# Patient Record
Sex: MALE | Race: WHITE | NOT HISPANIC OR LATINO | Employment: OTHER | URBAN - METROPOLITAN AREA
[De-identification: names, ages, dates, MRNs, and addresses within clinical notes are randomized per-mention and may not be internally consistent; named-entity substitution may affect disease eponyms.]

---

## 2018-02-08 ENCOUNTER — OFFICE VISIT (OUTPATIENT)
Dept: FAMILY MEDICINE | Facility: CLINIC | Age: 58
End: 2018-02-08
Payer: COMMERCIAL

## 2018-02-08 VITALS
DIASTOLIC BLOOD PRESSURE: 72 MMHG | HEART RATE: 79 BPM | SYSTOLIC BLOOD PRESSURE: 120 MMHG | HEIGHT: 70 IN | BODY MASS INDEX: 21.47 KG/M2 | WEIGHT: 149.94 LBS

## 2018-02-08 DIAGNOSIS — Z00.00 ANNUAL PHYSICAL EXAM: Primary | ICD-10-CM

## 2018-02-08 DIAGNOSIS — N52.9 ERECTILE DYSFUNCTION, UNSPECIFIED ERECTILE DYSFUNCTION TYPE: ICD-10-CM

## 2018-02-08 DIAGNOSIS — Z12.11 SCREEN FOR COLON CANCER: ICD-10-CM

## 2018-02-08 DIAGNOSIS — Z23 NEED FOR VACCINATION AGAINST STREPTOCOCCUS PNEUMONIAE: ICD-10-CM

## 2018-02-08 DIAGNOSIS — F17.200 SMOKER: ICD-10-CM

## 2018-02-08 DIAGNOSIS — Z12.5 SCREENING FOR PROSTATE CANCER: ICD-10-CM

## 2018-02-08 PROCEDURE — 99396 PREV VISIT EST AGE 40-64: CPT | Mod: 25,S$GLB,, | Performed by: FAMILY MEDICINE

## 2018-02-08 PROCEDURE — 99213 OFFICE O/P EST LOW 20 MIN: CPT | Mod: PO | Performed by: FAMILY MEDICINE

## 2018-02-08 PROCEDURE — 99999 PR PBB SHADOW E&M-EST. PATIENT-LVL III: CPT | Mod: PBBFAC,,, | Performed by: FAMILY MEDICINE

## 2018-02-08 PROCEDURE — 90471 IMMUNIZATION ADMIN: CPT | Mod: S$GLB,,, | Performed by: FAMILY MEDICINE

## 2018-02-08 PROCEDURE — 90732 PPSV23 VACC 2 YRS+ SUBQ/IM: CPT | Mod: S$GLB,,, | Performed by: FAMILY MEDICINE

## 2018-02-08 RX ORDER — SILDENAFIL 100 MG/1
100 TABLET, FILM COATED ORAL DAILY PRN
Qty: 4 TABLET | Refills: 6 | Status: SHIPPED | OUTPATIENT
Start: 2018-02-08 | End: 2019-12-05 | Stop reason: SDUPTHER

## 2018-02-08 NOTE — PROGRESS NOTES
Subjective:       Patient ID: Blayne Beebe is a 57 y.o. male.    Chief Complaint: Annual Exam and Establish Care    57 years old male came to the clinic for his physical examination.  Patient requests refill of his medicine for erectile dysfunction.      Review of Systems   Constitutional: Negative.    HENT: Negative.    Eyes: Negative.    Respiratory: Negative.    Cardiovascular: Negative.    Gastrointestinal: Negative.    Genitourinary: Negative.    Musculoskeletal: Negative.    Skin: Negative.    Neurological: Negative.    Psychiatric/Behavioral: Negative.        Objective:      Physical Exam   Constitutional: He is oriented to person, place, and time. He appears well-developed and well-nourished. No distress.   HENT:   Head: Normocephalic and atraumatic.   Right Ear: External ear normal.   Left Ear: External ear normal.   Nose: Nose normal.   Mouth/Throat: Oropharynx is clear and moist. No oropharyngeal exudate.   Eyes: Conjunctivae and EOM are normal. Pupils are equal, round, and reactive to light. Right eye exhibits no discharge. Left eye exhibits no discharge. No scleral icterus.   Neck: Normal range of motion. Neck supple. No JVD present. No tracheal deviation present. No thyromegaly present.   Cardiovascular: Normal rate, regular rhythm, normal heart sounds and intact distal pulses.  Exam reveals no gallop and no friction rub.    No murmur heard.  Pulmonary/Chest: Effort normal and breath sounds normal. No stridor. No respiratory distress. He has no wheezes. He has no rales. He exhibits no tenderness.   Abdominal: Soft. Bowel sounds are normal. He exhibits no distension and no mass. There is no tenderness. There is no rebound and no guarding.   Musculoskeletal: Normal range of motion. He exhibits no edema or tenderness.   Lymphadenopathy:     He has no cervical adenopathy.   Neurological: He is alert and oriented to person, place, and time. He has normal reflexes. He displays normal reflexes. No  cranial nerve deficit. He exhibits normal muscle tone. Coordination normal.   Skin: Skin is warm and dry. No rash noted. He is not diaphoretic. No erythema. No pallor.   Psychiatric: He has a normal mood and affect. His behavior is normal. Judgment and thought content normal.   Nursing note and vitals reviewed.      Assessment:       1. Annual physical exam    2. Screen for colon cancer    3. Screening for prostate cancer    4. Smoker    5. Need for vaccination against Streptococcus pneumoniae    6. Erectile dysfunction, unspecified erectile dysfunction type        Plan:         Blayne was seen today for annual exam and establish care.    Diagnoses and all orders for this visit:    Annual physical exam  -     Lipid panel; Future  -     Comprehensive metabolic panel; Future  -     Urinalysis; Future  -     TSH; Future  -     CBC auto differential; Future    Screen for colon cancer  -     Case request GI: COLONOSCOPY    Screening for prostate cancer  -     PSA, Screening; Future    Smoker  -     Ambulatory referral to Smoking Cessation Program    Need for vaccination against Streptococcus pneumoniae  -     Pneumococcal Polysaccharide Vaccine (23 Valent) (SQ/IM)    Erectile dysfunction, unspecified erectile dysfunction type  -     sildenafil (VIAGRA) 100 MG tablet; Take 1 tablet (100 mg total) by mouth daily as needed for Erectile Dysfunction.

## 2018-02-08 NOTE — PATIENT INSTRUCTIONS
Oral Medicines for Erectile Dysfunction  You can use prescription oral medicine for ED. They often work well. But, like all medicines, they can have side effects. Also, you cant use them if you have certain health problems or take certain other medicines. Talk with your doctor about oral ED medicine. Ask whether it is right for you.  Types of oral ED medicines  There are three types of prescription oral ED medicines. Each one increases blood flow to the penis. When the penis is stimulated, an erection results. The types are:  · Sildenafil citrate   · Tadalafil   · Vardenafil  · Avanfil  What oral ED medicines dont do  There are some things ED medicines cant do.  · They dont cure the cause of your ED. Without the medicine, youll still have trouble getting an erection.  · They cant produce an erection without sexual stimulation.  · They wont increase sexual desire. They also wont solve any other sexual issues. Psychological, emotional, or relationship issues will not be fixed.  Taking oral ED medicines safely  · Do not combine ED medicines with other treatments unless your doctor tells you to.  · Dont take ED medicines more often or in larger doses than prescribed.  · Tell your doctor your health history. Mention all medicines you take. This includes over-the-counter drugs, supplements, and herbs.  · Ask your doctor about whether you can drink alcohol while taking ED medication.  Possible side effects of oral ED medicines  · Headache  · Facial flushing  · Runny or stuffy nose  · Indigestion  · Distortion of your color vision for a short time  · Sudden vision loss or hearing loss (rare)  · Dizziness  Risks of oral ED medicines  · Do not take ED medicines if you take medicines containing nitrates. The combination may drop your blood pressure to a dangerous level. Nitrates include nitroglycerin (a drug for angina or chest pain). They are also in poppers, an inhaled recreational drug. If youre not sure  whether youre taking nitrates, ask your doctor or pharmacist.  · Medicines called alpha-blockers can interact with ED medicines. They can cause a sudden drop in blood pressure. Alpha-blockers are a common treatment for prostate problems. They also treat high blood pressure. Be sure your doctor knows if you take these medicines.  · If youve had a heart attack or have heart disease and you have not had sex for a while, talk to your doctor. Having sex again can put extra strain on your heart. Your doctor can confirm that your heart is healthy enough for sex.  · It is rare, but some men taking ED medicines have had sudden vision loss. This may be more likely if other health problems are present. These include high blood pressure and diabetes. Ask your doctor if you are at risk for this type of vision loss.  · In rare cases, an erection may last too long. This can badly damage the blood vessels in your penis. If an erection lasts longer than 4 hours, go to the emergency room right away.   Date Last Reviewed: 1/1/2017 © 2000-2017 [employer]. [address] [place] [address], [place] [address]. All rights reserved. This information is not intended as a substitute for professional medical care. Always follow your healthcare professional's instructions.

## 2018-02-10 ENCOUNTER — LAB VISIT (OUTPATIENT)
Dept: LAB | Facility: HOSPITAL | Age: 58
End: 2018-02-10
Attending: FAMILY MEDICINE
Payer: COMMERCIAL

## 2018-02-10 DIAGNOSIS — Z12.5 SCREENING FOR PROSTATE CANCER: ICD-10-CM

## 2018-02-10 DIAGNOSIS — Z00.00 ANNUAL PHYSICAL EXAM: ICD-10-CM

## 2018-02-10 LAB
ALBUMIN SERPL BCP-MCNC: 4.1 G/DL
ALP SERPL-CCNC: 92 U/L
ALT SERPL W/O P-5'-P-CCNC: 22 U/L
ANION GAP SERPL CALC-SCNC: 7 MMOL/L
AST SERPL-CCNC: 25 U/L
BASOPHILS # BLD AUTO: 0.05 K/UL
BASOPHILS NFR BLD: 0.7 %
BILIRUB SERPL-MCNC: 0.4 MG/DL
BUN SERPL-MCNC: 9 MG/DL
CALCIUM SERPL-MCNC: 9.8 MG/DL
CHLORIDE SERPL-SCNC: 105 MMOL/L
CHOLEST SERPL-MCNC: 181 MG/DL
CHOLEST/HDLC SERPL: 2.9 {RATIO}
CO2 SERPL-SCNC: 27 MMOL/L
COMPLEXED PSA SERPL-MCNC: 0.96 NG/ML
CREAT SERPL-MCNC: 0.9 MG/DL
DIFFERENTIAL METHOD: ABNORMAL
EOSINOPHIL # BLD AUTO: 0.2 K/UL
EOSINOPHIL NFR BLD: 3.1 %
ERYTHROCYTE [DISTWIDTH] IN BLOOD BY AUTOMATED COUNT: 13.2 %
EST. GFR  (AFRICAN AMERICAN): >60 ML/MIN/1.73 M^2
EST. GFR  (NON AFRICAN AMERICAN): >60 ML/MIN/1.73 M^2
GLUCOSE SERPL-MCNC: 94 MG/DL
HCT VFR BLD AUTO: 45 %
HDLC SERPL-MCNC: 63 MG/DL
HDLC SERPL: 34.8 %
HGB BLD-MCNC: 15.6 G/DL
IMM GRANULOCYTES # BLD AUTO: 0.05 K/UL
IMM GRANULOCYTES NFR BLD AUTO: 0.7 %
LDLC SERPL CALC-MCNC: 91.8 MG/DL
LYMPHOCYTES # BLD AUTO: 2.1 K/UL
LYMPHOCYTES NFR BLD: 28.8 %
MCH RBC QN AUTO: 32.9 PG
MCHC RBC AUTO-ENTMCNC: 34.7 G/DL
MCV RBC AUTO: 95 FL
MONOCYTES # BLD AUTO: 0.6 K/UL
MONOCYTES NFR BLD: 8.2 %
NEUTROPHILS # BLD AUTO: 4.2 K/UL
NEUTROPHILS NFR BLD: 58.5 %
NONHDLC SERPL-MCNC: 118 MG/DL
NRBC BLD-RTO: 0 /100 WBC
PLATELET # BLD AUTO: 272 K/UL
PMV BLD AUTO: 9.2 FL
POTASSIUM SERPL-SCNC: 4.4 MMOL/L
PROT SERPL-MCNC: 7.9 G/DL
RBC # BLD AUTO: 4.74 M/UL
SODIUM SERPL-SCNC: 139 MMOL/L
TRIGL SERPL-MCNC: 131 MG/DL
TSH SERPL DL<=0.005 MIU/L-ACNC: 0.67 UIU/ML
WBC # BLD AUTO: 7.21 K/UL

## 2018-02-10 PROCEDURE — 84153 ASSAY OF PSA TOTAL: CPT

## 2018-02-10 PROCEDURE — 80061 LIPID PANEL: CPT

## 2018-02-10 PROCEDURE — 84443 ASSAY THYROID STIM HORMONE: CPT

## 2018-02-10 PROCEDURE — 85025 COMPLETE CBC W/AUTO DIFF WBC: CPT

## 2018-02-10 PROCEDURE — 80053 COMPREHEN METABOLIC PANEL: CPT

## 2018-02-10 PROCEDURE — 36415 COLL VENOUS BLD VENIPUNCTURE: CPT | Mod: PO

## 2018-02-14 ENCOUNTER — CLINICAL SUPPORT (OUTPATIENT)
Dept: SMOKING CESSATION | Facility: CLINIC | Age: 58
End: 2018-02-14
Payer: COMMERCIAL

## 2018-02-14 DIAGNOSIS — F17.200 NICOTINE DEPENDENCE: Primary | ICD-10-CM

## 2018-02-14 PROCEDURE — 99406 BEHAV CHNG SMOKING 3-10 MIN: CPT | Mod: S$GLB,,,

## 2018-02-16 ENCOUNTER — TELEPHONE (OUTPATIENT)
Dept: GASTROENTEROLOGY | Facility: CLINIC | Age: 58
End: 2018-02-16

## 2018-02-16 NOTE — TELEPHONE ENCOUNTER
Referral was sent from Dr. Howard to schedule an Colonoscopy, left an voicemail for patient to call the office back in regards to scheduling procedure.

## 2018-02-20 ENCOUNTER — TELEPHONE (OUTPATIENT)
Dept: GASTROENTEROLOGY | Facility: CLINIC | Age: 58
End: 2018-02-20

## 2018-02-23 ENCOUNTER — TELEPHONE (OUTPATIENT)
Dept: GASTROENTEROLOGY | Facility: CLINIC | Age: 58
End: 2018-02-23

## 2018-02-26 ENCOUNTER — TELEPHONE (OUTPATIENT)
Dept: GASTROENTEROLOGY | Facility: CLINIC | Age: 58
End: 2018-02-26

## 2018-02-26 NOTE — TELEPHONE ENCOUNTER
----- Message from Rui Rothman sent at 2/26/2018  3:11 PM CST -----  Contact: [phone] self  Patient would like to schedule a colonoscopy. Please call and advise.

## 2018-02-27 ENCOUNTER — TELEPHONE (OUTPATIENT)
Dept: GASTROENTEROLOGY | Facility: CLINIC | Age: 58
End: 2018-02-27

## 2018-02-27 ENCOUNTER — CLINICAL SUPPORT (OUTPATIENT)
Dept: SMOKING CESSATION | Facility: CLINIC | Age: 58
End: 2018-02-27
Payer: COMMERCIAL

## 2018-02-27 DIAGNOSIS — Z12.11 SCREENING FOR COLON CANCER: Primary | ICD-10-CM

## 2018-02-27 DIAGNOSIS — F17.210 HEAVY CIGARETTE SMOKER (20-39 PER DAY): Primary | ICD-10-CM

## 2018-02-27 PROCEDURE — 99404 PREV MED CNSL INDIV APPRX 60: CPT | Mod: S$GLB,,,

## 2018-02-27 PROCEDURE — 99999 PR PBB SHADOW E&M-EST. PATIENT-LVL I: CPT | Mod: PBBFAC,,,

## 2018-02-27 RX ORDER — IBUPROFEN 200 MG
1 TABLET ORAL DAILY
Qty: 14 PATCH | Refills: 0 | Status: SHIPPED | OUTPATIENT
Start: 2018-02-27 | End: 2018-03-22 | Stop reason: SDUPTHER

## 2018-02-27 RX ORDER — MICONAZOLE NITRATE 2 %
2 CREAM (GRAM) TOPICAL
Qty: 100 EACH | Refills: 0 | Status: SHIPPED | OUTPATIENT
Start: 2018-02-27 | End: 2019-12-05

## 2018-02-27 NOTE — TELEPHONE ENCOUNTER
Colonoscopy Referral    Referring Physician: Dr. Howard      Date: 03/27/2018    Reason for Referral: Screening Colon     Family History of: None   Colon polyp:  Relationship/Age of Onset:     Colon cancer:   Relationship/Age of Onset:     Patient with:   Hemoccults Done: No     Iron deficient: No     On Blood Thinner: No     Valvular heart disease/valve replacement: No     Anemia Present: No     On NSAID: No     Lung disease: No     Kidney disease: No     Hx of polyps:     Hx of colon cancer:     Previous colon evalations:  None   When:   Where:   Pertinent symptoms:       Review of patient's allergies indicates:  No Known Allergies    Patient was scheduled for colonoscopy on 03/27/2018 with Dr. Morocho at [facility]. Split dose instructions were reviewed with patient.

## 2018-03-07 ENCOUNTER — CLINICAL SUPPORT (OUTPATIENT)
Dept: SMOKING CESSATION | Facility: CLINIC | Age: 58
End: 2018-03-07
Payer: COMMERCIAL

## 2018-03-07 DIAGNOSIS — F17.210 HEAVY CIGARETTE SMOKER (20-39 PER DAY): Primary | ICD-10-CM

## 2018-03-07 PROCEDURE — 99999 PR PBB SHADOW E&M-EST. PATIENT-LVL I: CPT | Mod: PBBFAC,,,

## 2018-03-07 PROCEDURE — 90853 GROUP PSYCHOTHERAPY: CPT | Mod: S$GLB,,,

## 2018-03-07 NOTE — Clinical Note
Patient is smoking 20 cpd.  Patient will attempt decrease to 15 cpd this week.  Patient remains on prescribed tobacco cessation medication regimen of 21 mg patch without any negative side effects at this time. The patient denies any abnormal behavioral or mental changes at this time. The patient will continue with group therapy sessions and medication monitoring by CTTS. Prescribed medication management will be by physician.

## 2018-03-08 NOTE — PROGRESS NOTES
Site: [facility]  Date:  3/7/18  Clinical Status of Patient: Outpatient   Length of Service and Code: 60 minutes - 84861   Number in Attendance: 14  Group Activities/Focus of Group:  orientation, client introductions, completion of TCRS (Tobacco Cessation Rating Scale) learned addiction model, cues/triggers, personal reasons for quitting, medications, goals, quit date    Target symptoms:  withdrawal and medication side effects             The following were rated moderate (3) to severe (4) on TCRS:       Moderate 3: Desire Crave tobacco/ Increased appetite weight gain/Explained as withdrawal     Severe 4:   none  Patient's Response to Intervention: Patient is smoking 20 cpd.  Patient will attempt decrease to 15 cpd this week.  Patient remains on prescribed tobacco cessation medication regimen of 21 mg patch without any negative side effects at this time. The patient denies any abnormal behavioral or mental changes at this time. The patient will continue with group therapy sessions and medication monitoring by CTTS. Prescribed medication management will be by physician.     Progress Toward Goals and Other Mental Status Changes: The patient denies any abnormal behavioral or mental changes at this time.     Interval History:   Diagnosis: Z72.0  Plan: The patient will continue with group therapy sessions and medication regimen prescribed with management by physician or Cessation Clinic Provider. Patient will inform Smoking Clinic Cessation Counselor of symptoms as rated high on TCRS.    Return to Clinic: 1 week

## 2018-03-15 ENCOUNTER — CLINICAL SUPPORT (OUTPATIENT)
Dept: SMOKING CESSATION | Facility: CLINIC | Age: 58
End: 2018-03-15
Payer: COMMERCIAL

## 2018-03-15 DIAGNOSIS — F17.210 MODERATE CIGARETTE SMOKER (10-19 PER DAY): Primary | ICD-10-CM

## 2018-03-15 PROCEDURE — 99999 PR PBB SHADOW E&M-EST. PATIENT-LVL I: CPT | Mod: PBBFAC,,,

## 2018-03-15 PROCEDURE — 99406 BEHAV CHNG SMOKING 3-10 MIN: CPT | Mod: S$GLB,,,

## 2018-03-21 ENCOUNTER — CLINICAL SUPPORT (OUTPATIENT)
Dept: SMOKING CESSATION | Facility: CLINIC | Age: 58
End: 2018-03-21
Payer: COMMERCIAL

## 2018-03-21 DIAGNOSIS — F17.210 MODERATE CIGARETTE SMOKER (10-19 PER DAY): Primary | ICD-10-CM

## 2018-03-21 PROCEDURE — 99999 PR PBB SHADOW E&M-EST. PATIENT-LVL I: CPT | Mod: PBBFAC,,,

## 2018-03-21 PROCEDURE — 90853 GROUP PSYCHOTHERAPY: CPT | Mod: S$GLB,,,

## 2018-03-21 NOTE — Clinical Note
Patient is smoking 12 cpd.  Patient will attempt 10 cpd this week.  Patient remains on prescribed tobacco cessation medication regimen of 14 mg patch without any negative side effects at this time.The patient denies any abnormal behavioral or mental changes at this time.

## 2018-03-22 RX ORDER — IBUPROFEN 200 MG
1 TABLET ORAL DAILY
Qty: 14 PATCH | Refills: 0 | Status: SHIPPED | OUTPATIENT
Start: 2018-03-22 | End: 2019-12-05

## 2018-03-22 NOTE — PROGRESS NOTES
Smoking Cessation Group Session #3    Site: [facility]  Date:  3/21/18  Clinical Status of Patient: Outpatient   Length of Service and Code: 60 minutes - 56166   Number in Attendance: 15  Group Activities/Focus of Group:  completion of TCRS (Tobacco Cessation Rating Scale) reviewed strategies, controlling environment, cues, triggers, new goals set. Introduced high risk situations with preparation interventions, caffeine similarities with withdrawal issues of habit and nicotine, Alcohol, Understanding urges, cravings, stress and relaxation. Open discussion with intervention discussion.    Specific session focus: managing habitual behavior    Target symptoms:  withdrawal and medication side effects             The following were rated moderate (3) to severe (4) on TCRS:       Moderate 3: Desire Crave tobacco/Explained as withdrawal     Severe 4:   none  Patient's Response to Intervention: Patient is smoking 12 cpd.  Patient will attempt 10 cpd this week.  Patient remains on prescribed tobacco cessation medication regimen of 14 mg patch without any negative side effects at this time.The patient denies any abnormal behavioral or mental changes at this time. The patient will continue with group therapy sessions and medication monitoring by CTTS. Prescribed medication management will be by physician.     Progress Toward Goals and Other Mental Status Changes: The patient denies any abnormal behavioral or mental changes at this time.     Interval History:     Diagnosis: Z72.0  Plan: The patient will continue with group therapy sessions and medication regimen prescribed with management by physician or by the Cessation Clinic Provider. Patient will inform Smoking Cessation Counselor of symptoms as rated high on TCRS.    Return to Clinic: 1 week    Quit Date:    Planned Quit Date:

## 2018-03-27 ENCOUNTER — SURGERY (OUTPATIENT)
Age: 58
End: 2018-03-27

## 2018-03-27 ENCOUNTER — ANESTHESIA (OUTPATIENT)
Dept: ENDOSCOPY | Facility: HOSPITAL | Age: 58
End: 2018-03-27
Payer: COMMERCIAL

## 2018-03-27 ENCOUNTER — ANESTHESIA EVENT (OUTPATIENT)
Dept: ENDOSCOPY | Facility: HOSPITAL | Age: 58
End: 2018-03-27
Payer: COMMERCIAL

## 2018-03-27 ENCOUNTER — HOSPITAL ENCOUNTER (OUTPATIENT)
Facility: HOSPITAL | Age: 58
Discharge: HOME | End: 2018-03-27
Attending: INTERNAL MEDICINE | Admitting: INTERNAL MEDICINE
Payer: COMMERCIAL

## 2018-03-27 VITALS
OXYGEN SATURATION: 98 % | HEART RATE: 64 BPM | DIASTOLIC BLOOD PRESSURE: 73 MMHG | HEIGHT: 70 IN | SYSTOLIC BLOOD PRESSURE: 125 MMHG | RESPIRATION RATE: 16 BRPM | TEMPERATURE: 96 F | BODY MASS INDEX: 22.19 KG/M2 | WEIGHT: 155 LBS

## 2018-03-27 DIAGNOSIS — Z12.11 SCREENING FOR MALIGNANT NEOPLASM OF COLON: Primary | ICD-10-CM

## 2018-03-27 PROCEDURE — 27201089 HC SNARE, DISP (ANY): Performed by: INTERNAL MEDICINE

## 2018-03-27 PROCEDURE — 37000008 HC ANESTHESIA 1ST 15 MINUTES: Performed by: INTERNAL MEDICINE

## 2018-03-27 PROCEDURE — 37000009 HC ANESTHESIA EA ADD 15 MINS: Performed by: INTERNAL MEDICINE

## 2018-03-27 PROCEDURE — 45385 COLONOSCOPY W/LESION REMOVAL: CPT | Performed by: INTERNAL MEDICINE

## 2018-03-27 PROCEDURE — 88305 TISSUE EXAM BY PATHOLOGIST: CPT | Mod: 26,,, | Performed by: PATHOLOGY

## 2018-03-27 PROCEDURE — 45385 COLONOSCOPY W/LESION REMOVAL: CPT | Mod: 33,,, | Performed by: INTERNAL MEDICINE

## 2018-03-27 PROCEDURE — 88305 TISSUE EXAM BY PATHOLOGIST: CPT | Performed by: PATHOLOGY

## 2018-03-27 PROCEDURE — 25000003 PHARM REV CODE 250: Performed by: INTERNAL MEDICINE

## 2018-03-27 PROCEDURE — 63600175 PHARM REV CODE 636 W HCPCS: Performed by: NURSE ANESTHETIST, CERTIFIED REGISTERED

## 2018-03-27 RX ORDER — PROPOFOL 10 MG/ML
VIAL (ML) INTRAVENOUS
Status: DISCONTINUED | OUTPATIENT
Start: 2018-03-27 | End: 2018-03-27

## 2018-03-27 RX ORDER — LIDOCAINE HCL/PF 100 MG/5ML
SYRINGE (ML) INTRAVENOUS
Status: DISCONTINUED | OUTPATIENT
Start: 2018-03-27 | End: 2018-03-27

## 2018-03-27 RX ORDER — SODIUM CHLORIDE 9 MG/ML
INJECTION, SOLUTION INTRAVENOUS CONTINUOUS
Status: DISCONTINUED | OUTPATIENT
Start: 2018-03-27 | End: 2018-03-27 | Stop reason: HOSPADM

## 2018-03-27 RX ORDER — PROPOFOL 10 MG/ML
VIAL (ML) INTRAVENOUS CONTINUOUS PRN
Status: DISCONTINUED | OUTPATIENT
Start: 2018-03-27 | End: 2018-03-27

## 2018-03-27 RX ADMIN — PROPOFOL 100 MG: 10 INJECTION, EMULSION INTRAVENOUS at 09:03

## 2018-03-27 RX ADMIN — LIDOCAINE HYDROCHLORIDE 60 MG: 20 INJECTION, SOLUTION INTRAVENOUS at 09:03

## 2018-03-27 RX ADMIN — PROPOFOL 150 MCG/KG/MIN: 10 INJECTION, EMULSION INTRAVENOUS at 09:03

## 2018-03-27 RX ADMIN — SODIUM CHLORIDE: 0.9 INJECTION, SOLUTION INTRAVENOUS at 08:03

## 2018-03-27 NOTE — PLAN OF CARE
Past Medical History:   Diagnosis Date    Arthritis     COPD (chronic obstructive pulmonary disease)      Accompanied by spouse, ok to speak to her re results of exam.

## 2018-03-27 NOTE — H&P
[facility]  Gastroenterology  H&P    Patient Name: Blayne Beebe  MRN: [id number]  Admission Date: 3/27/2018  Code Status: No Order    Attending Provider: Maria Teresa Morocho MD   Primary Care Physician: Ney Gardner MD  Principal Problem:<principal problem not specified>    Subjective:     History of Present Illness: Screening colonoscopy    Past Medical History:   Diagnosis Date    Arthritis     COPD (chronic obstructive pulmonary disease)        Past Surgical History:   Procedure Laterality Date    CERVICAL FUSION      INGUINAL HERNIA REPAIR Bilateral     Right x2, x1 left    ROTATOR CUFF REPAIR Right     x2       Review of patient's allergies indicates:  No Known Allergies  Family History     Problem Relation (Age of Onset)    Diabetes Mother    Heart disease Father        Social History Main Topics    Smoking status: Current Every Day Smoker     Packs/day: 0.50     Types: Cigarettes    Smokeless tobacco: Never Used    Alcohol use Yes    Drug use: No    Sexual activity: Not on file     Review of Systems   Constitutional: Negative for appetite change and unexpected weight change.   Respiratory: Negative for apnea and chest tightness.    Cardiovascular: Negative for chest pain and leg swelling.   Gastrointestinal: Negative for abdominal distention and abdominal pain.   Musculoskeletal: Negative for arthralgias and back pain.     Objective:     Vital Signs (Most Recent):  Temp: 97.6 °F (36.4 °C) (03/27/18 0831)  Pulse: 64 (03/27/18 0831)  Resp: 20 (03/27/18 0831)  BP: (!) 156/86 (03/27/18 0832)  SpO2: 97 % (03/27/18 0831) Vital Signs (24h Range):  Temp:  [97.6 °F (36.4 °C)] 97.6 °F (36.4 °C)  Pulse:  [64] 64  Resp:  [20] 20  SpO2:  [97 %] 97 %  BP: (156)/(86) 156/86     Weight: 70.3 kg (155 lb) (03/27/18 0831)  Body mass index is 22.24 kg/m².    No intake or output data in the 24 hours ending 03/27/18 0834    Lines/Drains/Airways          No matching active lines, drains, or  airways          Physical Exam   Constitutional: He is oriented to person, place, and time. He appears well-developed and well-nourished. No distress.   HENT:   Head: Normocephalic.   Eyes: Conjunctivae are normal. No scleral icterus.   Neck: No tracheal deviation present. No thyromegaly present.   Cardiovascular: Normal rate, regular rhythm and normal heart sounds.  Exam reveals no gallop and no friction rub.    No murmur heard.  Pulmonary/Chest: Effort normal and breath sounds normal. He has no wheezes. He has no rales.   Abdominal: Soft. Bowel sounds are normal. He exhibits no distension. There is no tenderness. There is no rebound and no guarding.   Musculoskeletal: Normal range of motion. He exhibits no edema or tenderness.   Neurological: He is alert and oriented to person, place, and time.   Skin: He is not diaphoretic.   Psychiatric: He has a normal mood and affect. His behavior is normal.         Assessment/Plan:     Active Diagnoses:    Diagnosis Date Noted POA    Screening for malignant neoplasm of colon [Z12.11] 03/27/2018 Not Applicable      Problems Resolved During this Admission:    Diagnosis Date Noted Date Resolved POA       - Screening colonoscopy    Maria Teresa Morocho MD  Gastroenterology  [facility]

## 2018-03-27 NOTE — ANESTHESIA PREPROCEDURE EVALUATION
03/27/2018  Blayne Beebe is a 57 y.o., male.    Anesthesia Evaluation     I have reviewed the Nursing Notes.   I have reviewed the Medications.     Review of Systems  Anesthesia Hx:  No problems with previous Anesthesia Denies Hx of Anesthetic complications Denies Family Hx of Anesthesia complications.    Social:  Non-Smoker, No Alcohol Use    Hematology/Oncology:  Hematology Normal   Oncology Normal     EENT/Dental:EENT/Dental Normal   Cardiovascular:   Exercise tolerance: good Dysrhythmias  Functional Capacity good / => 4 METS    Pulmonary:   COPD, mild    Renal/:  Renal/ Normal     Hepatic/GI:  Hepatic/GI Normal    Musculoskeletal:   Arthritis     Neurological:  Neurology Normal    Endocrine:  Endocrine Normal        Physical Exam  General:  Well nourished    Airway/Jaw/Neck:  Airway Findings: Mouth Opening: Normal Tongue: Normal  General Airway Assessment: Adult  Mallampati: II  TM Distance: Normal, at least 6 cm  Jaw/Neck Findings:     Neck ROM: Normal ROM      Dental:  Dental Findings: In tact        Mental Status:  Mental Status Findings:  Cooperative, Alert and Oriented         Anesthesia Plan  Type of Anesthesia, risks & benefits discussed:  Anesthesia Type:  MAC  Patient's Preference: MAC  Intra-op Monitoring Plan:   Intra-op Monitoring Plan Comments:   Post Op Pain Control Plan:   Post Op Pain Control Plan Comments:   Induction:   IV  Beta Blocker:         Informed Consent: Patient understands risks and agrees with Anesthesia plan.  Questions answered. Anesthesia consent signed with patient.  ASA Score: 2     Day of Surgery Review of History & Physical:            Ready For Surgery From Anesthesia Perspective.

## 2018-03-27 NOTE — ANESTHESIA POSTPROCEDURE EVALUATION
"Anesthesia Post Evaluation    Patient: Blayne Beebe    Procedure(s) Performed: Procedure(s) (LRB):  COLONOSCOPY (N/A)    Final Anesthesia Type: MAC  Patient location during evaluation: GI PACU  Patient participation: Yes- Able to Participate  Level of consciousness: awake and alert and oriented  Post-procedure vital signs: reviewed and stable  Pain management: adequate  Airway patency: patent  PONV status at discharge: No PONV  Anesthetic complications: no      Cardiovascular status: blood pressure returned to baseline and hemodynamically stable  Respiratory status: unassisted, room air and spontaneous ventilation  Hydration status: euvolemic  Follow-up not needed.        Visit Vitals  BP (!) 156/86   Pulse 64   Temp 36.4 °C (97.6 °F) (Oral)   Resp 20   Ht 5' 10" (1.778 m)   Wt 70.3 kg (155 lb)   SpO2 97%   BMI 22.24 kg/m²       Pain/Yessy Score: Pain Assessment Performed: Yes (3/27/2018  8:32 AM)  Presence of Pain: denies (3/27/2018  8:32 AM)      "

## 2018-03-27 NOTE — PLAN OF CARE
Discharge instructions discussed with pt and wife with all questions addressed. Awaiting to speak to dr lerma in re to procedure

## 2018-03-27 NOTE — TRANSFER OF CARE
"Anesthesia Transfer of Care Note    Patient: Blayne ECKERT III Steib    Procedure(s) Performed: Procedure(s) (LRB):  COLONOSCOPY (N/A)    Patient location: GI    Anesthesia Type: MAC    Transport from OR: Transported from OR on room air with adequate spontaneous ventilation    Post pain: adequate analgesia    Post assessment: tolerated procedure well and no apparent anesthetic complications    Post vital signs: stable    Level of consciousness: awake, alert and oriented    Nausea/Vomiting: no nausea/vomiting    Complications: none    Transfer of care protocol was followed      Last vitals:   Visit Vitals  BP (!) 156/86   Pulse 64   Temp 36.4 °C (97.6 °F) (Oral)   Resp 20   Ht 5' 10" (1.778 m)   Wt 70.3 kg (155 lb)   SpO2 97%   BMI 22.24 kg/m²     "

## 2018-03-27 NOTE — PROVATION PATIENT INSTRUCTIONS
Discharge Summary/Instructions after an Endoscopic Procedure  Patient Name: Blayne Beebe  Patient MRN: [id number]  Patient YOB: 1960 Tuesday, March 27, 2018  Maria Teresa Morocho MD  RESTRICTIONS:  During your procedure today, you received medications for sedation.  These   medications may affect your judgment, balance and coordination.  Therefore,   for 24 hours, you have the following restrictions:   - DO NOT drive a car, operate machinery, make legal/financial decisions,   sign important papers or drink alcohol.    ACTIVITY:  The following day: return to full activity including work, except no heavy   lifting, straining or running for 3 days if polyps were removed.  DIET:  Eat and drink normally unless instructed otherwise.     TREATMENT FOR COMMON SIDE EFFECTS:  - Mild abdominal pain, nausea, belching, bloating or excessive gas:  rest,   eat lightly and use a heating pad.  - Sore Throat: treat with throat lozenges and/or gargle with warm salt   water.  - Because air was used during the procedure, expelling large amounts of air   from your rectum or belching is normal.  - If a bowel prep was taken, you may not have a bowel movement for 1-3 days.    This is normal.  SYMPTOMS TO WATCH FOR AND REPORT TO YOUR PHYSICIAN:  1. Abdominal pain or bloating, other than gas cramps.  2. Chest pain.  3. Back pain.  4. Signs of infection such as: chills or fever occurring within 24 hours   after the procedure.  5. Rectal bleeding, which would show as bright red, maroon, or black stools.   (A tablespoon of blood from the rectum is not serious, especially if   hemorrhoids are present.)  6. Vomiting.  7. Weakness or dizziness.  GO DIRECTLY TO THE NEAREST EMERGENCY ROOM IF YOU HAVE ANY OF THE FOLLOWING:      Difficulty breathing              Chills and/or fever over 101 F   Persistent vomiting and/or vomiting blood   Severe abdominal pain   Severe chest pain   Black, tarry stools   Bleeding- more than one tablespoon   Any  other symptom or condition that you feel may need urgent attention  Your doctor recommends these additional instructions:  If any biopsies were taken, your doctors clinic will contact you in 1 to 2   weeks with any results.  You are being discharged to home.   You have a contact number available for emergencies.  The signs and symptoms   of potential delayed complications were discussed with you.  You may return   to normal activities tomorrow.  Written discharge instructions were   provided to you.   Resume your previous diet.   Continue your present medications.   We are waiting for your pathology results.   Your physician has recommended a repeat colonoscopy (date to be determined   after pending pathology results are reviewed) for surveillance.  For questions, problems or results please call your physician - Maria Teresa Morocho MD at Work:  [phone].  EMERGENCY PHONE NUMBER: [phone],  LAB RESULTS: [phone]  IF A COMPLICATION OR EMERGENCY SITUATION ARISES AND YOU ARE UNABLE TO REACH   YOUR PHYSICIAN - GO DIRECTLY TO THE EMERGENCY ROOM.  Maria Teresa Morocho MD  3/27/2018 9:28:46 AM  This report has been verified and signed electronically.

## 2018-04-10 ENCOUNTER — TELEPHONE (OUTPATIENT)
Dept: GASTROENTEROLOGY | Facility: CLINIC | Age: 58
End: 2018-04-10

## 2018-04-10 NOTE — TELEPHONE ENCOUNTER
----- Message from Maria Teresa Morocho MD sent at 4/9/2018 12:34 PM CDT -----  One tubular adenoma, 5 year colonoscopy recall

## 2018-05-09 ENCOUNTER — CLINICAL SUPPORT (OUTPATIENT)
Dept: SMOKING CESSATION | Facility: CLINIC | Age: 58
End: 2018-05-09
Payer: COMMERCIAL

## 2018-05-09 DIAGNOSIS — F17.200 NICOTINE DEPENDENCE: Primary | ICD-10-CM

## 2018-05-09 PROCEDURE — 99407 BEHAV CHNG SMOKING > 10 MIN: CPT | Mod: S$GLB,,,

## 2018-05-09 NOTE — PROGRESS NOTES
Successful contact with patient regarding tobacco cessation quit #2. Pt reports, he smoked his last cigarette 2 weeks ago and the prescribed nicotine gum has been very helpful. Pt commended for this accomplishment, thus far. Pt provided with his benefit status and contact information to schedule an appointment when he's ready. Will follow up with him in 3 months.

## 2018-05-15 ENCOUNTER — CLINICAL SUPPORT (OUTPATIENT)
Dept: SMOKING CESSATION | Facility: CLINIC | Age: 58
End: 2018-05-15
Payer: COMMERCIAL

## 2018-05-15 ENCOUNTER — TELEPHONE (OUTPATIENT)
Dept: SMOKING CESSATION | Facility: CLINIC | Age: 58
End: 2018-05-15

## 2018-05-15 DIAGNOSIS — F17.210 CIGARETTE NICOTINE DEPENDENCE, UNCOMPLICATED: Primary | ICD-10-CM

## 2018-05-15 PROCEDURE — 99406 BEHAV CHNG SMOKING 3-10 MIN: CPT | Mod: S$GLB,,,

## 2018-05-15 PROCEDURE — 99999 PR PBB SHADOW E&M-EST. PATIENT-LVL I: CPT | Mod: PBBFAC,,,

## 2018-08-08 ENCOUNTER — CLINICAL SUPPORT (OUTPATIENT)
Dept: SMOKING CESSATION | Facility: CLINIC | Age: 58
End: 2018-08-08
Payer: COMMERCIAL

## 2018-08-08 DIAGNOSIS — F17.200 NICOTINE DEPENDENCE: Primary | ICD-10-CM

## 2018-08-08 PROCEDURE — 99406 BEHAV CHNG SMOKING 3-10 MIN: CPT | Mod: S$GLB,,,

## 2018-08-08 NOTE — PROGRESS NOTES
Successful contact with patient regarding tobacco cessation quit #1. Pt states, he remain tobacco free and no further assistance is needed at this time. Pt commended for the accomplishment thus far.  Pt informed of his curent benefit status, 12 month telephone follow up and contact information to schedule an appointment if he need support. Will update the tobacco cessation smart form for 6 months.

## 2019-03-13 ENCOUNTER — TELEPHONE (OUTPATIENT)
Dept: SMOKING CESSATION | Facility: CLINIC | Age: 59
End: 2019-03-13

## 2019-03-25 ENCOUNTER — TELEPHONE (OUTPATIENT)
Dept: SMOKING CESSATION | Facility: CLINIC | Age: 59
End: 2019-03-25

## 2019-12-05 ENCOUNTER — OFFICE VISIT (OUTPATIENT)
Dept: FAMILY MEDICINE | Facility: CLINIC | Age: 59
End: 2019-12-05
Payer: COMMERCIAL

## 2019-12-05 VITALS
BODY MASS INDEX: 20.73 KG/M2 | HEART RATE: 76 BPM | WEIGHT: 144.81 LBS | HEIGHT: 70 IN | DIASTOLIC BLOOD PRESSURE: 72 MMHG | OXYGEN SATURATION: 97 % | SYSTOLIC BLOOD PRESSURE: 130 MMHG

## 2019-12-05 DIAGNOSIS — N52.9 ERECTILE DYSFUNCTION, UNSPECIFIED ERECTILE DYSFUNCTION TYPE: ICD-10-CM

## 2019-12-05 DIAGNOSIS — Z00.00 ANNUAL PHYSICAL EXAM: Primary | ICD-10-CM

## 2019-12-05 DIAGNOSIS — Z12.5 PROSTATE CANCER SCREENING: ICD-10-CM

## 2019-12-05 PROCEDURE — 99396 PR PREVENTIVE VISIT,EST,40-64: ICD-10-PCS | Mod: S$GLB,,, | Performed by: FAMILY MEDICINE

## 2019-12-05 PROCEDURE — 99396 PREV VISIT EST AGE 40-64: CPT | Mod: S$GLB,,, | Performed by: FAMILY MEDICINE

## 2019-12-05 PROCEDURE — 99999 PR PBB SHADOW E&M-EST. PATIENT-LVL III: CPT | Mod: PBBFAC,,, | Performed by: FAMILY MEDICINE

## 2019-12-05 PROCEDURE — 99999 PR PBB SHADOW E&M-EST. PATIENT-LVL III: ICD-10-PCS | Mod: PBBFAC,,, | Performed by: FAMILY MEDICINE

## 2019-12-05 RX ORDER — SILDENAFIL 100 MG/1
100 TABLET, FILM COATED ORAL DAILY PRN
Qty: 10 TABLET | Refills: 6 | Status: SHIPPED | OUTPATIENT
Start: 2019-12-05 | End: 2023-10-11

## 2019-12-05 NOTE — PATIENT INSTRUCTIONS
- Begin your Shingrix series (Shingles vaccine) at your local pharmacy      How to Quit Smoking  Smoking is one of the hardest habits to break. About half of all people who have ever smoked have been able to quit. Most people who still smoke want to quit. Here are some of the best ways to stop smoking.    Keep trying  Most smokers make many attempts at quitting before they are successful. Its important not to give up.  Go cold turkey  Most former smokers quit cold turkey (all at once). Trying to cut back gradually doesn't seem to work as well, perhaps because it continues the smoking habit. Also, it is possible to inhale more while smoking fewer cigarettes. This results in the same amount of nicotine in your body.  Get support  Support programs can be a big help, especially for heavy smokers. These groups offer lectures, ways to change behavior, and peer support. Here are some ways to find a support program:  · Free national quitline: 800-QUIT-NOW (770-468-7189).  · Hospital quit-smoking programs.  · American Lung Association: (843.181.6079).  · American Cancer Society (770-858-5055).  Support at home is important too. Nonsmokers can offer praise and encouragement. If the smoker in your life finds it hard to quit, encourage them to keep trying.  Over-the-counter medicines  Nicotine replacement therapy may make quitting easier. Certain aids, such as the nicotine patch, gum, and lozenges, are available without a prescription. It is best to use these under a doctors care, though. The skin patch provides a steady supply of nicotine. Nicotine gum and lozenges give temporary bursts of low levels of nicotine. Both methods reduce the craving for cigarettes. Warning: If you have nausea, vomiting, dizziness, weakness, or a fast heartbeat, stop using these products and see your doctor.  Prescription medicines  After reviewing your smoking patterns and past attempts to quit, your doctor may offer a prescription medicine such  "as bupropion, varenicline, a nicotine inhaler, or nasal spray. Each has advantages and side effects. Your doctor can review these with you.  Health benefits of quitting  The benefits of quitting start right away and keep improving the longer you go without smoking. These benefits occur at any age.  So whether you are 17 or 70, quitting is a good decision. Some of the benefits include:  · 20 minutes: Blood pressure and pulse return to normal.  · 8 hours: Oxygen levels return to normal.  · 2 days: Ability to smell and taste begin to improve as damaged nerves regrow.  · 2 to 3 weeks: Circulation and lung function improve.  · 1 to 9 months: Coughing, congestion, and shortness of breath decrease; tiredness decreases.  · 1 year: Risk of heart attack decreases by half.  · 5 years: Risk of lung cancer decreases by half; risk of stroke becomes the same as a nonsmokers.  For more on how to quit smoking, try these online resources:   · Smokefree.gov  · "Clearing the Air" booklet from the National Cancer [employer]: smokefree.gov/sites/default/files/pdf/clearing-the-air-accessible.pdf  Date Last Reviewed: 3/1/2017  © 0266-3173 [employer]. [address] [place] [address], [place] [address]. All rights reserved. This information is not intended as a substitute for professional medical care. Always follow your healthcare professional's instructions.        "

## 2019-12-05 NOTE — PROGRESS NOTES
"Subjective:       Patient ID: Blayne Beebe is a 59 y.o. male.    Chief Complaint: Annual Exam    This is a 58 yo male patient of Dr. Howard who is new to me. He presents today for his annual exam. Last check-up was February 2018. 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 Patient will have labs drawn this week. Has reduced smoking to 5 cigarettes/day. Encouraged to continue toward goal of quitting altogether. Discussed importance of engaging in physical activity at least 5x/week for a minimum of 30 min/day and maintaining a well-balanced diet. Recommended that patient begin Shingrix series at local pharmacy. Patient wears reading glasses; says he has not had an eye exam "in a couple years", but plans to follow up soon. Patient mentioned he recently saw a dermatologist who "froze" a few spots on his face and ears. Encouraged patient to use sunscreen when going outside under the sun. UTD with all other immunizations and screenings. PSA screening to be done with labs this week. Patient said he is doing great emotionally. No complaints today.      Review of Systems   Constitutional: Negative for chills, fatigue and fever.   HENT: Negative for congestion, rhinorrhea, sinus pressure, sinus pain, sore throat and tinnitus.    Eyes: Negative for visual disturbance.   Respiratory: Negative for cough, chest tightness and shortness of breath.    Cardiovascular: Negative for chest pain, palpitations and leg swelling.   Gastrointestinal: Negative for abdominal pain, constipation, diarrhea, nausea and vomiting.   Endocrine: Negative for polydipsia, polyphagia and polyuria.   Genitourinary: Negative for difficulty urinating, frequency and urgency.   Musculoskeletal: Negative for arthralgias, back pain and gait problem.   Allergic/Immunologic: Negative for food allergies.   Neurological: Negative for dizziness, weakness, light-headedness, numbness and headaches. "   Psychiatric/Behavioral: The patient is not nervous/anxious.        Objective:      Physical Exam   Constitutional: He is oriented to person, place, and time. Vital signs are normal. He appears well-developed and well-nourished. He is cooperative. No distress.   HENT:   Head: Normocephalic and atraumatic.   Right Ear: Hearing, tympanic membrane, external ear and ear canal normal.   Left Ear: Hearing, tympanic membrane, external ear and ear canal normal.   Nose: Nose normal.   Mouth/Throat: Uvula is midline, oropharynx is clear and moist and mucous membranes are normal.   Eyes: Pupils are equal, round, and reactive to light. Conjunctivae, EOM and lids are normal. Right eye exhibits no discharge. Left eye exhibits no discharge.   Wears reading glasses   Neck: Trachea normal, normal range of motion and full passive range of motion without pain. Neck supple. No JVD present.   Cardiovascular: Normal rate, regular rhythm, S1 normal, S2 normal, normal heart sounds, intact distal pulses and normal pulses.   No murmur heard.  Pulses:       Carotid pulses are 2+ on the right side, and 2+ on the left side.       Radial pulses are 2+ on the right side, and 2+ on the left side.        Dorsalis pedis pulses are 2+ on the right side, and 2+ on the left side.   Pulmonary/Chest: Effort normal and breath sounds normal. No respiratory distress. He has no wheezes. He has no rales.   Abdominal: Soft. Normal appearance and bowel sounds are normal. He exhibits no distension. There is no tenderness.   Musculoskeletal: Normal range of motion. He exhibits no edema.   Neurological: He is alert and oriented to person, place, and time. He has normal strength and normal reflexes. He exhibits normal muscle tone. Coordination and gait normal.   Skin: Skin is warm, dry and intact. Capillary refill takes less than 2 seconds. No rash noted. No erythema.   Psychiatric: He has a normal mood and affect. His speech is normal and behavior is normal.  Judgment and thought content normal. His mood appears not anxious. His affect is not inappropriate. He is not agitated and not withdrawn. Cognition and memory are normal. He does not express impulsivity.   Vitals reviewed.      Assessment:       1. Annual physical exam    2. Prostate cancer screening    3. Erectile dysfunction, unspecified erectile dysfunction type        Plan:       Blayne was seen today for annual exam.    Diagnoses and all orders for this visit:    Annual physical exam  -     CBC auto differential; Future  -     Comprehensive metabolic panel; Future  -     Lipid panel; Future  -     TSH; Future  -     Urinalysis; Future    Prostate cancer screening  -     PSA, Screening; Future    Erectile dysfunction, unspecified erectile dysfunction type  -     sildenafil (VIAGRA) 100 MG tablet; Take 1 tablet (100 mg total) by mouth daily as needed for Erectile Dysfunction.      RTC in one year or sooner if needed.    I personally evaluated the patient with the nurse practitioner.  I agree with her assessment and  plan.

## 2020-01-16 ENCOUNTER — TELEPHONE (OUTPATIENT)
Dept: FAMILY MEDICINE | Facility: CLINIC | Age: 60
End: 2020-01-16

## 2020-01-16 NOTE — TELEPHONE ENCOUNTER
----- Message from Daisha Simons sent at 1/15/2020  2:32 PM CST -----  Contact: Rekha, spouse, [phone]  States patient was seen on 12/5/19 and was supposed to have labs ordered. Please call.

## 2020-01-22 ENCOUNTER — TELEPHONE (OUTPATIENT)
Dept: FAMILY MEDICINE | Facility: CLINIC | Age: 60
End: 2020-01-22

## 2020-01-22 DIAGNOSIS — Z00.00 ANNUAL PHYSICAL EXAM: Primary | ICD-10-CM

## 2020-01-22 NOTE — TELEPHONE ENCOUNTER
Dr. Howard, this patient was not able to make it to his lab appointment because he was working out of town. Can you please finish these orders and I will reschedule him for another lab appointment. Thank you.

## 2020-01-22 NOTE — TELEPHONE ENCOUNTER
----- Message from José Anguiano sent at 1/22/2020 11:35 AM CST -----  Contact: Rekha , Spouse / [phone]   Rekha would like to speak with your office regarding blood work orders for the patient. Please Advise.

## 2020-01-28 ENCOUNTER — LAB VISIT (OUTPATIENT)
Dept: LAB | Facility: HOSPITAL | Age: 60
End: 2020-01-28
Attending: FAMILY MEDICINE
Payer: COMMERCIAL

## 2020-01-28 DIAGNOSIS — Z00.00 ANNUAL PHYSICAL EXAM: ICD-10-CM

## 2020-01-28 LAB
ALBUMIN SERPL BCP-MCNC: 4.1 G/DL (ref 3.5–5.2)
ALP SERPL-CCNC: 85 U/L (ref 55–135)
ALT SERPL W/O P-5'-P-CCNC: 13 U/L (ref 10–44)
ANION GAP SERPL CALC-SCNC: 8 MMOL/L (ref 8–16)
AST SERPL-CCNC: 16 U/L (ref 10–40)
BASOPHILS # BLD AUTO: 0.06 K/UL (ref 0–0.2)
BASOPHILS NFR BLD: 0.8 % (ref 0–1.9)
BILIRUB SERPL-MCNC: 0.4 MG/DL (ref 0.1–1)
BUN SERPL-MCNC: 10 MG/DL (ref 6–20)
CALCIUM SERPL-MCNC: 9.6 MG/DL (ref 8.7–10.5)
CHLORIDE SERPL-SCNC: 107 MMOL/L (ref 95–110)
CHOLEST SERPL-MCNC: 203 MG/DL (ref 120–199)
CHOLEST/HDLC SERPL: 3.1 {RATIO} (ref 2–5)
CO2 SERPL-SCNC: 26 MMOL/L (ref 23–29)
CREAT SERPL-MCNC: 1 MG/DL (ref 0.5–1.4)
DIFFERENTIAL METHOD: ABNORMAL
EOSINOPHIL # BLD AUTO: 0.3 K/UL (ref 0–0.5)
EOSINOPHIL NFR BLD: 4.6 % (ref 0–8)
ERYTHROCYTE [DISTWIDTH] IN BLOOD BY AUTOMATED COUNT: 13 % (ref 11.5–14.5)
EST. GFR  (AFRICAN AMERICAN): >60 ML/MIN/1.73 M^2
EST. GFR  (NON AFRICAN AMERICAN): >60 ML/MIN/1.73 M^2
GLUCOSE SERPL-MCNC: 111 MG/DL (ref 70–110)
HCT VFR BLD AUTO: 48.6 % (ref 40–54)
HDLC SERPL-MCNC: 65 MG/DL (ref 40–75)
HDLC SERPL: 32 % (ref 20–50)
HGB BLD-MCNC: 16 G/DL (ref 14–18)
IMM GRANULOCYTES # BLD AUTO: 0.03 K/UL (ref 0–0.04)
IMM GRANULOCYTES NFR BLD AUTO: 0.4 % (ref 0–0.5)
LDLC SERPL CALC-MCNC: 123.8 MG/DL (ref 63–159)
LYMPHOCYTES # BLD AUTO: 1.9 K/UL (ref 1–4.8)
LYMPHOCYTES NFR BLD: 27 % (ref 18–48)
MCH RBC QN AUTO: 32.7 PG (ref 27–31)
MCHC RBC AUTO-ENTMCNC: 32.9 G/DL (ref 32–36)
MCV RBC AUTO: 99 FL (ref 82–98)
MONOCYTES # BLD AUTO: 0.7 K/UL (ref 0.3–1)
MONOCYTES NFR BLD: 9.7 % (ref 4–15)
NEUTROPHILS # BLD AUTO: 4.1 K/UL (ref 1.8–7.7)
NEUTROPHILS NFR BLD: 57.5 % (ref 38–73)
NONHDLC SERPL-MCNC: 138 MG/DL
NRBC BLD-RTO: 0 /100 WBC
PLATELET # BLD AUTO: 282 K/UL (ref 150–350)
PMV BLD AUTO: 9.5 FL (ref 9.2–12.9)
POTASSIUM SERPL-SCNC: 4.1 MMOL/L (ref 3.5–5.1)
PROT SERPL-MCNC: 7.5 G/DL (ref 6–8.4)
RBC # BLD AUTO: 4.89 M/UL (ref 4.6–6.2)
SODIUM SERPL-SCNC: 141 MMOL/L (ref 136–145)
TRIGL SERPL-MCNC: 71 MG/DL (ref 30–150)
TSH SERPL DL<=0.005 MIU/L-ACNC: 1.41 UIU/ML (ref 0.4–4)
WBC # BLD AUTO: 7.1 K/UL (ref 3.9–12.7)

## 2020-01-28 PROCEDURE — 84443 ASSAY THYROID STIM HORMONE: CPT

## 2020-01-28 PROCEDURE — 36415 COLL VENOUS BLD VENIPUNCTURE: CPT | Mod: PO

## 2020-01-28 PROCEDURE — 80061 LIPID PANEL: CPT

## 2020-01-28 PROCEDURE — 80053 COMPREHEN METABOLIC PANEL: CPT

## 2020-01-28 PROCEDURE — 85025 COMPLETE CBC W/AUTO DIFF WBC: CPT

## 2021-01-05 ENCOUNTER — PATIENT MESSAGE (OUTPATIENT)
Dept: ADMINISTRATIVE | Facility: HOSPITAL | Age: 61
End: 2021-01-05

## 2021-04-05 ENCOUNTER — PATIENT MESSAGE (OUTPATIENT)
Dept: ADMINISTRATIVE | Facility: HOSPITAL | Age: 61
End: 2021-04-05

## 2021-05-04 ENCOUNTER — PATIENT MESSAGE (OUTPATIENT)
Dept: RESEARCH | Facility: HOSPITAL | Age: 61
End: 2021-05-04

## 2021-05-10 ENCOUNTER — PATIENT MESSAGE (OUTPATIENT)
Dept: RESEARCH | Facility: HOSPITAL | Age: 61
End: 2021-05-10

## 2021-07-06 ENCOUNTER — PATIENT MESSAGE (OUTPATIENT)
Dept: ADMINISTRATIVE | Facility: HOSPITAL | Age: 61
End: 2021-07-06

## 2021-10-04 ENCOUNTER — PATIENT MESSAGE (OUTPATIENT)
Dept: ADMINISTRATIVE | Facility: HOSPITAL | Age: 61
End: 2021-10-04

## 2022-05-31 ENCOUNTER — PATIENT MESSAGE (OUTPATIENT)
Dept: ADMINISTRATIVE | Facility: HOSPITAL | Age: 62
End: 2022-05-31
Payer: COMMERCIAL

## 2023-04-17 ENCOUNTER — OFFICE VISIT (OUTPATIENT)
Dept: FAMILY MEDICINE | Facility: CLINIC | Age: 63
End: 2023-04-17
Payer: COMMERCIAL

## 2023-04-17 ENCOUNTER — TELEPHONE (OUTPATIENT)
Dept: ADMINISTRATIVE | Facility: OTHER | Age: 63
End: 2023-04-17
Payer: COMMERCIAL

## 2023-04-17 VITALS
OXYGEN SATURATION: 99 % | SYSTOLIC BLOOD PRESSURE: 156 MMHG | WEIGHT: 135.81 LBS | HEIGHT: 70 IN | HEART RATE: 81 BPM | TEMPERATURE: 98 F | DIASTOLIC BLOOD PRESSURE: 80 MMHG | BODY MASS INDEX: 19.44 KG/M2

## 2023-04-17 DIAGNOSIS — R73.09 ABNORMAL GLUCOSE: ICD-10-CM

## 2023-04-17 DIAGNOSIS — F17.200 TOBACCO DEPENDENCE: ICD-10-CM

## 2023-04-17 DIAGNOSIS — J44.9 CHRONIC OBSTRUCTIVE PULMONARY DISEASE, UNSPECIFIED COPD TYPE: ICD-10-CM

## 2023-04-17 DIAGNOSIS — Z00.00 ROUTINE GENERAL MEDICAL EXAMINATION AT A HEALTH CARE FACILITY: Primary | ICD-10-CM

## 2023-04-17 DIAGNOSIS — Z12.11 COLON CANCER SCREENING: ICD-10-CM

## 2023-04-17 DIAGNOSIS — Z12.5 SCREENING FOR MALIGNANT NEOPLASM OF PROSTATE: ICD-10-CM

## 2023-04-17 PROCEDURE — 99999 PR PBB SHADOW E&M-EST. PATIENT-LVL IV: CPT | Mod: PBBFAC,,, | Performed by: FAMILY MEDICINE

## 2023-04-17 PROCEDURE — 99999 PR PBB SHADOW E&M-EST. PATIENT-LVL IV: ICD-10-PCS | Mod: PBBFAC,,, | Performed by: FAMILY MEDICINE

## 2023-04-17 PROCEDURE — 99396 PR PREVENTIVE VISIT,EST,40-64: ICD-10-PCS | Mod: S$GLB,,, | Performed by: FAMILY MEDICINE

## 2023-04-17 PROCEDURE — 3077F SYST BP >= 140 MM HG: CPT | Mod: CPTII,S$GLB,, | Performed by: FAMILY MEDICINE

## 2023-04-17 PROCEDURE — 3008F BODY MASS INDEX DOCD: CPT | Mod: CPTII,S$GLB,, | Performed by: FAMILY MEDICINE

## 2023-04-17 PROCEDURE — 1160F PR REVIEW ALL MEDS BY PRESCRIBER/CLIN PHARMACIST DOCUMENTED: ICD-10-PCS | Mod: CPTII,S$GLB,, | Performed by: FAMILY MEDICINE

## 2023-04-17 PROCEDURE — 1159F MED LIST DOCD IN RCRD: CPT | Mod: CPTII,S$GLB,, | Performed by: FAMILY MEDICINE

## 2023-04-17 PROCEDURE — 1159F PR MEDICATION LIST DOCUMENTED IN MEDICAL RECORD: ICD-10-PCS | Mod: CPTII,S$GLB,, | Performed by: FAMILY MEDICINE

## 2023-04-17 PROCEDURE — 3077F PR MOST RECENT SYSTOLIC BLOOD PRESSURE >= 140 MM HG: ICD-10-PCS | Mod: CPTII,S$GLB,, | Performed by: FAMILY MEDICINE

## 2023-04-17 PROCEDURE — 3079F PR MOST RECENT DIASTOLIC BLOOD PRESSURE 80-89 MM HG: ICD-10-PCS | Mod: CPTII,S$GLB,, | Performed by: FAMILY MEDICINE

## 2023-04-17 PROCEDURE — 3079F DIAST BP 80-89 MM HG: CPT | Mod: CPTII,S$GLB,, | Performed by: FAMILY MEDICINE

## 2023-04-17 PROCEDURE — 3008F PR BODY MASS INDEX (BMI) DOCUMENTED: ICD-10-PCS | Mod: CPTII,S$GLB,, | Performed by: FAMILY MEDICINE

## 2023-04-17 PROCEDURE — 1160F RVW MEDS BY RX/DR IN RCRD: CPT | Mod: CPTII,S$GLB,, | Performed by: FAMILY MEDICINE

## 2023-04-17 PROCEDURE — 99396 PREV VISIT EST AGE 40-64: CPT | Mod: S$GLB,,, | Performed by: FAMILY MEDICINE

## 2023-04-17 NOTE — PROGRESS NOTES
"(Portions of this note were dictated using voice recognition software and may contain dictation related errors in spelling/grammar/syntax not found on text review)    CC:   Chief Complaint   Patient presents with    Establish Care       HPI: 62 y.o. male new to me here to establish care    COPD, CT chest in 2012 showed bilateral apical emphysematous changes more pronounced on right than left.  No PFTs on file.  No prophylactic meds.  Chronic tobacco use.  Was prior enrolled with smoking cessation    Prior labs showed fasting glucose 111 in 2020    BP is Elevated.  No history of hypertension BP is 156/80, recheck BP is 160/80.  Asymptomatic.    Tobacco:  6 cigarettes a day, has been smoking for around 40 years.  At 1 point was up to 2 packs a day in the past, has been weaning    Alcohol:  Around 1 case of beer a week (24), possibly more  Denies any illicit drug use     Exercise:  Yes   Diet: "normal.    Past Medical History:   Diagnosis Date    Arthritis     COPD (chronic obstructive pulmonary disease)        Past Surgical History:   Procedure Laterality Date    CERVICAL FUSION      COLONOSCOPY N/A 3/27/2018    Procedure: COLONOSCOPY;  Surgeon: Maria Teresa Morocho MD;  Location: [facility];  Service: Endoscopy;  Laterality: N/A;    INGUINAL HERNIA REPAIR Bilateral     Right x2, x1 left    ROTATOR CUFF REPAIR Right     x2       Family History   Problem Relation Age of Onset    Heart disease Father         CHF    Diabetes Mother     Glaucoma Neg Hx        Social History     Tobacco Use    Smoking status: Every Day     Types: Cigarettes    Smokeless tobacco: Never    Tobacco comments:     5 cigarettes a day   Substance Use Topics    Alcohol use: Yes    Drug use: No       Lab Results   Component Value Date    WBC 7.10 01/28/2020    HGB 16.0 01/28/2020    HCT 48.6 01/28/2020    MCV 99 (H) 01/28/2020     01/28/2020    CHOL 203 (H) 01/28/2020    TRIG 71 01/28/2020    HDL 65 01/28/2020    ALT 13 01/28/2020    AST 16 " "01/28/2020    BILITOT 0.4 01/28/2020    ALKPHOS 85 01/28/2020     01/28/2020    K 4.1 01/28/2020     01/28/2020    CREATININE 1.0 01/28/2020    ESTGFRAFRICA >60.0 01/28/2020    EGFRNONAA >60.0 01/28/2020    CALCIUM 9.6 01/28/2020    ALBUMIN 4.1 01/28/2020    BUN 10 01/28/2020    CO2 26 01/28/2020    TSH 1.407 01/28/2020    PSA 0.96 02/10/2018    INR 1.0 12/13/2004    HGBA1C 5.6 02/14/2008    LDLCALC 123.8 01/28/2020     (H) 01/28/2020    HZHZWNGF27MW 44 10/11/2012                 Vital signs reviewed  Vitals:    04/17/23 0914   BP: (!) 156/80   BP Location: Right arm   Patient Position: Sitting   BP Method: Medium (Manual)   Pulse: 81   Temp: 97.7 °F (36.5 °C)   TempSrc: Oral   SpO2: 99%   Weight: 61.6 kg (135 lb 12.9 oz)   Height: 5' 10" (1.778 m)       Wt Readings from Last 4 Encounters:   04/17/23 61.6 kg (135 lb 12.9 oz)   12/05/19 65.7 kg (144 lb 13.5 oz)   03/27/18 70.3 kg (155 lb)   02/08/18 68 kg (149 lb 14.6 oz)       PE:   APPEARANCE: Well nourished, well developed, in no acute distress.    HEAD: Normocephalic, atraumatic.  EYES: PERRL. EOMI.   Conjunctivae noninjected.  EARS: TM's intact. Light reflex normal. No retraction or perforation  NOSE: Mucosa pink. Airway clear.  MOUTH & THROAT: No tonsillar enlargement. No pharyngeal erythema or exudate.   NECK: Supple with no cervical lymphadenopathy.  No carotid bruits.  No thyromegaly  CHEST: Good inspiratory effort. Lungs clear to auscultation with no wheezes or crackles.  CARDIOVASCULAR: Normal S1, S2. No rubs, murmurs, or gallops.  ABDOMEN: Bowel sounds normal. Not distended. Soft. No tenderness or masses. No organomegaly.  EXTREMITIES: No edema,    IMPRESSION  1. Routine general medical examination at a health care facility    2. Abnormal glucose    3. Screening for malignant neoplasm of prostate    4. Chronic obstructive pulmonary disease, unspecified COPD type            PLAN  Orders Placed This Encounter   Procedures    CBC Auto " Differential    Comprehensive Metabolic Panel    Lipid Panel    TSH    Hemoglobin A1C    PSA, Screening     Screening labs above     COPD, occasional coughing but no shortness of breath or wheezing.  Get LDCT given his chronic tobacco history.  Continues to wean, cessation although he feels like he is self     Elevated blood pressure, no documented hypertension history, asymptomatic.  Discussed lifestyle modification, reducing alcohol intake.  Will set up repeat visit in 4 weeks for blood pressure recheck.  He can get labs right before his visit              SCREENINGS      Immunizations:   Tdap 2013, out of stock today, can get at pharmacy  Pneumovax: 2018  Flu up-to-date  COVID x2, eligible for bivalent booster  Look into getting the Shingles vaccine (SHINGRIX) at your local pharmacy (2 part series, done once at/after age 50)      Age/demographic appropriate health maintenance:  Colonoscopy 03/27/2018, 2 polyps (5-6 mm size) sigmoid colon, tubular adenoma noted.  Repeat in 5 years  Prostate: Last check PSA 2018, reordered

## 2023-04-17 NOTE — PATIENT INSTRUCTIONS
"Lifestyle choices to lower blood pressure    Diet  DASH diet--high in fruits/vegetables and low in fat and saturated fat: 8-14 points  Salt restriction--2.3 grams sodium/day: 2-8 points    Weight loss--5-20 points per 20 lbs lost.    Exercise--30min most days/wk: 4-9 points    Limit Alcohol--2/day men; 1/day women: 2-4 points.        The DASH Diet: Healthy Eating to Control Your Blood Pressure     What is the DASH diet?    DASH stands for Dietary Approaches to Stop Hypertension. It is a balanced eating plan that your family doctor might recommend to help you lower your blood pressure. The DASH diet:  Is low in salt, saturated fat, cholesterol and total fat.   Emphasizes fruits, vegetables, and fat-free or low-fat milk and milk products.   Includes whole grains, fish, poultry and nuts.   Limits the amount of red meat, sweets, added sugars and sugar-containing beverages in your diet.   Is rich in potassium, magnesium and calcium, as well as protein and fiber.     How can the DASH diet help me stay healthy?  Getting too much sodium (salt) in your diet can contribute to high blood pressure (also called hypertension). Some salt is in foods naturally, and some salt is added to food when it is processed or prepared. Following the DASH diet can help you lower your blood pressure, or prevent high blood pressure, by reducing the amount of sodium in your diet to less than 2,300 mg per day.  The fruits, vegetables and whole grains recommended in the DASH diet provide many other elements of a healthy diet, such as lycopene, beta-carotene and isoflavones. These can help protect your body against common health conditions, such as cancer, osteoporosis, stroke and diabetes. Following the DASH diet can also help reduce your risk of heart disease by lowering your low-density lipoprotein (LDL, or "bad") cholesterol level.  Following the DASH diet may drop your blood pressure by a few points in as little as 2 weeks. However, you should " not stop taking your blood pressure medicine, or any other prescribed medicine, without talking to your doctor first.    What kinds of foods are included in the DASH diet?  The DASH diet is nutritionally balanced for good health. You dont need to buy any special foods or pills, or cook with any special recipes, to follow the DASH diet. If you follow the DASH diet, you will eat about 2,000 calories each day. These calories will come from a variety of foods.    Where is sodium in my diet?  Food Serving Sodium Content   ¼ teaspoon table salt 575 mg   ½ teaspoon table salt 1,150 mg   1 teaspoon table salt 2,300 mg   1 hot dog 460 mg   1 regular fast-food hamburger 600 mg   2 ounces processed cheese 600 mg   1 tablespoon soy sauce 900 mg   1 serving frozen pizza with meat and vegetables 982 mg   8 ounces regular potato chips 1,192 mg     The DASH diet  Whole grains (6 to 8 servings a day)   Vegetables (4 to 5 servings a day)   Fruits (4 to 5 servings a day)   Low-fat or fat-free milk and milk products (2 to 3 servings a day)   Lean meats, poultry and fish (6 or fewer servings a day)   Nuts, seeds and beans (4 to 5 servings a week)   Fats and oils (2 to 3 servings a day)   Sweets, preferably low-fat or fat-free (5 or fewer a week)   Sodium (no more than 2,300 mg a day)   You can adapt the DASH diet to meet your needs. For example:  If you drink alcohol, limit yourself to 2 drinks or less per day for men and 1 drink or less per day for women.   To reduce your blood pressure even more, replace some of the carbohydrates in the DASH diet with low-fat protein and unsaturated fats.   If you need to lose weight, reduce the number of calories you eat to about 1,600 per day.   Follow a lower-sodium version (no more than 1,500 mg daily) if you are 40 years of age or older, you are  or you already have high blood pressure.     How can I change my eating habits?  Dont be discouraged if following the DASH diet is  "difficult at first. Start with small, achievable goals. The following ideas can help you make healthy changes:  Pay attention to your current eating habits. Make a list of everything you eat for 2 or 3 days. Compare this list to the DASH diet recommendations above and see what changes you need to make in your diet.   Make one change at a time. For example, start by choosing lower-fat versions of your favorite foods or adding more whole grains to your meals.   Learn what makes a serving for each type of food. For example, 1 serving equals 1 slice of bread, 8 ounces of milk, 1 cup of raw vegetables or 1/2 cup of cooked vegetables. For more serving sizes, go to the [facility] site. Dont have a measuring cup? One serving (3 ounces) of meat or poultry is about the size of a deck of cards. One serving (1/2 cup) of rice or potato looks like half a baseball, and a serving of cheese is about the size of 4 stacked dice.   If eating more fruits and vegetables gives you gas, bloating or diarrhea, increase these foods slowly. You can also talk to your family doctor about taking over-the-counter medicines to reduce these symptoms until your body adjusts.   Get more exercise. Physical activity helps lower your blood pressure and can help you lose more weight.   Use salt-free seasonings, such as spices and herbs, to add flavor to your recipes and reduce or eliminate salt.   Include as many fresh and unprocessed foods as possible. Cut back on frozen dinners, packaged mixes, canned soups and bottled salad dressings, which are often high in sodium.   When buying canned, frozen or processed foods, check nutrition labels for the amount of sodium, sugar and saturated fat. Look for the phrases "no salt added," "sodium-free," "low sodium" or "very low sodium" on food packages. Choose foods with monounsaturated and polyunsaturated fats.   Steam, grill, poach, roast or stir-dewey foods. Use low-sodium broth or water instead of butter or oil for " sautéing.   When you eat at a restaurant, ask how foods are prepared. Ask if your order can be made without added salt. Dont add salty condiments, such as ketchup, mustard, pickles or sauces, to your food.   Other Organizations  Centers for Disease Control and Prevention   National Heart, Lung, and Blood [place]   Written by familydoctor.org editorial staff  Reviewed/Updated: 02/11  Created: 08/09

## 2023-04-20 ENCOUNTER — PATIENT MESSAGE (OUTPATIENT)
Dept: FAMILY MEDICINE | Facility: CLINIC | Age: 63
End: 2023-04-20
Payer: COMMERCIAL

## 2023-04-20 DIAGNOSIS — R91.1 PULMONARY NODULE 1 CM OR GREATER IN DIAMETER: Primary | ICD-10-CM

## 2023-04-20 NOTE — TELEPHONE ENCOUNTER
Reviewed screening low-dose lung scan.  Does have 1.4 cm nodule with irregularity noted right side, needs pulmonology referral, may need to consider PET verses biopsy

## 2023-05-16 ENCOUNTER — OFFICE VISIT (OUTPATIENT)
Dept: FAMILY MEDICINE | Facility: CLINIC | Age: 63
End: 2023-05-16
Payer: COMMERCIAL

## 2023-05-16 VITALS
HEART RATE: 76 BPM | SYSTOLIC BLOOD PRESSURE: 122 MMHG | WEIGHT: 133.63 LBS | HEIGHT: 70 IN | OXYGEN SATURATION: 98 % | DIASTOLIC BLOOD PRESSURE: 76 MMHG | BODY MASS INDEX: 19.13 KG/M2

## 2023-05-16 DIAGNOSIS — R73.09 ABNORMAL GLUCOSE: ICD-10-CM

## 2023-05-16 DIAGNOSIS — R03.0 ELEVATED BLOOD PRESSURE READING WITHOUT DIAGNOSIS OF HYPERTENSION: ICD-10-CM

## 2023-05-16 DIAGNOSIS — R91.1 PULMONARY NODULE 1 CM OR GREATER IN DIAMETER: Primary | ICD-10-CM

## 2023-05-16 DIAGNOSIS — F17.200 TOBACCO DEPENDENCE: ICD-10-CM

## 2023-05-16 DIAGNOSIS — J44.9 CHRONIC OBSTRUCTIVE PULMONARY DISEASE, UNSPECIFIED COPD TYPE: ICD-10-CM

## 2023-05-16 PROCEDURE — 3044F PR MOST RECENT HEMOGLOBIN A1C LEVEL <7.0%: ICD-10-PCS | Mod: CPTII,S$GLB,, | Performed by: FAMILY MEDICINE

## 2023-05-16 PROCEDURE — 3074F PR MOST RECENT SYSTOLIC BLOOD PRESSURE < 130 MM HG: ICD-10-PCS | Mod: CPTII,S$GLB,, | Performed by: FAMILY MEDICINE

## 2023-05-16 PROCEDURE — 90750 HZV VACC RECOMBINANT IM: CPT | Mod: S$GLB,,, | Performed by: FAMILY MEDICINE

## 2023-05-16 PROCEDURE — 3044F HG A1C LEVEL LT 7.0%: CPT | Mod: CPTII,S$GLB,, | Performed by: FAMILY MEDICINE

## 2023-05-16 PROCEDURE — 90750 ZOSTER RECOMBINANT VACCINE: ICD-10-PCS | Mod: S$GLB,,, | Performed by: FAMILY MEDICINE

## 2023-05-16 PROCEDURE — 3008F BODY MASS INDEX DOCD: CPT | Mod: CPTII,S$GLB,, | Performed by: FAMILY MEDICINE

## 2023-05-16 PROCEDURE — 90472 TDAP VACCINE GREATER THAN OR EQUAL TO 7YO IM: ICD-10-PCS | Mod: S$GLB,,, | Performed by: FAMILY MEDICINE

## 2023-05-16 PROCEDURE — 90471 IMMUNIZATION ADMIN: CPT | Mod: S$GLB,,, | Performed by: FAMILY MEDICINE

## 2023-05-16 PROCEDURE — 90472 IMMUNIZATION ADMIN EACH ADD: CPT | Mod: S$GLB,,, | Performed by: FAMILY MEDICINE

## 2023-05-16 PROCEDURE — 1159F MED LIST DOCD IN RCRD: CPT | Mod: CPTII,S$GLB,, | Performed by: FAMILY MEDICINE

## 2023-05-16 PROCEDURE — 99214 OFFICE O/P EST MOD 30 MIN: CPT | Mod: 25,S$GLB,, | Performed by: FAMILY MEDICINE

## 2023-05-16 PROCEDURE — 3078F DIAST BP <80 MM HG: CPT | Mod: CPTII,S$GLB,, | Performed by: FAMILY MEDICINE

## 2023-05-16 PROCEDURE — 90715 TDAP VACCINE GREATER THAN OR EQUAL TO 7YO IM: ICD-10-PCS | Mod: S$GLB,,, | Performed by: FAMILY MEDICINE

## 2023-05-16 PROCEDURE — 99214 PR OFFICE/OUTPT VISIT, EST, LEVL IV, 30-39 MIN: ICD-10-PCS | Mod: 25,S$GLB,, | Performed by: FAMILY MEDICINE

## 2023-05-16 PROCEDURE — 90715 TDAP VACCINE 7 YRS/> IM: CPT | Mod: S$GLB,,, | Performed by: FAMILY MEDICINE

## 2023-05-16 PROCEDURE — 3078F PR MOST RECENT DIASTOLIC BLOOD PRESSURE < 80 MM HG: ICD-10-PCS | Mod: CPTII,S$GLB,, | Performed by: FAMILY MEDICINE

## 2023-05-16 PROCEDURE — 3074F SYST BP LT 130 MM HG: CPT | Mod: CPTII,S$GLB,, | Performed by: FAMILY MEDICINE

## 2023-05-16 PROCEDURE — 3008F PR BODY MASS INDEX (BMI) DOCUMENTED: ICD-10-PCS | Mod: CPTII,S$GLB,, | Performed by: FAMILY MEDICINE

## 2023-05-16 PROCEDURE — 99999 PR PBB SHADOW E&M-EST. PATIENT-LVL III: ICD-10-PCS | Mod: PBBFAC,,, | Performed by: FAMILY MEDICINE

## 2023-05-16 PROCEDURE — 99999 PR PBB SHADOW E&M-EST. PATIENT-LVL III: CPT | Mod: PBBFAC,,, | Performed by: FAMILY MEDICINE

## 2023-05-16 PROCEDURE — 90471 ZOSTER RECOMBINANT VACCINE: ICD-10-PCS | Mod: S$GLB,,, | Performed by: FAMILY MEDICINE

## 2023-05-16 PROCEDURE — 1159F PR MEDICATION LIST DOCUMENTED IN MEDICAL RECORD: ICD-10-PCS | Mod: CPTII,S$GLB,, | Performed by: FAMILY MEDICINE

## 2023-05-16 NOTE — PATIENT INSTRUCTIONS
Colonoscopy Phone Numbers  Please call [phone] to schedule your colonoscopy at [facility] location.   If you prefer [facility] on [facility], please call [phone] to schedule.   [facility] location can be reached at [phone] if this location is preferred.

## 2023-05-16 NOTE — PROGRESS NOTES
(Portions of this note were dictated using voice recognition software and may contain dictation related errors in spelling/grammar/syntax not found on text review)    CC: No chief complaint on file.      HPI: 62 y.o. male     One-month blood pressure check since his last visit blood pressure was elevated.  No documented hypertension history.  Significant alcohol history case of beer a week but now has cut it down to about 2 beers a day.  Blood pressure does look better on recheck, currently 122/76 on intake, 120/80 on recheck.  Feels well      COPD, CT chest in 2012 showed bilateral apical emphysematous changes more pronounced on right than left.  No PFTs on file.  No prophylactic meds.  Chronic tobacco use, currently down to 5 cigarettes a day Was prior enrolled with smoking cessation screening chest CT done just recently did show 1.4 cm nodular component right apical area.  Had recommended further workup either with PET verses biopsy, does have a pulmonology appointment coming up tomorrow.     Labs recently done showed glucose 115, A1c 5.0      Past Medical History:   Diagnosis Date    Arthritis     COPD (chronic obstructive pulmonary disease)        Past Surgical History:   Procedure Laterality Date    CERVICAL FUSION      COLONOSCOPY N/A 3/27/2018    Procedure: COLONOSCOPY;  Surgeon: Maria Teresa Morocho MD;  Location: [facility];  Service: Endoscopy;  Laterality: N/A;    INGUINAL HERNIA REPAIR Bilateral     Right x2, x1 left    ROTATOR CUFF REPAIR Right     x2       Family History   Problem Relation Age of Onset    Diabetes Mother     Heart disease Father         CHF    Glaucoma Neg Hx     Colon cancer Neg Hx     Prostate cancer Neg Hx        Social History     Tobacco Use    Smoking status: Every Day     Types: Cigarettes    Smokeless tobacco: Never    Tobacco comments:     5 cigarettes a day   Substance Use Topics    Alcohol use: Yes    Drug use: No       Lab Results   Component Value Date    WBC 9.73 05/10/2023  "   HGB 15.8 05/10/2023    HCT 45.5 05/10/2023    MCV 95 05/10/2023     05/10/2023    CHOL 170 05/10/2023    TRIG 48 05/10/2023    HDL 71 05/10/2023    ALT 18 05/10/2023    AST 26 05/10/2023    BILITOT 0.6 05/10/2023    ALKPHOS 96 05/10/2023     05/10/2023    K 4.8 05/10/2023     05/10/2023    CREATININE 0.86 05/10/2023    ESTGFRAFRICA >60.0 01/28/2020    EGFRNONAA >60.0 01/28/2020    EGFRNORACEVR >60.0 05/10/2023    CALCIUM 9.1 05/10/2023    ALBUMIN 4.2 05/10/2023    BUN 11 05/10/2023    CO2 26 05/10/2023    TSH 0.741 05/10/2023    PSA 0.70 05/10/2023    INR 1.0 12/13/2004    HGBA1C 5.0 05/10/2023    LDLCALC 89.4 05/10/2023     (H) 05/10/2023    OYNYMMVA05HC 44 10/11/2012                 Vital signs reviewed  Vitals:    05/16/23 1101   BP: 122/76   BP Location: Left arm   Patient Position: Sitting   BP Method: Medium (Manual)   Pulse: 76   SpO2: 98%   Weight: 60.6 kg (133 lb 9.6 oz)   Height: 5' 10" (1.778 m)       Wt Readings from Last 4 Encounters:   05/16/23 60.6 kg (133 lb 9.6 oz)   04/17/23 61.6 kg (135 lb 12.9 oz)   12/05/19 65.7 kg (144 lb 13.5 oz)   03/27/18 70.3 kg (155 lb)            IMPRESSION  1. Pulmonary nodule 1 cm or greater in diameter    2. Abnormal glucose    3. Tobacco dependence    4. Chronic obstructive pulmonary disease, unspecified COPD type    5. Elevated blood pressure reading without diagnosis of hypertension            PLAN  Improved blood pressure on revisit.  Has cut down alcohol significantly which I think will help improve blood pressure in the long-term.  Working on quitting smoking, down to 5 cigarettes a day.  Pulmonology appointment coming up tomorrow for lung nodule assessment        SCREENINGS       Immunizations:   Tdap today  Pneumovax: 2018  Flu up-to-date  COVID x2, eligible for bivalent booster  Zoster 1. Today        Age/demographic appropriate health maintenance:  Colonoscopy 03/27/2018, 2 polyps (5-6 mm size) sigmoid colon, tubular adenoma " noted.  Repeat in 5 years. reordered at last visit, was given numbers to schedule  Prostate:  2023

## 2023-05-17 ENCOUNTER — OFFICE VISIT (OUTPATIENT)
Dept: PULMONOLOGY | Facility: CLINIC | Age: 63
End: 2023-05-17
Payer: COMMERCIAL

## 2023-05-17 VITALS
HEART RATE: 77 BPM | HEIGHT: 70 IN | SYSTOLIC BLOOD PRESSURE: 125 MMHG | WEIGHT: 135.56 LBS | DIASTOLIC BLOOD PRESSURE: 70 MMHG | OXYGEN SATURATION: 96 % | BODY MASS INDEX: 19.41 KG/M2

## 2023-05-17 DIAGNOSIS — J43.2 CENTRILOBULAR EMPHYSEMA: Primary | ICD-10-CM

## 2023-05-17 DIAGNOSIS — R91.1 PULMONARY NODULE 1 CM OR GREATER IN DIAMETER: ICD-10-CM

## 2023-05-17 PROCEDURE — 1159F MED LIST DOCD IN RCRD: CPT | Mod: CPTII,S$GLB,, | Performed by: INTERNAL MEDICINE

## 2023-05-17 PROCEDURE — 3044F PR MOST RECENT HEMOGLOBIN A1C LEVEL <7.0%: ICD-10-PCS | Mod: CPTII,S$GLB,, | Performed by: INTERNAL MEDICINE

## 2023-05-17 PROCEDURE — 99999 PR PBB SHADOW E&M-EST. PATIENT-LVL III: CPT | Mod: PBBFAC,,, | Performed by: INTERNAL MEDICINE

## 2023-05-17 PROCEDURE — 3008F BODY MASS INDEX DOCD: CPT | Mod: CPTII,S$GLB,, | Performed by: INTERNAL MEDICINE

## 2023-05-17 PROCEDURE — 99204 PR OFFICE/OUTPT VISIT, NEW, LEVL IV, 45-59 MIN: ICD-10-PCS | Mod: S$GLB,,, | Performed by: INTERNAL MEDICINE

## 2023-05-17 PROCEDURE — 1159F PR MEDICATION LIST DOCUMENTED IN MEDICAL RECORD: ICD-10-PCS | Mod: CPTII,S$GLB,, | Performed by: INTERNAL MEDICINE

## 2023-05-17 PROCEDURE — 3008F PR BODY MASS INDEX (BMI) DOCUMENTED: ICD-10-PCS | Mod: CPTII,S$GLB,, | Performed by: INTERNAL MEDICINE

## 2023-05-17 PROCEDURE — 99999 PR PBB SHADOW E&M-EST. PATIENT-LVL III: ICD-10-PCS | Mod: PBBFAC,,, | Performed by: INTERNAL MEDICINE

## 2023-05-17 PROCEDURE — 3044F HG A1C LEVEL LT 7.0%: CPT | Mod: CPTII,S$GLB,, | Performed by: INTERNAL MEDICINE

## 2023-05-17 PROCEDURE — 3074F SYST BP LT 130 MM HG: CPT | Mod: CPTII,S$GLB,, | Performed by: INTERNAL MEDICINE

## 2023-05-17 PROCEDURE — 1160F PR REVIEW ALL MEDS BY PRESCRIBER/CLIN PHARMACIST DOCUMENTED: ICD-10-PCS | Mod: CPTII,S$GLB,, | Performed by: INTERNAL MEDICINE

## 2023-05-17 PROCEDURE — 3074F PR MOST RECENT SYSTOLIC BLOOD PRESSURE < 130 MM HG: ICD-10-PCS | Mod: CPTII,S$GLB,, | Performed by: INTERNAL MEDICINE

## 2023-05-17 PROCEDURE — 99204 OFFICE O/P NEW MOD 45 MIN: CPT | Mod: S$GLB,,, | Performed by: INTERNAL MEDICINE

## 2023-05-17 PROCEDURE — 3078F DIAST BP <80 MM HG: CPT | Mod: CPTII,S$GLB,, | Performed by: INTERNAL MEDICINE

## 2023-05-17 PROCEDURE — 1160F RVW MEDS BY RX/DR IN RCRD: CPT | Mod: CPTII,S$GLB,, | Performed by: INTERNAL MEDICINE

## 2023-05-17 PROCEDURE — 3078F PR MOST RECENT DIASTOLIC BLOOD PRESSURE < 80 MM HG: ICD-10-PCS | Mod: CPTII,S$GLB,, | Performed by: INTERNAL MEDICINE

## 2023-05-17 NOTE — ASSESSMENT & PLAN NOTE
Asymetrical lung nodule >0.8cm in setting of chronic tobacco use concerning for malignancy. Location is high risk for biopsy, future NM PET Scan.  --Follow up PET scan results

## 2023-05-17 NOTE — PROGRESS NOTES
Subjective:       Patient ID: Blayne Beebe is a 62 y.o. male.    Chief Complaint: Pulmonary Nodules    HPI:   Blayne Beebe is a 62 y.o. male with tobacco use disorder (>40PYH) who presents for evaluation of right apical pulmonary nodule.    Pt seen by Dr Reyna (PCP) on 5/16/23 for follow up and to discuss findings of low dose CT Lung screen. Imaging was significant for emphysematous change and an asymetric, right-apical pleural-parenchymal change with 1.4cm nodule. This was compared to most recent imaging in 2012, which was significant for emphysematous change without nodular component. Mr Beebe was referred to [facility] for further evaluation.     Covid during Mardi Gras 2023- felt awful/tired for 4 days. Minimal respiratory symptoms. Lost weight during this time. Son was tested positive so assuming he had it as well.     Pt is a current smoker (5/day) with >40PYH. Chronic nonproductive cough really going on for one year. Not on home oxygen. 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 Not a concern with regulations at [employer]  Tobacco- cigarettes  Inhalational Agents- none  Mold- none  Pets- dog   Birds- none  Medications- none  Travel- last traveled to [place] in 12/2023  TB- none     Family History of Lung Cancer: none  Childhood history of Lung Disease: none    Review of Systems   Constitutional:  Negative for fever, chills, weight loss, activity change, night sweats and weakness.   HENT:  Negative for rhinorrhea, sinus pressure, sore throat, voice change and congestion.    Respiratory:  Negative for snoring, cough, sputum production, choking, chest tightness,  "shortness of breath, asthma nighttime symptoms and use of rescue inhaler.    Cardiovascular:  Negative for chest pain, palpitations and leg swelling.   Genitourinary:  Negative for hematuria.   Endocrine:  Negative for cold intolerance and heat intolerance.    Musculoskeletal:  Negative for back pain and myalgias.   Gastrointestinal:  Negative for nausea, vomiting, abdominal pain and abdominal distention.   Neurological:  Negative for dizziness and headaches.   Hematological:  Negative for adenopathy.   Psychiatric/Behavioral:  Negative for confusion.        Social History     Tobacco Use    Smoking status: Every Day     Types: Cigarettes     Passive exposure: Current    Smokeless tobacco: Never    Tobacco comments:     5 cigarettes a day   Substance Use Topics    Alcohol use: Yes       Review of patient's allergies indicates:  No Known Allergies  Past Medical History:   Diagnosis Date    Arthritis     COPD (chronic obstructive pulmonary disease)      Past Surgical History:   Procedure Laterality Date    CERVICAL FUSION      COLONOSCOPY N/A 3/27/2018    Procedure: COLONOSCOPY;  Surgeon: Maria Teresa Morocho MD;  Location: [facility];  Service: Endoscopy;  Laterality: N/A;    INGUINAL HERNIA REPAIR Bilateral     Right x2, x1 left    ROTATOR CUFF REPAIR Right     x2     Current Outpatient Medications on File Prior to Visit   Medication Sig    sildenafil (VIAGRA) 100 MG tablet Take 1 tablet (100 mg total) by mouth daily as needed for Erectile Dysfunction.     No current facility-administered medications on file prior to visit.       Objective:      Vitals:    05/17/23 1310   BP: 125/70   BP Location: Right arm   Patient Position: Sitting   Pulse: 77   SpO2: 96%   Weight: 61.5 kg (135 lb 9.3 oz)   Height: 5' 10" (1.778 m)     Physical Exam   Constitutional: He is oriented to person, place, and time. He appears well-developed and well-nourished. No distress.   HENT:   Head: Normocephalic.   Nose: Nose normal.   Mouth/Throat: " No oropharyngeal exudate.   Neck: No JVD present.   Cardiovascular: Normal rate, regular rhythm and normal heart sounds.   No murmur heard.  Pulmonary/Chest: Normal expansion and symmetric chest wall expansion. No stridor. No respiratory distress. He has no decreased breath sounds. He has no rales. Chest wall is not dull to percussion. He exhibits no tenderness. Negative for egophony.   Abdominal: Soft. Bowel sounds are normal. He exhibits no distension. There is no hepatosplenomegaly. There is no abdominal tenderness.   Musculoskeletal:         General: No edema. Normal range of motion.      Cervical back: Neck supple.   Lymphadenopathy:     He has no cervical adenopathy.     He has no axillary adenopathy.   Neurological: He is alert and oriented to person, place, and time. No cranial nerve deficit.   Skin: Skin is warm. He is not diaphoretic. No erythema. No pallor. Nails show no clubbing.   Psychiatric: He has a normal mood and affect. His behavior is normal.   Vitals reviewed.    Personal Diagnostic Review    Laboratory:  Lab Results   Component Value Date    WBC 9.73 05/10/2023    HGB 15.8 05/10/2023    HCT 45.5 05/10/2023    MCV 95 05/10/2023     05/10/2023     CMP  Sodium   Date Value Ref Range Status   05/10/2023 137 136 - 145 mmol/L Final     Potassium   Date Value Ref Range Status   05/10/2023 4.8 3.5 - 5.1 mmol/L Final     Chloride   Date Value Ref Range Status   05/10/2023 106 95 - 110 mmol/L Final     CO2   Date Value Ref Range Status   05/10/2023 26 23 - 29 mmol/L Final     Glucose   Date Value Ref Range Status   05/10/2023 115 (H) 70 - 110 mg/dL Final     BUN   Date Value Ref Range Status   05/10/2023 11 2 - 20 mg/dL Final     Creatinine   Date Value Ref Range Status   05/10/2023 0.86 0.50 - 1.40 mg/dL Final     Calcium   Date Value Ref Range Status   05/10/2023 9.1 8.7 - 10.5 mg/dL Final     Total Protein   Date Value Ref Range Status   05/10/2023 7.8 6.0 - 8.4 g/dL Final     Albumin   Date  Value Ref Range Status   05/10/2023 4.2 3.5 - 5.2 g/dL Final     Total Bilirubin   Date Value Ref Range Status   05/10/2023 0.6 0.1 - 1.0 mg/dL Final     Comment:     For infants and newborns, interpretation of results should be based  on gestational age, weight and in agreement with clinical  observations.    Premature Infant recommended reference ranges:  Up to 24 hours.............<8.0 mg/dL  Up to 48 hours............<12.0 mg/dL  3-5 days..................<15.0 mg/dL  6-29 days.................<15.0 mg/dL       Alkaline Phosphatase   Date Value Ref Range Status   05/10/2023 96 38 - 126 U/L Final     AST   Date Value Ref Range Status   05/10/2023 26 15 - 46 U/L Final     ALT   Date Value Ref Range Status   05/10/2023 18 10 - 44 U/L Final     Anion Gap   Date Value Ref Range Status   05/10/2023 5 (L) 8 - 16 mmol/L Final     eGFR   Date Value Ref Range Status   05/10/2023 >60.0 >60 mL/min/1.73 m^2 Final       CT Chest  low dose lung screen on 4/20/23 - Images personally reviewed imaging. I agree w/ the radiologist who notes;  1.  Asymmetric right apical pleural-parenchymal changes with nodularity present, measuring up to 1.4 cm.  2.  No other discrete pulmonary nodules are seen.  Moderate emphysematous changes are lungs.  3.  Large hiatal hernia.  Coronary artery calcifications.  Other nonemergent findings as described above.    PFTs - No recent PFTs    Stress Echo in 11/30/2012 -   Technically adequate study. The aortic root is normal in size, measuring 2.7 cm at sinotubular junction. The right atrium is normal in size, measuring 3.4 cm in the apical views. The left atrial volume index is normal, measuring 16.26 cc/m2.   The right ventricle is normal in size. The left ventricle is normal in size, with an end-diastolic diameter of 4.1 cm, and an end-systolic diameter of 2.6 cm. Wall thickness is normal, with the septum and the posterior wall each measuring 0.9 cm across.   Relative wall thickness was increased at  0.44, and the LV mass index was 69.4 g/m2 consistent with concentric remodeling.         Assessment:     Problem List Items Addressed This Visit          Unprioritized    COPD (chronic obstructive pulmonary disease) - Primary     Current smoker with >40PYH of smoking, now with non-productive cough >1yr and new pulm nodule at right apices. Lung function to be evaluated with PFT. See Pulm nodule >1cm.  --Follow up PFT results           Relevant Orders    Spirometry with/without bronchodilator    Lung Volumes    DLCO-Carbon Monoxide Diffusing Capacity    NM PET CT Routine Skull to Mid Thigh    Pulmonary nodule 1 cm or greater in diameter     Asymetrical lung nodule >0.8cm in setting of chronic tobacco use concerning for malignancy. Location is high risk for biopsy, future NM PET Scan.  --Follow up PET scan results         Relevant Orders    NM PET CT Routine Skull to Mid Thigh       30 minutes of total time spent on the encounter, which includes face to face time and non-face to face time preparing to see the patient (eg, review of tests), Obtaining and/or reviewing separately obtained history, Documenting clinical information in the electronic or other health record, Independently interpreting results (not separately reported) and communicating results to the patient/family/caregiver, or Care coordination (not separately reported).

## 2023-05-17 NOTE — ASSESSMENT & PLAN NOTE
Current smoker with >40PYH of smoking, now with non-productive cough >1yr and new pulm nodule at right apices. Lung function to be evaluated with PFT. See Pulm nodule >1cm.  --Follow up PFT results

## 2023-06-01 ENCOUNTER — HOSPITAL ENCOUNTER (OUTPATIENT)
Dept: PULMONOLOGY | Facility: CLINIC | Age: 63
Discharge: HOME | End: 2023-06-01
Payer: COMMERCIAL

## 2023-06-01 ENCOUNTER — HOSPITAL ENCOUNTER (OUTPATIENT)
Dept: RADIOLOGY | Facility: HOSPITAL | Age: 63
Discharge: HOME | End: 2023-06-01
Attending: INTERNAL MEDICINE
Payer: COMMERCIAL

## 2023-06-01 DIAGNOSIS — R91.1 PULMONARY NODULE 1 CM OR GREATER IN DIAMETER: ICD-10-CM

## 2023-06-01 DIAGNOSIS — J43.2 CENTRILOBULAR EMPHYSEMA: ICD-10-CM

## 2023-06-01 PROCEDURE — A9698 NON-RAD CONTRAST MATERIALNOC: HCPCS | Performed by: INTERNAL MEDICINE

## 2023-06-01 PROCEDURE — A9552 F18 FDG: HCPCS

## 2023-06-01 PROCEDURE — 94060 PR EVAL OF BRONCHOSPASM: ICD-10-PCS | Mod: S$GLB,,, | Performed by: INTERNAL MEDICINE

## 2023-06-01 PROCEDURE — 94727 PR PULM FUNCTION TEST BY GAS: ICD-10-PCS | Mod: S$GLB,,, | Performed by: INTERNAL MEDICINE

## 2023-06-01 PROCEDURE — 94727 GAS DIL/WSHOT DETER LNG VOL: CPT | Mod: S$GLB,,, | Performed by: INTERNAL MEDICINE

## 2023-06-01 PROCEDURE — 78815 NM PET CT ROUTINE: ICD-10-PCS | Mod: 26,PI,, | Performed by: STUDENT IN AN ORGANIZED HEALTH CARE EDUCATION/TRAINING PROGRAM

## 2023-06-01 PROCEDURE — 25500020 PHARM REV CODE 255: Performed by: INTERNAL MEDICINE

## 2023-06-01 PROCEDURE — 78815 PET IMAGE W/CT SKULL-THIGH: CPT | Mod: 26,PI,, | Performed by: STUDENT IN AN ORGANIZED HEALTH CARE EDUCATION/TRAINING PROGRAM

## 2023-06-01 PROCEDURE — 94060 EVALUATION OF WHEEZING: CPT | Mod: S$GLB,,, | Performed by: INTERNAL MEDICINE

## 2023-06-01 PROCEDURE — 94729 DIFFUSING CAPACITY: CPT | Mod: S$GLB,,, | Performed by: INTERNAL MEDICINE

## 2023-06-01 PROCEDURE — 94729 PR C02/MEMBANE DIFFUSE CAPACITY: ICD-10-PCS | Mod: S$GLB,,, | Performed by: INTERNAL MEDICINE

## 2023-06-01 RX ADMIN — Medication 450 ML: at 12:06

## 2023-06-02 ENCOUNTER — PATIENT MESSAGE (OUTPATIENT)
Dept: PULMONOLOGY | Facility: CLINIC | Age: 63
End: 2023-06-02
Payer: COMMERCIAL

## 2023-06-02 DIAGNOSIS — R91.1 SOLITARY PULMONARY NODULE: ICD-10-CM

## 2023-06-02 DIAGNOSIS — K22.9 ESOPHAGEAL ABNORMALITY: Primary | ICD-10-CM

## 2023-06-05 ENCOUNTER — PATIENT MESSAGE (OUTPATIENT)
Dept: PULMONOLOGY | Facility: CLINIC | Age: 63
End: 2023-06-05
Payer: COMMERCIAL

## 2023-06-06 ENCOUNTER — TELEPHONE (OUTPATIENT)
Dept: PULMONOLOGY | Facility: CLINIC | Age: 63
End: 2023-06-06
Payer: COMMERCIAL

## 2023-06-06 NOTE — TELEPHONE ENCOUNTER
LVM for Mrs Beebe to let her know that is ok if it is first available. The NP will be able to help facilitate getting the appropriate testing scheduled. If they are uncomfortable with that, we can get him in with an MD but I would think that either way they will be able to get the testing needed per Dr Khan. If she has any other questions to return my call and office number provided.

## 2023-06-07 ENCOUNTER — OFFICE VISIT (OUTPATIENT)
Dept: GASTROENTEROLOGY | Facility: CLINIC | Age: 63
End: 2023-06-07
Payer: COMMERCIAL

## 2023-06-07 VITALS
WEIGHT: 134.13 LBS | HEART RATE: 77 BPM | OXYGEN SATURATION: 100 % | DIASTOLIC BLOOD PRESSURE: 110 MMHG | BODY MASS INDEX: 19.2 KG/M2 | SYSTOLIC BLOOD PRESSURE: 158 MMHG | HEIGHT: 70 IN

## 2023-06-07 DIAGNOSIS — Z86.010 PERSONAL HISTORY OF COLONIC POLYPS: ICD-10-CM

## 2023-06-07 DIAGNOSIS — R93.3 ABNORMAL FINDING ON GI TRACT IMAGING: Primary | ICD-10-CM

## 2023-06-07 DIAGNOSIS — K22.9 ESOPHAGEAL ABNORMALITY: ICD-10-CM

## 2023-06-07 PROBLEM — Z86.0100 PERSONAL HISTORY OF COLONIC POLYPS: Status: ACTIVE | Noted: 2023-06-07

## 2023-06-07 PROBLEM — Z12.11 SCREENING FOR MALIGNANT NEOPLASM OF COLON: Status: RESOLVED | Noted: 2018-03-27 | Resolved: 2023-06-07

## 2023-06-07 PROCEDURE — 3080F DIAST BP >= 90 MM HG: CPT | Mod: CPTII,S$GLB,, | Performed by: NURSE PRACTITIONER

## 2023-06-07 PROCEDURE — 1160F RVW MEDS BY RX/DR IN RCRD: CPT | Mod: CPTII,S$GLB,, | Performed by: NURSE PRACTITIONER

## 2023-06-07 PROCEDURE — 3077F SYST BP >= 140 MM HG: CPT | Mod: CPTII,S$GLB,, | Performed by: NURSE PRACTITIONER

## 2023-06-07 PROCEDURE — 3008F PR BODY MASS INDEX (BMI) DOCUMENTED: ICD-10-PCS | Mod: CPTII,S$GLB,, | Performed by: NURSE PRACTITIONER

## 2023-06-07 PROCEDURE — 3077F PR MOST RECENT SYSTOLIC BLOOD PRESSURE >= 140 MM HG: ICD-10-PCS | Mod: CPTII,S$GLB,, | Performed by: NURSE PRACTITIONER

## 2023-06-07 PROCEDURE — 3044F PR MOST RECENT HEMOGLOBIN A1C LEVEL <7.0%: ICD-10-PCS | Mod: CPTII,S$GLB,, | Performed by: NURSE PRACTITIONER

## 2023-06-07 PROCEDURE — 3044F HG A1C LEVEL LT 7.0%: CPT | Mod: CPTII,S$GLB,, | Performed by: NURSE PRACTITIONER

## 2023-06-07 PROCEDURE — 99204 OFFICE O/P NEW MOD 45 MIN: CPT | Mod: S$GLB,,, | Performed by: NURSE PRACTITIONER

## 2023-06-07 PROCEDURE — 99204 PR OFFICE/OUTPT VISIT, NEW, LEVL IV, 45-59 MIN: ICD-10-PCS | Mod: S$GLB,,, | Performed by: NURSE PRACTITIONER

## 2023-06-07 PROCEDURE — 3080F PR MOST RECENT DIASTOLIC BLOOD PRESSURE >= 90 MM HG: ICD-10-PCS | Mod: CPTII,S$GLB,, | Performed by: NURSE PRACTITIONER

## 2023-06-07 PROCEDURE — 1159F PR MEDICATION LIST DOCUMENTED IN MEDICAL RECORD: ICD-10-PCS | Mod: CPTII,S$GLB,, | Performed by: NURSE PRACTITIONER

## 2023-06-07 PROCEDURE — 3008F BODY MASS INDEX DOCD: CPT | Mod: CPTII,S$GLB,, | Performed by: NURSE PRACTITIONER

## 2023-06-07 PROCEDURE — 99999 PR PBB SHADOW E&M-EST. PATIENT-LVL IV: ICD-10-PCS | Mod: PBBFAC,,, | Performed by: NURSE PRACTITIONER

## 2023-06-07 PROCEDURE — 1159F MED LIST DOCD IN RCRD: CPT | Mod: CPTII,S$GLB,, | Performed by: NURSE PRACTITIONER

## 2023-06-07 PROCEDURE — 99999 PR PBB SHADOW E&M-EST. PATIENT-LVL IV: CPT | Mod: PBBFAC,,, | Performed by: NURSE PRACTITIONER

## 2023-06-07 PROCEDURE — 1160F PR REVIEW ALL MEDS BY PRESCRIBER/CLIN PHARMACIST DOCUMENTED: ICD-10-PCS | Mod: CPTII,S$GLB,, | Performed by: NURSE PRACTITIONER

## 2023-06-07 RX ORDER — SODIUM PICOSULFATE, MAGNESIUM OXIDE, AND ANHYDROUS CITRIC ACID 10; 3.5; 12 MG/160ML; G/160ML; G/160ML
1 LIQUID ORAL ONCE
Qty: 1 EACH | Refills: 0 | Status: SHIPPED | OUTPATIENT
Start: 2023-06-07 | End: 2023-06-07

## 2023-06-07 NOTE — PATIENT INSTRUCTIONS
CLENPIQ Instructions    You are scheduled for a EGD/colonoscopy with Dr. Snell on 6/13/2023 at [facility].   To ensure that your test is accurate and complete, you MUST follow these instructions listed below.  If you have any questions, please call our office at [phone].  Plan on being at the hospital for your procedure for 3-4 hours. Please contact the office at [phone] two days prior to procedure date if reschedule is needed.    1.  Follow a CLEAR LIQUID DIET for the entire day before your scheduled colonoscopy.  This means no solid food the entire day starting when you wake.  You may have as much of the clear liquids as you want throughout the day.   CLEAR LIQUID DIET:   - Avoid Red, Orange, Purple, and/or Blue food coloring   - NO DAIRY   - You can have:  Coffee with sugar (no creamer), tea, water, soda, apple or white grape juice, chicken or beef broth/bouillon (no meat, noodles, or veggies), green/yellow popsicles, green/yellow Jell-O, lemonade.    2.  AT 5 pm the evening before your colonoscopy, OPEN ONE (1) BOTTLE OF CLENPIQ AND DRINK THE ENTIRE BOTTLE.  DRINK FIVE (5) 8-OUNCE GLASSES OF WATER (40 OUNCES TOTAL) OVER THE NEXT FIVE (5) HOURS.     3.  The endoscopy department will call you 1 day before your colonoscopy to tell you the exact time to arrive, AND to tell you the exact time to drink the 2nd portion of your prep (which will be FIVE HOURS BEFORE YOUR ARRIVAL TIME).  At this time given to you, OPEN THE OTHER ONE (1) BOTTLE OF CLENPIQ AND DRINK THE ENTIRE BOTTLE.  DRINK THREE (3) 8-OUNCE GLASSES OF WATER (24 OUNCES TOTAL) OVER THE NEXT THREE (3) HOURS. Once this is complete, you can not have anything else by mouth!    4.  You must have someone with you to DRIVE YOU HOME since you will be receiving IV sedation for the colonoscopy.    5.  It is ok to take MOST of your REGULAR MEDICATIONS  in the morning of your test with a SIP of water.  THE ONLY MEDS YOU NEED TO HOLD ARE YOUR  DIABETES MEDICATIONS,  SOME BLOOD PRESSURE MEDS, AND BLOOD THINNERS IF OK'D BY YOUR DOCTOR.  Do NOT have anything else to eat or drink the morning of your colonoscopy.  It is ok to brush your teeth.    6.  If you are on blood thinners THAT YOU HAVE BEEN INSTRUCTED TO HOLD BY YOUR DOCTOR FOR THIS PROCEDURE, then do NOT take this the morning of your colonoscopy.  Do NOT stop these medications on your own, they must be approved to be held by your doctor.  Your colonoscopy can NOT be done if you are on these medications.  Examples of blood thinners include: Coumadin, Aggrenox, Plavix, Pradaxa, Reapro, Pletal, Xarelto, Ticagrelor, Brilinta, Eliquis, and high dose aspirin (325 mg).  You do not have to stop baby aspirin 81 mg.    7.  IF YOU ARE DIABETIC:  NO INSULIN OR ORAL MEDICATIONS THE MORNING OF THE COLONOSCOPY.  TAKE ONLY HALF THE DOSE OF YOUR INSULIN THE DAY BEFORE THE COLONOSCOPY.  DO NOT TAKE ANY ORAL DIABETIC MEDICATIONS THE DAY BEFORE THE COLONOSCOPY.  IF YOU ARE AN INSULIN DEPENDENT DIABETIC WITH UNSTABLE BLOOD SUGARS, NOTIFY YOUR PRIMARY CARE PHYSICIAN FOR INSTRUCTIONS.    8.  Please DO use your inhalers the morning of your procedure.

## 2023-06-07 NOTE — PROGRESS NOTES
Subjective:       Patient ID: Blayne Beebe is a 63 y.o. male.    Chief Complaint: Other (Thickening of the Esophagus)    63 male referred by pulmonary for abnormal finding on PET scan. He is being followed by pulmonary for COPD and pulmonary nodule concerning for malignancy. Long history of tobacco dependence and etoh use. Has decreased etoh consumption over the last month. PET scan showed reactive changes in the esophagus and stomach. Patient denies abdominal pain, dysphagia, heartburn, nausea, vomiting, or unintentional weight loss. Hx of colon polyps with last colonoscopy in 2018. Will schedule EGD/colonoscopy.       Past Medical History:   Diagnosis Date    Arthritis     COPD (chronic obstructive pulmonary disease)        Past Surgical History:   Procedure Laterality Date    CERVICAL FUSION      COLONOSCOPY N/A 3/27/2018    Procedure: COLONOSCOPY;  Surgeon: Maria Teresa Morocho MD;  Location: [facility];  Service: Endoscopy;  Laterality: N/A;    INGUINAL HERNIA REPAIR Bilateral     Right x2, x1 left    ROTATOR CUFF REPAIR Right     x2       Family History   Problem Relation Age of Onset    Diabetes Mother     Heart disease Father         CHF    Glaucoma Neg Hx     Colon cancer Neg Hx     Prostate cancer Neg Hx        Social History     Socioeconomic History    Marital status:    Tobacco Use    Smoking status: Every Day     Types: Cigarettes     Passive exposure: Current    Smokeless tobacco: Never    Tobacco comments:     5 cigarettes a day   Substance and Sexual Activity    Alcohol use: Yes    Drug use: No    Sexual activity: Yes     Partners: Female     Comment:        Review of Systems   Constitutional:  Negative for appetite change and unexpected weight change.   HENT:  Negative for trouble swallowing.    Respiratory:  Positive for cough. Negative for shortness of breath.    Cardiovascular:  Negative for chest pain.   Gastrointestinal:  Negative for abdominal pain, blood in stool,  "constipation, diarrhea, nausea and rectal pain.   Psychiatric/Behavioral:  Negative for dysphoric mood.        Objective:     Vitals:    06/07/23 1316   BP: (!) 158/110   BP Location: Right arm   Patient Position: Sitting   BP Method: Medium (Manual)   Pulse: 77   SpO2: 100%   Weight: 60.8 kg (134 lb 1.6 oz)   Height: 5' 10" (1.778 m)          Physical Exam  Constitutional:       General: He is not in acute distress.  HENT:      Head: Normocephalic.   Eyes:      Conjunctiva/sclera: Conjunctivae normal.   Cardiovascular:      Rate and Rhythm: Normal rate and regular rhythm.   Pulmonary:      Effort: Pulmonary effort is normal. No respiratory distress.      Breath sounds: Normal breath sounds.   Musculoskeletal:         General: No swelling. Normal range of motion.      Cervical back: Normal range of motion.   Skin:     General: Skin is warm and dry.   Neurological:      Mental Status: He is alert and oriented to person, place, and time.   Psychiatric:         Mood and Affect: Mood normal.         Behavior: Behavior normal.     Component      Latest Ref Rng & Units 5/10/2023   WBC      3.90 - 12.70 K/uL 9.73   RBC      4.60 - 6.20 M/uL 4.78   Hemoglobin      14.0 - 18.0 g/dL 15.8   Hematocrit      40.0 - 54.0 % 45.5   MCV      82 - 98 fL 95   MCH      27.0 - 31.0 pg 33.1 (H)   MCHC      32.0 - 36.0 g/dL 34.7   RDW      11.5 - 14.5 % 13.4   Platelets      150 - 450 K/uL 290   MPV      9.2 - 12.9 fL 9.0 (L)   Immature Granulocytes      0.0 - 0.5 % 0.5   Gran # (ANC)      1.8 - 7.7 K/uL 6.7   Immature Grans (Abs)      0.00 - 0.04 K/uL 0.05 (H)   Lymph #      1.0 - 4.8 K/uL 1.9   Mono #      0.3 - 1.0 K/uL 0.7   Eos #      0.0 - 0.5 K/uL 0.3   Baso #      0.00 - 0.20 K/uL 0.08   nRBC      0 /100 WBC 0   Gran %      38.0 - 73.0 % 69.3   Lymph %      18.0 - 48.0 % 19.1   Mono %      4.0 - 15.0 % 7.5   Eosinophil %      0.0 - 8.0 % 2.8   Basophil %      0.0 - 1.9 % 0.8   Differential Method       Automated   Sodium      136 " - 145 mmol/L 137   Potassium      3.5 - 5.1 mmol/L 4.8   Chloride      95 - 110 mmol/L 106   CO2      23 - 29 mmol/L 26   Glucose      70 - 110 mg/dL 115 (H)   BUN      2 - 20 mg/dL 11   Creatinine      0.50 - 1.40 mg/dL 0.86   Calcium      8.7 - 10.5 mg/dL 9.1   PROTEIN TOTAL      6.0 - 8.4 g/dL 7.8   Albumin      3.5 - 5.2 g/dL 4.2   BILIRUBIN TOTAL      0.1 - 1.0 mg/dL 0.6   Alkaline Phosphatase      38 - 126 U/L 96   AST      15 - 46 U/L 26   ALT      10 - 44 U/L 18   Anion Gap      8 - 16 mmol/L 5 (L)   eGFR      >60 mL/min/1.73 m:2 >60.0     NM PET CT Routine Skull to Mid Thigh  Narrative: EXAMINATION:  NM PET CT ROUTINE    CLINICAL HISTORY:  Lung nodule, > 8mm; Solitary pulmonary nodule    TECHNIQUE:  11.93 mCi of F18-FDG was administered intravenously in the right antecubital fossa.  After an approximately 60 min distribution time, PET/CT images were acquired from the skull base to midthigh.  Transmission images were acquired to correct for attenuation using a whole body low-dose CT scan with oral contrast and without IV contrast with the arms positioned above the head. Glycemia at the time of injection was 84 mg/dL.    COMPARISON:  CT chest 04/20/2023    FINDINGS:  Quality of the study: Adequate.    In the head and neck, there is a normal sized left upper surgical chain no flow with mild radiotracer uptake, measures 0.7 cm in short axis with SUV max of 4.3 (image 18).    There are no hypermetabolic mucosal lesions.    In the chest, there is moderate paraseptal and centrilobular emphysema with an irregular opacity at the right lung apex, no associated focal suspicious FDG avidity, measures approximately 1.5 cm in average dimension, favored to represent scarring.    There is circumferential wall thickening of the lower esophagus with increased FDG avidity of SUV max 22 (image 120).  Wall thickening appears to extend into the gastric fundal wall.    Upper limit of normal sized AP window lymph node measuring  0.9 cm in short axis with mild uptake, SUV max of 3.3 (image 72).    In the abdomen and pelvis, there is physiologic tracer distribution within the abdominal organs and excretion into the genitourinary system.    In the bones, there is a peripherally sclerotic and lucent lesion in the posterior aspect of the iliac bone with mild uptake and SUV max of 3 (image 195).    In the extremities, there are no hypermetabolic lesions worrisome for malignancy.    Incidental CT findings: Patchy opacification of the maxillary sinuses.  Moderate coronary atherosclerosis.  Calcifications of the prostate.  Severe atherosclerosis.  Postoperative changes of upper cervical spine and right glenohumeral joint.  Impression: Hypermetabolic circumferential wall thickening of the lower esophagus and gastric fundus, concerning for primary malignancy.    Indeterminate AP window lymph node measuring upper limit of normal for size with mild radiotracer uptake.  Additional subcentimeter left upper cervical chain lymph node with mild uptake, possibly reactive.    Right apical irregular opacity, no associated focal suspicious FDG avidity, favored to represent scarring.    Peripherally sclerotic, lucent lesion with mild uptake in the right iliac bone.  Favored to represent benign etiology although nonspecific.    This report was flagged in Epic as abnormal.    Electronically signed by resident: Pranay Leon  Date:    06/01/2023  Time:    14:53    Electronically signed by: Juawn Villeda  Date:    06/01/2023  Time:    16:55              Assessment:         ICD-10-CM ICD-9-CM   1. Abnormal finding on GI tract imaging  R93.3 793.4   2. Esophageal abnormality  K22.9 530.9   3. Personal history of colonic polyps  Z86.010 V12.72       Plan:       Abnormal finding on GI tract imaging    Esophageal abnormality  -     Ambulatory referral/consult to Gastroenterology    Personal history of colonic polyps  -     CLENPIQ 10 mg-3.5 gram- 12 gram/160 mL Soln;  Take 1 Package by mouth once. Take as directed on your instructions from clinic for 1 dose  Dispense: 1 each; Refill: 0    - Schedule EGD/colonoscopy. Clenpig bowel prep instructions reviewed.     Follow up if symptoms worsen or fail to improve.     Patient's Medications   New Prescriptions    CLENPIQ 10 MG-3.5 GRAM- 12 GRAM/160 ML SOLN    Take 1 Package by mouth once. Take as directed on your instructions from clinic for 1 dose   Previous Medications    SILDENAFIL (VIAGRA) 100 MG TABLET    Take 1 tablet (100 mg total) by mouth daily as needed for Erectile Dysfunction.   Modified Medications    No medications on file   Discontinued Medications    No medications on file

## 2023-06-13 PROBLEM — T88.4XXA HARD TO INTUBATE: Status: ACTIVE | Noted: 2023-06-13

## 2023-06-20 ENCOUNTER — PATIENT MESSAGE (OUTPATIENT)
Dept: GASTROENTEROLOGY | Facility: CLINIC | Age: 63
End: 2023-06-20
Payer: COMMERCIAL

## 2023-06-20 NOTE — TELEPHONE ENCOUNTER
Patient informed that the biopsy confirmed esophageal adenocarcinoma. Keep appointment with oncology next week.

## 2023-06-23 PROBLEM — C16.0: Status: ACTIVE | Noted: 2023-06-23

## 2023-06-23 NOTE — PROGRESS NOTES
"PATIENT: Blayne Beebe  MRN: [id number]  DATE: 6/26/2023    Diagnosis:   1. Malignant neoplasm of gastroesophageal junction    2. Abnormal finding on GI tract imaging    3. Atherosclerosis of aorta    4. Nicotine dependence, cigarettes, uncomplicated    5. Advance care planning      Chief Complaint: Esophageal Cancer    Oncologic History:      Oncologic History Malignant neoplasm of gastroesophageal junction - staging in process      Oncologic Treatment To be determined: likely chemoradiation      Pathology 6/13/23:  1. Duodenal bulb nodule (biopsy):   Marked benign surface foveolar metaplasia with hyperplastic changes   Multiple deeper levels examined     2. Gastroesophageal junction mass (biopsy):   Invasive adenocarcinoma, moderately differentiated   Background low and high-grade glandular dysplasia   HER2 immunohistochemistry:  Equivocal.     Immunohistochemistry (IHC) Testing for Mismatch Repair (MMR) Proteins:   MLH1, MSH2, MSH6, PMS2 - Intact nuclear expression   Background nonneoplastic tissue/internal control with intact nuclear expression     There are exceptions to the above IHC interpretations. These results should not be considered in isolation, and clinical correlation with genetic counseling is recommended to assess the need for germline testing.     Interpretation: No loss of nuclear expression of MMR proteins: low probability of microsatellite instability       3. Colon, cecal polyp (polypectomy):   Tubular adenoma           Subjective:    History of Present Illness: Mr. Beebe is a 63 y.o. male who presents for evaluation and management of gastroesophageal cancer. He is referred by Dr. Snell.    - PET/CT scan (6/1/23) revealed "circumferential wall thickening of the lower esophagus with increased FDG avidity of SUV max 22."  - upper GI endoscopy (6/13/23) revealed a "partially obstructing, likely malignant esophageal tumor was found in the middle third of the esophagus and in the lower third of " "the esophagus."    - today, he endorses fatigue, cough. He denies dysphagia, odynophagia. He denies shortness of breath, chest pain, nausea, vomiting, diarrhea, constipation.    - he smokes a few cigarettes daily. He used to smoke 1-2 packs of cigarettes daily.  - he drinks a couple beers daily.    Past medical, surgical, family, and social histories have been reviewed and updated below.    Past Medical History:   Past Medical History:   Diagnosis Date    Arthritis     COPD (chronic obstructive pulmonary disease)        Past Surgical History:   Past Surgical History:   Procedure Laterality Date    CERVICAL FUSION      COLONOSCOPY N/A 3/27/2018    Procedure: COLONOSCOPY;  Surgeon: Maria Teresa Morocho MD;  Location: [facility];  Service: Endoscopy;  Laterality: N/A;    COLONOSCOPY N/A 6/13/2023    Procedure: COLONOSCOPY;  Surgeon: Kelli Snell MD;  Location: [facility] ENDO;  Service: Endoscopy;  Laterality: N/A;    ESOPHAGOGASTRODUODENOSCOPY N/A 6/13/2023    Procedure: EGD (ESOPHAGOGASTRODUODENOSCOPY);  Surgeon: Kelli Snell MD;  Location: [facility] ENDO;  Service: Endoscopy;  Laterality: N/A;    INGUINAL HERNIA REPAIR Bilateral     Right x2, x1 left    ROTATOR CUFF REPAIR Right     x2       Family History:   Family History   Problem Relation Age of Onset    Diabetes Mother     Heart disease Father         CHF    Glaucoma Neg Hx     Colon cancer Neg Hx     Prostate cancer Neg Hx        Social History:  reports that he has been smoking cigarettes. He has been exposed to tobacco smoke. He has never used smokeless tobacco. He reports current alcohol use. He reports that he does not use drugs.    Allergies:  Review of patient's allergies indicates:  No Known Allergies    Medications:  Current Outpatient Medications   Medication Sig Dispense Refill    pantoprazole (PROTONIX) 40 MG tablet Take 1 tablet (40 mg total) by mouth once daily. 90 tablet 3    sildenafil (VIAGRA) 100 MG tablet Take 1 tablet (100 mg total) by mouth " daily as needed for Erectile Dysfunction. 10 tablet 6     No current facility-administered medications for this visit.       Review of Systems   Constitutional:  Positive for fatigue.   HENT:  Negative for sore throat.    Eyes:  Negative for visual disturbance.   Respiratory:  Positive for cough. Negative for shortness of breath.    Cardiovascular:  Negative for chest pain.   Gastrointestinal:  Negative for abdominal pain.   Genitourinary:  Negative for dysuria.   Musculoskeletal:  Negative for back pain.   Skin:  Negative for rash.   Neurological:  Negative for headaches.   Hematological:  Negative for adenopathy.   Psychiatric/Behavioral:  The patient is not nervous/anxious.      ECOG Performance Status:   ECOG SCORE    0 - Fully active-able to carry on all pre-disease performance without restriction         Objective:      Vitals:   Vitals:    06/26/23 1125   BP: (!) 152/80   Pulse: 80   SpO2: 99%   Weight: 59.2 kg (130 lb 8.2 oz)     BMI: Body mass index is 19.27 kg/m².    Physical Exam  Vitals and nursing note reviewed.   Constitutional:       Appearance: He is well-developed.   HENT:      Head: Normocephalic and atraumatic.   Eyes:      Pupils: Pupils are equal, round, and reactive to light.   Cardiovascular:      Rate and Rhythm: Normal rate and regular rhythm.   Pulmonary:      Effort: Pulmonary effort is normal.      Breath sounds: Normal breath sounds.   Abdominal:      General: Bowel sounds are normal.      Palpations: Abdomen is soft.   Musculoskeletal:         General: Normal range of motion.      Cervical back: Normal range of motion and neck supple.   Skin:     General: Skin is warm and dry.   Neurological:      Mental Status: He is alert and oriented to person, place, and time.   Psychiatric:         Behavior: Behavior normal.         Thought Content: Thought content normal.         Judgment: Judgment normal.       Laboratory Data:  Labs have been reviewed.    Lab Results   Component Value Date     WBC 9.73 05/10/2023    HGB 15.8 05/10/2023    HCT 45.5 05/10/2023    MCV 95 05/10/2023     05/10/2023           Diagnostics:  Upper GI endoscopy (6/13/23):   - Normal upper third of esophagus.                          - Partially obstructing, likely malignant                          esophageal tumor was found in the middle third of                          the esophagus and in the lower third of the                          esophagus. Biopsied.                          - Small hiatal hernia.                          - Normal stomach.                          - Mucosal nodules found in the duodenum, sampled.                          - Normal second portion of the duodenum.     Colonoscopy (6/13/23):  - Redundant distal colon.                          - One diminutive polyp in the cecum, removed with                          a cold biopsy forceps. Resected and retrieved.                          - One 8 mm polyp in the sigmoid colon, removed                          with a cold snare. Complete resection. Polyp                          tissue not retrieved.                          - Severe diverticulosis in the entire examined                          colon.                          - Internal hemorrhoids.         Imaging:    PET scan (6/1/23): I have personally reviewed the images  Quality of the study: Adequate.     In the head and neck, there is a normal sized left upper surgical chain no flow with mild radiotracer uptake, measures 0.7 cm in short axis with SUV max of 4.3 (image 18).     There are no hypermetabolic mucosal lesions.     In the chest, there is moderate paraseptal and centrilobular emphysema with an irregular opacity at the right lung apex, no associated focal suspicious FDG avidity, measures approximately 1.5 cm in average dimension, favored to represent scarring.     There is circumferential wall thickening of the lower esophagus with increased FDG avidity of SUV max 22 (image 120).  Wall  thickening appears to extend into the gastric fundal wall.     Upper limit of normal sized AP window lymph node measuring 0.9 cm in short axis with mild uptake, SUV max of 3.3 (image 72).     In the abdomen and pelvis, there is physiologic tracer distribution within the abdominal organs and excretion into the genitourinary system.     In the bones, there is a peripherally sclerotic and lucent lesion in the posterior aspect of the iliac bone with mild uptake and SUV max of 3 (image 195).     In the extremities, there are no hypermetabolic lesions worrisome for malignancy.     Incidental CT findings: Patchy opacification of the maxillary sinuses.  Moderate coronary atherosclerosis.  Calcifications of the prostate.  Severe atherosclerosis.  Postoperative changes of upper cervical spine and right glenohumeral joint.     Impression:     Hypermetabolic circumferential wall thickening of the lower esophagus and gastric fundus, concerning for primary malignancy.     Indeterminate AP window lymph node measuring upper limit of normal for size with mild radiotracer uptake.  Additional subcentimeter left upper cervical chain lymph node with mild uptake, possibly reactive.     Right apical irregular opacity, no associated focal suspicious FDG avidity, favored to represent scarring.     Peripherally sclerotic, lucent lesion with mild uptake in the right iliac bone.  Favored to represent benign etiology although nonspecific.    CT chest (4/20/23):  1.  Asymmetric right apical pleural-parenchymal changes with nodularity present, measuring up to 1.4 cm.  2.  No other discrete pulmonary nodules are seen.  Moderate emphysematous changes are lungs.  3.  Large hiatal hernia.  Coronary artery calcifications.         Assessment:       1. Malignant neoplasm of gastroesophageal junction    2. Abnormal finding on GI tract imaging    3. Atherosclerosis of aorta    4. Nicotine dependence, cigarettes, uncomplicated    5. Advance care planning   "         Plan:     Gastroesophageal cancer  - I have reviewed his chart  - PET/CT scan (6/1/23) revealed "circumferential wall thickening of the lower esophagus with increased FDG avidity of SUV max 22." Also noted was an "indeterminate AP window lymph node measuring upper limit of normal for size with mild radiotracer uptake."  - upper GI endoscopy (6/13/23) revealed a "partially obstructing, likely malignant esophageal tumor was found in the middle third of the esophagus and in the lower third of the esophagus." Biopsy revealed invasive adenocarcinoma.  - refer for upper endoscopic ultrasound  - refer to surgical oncology  - refer to radiation oncology  - I and [facility] recommend chemoradiation with carboplatin/paclitaxel  - The risks and benefits of chemotherapy were discussed, and informed consent was obtained.  - refer to interventional radiology for port placement.  - since he will likely be proceeding with chemoradiation at [facility], we will arrange for him to see an oncologist at [facility]    2. Atherosclerosis of aorta  - seen on imaging  - continue to monitor    3. Nicotine dependence  - he smokes a few cigarettes daily. He used to smoke 1-2 packs of cigarettes daily.  - I encouraged cessation    5. Advance Care Planning     Date: 06/26/2023    Power of   I initiated the process of voluntary advance care planning today and explained the importance of this process to the patient.  I introduced the concept of advance directives to the patient, as well. Then the patient received detailed information about the importance of designating a Health Care Power of  (HCPOA). He was also instructed to communicate with this person about their wishes for future healthcare, should he become sick and lose decision-making capacity. The patient has not previously appointed a HCPOA. After our discussion, the patient has decided to complete a HCPOA and has appointed his  wife Aislinn Beebe " ([phone]) . I spent a total time of 16 minutes discussing this issue with the patient.          - [facility]      Ed Vance M.D.  Hematology/Oncology  [facility] - [address], [address]  [place] [address]  Phone: [phone]  Fax: [phone]

## 2023-06-23 NOTE — PLAN OF CARE
START ON PATHWAY REGIMEN - Gastroesophageal    [id number]        Paclitaxel       Carboplatin           Additional Orders: Given with concurrent chemoradiotherapy.  GCSF   therapy with RT is a relative contraindication.    **Always confirm dose/schedule in your pharmacy ordering system**    Patient Characteristics:  Esophageal & GE Junction, Adenocarcinoma, Preoperative or Nonsurgical Candidate,   M0 (Clinical Staging), cT2 or Higher or cN+, Surgical Candidate (Up to cT4a) -   Preoperative Therapy, GE Junction  Therapeutic Status: Preoperative or Nonsurgical Candidate, M0 (Clinical Staging)  Histology: Adenocarcinoma  Disease Classification: GE Junction  AJCC Grade: G2  AJCC 8 Stage Grouping: IIB  AJCC T Category: cT2  AJCC N Category: cN0  AJCC M Category: cM0  Intent of Therapy:  Curative Intent, Not Discussed with Patient

## 2023-06-26 ENCOUNTER — OFFICE VISIT (OUTPATIENT)
Dept: HEMATOLOGY/ONCOLOGY | Facility: CLINIC | Age: 63
End: 2023-06-26
Payer: COMMERCIAL

## 2023-06-26 VITALS
HEART RATE: 80 BPM | SYSTOLIC BLOOD PRESSURE: 152 MMHG | BODY MASS INDEX: 19.27 KG/M2 | OXYGEN SATURATION: 99 % | DIASTOLIC BLOOD PRESSURE: 80 MMHG | WEIGHT: 130.5 LBS

## 2023-06-26 DIAGNOSIS — Z71.89 ADVANCE CARE PLANNING: ICD-10-CM

## 2023-06-26 DIAGNOSIS — F17.210 NICOTINE DEPENDENCE, CIGARETTES, UNCOMPLICATED: ICD-10-CM

## 2023-06-26 DIAGNOSIS — R93.3 ABNORMAL FINDING ON GI TRACT IMAGING: ICD-10-CM

## 2023-06-26 DIAGNOSIS — I70.0 ATHEROSCLEROSIS OF AORTA: ICD-10-CM

## 2023-06-26 DIAGNOSIS — C16.0 MALIGNANT NEOPLASM OF GASTROESOPHAGEAL JUNCTION: Primary | ICD-10-CM

## 2023-06-26 PROCEDURE — 3044F HG A1C LEVEL LT 7.0%: CPT | Mod: CPTII,S$GLB,, | Performed by: INTERNAL MEDICINE

## 2023-06-26 PROCEDURE — 99999 PR PBB SHADOW E&M-EST. PATIENT-LVL IV: CPT | Mod: PBBFAC,,, | Performed by: INTERNAL MEDICINE

## 2023-06-26 PROCEDURE — 99215 OFFICE O/P EST HI 40 MIN: CPT | Mod: S$GLB,,, | Performed by: INTERNAL MEDICINE

## 2023-06-26 PROCEDURE — 1160F PR REVIEW ALL MEDS BY PRESCRIBER/CLIN PHARMACIST DOCUMENTED: ICD-10-PCS | Mod: CPTII,S$GLB,, | Performed by: INTERNAL MEDICINE

## 2023-06-26 PROCEDURE — 99497 ADVNCD CARE PLAN 30 MIN: CPT | Mod: S$GLB,,, | Performed by: INTERNAL MEDICINE

## 2023-06-26 PROCEDURE — 3079F PR MOST RECENT DIASTOLIC BLOOD PRESSURE 80-89 MM HG: ICD-10-PCS | Mod: CPTII,S$GLB,, | Performed by: INTERNAL MEDICINE

## 2023-06-26 PROCEDURE — 99999 PR PBB SHADOW E&M-EST. PATIENT-LVL IV: ICD-10-PCS | Mod: PBBFAC,,, | Performed by: INTERNAL MEDICINE

## 2023-06-26 PROCEDURE — 3077F PR MOST RECENT SYSTOLIC BLOOD PRESSURE >= 140 MM HG: ICD-10-PCS | Mod: CPTII,S$GLB,, | Performed by: INTERNAL MEDICINE

## 2023-06-26 PROCEDURE — 99497 PR ADVNCD CARE PLAN 30 MIN: ICD-10-PCS | Mod: S$GLB,,, | Performed by: INTERNAL MEDICINE

## 2023-06-26 PROCEDURE — 3044F PR MOST RECENT HEMOGLOBIN A1C LEVEL <7.0%: ICD-10-PCS | Mod: CPTII,S$GLB,, | Performed by: INTERNAL MEDICINE

## 2023-06-26 PROCEDURE — 3079F DIAST BP 80-89 MM HG: CPT | Mod: CPTII,S$GLB,, | Performed by: INTERNAL MEDICINE

## 2023-06-26 PROCEDURE — 99215 PR OFFICE/OUTPT VISIT, EST, LEVL V, 40-54 MIN: ICD-10-PCS | Mod: S$GLB,,, | Performed by: INTERNAL MEDICINE

## 2023-06-26 PROCEDURE — 1159F MED LIST DOCD IN RCRD: CPT | Mod: CPTII,S$GLB,, | Performed by: INTERNAL MEDICINE

## 2023-06-26 PROCEDURE — 3008F BODY MASS INDEX DOCD: CPT | Mod: CPTII,S$GLB,, | Performed by: INTERNAL MEDICINE

## 2023-06-26 PROCEDURE — 3077F SYST BP >= 140 MM HG: CPT | Mod: CPTII,S$GLB,, | Performed by: INTERNAL MEDICINE

## 2023-06-26 PROCEDURE — 3008F PR BODY MASS INDEX (BMI) DOCUMENTED: ICD-10-PCS | Mod: CPTII,S$GLB,, | Performed by: INTERNAL MEDICINE

## 2023-06-26 PROCEDURE — 1160F RVW MEDS BY RX/DR IN RCRD: CPT | Mod: CPTII,S$GLB,, | Performed by: INTERNAL MEDICINE

## 2023-06-26 PROCEDURE — 1159F PR MEDICATION LIST DOCUMENTED IN MEDICAL RECORD: ICD-10-PCS | Mod: CPTII,S$GLB,, | Performed by: INTERNAL MEDICINE

## 2023-06-26 NOTE — Clinical Note
Good morning,  This is a new lower esophageal/GE junction adenocarcinoma. Getting an EUS for staging. Referring to rad onc and surg oncology. Thinking chemoRT. He wants to be seen at [facility] if getting chemoRT there. Can you schedule him with you in next week or so?  Thanks!

## 2023-06-26 NOTE — Clinical Note
Good morning,  Just saw Mr. Beebe. He's a new lower esophageal/GE junction cancer. Can you set him up for an endoscopic ultrasound for staging?  Thanks so much!

## 2023-06-27 ENCOUNTER — TELEPHONE (OUTPATIENT)
Dept: HEMATOLOGY/ONCOLOGY | Facility: CLINIC | Age: 63
End: 2023-06-27
Payer: COMMERCIAL

## 2023-06-27 DIAGNOSIS — C15.9 MALIGNANT NEOPLASM OF ESOPHAGUS, UNSPECIFIED LOCATION: Primary | ICD-10-CM

## 2023-06-27 DIAGNOSIS — C16.0 MALIGNANT NEOPLASM OF GASTROESOPHAGEAL JUNCTION: Primary | ICD-10-CM

## 2023-06-28 ENCOUNTER — TELEPHONE (OUTPATIENT)
Dept: ENDOSCOPY | Facility: HOSPITAL | Age: 63
End: 2023-06-28
Payer: COMMERCIAL

## 2023-06-28 DIAGNOSIS — C16.0 MALIGNANT NEOPLASM OF GASTROESOPHAGEAL JUNCTION: Primary | ICD-10-CM

## 2023-06-29 ENCOUNTER — OFFICE VISIT (OUTPATIENT)
Dept: HEMATOLOGY/ONCOLOGY | Facility: CLINIC | Age: 63
End: 2023-06-29
Payer: COMMERCIAL

## 2023-06-29 ENCOUNTER — OFFICE VISIT (OUTPATIENT)
Dept: SURGERY | Facility: CLINIC | Age: 63
End: 2023-06-29
Payer: COMMERCIAL

## 2023-06-29 ENCOUNTER — LAB VISIT (OUTPATIENT)
Dept: LAB | Facility: HOSPITAL | Age: 63
End: 2023-06-29
Attending: INTERNAL MEDICINE
Payer: COMMERCIAL

## 2023-06-29 ENCOUNTER — TELEPHONE (OUTPATIENT)
Dept: ENDOSCOPY | Facility: HOSPITAL | Age: 63
End: 2023-06-29
Payer: COMMERCIAL

## 2023-06-29 VITALS
SYSTOLIC BLOOD PRESSURE: 137 MMHG | HEART RATE: 78 BPM | TEMPERATURE: 98 F | BODY MASS INDEX: 19.4 KG/M2 | DIASTOLIC BLOOD PRESSURE: 73 MMHG | RESPIRATION RATE: 18 BRPM | WEIGHT: 131 LBS | HEIGHT: 69 IN | OXYGEN SATURATION: 99 %

## 2023-06-29 VITALS
WEIGHT: 131.81 LBS | HEART RATE: 78 BPM | SYSTOLIC BLOOD PRESSURE: 137 MMHG | DIASTOLIC BLOOD PRESSURE: 73 MMHG | BODY MASS INDEX: 19.52 KG/M2 | OXYGEN SATURATION: 99 % | HEIGHT: 69 IN

## 2023-06-29 DIAGNOSIS — C16.0 MALIGNANT NEOPLASM OF GASTROESOPHAGEAL JUNCTION: ICD-10-CM

## 2023-06-29 DIAGNOSIS — J43.9 PULMONARY EMPHYSEMA, UNSPECIFIED EMPHYSEMA TYPE: ICD-10-CM

## 2023-06-29 DIAGNOSIS — C16.0 MALIGNANT NEOPLASM OF GASTROESOPHAGEAL JUNCTION: Primary | ICD-10-CM

## 2023-06-29 DIAGNOSIS — F17.210 NICOTINE DEPENDENCE, CIGARETTES, UNCOMPLICATED: ICD-10-CM

## 2023-06-29 LAB
ALBUMIN SERPL BCP-MCNC: 3.5 G/DL (ref 3.5–5.2)
ALP SERPL-CCNC: 94 U/L (ref 55–135)
ALT SERPL W/O P-5'-P-CCNC: 7 U/L (ref 10–44)
ANION GAP SERPL CALC-SCNC: 8 MMOL/L (ref 8–16)
AST SERPL-CCNC: 13 U/L (ref 10–40)
BASOPHILS # BLD AUTO: 0.06 K/UL (ref 0–0.2)
BASOPHILS NFR BLD: 0.7 % (ref 0–1.9)
BILIRUB SERPL-MCNC: 0.4 MG/DL (ref 0.1–1)
BUN SERPL-MCNC: 13 MG/DL (ref 8–23)
CALCIUM SERPL-MCNC: 9.4 MG/DL (ref 8.7–10.5)
CHLORIDE SERPL-SCNC: 105 MMOL/L (ref 95–110)
CO2 SERPL-SCNC: 25 MMOL/L (ref 23–29)
CREAT SERPL-MCNC: 0.9 MG/DL (ref 0.5–1.4)
DIFFERENTIAL METHOD: ABNORMAL
EOSINOPHIL # BLD AUTO: 0.2 K/UL (ref 0–0.5)
EOSINOPHIL NFR BLD: 1.7 % (ref 0–8)
ERYTHROCYTE [DISTWIDTH] IN BLOOD BY AUTOMATED COUNT: 12.8 % (ref 11.5–14.5)
EST. GFR  (NO RACE VARIABLE): >60 ML/MIN/1.73 M^2
GLUCOSE SERPL-MCNC: 100 MG/DL (ref 70–110)
HCT VFR BLD AUTO: 44.2 % (ref 40–54)
HGB BLD-MCNC: 15.1 G/DL (ref 14–18)
IMM GRANULOCYTES # BLD AUTO: 0.04 K/UL (ref 0–0.04)
IMM GRANULOCYTES NFR BLD AUTO: 0.4 % (ref 0–0.5)
LYMPHOCYTES # BLD AUTO: 1.5 K/UL (ref 1–4.8)
LYMPHOCYTES NFR BLD: 16.4 % (ref 18–48)
MCH RBC QN AUTO: 33 PG (ref 27–31)
MCHC RBC AUTO-ENTMCNC: 34.2 G/DL (ref 32–36)
MCV RBC AUTO: 97 FL (ref 82–98)
MONOCYTES # BLD AUTO: 0.8 K/UL (ref 0.3–1)
MONOCYTES NFR BLD: 8.8 % (ref 4–15)
NEUTROPHILS # BLD AUTO: 6.5 K/UL (ref 1.8–7.7)
NEUTROPHILS NFR BLD: 72 % (ref 38–73)
NRBC BLD-RTO: 0 /100 WBC
PLATELET # BLD AUTO: 304 K/UL (ref 150–450)
PMV BLD AUTO: 8.8 FL (ref 9.2–12.9)
POTASSIUM SERPL-SCNC: 4 MMOL/L (ref 3.5–5.1)
PROT SERPL-MCNC: 7.3 G/DL (ref 6–8.4)
RBC # BLD AUTO: 4.57 M/UL (ref 4.6–6.2)
SODIUM SERPL-SCNC: 138 MMOL/L (ref 136–145)
WBC # BLD AUTO: 9.01 K/UL (ref 3.9–12.7)

## 2023-06-29 PROCEDURE — 99205 OFFICE O/P NEW HI 60 MIN: CPT | Mod: S$GLB,,, | Performed by: SURGERY

## 2023-06-29 PROCEDURE — 3078F DIAST BP <80 MM HG: CPT | Mod: CPTII,S$GLB,, | Performed by: INTERNAL MEDICINE

## 2023-06-29 PROCEDURE — 3044F PR MOST RECENT HEMOGLOBIN A1C LEVEL <7.0%: ICD-10-PCS | Mod: CPTII,S$GLB,, | Performed by: SURGERY

## 2023-06-29 PROCEDURE — 99999 PR PBB SHADOW E&M-EST. PATIENT-LVL III: ICD-10-PCS | Mod: PBBFAC,,, | Performed by: SURGERY

## 2023-06-29 PROCEDURE — 3078F DIAST BP <80 MM HG: CPT | Mod: CPTII,S$GLB,, | Performed by: SURGERY

## 2023-06-29 PROCEDURE — 99999 PR PBB SHADOW E&M-EST. PATIENT-LVL III: CPT | Mod: PBBFAC,,, | Performed by: SURGERY

## 2023-06-29 PROCEDURE — 1159F PR MEDICATION LIST DOCUMENTED IN MEDICAL RECORD: ICD-10-PCS | Mod: CPTII,S$GLB,, | Performed by: INTERNAL MEDICINE

## 2023-06-29 PROCEDURE — 3075F SYST BP GE 130 - 139MM HG: CPT | Mod: CPTII,S$GLB,, | Performed by: INTERNAL MEDICINE

## 2023-06-29 PROCEDURE — 3008F BODY MASS INDEX DOCD: CPT | Mod: CPTII,S$GLB,, | Performed by: INTERNAL MEDICINE

## 2023-06-29 PROCEDURE — 99999 PR PBB SHADOW E&M-EST. PATIENT-LVL III: CPT | Mod: PBBFAC,,, | Performed by: INTERNAL MEDICINE

## 2023-06-29 PROCEDURE — 99205 PR OFFICE/OUTPT VISIT, NEW, LEVL V, 60-74 MIN: ICD-10-PCS | Mod: S$GLB,,, | Performed by: SURGERY

## 2023-06-29 PROCEDURE — 99215 OFFICE O/P EST HI 40 MIN: CPT | Mod: S$GLB,,, | Performed by: INTERNAL MEDICINE

## 2023-06-29 PROCEDURE — 3008F PR BODY MASS INDEX (BMI) DOCUMENTED: ICD-10-PCS | Mod: CPTII,S$GLB,, | Performed by: SURGERY

## 2023-06-29 PROCEDURE — 99215 PR OFFICE/OUTPT VISIT, EST, LEVL V, 40-54 MIN: ICD-10-PCS | Mod: S$GLB,,, | Performed by: INTERNAL MEDICINE

## 2023-06-29 PROCEDURE — 85025 COMPLETE CBC W/AUTO DIFF WBC: CPT | Performed by: INTERNAL MEDICINE

## 2023-06-29 PROCEDURE — 3044F HG A1C LEVEL LT 7.0%: CPT | Mod: CPTII,S$GLB,, | Performed by: INTERNAL MEDICINE

## 2023-06-29 PROCEDURE — 1159F MED LIST DOCD IN RCRD: CPT | Mod: CPTII,S$GLB,, | Performed by: INTERNAL MEDICINE

## 2023-06-29 PROCEDURE — 99999 PR PBB SHADOW E&M-EST. PATIENT-LVL III: ICD-10-PCS | Mod: PBBFAC,,, | Performed by: INTERNAL MEDICINE

## 2023-06-29 PROCEDURE — 3008F PR BODY MASS INDEX (BMI) DOCUMENTED: ICD-10-PCS | Mod: CPTII,S$GLB,, | Performed by: INTERNAL MEDICINE

## 2023-06-29 PROCEDURE — 3078F PR MOST RECENT DIASTOLIC BLOOD PRESSURE < 80 MM HG: ICD-10-PCS | Mod: CPTII,S$GLB,, | Performed by: SURGERY

## 2023-06-29 PROCEDURE — 36415 COLL VENOUS BLD VENIPUNCTURE: CPT | Performed by: INTERNAL MEDICINE

## 2023-06-29 PROCEDURE — 3075F PR MOST RECENT SYSTOLIC BLOOD PRESS GE 130-139MM HG: ICD-10-PCS | Mod: CPTII,S$GLB,, | Performed by: INTERNAL MEDICINE

## 2023-06-29 PROCEDURE — 3008F BODY MASS INDEX DOCD: CPT | Mod: CPTII,S$GLB,, | Performed by: SURGERY

## 2023-06-29 PROCEDURE — 80053 COMPREHEN METABOLIC PANEL: CPT | Performed by: INTERNAL MEDICINE

## 2023-06-29 PROCEDURE — 3078F PR MOST RECENT DIASTOLIC BLOOD PRESSURE < 80 MM HG: ICD-10-PCS | Mod: CPTII,S$GLB,, | Performed by: INTERNAL MEDICINE

## 2023-06-29 PROCEDURE — 3075F PR MOST RECENT SYSTOLIC BLOOD PRESS GE 130-139MM HG: ICD-10-PCS | Mod: CPTII,S$GLB,, | Performed by: SURGERY

## 2023-06-29 PROCEDURE — 3044F HG A1C LEVEL LT 7.0%: CPT | Mod: CPTII,S$GLB,, | Performed by: SURGERY

## 2023-06-29 PROCEDURE — 3075F SYST BP GE 130 - 139MM HG: CPT | Mod: CPTII,S$GLB,, | Performed by: SURGERY

## 2023-06-29 PROCEDURE — 3044F PR MOST RECENT HEMOGLOBIN A1C LEVEL <7.0%: ICD-10-PCS | Mod: CPTII,S$GLB,, | Performed by: INTERNAL MEDICINE

## 2023-06-29 RX ORDER — ONDANSETRON HYDROCHLORIDE 8 MG/1
8 TABLET, FILM COATED ORAL EVERY 8 HOURS PRN
Qty: 60 TABLET | Refills: 2 | Status: SHIPPED | OUTPATIENT
Start: 2023-06-29 | End: 2023-08-22

## 2023-06-29 NOTE — TELEPHONE ENCOUNTER
Spoke with patient about arrival time @ 1230.   Upper EUS    Medications: Do not take Insulin or oral diabetic medications the day of the procedure.    Take as prescribed: heart, seizure and blood pressure medication in the morning with a sip of water (less than an ounce).  Take any breathing medications and bring inhalers to hospital with you.     Leave all valuables and jewelry at home. Wear comfortable clothes to procedure to change into hospital gown.   You cannot drive for 24 hours after your procedure because you will receive sedation for your procedure to make you comfortable.    A ride must be provided at discharge.      Day Before Procedure 7/2/2023      You may have a light evening meal.   No solid food after 7:00 pm.   Continue drinking clear liquids.        Day of the Procedure 7/3/2023      You may have water/clear liquids until 4 hours before your procedure   See below for list.     What You CANNOT do:   Do not drink milk or anything colored red.  Do not drink alcohol.  No gum chewing or candy morning of procedure     Liquids That Are OK to Drink:   Water  Sports drinks (Gatorade, Power-Aid)  Coffee or tea (no cream or nondairy creamer)  Clear juices without pulp (apple, white grape)  Gelatin desserts (no fruit or toppings)  Clear soda (sprite, coke, ginger ale)  Chicken broth (until 12 midnight the night before procedure)

## 2023-06-29 NOTE — PROGRESS NOTES
Surgical Oncology History and Physical    Encounter Date:  2023    Patient ID: Blayne Beebe  Age:  63 y.o. :  1960    Chief Complaint:    Chief Complaint   Patient presents with    Consult       History:    Mr. Beebe is a 63 y.o. male who presents for evaluation of a newly diagnosed esophageal adenocarcinoma. He underwent screening chest CT due to his history of smoking. There was an abnormality at the apex of the right lung. A PET CT was ordered, which demonstrated a hypermetabolic mass in the mid to distal esophagus. Subsequent EGD demonstrated a partially obstructing tumor in the mid to distal esophagus with biopsy demonstrating adenocarcinoma. He reports he lost 15-20 pounds during  when he was diagnosed with COVID, but he has maintained his weight since. He has always been thin. He denies any dysphagia, odynophagia, or changes in his eating habits. He is a long time smoker and has recently cut down to 5 cigarettes/day. No shortness of breath or dyspnea on exertion.     Past Medical History:   Diagnosis Date    Arthritis     COPD (chronic obstructive pulmonary disease)      Past Surgical History:   Procedure Laterality Date    CERVICAL FUSION      COLONOSCOPY N/A 3/27/2018    Procedure: COLONOSCOPY;  Surgeon: Maria Teresa Morocho MD;  Location: [facility];  Service: Endoscopy;  Laterality: N/A;    COLONOSCOPY N/A 2023    Procedure: COLONOSCOPY;  Surgeon: Kelli Snell MD;  Location: [facility];  Service: Endoscopy;  Laterality: N/A;    ESOPHAGOGASTRODUODENOSCOPY N/A 2023    Procedure: EGD (ESOPHAGOGASTRODUODENOSCOPY);  Surgeon: Kelli Snell MD;  Location: [facility];  Service: Endoscopy;  Laterality: N/A;    INGUINAL HERNIA REPAIR Bilateral     Right x2, x1 left    ROTATOR CUFF REPAIR Right     x2     Current Outpatient Medications on File Prior to Visit   Medication Sig Dispense Refill    pantoprazole (PROTONIX) 40 MG tablet Take 1 tablet (40 mg total) by mouth  "once daily. (Patient not taking: Reported on 6/26/2023) 90 tablet 3    sildenafil (VIAGRA) 100 MG tablet Take 1 tablet (100 mg total) by mouth daily as needed for Erectile Dysfunction. 10 tablet 6     No current facility-administered medications on file prior to visit.     Review of patient's allergies indicates:  No Known Allergies    Family History:  His family history includes Diabetes in his mother; Heart disease in his father.     Social History:  He reports that he has been smoking cigarettes. He started smoking about 40 years ago. He has a 20.00 pack-year smoking history. He has been exposed to tobacco smoke. He has never used smokeless tobacco. He reports current alcohol use. He reports that he does not use drugs.     ROS:     Review of Systems  Pertinent positive/negatives detailed in HPI, all other systems negative.     Physical Exam:  /73 (BP Location: Left arm, Patient Position: Sitting)   Pulse 78   Ht 5' 9" (1.753 m)   Wt 59.8 kg (131 lb 13.4 oz)   SpO2 99%   BMI 19.47 kg/m²     Physical Exam    Constitutional:  Non-toxic, no acute distress.  Performance status: ECOG 0  Eyes:  Sclerae anicteric, gaze symmetrical  Neck:  Trachea midline, thyroid, non enlarged without palpable nodules,  FROM  Resp:  Easy work of breathing, no wheezes  CV:  Regular pulse, no JVD  Abd:  Soft, non-tender, no masses, no hepatosplenomegaly, no ascites, no superficial varices  Lymphatics:  No cervical, supraclavicular, axillary, or inguinal lymphadenopathy  Musculoskeletal:  Ambulatory, normal gait, no muscle wasting  Neuro:  No gross deficits  Psych:  Awake, alert, oriented.  Answers and asks questions appropriately    Data:     Radiology:  I personally reviewed these images: I reviewed his PET CT. The mid to distal esophagus is thickened and hypermetabolic. There is a mildly enlarged, mildly hypermetabolic AP window lymph node as well as a mildly hypermetabolic cervical lymph node. I do not think these are " convincingly metastatic. There is scarring at the right lung apex with no hypermetabolic activity.     Endoscopy:  Impression:            - Normal upper third of esophagus.                          - Partially obstructing, likely malignant                          esophageal tumor was found in the middle third of                          the esophagus and in the lower third of the                          esophagus. Biopsied.                          - Small hiatal hernia.                          - Normal stomach.                          - Mucosal nodules found in the duodenum, sampled.                          - Normal second portion of the duodenum.     Labs:  Normal CBC and CMP    Pathology:  Contains abnormal data Specimen to Pathology, Surgery Gastrointestinal tract  Order: [id number]  Status: Final result     Visible to patient: Yes (seen)     Next appt: 07/05/2023 at 09:00 AM in Radiation Oncology (Sunday Jasso MD)     1 Result Note      Component 2 wk ago   Final Pathologic Diagnosis  Abnormal   1. Duodenal bulb nodule (biopsy):   Marked benign surface foveolar metaplasia with hyperplastic changes   Multiple deeper levels examined     2. Gastroesophageal junction mass (biopsy):   Invasive adenocarcinoma, moderately differentiated   Background low and high-grade glandular dysplasia   HER2 immunohistochemistry:  Equivocal.     Immunohistochemistry (IHC) Testing for Mismatch Repair (MMR) Proteins:   MLH1, MSH2, MSH6, PMS2 - Intact nuclear expression   Background nonneoplastic tissue/internal control with intact nuclear expression     There are exceptions to the above IHC interpretations. These results should not be considered in isolation, and clinical correlation with genetic counseling is recommended to assess the need for germline testing.     Interpretation: No loss of nuclear expression of MMR proteins: low probability of microsatellite instability       3. Colon, cecal polyp (polypectomy):    Tubular adenoma     Comment: Interp By Kayy Cameron M.D., Signed on 06/19/2023 at 09:14   Gross  Abnormal   Part 1   MRN # on requisition [id number]   MRN # on AP label [id number]   Received in formalin, labeled duodenal bulb nodule Bx, is a rubbery tan fragment of tissue measuring 0.3 cm.  Submitted as received entirely.  NWF--1-A     Part 2   MRN # on requisition [id number]   MRN # on AP label [id number]   Received in formalin, labeled GEJ mass Bx, are multiple fragments of rubbery and light brown tissue with mucus measuring together 3 cm.  Submitted as received entirely. ISM--2-A     Part 3   MRN # on requisition [id number]   MRN # on AP label [id number]   Received in formalin, labeled cecal polyp, is a rubbery tan fragment of tissue measuring 0.1 cm.  Submitted as received entirely.  XTL--3-A         Grossed by [facility]     Disclaimer  Abnormal   Unless the case is a 'gross only' or additional testing only, the final diagnosis for each specimen is based on a microscopic examination of appropriate tissue sections.   HER-2/kallie IHC (4B5) clone, DAB detection method) is done on 10% buffered formalin-fixed (for 6-72 hrs), paraffin embedded tissue sections. The scoring is completed on a 4-tiered scoring system of membrane staining using the 2014 ASCO/CAP scoring   guidelines. It has been cleared by the U.S[place] FDA for use as an IVD test. This assay has not been validated on decalcified tissues. Results should be interpreted with caution given the likelihood of false negativity on decalcified specimens.             ICD-10-CM ICD-9-CM    1. Malignant neoplasm of gastroesophageal junction  C16.0 151.0 Ambulatory referral/consult to Surgical Oncology      Plan     Assessment: 63 year old man with newly diagnosed adenocarcinoma of the mid to distal esophagus. He smokes but otherwise has excellent performance status. We discussed the management of localized esophageal adenocarcinoma, which involves  neoadjuvant chemoradiation followed by surgical resection. Given that there are several indeterminate findings on PET CT, I will plan to have his imaging reviewed at [facility] to ensure there is nothing that needs to be biopsied prior to the initiation of chemoradiation.       Plan:  - I will plan to see him back at the conclusion of chemoradiation.   - I asked him to see his PCP for surgical clearance in the interim.   - I discussed his case with Dr. June.    Nas Meyer MD  Surgical Oncology  [facility] [place], [place]         Blayne Beebe 1960

## 2023-06-29 NOTE — PROGRESS NOTES
MEDICAL ONCOLOGY - NEW PATIENT VISIT    Reason for visit: Esophageal adenocarcinoma    Best Contact Phone Number(s): [phone] (home)      Cancer/Stage/TNM:    Cancer Staging   No matching staging information was found for the patient.     Oncology History   Malignant neoplasm of gastroesophageal junction   6/23/2023 Initial Diagnosis    Malignant neoplasm of gastroesophageal junction     7/16/2023 -  Chemotherapy    Treatment Summary   Plan Name: OP ESOPHAGEAL PACLITAXEL CARBOPLATIN WEEKLY  Treatment Goal: Control  Status: Active  Start Date: 7/16/2023 (Planned)  End Date: 8/14/2023 (Planned)  Provider: Delta June MD  Chemotherapy: CARBOplatin (PARAPLATIN) in sodium chloride 0.9% 250 mL chemo infusion, , Intravenous, Clinic/HOD 1 time, 0 of 1 cycle  PACLitaxeL (TAXOL) 50 mg/m2 in sodium chloride 0.9% 250 mL chemo infusion, 50 mg/m2, Intravenous, Clinic/HOD 1 time, 0 of 1 cycle          HPI:   63 y.o. male with tobacco abuse and emphysema who presents for further management of recently diagnosed esophageal adenocarcinoma.  He states that he was being evaluated with low dose screening lung CT in April by his PCP which noted some right apical pulmonary nodularity that was further worked up with a PET/CT on 6/1/23.  This revealed a large hypermetabolic mid-esophageal mass and possible mediastinal lymph node that was mildly hypermetabolic.  He had an EGD on 6/13/23 which demonstrated a large partially obstructing mid-esophageal mass. Biopsy confirmed moderately differentiated adenocarcinoma, pMMR.      He denies any dysphagia, heartburn, chest or abdominal pain.  He reports feeling well without limitations to his breathing or activity level.    History has been obtained by chart review and discussion with the patient.    ROS:   Review of Systems   Constitutional:  Negative for chills, fever, malaise/fatigue and weight loss.   HENT:  Negative for sore throat.    Eyes:  Negative for blurred vision and pain.    Respiratory:  Negative for cough and shortness of breath.    Cardiovascular:  Negative for chest pain and leg swelling.   Gastrointestinal:  Negative for abdominal pain, constipation, diarrhea, nausea and vomiting.   Genitourinary:  Negative for dysuria and frequency.   Musculoskeletal:  Negative for back pain, falls and myalgias.   Skin:  Negative for rash.   Neurological:  Negative for dizziness, weakness and headaches.   Endo/Heme/Allergies:  Does not bruise/bleed easily.   Psychiatric/Behavioral:  Negative for depression. The patient is not nervous/anxious.    All other systems reviewed and are negative.    Past Medical History:   Past Medical History:   Diagnosis Date    Arthritis     COPD (chronic obstructive pulmonary disease)         Past Surgical History:   Past Surgical History:   Procedure Laterality Date    CERVICAL FUSION      COLONOSCOPY N/A 3/27/2018    Procedure: COLONOSCOPY;  Surgeon: Maria Teresa Morocho MD;  Location: [facility];  Service: Endoscopy;  Laterality: N/A;    COLONOSCOPY N/A 6/13/2023    Procedure: COLONOSCOPY;  Surgeon: Kelli Snell MD;  Location: [facility];  Service: Endoscopy;  Laterality: N/A;    ESOPHAGOGASTRODUODENOSCOPY N/A 6/13/2023    Procedure: EGD (ESOPHAGOGASTRODUODENOSCOPY);  Surgeon: Kelli Snell MD;  Location: [facility];  Service: Endoscopy;  Laterality: N/A;    INGUINAL HERNIA REPAIR Bilateral     Right x2, x1 left    ROTATOR CUFF REPAIR Right     x2        Family History:   Family History   Problem Relation Age of Onset    Diabetes Mother     Heart disease Father         CHF    Glaucoma Neg Hx     Colon cancer Neg Hx     Prostate cancer Neg Hx         Social History:   Social History     Tobacco Use    Smoking status: Every Day     Packs/day: 0.50     Years: 40.00     Pack years: 20.00     Types: Cigarettes     Start date: 1983     Passive exposure: Current    Smokeless tobacco: Never    Tobacco comments:     4 cigarettes a day   Substance Use Topics     "Alcohol use: Yes     Comment: a couple of beers a day        I have reviewed and updated the patient's past medical, surgical, family and social histories.    Allergies:   Review of patient's allergies indicates:  No Known Allergies     Medications:   Current Outpatient Medications   Medication Sig Dispense Refill    ondansetron (ZOFRAN) 8 MG tablet Take 1 tablet (8 mg total) by mouth every 8 (eight) hours as needed for Nausea. 60 tablet 2    pantoprazole (PROTONIX) 40 MG tablet Take 1 tablet (40 mg total) by mouth once daily. (Patient not taking: Reported on 6/26/2023) 90 tablet 3    sildenafil (VIAGRA) 100 MG tablet Take 1 tablet (100 mg total) by mouth daily as needed for Erectile Dysfunction. 10 tablet 6     No current facility-administered medications for this visit.        Physical Exam:   /73 (BP Location: Left arm, Patient Position: Sitting, BP Method: Medium (Automatic))   Pulse 78   Temp 97.7 °F (36.5 °C) (Oral)   Resp 18   Ht 5' 9" (1.753 m)   Wt 59.4 kg (131 lb)   SpO2 99%   BMI 19.35 kg/m²      ECOG Performance status: 0            Physical Exam  Vitals reviewed.   Constitutional:       General: He is not in acute distress.     Appearance: Normal appearance. He is normal weight.   HENT:      Head: Normocephalic and atraumatic.      Right Ear: External ear normal.      Left Ear: External ear normal.      Nose: Nose normal.      Mouth/Throat:      Mouth: Mucous membranes are moist.      Pharynx: Oropharynx is clear. No posterior oropharyngeal erythema.   Eyes:      General: No scleral icterus.     Extraocular Movements: Extraocular movements intact.      Conjunctiva/sclera: Conjunctivae normal.      Pupils: Pupils are equal, round, and reactive to light.   Cardiovascular:      Rate and Rhythm: Normal rate and regular rhythm.      Pulses: Normal pulses.      Heart sounds: Normal heart sounds.   Pulmonary:      Effort: Pulmonary effort is normal.      Breath sounds: Normal breath sounds. No " wheezing or rales.   Abdominal:      General: Bowel sounds are normal. There is no distension.      Palpations: Abdomen is soft.      Tenderness: There is no abdominal tenderness.   Musculoskeletal:         General: No swelling. Normal range of motion.      Cervical back: Normal range of motion and neck supple.   Skin:     General: Skin is warm.      Coloration: Skin is not jaundiced.      Findings: No erythema or rash.   Neurological:      General: No focal deficit present.      Mental Status: He is alert and oriented to person, place, and time. Mental status is at baseline.      Gait: Gait normal.   Psychiatric:         Mood and Affect: Mood normal.         Behavior: Behavior normal.         Thought Content: Thought content normal.         Judgment: Judgment normal.         Labs:   Recent Results (from the past 48 hour(s))   CBC Auto Differential    Collection Time: 06/29/23 12:35 PM   Result Value Ref Range    WBC 9.01 3.90 - 12.70 K/uL    RBC 4.57 (L) 4.60 - 6.20 M/uL    Hemoglobin 15.1 14.0 - 18.0 g/dL    Hematocrit 44.2 40.0 - 54.0 %    MCV 97 82 - 98 fL    MCH 33.0 (H) 27.0 - 31.0 pg    MCHC 34.2 32.0 - 36.0 g/dL    RDW 12.8 11.5 - 14.5 %    Platelets 304 150 - 450 K/uL    MPV 8.8 (L) 9.2 - 12.9 fL    Immature Granulocytes 0.4 0.0 - 0.5 %    Gran # (ANC) 6.5 1.8 - 7.7 K/uL    Immature Grans (Abs) 0.04 0.00 - 0.04 K/uL    Lymph # 1.5 1.0 - 4.8 K/uL    Mono # 0.8 0.3 - 1.0 K/uL    Eos # 0.2 0.0 - 0.5 K/uL    Baso # 0.06 0.00 - 0.20 K/uL    nRBC 0 0 /100 WBC    Gran % 72.0 38.0 - 73.0 %    Lymph % 16.4 (L) 18.0 - 48.0 %    Mono % 8.8 4.0 - 15.0 %    Eosinophil % 1.7 0.0 - 8.0 %    Basophil % 0.7 0.0 - 1.9 %    Differential Method Automated    CMP    Collection Time: 06/29/23 12:35 PM   Result Value Ref Range    Sodium 138 136 - 145 mmol/L    Potassium 4.0 3.5 - 5.1 mmol/L    Chloride 105 95 - 110 mmol/L    CO2 25 23 - 29 mmol/L    Glucose 100 70 - 110 mg/dL    BUN 13 8 - 23 mg/dL    Creatinine 0.9 0.5 - 1.4  mg/dL    Calcium 9.4 8.7 - 10.5 mg/dL    Total Protein 7.3 6.0 - 8.4 g/dL    Albumin 3.5 3.5 - 5.2 g/dL    Total Bilirubin 0.4 0.1 - 1.0 mg/dL    Alkaline Phosphatase 94 55 - 135 U/L    AST 13 10 - 40 U/L    ALT 7 (L) 10 - 44 U/L    eGFR >60.0 >60 mL/min/1.73 m^2    Anion Gap 8 8 - 16 mmol/L        I have reviewed the pertinent labs which are notable for normal blood counts, renal and hepatic funciton.    Imaging:         I have personally reviewed the imaging which is notable for large hypermetabolic mid to distal esophageal tumor; possible mediastinal/AP window lymph node with mild activity.    Path:   2. Gastroesophageal junction mass (biopsy):   Invasive adenocarcinoma, moderately differentiated   Background low and high-grade glandular dysplasia   HER2 immunohistochemistry:  Equivocal.     Immunohistochemistry (IHC) Testing for Mismatch Repair (MMR) Proteins:   MLH1, MSH2, MSH6, PMS2 - Intact nuclear expression   Background nonneoplastic tissue/internal control with intact nuclear expression     There are exceptions to the above IHC interpretations. These results should not be considered in isolation, and clinical correlation with genetic counseling is recommended to assess the need for germline testing.     Interpretation: No loss of nuclear expression of MMR proteins: low probability of microsatellite instability       Assessment:       1. Malignant neoplasm of gastroesophageal junction    2. Nicotine dependence, cigarettes, uncomplicated    3. Pulmonary emphysema, unspecified emphysema type          Plan:             # Esophageal adenocarcinoma  He appears to have a locally advanced adenocarcinoma of the middle and lower third of the esophagus, pMMR.  EUS is planned for next week.  PET/CT does not show clear distant metastatic disease.  I discussed the case and plan with Dr. Meyer and he feels the patient is surgically resectable as well.  Will plan to discuss his [facility] next week and  review imaging with attention to PA window lymph node and L cervical chain lymph node.  He will meet with Dr. Jasso of rad onc next week.  Discussed plan for chemoradiation neoadjuvantly with carboplatin AUC 2 and paclitaxel 50m/mg2 weekly x 5 weeks concurrent with radiation.  He is in agreement with this plan.  Handouts on these agents given.  Will send Zofran for him.  Port placement scheduled with IR.  Will coordinate start date with Dr. Jasso.    # COPD, tobacco abuse  Counseled on cessation.  Normal SpO2.  No dyspnea.    Follow up: 2-3 weeks to start chemo.     The above information has been reviewed with the patient and all questions have been answered to their apparent satisfaction.  They understand that they can call the clinic with any questions.    Delta June MD  Hematology/Oncology  [facility]    Route Chart for Scheduling    Med Onc Chart Routing      Follow up with physician 3 weeks. for cycle 1 of carbo + taxol   Follow up with DEMARCO 4 weeks.   Infusion scheduling note   weekly carbo + taxol on Mon or Tues x 5 weeks; needs visit and labs prior   Injection scheduling note    Labs CBC and CMP   Scheduling:  Preferred lab:  Lab interval:     Imaging    Pharmacy appointment    Other referrals            Treatment Plan Information   OP ESOPHAGEAL PACLITAXEL CARBOPLATIN WEEKLY   Delta June MD   Upcoming Treatment Dates - OP ESOPHAGEAL PACLITAXEL CARBOPLATIN WEEKLY    7/17/2023       Pre-Medications       palonosetron (ALOXI) 0.25 mg with Dexamethasone (DECADRON) 12 mg in NS 50 mL IVPB       famotidine (PF) injection 20 mg       diphenhydrAMINE (BENADRYL) 50 mg in NS 50 mL IVPB       Chemotherapy       PACLitaxeL (TAXOL) 50 mg/m2 in sodium chloride 0.9% 250 mL chemo infusion       CARBOplatin (PARAPLATIN) in sodium chloride 0.9% 250 mL chemo infusion       Supportive Care       prochlorperazine injection Soln 10 mg  7/24/2023       Pre-Medications        palonosetron (ALOXI) 0.25 mg with Dexamethasone (DECADRON) 12 mg in NS 50 mL IVPB       famotidine (PF) injection 20 mg       diphenhydrAMINE (BENADRYL) 50 mg in NS 50 mL IVPB       Chemotherapy       PACLitaxeL (TAXOL) in sodium chloride 0.9% 250 mL chemo infusion       CARBOplatin (PARAPLATIN) in sodium chloride 0.9% 250 mL chemo infusion       Supportive Care       prochlorperazine injection Soln 10 mg  7/31/2023       Pre-Medications       palonosetron (ALOXI) 0.25 mg with Dexamethasone (DECADRON) 12 mg in NS 50 mL IVPB       famotidine (PF) injection 20 mg       diphenhydrAMINE (BENADRYL) 50 mg in NS 50 mL IVPB       Chemotherapy       PACLitaxeL (TAXOL) in sodium chloride 0.9% 250 mL chemo infusion       CARBOplatin (PARAPLATIN) in sodium chloride 0.9% 250 mL chemo infusion       Supportive Care       prochlorperazine injection Soln 10 mg  8/7/2023       Pre-Medications       palonosetron (ALOXI) 0.25 mg with Dexamethasone (DECADRON) 12 mg in NS 50 mL IVPB       famotidine (PF) injection 20 mg       diphenhydrAMINE (BENADRYL) 50 mg in NS 50 mL IVPB       Chemotherapy       PACLitaxeL (TAXOL) in sodium chloride 0.9% 250 mL chemo infusion       CARBOplatin (PARAPLATIN) in sodium chloride 0.9% 250 mL chemo infusion       Supportive Care       prochlorperazine injection Soln 10 mg

## 2023-06-30 ENCOUNTER — PATIENT MESSAGE (OUTPATIENT)
Dept: ENDOSCOPY | Facility: HOSPITAL | Age: 63
End: 2023-06-30
Payer: COMMERCIAL

## 2023-06-30 ENCOUNTER — TELEPHONE (OUTPATIENT)
Dept: ENDOSCOPY | Facility: HOSPITAL | Age: 63
End: 2023-06-30
Payer: COMMERCIAL

## 2023-06-30 NOTE — PROGRESS NOTES
[facility] MULTIDISCIPLINARY TUMOR BOARD  PATIENT REVIEW FORM   ____________________________________________________________    CLINIC #: [id number]   DATE: 7/3/2023    DIAGNOSIS: esophageal CA    PRESENTER: Mitch    PATIENT SUMMARY:   This 62 y/o gentleman is a long time smoker with emphysema who had low dose screening lung CT in 4/2023 per his PCP. Given an abnormality on this scan, he had PET on 6/1/23 which identified large hypermetabolic mid esophageal mass with lymph nodes. He underwent EGD on 6/13/23 that found large fungating ulcerated mass with bleeding in middle third of esophagus to lower esophagus, 34-44cm. Pathology revealed moderately differentiated adenocarcinoma.   Staging PET reviewed - level 2 cervical lymph node with FDG uptake, nothing in lungs concerning  Staging EUS today per Dr. Duffy.   He has been evaluated by Dr. Vance in [place] and Dr. June.   He is scheduled to see rad onc, Dr. Jasso, Wednesday and IR for port placement on 7/12/23.     BOARD RECOMMENDATIONS:   Proceed with neoadj CRT, then restaging to consider surgical resection    CONSULT NEEDED:     [] Surgery    [] Hem/Onc    [] Rad/Onc    [] Dietary                 [] Social Service    [] Psychology       [] AES  [] Radiology     Clinical Stage: Tumor 3 Node(s) 1 Metastasis 0      GROUP STAGE:  [] O    [] 1   [] II     [x] III   []IV  [] Local recurrence     [] Regional recurrence     [] Distant recurrence   Metastatic site(s): none         [x] [facility] Treatment Guidelines reviewed and care planned is consistent with guidelines.         (i.e., NCCN, NCI, PD, ACO, AUA, etc.)    PRESENTATION AT CANCER CONFERENCE:         [x] Prospective    [] Retrospective     [] Follow-Up

## 2023-06-30 NOTE — TELEPHONE ENCOUNTER
----- Message from Martha Montalvo sent at 6/30/2023  9:53 AM CDT -----  Contact: [phone]  Pt wife calling to get fasting instructions for procedure on Monday please advise [phone]

## 2023-07-03 ENCOUNTER — ANESTHESIA (OUTPATIENT)
Dept: ENDOSCOPY | Facility: HOSPITAL | Age: 63
End: 2023-07-03
Payer: COMMERCIAL

## 2023-07-03 ENCOUNTER — HOSPITAL ENCOUNTER (OUTPATIENT)
Facility: HOSPITAL | Age: 63
Discharge: HOME | End: 2023-07-03
Attending: INTERNAL MEDICINE | Admitting: INTERNAL MEDICINE
Payer: COMMERCIAL

## 2023-07-03 ENCOUNTER — ANESTHESIA EVENT (OUTPATIENT)
Dept: ENDOSCOPY | Facility: HOSPITAL | Age: 63
End: 2023-07-03
Payer: COMMERCIAL

## 2023-07-03 ENCOUNTER — TUMOR BOARD CONFERENCE (OUTPATIENT)
Dept: SURGERY | Facility: CLINIC | Age: 63
End: 2023-07-03
Payer: COMMERCIAL

## 2023-07-03 VITALS
SYSTOLIC BLOOD PRESSURE: 140 MMHG | DIASTOLIC BLOOD PRESSURE: 72 MMHG | TEMPERATURE: 98 F | OXYGEN SATURATION: 96 % | HEART RATE: 72 BPM | HEIGHT: 69 IN | RESPIRATION RATE: 19 BRPM | BODY MASS INDEX: 19.99 KG/M2 | WEIGHT: 135 LBS

## 2023-07-03 DIAGNOSIS — C15.9 MALIGNANT NEOPLASM OF ESOPHAGUS: ICD-10-CM

## 2023-07-03 PROCEDURE — 25000003 PHARM REV CODE 250: Performed by: NURSE ANESTHETIST, CERTIFIED REGISTERED

## 2023-07-03 PROCEDURE — D9220A PRA ANESTHESIA: ICD-10-PCS | Mod: ANES,,, | Performed by: ANESTHESIOLOGY

## 2023-07-03 PROCEDURE — 37000009 HC ANESTHESIA EA ADD 15 MINS: Performed by: INTERNAL MEDICINE

## 2023-07-03 PROCEDURE — D9220A PRA ANESTHESIA: ICD-10-PCS | Mod: CRNA,,, | Performed by: NURSE ANESTHETIST, CERTIFIED REGISTERED

## 2023-07-03 PROCEDURE — 43231 ESOPHAGOSCOP ULTRASOUND EXAM: CPT | Performed by: INTERNAL MEDICINE

## 2023-07-03 PROCEDURE — 25000003 PHARM REV CODE 250: Performed by: INTERNAL MEDICINE

## 2023-07-03 PROCEDURE — 37000008 HC ANESTHESIA 1ST 15 MINUTES: Performed by: INTERNAL MEDICINE

## 2023-07-03 PROCEDURE — 43231 PR ESOPHAGOSCOPY,ENDOSCOPIC ULTRASND: ICD-10-PCS | Mod: ,,, | Performed by: INTERNAL MEDICINE

## 2023-07-03 PROCEDURE — 63600175 PHARM REV CODE 636 W HCPCS: Performed by: NURSE ANESTHETIST, CERTIFIED REGISTERED

## 2023-07-03 PROCEDURE — D9220A PRA ANESTHESIA: Mod: ANES,,, | Performed by: ANESTHESIOLOGY

## 2023-07-03 PROCEDURE — 43231 ESOPHAGOSCOP ULTRASOUND EXAM: CPT | Mod: ,,, | Performed by: INTERNAL MEDICINE

## 2023-07-03 PROCEDURE — D9220A PRA ANESTHESIA: Mod: CRNA,,, | Performed by: NURSE ANESTHETIST, CERTIFIED REGISTERED

## 2023-07-03 RX ORDER — SODIUM CHLORIDE 0.9 % (FLUSH) 0.9 %
3 SYRINGE (ML) INJECTION
Status: DISCONTINUED | OUTPATIENT
Start: 2023-07-03 | End: 2023-07-03 | Stop reason: HOSPADM

## 2023-07-03 RX ORDER — SODIUM CHLORIDE 9 MG/ML
INJECTION, SOLUTION INTRAVENOUS CONTINUOUS
Status: DISCONTINUED | OUTPATIENT
Start: 2023-07-03 | End: 2023-07-03 | Stop reason: HOSPADM

## 2023-07-03 RX ORDER — PROPOFOL 10 MG/ML
VIAL (ML) INTRAVENOUS
Status: DISCONTINUED | OUTPATIENT
Start: 2023-07-03 | End: 2023-07-03

## 2023-07-03 RX ORDER — LIDOCAINE HYDROCHLORIDE 20 MG/ML
INJECTION, SOLUTION EPIDURAL; INFILTRATION; INTRACAUDAL; PERINEURAL
Status: DISCONTINUED | OUTPATIENT
Start: 2023-07-03 | End: 2023-07-03

## 2023-07-03 RX ADMIN — SODIUM CHLORIDE: 0.9 INJECTION, SOLUTION INTRAVENOUS at 01:07

## 2023-07-03 RX ADMIN — PROPOFOL 210 MG: 10 INJECTION, EMULSION INTRAVENOUS at 01:07

## 2023-07-03 RX ADMIN — LIDOCAINE HYDROCHLORIDE 100 MG: 20 INJECTION, SOLUTION EPIDURAL; INFILTRATION; INTRACAUDAL; PERINEURAL at 01:07

## 2023-07-03 RX ADMIN — SODIUM CHLORIDE: 9 INJECTION, SOLUTION INTRAVENOUS at 01:07

## 2023-07-03 NOTE — PROVATION PATIENT INSTRUCTIONS
Discharge Summary/Instructions after an Endoscopic Procedure  Patient Name: Blayne Beebe  Patient MRN: [id number]  Patient YOB: 1960  Monday, July 3, 2023  Ray Duffy MD  Dear patient,  As a result of recent federal legislation (The Federal Cures Act), you may   receive lab or pathology results from your procedure in your [facility]   account before your physician is able to contact you. Your physician or   their representative will relay the results to you with their   recommendations at their soonest availability.  Thank you,  Your health is very important to us during the Covid Crisis. Following your   procedure today, you will receive a daily text for 2 weeks asking about   signs or symptoms of Covid 19.  Please respond to this text when you   receive it so we can follow up and keep you as safe as possible.   RESTRICTIONS:  During your procedure today, you received medications for sedation.  These   medications may affect your judgment, balance and coordination.  Therefore,   for 24 hours, you have the following restrictions:   - DO NOT drive a car, operate machinery, make legal/financial decisions,   sign important papers or drink alcohol.    ACTIVITY:  Today: no heavy lifting, straining or running due to procedural   sedation/anesthesia.  The following day: return to full activity including work.  DIET:  Eat and drink normally unless instructed otherwise.     TREATMENT FOR COMMON SIDE EFFECTS:  - Mild abdominal pain, nausea, belching, bloating or excessive gas:  rest,   eat lightly and use a heating pad.  - Sore Throat: treat with throat lozenges and/or gargle with warm salt   water.  - Because air was used during the procedure, expelling large amounts of air   from your rectum or belching is normal.  - If a bowel prep was taken, you may not have a bowel movement for 1-3 days.    This is normal.  SYMPTOMS TO WATCH FOR AND REPORT TO YOUR PHYSICIAN:  1. Abdominal pain or bloating, other than gas  cramps.  2. Chest pain.  3. Back pain.  4. Signs of infection such as: chills or fever occurring within 24 hours   after the procedure.  5. Rectal bleeding, which would show as bright red, maroon, or black stools.   (A tablespoon of blood from the rectum is not serious, especially if   hemorrhoids are present.)  6. Vomiting.  7. Weakness or dizziness.  GO DIRECTLY TO THE NEAREST EMERGENCY ROOM IF YOU HAVE ANY OF THE FOLLOWING:      Difficulty breathing              Chills and/or fever over 101 F   Persistent vomiting and/or vomiting blood   Severe abdominal pain   Severe chest pain   Black, tarry stools   Bleeding- more than one tablespoon   Any other symptom or condition that you feel may need urgent attention  Your doctor recommends these additional instructions:  If any biopsies were taken, your doctors clinic will contact you in 1 to 2   weeks with any results.  - Discharge patient to home.   - Resume previous diet.   - Continue present medications.   - Return to referring physician as previously scheduled.  For questions, problems or results please call your physician - Ray Duffy MD.  EMERGENCY PHONE NUMBER: [phone],  LAB RESULTS: [phone]  IF A COMPLICATION OR EMERGENCY SITUATION ARISES AND YOU ARE UNABLE TO REACH   YOUR PHYSICIAN - GO DIRECTLY TO THE EMERGENCY ROOM.  Ray Duffy MD  7/3/2023 1:48:07 PM  This report has been verified and signed electronically.  Dear patient,  As a result of recent federal legislation (The Federal Cures Act), you may   receive lab or pathology results from your procedure in your [facility]   account before your physician is able to contact you. Your physician or   their representative will relay the results to you with their   recommendations at their soonest availability.  Thank you,  PROVATION

## 2023-07-03 NOTE — ANESTHESIA PREPROCEDURE EVALUATION
07/03/2023  Blayne Beebe is a 63 y.o., male     Patient Active Problem List   Diagnosis    Disc disease, degenerative, cervical    Erectile dysfunction    Elevated blood pressure reading without diagnosis of hypertension    Nicotine addiction    COPD (chronic obstructive pulmonary disease)    RBBB    Pulmonary nodule 1 cm or greater in diameter    Personal history of colonic polyps    Abnormal finding on GI tract imaging    Esophageal abnormality    Hard to intubate    Malignant neoplasm of gastroesophageal junction       Review of patient's allergies indicates:  No Known Allergies    Current Outpatient Medications on File Prior to Visit   Medication Sig Dispense Refill    ondansetron (ZOFRAN) 8 MG tablet Take 1 tablet (8 mg total) by mouth every 8 (eight) hours as needed for Nausea. 60 tablet 2    pantoprazole (PROTONIX) 40 MG tablet Take 1 tablet (40 mg total) by mouth once daily. (Patient not taking: Reported on 6/26/2023) 90 tablet 3    sildenafil (VIAGRA) 100 MG tablet Take 1 tablet (100 mg total) by mouth daily as needed for Erectile Dysfunction. 10 tablet 6     No current facility-administered medications on file prior to visit.       Past Surgical History:   Procedure Laterality Date    CERVICAL FUSION      COLONOSCOPY N/A 3/27/2018    Procedure: COLONOSCOPY;  Surgeon: Maria Teresa Morocho MD;  Location: [facility];  Service: Endoscopy;  Laterality: N/A;    COLONOSCOPY N/A 6/13/2023    Procedure: COLONOSCOPY;  Surgeon: Kelli Snell MD;  Location: [facility];  Service: Endoscopy;  Laterality: N/A;    ESOPHAGOGASTRODUODENOSCOPY N/A 6/13/2023    Procedure: EGD (ESOPHAGOGASTRODUODENOSCOPY);  Surgeon: Kelli Snell MD;  Location: [facility];  Service: Endoscopy;  Laterality: N/A;    INGUINAL HERNIA REPAIR Bilateral     Right x2, x1 left    ROTATOR CUFF REPAIR Right     x2          Pre-op Assessment    I have reviewed the Patient Summary Reports.     I have reviewed the Nursing Notes. I have reviewed the NPO Status.   I have reviewed the Medications.     Review of Systems  Anesthesia Hx:  No problems with previous Anesthesia  History of prior surgery of interest to airway management or planning: Denies Family Hx of Anesthesia complications.   Denies Personal Hx of Anesthesia complications.   Social:  Smoker, Alcohol Use    Hematology/Oncology:  Hematology Normal   Oncology Normal     EENT/Dental:EENT/Dental Normal   Cardiovascular:  Cardiovascular Normal Exercise tolerance: good  RBBB   Pulmonary:   COPD, moderate    Renal/:  Renal/ Normal     Hepatic/GI:  Hepatic/GI Normal    Musculoskeletal:   Arthritis   Spine Disorders: cervical Disc disease and Degenerative disease    Neurological:  Neurology Normal    Endocrine:  Endocrine Normal    Psych:  Psychiatric Normal           Physical Exam  General: Well nourished, Cooperative, Alert and Oriented    Airway:  Mallampati: III   Mouth Opening: Normal  TM Distance: Normal  Tongue: Normal  Neck ROM: Normal ROM    Dental:  Intact    Chest/Lungs:  Clear to auscultation, Normal Respiratory Rate    Heart:  Rate: Normal  Rhythm: Regular Rhythm        Anesthesia Plan  Type of Anesthesia, risks & benefits discussed:    Anesthesia Type: Gen Natural Airway  Intra-op Monitoring Plan: Standard ASA Monitors  Post Op Pain Control Plan: multimodal analgesia and IV/PO Opioids PRN  Induction:  IV  Airway Plan: Direct  Informed Consent: Informed consent signed with the Patient and all parties understand the risks and agree with anesthesia plan.  All questions answered. Patient consented to blood products? No  ASA Score: 3  Day of Surgery Review of History & Physical: H&P Update referred to the surgeon/provider.    Ready For Surgery From Anesthesia Perspective.     .

## 2023-07-03 NOTE — H&P
Short Stay Endoscopy History and Physical    PCP - Zahcariah Reyna MD  Referring Physician - Ed Vance MD  [address]  [address]  [place] [address]    Procedure - eus  ASA - per anesthesia  Mallampati - per anesthesia  History of Anesthesia problems - no  Family history Anesthesia problems -  no   Plan of anesthesia - General    HPI:  This is a 63 y.o. male here for evaluation of: esophageal tumor    Reflux - no  Dysphagia - no  Abdominal pain - no  Diarrhea - no    ROS:  Constitutional: No fevers, chills, No weight loss  CV: No chest pain  Pulm: No cough, No shortness of breath  Ophtho: No vision changes  GI: see HPI  Derm: No rash    Medical History:  has a past medical history of Arthritis and COPD (chronic obstructive pulmonary disease).    Surgical History:  has a past surgical history that includes Cervical fusion; Rotator cuff repair (Right); Inguinal hernia repair (Bilateral); Colonoscopy (N/A, 3/27/2018); Colonoscopy (N/A, 6/13/2023); and Esophagogastroduodenoscopy (N/A, 6/13/2023).    Family History: family history includes Diabetes in his mother; Heart disease in his father..    Social History:  reports that he has been smoking cigarettes. He started smoking about 40 years ago. He has a 10.00 pack-year smoking history. He has been exposed to tobacco smoke. He has never used smokeless tobacco. He reports current alcohol use. He reports that he does not use drugs.    Review of patient's allergies indicates:  No Known Allergies    Medications:   Medications Prior to Admission   Medication Sig Dispense Refill Last Dose    ondansetron (ZOFRAN) 8 MG tablet Take 1 tablet (8 mg total) by mouth every 8 (eight) hours as needed for Nausea. 60 tablet 2     pantoprazole (PROTONIX) 40 MG tablet Take 1 tablet (40 mg total) by mouth once daily. (Patient not taking: Reported on 6/26/2023) 90 tablet 3     sildenafil (VIAGRA) 100 MG tablet Take 1 tablet (100 mg total) by mouth daily as needed for Erectile  Dysfunction. 10 tablet 6        Physical Exam:    Vital Signs:   Vitals:    07/03/23 1312   BP: (!) 155/82   Pulse: 68   Resp: 18   Temp: 97.9 °F (36.6 °C)       General Appearance: Well appearing in no acute distress    Labs:  Lab Results   Component Value Date    WBC 9.01 06/29/2023    HGB 15.1 06/29/2023    HCT 44.2 06/29/2023     06/29/2023    CHOL 170 05/10/2023    TRIG 48 05/10/2023    HDL 71 05/10/2023    ALT 7 (L) 06/29/2023    AST 13 06/29/2023     06/29/2023    K 4.0 06/29/2023     06/29/2023    CREATININE 0.9 06/29/2023    BUN 13 06/29/2023    CO2 25 06/29/2023    TSH 0.741 05/10/2023    PSA 0.70 05/10/2023    INR 1.0 12/13/2004    HGBA1C 5.0 05/10/2023       I have explained the risks and benefits of this endoscopic procedure to the patient including but not limited to bleeding, inflammation, infection, perforation, and death.      Ray Duffy MD

## 2023-07-03 NOTE — TRANSFER OF CARE
"Anesthesia Transfer of Care Note    Patient: Blyane D III Steib    Procedure(s) Performed: Procedure(s) (LRB):  ULTRASOUND, UPPER GI TRACT, ENDOSCOPIC (N/A)  EGD (ESOPHAGOGASTRODUODENOSCOPY) (N/A)    Patient location: GI    Anesthesia Type: general    Transport from OR: Transported from OR on room air with adequate spontaneous ventilation    Post pain: adequate analgesia    Post assessment: no apparent anesthetic complications    Post vital signs: stable    Level of consciousness: awake    Nausea/Vomiting: no nausea/vomiting    Complications: none    Transfer of care protocol was followed      Last vitals:   Visit Vitals  BP (!) 155/82 (BP Location: Left arm, Patient Position: Lying)   Pulse 68   Temp 36.6 °C (97.9 °F) (Skin)   Resp 18   Ht 5' 9" (1.753 m)   Wt 61.2 kg (135 lb)   SpO2 99%   BMI 19.94 kg/m²     " Pt called to reschedule appointment for SI joint injection. [facility] re-emailed communication form with correct date.

## 2023-07-03 NOTE — ANESTHESIA POSTPROCEDURE EVALUATION
Anesthesia Post Evaluation    Patient: Blayne D III Steib    Procedure(s) Performed: Procedure(s) (LRB):  ULTRASOUND, UPPER GI TRACT, ENDOSCOPIC (N/A)  EGD (ESOPHAGOGASTRODUODENOSCOPY) (N/A)    Final Anesthesia Type: general      Patient location during evaluation: GI PACU  Patient participation: Yes- Able to Participate  Level of consciousness: awake and alert  Post-procedure vital signs: reviewed and stable  Pain management: adequate  Airway patency: patent    PONV status at discharge: No PONV  Anesthetic complications: no      Cardiovascular status: blood pressure returned to baseline  Respiratory status: unassisted  Hydration status: euvolemic            Vitals Value Taken Time   /72 07/03/23 1414   Temp 36.7 °C (98.1 °F) 07/03/23 1345   Pulse 72 07/03/23 1414   Resp 19 07/03/23 1414   SpO2 96 % 07/03/23 1414         Event Time   Out of Recovery 14:14:42         Pain/Yessy Score: Yessy Score: 10 (7/3/2023  2:14 PM)

## 2023-07-04 NOTE — PROGRESS NOTES
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   EBRT would consist of daily radiation treatments M-F, to the esophagus and involved regional lymph nodes to a dose of 41.4-50.4 Gy in 23-28 fractions at 1.8 Gy per fraction delivered daily, using IMRT.  Risks, benefits, and side effects of radiotherapy were discussed.   Side effects include but are not limited to fatigue, dysphagia, and pneumonitis.         The patient signed RT informed consent after I answered questions to their apparent satisfaction.    - RT consent signed today  - CT sim 7/7/2023 NPO x  4 hours  - 7/12/2023 PORT placement  - 7/18/2023  plan to start chemoRT     HPI:   Blayne Beebe is a 63 y.o. man with recent diagnosis of esophageal cancer after presenting with abnormal low dose screening lung CT     Oncologic history:    6/1/2023 PET CT   Hypermetabolic circumferential wall thickening of lower esophagus and gastric fundus   Indeterminate AP window lymph node measuring upper limit of normal for size with mild radiotracer uptake   subcentimeter left upper cervical chain lymph node with mild uptake, possibly reactive.       6/13/2023 colonoscopy  cecal tubular adenoma    6/13/2023 upper EGD: middle third of esophagus and lower third fungating and ulcerating mass with bleeding 34 to 44 cm from incisors, partially obstructing and partially circumferential, small hiatal hernia present   Pathology: G2 adenocarcinoma, pMMR, HER2n equivocal    6/29/2023  [facility] eval (Dr June)   Discussed plan for chemoradiation neoadjuvantly with carboplatin AUC 2 and paclitaxel 50m/mg2 weekly x 5 weeks concurrent with radiation.    7/3/2023 upper EUS (Dr Duffy) completely obstructing and circumferential fungating mass in middle third of esophagus, 30 to 35 cm from incisors.   single 9mm hypoechoic node in region of mass, not amenable to FNA  uT3    7/3/2023 presented at upper GI TB  Proceed with neoadj CRT, then restaging to consider surgical resection        Subjective:   In clinic the patient is accompanied by his wife Aislinn     The patient reports feeling well overall and feels asymptomatic.  He denies any symptoms such as GERD, dysphagia, odynophagia, chest pain or any other type of pain    The patient denies other major complaints.     The patient denies any history of radiation therapy, implantable cardiac devices, or connective tissue disease.     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 END[facility];  Service: Endoscopy;  Laterality:  N/A;    COLONOSCOPY N/A 6/13/2023    Procedure: COLONOSCOPY;  Surgeon: Kelli Snell MD;  Location: [facility];  Service: Endoscopy;  Laterality: N/A;    ENDOSCOPIC ULTRASOUND OF UPPER GASTROINTESTINAL TRACT N/A 7/3/2023    Procedure: ULTRASOUND, UPPER GI TRACT, ENDOSCOPIC;  Surgeon: Ray Duffy MD;  Location: [facility];  Service: Endoscopy;  Laterality: N/A;    ESOPHAGOGASTRODUODENOSCOPY N/A 6/13/2023    Procedure: EGD (ESOPHAGOGASTRODUODENOSCOPY);  Surgeon: Kelli Snell MD;  Location: [facility];  Service: Endoscopy;  Laterality: N/A;    ESOPHAGOGASTRODUODENOSCOPY N/A 7/3/2023    Procedure: EGD (ESOPHAGOGASTRODUODENOSCOPY);  Surgeon: Ray Duffy MD;  Location: [facility];  Service: Endoscopy;  Laterality: N/A;    INGUINAL HERNIA REPAIR Bilateral     Right x2, x1 left    ROTATOR CUFF REPAIR Right     x2       Past Surgical History:   Procedure Laterality Date    CERVICAL FUSION      COLONOSCOPY N/A 3/27/2018    Procedure: COLONOSCOPY;  Surgeon: Maria Teresa Morocho MD;  Location: [facility];  Service: Endoscopy;  Laterality: N/A;    COLONOSCOPY N/A 6/13/2023    Procedure: COLONOSCOPY;  Surgeon: Kelli Snell MD;  Location: [facility];  Service: Endoscopy;  Laterality: N/A;    ENDOSCOPIC ULTRASOUND OF UPPER GASTROINTESTINAL TRACT N/A 7/3/2023    Procedure: ULTRASOUND, UPPER GI TRACT, ENDOSCOPIC;  Surgeon: Ray Duffy MD;  Location: [facility];  Service: Endoscopy;  Laterality: N/A;    ESOPHAGOGASTRODUODENOSCOPY N/A 6/13/2023    Procedure: EGD (ESOPHAGOGASTRODUODENOSCOPY);  Surgeon: Kelli Snell MD;  Location: [facility];  Service: Endoscopy;  Laterality: N/A;    ESOPHAGOGASTRODUODENOSCOPY N/A 7/3/2023    Procedure: EGD (ESOPHAGOGASTRODUODENOSCOPY);  Surgeon: Ray Duffy MD;  Location: [facility];  Service: Endoscopy;  Laterality: N/A;    INGUINAL HERNIA REPAIR Bilateral     Right x2, x1 left    ROTATOR CUFF REPAIR Right     x2         Current Outpatient Medications on File Prior to Visit    Medication Sig Dispense Refill    ondansetron (ZOFRAN) 8 MG tablet Take 1 tablet (8 mg total) by mouth every 8 (eight) hours as needed for Nausea. 60 tablet 2    pantoprazole (PROTONIX) 40 MG tablet Take 1 tablet (40 mg total) by mouth once daily. (Patient not taking: Reported on 6/26/2023) 90 tablet 3    sildenafil (VIAGRA) 100 MG tablet Take 1 tablet (100 mg total) by mouth daily as needed for Erectile Dysfunction. 10 tablet 6     No current facility-administered medications on file prior to visit.       Review of patient's allergies indicates:  No Known Allergies     Review of Systems   Negative unless of above    Objective:   Physical Exam  Vitals reviewed.     Constitutional:       Appearance: Normal appearance.   HENT:      Head: Normocephalic and atraumatic.   Pulmonary:      Effort: Pulmonary effort is normal.   Abdominal:      General: There is no distension.   Musculoskeletal:         General: Normal range of motion.   Neurological:      General: No focal deficit present.      Mental Status: Alert and oriented  Psychiatric:         Mood and Affect: Mood normal.         Behavior: Behavior normal.      Imaging: I have personally reviewed the patient's available images and reports and summarized pertinent findings above in HPI.      Pathology: I have personally reviewed the patient's available pathology and summarized pertinent findings above in HPI.         I spent approximately 60 minutes reviewing the available records and evaluating the patient, out of which over 50% of the time was spent face to face with the patient in counseling and coordinating this patient's care.          Sunday Jasso MD/PhD

## 2023-07-05 ENCOUNTER — OFFICE VISIT (OUTPATIENT)
Dept: RADIATION ONCOLOGY | Facility: CLINIC | Age: 63
End: 2023-07-05
Payer: COMMERCIAL

## 2023-07-05 VITALS
RESPIRATION RATE: 18 BRPM | BODY MASS INDEX: 19.52 KG/M2 | HEART RATE: 81 BPM | HEIGHT: 69 IN | DIASTOLIC BLOOD PRESSURE: 67 MMHG | WEIGHT: 131.81 LBS | OXYGEN SATURATION: 100 % | SYSTOLIC BLOOD PRESSURE: 133 MMHG

## 2023-07-05 DIAGNOSIS — C16.0 MALIGNANT NEOPLASM OF GASTROESOPHAGEAL JUNCTION: ICD-10-CM

## 2023-07-05 PROCEDURE — 99999 PR PBB SHADOW E&M-EST. PATIENT-LVL III: ICD-10-PCS | Mod: PBBFAC,,, | Performed by: STUDENT IN AN ORGANIZED HEALTH CARE EDUCATION/TRAINING PROGRAM

## 2023-07-05 PROCEDURE — 1159F PR MEDICATION LIST DOCUMENTED IN MEDICAL RECORD: ICD-10-PCS | Mod: CPTII,S$GLB,, | Performed by: STUDENT IN AN ORGANIZED HEALTH CARE EDUCATION/TRAINING PROGRAM

## 2023-07-05 PROCEDURE — 3008F PR BODY MASS INDEX (BMI) DOCUMENTED: ICD-10-PCS | Mod: CPTII,S$GLB,, | Performed by: STUDENT IN AN ORGANIZED HEALTH CARE EDUCATION/TRAINING PROGRAM

## 2023-07-05 PROCEDURE — 99205 OFFICE O/P NEW HI 60 MIN: CPT | Mod: S$GLB,,, | Performed by: STUDENT IN AN ORGANIZED HEALTH CARE EDUCATION/TRAINING PROGRAM

## 2023-07-05 PROCEDURE — 99999 PR PBB SHADOW E&M-EST. PATIENT-LVL III: CPT | Mod: PBBFAC,,, | Performed by: STUDENT IN AN ORGANIZED HEALTH CARE EDUCATION/TRAINING PROGRAM

## 2023-07-05 PROCEDURE — 3075F SYST BP GE 130 - 139MM HG: CPT | Mod: CPTII,S$GLB,, | Performed by: STUDENT IN AN ORGANIZED HEALTH CARE EDUCATION/TRAINING PROGRAM

## 2023-07-05 PROCEDURE — 99205 PR OFFICE/OUTPT VISIT, NEW, LEVL V, 60-74 MIN: ICD-10-PCS | Mod: S$GLB,,, | Performed by: STUDENT IN AN ORGANIZED HEALTH CARE EDUCATION/TRAINING PROGRAM

## 2023-07-05 PROCEDURE — 3078F PR MOST RECENT DIASTOLIC BLOOD PRESSURE < 80 MM HG: ICD-10-PCS | Mod: CPTII,S$GLB,, | Performed by: STUDENT IN AN ORGANIZED HEALTH CARE EDUCATION/TRAINING PROGRAM

## 2023-07-05 PROCEDURE — 3044F HG A1C LEVEL LT 7.0%: CPT | Mod: CPTII,S$GLB,, | Performed by: STUDENT IN AN ORGANIZED HEALTH CARE EDUCATION/TRAINING PROGRAM

## 2023-07-05 PROCEDURE — 3008F BODY MASS INDEX DOCD: CPT | Mod: CPTII,S$GLB,, | Performed by: STUDENT IN AN ORGANIZED HEALTH CARE EDUCATION/TRAINING PROGRAM

## 2023-07-05 PROCEDURE — 3075F PR MOST RECENT SYSTOLIC BLOOD PRESS GE 130-139MM HG: ICD-10-PCS | Mod: CPTII,S$GLB,, | Performed by: STUDENT IN AN ORGANIZED HEALTH CARE EDUCATION/TRAINING PROGRAM

## 2023-07-05 PROCEDURE — 3078F DIAST BP <80 MM HG: CPT | Mod: CPTII,S$GLB,, | Performed by: STUDENT IN AN ORGANIZED HEALTH CARE EDUCATION/TRAINING PROGRAM

## 2023-07-05 PROCEDURE — 1159F MED LIST DOCD IN RCRD: CPT | Mod: CPTII,S$GLB,, | Performed by: STUDENT IN AN ORGANIZED HEALTH CARE EDUCATION/TRAINING PROGRAM

## 2023-07-05 PROCEDURE — 3044F PR MOST RECENT HEMOGLOBIN A1C LEVEL <7.0%: ICD-10-PCS | Mod: CPTII,S$GLB,, | Performed by: STUDENT IN AN ORGANIZED HEALTH CARE EDUCATION/TRAINING PROGRAM

## 2023-07-07 ENCOUNTER — HOSPITAL ENCOUNTER (OUTPATIENT)
Dept: RADIATION THERAPY | Facility: HOSPITAL | Age: 63
Discharge: HOME | End: 2023-07-07
Attending: STUDENT IN AN ORGANIZED HEALTH CARE EDUCATION/TRAINING PROGRAM
Payer: COMMERCIAL

## 2023-07-07 LAB
ONEOME COMMENT: NORMAL
ONEOME METHOD: NORMAL

## 2023-07-07 PROCEDURE — 77014 PR  CT GUIDANCE PLACEMENT RAD THERAPY FIELDS: ICD-10-PCS | Mod: 26,,, | Performed by: STUDENT IN AN ORGANIZED HEALTH CARE EDUCATION/TRAINING PROGRAM

## 2023-07-07 PROCEDURE — 77263 PR  RADIATION THERAPY PLAN COMPLEX: ICD-10-PCS | Mod: ,,, | Performed by: STUDENT IN AN ORGANIZED HEALTH CARE EDUCATION/TRAINING PROGRAM

## 2023-07-07 PROCEDURE — 77334 PR  RADN TREATMENT AID(S) COMPLX: ICD-10-PCS | Mod: 26,,, | Performed by: STUDENT IN AN ORGANIZED HEALTH CARE EDUCATION/TRAINING PROGRAM

## 2023-07-07 PROCEDURE — 77014 PR  CT GUIDANCE PLACEMENT RAD THERAPY FIELDS: CPT | Mod: 26,,, | Performed by: STUDENT IN AN ORGANIZED HEALTH CARE EDUCATION/TRAINING PROGRAM

## 2023-07-07 PROCEDURE — 77334 RADIATION TREATMENT AID(S): CPT | Mod: TC | Performed by: STUDENT IN AN ORGANIZED HEALTH CARE EDUCATION/TRAINING PROGRAM

## 2023-07-07 PROCEDURE — 77334 RADIATION TREATMENT AID(S): CPT | Mod: 26,,, | Performed by: STUDENT IN AN ORGANIZED HEALTH CARE EDUCATION/TRAINING PROGRAM

## 2023-07-07 PROCEDURE — 77263 THER RADIOLOGY TX PLNG CPLX: CPT | Mod: ,,, | Performed by: STUDENT IN AN ORGANIZED HEALTH CARE EDUCATION/TRAINING PROGRAM

## 2023-07-07 PROCEDURE — 77014 HC CT GUIDANCE RADIATION THERAPY FLDS PLACEMENT: CPT | Mod: TC | Performed by: STUDENT IN AN ORGANIZED HEALTH CARE EDUCATION/TRAINING PROGRAM

## 2023-07-11 ENCOUNTER — TELEPHONE (OUTPATIENT)
Dept: INTERVENTIONAL RADIOLOGY/VASCULAR | Facility: HOSPITAL | Age: 63
End: 2023-07-11
Payer: COMMERCIAL

## 2023-07-11 NOTE — NURSING
Spoke to wife: advised to arrive at 12:00, where to ck in, have nothing to eat/drink past midnight, have ride to/from procedure, currently takes no meds, Confirmed no allergies and no blood thinners.

## 2023-07-12 ENCOUNTER — HOSPITAL ENCOUNTER (OUTPATIENT)
Dept: INTERVENTIONAL RADIOLOGY/VASCULAR | Facility: HOSPITAL | Age: 63
Discharge: HOME | End: 2023-07-12
Attending: INTERNAL MEDICINE
Payer: COMMERCIAL

## 2023-07-12 VITALS
WEIGHT: 140 LBS | HEART RATE: 66 BPM | BODY MASS INDEX: 20.73 KG/M2 | RESPIRATION RATE: 13 BRPM | DIASTOLIC BLOOD PRESSURE: 88 MMHG | TEMPERATURE: 99 F | SYSTOLIC BLOOD PRESSURE: 145 MMHG | HEIGHT: 69 IN | OXYGEN SATURATION: 97 %

## 2023-07-12 DIAGNOSIS — C16.0 MALIGNANT NEOPLASM OF GASTROESOPHAGEAL JUNCTION: ICD-10-CM

## 2023-07-12 PROCEDURE — 99152 MOD SED SAME PHYS/QHP 5/>YRS: CPT | Mod: ,,, | Performed by: RADIOLOGY

## 2023-07-12 PROCEDURE — 36561 INSERT TUNNELED CV CATH: CPT | Performed by: RADIOLOGY

## 2023-07-12 PROCEDURE — 76937 US GUIDE VASCULAR ACCESS: CPT | Mod: 26,,, | Performed by: RADIOLOGY

## 2023-07-12 PROCEDURE — 99152 PR MOD CONSCIOUS SEDATION, SAME PHYS, 5+ YRS, FIRST 15 MIN: ICD-10-PCS | Mod: ,,, | Performed by: RADIOLOGY

## 2023-07-12 PROCEDURE — 99152 MOD SED SAME PHYS/QHP 5/>YRS: CPT | Performed by: RADIOLOGY

## 2023-07-12 PROCEDURE — 99153 MOD SED SAME PHYS/QHP EA: CPT | Performed by: RADIOLOGY

## 2023-07-12 PROCEDURE — 63600175 PHARM REV CODE 636 W HCPCS: Performed by: RADIOLOGY

## 2023-07-12 PROCEDURE — 76937 US GUIDE VASCULAR ACCESS: CPT | Mod: TC | Performed by: RADIOLOGY

## 2023-07-12 PROCEDURE — 36561 IR TUNNELED CATH PLACEMENT WITH PORT: ICD-10-PCS | Mod: RT,,, | Performed by: RADIOLOGY

## 2023-07-12 PROCEDURE — 77001 FLUOROGUIDE FOR VEIN DEVICE: CPT | Mod: 26,,, | Performed by: RADIOLOGY

## 2023-07-12 PROCEDURE — 76937 PR  US GUIDE, VASCULAR ACCESS: ICD-10-PCS | Mod: 26,,, | Performed by: RADIOLOGY

## 2023-07-12 PROCEDURE — 77001 FLUOROGUIDE FOR VEIN DEVICE: CPT | Mod: TC | Performed by: RADIOLOGY

## 2023-07-12 PROCEDURE — 25000003 PHARM REV CODE 250: Performed by: RADIOLOGY

## 2023-07-12 PROCEDURE — 77001 CHG FLUOROGUIDE CNTRL VEN ACCESS,PLACE,REPLACE,REMOVE: ICD-10-PCS | Mod: 26,,, | Performed by: RADIOLOGY

## 2023-07-12 PROCEDURE — 63600175 PHARM REV CODE 636 W HCPCS: Performed by: PHYSICIAN ASSISTANT

## 2023-07-12 PROCEDURE — A4550 SURGICAL TRAYS: HCPCS

## 2023-07-12 RX ORDER — SODIUM CHLORIDE 9 MG/ML
INJECTION, SOLUTION INTRAVENOUS
Status: COMPLETED | OUTPATIENT
Start: 2023-07-12 | End: 2023-07-12

## 2023-07-12 RX ORDER — HEPARIN 100 UNIT/ML
SYRINGE INTRAVENOUS
Status: COMPLETED | OUTPATIENT
Start: 2023-07-12 | End: 2023-07-12

## 2023-07-12 RX ORDER — CEFAZOLIN SODIUM 1 G/50ML
1 SOLUTION INTRAVENOUS
Status: COMPLETED | OUTPATIENT
Start: 2023-07-12 | End: 2023-07-12

## 2023-07-12 RX ORDER — MIDAZOLAM HYDROCHLORIDE 1 MG/ML
INJECTION INTRAMUSCULAR; INTRAVENOUS
Status: COMPLETED | OUTPATIENT
Start: 2023-07-12 | End: 2023-07-12

## 2023-07-12 RX ORDER — HEPARIN SOD,PORCINE/0.9 % NACL 1000/500ML
INTRAVENOUS SOLUTION INTRAVENOUS
Status: COMPLETED | OUTPATIENT
Start: 2023-07-12 | End: 2023-07-12

## 2023-07-12 RX ORDER — FENTANYL CITRATE 50 UG/ML
INJECTION, SOLUTION INTRAMUSCULAR; INTRAVENOUS
Status: COMPLETED | OUTPATIENT
Start: 2023-07-12 | End: 2023-07-12

## 2023-07-12 RX ORDER — LIDOCAINE HYDROCHLORIDE 10 MG/ML
INJECTION INFILTRATION; PERINEURAL
Status: COMPLETED | OUTPATIENT
Start: 2023-07-12 | End: 2023-07-12

## 2023-07-12 RX ADMIN — MIDAZOLAM HYDROCHLORIDE 1 MG: 1 INJECTION, SOLUTION INTRAMUSCULAR; INTRAVENOUS at 01:07

## 2023-07-12 RX ADMIN — SODIUM CHLORIDE 500 ML: 0.9 INJECTION, SOLUTION INTRAVENOUS at 01:07

## 2023-07-12 RX ADMIN — FENTANYL CITRATE 50 MCG: 50 INJECTION, SOLUTION INTRAMUSCULAR; INTRAVENOUS at 01:07

## 2023-07-12 RX ADMIN — HEPARIN SODIUM IN SODIUM CHLORIDE 500 ML: 200 INJECTION INTRAVENOUS at 01:07

## 2023-07-12 RX ADMIN — LIDOCAINE HYDROCHLORIDE 10 ML: 10; .005 INJECTION, SOLUTION EPIDURAL; INFILTRATION; INTRACAUDAL; PERINEURAL at 01:07

## 2023-07-12 RX ADMIN — LIDOCAINE HYDROCHLORIDE 10 ML: 10 INJECTION, SOLUTION INFILTRATION; PERINEURAL at 01:07

## 2023-07-12 RX ADMIN — HEPARIN 5 ML: 100 SYRINGE at 01:07

## 2023-07-12 RX ADMIN — CEFAZOLIN SODIUM 1 G: 1 SOLUTION INTRAVENOUS at 12:07

## 2023-07-12 NOTE — PROCEDURES
Radiology Post-Procedure Note    Pre Op Diagnosis: Esophageal cancer  Post Op Diagnosis: Same    Procedure: Port placement    Procedure performed by: Anselmo Silverio MD    Written Informed Consent Obtained: Yes  Specimen Removed: NO  Estimated Blood Loss: Minimal    Findings:   Right chest wall port placed via IJ access.  No complications.    Patient tolerated procedure well.    Anselmo Silverio MD  Diagnostic and Interventional Radiologist  Department of Radiology  Pager: [phone]

## 2023-07-12 NOTE — DISCHARGE INSTRUCTIONS
*PLEASE READ CAREFULLY!!*      Your port insertion/removal site is dressed with gauze and Tega-Derm (looks like Saran Wrap). Underneath the dressing is Derma-Bond (skin glue) and Steri-Strips (looks like strips of white tape). There are also sutures, or stitches, on the inside - these will dissolve on their own.    Do NOT remove the dressing today. Do NOT shower/bathe tonight.     You can shower/bathe tomorrow night. Leave the dressing ON during your shower/bath. Turn the site away from the shower's spray; if you bathe, do NOT allow the site underwater. After your shower/bath, you may remove the dressing. Gently pat the site dry.    The site may remained uncovered. Allow the Steri-Strips and Derma-Bond to come off on their own. You can use a large pad-style bandage, or a 4x4 gauze dressing and tape to cover the site if your clothing (or bra straps) rubbing against it is uncomfortable to you. These items can be found at your local pharmacy or grocery/shopping center.    Until the site heals, usually within one to two weeks, DO NOT submerge in a bath tub, swimming pool, or jacuzzi. DO NOT lift/carry anything heavier than a gallon of milk. DO NOT reach excessively or repeatedly above your head.    Hand hygiene is VERY important! Anyone, including you, who touches the port site should do so with CLEAN hands.         For INSERTIONS: Only trained personnel should access your port. At the end of whatever activity involving your port is completed (chemo, other infusion, blood draw, etc), it should be flushed with a medicine called Heparin.

## 2023-07-12 NOTE — H&P
Radiology History & Physical      SUBJECTIVE:     Chief Complaint: Esophageal cancer    History of Present Illness:  Blayne Beebe is a 63 y.o. male with esophageal cancer who presents for port placement.    Past Medical History:   Diagnosis Date    Arthritis     COPD (chronic obstructive pulmonary disease)      Past Surgical History:   Procedure Laterality Date    CERVICAL FUSION      COLONOSCOPY N/A 3/27/2018    Procedure: COLONOSCOPY;  Surgeon: Maria Teresa Morocho MD;  Location: [facility];  Service: Endoscopy;  Laterality: N/A;    COLONOSCOPY N/A 6/13/2023    Procedure: COLONOSCOPY;  Surgeon: Kelli Snell MD;  Location: [facility];  Service: Endoscopy;  Laterality: N/A;    ENDOSCOPIC ULTRASOUND OF UPPER GASTROINTESTINAL TRACT N/A 7/3/2023    Procedure: ULTRASOUND, UPPER GI TRACT, ENDOSCOPIC;  Surgeon: Ray Duffy MD;  Location: [facility];  Service: Endoscopy;  Laterality: N/A;    ESOPHAGOGASTRODUODENOSCOPY N/A 6/13/2023    Procedure: EGD (ESOPHAGOGASTRODUODENOSCOPY);  Surgeon: Kelli Snell MD;  Location: [facility];  Service: Endoscopy;  Laterality: N/A;    ESOPHAGOGASTRODUODENOSCOPY N/A 7/3/2023    Procedure: EGD (ESOPHAGOGASTRODUODENOSCOPY);  Surgeon: Ray Duffy MD;  Location: [facility];  Service: Endoscopy;  Laterality: N/A;    INGUINAL HERNIA REPAIR Bilateral     Right x2, x1 left    ROTATOR CUFF REPAIR Right     x2       Home Meds:   Prior to Admission medications    Medication Sig Start Date End Date Taking? Authorizing Provider   ondansetron (ZOFRAN) 8 MG tablet Take 1 tablet (8 mg total) by mouth every 8 (eight) hours as needed for Nausea.  Patient not taking: Reported on 7/5/2023 6/29/23   Delta June MD   pantoprazole (PROTONIX) 40 MG tablet Take 1 tablet (40 mg total) by mouth once daily.  Patient not taking: Reported on 6/26/2023 6/13/23 6/12/24  Kelli Snell MD   sildenafil (VIAGRA) 100 MG tablet Take 1 tablet (100 mg total) by mouth daily as needed for Erectile  Dysfunction. 12/5/19 6/8/23  Ney Gardner MD     Anticoagulants/Antiplatelets: no anticoagulation    Allergies: Review of patient's allergies indicates:  No Known Allergies  Sedation History:  no adverse reactions    Review of Systems:   Hematological: no known coagulopathies  Respiratory: no shortness of breath  Cardiovascular: no chest pain  Gastrointestinal: no abdominal pain  Genito-Urinary: no dysuria  Musculoskeletal: negative  Neurological: no TIA or stroke symptoms         OBJECTIVE:     Vital Signs (Most Recent)  Temp: 99.1 °F (37.3 °C) (07/12/23 1213)  Pulse: 68 (07/12/23 1330)  Resp: 15 (07/12/23 1330)  BP: 139/76 (07/12/23 1330)  SpO2: 100 % (07/12/23 1330)    Physical Exam:  ASA: 3  Mallampati: 2    General: no acute distress  Mental Status: alert and oriented to person, place and time  HEENT: normocephalic, atraumatic  Chest: unlabored breathing  Heart: regular heart rate  Abdomen: nondistended  Extremity: moves all extremities    Laboratory  Lab Results   Component Value Date    INR 0.9 07/07/2023       Lab Results   Component Value Date    WBC 9.73 07/07/2023    HGB 15.2 07/07/2023    HCT 44.1 07/07/2023    MCV 94 07/07/2023     07/07/2023      Lab Results   Component Value Date     06/29/2023     06/29/2023    K 4.0 06/29/2023     06/29/2023    CO2 25 06/29/2023    BUN 13 06/29/2023    CREATININE 0.9 06/29/2023    CALCIUM 9.4 06/29/2023    ALT 7 (L) 06/29/2023    AST 13 06/29/2023    ALBUMIN 3.5 06/29/2023    BILITOT 0.4 06/29/2023       ASSESSMENT/PLAN:     Sedation Plan: Moderate.  Patient will undergo port placement.    Anselmo Silverio MD  Diagnostic and Interventional Radiologist  Department of Radiology  Pager: [phone]

## 2023-07-12 NOTE — DISCHARGE SUMMARY
Radiology Discharge Summary      Hospital Course: No complications    Admit Date: 7/12/2023  Discharge Date: 07/12/2023     Instructions Given to Patient: Yes  Diet: Resume prior diet  Activity: activity as tolerated    Description of Condition on Discharge: Stable  Vital Signs (Most Recent): Temp: 99.1 °F (37.3 °C) (07/12/23 1213)  Pulse: 66 (07/12/23 1405)  Resp: 11 (07/12/23 1405)  BP: 135/76 (07/12/23 1405)  SpO2: 98 % (07/12/23 1405)    Discharge Disposition: Home    Discharge Diagnosis: Esophageal cancer s/p port placement     Follow-up: EDWIN Silverio MD  Diagnostic and Interventional Radiologist  Department of Radiology  Pager: [phone]

## 2023-07-12 NOTE — PLAN OF CARE
Patient discharged home in stable condition. PIV removed with catheter tip intact. AVS provided and reviewed with patient. Patient brought to spouse's vehicle via wheelchair. IR care complete.

## 2023-07-13 ENCOUNTER — PATIENT MESSAGE (OUTPATIENT)
Dept: SURGERY | Facility: CLINIC | Age: 63
End: 2023-07-13
Payer: COMMERCIAL

## 2023-07-17 PROCEDURE — 77301 PR  INTEN MOD RADIOTHER PLAN W/DOSE VOL HIST: ICD-10-PCS | Mod: 26,,, | Performed by: STUDENT IN AN ORGANIZED HEALTH CARE EDUCATION/TRAINING PROGRAM

## 2023-07-17 PROCEDURE — 77301 RADIOTHERAPY DOSE PLAN IMRT: CPT | Mod: 26,,, | Performed by: STUDENT IN AN ORGANIZED HEALTH CARE EDUCATION/TRAINING PROGRAM

## 2023-07-17 PROCEDURE — 77301 RADIOTHERAPY DOSE PLAN IMRT: CPT | Mod: TC | Performed by: STUDENT IN AN ORGANIZED HEALTH CARE EDUCATION/TRAINING PROGRAM

## 2023-07-18 ENCOUNTER — INFUSION (OUTPATIENT)
Dept: INFUSION THERAPY | Facility: HOSPITAL | Age: 63
End: 2023-07-18
Payer: COMMERCIAL

## 2023-07-18 ENCOUNTER — OFFICE VISIT (OUTPATIENT)
Dept: HEMATOLOGY/ONCOLOGY | Facility: CLINIC | Age: 63
End: 2023-07-18
Payer: COMMERCIAL

## 2023-07-18 ENCOUNTER — LAB VISIT (OUTPATIENT)
Dept: LAB | Facility: HOSPITAL | Age: 63
End: 2023-07-18
Attending: INTERNAL MEDICINE
Payer: COMMERCIAL

## 2023-07-18 VITALS
SYSTOLIC BLOOD PRESSURE: 123 MMHG | TEMPERATURE: 99 F | OXYGEN SATURATION: 98 % | RESPIRATION RATE: 18 BRPM | BODY MASS INDEX: 19.39 KG/M2 | HEART RATE: 74 BPM | DIASTOLIC BLOOD PRESSURE: 75 MMHG | WEIGHT: 130.94 LBS | HEIGHT: 69 IN

## 2023-07-18 VITALS
OXYGEN SATURATION: 98 % | DIASTOLIC BLOOD PRESSURE: 63 MMHG | HEART RATE: 69 BPM | SYSTOLIC BLOOD PRESSURE: 112 MMHG | RESPIRATION RATE: 18 BRPM

## 2023-07-18 DIAGNOSIS — C16.0 MALIGNANT NEOPLASM OF GASTROESOPHAGEAL JUNCTION: Primary | ICD-10-CM

## 2023-07-18 DIAGNOSIS — J43.9 PULMONARY EMPHYSEMA, UNSPECIFIED EMPHYSEMA TYPE: ICD-10-CM

## 2023-07-18 DIAGNOSIS — C16.0 MALIGNANT NEOPLASM OF GASTROESOPHAGEAL JUNCTION: ICD-10-CM

## 2023-07-18 DIAGNOSIS — F17.210 NICOTINE DEPENDENCE, CIGARETTES, UNCOMPLICATED: ICD-10-CM

## 2023-07-18 DIAGNOSIS — I70.0 ATHEROSCLEROSIS OF AORTA: ICD-10-CM

## 2023-07-18 LAB
ALBUMIN SERPL BCP-MCNC: 3.4 G/DL (ref 3.5–5.2)
ALP SERPL-CCNC: 93 U/L (ref 55–135)
ALT SERPL W/O P-5'-P-CCNC: 7 U/L (ref 10–44)
ANION GAP SERPL CALC-SCNC: 8 MMOL/L (ref 8–16)
AST SERPL-CCNC: 12 U/L (ref 10–40)
BILIRUB SERPL-MCNC: 0.5 MG/DL (ref 0.1–1)
BUN SERPL-MCNC: 11 MG/DL (ref 8–23)
CALCIUM SERPL-MCNC: 9.5 MG/DL (ref 8.7–10.5)
CHLORIDE SERPL-SCNC: 104 MMOL/L (ref 95–110)
CO2 SERPL-SCNC: 28 MMOL/L (ref 23–29)
CREAT SERPL-MCNC: 0.9 MG/DL (ref 0.5–1.4)
ERYTHROCYTE [DISTWIDTH] IN BLOOD BY AUTOMATED COUNT: 13 % (ref 11.5–14.5)
EST. GFR  (NO RACE VARIABLE): >60 ML/MIN/1.73 M^2
GLUCOSE SERPL-MCNC: 101 MG/DL (ref 70–110)
HCT VFR BLD AUTO: 45.2 % (ref 40–54)
HGB BLD-MCNC: 15.1 G/DL (ref 14–18)
IMM GRANULOCYTES # BLD AUTO: 0.04 K/UL (ref 0–0.04)
MCH RBC QN AUTO: 32.7 PG (ref 27–31)
MCHC RBC AUTO-ENTMCNC: 33.4 G/DL (ref 32–36)
MCV RBC AUTO: 98 FL (ref 82–98)
NEUTROPHILS # BLD AUTO: 7.8 K/UL (ref 1.8–7.7)
PLATELET # BLD AUTO: 281 K/UL (ref 150–450)
PMV BLD AUTO: 8.9 FL (ref 9.2–12.9)
POTASSIUM SERPL-SCNC: 4.5 MMOL/L (ref 3.5–5.1)
PROT SERPL-MCNC: 7.4 G/DL (ref 6–8.4)
RBC # BLD AUTO: 4.62 M/UL (ref 4.6–6.2)
SODIUM SERPL-SCNC: 140 MMOL/L (ref 136–145)
WBC # BLD AUTO: 10.58 K/UL (ref 3.9–12.7)

## 2023-07-18 PROCEDURE — 3074F SYST BP LT 130 MM HG: CPT | Mod: CPTII,S$GLB,, | Performed by: INTERNAL MEDICINE

## 2023-07-18 PROCEDURE — 77014 PR  CT GUIDANCE PLACEMENT RAD THERAPY FIELDS: ICD-10-PCS | Mod: 26,,, | Performed by: STUDENT IN AN ORGANIZED HEALTH CARE EDUCATION/TRAINING PROGRAM

## 2023-07-18 PROCEDURE — 25000003 PHARM REV CODE 250: Performed by: INTERNAL MEDICINE

## 2023-07-18 PROCEDURE — 99999 PR PBB SHADOW E&M-EST. PATIENT-LVL III: ICD-10-PCS | Mod: PBBFAC,,, | Performed by: INTERNAL MEDICINE

## 2023-07-18 PROCEDURE — 3078F PR MOST RECENT DIASTOLIC BLOOD PRESSURE < 80 MM HG: ICD-10-PCS | Mod: CPTII,S$GLB,, | Performed by: INTERNAL MEDICINE

## 2023-07-18 PROCEDURE — 3008F BODY MASS INDEX DOCD: CPT | Mod: CPTII,S$GLB,, | Performed by: INTERNAL MEDICINE

## 2023-07-18 PROCEDURE — 77338 PR  MLC IMRT DESIGN & CONSTRUCTION PER IMRT PLAN: ICD-10-PCS | Mod: 26,,, | Performed by: STUDENT IN AN ORGANIZED HEALTH CARE EDUCATION/TRAINING PROGRAM

## 2023-07-18 PROCEDURE — 96367 TX/PROPH/DG ADDL SEQ IV INF: CPT

## 2023-07-18 PROCEDURE — 77470 SPECIAL RADIATION TREATMENT: CPT | Mod: 59,TC | Performed by: STUDENT IN AN ORGANIZED HEALTH CARE EDUCATION/TRAINING PROGRAM

## 2023-07-18 PROCEDURE — 85027 COMPLETE CBC AUTOMATED: CPT | Performed by: INTERNAL MEDICINE

## 2023-07-18 PROCEDURE — 77300 PR RADIATION THERAPY,DOSIMETRY PLAN: ICD-10-PCS | Mod: 26,,, | Performed by: STUDENT IN AN ORGANIZED HEALTH CARE EDUCATION/TRAINING PROGRAM

## 2023-07-18 PROCEDURE — 63600175 PHARM REV CODE 636 W HCPCS: Performed by: INTERNAL MEDICINE

## 2023-07-18 PROCEDURE — 77338 DESIGN MLC DEVICE FOR IMRT: CPT | Mod: 26,,, | Performed by: STUDENT IN AN ORGANIZED HEALTH CARE EDUCATION/TRAINING PROGRAM

## 2023-07-18 PROCEDURE — 99999 PR PBB SHADOW E&M-EST. PATIENT-LVL III: CPT | Mod: PBBFAC,,, | Performed by: INTERNAL MEDICINE

## 2023-07-18 PROCEDURE — 77338 DESIGN MLC DEVICE FOR IMRT: CPT | Mod: TC,59 | Performed by: STUDENT IN AN ORGANIZED HEALTH CARE EDUCATION/TRAINING PROGRAM

## 2023-07-18 PROCEDURE — 3074F PR MOST RECENT SYSTOLIC BLOOD PRESSURE < 130 MM HG: ICD-10-PCS | Mod: CPTII,S$GLB,, | Performed by: INTERNAL MEDICINE

## 2023-07-18 PROCEDURE — 99215 OFFICE O/P EST HI 40 MIN: CPT | Mod: S$GLB,,, | Performed by: INTERNAL MEDICINE

## 2023-07-18 PROCEDURE — 36415 COLL VENOUS BLD VENIPUNCTURE: CPT | Performed by: INTERNAL MEDICINE

## 2023-07-18 PROCEDURE — 3044F HG A1C LEVEL LT 7.0%: CPT | Mod: CPTII,S$GLB,, | Performed by: INTERNAL MEDICINE

## 2023-07-18 PROCEDURE — 96413 CHEMO IV INFUSION 1 HR: CPT

## 2023-07-18 PROCEDURE — 77470 PR  SPECIAL RADIATION TREATMENT: ICD-10-PCS | Mod: 26,59,, | Performed by: STUDENT IN AN ORGANIZED HEALTH CARE EDUCATION/TRAINING PROGRAM

## 2023-07-18 PROCEDURE — 77386 HC IMRT, COMPLEX: CPT | Performed by: STUDENT IN AN ORGANIZED HEALTH CARE EDUCATION/TRAINING PROGRAM

## 2023-07-18 PROCEDURE — 77300 RADIATION THERAPY DOSE PLAN: CPT | Mod: TC | Performed by: STUDENT IN AN ORGANIZED HEALTH CARE EDUCATION/TRAINING PROGRAM

## 2023-07-18 PROCEDURE — 77014 PR  CT GUIDANCE PLACEMENT RAD THERAPY FIELDS: CPT | Mod: 26,,, | Performed by: STUDENT IN AN ORGANIZED HEALTH CARE EDUCATION/TRAINING PROGRAM

## 2023-07-18 PROCEDURE — 3044F PR MOST RECENT HEMOGLOBIN A1C LEVEL <7.0%: ICD-10-PCS | Mod: CPTII,S$GLB,, | Performed by: INTERNAL MEDICINE

## 2023-07-18 PROCEDURE — 80053 COMPREHEN METABOLIC PANEL: CPT | Performed by: INTERNAL MEDICINE

## 2023-07-18 PROCEDURE — 77470 SPECIAL RADIATION TREATMENT: CPT | Mod: 26,59,, | Performed by: STUDENT IN AN ORGANIZED HEALTH CARE EDUCATION/TRAINING PROGRAM

## 2023-07-18 PROCEDURE — 1159F PR MEDICATION LIST DOCUMENTED IN MEDICAL RECORD: ICD-10-PCS | Mod: CPTII,S$GLB,, | Performed by: INTERNAL MEDICINE

## 2023-07-18 PROCEDURE — 77300 RADIATION THERAPY DOSE PLAN: CPT | Mod: 26,,, | Performed by: STUDENT IN AN ORGANIZED HEALTH CARE EDUCATION/TRAINING PROGRAM

## 2023-07-18 PROCEDURE — 77014 HC CT GUIDANCE RADIATION THERAPY FLDS PLACEMENT: CPT | Mod: TC | Performed by: STUDENT IN AN ORGANIZED HEALTH CARE EDUCATION/TRAINING PROGRAM

## 2023-07-18 PROCEDURE — 1159F MED LIST DOCD IN RCRD: CPT | Mod: CPTII,S$GLB,, | Performed by: INTERNAL MEDICINE

## 2023-07-18 PROCEDURE — 3078F DIAST BP <80 MM HG: CPT | Mod: CPTII,S$GLB,, | Performed by: INTERNAL MEDICINE

## 2023-07-18 PROCEDURE — A4216 STERILE WATER/SALINE, 10 ML: HCPCS | Performed by: INTERNAL MEDICINE

## 2023-07-18 PROCEDURE — 96375 TX/PRO/DX INJ NEW DRUG ADDON: CPT

## 2023-07-18 PROCEDURE — 3008F PR BODY MASS INDEX (BMI) DOCUMENTED: ICD-10-PCS | Mod: CPTII,S$GLB,, | Performed by: INTERNAL MEDICINE

## 2023-07-18 PROCEDURE — 99215 PR OFFICE/OUTPT VISIT, EST, LEVL V, 40-54 MIN: ICD-10-PCS | Mod: S$GLB,,, | Performed by: INTERNAL MEDICINE

## 2023-07-18 RX ORDER — FAMOTIDINE 10 MG/ML
20 INJECTION INTRAVENOUS
Status: COMPLETED | OUTPATIENT
Start: 2023-07-18 | End: 2023-07-18

## 2023-07-18 RX ORDER — EPINEPHRINE 0.3 MG/.3ML
0.3 INJECTION SUBCUTANEOUS ONCE AS NEEDED
Status: DISCONTINUED | OUTPATIENT
Start: 2023-07-18 | End: 2023-07-18 | Stop reason: HOSPADM

## 2023-07-18 RX ORDER — HEPARIN 100 UNIT/ML
500 SYRINGE INTRAVENOUS
Status: CANCELLED | OUTPATIENT
Start: 2023-07-18

## 2023-07-18 RX ORDER — DIPHENHYDRAMINE HYDROCHLORIDE 50 MG/ML
50 INJECTION INTRAMUSCULAR; INTRAVENOUS ONCE AS NEEDED
Status: CANCELLED | OUTPATIENT
Start: 2023-07-18

## 2023-07-18 RX ORDER — DIPHENHYDRAMINE HYDROCHLORIDE 50 MG/ML
50 INJECTION INTRAMUSCULAR; INTRAVENOUS ONCE AS NEEDED
Status: COMPLETED | OUTPATIENT
Start: 2023-07-18 | End: 2023-07-18

## 2023-07-18 RX ORDER — FAMOTIDINE 10 MG/ML
20 INJECTION INTRAVENOUS
Status: CANCELLED | OUTPATIENT
Start: 2023-07-18 | End: 2023-07-18

## 2023-07-18 RX ORDER — HEPARIN 100 UNIT/ML
500 SYRINGE INTRAVENOUS
Status: DISCONTINUED | OUTPATIENT
Start: 2023-07-18 | End: 2023-07-18 | Stop reason: HOSPADM

## 2023-07-18 RX ORDER — EPINEPHRINE 0.3 MG/.3ML
0.3 INJECTION SUBCUTANEOUS ONCE AS NEEDED
Status: CANCELLED | OUTPATIENT
Start: 2023-07-18

## 2023-07-18 RX ORDER — PROCHLORPERAZINE EDISYLATE 5 MG/ML
10 INJECTION INTRAMUSCULAR; INTRAVENOUS ONCE AS NEEDED
Status: CANCELLED
Start: 2023-07-18

## 2023-07-18 RX ORDER — LIDOCAINE AND PRILOCAINE 25; 25 MG/G; MG/G
CREAM TOPICAL
Qty: 30 G | Refills: 1 | Status: SHIPPED | OUTPATIENT
Start: 2023-07-18

## 2023-07-18 RX ORDER — SODIUM CHLORIDE 0.9 % (FLUSH) 0.9 %
10 SYRINGE (ML) INJECTION
Status: CANCELLED | OUTPATIENT
Start: 2023-07-18

## 2023-07-18 RX ORDER — PROCHLORPERAZINE EDISYLATE 5 MG/ML
10 INJECTION INTRAMUSCULAR; INTRAVENOUS ONCE AS NEEDED
Status: DISCONTINUED | OUTPATIENT
Start: 2023-07-18 | End: 2023-07-18 | Stop reason: HOSPADM

## 2023-07-18 RX ORDER — SODIUM CHLORIDE 0.9 % (FLUSH) 0.9 %
10 SYRINGE (ML) INJECTION
Status: DISCONTINUED | OUTPATIENT
Start: 2023-07-18 | End: 2023-07-18 | Stop reason: HOSPADM

## 2023-07-18 RX ADMIN — HEPARIN 500 UNITS: 100 SYRINGE at 01:07

## 2023-07-18 RX ADMIN — Medication 10 ML: at 01:07

## 2023-07-18 RX ADMIN — PACLITAXEL 84 MG: 6 INJECTION, SOLUTION, CONCENTRATE INTRAVENOUS at 11:07

## 2023-07-18 RX ADMIN — CARBOPLATIN 195 MG: 10 INJECTION, SOLUTION INTRAVENOUS at 12:07

## 2023-07-18 RX ADMIN — DIPHENHYDRAMINE HYDROCHLORIDE 50 MG: 50 INJECTION, SOLUTION INTRAMUSCULAR; INTRAVENOUS at 11:07

## 2023-07-18 RX ADMIN — DIPHENHYDRAMINE HYDROCHLORIDE 50 MG: 50 INJECTION INTRAMUSCULAR; INTRAVENOUS at 11:07

## 2023-07-18 RX ADMIN — FAMOTIDINE 20 MG: 10 INJECTION INTRAVENOUS at 10:07

## 2023-07-18 RX ADMIN — DEXAMETHASONE SODIUM PHOSPHATE 0.25 MG: 4 INJECTION, SOLUTION INTRA-ARTICULAR; INTRALESIONAL; INTRAMUSCULAR; INTRAVENOUS; SOFT TISSUE at 10:07

## 2023-07-18 RX ADMIN — HYDROCORTISONE SODIUM SUCCINATE 100 MG: 100 INJECTION, POWDER, FOR SOLUTION INTRAMUSCULAR; INTRAVENOUS at 11:07

## 2023-07-18 NOTE — PLAN OF CARE
1000 Pt seated in chair. Treatment plan reviewed. Pt within parameters for treatment. Pt voices no new complaints or concerns.Chemo education given to pt, including poss side effects, symptom management, and knows when to report to ER for further evaluation of symptoms. Pt verbalized an understanding. [facility] for safety.

## 2023-07-18 NOTE — PROGRESS NOTES
MEDICAL ONCOLOGY - ESTABLISHED PATIENT VISIT    Reason for visit: Esophageal adenocarcinoma    Best Contact Phone Number(s): [phone] (home)      Cancer/Stage/TNM:    Cancer Staging   Malignant neoplasm of gastroesophageal junction  Staging form: Esophagus - Adenocarcinoma, AJCC 8th Edition  - Clinical stage from 6/13/2023: Stage III (cT3, cN1, cM0, G2) - Signed by Sunday Jasso MD on 7/5/2023       Oncology History   Malignant neoplasm of gastroesophageal junction   6/13/2023 Cancer Staged    Staging form: Esophagus - Adenocarcinoma, AJCC 8th Edition  - Clinical stage from 6/13/2023: Stage III (cT3, cN1, cM0, G2)     6/23/2023 Initial Diagnosis    Malignant neoplasm of gastroesophageal junction     7/3/2023 Tumor Conference     [facility] UGI MULTIDISCIPLINARY TUMOR BOARD  PATIENT REVIEW FORM   ____________________________________________________________    CLINIC #: [id number]   DATE: 7/3/2023    DIAGNOSIS: esophageal CA    PRESENTER: Mitch    PATIENT SUMMARY:   This 64 y/o gentleman is a long time smoker with emphysema who had low dose screening lung CT in 4/2023 per his PCP. Given an abnormality on this scan, he had PET on 6/1/23 which identified large hypermetabolic mid esophageal mass with lymph nodes. He underwent EGD on 6/13/23 that found large fungating ulcerated mass with bleeding in middle third of esophagus to lower esophagus, 34-44cm. Pathology revealed moderately differentiated adenocarcinoma.   Staging PET reviewed - level 2 cervical lymph node with FDG uptake, nothing in lungs concerning  Staging EUS today per Dr. Duffy.   He has been evaluated by Dr. Vance in [place] and Dr. June.   He is scheduled to see rad onc, Dr. Jasso, Wednesday and IR for port placement on 7/12/23.     BOARD RECOMMENDATIONS:   Proceed with neoadj CRT, then restaging to consider surgical resection    CONSULT NEEDED:     [] Surgery    [] Hem/Onc    [] Rad/Onc    [] Dietary                 [] Social  Service    [] Psychology       [] AES  [] Radiology     Clinical Stage: Tumor 3 Node(s) 1 Metastasis 0      GROUP STAGE:  [] O    [] 1   [] II     [x] III   []IV  [] Local recurrence     [] Regional recurrence     [] Distant recurrence   Metastatic site(s): none         [x] [facility] Treatment Guidelines reviewed and care planned is consistent with guidelines.         (i.e., NCCN, NCI, PD, ACO, AUA, etc.)    PRESENTATION AT CANCER CONFERENCE:         [x] Prospective    [] Retrospective     [] Follow-Up         7/16/2023 -  Chemotherapy    Treatment Summary   Plan Name: OP ESOPHAGEAL PACLITAXEL CARBOPLATIN WEEKLY  Treatment Goal: Control  Status: Active  Start Date: 7/16/2023  End Date: 8/15/2023 (Planned)  Provider: Delta June MD  Chemotherapy: CARBOplatin (PARAPLATIN) 195 mg in sodium chloride 0.9% 304.5 mL chemo infusion, 195 mg (100 % of original dose 193 mg), Intravenous, Clinic/HOD 1 time, 1 of 1 cycle  Dose modification:   (original dose 193 mg, Cycle 1)  Administration: 195 mg (7/18/2023)  PACLitaxeL (TAXOL) 50 mg/m2 = 84 mg in sodium chloride 0.9% 250 mL chemo infusion, 50 mg/m2 = 84 mg, Intravenous, Clinic/HOD 1 time, 1 of 1 cycle  Administration: 84 mg (7/18/2023)          Interim History:   63 y.o. male recently diagnosed esophageal adenocarcinoma who presents prior to cycle 1 of carboplatin + paclitaxel as part of neoadjuvant chemoradiation. He started radiation therapy this morning. Port was placed last week and is healing well, has a little discomfort but it is improving.  He denies any dysphagia, heartburn, chest or abdominal pain. States he is able to eat normally and does not feel like food gets stuck in his throat. He reports feeling well without limitations to his breathing or activity level. CBC today with WBC 10.6, Hg 15.1, platelets 281. CMP today unremarkable.     ECOG status is 0.     ROS:   Review of Systems   Constitutional:  Negative for chills, fever, malaise/fatigue and weight  loss.   HENT:  Negative for sore throat.    Eyes:  Negative for blurred vision and pain.   Respiratory:  Negative for cough and shortness of breath.    Cardiovascular:  Negative for chest pain and leg swelling.   Gastrointestinal:  Negative for abdominal pain, constipation, diarrhea, nausea and vomiting.   Genitourinary:  Negative for dysuria and frequency.   Musculoskeletal:  Negative for back pain, falls and myalgias.   Skin:  Negative for rash.   Neurological:  Negative for dizziness, weakness and headaches.   Endo/Heme/Allergies:  Does not bruise/bleed easily.   Psychiatric/Behavioral:  Negative for depression. The patient is not nervous/anxious.    All other systems reviewed and are negative.    Past Medical History:   Past Medical History:   Diagnosis Date    Arthritis     COPD (chronic obstructive pulmonary disease)         Past Surgical History:   Past Surgical History:   Procedure Laterality Date    CERVICAL FUSION      COLONOSCOPY N/A 3/27/2018    Procedure: COLONOSCOPY;  Surgeon: Mari aTeresa Morocho MD;  Location: [facility];  Service: Endoscopy;  Laterality: N/A;    COLONOSCOPY N/A 6/13/2023    Procedure: COLONOSCOPY;  Surgeon: Kelli Snell MD;  Location: [facility];  Service: Endoscopy;  Laterality: N/A;    ENDOSCOPIC ULTRASOUND OF UPPER GASTROINTESTINAL TRACT N/A 7/3/2023    Procedure: ULTRASOUND, UPPER GI TRACT, ENDOSCOPIC;  Surgeon: Ray Duffy MD;  Location: [facility];  Service: Endoscopy;  Laterality: N/A;    ESOPHAGOGASTRODUODENOSCOPY N/A 6/13/2023    Procedure: EGD (ESOPHAGOGASTRODUODENOSCOPY);  Surgeon: Kelli Snell MD;  Location: [facility];  Service: Endoscopy;  Laterality: N/A;    ESOPHAGOGASTRODUODENOSCOPY N/A 7/3/2023    Procedure: EGD (ESOPHAGOGASTRODUODENOSCOPY);  Surgeon: Ray Duffy MD;  Location: [facility];  Service: Endoscopy;  Laterality: N/A;    INGUINAL HERNIA REPAIR Bilateral     Right x2, x1 left    ROTATOR CUFF REPAIR Right     x2        Family History:   Family  History   Problem Relation Age of Onset    Diabetes Mother     Heart disease Father         CHF    Glaucoma Neg Hx     Colon cancer Neg Hx     Prostate cancer Neg Hx         Social History:   Social History     Tobacco Use    Smoking status: Every Day     Packs/day: 0.25     Years: 40.00     Pack years: 10.00     Types: Cigarettes     Start date: 1983     Passive exposure: Current    Smokeless tobacco: Never    Tobacco comments:     4 cigarettes a day   Substance Use Topics    Alcohol use: Yes     Comment: a couple of beers a day        I have reviewed and updated the patient's past medical, surgical, family and social histories.    Allergies:   Review of patient's allergies indicates:  No Known Allergies     Medications:   Current Outpatient Medications   Medication Sig Dispense Refill    LIDOcaine-prilocaine (EMLA) cream Apply topically as needed (Port access). 30 g 1    ondansetron (ZOFRAN) 8 MG tablet Take 1 tablet (8 mg total) by mouth every 8 (eight) hours as needed for Nausea. (Patient not taking: Reported on 7/5/2023) 60 tablet 2    pantoprazole (PROTONIX) 40 MG tablet Take 1 tablet (40 mg total) by mouth once daily. (Patient not taking: Reported on 6/26/2023) 90 tablet 3    sildenafil (VIAGRA) 100 MG tablet Take 1 tablet (100 mg total) by mouth daily as needed for Erectile Dysfunction. 10 tablet 6     No current facility-administered medications for this visit.     Facility-Administered Medications Ordered in Other Visits   Medication Dose Route Frequency Provider Last Rate Last Admin    alteplase injection 2 mg  2 mg Intra-Catheter PRN Delta June MD        EPINEPHrine (EPIPEN) 0.3 mg/0.3 mL pen injection 0.3 mg  0.3 mg Intramuscular Once PRN Delta June MD        heparin, porcine (PF) 100 unit/mL injection flush 500 Units  500 Units Intravenous PRN Delta June MD   500 Units at 07/18/23 1335    prochlorperazine injection Soln 10 mg  10 mg Intravenous Once PRN Delta SEAY  "MD Shaylee        sodium chloride 0.9% 250 mL flush bag   Intravenous 1 time in Clinic/HOD Delta June MD        sodium chloride 0.9% flush 10 mL  10 mL Intravenous PRN Delta June MD   10 mL at 07/18/23 1336        Physical Exam:   /75 (BP Location: Left arm, Patient Position: Sitting, BP Method: Medium (Automatic))   Pulse 74   Temp 98.5 °F (36.9 °C) (Oral)   Resp 18   Ht 5' 9" (1.753 m)   Wt 59.4 kg (130 lb 15.3 oz)   SpO2 98%   BMI 19.34 kg/m²      ECOG Performance status: 0            Physical Exam  Vitals reviewed.   Constitutional:       General: He is not in acute distress.     Appearance: Normal appearance. He is normal weight.   HENT:      Head: Normocephalic and atraumatic.      Right Ear: External ear normal.      Left Ear: External ear normal.      Nose: Nose normal.      Mouth/Throat:      Mouth: Mucous membranes are moist.      Pharynx: Oropharynx is clear. No posterior oropharyngeal erythema.   Eyes:      General: No scleral icterus.     Extraocular Movements: Extraocular movements intact.      Conjunctiva/sclera: Conjunctivae normal.      Pupils: Pupils are equal, round, and reactive to light.   Cardiovascular:      Rate and Rhythm: Normal rate and regular rhythm.      Pulses: Normal pulses.      Heart sounds: Normal heart sounds.   Pulmonary:      Effort: Pulmonary effort is normal.      Breath sounds: Normal breath sounds. No wheezing or rales.   Abdominal:      General: Bowel sounds are normal. There is no distension.      Palpations: Abdomen is soft.      Tenderness: There is no abdominal tenderness.   Musculoskeletal:         General: No swelling. Normal range of motion.      Cervical back: Normal range of motion and neck supple.   Skin:     General: Skin is warm.      Coloration: Skin is not jaundiced.      Findings: No erythema or rash.   Neurological:      General: No focal deficit present.      Mental Status: He is alert and oriented to person, place, and " time. Mental status is at baseline.      Gait: Gait normal.   Psychiatric:         Mood and Affect: Mood normal.         Behavior: Behavior normal.         Thought Content: Thought content normal.         Judgment: Judgment normal.         Labs:   Recent Results (from the past 48 hour(s))   CBC Oncology    Collection Time: 07/18/23  8:07 AM   Result Value Ref Range    WBC 10.58 3.90 - 12.70 K/uL    RBC 4.62 4.60 - 6.20 M/uL    Hemoglobin 15.1 14.0 - 18.0 g/dL    Hematocrit 45.2 40.0 - 54.0 %    MCV 98 82 - 98 fL    MCH 32.7 (H) 27.0 - 31.0 pg    MCHC 33.4 32.0 - 36.0 g/dL    RDW 13.0 11.5 - 14.5 %    Platelets 281 150 - 450 K/uL    MPV 8.9 (L) 9.2 - 12.9 fL    Gran # (ANC) 7.8 (H) 1.8 - 7.7 K/uL    Immature Grans (Abs) 0.04 0.00 - 0.04 K/uL   Comprehensive Metabolic Panel    Collection Time: 07/18/23  8:07 AM   Result Value Ref Range    Sodium 140 136 - 145 mmol/L    Potassium 4.5 3.5 - 5.1 mmol/L    Chloride 104 95 - 110 mmol/L    CO2 28 23 - 29 mmol/L    Glucose 101 70 - 110 mg/dL    BUN 11 8 - 23 mg/dL    Creatinine 0.9 0.5 - 1.4 mg/dL    Calcium 9.5 8.7 - 10.5 mg/dL    Total Protein 7.4 6.0 - 8.4 g/dL    Albumin 3.4 (L) 3.5 - 5.2 g/dL    Total Bilirubin 0.5 0.1 - 1.0 mg/dL    Alkaline Phosphatase 93 55 - 135 U/L    AST 12 10 - 40 U/L    ALT 7 (L) 10 - 44 U/L    eGFR >60.0 >60 mL/min/1.73 m^2    Anion Gap 8 8 - 16 mmol/L        I have reviewed the pertinent labs which are notable for normal blood counts, renal and hepatic funciton.    Imaging:         I have personally reviewed the imaging which is notable for large hypermetabolic mid to distal esophageal tumor; possible mediastinal/AP window lymph node with mild activity.    Path:   2. Gastroesophageal junction mass (biopsy):   Invasive adenocarcinoma, moderately differentiated   Background low and high-grade glandular dysplasia   HER2 immunohistochemistry:  Equivocal.     Immunohistochemistry (IHC) Testing for Mismatch Repair (MMR) Proteins:   MLH1, MSH2, MSH6,  PMS2 - Intact nuclear expression   Background nonneoplastic tissue/internal control with intact nuclear expression     There are exceptions to the above IHC interpretations. These results should not be considered in isolation, and clinical correlation with genetic counseling is recommended to assess the need for germline testing.     Interpretation: No loss of nuclear expression of MMR proteins: low probability of microsatellite instability       Assessment:       1. Malignant neoplasm of gastroesophageal junction    2. Nicotine dependence, cigarettes, uncomplicated    3. Pulmonary emphysema, unspecified emphysema type    4. Atherosclerosis of aorta          Plan:             # Esophageal adenocarcinoma  He appears to have a locally advanced adenocarcinoma of the middle and lower third of the esophagus, pMMR. PET/CT does not show clear distant metastatic disease.  I discussed the case and plan with Dr. Meyer and he feels the patient is surgically resectable as well.    Started radiation therapy this morning  Discussed all the risks and benefits of the chemotherapy with the patient.    Patient was consented for chemotherapy today 7/18/2023 .   An extensive discussion was had which included a thorough discussion of the risk and benefits of treatment and alternatives.  Risks, including but not limited to, possible hair loss, bone marrow damage (anemia, thrombocytopenia, immune suppression, neutropenia), damage to body organs (brain, heart, liver, kidney, lungs, nervous system, skin, and others), allergic reactions, sterility, nausea/vomiting, constipation/diarrhea, sores in the mouth, secondary cancers, local damage at possible injection sites, and rarely death were all discussed.  The patient agrees with the plan, and all questions have been answered to their satisfaction.  Consent was signed the patient, provider, and a third party witness.     Proceed with carboplatin cycle 1 AUC 2 and paclitaxel 50m/mg2 weekly x 5  weeks concurrent with radiation  Zofran PRN sent.  EMLA sent.  Will get repeat PET 4-6 weeks after chemo/radiation to confirm patient still a surgical candidate   Follow-up next week prior to cycle 2.    # COPD, tobacco abuse, aortic atherosclerosis  Counseled on tobacco cessation.  Normal SpO2.  No dyspnea.  Atherosclerosis noted on imaging.    The above information has been reviewed with the patient and all questions have been answered to their apparent satisfaction.  They understand that they can call the clinic with any questions.      Alex Chavis MD  Hematology/Oncology Fellow PGY-IV      Route Chart for Scheduling    Med Onc Chart Routing      Follow up with physician 2 weeks. for weekly carbo + taxol (5 weeks total)   Follow up with DEMARCO 1 week. for weekly carbo + taxol (5 weeks total)   Infusion scheduling note    Injection scheduling note    Labs CBC and CMP   Scheduling:  Preferred lab:  Lab interval:     Imaging    Pharmacy appointment    Other referrals            Treatment Plan Information   OP ESOPHAGEAL PACLITAXEL CARBOPLATIN WEEKLY   Delta June MD   Upcoming Treatment Dates - OP ESOPHAGEAL PACLITAXEL CARBOPLATIN WEEKLY    7/25/2023       Pre-Medications       palonosetron (ALOXI) 0.25 mg with Dexamethasone (DECADRON) 12 mg in NS 50 mL IVPB       famotidine (PF) injection 20 mg       diphenhydrAMINE (BENADRYL) 50 mg in NS 50 mL IVPB       Chemotherapy       CARBOplatin (PARAPLATIN) 195 mg in sodium chloride 0.9% 269.5 mL chemo infusion       PACLitaxeL protein-bound (ABRAXANE) in 14 mL infusion       Supportive Care       prochlorperazine injection Soln 10 mg  8/1/2023       Pre-Medications       palonosetron 0.25mg/dexAMETHasone 12mg in NS IVPB 0.25 mg 50 mL       famotidine (PF) injection 20 mg       diphenhydrAMINE (BENADRYL) 50 mg in NS 50 mL IVPB       Chemotherapy       PACLitaxeL protein-bound (ABRAXANE) in 14 mL infusion       CARBOplatin (PARAPLATIN) 195 mg in sodium chloride  0.9% 269.5 mL chemo infusion       Supportive Care       prochlorperazine injection Soln 10 mg  8/8/2023       Pre-Medications       palonosetron 0.25mg/dexAMETHasone 12mg in NS IVPB 0.25 mg 50 mL       famotidine (PF) injection 20 mg       diphenhydrAMINE (BENADRYL) 50 mg in NS 50 mL IVPB       Chemotherapy       PACLitaxeL protein-bound (ABRAXANE) in 14 mL infusion       CARBOplatin (PARAPLATIN) 195 mg in sodium chloride 0.9% 269.5 mL chemo infusion       Supportive Care       prochlorperazine injection Soln 10 mg  8/15/2023       Pre-Medications       palonosetron 0.25mg/dexAMETHasone 12mg in NS IVPB 0.25 mg 50 mL       famotidine (PF) injection 20 mg       diphenhydrAMINE (BENADRYL) 50 mg in NS 50 mL IVPB       Chemotherapy       PACLitaxeL protein-bound (ABRAXANE) in 14 mL infusion       CARBOplatin (PARAPLATIN) 195 mg in sodium chloride 0.9% 269.5 mL chemo infusion       Supportive Care       prochlorperazine injection Soln 10 mg

## 2023-07-18 NOTE — NURSING
1154 Pt began reacting to Taxol, 4 min into infusion, rate 63 ml/hr. Infusion stopped, NS wide open, administered 25 mg Benadryl, and 100 mg Solucortef. Pt presented with coughing, flushing, chest tightness and nausea. O2 sat was 92%, 2L O2 applied NC. MD notified, no further meds to be given. Pt recovered within a few minutes of rescue meds being given. Carboplatin to be given per SOLO Snow. VSS, pt is sleeping in chair, with wife by his side, [facility] for signs and symptoms of reaction.

## 2023-07-18 NOTE — PLAN OF CARE
1340 Taxol discontinued per MD. Hansa bhupinder without complications. VS stable. Pt knows to call provider with any questions or concerns. Pt ambulatory from clinic with steady gait. Accompanied by wife. No distress noted.

## 2023-07-19 PROCEDURE — 77014 PR  CT GUIDANCE PLACEMENT RAD THERAPY FIELDS: ICD-10-PCS | Mod: 26,,, | Performed by: STUDENT IN AN ORGANIZED HEALTH CARE EDUCATION/TRAINING PROGRAM

## 2023-07-19 PROCEDURE — 77014 PR  CT GUIDANCE PLACEMENT RAD THERAPY FIELDS: CPT | Mod: 26,,, | Performed by: STUDENT IN AN ORGANIZED HEALTH CARE EDUCATION/TRAINING PROGRAM

## 2023-07-19 PROCEDURE — 77386 HC IMRT, COMPLEX: CPT | Performed by: STUDENT IN AN ORGANIZED HEALTH CARE EDUCATION/TRAINING PROGRAM

## 2023-07-19 PROCEDURE — 77014 HC CT GUIDANCE RADIATION THERAPY FLDS PLACEMENT: CPT | Mod: TC | Performed by: STUDENT IN AN ORGANIZED HEALTH CARE EDUCATION/TRAINING PROGRAM

## 2023-07-20 PROCEDURE — 77014 PR  CT GUIDANCE PLACEMENT RAD THERAPY FIELDS: CPT | Mod: 26,,, | Performed by: STUDENT IN AN ORGANIZED HEALTH CARE EDUCATION/TRAINING PROGRAM

## 2023-07-20 PROCEDURE — 77386 HC IMRT, COMPLEX: CPT | Performed by: STUDENT IN AN ORGANIZED HEALTH CARE EDUCATION/TRAINING PROGRAM

## 2023-07-20 PROCEDURE — 77014 PR  CT GUIDANCE PLACEMENT RAD THERAPY FIELDS: ICD-10-PCS | Mod: 26,,, | Performed by: STUDENT IN AN ORGANIZED HEALTH CARE EDUCATION/TRAINING PROGRAM

## 2023-07-20 PROCEDURE — 77014 HC CT GUIDANCE RADIATION THERAPY FLDS PLACEMENT: CPT | Mod: TC | Performed by: STUDENT IN AN ORGANIZED HEALTH CARE EDUCATION/TRAINING PROGRAM

## 2023-07-21 ENCOUNTER — DOCUMENTATION ONLY (OUTPATIENT)
Dept: RADIATION ONCOLOGY | Facility: CLINIC | Age: 63
End: 2023-07-21
Payer: COMMERCIAL

## 2023-07-21 PROCEDURE — 77014 PR  CT GUIDANCE PLACEMENT RAD THERAPY FIELDS: CPT | Mod: 26,,, | Performed by: STUDENT IN AN ORGANIZED HEALTH CARE EDUCATION/TRAINING PROGRAM

## 2023-07-21 PROCEDURE — 77014 PR  CT GUIDANCE PLACEMENT RAD THERAPY FIELDS: ICD-10-PCS | Mod: 26,,, | Performed by: STUDENT IN AN ORGANIZED HEALTH CARE EDUCATION/TRAINING PROGRAM

## 2023-07-21 PROCEDURE — 77014 HC CT GUIDANCE RADIATION THERAPY FLDS PLACEMENT: CPT | Mod: TC | Performed by: STUDENT IN AN ORGANIZED HEALTH CARE EDUCATION/TRAINING PROGRAM

## 2023-07-21 PROCEDURE — 77386 HC IMRT, COMPLEX: CPT | Performed by: STUDENT IN AN ORGANIZED HEALTH CARE EDUCATION/TRAINING PROGRAM

## 2023-07-21 NOTE — PLAN OF CARE
Day 4 of outpatient radiation to the esophagus. Doing well. No complaints or concerns, just started therapy.

## 2023-07-24 ENCOUNTER — OFFICE VISIT (OUTPATIENT)
Dept: HEMATOLOGY/ONCOLOGY | Facility: CLINIC | Age: 63
End: 2023-07-24
Payer: COMMERCIAL

## 2023-07-24 ENCOUNTER — LAB VISIT (OUTPATIENT)
Dept: LAB | Facility: HOSPITAL | Age: 63
End: 2023-07-24
Attending: INTERNAL MEDICINE
Payer: COMMERCIAL

## 2023-07-24 ENCOUNTER — INFUSION (OUTPATIENT)
Dept: INFUSION THERAPY | Facility: HOSPITAL | Age: 63
End: 2023-07-24
Payer: COMMERCIAL

## 2023-07-24 VITALS
TEMPERATURE: 98 F | DIASTOLIC BLOOD PRESSURE: 70 MMHG | BODY MASS INDEX: 19.31 KG/M2 | HEIGHT: 69 IN | SYSTOLIC BLOOD PRESSURE: 115 MMHG | OXYGEN SATURATION: 99 % | WEIGHT: 130.38 LBS | RESPIRATION RATE: 18 BRPM | HEART RATE: 72 BPM

## 2023-07-24 VITALS
RESPIRATION RATE: 18 BRPM | HEART RATE: 66 BPM | DIASTOLIC BLOOD PRESSURE: 78 MMHG | TEMPERATURE: 99 F | SYSTOLIC BLOOD PRESSURE: 130 MMHG | OXYGEN SATURATION: 98 %

## 2023-07-24 DIAGNOSIS — I70.0 ATHEROSCLEROSIS OF AORTA: ICD-10-CM

## 2023-07-24 DIAGNOSIS — C16.0 MALIGNANT NEOPLASM OF GASTROESOPHAGEAL JUNCTION: Primary | ICD-10-CM

## 2023-07-24 DIAGNOSIS — C16.0 MALIGNANT NEOPLASM OF GASTROESOPHAGEAL JUNCTION: ICD-10-CM

## 2023-07-24 DIAGNOSIS — J43.9 PULMONARY EMPHYSEMA, UNSPECIFIED EMPHYSEMA TYPE: ICD-10-CM

## 2023-07-24 LAB
ALBUMIN SERPL BCP-MCNC: 3.2 G/DL (ref 3.5–5.2)
ALP SERPL-CCNC: 82 U/L (ref 55–135)
ALT SERPL W/O P-5'-P-CCNC: 7 U/L (ref 10–44)
ANION GAP SERPL CALC-SCNC: 9 MMOL/L (ref 8–16)
AST SERPL-CCNC: 11 U/L (ref 10–40)
BILIRUB SERPL-MCNC: 0.7 MG/DL (ref 0.1–1)
BUN SERPL-MCNC: 12 MG/DL (ref 8–23)
CALCIUM SERPL-MCNC: 9.3 MG/DL (ref 8.7–10.5)
CHLORIDE SERPL-SCNC: 104 MMOL/L (ref 95–110)
CO2 SERPL-SCNC: 25 MMOL/L (ref 23–29)
CREAT SERPL-MCNC: 0.8 MG/DL (ref 0.5–1.4)
ERYTHROCYTE [DISTWIDTH] IN BLOOD BY AUTOMATED COUNT: 12.9 % (ref 11.5–14.5)
EST. GFR  (NO RACE VARIABLE): >60 ML/MIN/1.73 M^2
GLUCOSE SERPL-MCNC: 106 MG/DL (ref 70–110)
HCT VFR BLD AUTO: 44 % (ref 40–54)
HGB BLD-MCNC: 14.9 G/DL (ref 14–18)
IMM GRANULOCYTES # BLD AUTO: 0.1 K/UL (ref 0–0.04)
MCH RBC QN AUTO: 33 PG (ref 27–31)
MCHC RBC AUTO-ENTMCNC: 33.9 G/DL (ref 32–36)
MCV RBC AUTO: 97 FL (ref 82–98)
NEUTROPHILS # BLD AUTO: 6.4 K/UL (ref 1.8–7.7)
PLATELET # BLD AUTO: 282 K/UL (ref 150–450)
PMV BLD AUTO: 9 FL (ref 9.2–12.9)
POTASSIUM SERPL-SCNC: 4.5 MMOL/L (ref 3.5–5.1)
PROT SERPL-MCNC: 6.8 G/DL (ref 6–8.4)
RBC # BLD AUTO: 4.52 M/UL (ref 4.6–6.2)
SODIUM SERPL-SCNC: 138 MMOL/L (ref 136–145)
WBC # BLD AUTO: 8.45 K/UL (ref 3.9–12.7)

## 2023-07-24 PROCEDURE — 25000003 PHARM REV CODE 250: Performed by: PHYSICIAN ASSISTANT

## 2023-07-24 PROCEDURE — 1159F PR MEDICATION LIST DOCUMENTED IN MEDICAL RECORD: ICD-10-PCS | Mod: CPTII,S$GLB,, | Performed by: PHYSICIAN ASSISTANT

## 2023-07-24 PROCEDURE — 99999 PR PBB SHADOW E&M-EST. PATIENT-LVL IV: ICD-10-PCS | Mod: PBBFAC,,, | Performed by: PHYSICIAN ASSISTANT

## 2023-07-24 PROCEDURE — 36415 COLL VENOUS BLD VENIPUNCTURE: CPT | Performed by: INTERNAL MEDICINE

## 2023-07-24 PROCEDURE — 99999 PR PBB SHADOW E&M-EST. PATIENT-LVL IV: CPT | Mod: PBBFAC,,, | Performed by: PHYSICIAN ASSISTANT

## 2023-07-24 PROCEDURE — 77386 HC IMRT, COMPLEX: CPT | Performed by: STUDENT IN AN ORGANIZED HEALTH CARE EDUCATION/TRAINING PROGRAM

## 2023-07-24 PROCEDURE — 3044F HG A1C LEVEL LT 7.0%: CPT | Mod: CPTII,S$GLB,, | Performed by: PHYSICIAN ASSISTANT

## 2023-07-24 PROCEDURE — 77014 PR  CT GUIDANCE PLACEMENT RAD THERAPY FIELDS: ICD-10-PCS | Mod: 26,,, | Performed by: STUDENT IN AN ORGANIZED HEALTH CARE EDUCATION/TRAINING PROGRAM

## 2023-07-24 PROCEDURE — 3078F DIAST BP <80 MM HG: CPT | Mod: CPTII,S$GLB,, | Performed by: PHYSICIAN ASSISTANT

## 2023-07-24 PROCEDURE — 3074F SYST BP LT 130 MM HG: CPT | Mod: CPTII,S$GLB,, | Performed by: PHYSICIAN ASSISTANT

## 2023-07-24 PROCEDURE — 96417 CHEMO IV INFUS EACH ADDL SEQ: CPT

## 2023-07-24 PROCEDURE — 3074F PR MOST RECENT SYSTOLIC BLOOD PRESSURE < 130 MM HG: ICD-10-PCS | Mod: CPTII,S$GLB,, | Performed by: PHYSICIAN ASSISTANT

## 2023-07-24 PROCEDURE — 96375 TX/PRO/DX INJ NEW DRUG ADDON: CPT

## 2023-07-24 PROCEDURE — 1160F RVW MEDS BY RX/DR IN RCRD: CPT | Mod: CPTII,S$GLB,, | Performed by: PHYSICIAN ASSISTANT

## 2023-07-24 PROCEDURE — 77014 PR  CT GUIDANCE PLACEMENT RAD THERAPY FIELDS: CPT | Mod: 26,,, | Performed by: STUDENT IN AN ORGANIZED HEALTH CARE EDUCATION/TRAINING PROGRAM

## 2023-07-24 PROCEDURE — A4216 STERILE WATER/SALINE, 10 ML: HCPCS | Performed by: PHYSICIAN ASSISTANT

## 2023-07-24 PROCEDURE — 1159F MED LIST DOCD IN RCRD: CPT | Mod: CPTII,S$GLB,, | Performed by: PHYSICIAN ASSISTANT

## 2023-07-24 PROCEDURE — 99215 OFFICE O/P EST HI 40 MIN: CPT | Mod: S$GLB,,, | Performed by: PHYSICIAN ASSISTANT

## 2023-07-24 PROCEDURE — 3078F PR MOST RECENT DIASTOLIC BLOOD PRESSURE < 80 MM HG: ICD-10-PCS | Mod: CPTII,S$GLB,, | Performed by: PHYSICIAN ASSISTANT

## 2023-07-24 PROCEDURE — 3008F PR BODY MASS INDEX (BMI) DOCUMENTED: ICD-10-PCS | Mod: CPTII,S$GLB,, | Performed by: PHYSICIAN ASSISTANT

## 2023-07-24 PROCEDURE — 85027 COMPLETE CBC AUTOMATED: CPT | Performed by: INTERNAL MEDICINE

## 2023-07-24 PROCEDURE — 1160F PR REVIEW ALL MEDS BY PRESCRIBER/CLIN PHARMACIST DOCUMENTED: ICD-10-PCS | Mod: CPTII,S$GLB,, | Performed by: PHYSICIAN ASSISTANT

## 2023-07-24 PROCEDURE — 99215 PR OFFICE/OUTPT VISIT, EST, LEVL V, 40-54 MIN: ICD-10-PCS | Mod: S$GLB,,, | Performed by: PHYSICIAN ASSISTANT

## 2023-07-24 PROCEDURE — 96367 TX/PROPH/DG ADDL SEQ IV INF: CPT

## 2023-07-24 PROCEDURE — 63600175 PHARM REV CODE 636 W HCPCS: Performed by: PHYSICIAN ASSISTANT

## 2023-07-24 PROCEDURE — 77336 RADIATION PHYSICS CONSULT: CPT | Performed by: STUDENT IN AN ORGANIZED HEALTH CARE EDUCATION/TRAINING PROGRAM

## 2023-07-24 PROCEDURE — 3008F BODY MASS INDEX DOCD: CPT | Mod: CPTII,S$GLB,, | Performed by: PHYSICIAN ASSISTANT

## 2023-07-24 PROCEDURE — 96413 CHEMO IV INFUSION 1 HR: CPT

## 2023-07-24 PROCEDURE — 80053 COMPREHEN METABOLIC PANEL: CPT | Performed by: INTERNAL MEDICINE

## 2023-07-24 PROCEDURE — 3044F PR MOST RECENT HEMOGLOBIN A1C LEVEL <7.0%: ICD-10-PCS | Mod: CPTII,S$GLB,, | Performed by: PHYSICIAN ASSISTANT

## 2023-07-24 RX ORDER — PROCHLORPERAZINE EDISYLATE 5 MG/ML
10 INJECTION INTRAMUSCULAR; INTRAVENOUS ONCE AS NEEDED
Status: DISCONTINUED | OUTPATIENT
Start: 2023-07-24 | End: 2023-07-24 | Stop reason: HOSPADM

## 2023-07-24 RX ORDER — DIPHENHYDRAMINE HYDROCHLORIDE 50 MG/ML
50 INJECTION INTRAMUSCULAR; INTRAVENOUS ONCE AS NEEDED
Status: CANCELLED | OUTPATIENT
Start: 2023-07-25

## 2023-07-24 RX ORDER — EPINEPHRINE 0.3 MG/.3ML
0.3 INJECTION SUBCUTANEOUS ONCE AS NEEDED
Status: DISCONTINUED | OUTPATIENT
Start: 2023-07-24 | End: 2023-07-24 | Stop reason: HOSPADM

## 2023-07-24 RX ORDER — DIPHENHYDRAMINE HYDROCHLORIDE 50 MG/ML
50 INJECTION INTRAMUSCULAR; INTRAVENOUS ONCE AS NEEDED
Status: DISCONTINUED | OUTPATIENT
Start: 2023-07-24 | End: 2023-07-24 | Stop reason: HOSPADM

## 2023-07-24 RX ORDER — SODIUM CHLORIDE 0.9 % (FLUSH) 0.9 %
10 SYRINGE (ML) INJECTION
Status: CANCELLED | OUTPATIENT
Start: 2023-07-25

## 2023-07-24 RX ORDER — FAMOTIDINE 10 MG/ML
20 INJECTION INTRAVENOUS
Status: CANCELLED | OUTPATIENT
Start: 2023-07-25 | End: 2023-07-25

## 2023-07-24 RX ORDER — PROCHLORPERAZINE EDISYLATE 5 MG/ML
10 INJECTION INTRAMUSCULAR; INTRAVENOUS ONCE AS NEEDED
Status: CANCELLED
Start: 2023-07-25

## 2023-07-24 RX ORDER — SODIUM CHLORIDE 0.9 % (FLUSH) 0.9 %
10 SYRINGE (ML) INJECTION
Status: DISCONTINUED | OUTPATIENT
Start: 2023-07-24 | End: 2023-07-24 | Stop reason: HOSPADM

## 2023-07-24 RX ORDER — FAMOTIDINE 10 MG/ML
20 INJECTION INTRAVENOUS
Status: COMPLETED | OUTPATIENT
Start: 2023-07-24 | End: 2023-07-24

## 2023-07-24 RX ORDER — EPINEPHRINE 0.3 MG/.3ML
0.3 INJECTION SUBCUTANEOUS ONCE AS NEEDED
Status: CANCELLED | OUTPATIENT
Start: 2023-07-25

## 2023-07-24 RX ORDER — HEPARIN 100 UNIT/ML
500 SYRINGE INTRAVENOUS
Status: CANCELLED | OUTPATIENT
Start: 2023-07-25

## 2023-07-24 RX ORDER — HEPARIN 100 UNIT/ML
500 SYRINGE INTRAVENOUS
Status: DISCONTINUED | OUTPATIENT
Start: 2023-07-24 | End: 2023-07-24 | Stop reason: HOSPADM

## 2023-07-24 RX ADMIN — SODIUM CHLORIDE: 9 INJECTION, SOLUTION INTRAVENOUS at 02:07

## 2023-07-24 RX ADMIN — DEXAMETHASONE SODIUM PHOSPHATE 0.25 MG: 4 INJECTION, SOLUTION INTRA-ARTICULAR; INTRALESIONAL; INTRAMUSCULAR; INTRAVENOUS; SOFT TISSUE at 02:07

## 2023-07-24 RX ADMIN — FAMOTIDINE 20 MG: 10 INJECTION INTRAVENOUS at 02:07

## 2023-07-24 RX ADMIN — Medication 10 ML: at 04:07

## 2023-07-24 RX ADMIN — HEPARIN 500 UNITS: 100 SYRINGE at 04:07

## 2023-07-24 RX ADMIN — DIPHENHYDRAMINE HYDROCHLORIDE 50 MG: 50 INJECTION INTRAMUSCULAR; INTRAVENOUS at 03:07

## 2023-07-24 RX ADMIN — PACLITAXEL 70 MG: 100 INJECTION, POWDER, LYOPHILIZED, FOR SUSPENSION INTRAVENOUS at 03:07

## 2023-07-24 RX ADMIN — CARBOPLATIN 210 MG: 10 INJECTION, SOLUTION INTRAVENOUS at 04:07

## 2023-07-24 NOTE — PROGRESS NOTES
MEDICAL ONCOLOGY - ESTABLISHED PATIENT VISIT    Reason for visit: Esophageal adenocarcinoma    Best Contact Phone Number(s): [phone] (home)      Cancer/Stage/TNM:    Cancer Staging   Malignant neoplasm of gastroesophageal junction  Staging form: Esophagus - Adenocarcinoma, AJCC 8th Edition  - Clinical stage from 6/13/2023: Stage III (cT3, cN1, cM0, G2) - Signed by Sunday Jasso MD on 7/5/2023       Oncology History   Malignant neoplasm of gastroesophageal junction   6/13/2023 Cancer Staged    Staging form: Esophagus - Adenocarcinoma, AJCC 8th Edition  - Clinical stage from 6/13/2023: Stage III (cT3, cN1, cM0, G2)     6/23/2023 Initial Diagnosis    Malignant neoplasm of gastroesophageal junction     7/3/2023 Tumor Conference     [facility] UGI MULTIDISCIPLINARY TUMOR BOARD  PATIENT REVIEW FORM   ____________________________________________________________    CLINIC #: [id number]   DATE: 7/3/2023    DIAGNOSIS: esophageal CA    PRESENTER: Mitch    PATIENT SUMMARY:   This 64 y/o gentleman is a long time smoker with emphysema who had low dose screening lung CT in 4/2023 per his PCP. Given an abnormality on this scan, he had PET on 6/1/23 which identified large hypermetabolic mid esophageal mass with lymph nodes. He underwent EGD on 6/13/23 that found large fungating ulcerated mass with bleeding in middle third of esophagus to lower esophagus, 34-44cm. Pathology revealed moderately differentiated adenocarcinoma.   Staging PET reviewed - level 2 cervical lymph node with FDG uptake, nothing in lungs concerning  Staging EUS today per Dr. Duffy.   He has been evaluated by Dr. Vance in [place] and Dr. June.   He is scheduled to see rad onc, Dr. Jasso, Wednesday and IR for port placement on 7/12/23.     BOARD RECOMMENDATIONS:   Proceed with neoadj CRT, then restaging to consider surgical resection    CONSULT NEEDED:     [] Surgery    [] Hem/Onc    [] Rad/Onc    [] Dietary                 [] Social  Service    [] Psychology       [] AES  [] Radiology     Clinical Stage: Tumor 3 Node(s) 1 Metastasis 0      GROUP STAGE:  [] O    [] 1   [] II     [x] III   []IV  [] Local recurrence     [] Regional recurrence     [] Distant recurrence   Metastatic site(s): none         [x] [facility] Treatment Guidelines reviewed and care planned is consistent with guidelines.         (i.e., NCCN, NCI, PD, ACO, AUA, etc.)    PRESENTATION AT CANCER CONFERENCE:         [x] Prospective    [] Retrospective     [] Follow-Up         7/16/2023 -  Chemotherapy    Treatment Summary   Plan Name: OP ESOPHAGEAL PACLITAXEL CARBOPLATIN WEEKLY  Treatment Goal: Control  Status: Active  Start Date: 7/16/2023  End Date: 8/15/2023 (Planned)  Provider: Delta June MD  Chemotherapy: CARBOplatin (PARAPLATIN) 195 mg in sodium chloride 0.9% 304.5 mL chemo infusion, 195 mg (100 % of original dose 193 mg), Intravenous, Clinic/HOD 1 time, 1 of 1 cycle  Dose modification:   (original dose 193 mg, Cycle 1)  Administration: 195 mg (7/18/2023)  PACLitaxeL (TAXOL) 50 mg/m2 = 84 mg in sodium chloride 0.9% 250 mL chemo infusion, 50 mg/m2 = 84 mg, Intravenous, Clinic/HOD 1 time, 1 of 1 cycle  Administration: 84 mg (7/18/2023)          Interim History:   63 y.o. male recently diagnosed esophageal adenocarcinoma who presents prior to cycle 2 of weekly neoadjuvant chemoradiation. He is s/p 1 cycle of carbo/taxol that he received last week. Unfortunately, he began to have a reaction to the Taxol. During the Taxol infusion, he presented with coughing, flushing, chest tightness and nausea. O2 sat was 92%, 2L O2 applied NC. We were notified and stopped the Taxol. He was able to proceed with the Carboplatin without complication. He returns today for week 2 and to discuss the adjustment in his treatment. Given his reaction to the Taxol, we are going to switch him to weekly Carbo/Abraxane. He is agreeable.     Doing well today and feels well. He has tolerated radiation  therapy well with minimal side effects. He is still eating fairly well and swallowing without significant discomfort. Appetite is stable. No other concerns or complaints today.  Port was placed 2 weeks ago and is healing well, has a little discomfort but it is improving.  He denies any dysphagia, heartburn, chest or abdominal pain. He reports feeling well without limitations to his breathing or activity level.     ECOG status is 0. Presents with his caregiver today.     ROS:   Review of Systems   Constitutional:  Negative for chills, fever, malaise/fatigue and weight loss.   HENT:  Negative for sore throat.    Eyes:  Negative for blurred vision and pain.   Respiratory:  Negative for cough and shortness of breath.    Cardiovascular:  Negative for chest pain and leg swelling.   Gastrointestinal:  Negative for abdominal pain, constipation, diarrhea, nausea and vomiting.   Genitourinary:  Negative for dysuria and frequency.   Musculoskeletal:  Negative for back pain, falls and myalgias.   Skin:  Negative for rash.   Neurological:  Negative for dizziness, weakness and headaches.   Endo/Heme/Allergies:  Does not bruise/bleed easily.   Psychiatric/Behavioral:  Negative for depression. The patient is not nervous/anxious.    All other systems reviewed and are negative.    Past Medical History:   Past Medical History:   Diagnosis Date    Arthritis     COPD (chronic obstructive pulmonary disease)         Past Surgical History:   Past Surgical History:   Procedure Laterality Date    CERVICAL FUSION      COLONOSCOPY N/A 3/27/2018    Procedure: COLONOSCOPY;  Surgeon: Maria Teresa Morocho MD;  Location: [facility];  Service: Endoscopy;  Laterality: N/A;    COLONOSCOPY N/A 6/13/2023    Procedure: COLONOSCOPY;  Surgeon: Kelli Snell MD;  Location: [facility];  Service: Endoscopy;  Laterality: N/A;    ENDOSCOPIC ULTRASOUND OF UPPER GASTROINTESTINAL TRACT N/A 7/3/2023    Procedure: ULTRASOUND, UPPER GI TRACT, ENDOSCOPIC;  Surgeon:  Ray Duffy MD;  Location: [facility] ENDO;  Service: Endoscopy;  Laterality: N/A;    ESOPHAGOGASTRODUODENOSCOPY N/A 6/13/2023    Procedure: EGD (ESOPHAGOGASTRODUODENOSCOPY);  Surgeon: Kelli Snell MD;  Location: [facility] ENDO;  Service: Endoscopy;  Laterality: N/A;    ESOPHAGOGASTRODUODENOSCOPY N/A 7/3/2023    Procedure: EGD (ESOPHAGOGASTRODUODENOSCOPY);  Surgeon: Ray Duffy MD;  Location: [facility] ENDO;  Service: Endoscopy;  Laterality: N/A;    INGUINAL HERNIA REPAIR Bilateral     Right x2, x1 left    ROTATOR CUFF REPAIR Right     x2        Family History:   Family History   Problem Relation Age of Onset    Diabetes Mother     Heart disease Father         CHF    Glaucoma Neg Hx     Colon cancer Neg Hx     Prostate cancer Neg Hx         Social History:   Social History     Tobacco Use    Smoking status: Every Day     Packs/day: 0.25     Years: 40.00     Pack years: 10.00     Types: Cigarettes     Start date: 1983     Passive exposure: Current    Smokeless tobacco: Never    Tobacco comments:     4 cigarettes a day   Substance Use Topics    Alcohol use: Yes     Comment: a couple of beers a day        I have reviewed and updated the patient's past medical, surgical, family and social histories.    Allergies:   Review of patient's allergies indicates:  No Known Allergies     Medications:   Current Outpatient Medications   Medication Sig Dispense Refill    LIDOcaine-prilocaine (EMLA) cream Apply topically as needed (Port access). 30 g 1    ondansetron (ZOFRAN) 8 MG tablet Take 1 tablet (8 mg total) by mouth every 8 (eight) hours as needed for Nausea. (Patient not taking: Reported on 7/5/2023) 60 tablet 2    pantoprazole (PROTONIX) 40 MG tablet Take 1 tablet (40 mg total) by mouth once daily. (Patient not taking: Reported on 6/26/2023) 90 tablet 3    sildenafil (VIAGRA) 100 MG tablet Take 1 tablet (100 mg total) by mouth daily as needed for Erectile Dysfunction. 10 tablet 6     No current facility-administered  "medications for this visit.        Physical Exam:   /70 (BP Location: Right arm, Patient Position: Sitting, BP Method: Large (Automatic))   Pulse 72   Temp 98.2 °F (36.8 °C) (Oral)   Resp 18   Ht 5' 9" (1.753 m)   Wt 59.1 kg (130 lb 6.4 oz)   SpO2 99%   BMI 19.26 kg/m²      ECOG Performance status: 0            Physical Exam  Vitals reviewed.   Constitutional:       General: He is not in acute distress.     Appearance: Normal appearance. He is normal weight.   HENT:      Head: Normocephalic and atraumatic.      Right Ear: External ear normal.      Left Ear: External ear normal.      Nose: Nose normal.      Mouth/Throat:      Mouth: Mucous membranes are moist.      Pharynx: Oropharynx is clear. No posterior oropharyngeal erythema.   Eyes:      General: No scleral icterus.     Extraocular Movements: Extraocular movements intact.      Conjunctiva/sclera: Conjunctivae normal.      Pupils: Pupils are equal, round, and reactive to light.   Cardiovascular:      Rate and Rhythm: Normal rate and regular rhythm.      Pulses: Normal pulses.      Heart sounds: Normal heart sounds.   Pulmonary:      Effort: Pulmonary effort is normal.      Breath sounds: Normal breath sounds. No wheezing or rales.   Abdominal:      General: Bowel sounds are normal. There is no distension.      Palpations: Abdomen is soft.      Tenderness: There is no abdominal tenderness.   Musculoskeletal:         General: No swelling. Normal range of motion.      Cervical back: Normal range of motion and neck supple.   Skin:     General: Skin is warm.      Coloration: Skin is not jaundiced.      Findings: No erythema or rash.   Neurological:      General: No focal deficit present.      Mental Status: He is alert and oriented to person, place, and time. Mental status is at baseline.      Gait: Gait normal.   Psychiatric:         Mood and Affect: Mood normal.         Behavior: Behavior normal.         Thought Content: Thought content normal.         " Judgment: Judgment normal.         Labs:   Recent Results (from the past 48 hour(s))   CBC Oncology    Collection Time: 07/24/23  7:28 AM   Result Value Ref Range    WBC 8.45 3.90 - 12.70 K/uL    RBC 4.52 (L) 4.60 - 6.20 M/uL    Hemoglobin 14.9 14.0 - 18.0 g/dL    Hematocrit 44.0 40.0 - 54.0 %    MCV 97 82 - 98 fL    MCH 33.0 (H) 27.0 - 31.0 pg    MCHC 33.9 32.0 - 36.0 g/dL    RDW 12.9 11.5 - 14.5 %    Platelets 282 150 - 450 K/uL    MPV 9.0 (L) 9.2 - 12.9 fL    Gran # (ANC) 6.4 1.8 - 7.7 K/uL    Immature Grans (Abs) 0.10 (H) 0.00 - 0.04 K/uL   Comprehensive Metabolic Panel    Collection Time: 07/24/23  7:28 AM   Result Value Ref Range    Sodium 138 136 - 145 mmol/L    Potassium 4.5 3.5 - 5.1 mmol/L    Chloride 104 95 - 110 mmol/L    CO2 25 23 - 29 mmol/L    Glucose 106 70 - 110 mg/dL    BUN 12 8 - 23 mg/dL    Creatinine 0.8 0.5 - 1.4 mg/dL    Calcium 9.3 8.7 - 10.5 mg/dL    Total Protein 6.8 6.0 - 8.4 g/dL    Albumin 3.2 (L) 3.5 - 5.2 g/dL    Total Bilirubin 0.7 0.1 - 1.0 mg/dL    Alkaline Phosphatase 82 55 - 135 U/L    AST 11 10 - 40 U/L    ALT 7 (L) 10 - 44 U/L    eGFR >60.0 >60 mL/min/1.73 m^2    Anion Gap 9 8 - 16 mmol/L        I have reviewed the pertinent labs which are notable for normal blood counts, renal and hepatic funciton.    Imaging:         I have personally reviewed the imaging which is notable for large hypermetabolic mid to distal esophageal tumor; possible mediastinal/AP window lymph node with mild activity.    Path:   2. Gastroesophageal junction mass (biopsy):   Invasive adenocarcinoma, moderately differentiated   Background low and high-grade glandular dysplasia   HER2 immunohistochemistry:  Equivocal.     Immunohistochemistry (IHC) Testing for Mismatch Repair (MMR) Proteins:   MLH1, MSH2, MSH6, PMS2 - Intact nuclear expression   Background nonneoplastic tissue/internal control with intact nuclear expression     There are exceptions to the above IHC interpretations. These results should not be  considered in isolation, and clinical correlation with genetic counseling is recommended to assess the need for germline testing.     Interpretation: No loss of nuclear expression of MMR proteins: low probability of microsatellite instability       Assessment:       1. Malignant neoplasm of gastroesophageal junction    2. Pulmonary emphysema, unspecified emphysema type    3. Atherosclerosis of aorta            Plan:             # Esophageal adenocarcinoma  He appears to have a locally advanced adenocarcinoma of the middle and lower third of the esophagus, pMMR. PET/CT does not show clear distant metastatic disease.  We discussed the case and plan with Dr. Meyer and he feels the patient is surgically resectable as well.    Mr. Beebe was recently diagnosed with esophageal adenocarcinoma who presents prior to cycle 2 of weekly neoadjuvant chemoradiation. He is s/p 1 cycle of carbo/taxol that he received last week. Unfortunately, he began to have a reaction to the Taxol. During the Taxol infusion, he presented with coughing, flushing, chest tightness and nausea. O2 sat was 92%, 2L O2 applied NC. We were notified and stopped the Taxol. He was able to proceed with the Carboplatin without complication. He returns today for week 2 and to discuss the adjustment in his treatment. Given his reaction to the Taxol, we are going to switch him to weekly Carbo/Abraxane. He is agreeable.   Discussed all the risks and benefits of the chemotherapy with the patient. Has tolerated RT well.     Doing well today. No concerns or complaints.   Patient was consented for chemotherapy today 7/24/2023 .   An extensive discussion was had which included a thorough discussion of the risk and benefits of treatment and alternatives.  Risks, including but not limited to, possible hair loss, bone marrow damage (anemia, thrombocytopenia, immune suppression, neutropenia), damage to body organs (brain, heart, liver, kidney, lungs, nervous system, skin,  and others), allergic reactions, sterility, nausea/vomiting, constipation/diarrhea, sores in the mouth, secondary cancers, local damage at possible injection sites, and rarely death were all discussed.  The patient agrees with the plan, and all questions have been answered to their satisfaction.  Consent was signed the patient, provider, and a third party witness.     Proceed with carboplatin cycle 1 AUC 2 and Abraxane 40m/mg2 weekly x 4 weeks concurrent with radiation, once PA for Abraxane has been gained.   PA for Abraxane approved. Will start today  Lab work adequate to proceed.   Proceed with cycle 2 of chemoRT with Carbo/abraxane    Will get repeat PET 4-6 weeks after chemo/radiation to confirm patient still a surgical candidate     Follow-up next week prior to cycle 3.    # COPD, tobacco abuse, aortic atherosclerosis  Counseled on tobacco cessation.  Normal SpO2.  No dyspnea.  Atherosclerosis noted on imaging.    The above information has been reviewed with the patient and all questions have been answered to their apparent satisfaction.  They understand that they can call the clinic with any questions.      Route Chart for Scheduling    Med Onc Chart Routing      Follow up with physician 1 week and 2 weeks. See Dr. June as scheduled with lab work and weekly Carbo/Abraxane   Follow up with DEMARCO 3 weeks. See DEMARCO in 3 weeks as scheduled   Infusion scheduling note    Injection scheduling note    Labs CBC and CMP   Scheduling:  Preferred lab:  Lab interval: once a week     Imaging    Pharmacy appointment    Other referrals            Treatment Plan Information   OP ESOPHAGEAL PACLITAXEL CARBOPLATIN WEEKLY   Delta June MD   Upcoming Treatment Dates - OP ESOPHAGEAL PACLITAXEL CARBOPLATIN WEEKLY    7/25/2023       Pre-Medications       palonosetron (ALOXI) 0.25 mg with Dexamethasone (DECADRON) 12 mg in NS 50 mL IVPB       famotidine (PF) injection 20 mg       diphenhydrAMINE (BENADRYL) 50 mg in NS 50 mL  IVPB       Chemotherapy       CARBOplatin (PARAPLATIN) 195 mg in sodium chloride 0.9% 269.5 mL chemo infusion       PACLitaxeL protein-bound (ABRAXANE) in 14 mL infusion       Supportive Care       prochlorperazine injection Soln 10 mg  8/1/2023       Pre-Medications       palonosetron 0.25mg/dexAMETHasone 12mg in NS IVPB 0.25 mg 50 mL       famotidine (PF) injection 20 mg       diphenhydrAMINE (BENADRYL) 50 mg in NS 50 mL IVPB       Chemotherapy       PACLitaxeL protein-bound (ABRAXANE) in 14 mL infusion       CARBOplatin (PARAPLATIN) 195 mg in sodium chloride 0.9% 269.5 mL chemo infusion       Supportive Care       prochlorperazine injection Soln 10 mg  8/8/2023       Pre-Medications       palonosetron 0.25mg/dexAMETHasone 12mg in NS IVPB 0.25 mg 50 mL       famotidine (PF) injection 20 mg       diphenhydrAMINE (BENADRYL) 50 mg in NS 50 mL IVPB       Chemotherapy       PACLitaxeL protein-bound (ABRAXANE) in 14 mL infusion       CARBOplatin (PARAPLATIN) 195 mg in sodium chloride 0.9% 269.5 mL chemo infusion       Supportive Care       prochlorperazine injection Soln 10 mg  8/15/2023       Pre-Medications       palonosetron 0.25mg/dexAMETHasone 12mg in NS IVPB 0.25 mg 50 mL       famotidine (PF) injection 20 mg       diphenhydrAMINE (BENADRYL) 50 mg in NS 50 mL IVPB       Chemotherapy       PACLitaxeL protein-bound (ABRAXANE) in 14 mL infusion       CARBOplatin (PARAPLATIN) 195 mg in sodium chloride 0.9% 269.5 mL chemo infusion       Supportive Care       prochlorperazine injection Soln 10 mg

## 2023-07-24 NOTE — PLAN OF CARE
5394 Patient tolerated abraxane/carbo with no s/s of reaction and no complaints. VSS after treatment. Port flushed, heparin locked and deaccessed. Bandaid to port site. He declined the AVS and ambulated out.

## 2023-07-24 NOTE — PLAN OF CARE
1405 Patient here for C1D8 abraxane/carbo. Insurance approved abraxane today to replace taxol. Port accessed and NS started at 25ml/hr. All pre-meds to be given. [place] and snack provided.

## 2023-07-25 PROCEDURE — 77427 RADIATION TX MANAGEMENT X5: CPT | Mod: ,,, | Performed by: STUDENT IN AN ORGANIZED HEALTH CARE EDUCATION/TRAINING PROGRAM

## 2023-07-25 PROCEDURE — 77014 PR  CT GUIDANCE PLACEMENT RAD THERAPY FIELDS: CPT | Mod: 26,,, | Performed by: STUDENT IN AN ORGANIZED HEALTH CARE EDUCATION/TRAINING PROGRAM

## 2023-07-25 PROCEDURE — 77014 PR  CT GUIDANCE PLACEMENT RAD THERAPY FIELDS: ICD-10-PCS | Mod: 26,,, | Performed by: STUDENT IN AN ORGANIZED HEALTH CARE EDUCATION/TRAINING PROGRAM

## 2023-07-25 PROCEDURE — 77386 HC IMRT, COMPLEX: CPT | Performed by: STUDENT IN AN ORGANIZED HEALTH CARE EDUCATION/TRAINING PROGRAM

## 2023-07-25 PROCEDURE — 77427 PR CHG RADIATION,MANGEMENT,5 TX'S: ICD-10-PCS | Mod: ,,, | Performed by: STUDENT IN AN ORGANIZED HEALTH CARE EDUCATION/TRAINING PROGRAM

## 2023-07-26 PROCEDURE — 77014 PR  CT GUIDANCE PLACEMENT RAD THERAPY FIELDS: CPT | Mod: 26,,, | Performed by: STUDENT IN AN ORGANIZED HEALTH CARE EDUCATION/TRAINING PROGRAM

## 2023-07-26 PROCEDURE — 77386 HC IMRT, COMPLEX: CPT | Performed by: STUDENT IN AN ORGANIZED HEALTH CARE EDUCATION/TRAINING PROGRAM

## 2023-07-26 PROCEDURE — 77014 PR  CT GUIDANCE PLACEMENT RAD THERAPY FIELDS: ICD-10-PCS | Mod: 26,,, | Performed by: STUDENT IN AN ORGANIZED HEALTH CARE EDUCATION/TRAINING PROGRAM

## 2023-07-27 PROCEDURE — 77014 PR  CT GUIDANCE PLACEMENT RAD THERAPY FIELDS: ICD-10-PCS | Mod: 26,,, | Performed by: STUDENT IN AN ORGANIZED HEALTH CARE EDUCATION/TRAINING PROGRAM

## 2023-07-27 PROCEDURE — 77014 PR  CT GUIDANCE PLACEMENT RAD THERAPY FIELDS: CPT | Mod: 26,,, | Performed by: STUDENT IN AN ORGANIZED HEALTH CARE EDUCATION/TRAINING PROGRAM

## 2023-07-27 PROCEDURE — 77386 HC IMRT, COMPLEX: CPT | Performed by: STUDENT IN AN ORGANIZED HEALTH CARE EDUCATION/TRAINING PROGRAM

## 2023-07-28 ENCOUNTER — DOCUMENTATION ONLY (OUTPATIENT)
Dept: RADIATION ONCOLOGY | Facility: CLINIC | Age: 63
End: 2023-07-28
Payer: COMMERCIAL

## 2023-07-28 PROCEDURE — 77386 HC IMRT, COMPLEX: CPT | Performed by: STUDENT IN AN ORGANIZED HEALTH CARE EDUCATION/TRAINING PROGRAM

## 2023-07-28 PROCEDURE — 77014 PR  CT GUIDANCE PLACEMENT RAD THERAPY FIELDS: CPT | Mod: 26,,, | Performed by: STUDENT IN AN ORGANIZED HEALTH CARE EDUCATION/TRAINING PROGRAM

## 2023-07-28 PROCEDURE — 77014 PR  CT GUIDANCE PLACEMENT RAD THERAPY FIELDS: ICD-10-PCS | Mod: 26,,, | Performed by: STUDENT IN AN ORGANIZED HEALTH CARE EDUCATION/TRAINING PROGRAM

## 2023-07-28 NOTE — PLAN OF CARE
Day 9 of outpatient radiation to the esophagus. Doing well. No dysphagia. Feels gaseous, increased belching. No nausea or vomiting.

## 2023-07-31 ENCOUNTER — INFUSION (OUTPATIENT)
Dept: INFUSION THERAPY | Facility: HOSPITAL | Age: 63
End: 2023-07-31
Payer: COMMERCIAL

## 2023-07-31 ENCOUNTER — LAB VISIT (OUTPATIENT)
Dept: LAB | Facility: HOSPITAL | Age: 63
End: 2023-07-31
Attending: INTERNAL MEDICINE
Payer: COMMERCIAL

## 2023-07-31 ENCOUNTER — OFFICE VISIT (OUTPATIENT)
Dept: HEMATOLOGY/ONCOLOGY | Facility: CLINIC | Age: 63
End: 2023-07-31
Payer: COMMERCIAL

## 2023-07-31 VITALS — SYSTOLIC BLOOD PRESSURE: 110 MMHG | DIASTOLIC BLOOD PRESSURE: 67 MMHG | HEART RATE: 65 BPM

## 2023-07-31 VITALS
RESPIRATION RATE: 18 BRPM | HEIGHT: 69 IN | BODY MASS INDEX: 18.96 KG/M2 | WEIGHT: 128 LBS | SYSTOLIC BLOOD PRESSURE: 106 MMHG | TEMPERATURE: 98 F | HEART RATE: 76 BPM | DIASTOLIC BLOOD PRESSURE: 62 MMHG | OXYGEN SATURATION: 99 %

## 2023-07-31 DIAGNOSIS — C16.0 MALIGNANT NEOPLASM OF GASTROESOPHAGEAL JUNCTION: ICD-10-CM

## 2023-07-31 DIAGNOSIS — J43.9 PULMONARY EMPHYSEMA, UNSPECIFIED EMPHYSEMA TYPE: ICD-10-CM

## 2023-07-31 DIAGNOSIS — C16.0 MALIGNANT NEOPLASM OF GASTROESOPHAGEAL JUNCTION: Primary | ICD-10-CM

## 2023-07-31 DIAGNOSIS — I70.0 ATHEROSCLEROSIS OF AORTA: ICD-10-CM

## 2023-07-31 DIAGNOSIS — F17.210 NICOTINE DEPENDENCE, CIGARETTES, UNCOMPLICATED: ICD-10-CM

## 2023-07-31 LAB
ALBUMIN SERPL BCP-MCNC: 3.3 G/DL (ref 3.5–5.2)
ALP SERPL-CCNC: 78 U/L (ref 55–135)
ALT SERPL W/O P-5'-P-CCNC: 9 U/L (ref 10–44)
ANION GAP SERPL CALC-SCNC: 7 MMOL/L (ref 8–16)
AST SERPL-CCNC: 16 U/L (ref 10–40)
BILIRUB SERPL-MCNC: 0.5 MG/DL (ref 0.1–1)
BUN SERPL-MCNC: 13 MG/DL (ref 8–23)
CALCIUM SERPL-MCNC: 9.6 MG/DL (ref 8.7–10.5)
CHLORIDE SERPL-SCNC: 106 MMOL/L (ref 95–110)
CO2 SERPL-SCNC: 26 MMOL/L (ref 23–29)
CREAT SERPL-MCNC: 0.7 MG/DL (ref 0.5–1.4)
ERYTHROCYTE [DISTWIDTH] IN BLOOD BY AUTOMATED COUNT: 12.9 % (ref 11.5–14.5)
EST. GFR  (NO RACE VARIABLE): >60 ML/MIN/1.73 M^2
GLUCOSE SERPL-MCNC: 103 MG/DL (ref 70–110)
HCT VFR BLD AUTO: 42.6 % (ref 40–54)
HGB BLD-MCNC: 14.4 G/DL (ref 14–18)
IMM GRANULOCYTES # BLD AUTO: 0.03 K/UL (ref 0–0.04)
MCH RBC QN AUTO: 32.9 PG (ref 27–31)
MCHC RBC AUTO-ENTMCNC: 33.8 G/DL (ref 32–36)
MCV RBC AUTO: 97 FL (ref 82–98)
NEUTROPHILS # BLD AUTO: 4.1 K/UL (ref 1.8–7.7)
PLATELET # BLD AUTO: 194 K/UL (ref 150–450)
PMV BLD AUTO: 9.4 FL (ref 9.2–12.9)
POTASSIUM SERPL-SCNC: 4.5 MMOL/L (ref 3.5–5.1)
PROT SERPL-MCNC: 6.8 G/DL (ref 6–8.4)
RBC # BLD AUTO: 4.38 M/UL (ref 4.6–6.2)
SODIUM SERPL-SCNC: 139 MMOL/L (ref 136–145)
WBC # BLD AUTO: 5.35 K/UL (ref 3.9–12.7)

## 2023-07-31 PROCEDURE — 3008F PR BODY MASS INDEX (BMI) DOCUMENTED: ICD-10-PCS | Mod: CPTII,S$GLB,, | Performed by: INTERNAL MEDICINE

## 2023-07-31 PROCEDURE — 96367 TX/PROPH/DG ADDL SEQ IV INF: CPT

## 2023-07-31 PROCEDURE — 3044F HG A1C LEVEL LT 7.0%: CPT | Mod: CPTII,S$GLB,, | Performed by: INTERNAL MEDICINE

## 2023-07-31 PROCEDURE — 96375 TX/PRO/DX INJ NEW DRUG ADDON: CPT

## 2023-07-31 PROCEDURE — 3008F BODY MASS INDEX DOCD: CPT | Mod: CPTII,S$GLB,, | Performed by: INTERNAL MEDICINE

## 2023-07-31 PROCEDURE — 99215 OFFICE O/P EST HI 40 MIN: CPT | Mod: S$GLB,,, | Performed by: INTERNAL MEDICINE

## 2023-07-31 PROCEDURE — 99215 PR OFFICE/OUTPT VISIT, EST, LEVL V, 40-54 MIN: ICD-10-PCS | Mod: S$GLB,,, | Performed by: INTERNAL MEDICINE

## 2023-07-31 PROCEDURE — 36415 COLL VENOUS BLD VENIPUNCTURE: CPT | Performed by: INTERNAL MEDICINE

## 2023-07-31 PROCEDURE — 63600175 PHARM REV CODE 636 W HCPCS: Performed by: INTERNAL MEDICINE

## 2023-07-31 PROCEDURE — 99999 PR PBB SHADOW E&M-EST. PATIENT-LVL IV: ICD-10-PCS | Mod: PBBFAC,,, | Performed by: INTERNAL MEDICINE

## 2023-07-31 PROCEDURE — 3074F PR MOST RECENT SYSTOLIC BLOOD PRESSURE < 130 MM HG: ICD-10-PCS | Mod: CPTII,S$GLB,, | Performed by: INTERNAL MEDICINE

## 2023-07-31 PROCEDURE — 3044F PR MOST RECENT HEMOGLOBIN A1C LEVEL <7.0%: ICD-10-PCS | Mod: CPTII,S$GLB,, | Performed by: INTERNAL MEDICINE

## 2023-07-31 PROCEDURE — 77386 HC IMRT, COMPLEX: CPT | Performed by: STUDENT IN AN ORGANIZED HEALTH CARE EDUCATION/TRAINING PROGRAM

## 2023-07-31 PROCEDURE — 3078F DIAST BP <80 MM HG: CPT | Mod: CPTII,S$GLB,, | Performed by: INTERNAL MEDICINE

## 2023-07-31 PROCEDURE — 85027 COMPLETE CBC AUTOMATED: CPT | Performed by: INTERNAL MEDICINE

## 2023-07-31 PROCEDURE — 3074F SYST BP LT 130 MM HG: CPT | Mod: CPTII,S$GLB,, | Performed by: INTERNAL MEDICINE

## 2023-07-31 PROCEDURE — 77336 RADIATION PHYSICS CONSULT: CPT | Performed by: STUDENT IN AN ORGANIZED HEALTH CARE EDUCATION/TRAINING PROGRAM

## 2023-07-31 PROCEDURE — 77014 PR  CT GUIDANCE PLACEMENT RAD THERAPY FIELDS: ICD-10-PCS | Mod: 26,,, | Performed by: STUDENT IN AN ORGANIZED HEALTH CARE EDUCATION/TRAINING PROGRAM

## 2023-07-31 PROCEDURE — 96417 CHEMO IV INFUS EACH ADDL SEQ: CPT

## 2023-07-31 PROCEDURE — A4216 STERILE WATER/SALINE, 10 ML: HCPCS | Performed by: INTERNAL MEDICINE

## 2023-07-31 PROCEDURE — 25000003 PHARM REV CODE 250: Performed by: INTERNAL MEDICINE

## 2023-07-31 PROCEDURE — 1159F MED LIST DOCD IN RCRD: CPT | Mod: CPTII,S$GLB,, | Performed by: INTERNAL MEDICINE

## 2023-07-31 PROCEDURE — 1159F PR MEDICATION LIST DOCUMENTED IN MEDICAL RECORD: ICD-10-PCS | Mod: CPTII,S$GLB,, | Performed by: INTERNAL MEDICINE

## 2023-07-31 PROCEDURE — 80053 COMPREHEN METABOLIC PANEL: CPT | Performed by: INTERNAL MEDICINE

## 2023-07-31 PROCEDURE — 99999 PR PBB SHADOW E&M-EST. PATIENT-LVL IV: CPT | Mod: PBBFAC,,, | Performed by: INTERNAL MEDICINE

## 2023-07-31 PROCEDURE — 3078F PR MOST RECENT DIASTOLIC BLOOD PRESSURE < 80 MM HG: ICD-10-PCS | Mod: CPTII,S$GLB,, | Performed by: INTERNAL MEDICINE

## 2023-07-31 PROCEDURE — 77014 PR  CT GUIDANCE PLACEMENT RAD THERAPY FIELDS: CPT | Mod: 26,,, | Performed by: STUDENT IN AN ORGANIZED HEALTH CARE EDUCATION/TRAINING PROGRAM

## 2023-07-31 PROCEDURE — 96413 CHEMO IV INFUSION 1 HR: CPT

## 2023-07-31 RX ORDER — EPINEPHRINE 0.3 MG/.3ML
0.3 INJECTION SUBCUTANEOUS ONCE AS NEEDED
Status: DISCONTINUED | OUTPATIENT
Start: 2023-07-31 | End: 2023-07-31 | Stop reason: HOSPADM

## 2023-07-31 RX ORDER — PROCHLORPERAZINE EDISYLATE 5 MG/ML
10 INJECTION INTRAMUSCULAR; INTRAVENOUS ONCE AS NEEDED
Status: DISCONTINUED | OUTPATIENT
Start: 2023-07-31 | End: 2023-07-31 | Stop reason: HOSPADM

## 2023-07-31 RX ORDER — HEPARIN 100 UNIT/ML
500 SYRINGE INTRAVENOUS
Status: DISCONTINUED | OUTPATIENT
Start: 2023-07-31 | End: 2023-07-31 | Stop reason: HOSPADM

## 2023-07-31 RX ORDER — PROCHLORPERAZINE EDISYLATE 5 MG/ML
10 INJECTION INTRAMUSCULAR; INTRAVENOUS ONCE AS NEEDED
Status: CANCELLED
Start: 2023-08-01

## 2023-07-31 RX ORDER — SODIUM CHLORIDE 0.9 % (FLUSH) 0.9 %
10 SYRINGE (ML) INJECTION
Status: CANCELLED | OUTPATIENT
Start: 2023-08-01

## 2023-07-31 RX ORDER — FAMOTIDINE 10 MG/ML
20 INJECTION INTRAVENOUS
Status: CANCELLED | OUTPATIENT
Start: 2023-08-01 | End: 2023-08-01

## 2023-07-31 RX ORDER — DIPHENHYDRAMINE HYDROCHLORIDE 50 MG/ML
50 INJECTION INTRAMUSCULAR; INTRAVENOUS ONCE AS NEEDED
Status: CANCELLED | OUTPATIENT
Start: 2023-08-01

## 2023-07-31 RX ORDER — SODIUM CHLORIDE 0.9 % (FLUSH) 0.9 %
10 SYRINGE (ML) INJECTION
Status: DISCONTINUED | OUTPATIENT
Start: 2023-07-31 | End: 2023-07-31 | Stop reason: HOSPADM

## 2023-07-31 RX ORDER — FAMOTIDINE 10 MG/ML
20 INJECTION INTRAVENOUS
Status: COMPLETED | OUTPATIENT
Start: 2023-07-31 | End: 2023-07-31

## 2023-07-31 RX ORDER — EPINEPHRINE 0.3 MG/.3ML
0.3 INJECTION SUBCUTANEOUS ONCE AS NEEDED
Status: CANCELLED | OUTPATIENT
Start: 2023-08-01

## 2023-07-31 RX ORDER — DIPHENHYDRAMINE HYDROCHLORIDE 50 MG/ML
50 INJECTION INTRAMUSCULAR; INTRAVENOUS ONCE AS NEEDED
Status: DISCONTINUED | OUTPATIENT
Start: 2023-07-31 | End: 2023-07-31 | Stop reason: HOSPADM

## 2023-07-31 RX ORDER — HEPARIN 100 UNIT/ML
500 SYRINGE INTRAVENOUS
Status: CANCELLED | OUTPATIENT
Start: 2023-08-01

## 2023-07-31 RX ADMIN — CARBOPLATIN 230 MG: 10 INJECTION, SOLUTION INTRAVENOUS at 12:07

## 2023-07-31 RX ADMIN — SODIUM CHLORIDE: 9 INJECTION, SOLUTION INTRAVENOUS at 09:07

## 2023-07-31 RX ADMIN — HEPARIN 500 UNITS: 100 SYRINGE at 01:07

## 2023-07-31 RX ADMIN — DEXAMETHASONE SODIUM PHOSPHATE 0.25 MG: 4 INJECTION, SOLUTION INTRA-ARTICULAR; INTRALESIONAL; INTRAMUSCULAR; INTRAVENOUS; SOFT TISSUE at 10:07

## 2023-07-31 RX ADMIN — Medication 10 ML: at 01:07

## 2023-07-31 RX ADMIN — PACLITAXEL 70 MG: 100 INJECTION, POWDER, LYOPHILIZED, FOR SUSPENSION INTRAVENOUS at 11:07

## 2023-07-31 RX ADMIN — DIPHENHYDRAMINE HYDROCHLORIDE 50 MG: 50 INJECTION, SOLUTION INTRAMUSCULAR; INTRAVENOUS at 10:07

## 2023-07-31 RX ADMIN — FAMOTIDINE 20 MG: 10 INJECTION INTRAVENOUS at 10:07

## 2023-07-31 NOTE — PROGRESS NOTES
MEDICAL ONCOLOGY - ESTABLISHED PATIENT VISIT    Reason for visit: Esophageal adenocarcinoma    Best Contact Phone Number(s): [phone] (home)      Cancer/Stage/TNM:    Cancer Staging   Malignant neoplasm of gastroesophageal junction  Staging form: Esophagus - Adenocarcinoma, AJCC 8th Edition  - Clinical stage from 6/13/2023: Stage III (cT3, cN1, cM0, G2) - Signed by Sunday Jasso MD on 7/5/2023       Oncology History   Malignant neoplasm of gastroesophageal junction   6/13/2023 Cancer Staged    Staging form: Esophagus - Adenocarcinoma, AJCC 8th Edition  - Clinical stage from 6/13/2023: Stage III (cT3, cN1, cM0, G2)     6/23/2023 Initial Diagnosis    Malignant neoplasm of gastroesophageal junction     7/3/2023 Tumor Conference     [facility] UGI MULTIDISCIPLINARY TUMOR BOARD  PATIENT REVIEW FORM   ____________________________________________________________    CLINIC #: [id number]   DATE: 7/3/2023    DIAGNOSIS: esophageal CA    PRESENTER: Mitch    PATIENT SUMMARY:   This 62 y/o gentleman is a long time smoker with emphysema who had low dose screening lung CT in 4/2023 per his PCP. Given an abnormality on this scan, he had PET on 6/1/23 which identified large hypermetabolic mid esophageal mass with lymph nodes. He underwent EGD on 6/13/23 that found large fungating ulcerated mass with bleeding in middle third of esophagus to lower esophagus, 34-44cm. Pathology revealed moderately differentiated adenocarcinoma.   Staging PET reviewed - level 2 cervical lymph node with FDG uptake, nothing in lungs concerning  Staging EUS today per Dr. Duffy.   He has been evaluated by Dr. Vance in [place] and Dr. June.   He is scheduled to see rad onc, Dr. Jasso, Wednesday and IR for port placement on 7/12/23.     BOARD RECOMMENDATIONS:   Proceed with neoadj CRT, then restaging to consider surgical resection    CONSULT NEEDED:     [] Surgery    [] Hem/Onc    [] Rad/Onc    [] Dietary                 [] Social  Service    [] Psychology       [] AES  [] Radiology     Clinical Stage: Tumor 3 Node(s) 1 Metastasis 0      GROUP STAGE:  [] O    [] 1   [] II     [x] III   []IV  [] Local recurrence     [] Regional recurrence     [] Distant recurrence   Metastatic site(s): none         [x] [facility] Treatment Guidelines reviewed and care planned is consistent with guidelines.         (i.e., NCCN, NCI, PD, ACO, AUA, etc.)    PRESENTATION AT CANCER CONFERENCE:         [x] Prospective    [] Retrospective     [] Follow-Up         7/16/2023 -  Chemotherapy    Treatment Summary   Plan Name: OP ESOPHAGEAL PACLITAXEL CARBOPLATIN WEEKLY  Treatment Goal: Control  Status: Active  Start Date: 7/16/2023  End Date: 8/15/2023 (Planned)  Provider: Delta June MD  Chemotherapy: CARBOplatin (PARAPLATIN) 195 mg in sodium chloride 0.9% 304.5 mL chemo infusion, 195 mg (100 % of original dose 193 mg), Intravenous, Clinic/HOD 1 time, 1 of 1 cycle  Dose modification:   (original dose 193 mg, Cycle 1)  Administration: 195 mg (7/18/2023), 210 mg (7/24/2023)  PACLitaxeL (TAXOL) 50 mg/m2 = 84 mg in sodium chloride 0.9% 250 mL chemo infusion, 50 mg/m2 = 84 mg, Intravenous, Clinic/HOD 1 time, 1 of 1 cycle  Administration: 84 mg (7/18/2023)          Interim History:   63 y.o. male recently diagnosed esophageal adenocarcinoma who presents prior to cycle 3 of weekly neoadjuvant chemoradiation. He had an infusion reaction with cycle 1 of carbo/taxol and then switched to Abraxane with cycle 2 which he tolerated without issue.  He feels well.  No nausea, no dysphagia, no paresthesias.    ECOG status is 0. Presents with his wife today.     ROS:   Review of Systems   Constitutional:  Negative for chills, fever, malaise/fatigue and weight loss.   HENT:  Negative for sore throat.    Eyes:  Negative for blurred vision and pain.   Respiratory:  Negative for cough and shortness of breath.    Cardiovascular:  Negative for chest pain and leg swelling.   Gastrointestinal:   Negative for abdominal pain, constipation, diarrhea, nausea and vomiting.   Genitourinary:  Negative for dysuria and frequency.   Musculoskeletal:  Negative for back pain, falls and myalgias.   Skin:  Negative for rash.   Neurological:  Negative for dizziness, weakness and headaches.   Endo/Heme/Allergies:  Does not bruise/bleed easily.   Psychiatric/Behavioral:  Negative for depression. The patient is not nervous/anxious.    All other systems reviewed and are negative.      Past Medical History:   Past Medical History:   Diagnosis Date    Arthritis     COPD (chronic obstructive pulmonary disease)         Past Surgical History:   Past Surgical History:   Procedure Laterality Date    CERVICAL FUSION      COLONOSCOPY N/A 3/27/2018    Procedure: COLONOSCOPY;  Surgeon: Maria Teresa Morocho MD;  Location: [facility];  Service: Endoscopy;  Laterality: N/A;    COLONOSCOPY N/A 6/13/2023    Procedure: COLONOSCOPY;  Surgeon: Kelli Snell MD;  Location: [facility];  Service: Endoscopy;  Laterality: N/A;    ENDOSCOPIC ULTRASOUND OF UPPER GASTROINTESTINAL TRACT N/A 7/3/2023    Procedure: ULTRASOUND, UPPER GI TRACT, ENDOSCOPIC;  Surgeon: Ray Duffy MD;  Location: [facility];  Service: Endoscopy;  Laterality: N/A;    ESOPHAGOGASTRODUODENOSCOPY N/A 6/13/2023    Procedure: EGD (ESOPHAGOGASTRODUODENOSCOPY);  Surgeon: Kelli Snell MD;  Location: [facility];  Service: Endoscopy;  Laterality: N/A;    ESOPHAGOGASTRODUODENOSCOPY N/A 7/3/2023    Procedure: EGD (ESOPHAGOGASTRODUODENOSCOPY);  Surgeon: Ray Duffy MD;  Location: [facility];  Service: Endoscopy;  Laterality: N/A;    INGUINAL HERNIA REPAIR Bilateral     Right x2, x1 left    ROTATOR CUFF REPAIR Right     x2        Family History:   Family History   Problem Relation Age of Onset    Diabetes Mother     Heart disease Father         CHF    Glaucoma Neg Hx     Colon cancer Neg Hx     Prostate cancer Neg Hx         Social History:   Social History     Tobacco Use     "Smoking status: Some Days     Current packs/day: 0.25     Average packs/day: 0.3 packs/day for 40.6 years (10.1 ttl pk-yrs)     Types: Cigarettes     Start date: 1983     Passive exposure: Current    Smokeless tobacco: Never    Tobacco comments:     4 cigarettes a day   Substance Use Topics    Alcohol use: Yes     Comment: a couple of beers a day        I have reviewed and updated the patient's past medical, surgical, family and social histories.    Allergies:   Review of patient's allergies indicates:  No Known Allergies     Medications:   Current Outpatient Medications   Medication Sig Dispense Refill    LIDOcaine-prilocaine (EMLA) cream Apply topically as needed (Port access). (Patient not taking: Reported on 7/31/2023) 30 g 1    ondansetron (ZOFRAN) 8 MG tablet Take 1 tablet (8 mg total) by mouth every 8 (eight) hours as needed for Nausea. (Patient not taking: Reported on 7/5/2023) 60 tablet 2    pantoprazole (PROTONIX) 40 MG tablet Take 1 tablet (40 mg total) by mouth once daily. (Patient not taking: Reported on 6/26/2023) 90 tablet 3    sildenafil (VIAGRA) 100 MG tablet Take 1 tablet (100 mg total) by mouth daily as needed for Erectile Dysfunction. 10 tablet 6     No current facility-administered medications for this visit.        Physical Exam:   /62 (BP Location: Left arm, Patient Position: Sitting, BP Method: Medium (Automatic))   Pulse 76   Temp 98.1 °F (36.7 °C) (Oral)   Resp 18   Ht 5' 9" (1.753 m)   Wt 58 kg (127 lb 15.6 oz)   SpO2 99%   BMI 18.90 kg/m²      ECOG Performance status: 0            Physical Exam  Vitals reviewed.   Constitutional:       General: He is not in acute distress.     Appearance: Normal appearance. He is normal weight.   HENT:      Head: Normocephalic and atraumatic.      Right Ear: External ear normal.      Left Ear: External ear normal.      Nose: Nose normal.      Mouth/Throat:      Mouth: Mucous membranes are moist.      Pharynx: Oropharynx is clear. No posterior " oropharyngeal erythema.   Eyes:      General: No scleral icterus.     Extraocular Movements: Extraocular movements intact.      Conjunctiva/sclera: Conjunctivae normal.      Pupils: Pupils are equal, round, and reactive to light.   Cardiovascular:      Rate and Rhythm: Normal rate and regular rhythm.      Pulses: Normal pulses.      Heart sounds: Normal heart sounds.   Pulmonary:      Effort: Pulmonary effort is normal.      Breath sounds: Normal breath sounds. No wheezing or rales.   Abdominal:      General: Bowel sounds are normal. There is no distension.      Palpations: Abdomen is soft.      Tenderness: There is no abdominal tenderness.   Musculoskeletal:         General: No swelling. Normal range of motion.      Cervical back: Normal range of motion and neck supple.   Skin:     General: Skin is warm.      Coloration: Skin is not jaundiced.      Findings: No erythema or rash.   Neurological:      General: No focal deficit present.      Mental Status: He is alert and oriented to person, place, and time. Mental status is at baseline.      Gait: Gait normal.   Psychiatric:         Mood and Affect: Mood normal.         Behavior: Behavior normal.         Thought Content: Thought content normal.         Judgment: Judgment normal.           Labs:   Recent Results (from the past 48 hour(s))   CBC Oncology    Collection Time: 07/31/23  8:06 AM   Result Value Ref Range    WBC 5.35 3.90 - 12.70 K/uL    RBC 4.38 (L) 4.60 - 6.20 M/uL    Hemoglobin 14.4 14.0 - 18.0 g/dL    Hematocrit 42.6 40.0 - 54.0 %    MCV 97 82 - 98 fL    MCH 32.9 (H) 27.0 - 31.0 pg    MCHC 33.8 32.0 - 36.0 g/dL    RDW 12.9 11.5 - 14.5 %    Platelets 194 150 - 450 K/uL    MPV 9.4 9.2 - 12.9 fL    Gran # (ANC) 4.1 1.8 - 7.7 K/uL    Immature Grans (Abs) 0.03 0.00 - 0.04 K/uL   Comprehensive Metabolic Panel    Collection Time: 07/31/23  8:06 AM   Result Value Ref Range    Sodium 139 136 - 145 mmol/L    Potassium 4.5 3.5 - 5.1 mmol/L    Chloride 106 95 - 110  mmol/L    CO2 26 23 - 29 mmol/L    Glucose 103 70 - 110 mg/dL    BUN 13 8 - 23 mg/dL    Creatinine 0.7 0.5 - 1.4 mg/dL    Calcium 9.6 8.7 - 10.5 mg/dL    Total Protein 6.8 6.0 - 8.4 g/dL    Albumin 3.3 (L) 3.5 - 5.2 g/dL    Total Bilirubin 0.5 0.1 - 1.0 mg/dL    Alkaline Phosphatase 78 55 - 135 U/L    AST 16 10 - 40 U/L    ALT 9 (L) 10 - 44 U/L    eGFR >60.0 >60 mL/min/1.73 m^2    Anion Gap 7 (L) 8 - 16 mmol/L        I have reviewed the pertinent labs which are notable for normal blood counts, renal and hepatic funciton.    Imaging:       I have personally reviewed the imaging which is notable for large hypermetabolic mid to distal esophageal tumor; possible mediastinal/AP window lymph node with mild activity.    Path:   2. Gastroesophageal junction mass (biopsy):   Invasive adenocarcinoma, moderately differentiated   Background low and high-grade glandular dysplasia   HER2 immunohistochemistry:  Equivocal.     Immunohistochemistry (IHC) Testing for Mismatch Repair (MMR) Proteins:   MLH1, MSH2, MSH6, PMS2 - Intact nuclear expression   Background nonneoplastic tissue/internal control with intact nuclear expression     There are exceptions to the above IHC interpretations. These results should not be considered in isolation, and clinical correlation with genetic counseling is recommended to assess the need for germline testing.     Interpretation: No loss of nuclear expression of MMR proteins: low probability of microsatellite instability       Assessment:       1. Malignant neoplasm of gastroesophageal junction    2. Pulmonary emphysema, unspecified emphysema type    3. Atherosclerosis of aorta    4. Nicotine dependence, cigarettes, uncomplicated              Plan:             # Esophageal adenocarcinoma  He appears to have a locally advanced adenocarcinoma of the middle and lower third of the esophagus, pMMR. PET/CT does not show clear distant metastatic disease.  We discussed the case and plan with Dr. Meyer and  he feels the patient is surgically resectable as well.    Began cycle 1 on 7/18/23. He unfortunately had a reaction to the Taxol. During the Taxol infusion, he developed coughing, flushing, chest tightness and nausea. O2 sat was 92%, 2L O2 applied NC. We were notified and stopped the Taxol. He was able to proceed with the Carboplatin without complication.   We switched him to Abraxane 40 mg/m2 with week 2.  This was tolerated very well without issue. Has tolerated RT well.     Will proceed today with third cycle of weekly carbo + Abraxane.  Carboplatin AUC 2 and Abraxane 40m/mg2.  Plan for 5 total weeks of chemotherapy.  Lab work adequate to proceed.   RTC in 1 week for fourth dose.    Will get repeat PET 4-6 weeks after chemo/radiation to confirm patient still a surgical candidate     # COPD, tobacco abuse, aortic atherosclerosis  Counseled on tobacco cessation.  Normal SpO2.  No dyspnea.  Atherosclerosis noted on imaging.    The above information has been reviewed with the patient and all questions have been answered to their apparent satisfaction.  They understand that they can call the clinic with any questions.      Route Chart for Scheduling    Med Onc Chart Routing      Follow up with physician 1 week. for carbo + abraxane   Follow up with DEMARCO 2 weeks. for carbo + abraxane   Infusion scheduling note    Injection scheduling note    Labs CBC and CMP   Scheduling:  Preferred lab:  Lab interval:     Imaging    Pharmacy appointment    Other referrals              Treatment Plan Information   OP ESOPHAGEAL PACLITAXEL CARBOPLATIN WEEKLY   Delta June MD   Upcoming Treatment Dates - OP ESOPHAGEAL PACLITAXEL CARBOPLATIN WEEKLY    8/1/2023       Pre-Medications       palonosetron (ALOXI) 0.25 mg with Dexamethasone (DECADRON) 12 mg in NS 50 mL IVPB       famotidine (PF) injection 20 mg       diphenhydrAMINE (BENADRYL) 50 mg in sodium chloride 0.9% 50 mL IVPB       Chemotherapy       PACLitaxeL protein-bound  (ABRAXANE) in 14 mL infusion       CARBOplatin (PARAPLATIN) 230 mg in sodium chloride 0.9% 273 mL chemo infusion       Supportive Care       prochlorperazine injection Soln 10 mg  8/8/2023       Pre-Medications       palonosetron (ALOXI) 0.25 mg with Dexamethasone (DECADRON) 12 mg in NS 50 mL IVPB       famotidine (PF) injection 20 mg       diphenhydrAMINE (BENADRYL) 50 mg in sodium chloride 0.9% 50 mL IVPB       Chemotherapy       PACLitaxeL protein-bound (ABRAXANE) in 14 mL infusion       CARBOplatin (PARAPLATIN) 230 mg in sodium chloride 0.9% 273 mL chemo infusion       Supportive Care       prochlorperazine injection Soln 10 mg  8/15/2023       Pre-Medications       palonosetron (ALOXI) 0.25 mg with Dexamethasone (DECADRON) 12 mg in NS 50 mL IVPB       famotidine (PF) injection 20 mg       diphenhydrAMINE (BENADRYL) 50 mg in sodium chloride 0.9% 50 mL IVPB       Chemotherapy       PACLitaxeL protein-bound (ABRAXANE) in 14 mL infusion       CARBOplatin (PARAPLATIN) 230 mg in sodium chloride 0.9% 273 mL chemo infusion       Supportive Care       prochlorperazine injection Soln 10 mg

## 2023-07-31 NOTE — PLAN OF CARE
Patient tolerated Abraxane/Carbo today. NAD. PAC accessed with positive blood return. Pt declined AVS, uses [facility]. D/C 8/8. Discharged home, ambulated independently.

## 2023-08-01 ENCOUNTER — HOSPITAL ENCOUNTER (OUTPATIENT)
Dept: RADIATION THERAPY | Facility: HOSPITAL | Age: 63
Discharge: HOME | End: 2023-08-01
Attending: STUDENT IN AN ORGANIZED HEALTH CARE EDUCATION/TRAINING PROGRAM
Payer: COMMERCIAL

## 2023-08-01 PROCEDURE — 77014 PR  CT GUIDANCE PLACEMENT RAD THERAPY FIELDS: ICD-10-PCS | Mod: 26,,, | Performed by: STUDENT IN AN ORGANIZED HEALTH CARE EDUCATION/TRAINING PROGRAM

## 2023-08-01 PROCEDURE — 77427 RADIATION TX MANAGEMENT X5: CPT | Mod: ,,, | Performed by: STUDENT IN AN ORGANIZED HEALTH CARE EDUCATION/TRAINING PROGRAM

## 2023-08-01 PROCEDURE — 77427 PR CHG RADIATION,MANGEMENT,5 TX'S: ICD-10-PCS | Mod: ,,, | Performed by: STUDENT IN AN ORGANIZED HEALTH CARE EDUCATION/TRAINING PROGRAM

## 2023-08-01 PROCEDURE — 77014 PR  CT GUIDANCE PLACEMENT RAD THERAPY FIELDS: CPT | Mod: 26,,, | Performed by: STUDENT IN AN ORGANIZED HEALTH CARE EDUCATION/TRAINING PROGRAM

## 2023-08-01 PROCEDURE — 77014 HC CT GUIDANCE RADIATION THERAPY FLDS PLACEMENT: CPT | Mod: TC | Performed by: STUDENT IN AN ORGANIZED HEALTH CARE EDUCATION/TRAINING PROGRAM

## 2023-08-01 PROCEDURE — 77386 HC IMRT, COMPLEX: CPT | Performed by: STUDENT IN AN ORGANIZED HEALTH CARE EDUCATION/TRAINING PROGRAM

## 2023-08-02 ENCOUNTER — PATIENT MESSAGE (OUTPATIENT)
Dept: HEMATOLOGY/ONCOLOGY | Facility: CLINIC | Age: 63
End: 2023-08-02
Payer: COMMERCIAL

## 2023-08-02 PROCEDURE — 77014 PR  CT GUIDANCE PLACEMENT RAD THERAPY FIELDS: CPT | Mod: 26,,, | Performed by: STUDENT IN AN ORGANIZED HEALTH CARE EDUCATION/TRAINING PROGRAM

## 2023-08-02 PROCEDURE — 77014 HC CT GUIDANCE RADIATION THERAPY FLDS PLACEMENT: CPT | Mod: TC | Performed by: STUDENT IN AN ORGANIZED HEALTH CARE EDUCATION/TRAINING PROGRAM

## 2023-08-02 PROCEDURE — 77014 PR  CT GUIDANCE PLACEMENT RAD THERAPY FIELDS: ICD-10-PCS | Mod: 26,,, | Performed by: STUDENT IN AN ORGANIZED HEALTH CARE EDUCATION/TRAINING PROGRAM

## 2023-08-02 PROCEDURE — 77386 HC IMRT, COMPLEX: CPT | Performed by: STUDENT IN AN ORGANIZED HEALTH CARE EDUCATION/TRAINING PROGRAM

## 2023-08-03 PROCEDURE — 77386 HC IMRT, COMPLEX: CPT | Performed by: STUDENT IN AN ORGANIZED HEALTH CARE EDUCATION/TRAINING PROGRAM

## 2023-08-03 PROCEDURE — 77014 PR  CT GUIDANCE PLACEMENT RAD THERAPY FIELDS: CPT | Mod: 26,,, | Performed by: STUDENT IN AN ORGANIZED HEALTH CARE EDUCATION/TRAINING PROGRAM

## 2023-08-03 PROCEDURE — 77014 HC CT GUIDANCE RADIATION THERAPY FLDS PLACEMENT: CPT | Mod: TC | Performed by: STUDENT IN AN ORGANIZED HEALTH CARE EDUCATION/TRAINING PROGRAM

## 2023-08-03 PROCEDURE — 77014 PR  CT GUIDANCE PLACEMENT RAD THERAPY FIELDS: ICD-10-PCS | Mod: 26,,, | Performed by: STUDENT IN AN ORGANIZED HEALTH CARE EDUCATION/TRAINING PROGRAM

## 2023-08-04 ENCOUNTER — DOCUMENTATION ONLY (OUTPATIENT)
Dept: RADIATION ONCOLOGY | Facility: CLINIC | Age: 63
End: 2023-08-04
Payer: COMMERCIAL

## 2023-08-04 PROCEDURE — 77014 PR  CT GUIDANCE PLACEMENT RAD THERAPY FIELDS: ICD-10-PCS | Mod: 26,,, | Performed by: STUDENT IN AN ORGANIZED HEALTH CARE EDUCATION/TRAINING PROGRAM

## 2023-08-04 PROCEDURE — 77386 HC IMRT, COMPLEX: CPT | Performed by: STUDENT IN AN ORGANIZED HEALTH CARE EDUCATION/TRAINING PROGRAM

## 2023-08-04 PROCEDURE — 77014 PR  CT GUIDANCE PLACEMENT RAD THERAPY FIELDS: CPT | Mod: 26,,, | Performed by: STUDENT IN AN ORGANIZED HEALTH CARE EDUCATION/TRAINING PROGRAM

## 2023-08-07 PROCEDURE — 77014 PR  CT GUIDANCE PLACEMENT RAD THERAPY FIELDS: ICD-10-PCS | Mod: 26,,, | Performed by: STUDENT IN AN ORGANIZED HEALTH CARE EDUCATION/TRAINING PROGRAM

## 2023-08-07 PROCEDURE — 77336 RADIATION PHYSICS CONSULT: CPT | Performed by: STUDENT IN AN ORGANIZED HEALTH CARE EDUCATION/TRAINING PROGRAM

## 2023-08-07 PROCEDURE — 77386 HC IMRT, COMPLEX: CPT | Performed by: STUDENT IN AN ORGANIZED HEALTH CARE EDUCATION/TRAINING PROGRAM

## 2023-08-07 PROCEDURE — 77014 PR  CT GUIDANCE PLACEMENT RAD THERAPY FIELDS: CPT | Mod: 26,,, | Performed by: STUDENT IN AN ORGANIZED HEALTH CARE EDUCATION/TRAINING PROGRAM

## 2023-08-08 ENCOUNTER — INFUSION (OUTPATIENT)
Dept: INFUSION THERAPY | Facility: HOSPITAL | Age: 63
End: 2023-08-08
Payer: COMMERCIAL

## 2023-08-08 ENCOUNTER — OFFICE VISIT (OUTPATIENT)
Dept: HEMATOLOGY/ONCOLOGY | Facility: CLINIC | Age: 63
End: 2023-08-08
Payer: COMMERCIAL

## 2023-08-08 ENCOUNTER — LAB VISIT (OUTPATIENT)
Dept: LAB | Facility: HOSPITAL | Age: 63
End: 2023-08-08
Attending: INTERNAL MEDICINE
Payer: COMMERCIAL

## 2023-08-08 VITALS
SYSTOLIC BLOOD PRESSURE: 99 MMHG | WEIGHT: 128.63 LBS | HEIGHT: 69 IN | BODY MASS INDEX: 19.05 KG/M2 | DIASTOLIC BLOOD PRESSURE: 62 MMHG | HEART RATE: 78 BPM | OXYGEN SATURATION: 98 % | TEMPERATURE: 98 F | RESPIRATION RATE: 18 BRPM

## 2023-08-08 VITALS — DIASTOLIC BLOOD PRESSURE: 88 MMHG | HEART RATE: 71 BPM | RESPIRATION RATE: 16 BRPM | SYSTOLIC BLOOD PRESSURE: 127 MMHG

## 2023-08-08 DIAGNOSIS — K20.90 ESOPHAGITIS: ICD-10-CM

## 2023-08-08 DIAGNOSIS — I70.0 ATHEROSCLEROSIS OF AORTA: ICD-10-CM

## 2023-08-08 DIAGNOSIS — D70.1 CHEMOTHERAPY INDUCED NEUTROPENIA: ICD-10-CM

## 2023-08-08 DIAGNOSIS — D64.81 ANTINEOPLASTIC CHEMOTHERAPY INDUCED ANEMIA: ICD-10-CM

## 2023-08-08 DIAGNOSIS — C16.0 MALIGNANT NEOPLASM OF GASTROESOPHAGEAL JUNCTION: Primary | ICD-10-CM

## 2023-08-08 DIAGNOSIS — T45.1X5A IMMUNODEFICIENCY DUE TO CHEMOTHERAPY: ICD-10-CM

## 2023-08-08 DIAGNOSIS — C16.0 MALIGNANT NEOPLASM OF GASTROESOPHAGEAL JUNCTION: ICD-10-CM

## 2023-08-08 DIAGNOSIS — D84.821 IMMUNODEFICIENCY DUE TO CHEMOTHERAPY: ICD-10-CM

## 2023-08-08 DIAGNOSIS — D69.59 CHEMOTHERAPY-INDUCED THROMBOCYTOPENIA: ICD-10-CM

## 2023-08-08 DIAGNOSIS — Z79.899 IMMUNODEFICIENCY DUE TO CHEMOTHERAPY: ICD-10-CM

## 2023-08-08 DIAGNOSIS — T45.1X5A CHEMOTHERAPY INDUCED NEUTROPENIA: ICD-10-CM

## 2023-08-08 DIAGNOSIS — J43.9 PULMONARY EMPHYSEMA, UNSPECIFIED EMPHYSEMA TYPE: ICD-10-CM

## 2023-08-08 DIAGNOSIS — T45.1X5A ANTINEOPLASTIC CHEMOTHERAPY INDUCED ANEMIA: ICD-10-CM

## 2023-08-08 DIAGNOSIS — F17.210 NICOTINE DEPENDENCE, CIGARETTES, UNCOMPLICATED: ICD-10-CM

## 2023-08-08 DIAGNOSIS — T45.1X5A CHEMOTHERAPY-INDUCED THROMBOCYTOPENIA: ICD-10-CM

## 2023-08-08 LAB
ALBUMIN SERPL BCP-MCNC: 3.3 G/DL (ref 3.5–5.2)
ALP SERPL-CCNC: 67 U/L (ref 55–135)
ALT SERPL W/O P-5'-P-CCNC: 11 U/L (ref 10–44)
ANION GAP SERPL CALC-SCNC: 10 MMOL/L (ref 8–16)
AST SERPL-CCNC: 17 U/L (ref 10–40)
BILIRUB SERPL-MCNC: 0.4 MG/DL (ref 0.1–1)
BUN SERPL-MCNC: 14 MG/DL (ref 8–23)
CALCIUM SERPL-MCNC: 9.3 MG/DL (ref 8.7–10.5)
CHLORIDE SERPL-SCNC: 106 MMOL/L (ref 95–110)
CO2 SERPL-SCNC: 24 MMOL/L (ref 23–29)
CREAT SERPL-MCNC: 0.8 MG/DL (ref 0.5–1.4)
ERYTHROCYTE [DISTWIDTH] IN BLOOD BY AUTOMATED COUNT: 13.3 % (ref 11.5–14.5)
EST. GFR  (NO RACE VARIABLE): >60 ML/MIN/1.73 M^2
GLUCOSE SERPL-MCNC: 101 MG/DL (ref 70–110)
HCT VFR BLD AUTO: 39 % (ref 40–54)
HGB BLD-MCNC: 13.5 G/DL (ref 14–18)
IMM GRANULOCYTES # BLD AUTO: 0.01 K/UL (ref 0–0.04)
MCH RBC QN AUTO: 32.8 PG (ref 27–31)
MCHC RBC AUTO-ENTMCNC: 34.6 G/DL (ref 32–36)
MCV RBC AUTO: 95 FL (ref 82–98)
NEUTROPHILS # BLD AUTO: 1.4 K/UL (ref 1.8–7.7)
PLATELET # BLD AUTO: 75 K/UL (ref 150–450)
PMV BLD AUTO: 9.8 FL (ref 9.2–12.9)
POTASSIUM SERPL-SCNC: 4.2 MMOL/L (ref 3.5–5.1)
PROT SERPL-MCNC: 6.5 G/DL (ref 6–8.4)
RBC # BLD AUTO: 4.12 M/UL (ref 4.6–6.2)
SODIUM SERPL-SCNC: 140 MMOL/L (ref 136–145)
WBC # BLD AUTO: 2.14 K/UL (ref 3.9–12.7)

## 2023-08-08 PROCEDURE — 3044F PR MOST RECENT HEMOGLOBIN A1C LEVEL <7.0%: ICD-10-PCS | Mod: CPTII,S$GLB,, | Performed by: INTERNAL MEDICINE

## 2023-08-08 PROCEDURE — 3078F DIAST BP <80 MM HG: CPT | Mod: CPTII,S$GLB,, | Performed by: INTERNAL MEDICINE

## 2023-08-08 PROCEDURE — 77427 RADIATION TX MANAGEMENT X5: CPT | Mod: ,,, | Performed by: STUDENT IN AN ORGANIZED HEALTH CARE EDUCATION/TRAINING PROGRAM

## 2023-08-08 PROCEDURE — 63600175 PHARM REV CODE 636 W HCPCS: Performed by: INTERNAL MEDICINE

## 2023-08-08 PROCEDURE — 96413 CHEMO IV INFUSION 1 HR: CPT

## 2023-08-08 PROCEDURE — 25000003 PHARM REV CODE 250: Performed by: INTERNAL MEDICINE

## 2023-08-08 PROCEDURE — 77386 HC IMRT, COMPLEX: CPT | Performed by: STUDENT IN AN ORGANIZED HEALTH CARE EDUCATION/TRAINING PROGRAM

## 2023-08-08 PROCEDURE — 3078F PR MOST RECENT DIASTOLIC BLOOD PRESSURE < 80 MM HG: ICD-10-PCS | Mod: CPTII,S$GLB,, | Performed by: INTERNAL MEDICINE

## 2023-08-08 PROCEDURE — 3044F HG A1C LEVEL LT 7.0%: CPT | Mod: CPTII,S$GLB,, | Performed by: INTERNAL MEDICINE

## 2023-08-08 PROCEDURE — 96367 TX/PROPH/DG ADDL SEQ IV INF: CPT

## 2023-08-08 PROCEDURE — 99999 PR PBB SHADOW E&M-EST. PATIENT-LVL III: CPT | Mod: PBBFAC,,, | Performed by: INTERNAL MEDICINE

## 2023-08-08 PROCEDURE — 36415 COLL VENOUS BLD VENIPUNCTURE: CPT | Performed by: INTERNAL MEDICINE

## 2023-08-08 PROCEDURE — 3008F PR BODY MASS INDEX (BMI) DOCUMENTED: ICD-10-PCS | Mod: CPTII,S$GLB,, | Performed by: INTERNAL MEDICINE

## 2023-08-08 PROCEDURE — 80053 COMPREHEN METABOLIC PANEL: CPT | Performed by: INTERNAL MEDICINE

## 2023-08-08 PROCEDURE — 96375 TX/PRO/DX INJ NEW DRUG ADDON: CPT

## 2023-08-08 PROCEDURE — 77014 PR  CT GUIDANCE PLACEMENT RAD THERAPY FIELDS: CPT | Mod: 26,,, | Performed by: STUDENT IN AN ORGANIZED HEALTH CARE EDUCATION/TRAINING PROGRAM

## 2023-08-08 PROCEDURE — 99215 OFFICE O/P EST HI 40 MIN: CPT | Mod: S$GLB,,, | Performed by: INTERNAL MEDICINE

## 2023-08-08 PROCEDURE — 3074F PR MOST RECENT SYSTOLIC BLOOD PRESSURE < 130 MM HG: ICD-10-PCS | Mod: CPTII,S$GLB,, | Performed by: INTERNAL MEDICINE

## 2023-08-08 PROCEDURE — 85027 COMPLETE CBC AUTOMATED: CPT | Performed by: INTERNAL MEDICINE

## 2023-08-08 PROCEDURE — 99999 PR PBB SHADOW E&M-EST. PATIENT-LVL III: ICD-10-PCS | Mod: PBBFAC,,, | Performed by: INTERNAL MEDICINE

## 2023-08-08 PROCEDURE — 77014 PR  CT GUIDANCE PLACEMENT RAD THERAPY FIELDS: ICD-10-PCS | Mod: 26,,, | Performed by: STUDENT IN AN ORGANIZED HEALTH CARE EDUCATION/TRAINING PROGRAM

## 2023-08-08 PROCEDURE — 3008F BODY MASS INDEX DOCD: CPT | Mod: CPTII,S$GLB,, | Performed by: INTERNAL MEDICINE

## 2023-08-08 PROCEDURE — 96417 CHEMO IV INFUS EACH ADDL SEQ: CPT

## 2023-08-08 PROCEDURE — 3074F SYST BP LT 130 MM HG: CPT | Mod: CPTII,S$GLB,, | Performed by: INTERNAL MEDICINE

## 2023-08-08 PROCEDURE — 77427 PR CHG RADIATION,MANGEMENT,5 TX'S: ICD-10-PCS | Mod: ,,, | Performed by: STUDENT IN AN ORGANIZED HEALTH CARE EDUCATION/TRAINING PROGRAM

## 2023-08-08 PROCEDURE — 99215 PR OFFICE/OUTPT VISIT, EST, LEVL V, 40-54 MIN: ICD-10-PCS | Mod: S$GLB,,, | Performed by: INTERNAL MEDICINE

## 2023-08-08 PROCEDURE — A4216 STERILE WATER/SALINE, 10 ML: HCPCS | Performed by: INTERNAL MEDICINE

## 2023-08-08 RX ORDER — PANTOPRAZOLE SODIUM 40 MG/1
40 TABLET, DELAYED RELEASE ORAL DAILY
Qty: 90 TABLET | Refills: 3 | Status: SHIPPED | OUTPATIENT
Start: 2023-08-08 | End: 2024-08-07

## 2023-08-08 RX ORDER — SODIUM CHLORIDE 0.9 % (FLUSH) 0.9 %
10 SYRINGE (ML) INJECTION
Status: DISCONTINUED | OUTPATIENT
Start: 2023-08-08 | End: 2023-08-08 | Stop reason: HOSPADM

## 2023-08-08 RX ORDER — DIPHENHYDRAMINE HYDROCHLORIDE 50 MG/ML
50 INJECTION INTRAMUSCULAR; INTRAVENOUS ONCE AS NEEDED
Status: CANCELLED | OUTPATIENT
Start: 2023-08-08

## 2023-08-08 RX ORDER — HEPARIN 100 UNIT/ML
500 SYRINGE INTRAVENOUS
Status: DISCONTINUED | OUTPATIENT
Start: 2023-08-08 | End: 2023-08-08 | Stop reason: HOSPADM

## 2023-08-08 RX ORDER — DIPHENHYDRAMINE HYDROCHLORIDE 50 MG/ML
50 INJECTION INTRAMUSCULAR; INTRAVENOUS ONCE AS NEEDED
Status: DISCONTINUED | OUTPATIENT
Start: 2023-08-08 | End: 2023-08-08 | Stop reason: HOSPADM

## 2023-08-08 RX ORDER — FAMOTIDINE 10 MG/ML
20 INJECTION INTRAVENOUS
Status: CANCELLED | OUTPATIENT
Start: 2023-08-08 | End: 2023-08-08

## 2023-08-08 RX ORDER — HEPARIN 100 UNIT/ML
500 SYRINGE INTRAVENOUS
Status: CANCELLED | OUTPATIENT
Start: 2023-08-08

## 2023-08-08 RX ORDER — EPINEPHRINE 0.3 MG/.3ML
0.3 INJECTION SUBCUTANEOUS ONCE AS NEEDED
Status: CANCELLED | OUTPATIENT
Start: 2023-08-08

## 2023-08-08 RX ORDER — PROCHLORPERAZINE EDISYLATE 5 MG/ML
10 INJECTION INTRAMUSCULAR; INTRAVENOUS ONCE AS NEEDED
Status: CANCELLED
Start: 2023-08-08

## 2023-08-08 RX ORDER — SODIUM CHLORIDE 0.9 % (FLUSH) 0.9 %
10 SYRINGE (ML) INJECTION
Status: CANCELLED | OUTPATIENT
Start: 2023-08-08

## 2023-08-08 RX ORDER — SUCRALFATE 1 G/10ML
1 SUSPENSION ORAL 4 TIMES DAILY
Qty: 414 ML | Refills: 1 | Status: SHIPPED | OUTPATIENT
Start: 2023-08-08 | End: 2023-08-22

## 2023-08-08 RX ORDER — EPINEPHRINE 0.3 MG/.3ML
0.3 INJECTION SUBCUTANEOUS ONCE AS NEEDED
Status: DISCONTINUED | OUTPATIENT
Start: 2023-08-08 | End: 2023-08-08 | Stop reason: HOSPADM

## 2023-08-08 RX ORDER — FAMOTIDINE 10 MG/ML
20 INJECTION INTRAVENOUS
Status: COMPLETED | OUTPATIENT
Start: 2023-08-08 | End: 2023-08-08

## 2023-08-08 RX ORDER — PROCHLORPERAZINE EDISYLATE 5 MG/ML
10 INJECTION INTRAMUSCULAR; INTRAVENOUS ONCE AS NEEDED
Status: DISCONTINUED | OUTPATIENT
Start: 2023-08-08 | End: 2023-08-08 | Stop reason: HOSPADM

## 2023-08-08 RX ADMIN — Medication 10 ML: at 12:08

## 2023-08-08 RX ADMIN — HEPARIN 500 UNITS: 100 SYRINGE at 12:08

## 2023-08-08 RX ADMIN — DEXAMETHASONE SODIUM PHOSPHATE 0.25 MG: 4 INJECTION, SOLUTION INTRA-ARTICULAR; INTRALESIONAL; INTRAMUSCULAR; INTRAVENOUS; SOFT TISSUE at 09:08

## 2023-08-08 RX ADMIN — SODIUM CHLORIDE: 9 INJECTION, SOLUTION INTRAVENOUS at 09:08

## 2023-08-08 RX ADMIN — FAMOTIDINE 20 MG: 10 INJECTION INTRAVENOUS at 10:08

## 2023-08-08 RX ADMIN — CARBOPLATIN 210 MG: 10 INJECTION, SOLUTION INTRAVENOUS at 11:08

## 2023-08-08 RX ADMIN — PACLITAXEL 70 MG: 100 INJECTION, POWDER, LYOPHILIZED, FOR SUSPENSION INTRAVENOUS at 10:08

## 2023-08-08 RX ADMIN — DIPHENHYDRAMINE HYDROCHLORIDE 50 MG: 50 INJECTION, SOLUTION INTRAMUSCULAR; INTRAVENOUS at 09:08

## 2023-08-08 NOTE — PROGRESS NOTES
MEDICAL ONCOLOGY - ESTABLISHED PATIENT VISIT    Reason for visit: Esophageal adenocarcinoma    Best Contact Phone Number(s): [phone] (home)      Cancer/Stage/TNM:    Cancer Staging   Malignant neoplasm of gastroesophageal junction  Staging form: Esophagus - Adenocarcinoma, AJCC 8th Edition  - Clinical stage from 6/13/2023: Stage III (cT3, cN1, cM0, G2) - Signed by Sunday Jasso MD on 7/5/2023       Oncology History   Malignant neoplasm of gastroesophageal junction   6/13/2023 Cancer Staged    Staging form: Esophagus - Adenocarcinoma, AJCC 8th Edition  - Clinical stage from 6/13/2023: Stage III (cT3, cN1, cM0, G2)     6/23/2023 Initial Diagnosis    Malignant neoplasm of gastroesophageal junction     7/3/2023 Tumor Conference     [facility] UGI MULTIDISCIPLINARY TUMOR BOARD  PATIENT REVIEW FORM   ____________________________________________________________    CLINIC #: [id number]   DATE: 7/3/2023    DIAGNOSIS: esophageal CA    PRESENTER: Mitch    PATIENT SUMMARY:   This 62 y/o gentleman is a long time smoker with emphysema who had low dose screening lung CT in 4/2023 per his PCP. Given an abnormality on this scan, he had PET on 6/1/23 which identified large hypermetabolic mid esophageal mass with lymph nodes. He underwent EGD on 6/13/23 that found large fungating ulcerated mass with bleeding in middle third of esophagus to lower esophagus, 34-44cm. Pathology revealed moderately differentiated adenocarcinoma.   Staging PET reviewed - level 2 cervical lymph node with FDG uptake, nothing in lungs concerning  Staging EUS today per Dr. Duffy.   He has been evaluated by Dr. Vance in [place] and Dr. June.   He is scheduled to see rad onc, Dr. Jasso, Wednesday and IR for port placement on 7/12/23.     BOARD RECOMMENDATIONS:   Proceed with neoadj CRT, then restaging to consider surgical resection    CONSULT NEEDED:     [] Surgery    [] Hem/Onc    [] Rad/Onc    [] Dietary                 [] Social  "Service    [] Psychology       [] AES  [] Radiology     Clinical Stage: Tumor 3 Node(s) 1 Metastasis 0      GROUP STAGE:  [] O    [] 1   [] II     [x] III   []IV  [] Local recurrence     [] Regional recurrence     [] Distant recurrence   Metastatic site(s): none         [x] [facility] Treatment Guidelines reviewed and care planned is consistent with guidelines.         (i.e., NCCN, NCI, PD, ACO, AUA, etc.)    PRESENTATION AT CANCER CONFERENCE:         [x] Prospective    [] Retrospective     [] Follow-Up         7/16/2023 -  Chemotherapy    Treatment Summary   Plan Name: OP ESOPHAGEAL PACLITAXEL CARBOPLATIN WEEKLY  Treatment Goal: Control  Status: Active  Start Date: 7/16/2023  End Date: 8/15/2023 (Planned)  Provider: Delta June MD  Chemotherapy: CARBOplatin (PARAPLATIN) 195 mg in sodium chloride 0.9% 304.5 mL chemo infusion, 195 mg (100 % of original dose 193 mg), Intravenous, Clinic/HOD 1 time, 1 of 1 cycle  Dose modification:   (original dose 193 mg, Cycle 1)  Administration: 195 mg (7/18/2023), 210 mg (7/24/2023), 230 mg (7/31/2023)  PACLitaxeL (TAXOL) 50 mg/m2 = 84 mg in sodium chloride 0.9% 250 mL chemo infusion, 50 mg/m2 = 84 mg, Intravenous, Clinic/HOD 1 time, 1 of 1 cycle  Administration: 84 mg (7/18/2023)          Interim History:   63 y.o. male recently diagnosed esophageal adenocarcinoma who presents prior to cycle 4 of weekly neoadjuvant chemoradiation. He had an infusion reaction with cycle 1 of carbo/taxol and then switched to Abraxane with cycle 2 which he tolerated without issue.  He feels well.  No nausea, no dysphagia, no paresthesias.  He does note some increase in "reflux" or burning in his esophagus.  He tried Gas X which did not help much.  Very mild fatigue.    ECOG status is 0. Presents with his wife today.     ROS:   Review of Systems   Constitutional:  Negative for chills, fever, malaise/fatigue and weight loss.   HENT:  Negative for sore throat.    Eyes:  Negative for blurred vision " and pain.   Respiratory:  Negative for cough and shortness of breath.    Cardiovascular:  Negative for chest pain and leg swelling.   Gastrointestinal:  Negative for abdominal pain, constipation, diarrhea, nausea and vomiting.   Genitourinary:  Negative for dysuria and frequency.   Musculoskeletal:  Negative for back pain, falls and myalgias.   Skin:  Negative for rash.   Neurological:  Negative for dizziness, weakness and headaches.   Endo/Heme/Allergies:  Does not bruise/bleed easily.   Psychiatric/Behavioral:  Negative for depression. The patient is not nervous/anxious.    All other systems reviewed and are negative.      Past Medical History:   Past Medical History:   Diagnosis Date    Arthritis     COPD (chronic obstructive pulmonary disease)         Past Surgical History:   Past Surgical History:   Procedure Laterality Date    CERVICAL FUSION      COLONOSCOPY N/A 3/27/2018    Procedure: COLONOSCOPY;  Surgeon: Maria Teresa Morocho MD;  Location: [facility];  Service: Endoscopy;  Laterality: N/A;    COLONOSCOPY N/A 6/13/2023    Procedure: COLONOSCOPY;  Surgeon: Kelli Snell MD;  Location: [facility];  Service: Endoscopy;  Laterality: N/A;    ENDOSCOPIC ULTRASOUND OF UPPER GASTROINTESTINAL TRACT N/A 7/3/2023    Procedure: ULTRASOUND, UPPER GI TRACT, ENDOSCOPIC;  Surgeon: Ray Duffy MD;  Location: [facility];  Service: Endoscopy;  Laterality: N/A;    ESOPHAGOGASTRODUODENOSCOPY N/A 6/13/2023    Procedure: EGD (ESOPHAGOGASTRODUODENOSCOPY);  Surgeon: Kelli Snell MD;  Location: [facility];  Service: Endoscopy;  Laterality: N/A;    ESOPHAGOGASTRODUODENOSCOPY N/A 7/3/2023    Procedure: EGD (ESOPHAGOGASTRODUODENOSCOPY);  Surgeon: Ray Duffy MD;  Location: [facility];  Service: Endoscopy;  Laterality: N/A;    INGUINAL HERNIA REPAIR Bilateral     Right x2, x1 left    ROTATOR CUFF REPAIR Right     x2        Family History:   Family History   Problem Relation Age of Onset    Diabetes Mother     Heart disease  "Father         CHF    Glaucoma Neg Hx     Colon cancer Neg Hx     Prostate cancer Neg Hx         Social History:   Social History     Tobacco Use    Smoking status: Former     Current packs/day: 0.25     Average packs/day: 0.3 packs/day for 40.6 years (10.2 ttl pk-yrs)     Types: Cigarettes     Start date: 1983     Passive exposure: Current    Smokeless tobacco: Never    Tobacco comments:     4 cigarettes a day   Substance Use Topics    Alcohol use: Yes     Comment: a couple of beers a day        I have reviewed and updated the patient's past medical, surgical, family and social histories.    Allergies:   Review of patient's allergies indicates:  No Known Allergies     Medications:   Current Outpatient Medications   Medication Sig Dispense Refill    LIDOcaine-prilocaine (EMLA) cream Apply topically as needed (Port access). (Patient not taking: Reported on 7/31/2023) 30 g 1    ondansetron (ZOFRAN) 8 MG tablet Take 1 tablet (8 mg total) by mouth every 8 (eight) hours as needed for Nausea. (Patient not taking: Reported on 7/5/2023) 60 tablet 2    pantoprazole (PROTONIX) 40 MG tablet Take 1 tablet (40 mg total) by mouth once daily. 90 tablet 3    sildenafil (VIAGRA) 100 MG tablet Take 1 tablet (100 mg total) by mouth daily as needed for Erectile Dysfunction. 10 tablet 6    sucralfate (CARAFATE) 100 mg/mL suspension Take 10 mLs (1 g total) by mouth 4 (four) times daily. 414 mL 1     No current facility-administered medications for this visit.        Physical Exam:   BP 99/62 (BP Location: Left arm, Patient Position: Sitting, BP Method: Medium (Automatic))   Pulse 78   Temp 98.1 °F (36.7 °C) (Oral)   Resp 18   Ht 5' 9" (1.753 m)   Wt 58.3 kg (128 lb 10.2 oz)   SpO2 98%   BMI 19.00 kg/m²      ECOG Performance status: 0            Physical Exam  Vitals reviewed.   Constitutional:       General: He is not in acute distress.     Appearance: Normal appearance. He is normal weight.   HENT:      Head: Normocephalic and " atraumatic.      Right Ear: External ear normal.      Left Ear: External ear normal.      Nose: Nose normal.      Mouth/Throat:      Mouth: Mucous membranes are moist.      Pharynx: Oropharynx is clear. No posterior oropharyngeal erythema.   Eyes:      General: No scleral icterus.     Extraocular Movements: Extraocular movements intact.      Conjunctiva/sclera: Conjunctivae normal.      Pupils: Pupils are equal, round, and reactive to light.   Cardiovascular:      Rate and Rhythm: Normal rate and regular rhythm.      Pulses: Normal pulses.      Heart sounds: Normal heart sounds.   Pulmonary:      Effort: Pulmonary effort is normal.      Breath sounds: Normal breath sounds. No wheezing or rales.   Abdominal:      General: Bowel sounds are normal. There is no distension.      Palpations: Abdomen is soft.      Tenderness: There is no abdominal tenderness.   Musculoskeletal:         General: No swelling. Normal range of motion.      Cervical back: Normal range of motion and neck supple.   Skin:     General: Skin is warm.      Coloration: Skin is not jaundiced.      Findings: No erythema or rash.   Neurological:      General: No focal deficit present.      Mental Status: He is alert and oriented to person, place, and time. Mental status is at baseline.      Gait: Gait normal.   Psychiatric:         Mood and Affect: Mood normal.         Behavior: Behavior normal.         Thought Content: Thought content normal.         Judgment: Judgment normal.           Labs:   Recent Results (from the past 48 hour(s))   CBC Oncology    Collection Time: 08/08/23  7:29 AM   Result Value Ref Range    WBC 2.14 (L) 3.90 - 12.70 K/uL    RBC 4.12 (L) 4.60 - 6.20 M/uL    Hemoglobin 13.5 (L) 14.0 - 18.0 g/dL    Hematocrit 39.0 (L) 40.0 - 54.0 %    MCV 95 82 - 98 fL    MCH 32.8 (H) 27.0 - 31.0 pg    MCHC 34.6 32.0 - 36.0 g/dL    RDW 13.3 11.5 - 14.5 %    Platelets 75 (L) 150 - 450 K/uL    MPV 9.8 9.2 - 12.9 fL    Gran # (ANC) 1.4 (L) 1.8 - 7.7  K/uL    Immature Grans (Abs) 0.01 0.00 - 0.04 K/uL   Comprehensive Metabolic Panel    Collection Time: 08/08/23  7:29 AM   Result Value Ref Range    Sodium 140 136 - 145 mmol/L    Potassium 4.2 3.5 - 5.1 mmol/L    Chloride 106 95 - 110 mmol/L    CO2 24 23 - 29 mmol/L    Glucose 101 70 - 110 mg/dL    BUN 14 8 - 23 mg/dL    Creatinine 0.8 0.5 - 1.4 mg/dL    Calcium 9.3 8.7 - 10.5 mg/dL    Total Protein 6.5 6.0 - 8.4 g/dL    Albumin 3.3 (L) 3.5 - 5.2 g/dL    Total Bilirubin 0.4 0.1 - 1.0 mg/dL    Alkaline Phosphatase 67 55 - 135 U/L    AST 17 10 - 40 U/L    ALT 11 10 - 44 U/L    eGFR >60.0 >60 mL/min/1.73 m^2    Anion Gap 10 8 - 16 mmol/L        I have reviewed the pertinent labs which are notable for mild neutropenia, normal renal and hepatic funciton.    Imaging:       I have personally reviewed the imaging which is notable for large hypermetabolic mid to distal esophageal tumor; possible mediastinal/AP window lymph node with mild activity.    Path:   2. Gastroesophageal junction mass (biopsy):   Invasive adenocarcinoma, moderately differentiated   Background low and high-grade glandular dysplasia   HER2 immunohistochemistry:  Equivocal.     Immunohistochemistry (IHC) Testing for Mismatch Repair (MMR) Proteins:   MLH1, MSH2, MSH6, PMS2 - Intact nuclear expression   Background nonneoplastic tissue/internal control with intact nuclear expression     There are exceptions to the above IHC interpretations. These results should not be considered in isolation, and clinical correlation with genetic counseling is recommended to assess the need for germline testing.     Interpretation: No loss of nuclear expression of MMR proteins: low probability of microsatellite instability       Assessment:       1. Malignant neoplasm of gastroesophageal junction    2. Immunodeficiency due to chemotherapy    3. Pulmonary emphysema, unspecified emphysema type    4. Atherosclerosis of aorta    5. Nicotine dependence, cigarettes,  uncomplicated    6. Chemotherapy induced neutropenia    7. Antineoplastic chemotherapy induced anemia    8. Chemotherapy-induced thrombocytopenia    9. Esophagitis              Plan:             # Esophageal adenocarcinoma  He appears to have a locally advanced adenocarcinoma of the middle and lower third of the esophagus, pMMR. PET/CT does not show clear distant metastatic disease.  We discussed the case and plan with Dr. Meyer and he feels the patient is surgically resectable as well.    Began cycle 1 on 7/18/23. He unfortunately had a reaction to the Taxol. During the Taxol infusion, he developed coughing, flushing, chest tightness and nausea. O2 sat was 92%, 2L O2 applied NC. We were notified and stopped the Taxol. He was able to proceed with the Carboplatin without complication.   We switched him to Abraxane 40 mg/m2 with week 2.  This was tolerated very well without issue. Has tolerated RT well.     Will proceed today with fourth cycle of weekly carbo + Abraxane.  Carboplatin AUC 2 and Abraxane 40m/mg2.  Plan for 5 total weeks of chemotherapy.  Lab work adequate to proceed.  Neutropenia, thrombocytopenia, mild anemia are noted.  Will not change dose and proceed as planned.  RTC in 1 week for fifth and final dose if counts tolerate.    Will get repeat PET 4-6 weeks after chemo/radiation to confirm patient still a surgical candidate     # Esophagitis  Rx sent for pantoprazole and sucralfate.  Likely secondary to radiation.    # COPD, tobacco abuse, aortic atherosclerosis  Counseled on tobacco cessation.  Normal SpO2.  No dyspnea.  Atherosclerosis noted on imaging.    The above information has been reviewed with the patient and all questions have been answered to their apparent satisfaction.  They understand that they can call the clinic with any questions.      Route Chart for Scheduling    Med Onc Chart Routing      Follow up with physician 6 weeks. with PET/CT prior   Follow up with DEMARCO 1 week. as scheduled    Infusion scheduling note    Injection scheduling note    Labs CBC and CMP   Scheduling:  Preferred lab:  Lab interval:  labs next week and in 6 weeks prior to PET   Imaging PET scan   prior to f/u in 6 weeks with me   Pharmacy appointment    Other referrals              Treatment Plan Information   OP ESOPHAGEAL PACLITAXEL CARBOPLATIN WEEKLY   Delta June MD   Upcoming Treatment Dates - OP ESOPHAGEAL PACLITAXEL CARBOPLATIN WEEKLY    8/8/2023       Pre-Medications       palonosetron (ALOXI) 0.25 mg with Dexamethasone (DECADRON) 12 mg in NS 50 mL IVPB       famotidine (PF) injection 20 mg       diphenhydrAMINE (BENADRYL) 50 mg in sodium chloride 0.9% 50 mL IVPB       Chemotherapy       PACLitaxeL protein-bound (ABRAXANE) in 14 mL infusion       CARBOplatin (PARAPLATIN) 210 mg in sodium chloride 0.9% 271 mL chemo infusion       Supportive Care       prochlorperazine injection Soln 10 mg  8/15/2023       Pre-Medications       palonosetron (ALOXI) 0.25 mg with Dexamethasone (DECADRON) 12 mg in NS 50 mL IVPB       famotidine (PF) injection 20 mg       diphenhydrAMINE (BENADRYL) 50 mg in sodium chloride 0.9% 50 mL IVPB       Chemotherapy       PACLitaxeL protein-bound (ABRAXANE) in 14 mL infusion       CARBOplatin (PARAPLATIN) 210 mg in sodium chloride 0.9% 271 mL chemo infusion       Supportive Care       prochlorperazine injection Soln 10 mg

## 2023-08-08 NOTE — PLAN OF CARE
1216 Patient tolerated C1D22 Abraxane/ Carboplatin without incident. Seen by Dr June prior to treatment. Labs reviewed and within parameters for treatment. Vitals stable before, during and after treatment. Port with brisk blood return and flushed without resistance before, during and after infusion, heparin locked and needle removed at discharge. Pre-medicated per orders. Patient aware of his next appointment date/time, uses [facility]. To contact provider with questions or concerns. D/C ambulatory and stable with his wife.

## 2023-08-09 PROCEDURE — 77386 HC IMRT, COMPLEX: CPT | Performed by: STUDENT IN AN ORGANIZED HEALTH CARE EDUCATION/TRAINING PROGRAM

## 2023-08-09 PROCEDURE — 77014 PR  CT GUIDANCE PLACEMENT RAD THERAPY FIELDS: CPT | Mod: 26,,, | Performed by: STUDENT IN AN ORGANIZED HEALTH CARE EDUCATION/TRAINING PROGRAM

## 2023-08-09 PROCEDURE — 77014 PR  CT GUIDANCE PLACEMENT RAD THERAPY FIELDS: ICD-10-PCS | Mod: 26,,, | Performed by: STUDENT IN AN ORGANIZED HEALTH CARE EDUCATION/TRAINING PROGRAM

## 2023-08-10 ENCOUNTER — PATIENT MESSAGE (OUTPATIENT)
Dept: FAMILY MEDICINE | Facility: CLINIC | Age: 63
End: 2023-08-10
Payer: COMMERCIAL

## 2023-08-10 PROCEDURE — 77014 PR  CT GUIDANCE PLACEMENT RAD THERAPY FIELDS: CPT | Mod: 26,,, | Performed by: STUDENT IN AN ORGANIZED HEALTH CARE EDUCATION/TRAINING PROGRAM

## 2023-08-10 PROCEDURE — 77386 HC IMRT, COMPLEX: CPT | Performed by: STUDENT IN AN ORGANIZED HEALTH CARE EDUCATION/TRAINING PROGRAM

## 2023-08-10 PROCEDURE — 77014 PR  CT GUIDANCE PLACEMENT RAD THERAPY FIELDS: ICD-10-PCS | Mod: 26,,, | Performed by: STUDENT IN AN ORGANIZED HEALTH CARE EDUCATION/TRAINING PROGRAM

## 2023-08-11 ENCOUNTER — DOCUMENTATION ONLY (OUTPATIENT)
Dept: RADIATION ONCOLOGY | Facility: CLINIC | Age: 63
End: 2023-08-11
Payer: COMMERCIAL

## 2023-08-11 DIAGNOSIS — C16.0 MALIGNANT NEOPLASM OF GASTROESOPHAGEAL JUNCTION: Primary | ICD-10-CM

## 2023-08-11 PROCEDURE — 77386 HC IMRT, COMPLEX: CPT | Performed by: STUDENT IN AN ORGANIZED HEALTH CARE EDUCATION/TRAINING PROGRAM

## 2023-08-11 PROCEDURE — 77014 PR  CT GUIDANCE PLACEMENT RAD THERAPY FIELDS: CPT | Mod: 26,,, | Performed by: STUDENT IN AN ORGANIZED HEALTH CARE EDUCATION/TRAINING PROGRAM

## 2023-08-11 PROCEDURE — 77014 PR  CT GUIDANCE PLACEMENT RAD THERAPY FIELDS: ICD-10-PCS | Mod: 26,,, | Performed by: STUDENT IN AN ORGANIZED HEALTH CARE EDUCATION/TRAINING PROGRAM

## 2023-08-14 ENCOUNTER — PATIENT MESSAGE (OUTPATIENT)
Dept: FAMILY MEDICINE | Facility: CLINIC | Age: 63
End: 2023-08-14
Payer: COMMERCIAL

## 2023-08-14 PROCEDURE — 77014 PR  CT GUIDANCE PLACEMENT RAD THERAPY FIELDS: ICD-10-PCS | Mod: 26,,, | Performed by: STUDENT IN AN ORGANIZED HEALTH CARE EDUCATION/TRAINING PROGRAM

## 2023-08-14 PROCEDURE — 77336 RADIATION PHYSICS CONSULT: CPT | Performed by: STUDENT IN AN ORGANIZED HEALTH CARE EDUCATION/TRAINING PROGRAM

## 2023-08-14 PROCEDURE — 77014 PR  CT GUIDANCE PLACEMENT RAD THERAPY FIELDS: CPT | Mod: 26,,, | Performed by: STUDENT IN AN ORGANIZED HEALTH CARE EDUCATION/TRAINING PROGRAM

## 2023-08-14 PROCEDURE — 77386 HC IMRT, COMPLEX: CPT | Performed by: STUDENT IN AN ORGANIZED HEALTH CARE EDUCATION/TRAINING PROGRAM

## 2023-08-15 ENCOUNTER — LAB VISIT (OUTPATIENT)
Dept: LAB | Facility: HOSPITAL | Age: 63
End: 2023-08-15
Attending: INTERNAL MEDICINE
Payer: COMMERCIAL

## 2023-08-15 ENCOUNTER — PATIENT MESSAGE (OUTPATIENT)
Dept: SURGERY | Facility: CLINIC | Age: 63
End: 2023-08-15
Payer: COMMERCIAL

## 2023-08-15 ENCOUNTER — OFFICE VISIT (OUTPATIENT)
Dept: HEMATOLOGY/ONCOLOGY | Facility: CLINIC | Age: 63
End: 2023-08-15
Payer: COMMERCIAL

## 2023-08-15 ENCOUNTER — DOCUMENTATION ONLY (OUTPATIENT)
Dept: RADIATION ONCOLOGY | Facility: CLINIC | Age: 63
End: 2023-08-15
Payer: COMMERCIAL

## 2023-08-15 VITALS
RESPIRATION RATE: 18 BRPM | OXYGEN SATURATION: 98 % | HEIGHT: 69 IN | SYSTOLIC BLOOD PRESSURE: 95 MMHG | WEIGHT: 127.13 LBS | HEART RATE: 79 BPM | BODY MASS INDEX: 18.83 KG/M2 | DIASTOLIC BLOOD PRESSURE: 58 MMHG

## 2023-08-15 DIAGNOSIS — C16.0 MALIGNANT NEOPLASM OF GASTROESOPHAGEAL JUNCTION: Primary | ICD-10-CM

## 2023-08-15 DIAGNOSIS — K20.90 ESOPHAGITIS: ICD-10-CM

## 2023-08-15 DIAGNOSIS — T45.1X5A CHEMOTHERAPY-INDUCED THROMBOCYTOPENIA: ICD-10-CM

## 2023-08-15 DIAGNOSIS — Z79.899 IMMUNODEFICIENCY DUE TO CHEMOTHERAPY: ICD-10-CM

## 2023-08-15 DIAGNOSIS — T45.1X5A IMMUNODEFICIENCY DUE TO CHEMOTHERAPY: ICD-10-CM

## 2023-08-15 DIAGNOSIS — I70.0 ATHEROSCLEROSIS OF AORTA: ICD-10-CM

## 2023-08-15 DIAGNOSIS — D69.59 CHEMOTHERAPY-INDUCED THROMBOCYTOPENIA: ICD-10-CM

## 2023-08-15 DIAGNOSIS — D70.1 CHEMOTHERAPY INDUCED NEUTROPENIA: ICD-10-CM

## 2023-08-15 DIAGNOSIS — C16.0 MALIGNANT NEOPLASM OF GASTROESOPHAGEAL JUNCTION: ICD-10-CM

## 2023-08-15 DIAGNOSIS — J43.9 PULMONARY EMPHYSEMA, UNSPECIFIED EMPHYSEMA TYPE: ICD-10-CM

## 2023-08-15 DIAGNOSIS — D84.821 IMMUNODEFICIENCY DUE TO CHEMOTHERAPY: ICD-10-CM

## 2023-08-15 DIAGNOSIS — T45.1X5A CHEMOTHERAPY INDUCED NEUTROPENIA: ICD-10-CM

## 2023-08-15 LAB
ALBUMIN SERPL BCP-MCNC: 3.1 G/DL (ref 3.5–5.2)
ALP SERPL-CCNC: 69 U/L (ref 55–135)
ALT SERPL W/O P-5'-P-CCNC: 10 U/L (ref 10–44)
ANION GAP SERPL CALC-SCNC: 5 MMOL/L (ref 8–16)
AST SERPL-CCNC: 14 U/L (ref 10–40)
BILIRUB SERPL-MCNC: 0.9 MG/DL (ref 0.1–1)
BUN SERPL-MCNC: 17 MG/DL (ref 8–23)
CALCIUM SERPL-MCNC: 9.1 MG/DL (ref 8.7–10.5)
CHLORIDE SERPL-SCNC: 110 MMOL/L (ref 95–110)
CO2 SERPL-SCNC: 25 MMOL/L (ref 23–29)
CREAT SERPL-MCNC: 0.8 MG/DL (ref 0.5–1.4)
ERYTHROCYTE [DISTWIDTH] IN BLOOD BY AUTOMATED COUNT: 13.6 % (ref 11.5–14.5)
EST. GFR  (NO RACE VARIABLE): >60 ML/MIN/1.73 M^2
GLUCOSE SERPL-MCNC: 104 MG/DL (ref 70–110)
HCT VFR BLD AUTO: 36.2 % (ref 40–54)
HGB BLD-MCNC: 12.5 G/DL (ref 14–18)
IMM GRANULOCYTES # BLD AUTO: 0.01 K/UL (ref 0–0.04)
MCH RBC QN AUTO: 32.7 PG (ref 27–31)
MCHC RBC AUTO-ENTMCNC: 34.5 G/DL (ref 32–36)
MCV RBC AUTO: 95 FL (ref 82–98)
NEUTROPHILS # BLD AUTO: 0.9 K/UL (ref 1.8–7.7)
PLATELET # BLD AUTO: 43 K/UL (ref 150–450)
PMV BLD AUTO: 11 FL (ref 9.2–12.9)
POTASSIUM SERPL-SCNC: 4.3 MMOL/L (ref 3.5–5.1)
PROT SERPL-MCNC: 6.4 G/DL (ref 6–8.4)
RBC # BLD AUTO: 3.82 M/UL (ref 4.6–6.2)
SODIUM SERPL-SCNC: 140 MMOL/L (ref 136–145)
WBC # BLD AUTO: 1.6 K/UL (ref 3.9–12.7)

## 2023-08-15 PROCEDURE — 3074F PR MOST RECENT SYSTOLIC BLOOD PRESSURE < 130 MM HG: ICD-10-PCS | Mod: CPTII,S$GLB,, | Performed by: PHYSICIAN ASSISTANT

## 2023-08-15 PROCEDURE — 77427 RADIATION TX MANAGEMENT X5: CPT | Mod: ,,, | Performed by: STUDENT IN AN ORGANIZED HEALTH CARE EDUCATION/TRAINING PROGRAM

## 2023-08-15 PROCEDURE — 3044F PR MOST RECENT HEMOGLOBIN A1C LEVEL <7.0%: ICD-10-PCS | Mod: CPTII,S$GLB,, | Performed by: PHYSICIAN ASSISTANT

## 2023-08-15 PROCEDURE — 77014 PR  CT GUIDANCE PLACEMENT RAD THERAPY FIELDS: ICD-10-PCS | Mod: 26,,, | Performed by: STUDENT IN AN ORGANIZED HEALTH CARE EDUCATION/TRAINING PROGRAM

## 2023-08-15 PROCEDURE — 99999 PR PBB SHADOW E&M-EST. PATIENT-LVL III: ICD-10-PCS | Mod: PBBFAC,,, | Performed by: PHYSICIAN ASSISTANT

## 2023-08-15 PROCEDURE — 85027 COMPLETE CBC AUTOMATED: CPT | Performed by: INTERNAL MEDICINE

## 2023-08-15 PROCEDURE — 3008F PR BODY MASS INDEX (BMI) DOCUMENTED: ICD-10-PCS | Mod: CPTII,S$GLB,, | Performed by: PHYSICIAN ASSISTANT

## 2023-08-15 PROCEDURE — 1160F PR REVIEW ALL MEDS BY PRESCRIBER/CLIN PHARMACIST DOCUMENTED: ICD-10-PCS | Mod: CPTII,S$GLB,, | Performed by: PHYSICIAN ASSISTANT

## 2023-08-15 PROCEDURE — 80053 COMPREHEN METABOLIC PANEL: CPT | Performed by: INTERNAL MEDICINE

## 2023-08-15 PROCEDURE — 77386 HC IMRT, COMPLEX: CPT | Performed by: STUDENT IN AN ORGANIZED HEALTH CARE EDUCATION/TRAINING PROGRAM

## 2023-08-15 PROCEDURE — 3078F PR MOST RECENT DIASTOLIC BLOOD PRESSURE < 80 MM HG: ICD-10-PCS | Mod: CPTII,S$GLB,, | Performed by: PHYSICIAN ASSISTANT

## 2023-08-15 PROCEDURE — 1159F PR MEDICATION LIST DOCUMENTED IN MEDICAL RECORD: ICD-10-PCS | Mod: CPTII,S$GLB,, | Performed by: PHYSICIAN ASSISTANT

## 2023-08-15 PROCEDURE — 36415 COLL VENOUS BLD VENIPUNCTURE: CPT | Performed by: INTERNAL MEDICINE

## 2023-08-15 PROCEDURE — 77427 PR CHG RADIATION,MANGEMENT,5 TX'S: ICD-10-PCS | Mod: ,,, | Performed by: STUDENT IN AN ORGANIZED HEALTH CARE EDUCATION/TRAINING PROGRAM

## 2023-08-15 PROCEDURE — 3044F HG A1C LEVEL LT 7.0%: CPT | Mod: CPTII,S$GLB,, | Performed by: PHYSICIAN ASSISTANT

## 2023-08-15 PROCEDURE — 99999 PR PBB SHADOW E&M-EST. PATIENT-LVL III: CPT | Mod: PBBFAC,,, | Performed by: PHYSICIAN ASSISTANT

## 2023-08-15 PROCEDURE — 3078F DIAST BP <80 MM HG: CPT | Mod: CPTII,S$GLB,, | Performed by: PHYSICIAN ASSISTANT

## 2023-08-15 PROCEDURE — 99215 OFFICE O/P EST HI 40 MIN: CPT | Mod: S$GLB,,, | Performed by: PHYSICIAN ASSISTANT

## 2023-08-15 PROCEDURE — 3074F SYST BP LT 130 MM HG: CPT | Mod: CPTII,S$GLB,, | Performed by: PHYSICIAN ASSISTANT

## 2023-08-15 PROCEDURE — 3008F BODY MASS INDEX DOCD: CPT | Mod: CPTII,S$GLB,, | Performed by: PHYSICIAN ASSISTANT

## 2023-08-15 PROCEDURE — 77014 PR  CT GUIDANCE PLACEMENT RAD THERAPY FIELDS: CPT | Mod: 26,,, | Performed by: STUDENT IN AN ORGANIZED HEALTH CARE EDUCATION/TRAINING PROGRAM

## 2023-08-15 PROCEDURE — 1160F RVW MEDS BY RX/DR IN RCRD: CPT | Mod: CPTII,S$GLB,, | Performed by: PHYSICIAN ASSISTANT

## 2023-08-15 PROCEDURE — 1159F MED LIST DOCD IN RCRD: CPT | Mod: CPTII,S$GLB,, | Performed by: PHYSICIAN ASSISTANT

## 2023-08-15 PROCEDURE — 99215 PR OFFICE/OUTPT VISIT, EST, LEVL V, 40-54 MIN: ICD-10-PCS | Mod: S$GLB,,, | Performed by: PHYSICIAN ASSISTANT

## 2023-08-15 NOTE — PROGRESS NOTES
MEDICAL ONCOLOGY - ESTABLISHED PATIENT VISIT    Reason for visit: Esophageal adenocarcinoma    Best Contact Phone Number(s): [phone] (home)      Cancer/Stage/TNM:    Cancer Staging   Malignant neoplasm of gastroesophageal junction  Staging form: Esophagus - Adenocarcinoma, AJCC 8th Edition  - Clinical stage from 6/13/2023: Stage III (cT3, cN1, cM0, G2) - Signed by Sunday Jasso MD on 7/5/2023       Oncology History   Malignant neoplasm of gastroesophageal junction   6/13/2023 Cancer Staged    Staging form: Esophagus - Adenocarcinoma, AJCC 8th Edition  - Clinical stage from 6/13/2023: Stage III (cT3, cN1, cM0, G2)     6/23/2023 Initial Diagnosis    Malignant neoplasm of gastroesophageal junction     7/3/2023 Tumor Conference     [facility] UGI MULTIDISCIPLINARY TUMOR BOARD  PATIENT REVIEW FORM   ____________________________________________________________    CLINIC #: [id number]   DATE: 7/3/2023    DIAGNOSIS: esophageal CA    PRESENTER: Mitch    PATIENT SUMMARY:   This 62 y/o gentleman is a long time smoker with emphysema who had low dose screening lung CT in 4/2023 per his PCP. Given an abnormality on this scan, he had PET on 6/1/23 which identified large hypermetabolic mid esophageal mass with lymph nodes. He underwent EGD on 6/13/23 that found large fungating ulcerated mass with bleeding in middle third of esophagus to lower esophagus, 34-44cm. Pathology revealed moderately differentiated adenocarcinoma.   Staging PET reviewed - level 2 cervical lymph node with FDG uptake, nothing in lungs concerning  Staging EUS today per Dr. Duffy.   He has been evaluated by Dr. Vance in [place] and Dr. June.   He is scheduled to see rad onc, Dr. Jasso, Wednesday and IR for port placement on 7/12/23.     BOARD RECOMMENDATIONS:   Proceed with neoadj CRT, then restaging to consider surgical resection    CONSULT NEEDED:     [] Surgery    [] Hem/Onc    [] Rad/Onc    [] Dietary                 [] Social  "Service    [] Psychology       [] AES  [] Radiology     Clinical Stage: Tumor 3 Node(s) 1 Metastasis 0      GROUP STAGE:  [] O    [] 1   [] II     [x] III   []IV  [] Local recurrence     [] Regional recurrence     [] Distant recurrence   Metastatic site(s): none         [x] [facility] Treatment Guidelines reviewed and care planned is consistent with guidelines.         (i.e., NCCN, NCI, PD, ACO, AUA, etc.)    PRESENTATION AT CANCER CONFERENCE:         [x] Prospective    [] Retrospective     [] Follow-Up         7/16/2023 -  Chemotherapy    Treatment Summary   Plan Name: OP ESOPHAGEAL PACLITAXEL CARBOPLATIN WEEKLY  Treatment Goal: Control  Status: Active  Start Date: 7/16/2023  End Date: 8/15/2023 (Planned)  Provider: Delta June MD  Chemotherapy: CARBOplatin (PARAPLATIN) 195 mg in sodium chloride 0.9% 304.5 mL chemo infusion, 195 mg (100 % of original dose 193 mg), Intravenous, Clinic/HOD 1 time, 1 of 1 cycle  Dose modification:   (original dose 193 mg, Cycle 1)  Administration: 195 mg (7/18/2023), 210 mg (7/24/2023), 230 mg (7/31/2023), 210 mg (8/8/2023)  PACLitaxeL (TAXOL) 50 mg/m2 = 84 mg in sodium chloride 0.9% 250 mL chemo infusion, 50 mg/m2 = 84 mg, Intravenous, Clinic/HOD 1 time, 1 of 1 cycle  Administration: 84 mg (7/18/2023)          Interim History:   63 y.o. male recently diagnosed esophageal adenocarcinoma who presents prior to cycle 5 of weekly neoadjuvant chemoradiation. He had an infusion reaction with cycle 1 of carbo/taxol and then switched to Abraxane with cycle 2 which he tolerated without issue.  He feels well.  No nausea, no dysphagia, no paresthesias.  He does note some increase in "reflux" or burning in his esophagus.  He was prescribed carafate but the liquid was not covered by insurance. He was given magic mouthwash by Dr. Jasso that he is going to start today. He does have fatigue that is slowly worsening but he continues to try and remain active at home. He is eating and has a good " appetite. No other concerns or complaints today.     ECOG status is 0. Presents with his wife today.     ROS:   Review of Systems   Constitutional:  Negative for chills, fever, malaise/fatigue and weight loss.   HENT:  Negative for sore throat.    Eyes:  Negative for blurred vision and pain.   Respiratory:  Negative for cough and shortness of breath.    Cardiovascular:  Negative for chest pain and leg swelling.   Gastrointestinal:  Negative for abdominal pain, constipation, diarrhea, nausea and vomiting.   Genitourinary:  Negative for dysuria and frequency.   Musculoskeletal:  Negative for back pain, falls and myalgias.   Skin:  Negative for rash.   Neurological:  Negative for dizziness, weakness and headaches.   Endo/Heme/Allergies:  Does not bruise/bleed easily.   Psychiatric/Behavioral:  Negative for depression. The patient is not nervous/anxious.    All other systems reviewed and are negative.      Past Medical History:   Past Medical History:   Diagnosis Date    Arthritis     COPD (chronic obstructive pulmonary disease)         Past Surgical History:   Past Surgical History:   Procedure Laterality Date    CERVICAL FUSION      COLONOSCOPY N/A 3/27/2018    Procedure: COLONOSCOPY;  Surgeon: Maria Teresa Morocho MD;  Location: [facility];  Service: Endoscopy;  Laterality: N/A;    COLONOSCOPY N/A 6/13/2023    Procedure: COLONOSCOPY;  Surgeon: Kelli Snell MD;  Location: [facility];  Service: Endoscopy;  Laterality: N/A;    ENDOSCOPIC ULTRASOUND OF UPPER GASTROINTESTINAL TRACT N/A 7/3/2023    Procedure: ULTRASOUND, UPPER GI TRACT, ENDOSCOPIC;  Surgeon: Ray Duffy MD;  Location: [facility];  Service: Endoscopy;  Laterality: N/A;    ESOPHAGOGASTRODUODENOSCOPY N/A 6/13/2023    Procedure: EGD (ESOPHAGOGASTRODUODENOSCOPY);  Surgeon: Kelli Snell MD;  Location: [facility];  Service: Endoscopy;  Laterality: N/A;    ESOPHAGOGASTRODUODENOSCOPY N/A 7/3/2023    Procedure: EGD (ESOPHAGOGASTRODUODENOSCOPY);  Surgeon:  Ray Duffy MD;  Location: [facility];  Service: Endoscopy;  Laterality: N/A;    INGUINAL HERNIA REPAIR Bilateral     Right x2, x1 left    ROTATOR CUFF REPAIR Right     x2        Family History:   Family History   Problem Relation Age of Onset    Diabetes Mother     Heart disease Father         CHF    Glaucoma Neg Hx     Colon cancer Neg Hx     Prostate cancer Neg Hx         Social History:   Social History     Tobacco Use    Smoking status: Former     Current packs/day: 0.25     Average packs/day: 0.3 packs/day for 40.6 years (10.2 ttl pk-yrs)     Types: Cigarettes     Start date: 1983     Passive exposure: Current    Smokeless tobacco: Never    Tobacco comments:     4 cigarettes a day   Substance Use Topics    Alcohol use: Yes     Comment: a couple of beers a day        I have reviewed and updated the patient's past medical, surgical, family and social histories.    Allergies:   Review of patient's allergies indicates:  No Known Allergies     Medications:   Current Outpatient Medications   Medication Sig Dispense Refill    duke's soln (benadryl 30 mL, mylanta 30 mL, LIDOcaine 30 mL, nystatin 30 mL) 120mL Take 10 mLs by mouth 4 (four) times daily. for 12 days 120 mL 3    LIDOcaine-prilocaine (EMLA) cream Apply topically as needed (Port access). (Patient not taking: Reported on 7/31/2023) 30 g 1    ondansetron (ZOFRAN) 8 MG tablet Take 1 tablet (8 mg total) by mouth every 8 (eight) hours as needed for Nausea. (Patient not taking: Reported on 7/5/2023) 60 tablet 2    pantoprazole (PROTONIX) 40 MG tablet Take 1 tablet (40 mg total) by mouth once daily. 90 tablet 3    sildenafil (VIAGRA) 100 MG tablet Take 1 tablet (100 mg total) by mouth daily as needed for Erectile Dysfunction. 10 tablet 6    sucralfate (CARAFATE) 100 mg/mL suspension Take 10 mLs (1 g total) by mouth 4 (four) times daily. 414 mL 1     No current facility-administered medications for this visit.        Physical Exam:   BP (!) 95/58 (BP Location:  "Left arm, Patient Position: Sitting, BP Method: Large (Automatic))   Pulse 79   Resp 18   Ht 5' 9" (1.753 m)   Wt 57.6 kg (127 lb 1.5 oz)   SpO2 98%   BMI 18.77 kg/m²      ECOG Performance status: 0            Physical Exam  Vitals reviewed.   Constitutional:       General: He is not in acute distress.     Appearance: Normal appearance. He is normal weight.   HENT:      Head: Normocephalic and atraumatic.      Right Ear: External ear normal.      Left Ear: External ear normal.      Nose: Nose normal.      Mouth/Throat:      Mouth: Mucous membranes are moist.      Pharynx: Oropharynx is clear. No posterior oropharyngeal erythema.   Eyes:      General: No scleral icterus.     Extraocular Movements: Extraocular movements intact.      Conjunctiva/sclera: Conjunctivae normal.      Pupils: Pupils are equal, round, and reactive to light.   Cardiovascular:      Rate and Rhythm: Normal rate and regular rhythm.      Pulses: Normal pulses.      Heart sounds: Normal heart sounds.   Pulmonary:      Effort: Pulmonary effort is normal.      Breath sounds: Normal breath sounds. No wheezing or rales.   Abdominal:      General: Bowel sounds are normal. There is no distension.      Palpations: Abdomen is soft.      Tenderness: There is no abdominal tenderness.   Musculoskeletal:         General: No swelling. Normal range of motion.      Cervical back: Normal range of motion and neck supple.   Skin:     General: Skin is warm.      Coloration: Skin is not jaundiced.      Findings: No erythema or rash.   Neurological:      General: No focal deficit present.      Mental Status: He is alert and oriented to person, place, and time. Mental status is at baseline.      Gait: Gait normal.   Psychiatric:         Mood and Affect: Mood normal.         Behavior: Behavior normal.         Thought Content: Thought content normal.         Judgment: Judgment normal.           Labs:   Recent Results (from the past 48 hour(s))   CBC Oncology    " Collection Time: 08/15/23  7:29 AM   Result Value Ref Range    WBC 1.60 (LL) 3.90 - 12.70 K/uL    RBC 3.82 (L) 4.60 - 6.20 M/uL    Hemoglobin 12.5 (L) 14.0 - 18.0 g/dL    Hematocrit 36.2 (L) 40.0 - 54.0 %    MCV 95 82 - 98 fL    MCH 32.7 (H) 27.0 - 31.0 pg    MCHC 34.5 32.0 - 36.0 g/dL    RDW 13.6 11.5 - 14.5 %    Platelets 43 (L) 150 - 450 K/uL    MPV 11.0 9.2 - 12.9 fL    Gran # (ANC) 0.9 (L) 1.8 - 7.7 K/uL    Immature Grans (Abs) 0.01 0.00 - 0.04 K/uL   Comprehensive Metabolic Panel    Collection Time: 08/15/23  7:29 AM   Result Value Ref Range    Sodium 140 136 - 145 mmol/L    Potassium 4.3 3.5 - 5.1 mmol/L    Chloride 110 95 - 110 mmol/L    CO2 25 23 - 29 mmol/L    Glucose 104 70 - 110 mg/dL    BUN 17 8 - 23 mg/dL    Creatinine 0.8 0.5 - 1.4 mg/dL    Calcium 9.1 8.7 - 10.5 mg/dL    Total Protein 6.4 6.0 - 8.4 g/dL    Albumin 3.1 (L) 3.5 - 5.2 g/dL    Total Bilirubin 0.9 0.1 - 1.0 mg/dL    Alkaline Phosphatase 69 55 - 135 U/L    AST 14 10 - 40 U/L    ALT 10 10 - 44 U/L    eGFR >60.0 >60 mL/min/1.73 m^2    Anion Gap 5 (L) 8 - 16 mmol/L        I have reviewed the pertinent labs. Platelet count 43K.     Imaging:       I have personally reviewed the imaging which is notable for large hypermetabolic mid to distal esophageal tumor; possible mediastinal/AP window lymph node with mild activity.    Path:   2. Gastroesophageal junction mass (biopsy):   Invasive adenocarcinoma, moderately differentiated   Background low and high-grade glandular dysplasia   HER2 immunohistochemistry:  Equivocal.     Immunohistochemistry (IHC) Testing for Mismatch Repair (MMR) Proteins:   MLH1, MSH2, MSH6, PMS2 - Intact nuclear expression   Background nonneoplastic tissue/internal control with intact nuclear expression     There are exceptions to the above IHC interpretations. These results should not be considered in isolation, and clinical correlation with genetic counseling is recommended to assess the need for germline testing.      Interpretation: No loss of nuclear expression of MMR proteins: low probability of microsatellite instability       Assessment:       1. Malignant neoplasm of gastroesophageal junction    2. Immunodeficiency due to chemotherapy    3. Esophagitis    4. Pulmonary emphysema, unspecified emphysema type    5. Atherosclerosis of aorta    6. Chemotherapy induced neutropenia    7. Chemotherapy-induced thrombocytopenia                Plan:             # Esophageal adenocarcinoma, chemotherapy induced neutropenia/thrombocytopenia  He appears to have a locally advanced adenocarcinoma of the middle and lower third of the esophagus, pMMR. PET/CT does not show clear distant metastatic disease.  We discussed the case and plan with Dr. Meyer and he feels the patient is surgically resectable as well.    Began cycle 1 on 7/18/23. He unfortunately had a reaction to the Taxol. During the Taxol infusion, he developed coughing, flushing, chest tightness and nausea. O2 sat was 92%, 2L O2 applied NC. We were notified and stopped the Taxol. He was able to proceed with the Carboplatin without complication.   We switched him to Abraxane 40 mg/m2 with week 2.  This was tolerated very well without issue. Has tolerated RT well and has completed 4 cycles of chemoRT. He will complete RT this Friday.     Doing well but platelet count 43K. .   Will hold chemotherapy today and he will have completed 4 cycles of chemotherapy with RT. We do not feel the need to make this cycle up for him. Discussed with Dr. June  Will cancel cycle 5 of carbo + Abraxane today.    RTC as scheduled with repeat PET/CT around 9/22/2023. This will be around 4-6 weeks after chemo/radiation to confirm patient still a surgical candidate     # Esophagitis  Rx sent for magic mouthwash. Will start this today. Insurance did not approve the liquid carafate  Likely secondary to radiation which he will complete this Friday.    # COPD, tobacco abuse, aortic  atherosclerosis  Counseled on tobacco cessation.  Normal SpO2.  No dyspnea.  Atherosclerosis noted on imaging.    The above information has been reviewed with the patient and all questions have been answered to their apparent satisfaction.  They understand that they can call the clinic with any questions.      Route Chart for Scheduling    Med Onc Chart Routing      Follow up with physician . RTC as scheduled with lab work, PET/CT and treatment discussion   Follow up with DEMARCO    Infusion scheduling note    Injection scheduling note    Labs CBC and CMP   Scheduling:  Preferred lab:  Lab interval:     Imaging PET scan   scheduled. thank you   Pharmacy appointment    Other referrals              Treatment Plan Information   OP ESOPHAGEAL PACLITAXEL CARBOPLATIN WEEKLY   Delta June MD   Upcoming Treatment Dates - OP ESOPHAGEAL PACLITAXEL CARBOPLATIN WEEKLY    8/15/2023       Pre-Medications       palonosetron (ALOXI) 0.25 mg with Dexamethasone (DECADRON) 12 mg in NS 50 mL IVPB       famotidine (PF) injection 20 mg       diphenhydrAMINE (BENADRYL) 50 mg in sodium chloride 0.9% 50 mL IVPB       Chemotherapy       PACLitaxeL protein-bound (ABRAXANE) in 14 mL infusion       CARBOplatin (PARAPLATIN) 210 mg in sodium chloride 0.9% 271 mL chemo infusion       Supportive Care       prochlorperazine injection Soln 10 mg

## 2023-08-16 ENCOUNTER — TELEPHONE (OUTPATIENT)
Dept: SURGERY | Facility: CLINIC | Age: 63
End: 2023-08-16
Payer: COMMERCIAL

## 2023-08-16 PROCEDURE — 77014 PR  CT GUIDANCE PLACEMENT RAD THERAPY FIELDS: CPT | Mod: 26,,, | Performed by: STUDENT IN AN ORGANIZED HEALTH CARE EDUCATION/TRAINING PROGRAM

## 2023-08-16 PROCEDURE — 77386 HC IMRT, COMPLEX: CPT | Performed by: STUDENT IN AN ORGANIZED HEALTH CARE EDUCATION/TRAINING PROGRAM

## 2023-08-16 PROCEDURE — 77014 PR  CT GUIDANCE PLACEMENT RAD THERAPY FIELDS: ICD-10-PCS | Mod: 26,,, | Performed by: STUDENT IN AN ORGANIZED HEALTH CARE EDUCATION/TRAINING PROGRAM

## 2023-08-16 NOTE — TELEPHONE ENCOUNTER
Attempted to speak to pt in regards to scheduling prehab. Pt was sleeping but spoke to spouse, Aislinn. Spouse feels that pt will be resistant to the idea and will benefit from speaking to Dr. Meyer at clinic appt about it. Spouse would like to schedule prehab appointment in the meantime. Pt scheduled for 8/28/23 with SARAY Solomon.

## 2023-08-17 PROCEDURE — 77386 HC IMRT, COMPLEX: CPT | Performed by: STUDENT IN AN ORGANIZED HEALTH CARE EDUCATION/TRAINING PROGRAM

## 2023-08-17 PROCEDURE — 77014 PR  CT GUIDANCE PLACEMENT RAD THERAPY FIELDS: ICD-10-PCS | Mod: 26,,, | Performed by: STUDENT IN AN ORGANIZED HEALTH CARE EDUCATION/TRAINING PROGRAM

## 2023-08-17 PROCEDURE — 77336 RADIATION PHYSICS CONSULT: CPT | Performed by: STUDENT IN AN ORGANIZED HEALTH CARE EDUCATION/TRAINING PROGRAM

## 2023-08-17 PROCEDURE — 77014 PR  CT GUIDANCE PLACEMENT RAD THERAPY FIELDS: CPT | Mod: 26,,, | Performed by: STUDENT IN AN ORGANIZED HEALTH CARE EDUCATION/TRAINING PROGRAM

## 2023-08-18 ENCOUNTER — DOCUMENTATION ONLY (OUTPATIENT)
Dept: RADIATION ONCOLOGY | Facility: CLINIC | Age: 63
End: 2023-08-18
Payer: COMMERCIAL

## 2023-08-18 NOTE — PLAN OF CARE
Completed 23/23 of outpatient radiation to the esophagus, on August 17th. Tolerated therapy well. RTC tbd.

## 2023-08-22 ENCOUNTER — OFFICE VISIT (OUTPATIENT)
Dept: FAMILY MEDICINE | Facility: CLINIC | Age: 63
End: 2023-08-22
Payer: COMMERCIAL

## 2023-08-22 VITALS
RESPIRATION RATE: 18 BRPM | SYSTOLIC BLOOD PRESSURE: 98 MMHG | BODY MASS INDEX: 18.68 KG/M2 | HEART RATE: 102 BPM | OXYGEN SATURATION: 95 % | DIASTOLIC BLOOD PRESSURE: 54 MMHG | HEIGHT: 69 IN | WEIGHT: 126.13 LBS

## 2023-08-22 DIAGNOSIS — I25.10 ATHEROSCLEROSIS OF CORONARY ARTERY WITHOUT ANGINA PECTORIS, UNSPECIFIED VESSEL OR LESION TYPE, UNSPECIFIED WHETHER NATIVE OR TRANSPLANTED HEART: ICD-10-CM

## 2023-08-22 DIAGNOSIS — C16.0 MALIGNANT NEOPLASM OF GASTROESOPHAGEAL JUNCTION: Primary | ICD-10-CM

## 2023-08-22 DIAGNOSIS — I45.10 RBBB: ICD-10-CM

## 2023-08-22 DIAGNOSIS — F17.200 TOBACCO DEPENDENCE: ICD-10-CM

## 2023-08-22 DIAGNOSIS — D69.6 THROMBOCYTOPENIA: ICD-10-CM

## 2023-08-22 DIAGNOSIS — D64.81 ANEMIA DUE TO ANTINEOPLASTIC CHEMOTHERAPY: ICD-10-CM

## 2023-08-22 DIAGNOSIS — R94.31 ABNORMAL ELECTROCARDIOGRAM (ECG) (EKG): ICD-10-CM

## 2023-08-22 DIAGNOSIS — J44.9 CHRONIC OBSTRUCTIVE PULMONARY DISEASE, UNSPECIFIED COPD TYPE: ICD-10-CM

## 2023-08-22 DIAGNOSIS — Z01.818 PREOP EXAMINATION: ICD-10-CM

## 2023-08-22 DIAGNOSIS — T45.1X5A ANEMIA DUE TO ANTINEOPLASTIC CHEMOTHERAPY: ICD-10-CM

## 2023-08-22 DIAGNOSIS — D70.9 NEUTROPENIA, UNSPECIFIED TYPE: ICD-10-CM

## 2023-08-22 PROCEDURE — 93005 ELECTROCARDIOGRAM TRACING: CPT | Mod: S$GLB,,, | Performed by: FAMILY MEDICINE

## 2023-08-22 PROCEDURE — 3044F PR MOST RECENT HEMOGLOBIN A1C LEVEL <7.0%: ICD-10-PCS | Mod: CPTII,S$GLB,, | Performed by: FAMILY MEDICINE

## 2023-08-22 PROCEDURE — 99215 PR OFFICE/OUTPT VISIT, EST, LEVL V, 40-54 MIN: ICD-10-PCS | Mod: 25,S$GLB,, | Performed by: FAMILY MEDICINE

## 2023-08-22 PROCEDURE — 3008F PR BODY MASS INDEX (BMI) DOCUMENTED: ICD-10-PCS | Mod: CPTII,S$GLB,, | Performed by: FAMILY MEDICINE

## 2023-08-22 PROCEDURE — 93010 EKG 12-LEAD: ICD-10-PCS | Mod: S$GLB,,, | Performed by: INTERNAL MEDICINE

## 2023-08-22 PROCEDURE — 3008F BODY MASS INDEX DOCD: CPT | Mod: CPTII,S$GLB,, | Performed by: FAMILY MEDICINE

## 2023-08-22 PROCEDURE — 93010 ELECTROCARDIOGRAM REPORT: CPT | Mod: S$GLB,,, | Performed by: INTERNAL MEDICINE

## 2023-08-22 PROCEDURE — 3074F SYST BP LT 130 MM HG: CPT | Mod: CPTII,S$GLB,, | Performed by: FAMILY MEDICINE

## 2023-08-22 PROCEDURE — 1159F PR MEDICATION LIST DOCUMENTED IN MEDICAL RECORD: ICD-10-PCS | Mod: CPTII,S$GLB,, | Performed by: FAMILY MEDICINE

## 2023-08-22 PROCEDURE — 1159F MED LIST DOCD IN RCRD: CPT | Mod: CPTII,S$GLB,, | Performed by: FAMILY MEDICINE

## 2023-08-22 PROCEDURE — 99999 PR PBB SHADOW E&M-EST. PATIENT-LVL III: ICD-10-PCS | Mod: PBBFAC,,, | Performed by: FAMILY MEDICINE

## 2023-08-22 PROCEDURE — 3074F PR MOST RECENT SYSTOLIC BLOOD PRESSURE < 130 MM HG: ICD-10-PCS | Mod: CPTII,S$GLB,, | Performed by: FAMILY MEDICINE

## 2023-08-22 PROCEDURE — 3044F HG A1C LEVEL LT 7.0%: CPT | Mod: CPTII,S$GLB,, | Performed by: FAMILY MEDICINE

## 2023-08-22 PROCEDURE — 93005 EKG 12-LEAD: ICD-10-PCS | Mod: S$GLB,,, | Performed by: FAMILY MEDICINE

## 2023-08-22 PROCEDURE — 3078F PR MOST RECENT DIASTOLIC BLOOD PRESSURE < 80 MM HG: ICD-10-PCS | Mod: CPTII,S$GLB,, | Performed by: FAMILY MEDICINE

## 2023-08-22 PROCEDURE — 99215 OFFICE O/P EST HI 40 MIN: CPT | Mod: 25,S$GLB,, | Performed by: FAMILY MEDICINE

## 2023-08-22 PROCEDURE — 3078F DIAST BP <80 MM HG: CPT | Mod: CPTII,S$GLB,, | Performed by: FAMILY MEDICINE

## 2023-08-22 PROCEDURE — 99999 PR PBB SHADOW E&M-EST. PATIENT-LVL III: CPT | Mod: PBBFAC,,, | Performed by: FAMILY MEDICINE

## 2023-08-24 ENCOUNTER — HOSPITAL ENCOUNTER (OUTPATIENT)
Dept: PULMONOLOGY | Facility: CLINIC | Age: 63
Discharge: HOME | End: 2023-08-24
Payer: COMMERCIAL

## 2023-08-24 ENCOUNTER — OFFICE VISIT (OUTPATIENT)
Dept: SURGERY | Facility: CLINIC | Age: 63
End: 2023-08-24
Payer: COMMERCIAL

## 2023-08-24 VITALS
WEIGHT: 128.19 LBS | HEIGHT: 69 IN | DIASTOLIC BLOOD PRESSURE: 69 MMHG | SYSTOLIC BLOOD PRESSURE: 113 MMHG | BODY MASS INDEX: 18.99 KG/M2 | HEART RATE: 80 BPM | OXYGEN SATURATION: 99 %

## 2023-08-24 DIAGNOSIS — C16.0 MALIGNANT NEOPLASM OF GASTROESOPHAGEAL JUNCTION: Primary | ICD-10-CM

## 2023-08-24 DIAGNOSIS — C16.0 MALIGNANT NEOPLASM OF GASTROESOPHAGEAL JUNCTION: ICD-10-CM

## 2023-08-24 LAB
DLCO ADJ PRE: 12.84 ML/(MIN*MMHG) (ref 20.99–34.84)
DLCO SINGLE BREATH LLN: 20.99
DLCO SINGLE BREATH PRE REF: 43 %
DLCO SINGLE BREATH REF: 27.92
DLCOC SBVA LLN: 2.83
DLCOC SBVA PRE REF: 52.8 %
DLCOC SBVA REF: 4.03
DLCOC SINGLE BREATH LLN: 20.99
DLCOC SINGLE BREATH PRE REF: 46 %
DLCOC SINGLE BREATH REF: 27.92
DLCOCSBVAULN: 5.24
DLCOCSINGLEBREATHULN: 34.84
DLCOSINGLEBREATHULN: 34.84
DLCOVA LLN: 2.83
DLCOVA PRE REF: 49.4 %
DLCOVA PRE: 1.99 ML/(MIN*MMHG*L) (ref 2.83–5.24)
DLCOVA REF: 4.03
DLCOVAULN: 5.24
DLVAADJ PRE: 2.13 ML/(MIN*MMHG*L) (ref 2.83–5.24)
ERV LLN: -16448.86
ERV PRE REF: 168.7 %
ERV REF: 1.14
ERVULN: ABNORMAL
FEF 25 75 LLN: 1.34
FEF 25 75 PRE REF: 65.1 %
FEF 25 75 REF: 2.79
FEV05 LLN: 1.55
FEV05 REF: 2.68
FEV1 FVC LLN: 65
FEV1 FVC PRE REF: 93.3 %
FEV1 FVC REF: 77
FEV1 LLN: 2.54
FEV1 PRE REF: 89.3 %
FEV1 REF: 3.4
FRCPLETH LLN: 2.58
FRCPLETH PREREF: 120.9 %
FRCPLETH REF: 3.57
FRCPLETHULN: 4.56
FVC LLN: 3.34
FVC PRE REF: 95.5 %
FVC REF: 4.4
IVC PRE: 3.96 L (ref 3.34–5.48)
IVC SINGLE BREATH LLN: 3.34
IVC SINGLE BREATH PRE REF: 89.9 %
IVC SINGLE BREATH REF: 4.4
IVCSINGLEBREATHULN: 5.48
LLN IC: -9999996.83
PEF LLN: 6.6
PEF PRE REF: 76.6 %
PEF REF: 8.85
PHYSICIAN COMMENT: ABNORMAL
PRE DLCO: 12.01 ML/(MIN*MMHG) (ref 20.99–34.84)
PRE ERV: 1.92 L (ref -16448.86–16451.14)
PRE FEF 25 75: 1.82 L/S (ref 1.34–4.78)
PRE FET 100: 6.57 SEC
PRE FEV05 REF: 86.7 %
PRE FEV1 FVC: 72.15 % (ref 65–88.08)
PRE FEV1: 3.03 L (ref 2.54–4.2)
PRE FEV5: 2.33 L (ref 1.55–3.82)
PRE FRC PL: 4.32 L (ref 2.58–4.56)
PRE FVC: 4.2 L (ref 3.34–5.48)
PRE IC: 2.32 L (ref -9999996.83–#######.####)
PRE PEF: 6.78 L/S (ref 6.6–11.1)
PRE REF IC: 73.2 %
PRE RV: 2.39 L (ref 1.76–3.1)
PRE TLC: 6.63 L (ref 5.77–8.07)
RAW PRE REF: 82.1 %
RAW PRE: 2.51 CMH2O*S/L (ref 3.06–3.06)
RAW REF: 3.06
REF IC: 3.17
RV LLN: 1.76
RV PRE REF: 98.5 %
RV REF: 2.43
RVTLC LLN: 29
RVTLC PRE REF: 94.6 %
RVTLC PRE: 36.09 % (ref 29.16–47.12)
RVTLC REF: 38
RVTLCULN: 47
RVULN: 3.1
SGAW PRE REF: 102.8 %
SGAW PRE: 0.09 1/(CMH2O*S) (ref 0.08–0.08)
SGAW REF: 0.08
TLC LLN: 5.77
TLC PRE REF: 95.8 %
TLC REF: 6.92
TLC ULN: 8.07
ULN IC: ABNORMAL
VA PRE: 6.03 L (ref 6.77–6.77)
VA SINGLE BREATH LLN: 6.77
VA SINGLE BREATH PRE REF: 89 %
VA SINGLE BREATH REF: 6.77
VASINGLEBREATHULN: 6.77
VC LLN: 3.34
VC PRE REF: 96.3 %
VC PRE: 4.24 L (ref 3.34–5.48)
VC REF: 4.4
VC ULN: 5.48

## 2023-08-24 PROCEDURE — 3074F SYST BP LT 130 MM HG: CPT | Mod: CPTII,S$GLB,, | Performed by: SURGERY

## 2023-08-24 PROCEDURE — 3078F DIAST BP <80 MM HG: CPT | Mod: CPTII,S$GLB,, | Performed by: SURGERY

## 2023-08-24 PROCEDURE — 94726 PULM FUNCT TST PLETHYSMOGRAP: ICD-10-PCS | Mod: S$GLB,,, | Performed by: INTERNAL MEDICINE

## 2023-08-24 PROCEDURE — 99999 PR PBB SHADOW E&M-EST. PATIENT-LVL IV: ICD-10-PCS | Mod: PBBFAC,,, | Performed by: SURGERY

## 2023-08-24 PROCEDURE — 94010 BREATHING CAPACITY TEST: ICD-10-PCS | Mod: S$GLB,,, | Performed by: INTERNAL MEDICINE

## 2023-08-24 PROCEDURE — 94726 PLETHYSMOGRAPHY LUNG VOLUMES: CPT | Mod: S$GLB,,, | Performed by: INTERNAL MEDICINE

## 2023-08-24 PROCEDURE — 3008F PR BODY MASS INDEX (BMI) DOCUMENTED: ICD-10-PCS | Mod: CPTII,S$GLB,, | Performed by: SURGERY

## 2023-08-24 PROCEDURE — 1159F MED LIST DOCD IN RCRD: CPT | Mod: CPTII,S$GLB,, | Performed by: SURGERY

## 2023-08-24 PROCEDURE — 3044F HG A1C LEVEL LT 7.0%: CPT | Mod: CPTII,S$GLB,, | Performed by: SURGERY

## 2023-08-24 PROCEDURE — 94729 DIFFUSING CAPACITY: CPT | Mod: S$GLB,,, | Performed by: INTERNAL MEDICINE

## 2023-08-24 PROCEDURE — 1159F PR MEDICATION LIST DOCUMENTED IN MEDICAL RECORD: ICD-10-PCS | Mod: CPTII,S$GLB,, | Performed by: SURGERY

## 2023-08-24 PROCEDURE — 99213 OFFICE O/P EST LOW 20 MIN: CPT | Mod: S$GLB,,, | Performed by: SURGERY

## 2023-08-24 PROCEDURE — 99999 PR PBB SHADOW E&M-EST. PATIENT-LVL IV: CPT | Mod: PBBFAC,,, | Performed by: SURGERY

## 2023-08-24 PROCEDURE — 94729 PR C02/MEMBANE DIFFUSE CAPACITY: ICD-10-PCS | Mod: S$GLB,,, | Performed by: INTERNAL MEDICINE

## 2023-08-24 PROCEDURE — 3044F PR MOST RECENT HEMOGLOBIN A1C LEVEL <7.0%: ICD-10-PCS | Mod: CPTII,S$GLB,, | Performed by: SURGERY

## 2023-08-24 PROCEDURE — 3008F BODY MASS INDEX DOCD: CPT | Mod: CPTII,S$GLB,, | Performed by: SURGERY

## 2023-08-24 PROCEDURE — 3078F PR MOST RECENT DIASTOLIC BLOOD PRESSURE < 80 MM HG: ICD-10-PCS | Mod: CPTII,S$GLB,, | Performed by: SURGERY

## 2023-08-24 PROCEDURE — 99213 PR OFFICE/OUTPT VISIT, EST, LEVL III, 20-29 MIN: ICD-10-PCS | Mod: S$GLB,,, | Performed by: SURGERY

## 2023-08-24 PROCEDURE — 94010 BREATHING CAPACITY TEST: CPT | Mod: S$GLB,,, | Performed by: INTERNAL MEDICINE

## 2023-08-24 PROCEDURE — 3074F PR MOST RECENT SYSTOLIC BLOOD PRESSURE < 130 MM HG: ICD-10-PCS | Mod: CPTII,S$GLB,, | Performed by: SURGERY

## 2023-08-24 NOTE — PROGRESS NOTES
SURGICAL ONCOLOGY  Clinic Note        SUBJECTIVE:     HISTORY OF PRESENT ILLNESS:  Blayne Beebe is a 63 y.o. male with PMH COPD (emphysema), HLD, and Stage III (cT3, cN1, cM0, G2) GEJ esophageal adenocarcinoma. He was diagnosed after an abnormal screening lung CT. PET on 23 which identified large hypermetabolic mid esophageal mass with lymph nodes. He underwent EGD on 23 that found large fungating ulcerated mass with bleeding in middle third of esophagus to lower esophagus, 34-44cm. Pathology revealed moderately differentiated adenocarcinoma.     He underwent port placement on 23 for administration of neoadjuvant chemotherapy. Initiated chemotherapy (paclitaxel/carboplatin) 23-8/15/23. He is here today to discuss possible surgical intervention. His oncologist is Dr. Banks, last visit on 8/15/23. He tolerated chemotherapy well. He reports his only symptoms since the initiation of chemotherapy has been mild fatigue. He has been tolerating a regular diet, without abdominal pain, early satiety or reflux.     He underwent PFTs today - Spirometry is normal. Lung volume determination is normal. Airway mechanics show normal airway resistance and conductance. DLCO is moderately decreased. He reports that he is still smoking, 2-3 cigarettes yesterday, but is trying to quit. Past surgical history is notable for 3 open inguinal hernia repairs, no prior intra-abdominal surgery. No prior MI or stroke and he is not on anticoagulation.         MEDICATIONS:  Home Medications:  Current Outpatient Medications on File Prior to Visit   Medication Sig Dispense Refill    [] duke's soln (benadryl 30 mL, mylanta 30 mL, LIDOcaine 30 mL, nystatin 30 mL) 120mL Take 10 mLs by mouth 4 (four) times daily. for 12 days 120 mL 3    LIDOcaine-prilocaine (EMLA) cream Apply topically as needed (Port access). (Patient not taking: Reported on 2023) 30 g 1    pantoprazole (PROTONIX) 40 MG tablet Take 1 tablet (40 mg  total) by mouth once daily. 90 tablet 3    sildenafil (VIAGRA) 100 MG tablet Take 1 tablet (100 mg total) by mouth daily as needed for Erectile Dysfunction. 10 tablet 6     No current facility-administered medications on file prior to visit.       ALLERGIES:    Review of patient's allergies indicates:  No Known Allergies    PAST MEDICAL HISTORY:    Past Medical History:   Diagnosis Date    Arthritis     COPD (chronic obstructive pulmonary disease)        SURGICAL HISTORY:  Past Surgical History:   Procedure Laterality Date    CERVICAL FUSION      COLONOSCOPY N/A 3/27/2018    Procedure: COLONOSCOPY;  Surgeon: Maria Teresa Morocho MD;  Location: [facility];  Service: Endoscopy;  Laterality: N/A;    COLONOSCOPY N/A 6/13/2023    Procedure: COLONOSCOPY;  Surgeon: Kelli Snell MD;  Location: [facility];  Service: Endoscopy;  Laterality: N/A;    ENDOSCOPIC ULTRASOUND OF UPPER GASTROINTESTINAL TRACT N/A 7/3/2023    Procedure: ULTRASOUND, UPPER GI TRACT, ENDOSCOPIC;  Surgeon: Ray Duffy MD;  Location: [facility];  Service: Endoscopy;  Laterality: N/A;    ESOPHAGOGASTRODUODENOSCOPY N/A 6/13/2023    Procedure: EGD (ESOPHAGOGASTRODUODENOSCOPY);  Surgeon: Kelli Snell MD;  Location: [facility];  Service: Endoscopy;  Laterality: N/A;    ESOPHAGOGASTRODUODENOSCOPY N/A 7/3/2023    Procedure: EGD (ESOPHAGOGASTRODUODENOSCOPY);  Surgeon: Ray Duffy MD;  Location: [facility];  Service: Endoscopy;  Laterality: N/A;    INGUINAL HERNIA REPAIR Bilateral     Right x2, x1 left    ROTATOR CUFF REPAIR Right     x2       FAMILY HISTORY:  Family History   Problem Relation Age of Onset    Diabetes Mother     Heart disease Father         CHF    Glaucoma Neg Hx     Colon cancer Neg Hx     Prostate cancer Neg Hx        SOCIAL HISTORY:  Social History     Tobacco Use    Smoking status: Former     Current packs/day: 0.25     Average packs/day: 0.3 packs/day for 40.6 years (10.2 ttl pk-yrs)     Types: Cigarettes     Start date: 1983      Passive exposure: Current    Smokeless tobacco: Never    Tobacco comments:     4 cigarettes a day   Substance Use Topics    Alcohol use: Yes     Comment: a couple of beers a day    Drug use: No        REVIEW OF SYSTEMS:  Review of Systems   Constitutional:  Positive for fatigue. Negative for activity change, chills and fever.   Eyes:  Negative for discharge and itching.   Respiratory:  Negative for cough and shortness of breath.    Cardiovascular:  Negative for chest pain and palpitations.   Gastrointestinal:  Negative for abdominal distention and abdominal pain.   Endocrine: Negative for cold intolerance and heat intolerance.         OBJECTIVE:     Most Recent Vitals:  Pulse: 80 (08/24/23 1259)  BP: 113/69 (08/24/23 1259)  SpO2: 99 % (08/24/23 1259)      PHYSICAL EXAM:  Physical Exam  Vitals and nursing note reviewed.   Constitutional:       General: He is not in acute distress.     Comments: Thin   Cardiovascular:      Rate and Rhythm: Normal rate.      Pulses: Normal pulses.   Pulmonary:      Effort: Pulmonary effort is normal. No respiratory distress.   Abdominal:      General: There is no distension.      Palpations: Abdomen is soft.      Tenderness: There is no abdominal tenderness.   Neurological:      Mental Status: He is alert.           LABORATORY VALUES:  Lab Results   Component Value Date    WBC 1.60 (LL) 08/15/2023    HGB 12.5 (L) 08/15/2023    HCT 36.2 (L) 08/15/2023    PLT 43 (L) 08/15/2023    HGBA1C 5.0 05/10/2023     Lab Results   Component Value Date     08/15/2023    K 4.3 08/15/2023     08/15/2023    CO2 25 08/15/2023    BUN 17 08/15/2023    CREATININE 0.8 08/15/2023     08/15/2023    CALCIUM 9.1 08/15/2023    AST 14 08/15/2023    ALT 10 08/15/2023    ALKPHOS 69 08/15/2023    BILITOT 0.9 08/15/2023    PROT 6.4 08/15/2023    ALBUMIN 3.1 (L) 08/15/2023     Lab Results   Component Value Date    INR 0.9 07/07/2023     Lab Results   Component Value Date    TSH 0.741 05/10/2023          DIAGNOSTIC STUDIES:  EUS (7/3/23) - hypoechoic mass was found in the middle third of the esophagus.        The mass was encountered at 30 cm from the incisors and extended to        35 cm. The lesion was partially circumferential (involving 60% of        the lumen). The endosonographic borders were well-defined. There was        sonographic evidence suggesting invasion into the adventitia. One 9mm hypoechoic node was        seen in the region of the mass, not amenable to FNA (>T3)      PET (6/1/23) - hypermetabolic circumferential wall thickening of lower esophagus/gastric fundus; Indeterminate AP window lymph node measuring upper limit of normal for size with mild radiotracer uptake.  Additional subcentimeter left upper cervical chain lymph node with mild uptake, possibly reactive.         ASSESSMENT:     Blayne Beebe is a 63 y.o. male PMH COPD (emphysema), HLD, and Stage III (cT3, cN1, cM0, G2) GEJ esophageal adenocarcinoma s/p neoadjuvant chemoradiation presents to clinic today to discuss surgical intervention. He underwent PFTs today, sufficient lung volumes but decreased DLCO, discussed with patient would be a higher than average risk for not tolerating single lung ventilation for transthoracic approach. Scheduled for cardiac stress test next week, staging PET post chemoradiation to be completed and will tentatively schedule for surgery in October.       PLAN:  - Transthoracic, possible transhiatal esophagectomy in October, pending cardiac stress test and restaging PET scan   - Discussed prehabiliation  - Smoking cessation counseling performed  - Encouraged aerobic exercise, including walking, as tolerated        -- Juju Moeller M.D  General Surgery PGY5  Pager: [phone]

## 2023-08-28 ENCOUNTER — CLINICAL SUPPORT (OUTPATIENT)
Dept: REHABILITATION | Facility: HOSPITAL | Age: 63
End: 2023-08-28
Attending: STUDENT IN AN ORGANIZED HEALTH CARE EDUCATION/TRAINING PROGRAM
Payer: COMMERCIAL

## 2023-08-28 ENCOUNTER — CLINICAL SUPPORT (OUTPATIENT)
Dept: HEMATOLOGY/ONCOLOGY | Facility: CLINIC | Age: 63
End: 2023-08-28
Payer: COMMERCIAL

## 2023-08-28 ENCOUNTER — OFFICE VISIT (OUTPATIENT)
Dept: SURGERY | Facility: CLINIC | Age: 63
End: 2023-08-28
Payer: COMMERCIAL

## 2023-08-28 ENCOUNTER — OFFICE VISIT (OUTPATIENT)
Dept: PSYCHIATRY | Facility: CLINIC | Age: 63
End: 2023-08-28
Payer: COMMERCIAL

## 2023-08-28 VITALS
HEART RATE: 84 BPM | HEIGHT: 69 IN | SYSTOLIC BLOOD PRESSURE: 96 MMHG | BODY MASS INDEX: 18.47 KG/M2 | WEIGHT: 124.69 LBS | OXYGEN SATURATION: 99 % | DIASTOLIC BLOOD PRESSURE: 62 MMHG

## 2023-08-28 DIAGNOSIS — C16.0 MALIGNANT NEOPLASM OF GASTROESOPHAGEAL JUNCTION: Primary | ICD-10-CM

## 2023-08-28 DIAGNOSIS — Z00.8 ENCOUNTER FOR PRE-SURGICAL PSYCHOLOGICAL ASSESSMENT: ICD-10-CM

## 2023-08-28 DIAGNOSIS — E44.0 MODERATE MALNUTRITION: ICD-10-CM

## 2023-08-28 DIAGNOSIS — Z74.09 DECREASED FUNCTIONAL MOBILITY AND ENDURANCE: ICD-10-CM

## 2023-08-28 DIAGNOSIS — Z71.3 NUTRITIONAL COUNSELING: ICD-10-CM

## 2023-08-28 DIAGNOSIS — R29.898 WEAKNESS OF BOTH HIPS: ICD-10-CM

## 2023-08-28 DIAGNOSIS — Z72.0 TOBACCO USE: ICD-10-CM

## 2023-08-28 DIAGNOSIS — C16.0 MALIGNANT NEOPLASM OF GASTROESOPHAGEAL JUNCTION: ICD-10-CM

## 2023-08-28 PROCEDURE — 3078F DIAST BP <80 MM HG: CPT | Mod: CPTII,S$GLB,, | Performed by: NURSE PRACTITIONER

## 2023-08-28 PROCEDURE — 99999 PR PBB SHADOW E&M-EST. PATIENT-LVL I: ICD-10-PCS | Mod: PBBFAC,,, | Performed by: CASE MANAGER/CARE COORDINATOR

## 2023-08-28 PROCEDURE — 97161 PT EVAL LOW COMPLEX 20 MIN: CPT

## 2023-08-28 PROCEDURE — 99999 PR PBB SHADOW E&M-EST. PATIENT-LVL I: CPT | Mod: PBBFAC,,, | Performed by: DIETITIAN, REGISTERED

## 2023-08-28 PROCEDURE — 97802 PR MED NUTR THER, 1ST, INDIV, EA 15 MIN: ICD-10-PCS | Mod: S$GLB,,, | Performed by: DIETITIAN, REGISTERED

## 2023-08-28 PROCEDURE — 1159F PR MEDICATION LIST DOCUMENTED IN MEDICAL RECORD: ICD-10-PCS | Mod: CPTII,S$GLB,, | Performed by: NURSE PRACTITIONER

## 2023-08-28 PROCEDURE — 97110 THERAPEUTIC EXERCISES: CPT

## 2023-08-28 PROCEDURE — 3044F PR MOST RECENT HEMOGLOBIN A1C LEVEL <7.0%: ICD-10-PCS | Mod: CPTII,S$GLB,, | Performed by: CASE MANAGER/CARE COORDINATOR

## 2023-08-28 PROCEDURE — 99999 PR PBB SHADOW E&M-EST. PATIENT-LVL IV: CPT | Mod: PBBFAC,,, | Performed by: NURSE PRACTITIONER

## 2023-08-28 PROCEDURE — 1160F PR REVIEW ALL MEDS BY PRESCRIBER/CLIN PHARMACIST DOCUMENTED: ICD-10-PCS | Mod: CPTII,S$GLB,, | Performed by: NURSE PRACTITIONER

## 2023-08-28 PROCEDURE — 1160F RVW MEDS BY RX/DR IN RCRD: CPT | Mod: CPTII,S$GLB,, | Performed by: NURSE PRACTITIONER

## 2023-08-28 PROCEDURE — 3008F PR BODY MASS INDEX (BMI) DOCUMENTED: ICD-10-PCS | Mod: CPTII,S$GLB,, | Performed by: NURSE PRACTITIONER

## 2023-08-28 PROCEDURE — 3074F PR MOST RECENT SYSTOLIC BLOOD PRESSURE < 130 MM HG: ICD-10-PCS | Mod: CPTII,S$GLB,, | Performed by: NURSE PRACTITIONER

## 2023-08-28 PROCEDURE — 3044F HG A1C LEVEL LT 7.0%: CPT | Mod: CPTII,S$GLB,, | Performed by: CASE MANAGER/CARE COORDINATOR

## 2023-08-28 PROCEDURE — 3074F SYST BP LT 130 MM HG: CPT | Mod: CPTII,S$GLB,, | Performed by: NURSE PRACTITIONER

## 2023-08-28 PROCEDURE — 96156 HLTH BHV ASSMT/REASSESSMENT: CPT | Mod: S$GLB,,, | Performed by: CASE MANAGER/CARE COORDINATOR

## 2023-08-28 PROCEDURE — 3044F HG A1C LEVEL LT 7.0%: CPT | Mod: CPTII,S$GLB,, | Performed by: NURSE PRACTITIONER

## 2023-08-28 PROCEDURE — 3044F PR MOST RECENT HEMOGLOBIN A1C LEVEL <7.0%: ICD-10-PCS | Mod: CPTII,S$GLB,, | Performed by: NURSE PRACTITIONER

## 2023-08-28 PROCEDURE — 99999 PR PBB SHADOW E&M-EST. PATIENT-LVL I: CPT | Mod: PBBFAC,,, | Performed by: CASE MANAGER/CARE COORDINATOR

## 2023-08-28 PROCEDURE — 97802 MEDICAL NUTRITION INDIV IN: CPT | Mod: S$GLB,,, | Performed by: DIETITIAN, REGISTERED

## 2023-08-28 PROCEDURE — 1159F MED LIST DOCD IN RCRD: CPT | Mod: CPTII,S$GLB,, | Performed by: NURSE PRACTITIONER

## 2023-08-28 PROCEDURE — 3078F PR MOST RECENT DIASTOLIC BLOOD PRESSURE < 80 MM HG: ICD-10-PCS | Mod: CPTII,S$GLB,, | Performed by: NURSE PRACTITIONER

## 2023-08-28 PROCEDURE — 96156 PR ASSESS/REASSESSMENT, HEALTH BEHAVIOR: ICD-10-PCS | Mod: S$GLB,,, | Performed by: CASE MANAGER/CARE COORDINATOR

## 2023-08-28 PROCEDURE — 99214 OFFICE O/P EST MOD 30 MIN: CPT | Mod: S$GLB,,, | Performed by: NURSE PRACTITIONER

## 2023-08-28 PROCEDURE — 99214 PR OFFICE/OUTPT VISIT, EST, LEVL IV, 30-39 MIN: ICD-10-PCS | Mod: S$GLB,,, | Performed by: NURSE PRACTITIONER

## 2023-08-28 PROCEDURE — 99999 PR PBB SHADOW E&M-EST. PATIENT-LVL I: ICD-10-PCS | Mod: PBBFAC,,, | Performed by: DIETITIAN, REGISTERED

## 2023-08-28 PROCEDURE — 3008F BODY MASS INDEX DOCD: CPT | Mod: CPTII,S$GLB,, | Performed by: NURSE PRACTITIONER

## 2023-08-28 PROCEDURE — 99999 PR PBB SHADOW E&M-EST. PATIENT-LVL IV: ICD-10-PCS | Mod: PBBFAC,,, | Performed by: NURSE PRACTITIONER

## 2023-08-28 RX ORDER — DM/P-EPHED/ACETAMINOPH/DOXYLAM 30-7.5/3
2 LIQUID (ML) ORAL
Qty: 270 LOZENGE | Refills: 0 | Status: SHIPPED | OUTPATIENT
Start: 2023-08-28 | End: 2023-09-27

## 2023-08-28 NOTE — PLAN OF CARE
[facility]  Physical Therapy Initial Evaluation  Name: Blayne Beebe  Clinic Number: [id number]    Therapy Diagnosis:   Encounter Diagnoses   Name Primary?    Weakness of both hips     Decreased functional mobility and endurance     Malignant neoplasm of gastroesophageal junction         Physician: Belem Chanel APRN, A*    Physician Orders: PT Eval and Treat   Medical Diagnosis from Referral: C16.0 (ICD-10-CM) - Malignant neoplasm of gastroesophageal junction  Evaluation Date: 8/28/2023  Authorization Period Expiration: 8/27/2024  Plan of Care Expiration: 10/6/2023  Progress Note Due: 10/6/2023  Visit # / Visits authorized: 1/ 1   FOTO: N/A    Precautions: Standard and cancer     Time In: 12:08 pm  Time Out: 12:55 pm  Total Appointment Time (timed & untimed codes): 47 minutes    Subjective   Date of onset: during chemo and radiation  History of current condition - Blayne reports: since starting chemo and radiation he has been taking it easy. 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 He has been feeling tired, more at the start of the week and it gets better as the week goes on. By the end of the week he just feels tired at the end of the day.   Cancer Related Surgery and Date: 10/4/23 esophagectomy    Chemotherapy: 7/16/23-8/15/23  Radiation: Completed 8/17/23     Medical History:   Past Medical History:   Diagnosis Date    Arthritis     COPD (chronic obstructive pulmonary disease)        Surgical History:   Blayne Beebe  has a past surgical history that includes Cervical fusion; Rotator cuff repair (Right); Inguinal hernia repair (Bilateral); Colonoscopy (N/A, 3/27/2018); Colonoscopy (N/A, 6/13/2023); Esophagogastroduodenoscopy (N/A, 6/13/2023); Endoscopic ultrasound of upper gastrointestinal tract (N/A, 7/3/2023); and Esophagogastroduodenoscopy (N/A,  7/3/2023).    Medications:   Blayne has a current medication list which includes the following prescription(s): lidocaine-prilocaine, pantoprazole, and sildenafil.    Allergies:   Review of patient's allergies indicates:  No Known Allergies     Imaging, EUS: 7/3/23  - hypoechoic mass was found in the middle third of the esophagus.        The mass was encountered at 30 cm from the incisors and extended to        35 cm. The lesion was partially circumferential (involving 60% of        the lumen). The endosonographic borders were well-defined. There was        sonographic evidence suggesting invasion into the adventitia. 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, lives with their family  Occupation: retired  Prior Level of Function: independent  Current Level of Function: I with ADLs, Totally dependent for gutting grass, yard work   Exercise Routine prior to onset: walking 3-4 times a week  Dominant hand:  right     Pain:  Current 0/10, worst 0/10, best 0/10   Location: N/A  Description: N/A  Aggravating Factors: N/A  Easing Factors: N/A    Functional Mobility (Bed mobility, transfers)  Bed mobility: I  Supine to sit: I  Sit to supine: I  Transfers to bed: I  Transfers to toilet: I  Sit to stand:  I  Stand pivot:  I  Car transfers: I    ADL's:  Feeding: I  Grooming: I  Hygiene: I  UB Dressing: I  LB Dressing: I  Toileting: I  Bathing: I    Pts goals: I'm not sure.      Objective     Lab Values 8/15/23  Hemoglobin: 12.5(L)  Hematocrit: 36.2(L)  Platelets: 43(L)  ANC: 0.9(L)        Vitals: BP: 96/62 mmHg; HR: 84 bpm; SpO2: 99%    Mental status: alert, oriented to person, place, and time, normal mood, behavior, speech, dress, motor activity, and thought processes, affect appropriate to mood  Appearance: Casually dressed  Behavior:  calm, cooperative, and  adequate rapport can be established  Attention Span and Concentration:  Normal    Postural examination/scapula alignment: Rounded shoulder, Head forward, and Decreased lordosis      Sensation:   Light Touch UEs  Intact  Light Touch LEs  Intact  Proprioception: Intact,            ROM:     Active/Passive ROM: (measured in degrees)     B UE and LE AROM WFL        Strength: manual muscle test grades below     Upper Extremity Strength  (R) UE  (L) UE    Shoulder flexion: 5/5 Shoulder flexion: 5/5   Shoulder Abduction: 5/5 Shoulder abduction: 5/5   Shoulder ER 5/5 Shoulder ER 5/5   Shoulder IR 5/5 Shoulder IR 5/5   Elbow flexion: 5/5 Elbow flexion: 5/5   Elbow extension: 5/5 Elbow extension: 5/5   Wrist flexion: 5/5 Wrist flexion: 5/5   Wrist extension: 5/5 Wrist extension: 5/5         Lower Extremity Strength  Right LE  Left LE    Hip Flexion: 4/5 Hip Flexion: 4/5   Hip Extension:  4/5 Hip Extension: 4/5   Hip Abduction: 5/5 Hip Abduction: 5/5   Hip Adduction: 5/5 Hip Adduction 5/5   Knee Extension: 5/5 Knee Extension: 5/5   Knee Flexion: 5/5 Knee Flexion: 5/5   Ankle Dorsiflexion: 5/5 Ankle Dorsiflexion: 5/5   Ankle Plantarflexion: 5/5 Ankle Plantarflexion: 5/5       Special Tests      Strength (in pounds, setting II one maximum trial):   Left Right    II 95.0 90.6     Normal  Average Values  Female Right Left Male: Right Left   20-29 66 lbs 62 lbs         94 lbs 86 lbs   30-39 68 lbs 64 lbs 90 lbs 82 lbs   40-49 64 lbs 62 lbs 80 lbs 74 lbs   50-59 62 lbs 57 lbs 72 lbs 65 lbs   60-69 53 lbs 51 lbs 63 lbs 56 lbs   70+ 44 lbs 42 lbs 54 lbs 48 lbs       Balance Assessment:     Evaluation   Single Limb Stance R LE 18.05 sec  (<10 sec = HIGH FALL RISK)   Single Limb Stance L LE 20.21 sec  (<10 sec = HIGH FALL RISK)      Evaluation   Timed Up and Go 6.19 sec  < 20 sec safe for independent transfers, < 30 sec safe for dependent transfers/assist required       Table: Population Norms for TUG    Age  Average TUG    60 -  "69 years  8.1 seconds    70 - 79 years  9.2 seconds    80 - 99 years  11.3 seconds        Endurance:    6 Minute Walk Test Distance in meters: 447.42 m (1467' 10")  Age Matched Norm for Men 60-64 years - 1830 ft - 2205 ft  - AD used: none  - Assistance: none  - Distance: 447.42 meters    GAIT DEVIATIONS:  Blayne displays the following deviations with ambulation: decreased elena.    Impairments contributing to deviations: decreased endurance    Endurance Assessment:   Evaluation   30 second Chair Rise  (adults > 59 y/o) 17 completed with no arms   Normal Range of Scores for Men age 60-64 - 14 to 19 reps with no arms      TREATMENT     Total Treatment time separate from Evaluation: 10 minutes    Blayne received therapeutic exercises to develop strength and endurance for 10 minutes including:  Mini squats  Standing hip ext  Tandem stance  Single leg stance  Walking program    Home Exercises and Patient Education Provided    Education provided:   - Role of physical therapy in multi-disciplinary team  - Goals for physical therapy, scheduling  - Home exercise program, exercise technique  - Energy conservation techniques     Written Home Exercises Provided: yes.  Exercises were reviewed and Blayne was able to demonstrate them prior to the end of the session.  Blayne demonstrated good  understanding of the education provided.     See EMR under Patient Instructions for exercises provided 8/28/2023.    Assessment   Blayne is a 63 y.o. male referred to outpatient Physical Therapy with a medical diagnosis of Malignant neoplasm of gastroesophageal junction. Pt presents with mild weakness of both hips, decreased balance, and decreased endurance. Due to his mild weakness, balance issues, decreased endurance he has difficulty performing his usual household duties and preferred recreational activities. At this time the patient prefers to perform a HEP instead of attending formal physical therapy.    Pt prognosis is Good.   Pt " will benefit from skilled outpatient Physical Therapy to address the deficits stated above and in the chart below, provide pt/family education, and to maximize pt's level of independence.     Plan of care discussed with patient: Yes  Pt's spiritual, cultural and educational needs considered and patient is agreeable to the plan of care and goals as stated below:     Anticipated Barriers for therapy: none anticipated    Medical Necessity is demonstrated by the following  History  Co-morbidities and personal factors that may impact the plan of care [] LOW: no personal factors / co-morbidities  [x] MODERATE: 1-2 personal factors / co-morbidities  [] HIGH: 3+ personal factors / co-morbidities    Moderate / High Support Documentation:   Co-morbidities affecting plan of care: history of cancer, COPD    Personal Factors:   no deficits     Examination  Body Structures and Functions, activity limitations and participation restrictions that may impact the plan of care [x] LOW: addressing 1-2 elements  [] MODERATE: 3+ elements  [] HIGH: 4+ elements (please support below)    Moderate / High Support Documentation: decreased endurance, mild B hip weakness     Clinical Presentation [] LOW: stable  [x] MODERATE: Evolving  [] HIGH: Unstable     Decision Making/ Complexity Score: low         Goals:  Long Term Goals: 5 weeks  1. Pt will increase strength of bilateral lower extremities to 5/5  to increase stability during ambulation on uneven surfaces,to increase tolerance for ADL and work activities. (progressing, not met)  2. Pt will be independent with HEP to improve ROM, strength, balance,  and independence with ADL's (progressing, not met)  3. Pt. will improve Single leg balance test score to 30 seconds each LE in order to ambulate safely in community and for patient to be in low risk for falls category (progressing, not met)  4. Pt. will improve 6 minute walk test score by 50 meters in order to transfer independently and for  patient to be in low risk for falls category (progressing, not met)  5. Patient will report compliance with walking program 5x week for 30 min each day to improve overall cardiovascular function and decrease cancer related fatigue at discharge. (progressing, not met)    Plan   Plan of care Certification: 8/28/2023 to 10/6/2023.    Patient has elected to perform HEP only at this time, Physical Therapist to reassess patient and update his HEP as needed.      Rhea Cowan, PT

## 2023-08-28 NOTE — PROGRESS NOTES
Psycho-Oncology Health and Behavior Evaluation   Psychiatry Initial Visit (PhD)    Date:  8/28/2023     CPT Code: 93667 Health and Behavior Assessment    Evaluation Length (direct face-to-face time):  20 minutes     Referred by:  Surgical Oncologist/GI Team: SARAY Dejesus, ANPShanaeC    Chief complaint/reason for encounter:  Health and Behavior evaluation prior to esophagectomy surgery    Clinical status of patient: Outpatient    Blayne Beebe, a 63 y.o. male, was seen for initial evaluation visit.  Met with patient. His primary care physician is Zachariah Renya MD.    INFORMED CONSENT/ LIMITS of CONFIDENTIALITY: Prior to beginning the interview, the patient's identification was confirmed using two identifiers. Blayne Beebe was informed of the possible risks and benefits of psychological interventions (e.g., counseling, psychotherapy, testing) and provided information regarding the handling of protected health records and   the limits of confidentiality, including the importance of reporting any suicidal or homicidal ideation to ensure safety of all parties. This provider explained the purpose of today's appointment and the patient was provided with time to ask questions regarding this information.  Acceptance and understanding of these conditions was expressed, and Blayne Beebe freely consented to this evaluation.     Patient's wife was present for the session with patient's consent.    Medical/Surgical History:   Patient Active Problem List   Diagnosis    Disc disease, degenerative, cervical    Erectile dysfunction    Elevated blood pressure reading without diagnosis of hypertension    Nicotine addiction    COPD (chronic obstructive pulmonary disease)    RBBB    Pulmonary nodule 1 cm or greater in diameter    Personal history of colonic polyps    Abnormal finding on GI tract imaging    Esophageal abnormality    Hard to intubate    Malignant neoplasm of gastroesophageal junction       Health  Behaviors:       ETOH Use: Patient noted he has not had any alcohol in last three weeks.    Tobacco Use: Patient noted he smoked two cigarettes yesterday and is working on abstaining.   THC use: No   Non-prescribed/Illicit Drug Use:  No     Prescription Misuse:No   Caffeine: 2 cups of coffee in AM   Exercise:The patient engaged in regular activity prior to illness The patient is actively working to return to baseline exercise tolerance.   Firearms:  Yes, patient noted firearms are stored locked up.   Advanced directives:Yes, patient has healthcare power of  in chart marked as received on 6/30/2023.     Past Psychiatric History:   Inpatient treatment: No     Outpatient treatment: No     Prior substance abuse treatment: No     Suicide Attempts: No     Psychotropic Medications: Current/Past: None        Current medications below, allergies reviewed in chart.  Current Outpatient Medications   Medication    LIDOcaine-prilocaine (EMLA) cream    pantoprazole (PROTONIX) 40 MG tablet    sildenafil (VIAGRA) 100 MG tablet     No current facility-administered medications for this visit.         Social situation/Stressors:Blayne Beebe is an 63 y.o. male referred by SARAY Duke, ANP-C for pre-surgical evaluation.  Blayne Beebe lives in [place], [place].     Relationship Status:  Partner/Spouse Name: Aislinn  Children/Dependents: Patient noted he has two children  Employment Status:  Retired 2 years and 8 months ago from [employer]  Education: high school diploma/GED  Hobbies: Patient shared that he enjoys fishing, planting, golf, reading, and cooking.   Status: no  Legal Status:  Patient denied any current legal difficulties.  Perceived Level of Social Support: good   Primary supports: spouse and friends  Patient's wife will be present and available to assist the patient during his recovery period.         Additional stressors:   None reported at this time    Strengths and liabilities:  Strength: Patient accepts guidance/feedback, Strength: Patient is motivated for change., Strength: Patient has positive support network., Strength: Patient has reasonable judgment.    Current Evaluation:       History of present illness:   Oncology History   Malignant neoplasm of gastroesophageal junction   6/13/2023 Cancer Staged    Staging form: Esophagus - Adenocarcinoma, AJCC 8th Edition  - Clinical stage from 6/13/2023: Stage III (cT3, cN1, cM0, G2)     6/23/2023 Initial Diagnosis    Malignant neoplasm of gastroesophageal junction     7/3/2023 Tumor Conference     [facility] UGI MULTIDISCIPLINARY TUMOR BOARD  PATIENT REVIEW FORM   ____________________________________________________________    CLINIC #: [id number]   DATE: 7/3/2023    DIAGNOSIS: esophageal CA    PRESENTER: Mitch    PATIENT SUMMARY:   This 64 y/o gentleman is a long time smoker with emphysema who had low dose screening lung CT in 4/2023 per his PCP. Given an abnormality on this scan, he had PET on 6/1/23 which identified large hypermetabolic mid esophageal mass with lymph nodes. He underwent EGD on 6/13/23 that found large fungating ulcerated mass with bleeding in middle third of esophagus to lower esophagus, 34-44cm. Pathology revealed moderately differentiated adenocarcinoma.   Staging PET reviewed - level 2 cervical lymph node with FDG uptake, nothing in lungs concerning  Staging EUS today per Dr. Duffy.   He has been evaluated by Dr. Vance in [place] and Dr. June.   He is scheduled to see rad onc, Dr. Jasso, Wednesday and IR for port placement on 7/12/23.     BOARD RECOMMENDATIONS:   Proceed with neoadj CRT, then restaging to consider surgical resection    CONSULT NEEDED:     [] Surgery    [] Hem/Onc    [] Rad/Onc    [] Dietary                 [] Social Service    [] Psychology       [] AES  [] Radiology     Clinical Stage: Tumor 3 Node(s) 1 Metastasis 0      GROUP STAGE:  [] O    [] 1   [] II     [x] III   []IV  [] Local  recurrence     [] Regional recurrence     [] Distant recurrence   Metastatic site(s): none         [x] [facility] Treatment Guidelines reviewed and care planned is consistent with guidelines.         (i.e., NCCN, NCI, PD, ACO, AUA, etc.)    PRESENTATION AT CANCER CONFERENCE:         [x] Prospective    [] Retrospective     [] Follow-Up         7/16/2023 -  Chemotherapy    Treatment Summary   Plan Name: OP ESOPHAGEAL PACLITAXEL CARBOPLATIN WEEKLY  Treatment Goal: Control  Status: Active  Start Date: 7/16/2023  End Date: 8/15/2023 (Planned)  Provider: Delta June MD  Chemotherapy: CARBOplatin (PARAPLATIN) 195 mg in sodium chloride 0.9% 304.5 mL chemo infusion, 195 mg (100 % of original dose 193 mg), Intravenous, Clinic/HOD 1 time, 1 of 1 cycle  Dose modification:   (original dose 193 mg, Cycle 1)  Administration: 195 mg (7/18/2023), 210 mg (7/24/2023), 230 mg (7/31/2023), 210 mg (8/8/2023)  PACLitaxeL (TAXOL) 50 mg/m2 = 84 mg in sodium chloride 0.9% 250 mL chemo infusion, 50 mg/m2 = 84 mg, Intravenous, Clinic/HOD 1 time, 1 of 1 cycle  Administration: 84 mg (7/18/2023)         Blayne Beebe has adjusted to illness fairly well.  The patient relies primarily on active coping strategies. He has engaged in appropriate information gathering.  The patient has good family support. The patient has an good partnership with his [facility] oncology treatment team.    Illness-related psychosocial stressors include  decrease in exercise, however patient noted that he plans on starting to start walking for exercise .  The patient reports good adherence with medical treatment in the past.  The patient reports the following barriers to cancer care:none reported at this time.  The patient reports high self-efficacy in regard to coping with his illness.  He reports good motivation to regain his strength and gain weight prior to surgery and to work toward recovery post-surgery. Patient noted he has coped adequately with previous  "surgeries.    Blayne Beebe is able to describe his medical problem in layman's terms.  Blayne Beebe is able to knowledgeably discuss the proposed medical treatments and describe what has been recommended by his team.  Blayne Beebe is knowledgeable about the possible costs, risks, and some of the complications of the treatment and the behavioral changes which will be required of him.    He has anticipated his recovery needs and has planned adequate time, assistance from his wife, and residence at home to facilitate healing.   Blayne Beebe is aware of the requirement that he increase his weight.    He is aware of the following necessary behavioral changes:changes in food selection, preparation, and storage.   Blayne Beebe has realistic expectations of health and illness possibilities following his treatment. He is aware of some of the possible medical side effects during/following treatment.   He is aware of the risk of mortality.         Psychological Impediments to Coping:  Mood: denied;   no SI/HI;   Anxiety: denied; no prior;    Substance abuse: denied  Is the patient willing to submit to a random drug screen?  N/A  Phobias: Denied  Trauma History: Denied  Sleep:  The patient described his sleep quality as "good" and noted that he gets 6-8 hours of sleep per night.  Head Injury History: Denies   Personality Functioning: The patient does not display any personality characteristics which would be an impediment to receiving surgery.  Cognitive functioning: denied      SUMMARY:  Blayne Beebe is a  63 y.o. male referred by SARAY Groves for health and behavior evaluation to maximize surgical outcomes.  The patient appears absent of disabling psychopathology or disabilities which would prevent understanding and compliance with medical treatment.  There is no evidence of suicidality.   The patient has good knowledge about surgery, appropriate expectations for health and illness " following sugery, adequate  understanding of the possible risks and somewhat of possible complications of this treatment option, and a high willingness to sustain effort for lifestyle changes and health adaptations which will be required of him.  Coping with illness effectively at this time.  He reports adequate compliance with previous medical treatment.  Blayne Beebe has good social support from his wife and friends. Caregiver is reportedly patient's wife and she is reportedly engaged and aware of post-surgical demands.  The patient exhibits a high degree of social stability.  The patient has experience using coping mechanisms to deal with stress. and The patient acknowledges no stressors expected to limit his ability to cope with the demands of surgery and recovery.  The patient reports no illicit drug use and that he has not drank alcohol in last 3 weeks He also reportedly drinks two cups of coffee in AM.  Patient noted he smoked two cigarettes yesterday and has started talking with SARAY Duke ANP-C regarding smoking cessation.  No cognitive difficulties appeared evident during the session. Patient denied any difficulties with memory/concentration and was oriented to person, place, and day of the week.      RECOMMENDATIONS  Patient noted he has already talked with SARAY Duke ANP-C regarding smoking cessation. No additional pre-surgical psychological or behavioral interventions are recommended for this patient. He was provided with information about [facility] supportive services (individual and caregiver psychological interventions) should these interventions be of interest in the future. Patient appears to have some understanding of possible side effects, however possible side effects from his proposed procedure should be reviewed with patient prior to him giving consent for procedure.    1. Malignant neoplasm of gastroesophageal junction    2. Encounter for  pre-surgical psychological assessment          Rico Santos Psy.D.  Clinical Psychologist  [place] License #[id number]  [place] License #[id number]

## 2023-08-28 NOTE — PATIENT INSTRUCTIONS
Strengthening: Hip Extension - Resisted        With tubing around right ankle, face anchor and pull leg straight back.  Repeat 10 times per set. Do 3 sets per session. Do 1 sessions per day.     Functional Quadriceps: Chair Squat        Keeping feet flat on floor, shoulder width apart, squat as low as is comfortable. Use support as necessary.  Repeat 10 times per set. Do 3 sets per session. Do 1 sessions per day.          Tandem Stance    Stand with one foot directly in front of the other. Repeat on both sides. Hold for 30 seconds. Do 3 times on each side.        Single Leg Stance    Standing up tall, pull up one leg from the floor and balance on on leg. Avoid letting your hip drop or slouch over. Hold for 30 seconds. Do 2 times on each side.     WALKING FOR EXERCISE:  The American Heart Association recommends 20-30 minutes of moderate to vigorous activity most, if not all, days of the week.  Start walking at any comfortable speed for 10 minutes every day. Takes breaks as you need to, and work toward taking fewer breaks. Increase time or speed as walking gets easier.

## 2023-08-28 NOTE — PROGRESS NOTES
Prehabilitation Initial Assessment      Mr. Blayne Beebe is a 63 y.o. male presented to the clinic for Prehabilitation. He arrives to clinic with his wife from [place].     Past Oncological History:   6/2023: abnormal lung CT screening identified mid esophageal mass with lymph nodes, stage III, [id number] at mid to lower esophagus.   7/16/2023 - 8/15/2023: neoadj CRT per Dr. June    Tentatively scheduled for esophagectomy 10/4/23 per Dr. Meyer    SUBJECTIVE:     Vital Signs:  Vitals:    08/28/23 1024   BP: 96/62   Pulse: 84      Body mass index is 18.41 kg/m².     Overall reports feeling OK. Fatigue is improving, sleeping well at night, admits to daytime napping. Continues to have a decreased appetite but denies dysphagia and odynophagia. Usually eats 1-2 meals daily without N/V/D. Drinks 2-3 Ensure daily. His baseline weight was 140-145# but he lost 10# with COVID in 2/2023. He weighed ~ 130# when diagnosed with cancer. Remains afebrile. Denies CP, SOB. He admits to decreased activity but works in garden / yard. Prior to chemoRT, he walked almost daily but no formal exercise. Limited ROM in neck r/t C1-C2 fusion. Retired from [employer] in 12/2020.     ASSESSMENTS:     Cognitive Assessment     Smoking Hx Yes    Ever smoked? Yes  Currently smoking? Yes  Previous failed Smoking Cessation program twice  How many years smoking? 40 years  How many packs per day? 1/2 ppd    Functional Assessment    Mets  >4 Mets  Yes    Functional Screening Questions   Can you get out of bed to chair yourself?  Yes  Can you dress and bathe yourself?  Yes  Can you make your own meals?  Yes  Can you do your own shopping?  Yes  Result: Does All ADLS, has No impairment    MANAGEMENT:     We discussed the rationale for Prehab and education regarding functional losses/risks/possible contraindications for surgical resection. The patient would like to participate in the PREHAB program.     We discussed the vital components of the  prehabilitation program and that it is designed to improve preoperative functional status, physical condition, nutritional status in order to improve perioperative outcomes. This is accomplished through a multimodal Prehab treatment protocol:     1) Nutritional Intervention  2) Physical Exercise/Pulmonary Strengthening  3) Smoking Cessation/Harm Reduction  4) Psychosocial evaluation/referral/treatment    Treatment Plan    1) Patient is going to start IMT (Inspiratory Muscle Training) to prevent pneumonia.   2) Not enrolled into the Care Companion's program.   3)Patient is going to start whey protein.   4) Patient is going to start Vitamin D.    5) Patient is going to start Omega-3 supplement.   6) Patient is referred to physical therapy. He will perform home exercises as instructed.   7) Patient is referred to dietitian  8) Patient is referred to psychology oncology.  9) instructed to quit smoking. Not interested in a third referral to Smoking Cessation given past failure. Discussed OTC and RX, he is open to try lozenges. eRx sent to pharmacy on file.     Please schedule prehab f/u VV next week.     8/29/2023: Cardiac stress test   9/22/2023: Staging PET with labs  9/27/2023: appt with Dr. Mitch Chanel NP  Nurse Practitioner, Prehabilitation Program and Surgical Oncology   [facility]  [address], [place] [address]

## 2023-08-28 NOTE — PROGRESS NOTES
"Oncology Nutrition Assessment for Medical Nutrition Therapy  Initial Prehab Visit    Blayne Beebe   1960    Referring Provider: Nas Meyer MD      Reason for Visit: nutrition counseling and education    PMHx:   Past Medical History:   Diagnosis Date    Arthritis     COPD (chronic obstructive pulmonary disease)        Nutrition Assessment    This is a 63 y.o.male with a medical diagnosis of GEJ adenocarcinoma s/p chemoradiation and plan for esophagectomy 10/4. He presents today as part of prehab program. He reports that his appetite is fair, eating 1-2 meals/day but snacks as well. He is supplementing with 2-3 Ensure Plus as well. He drinks Gatorade, Crystal Light, and tea.      Weight: 56.5 kg (124 lb 10.7 oz)  Height: 5' 9" (1.753 m)  BMI: 18.4    Usual BW: 140-145lb  Weight Change: 10lb loss from COVID 2/2023 then another 5-10lb loss    Allergies: Patient has no known allergies.    Current Medications:  Current Outpatient Medications:     LIDOcaine-prilocaine (EMLA) cream, Apply topically as needed (Port access). (Patient not taking: Reported on 7/31/2023), Disp: 30 g, Rfl: 1    pantoprazole (PROTONIX) 40 MG tablet, Take 1 tablet (40 mg total) by mouth once daily., Disp: 90 tablet, Rfl: 3    sildenafil (VIAGRA) 100 MG tablet, Take 1 tablet (100 mg total) by mouth daily as needed for Erectile Dysfunction., Disp: 10 tablet, Rfl: 6    Food/medication interactions noted: none    Vitamins/Supplements: none reported    Labs: Reviewed    Nutrition Diagnosis    Problem: moderate malnutrition  Etiology (related to): inadequate energy and protein intake  Signs/Symptoms (as evidenced by): reports of inadequate PO intake, weight loss, and both fat & muscle wasting present    Nutrition Intervention    Nutrition Prescription   1978 kcals (35kcal/kg)  79g protein (1.4g/kg)   1978mL fluid (35mL/kg)    Recommendations:  Eat small frequent meals/snacks  Make meals/snacks high in calories and protein - examples " dicussed  Continue to supplement with 2 Ensure Plus/day  Consume at least 1 scoop of protein powder/day  Start vitamin D3 and omega-3 supplements  Drink at least 64oz fluid/day    Materials Provided/Reviewed: Prehab materials    Nutrition Monitoring and Evaluation    Monitor: diet education needs, energy intake, and weight status    Goals: prevent further weight loss and encourage weight gain    Follow up: Patient provided with contact information and advised to call/message with questions or to make future appointment if further intervention is needed.    Communication to referring provider/care team: note available in chart; discussed with CHARI Chanel NP    Counseling time: 15 minutes    Noelle Morelos MS, RD, LDN

## 2023-08-28 NOTE — PATIENT INSTRUCTIONS
"Weeks 1 to 6: 1 lozenge every 1 to 2 hours (maximum: 5 lozenges every 6 hours; 20 lozenges/day); to increase chances of quitting, use at least 9 lozenges/day during the first 6 weeks.    Weeks 7 to 9: 1 lozenge every 2 to 4 hours (maximum: 5 lozenges every 6 hours; 20 lozenges/day).    Weeks 10 to 12: 1 lozenge every 4 to 8 hours (maximum: 5 lozenges every 6 hours; 20 lozenges/day).              Telemedicine Virtual Visits    What is a Virtual Visit?  A virtual visit is a secure video appointment with your provider via your smartphone or tablet.  This allows you to conduct a traditional office visit with your provider electronically through your [facility] without leaving home or work.    How much is a Virtual Visit?  The patients insurance will be billed for the virtual visit. Contact your insurance provider for covered benefits.    Requirements and Preparing for a Virtual Visit  You must have a smartphone, mobile tablet, laptop or desktop computer.  Your device must have a front-facing camera.  On a laptop or desktop, you can visit [email or web address]  For smartphones or tablets, you must have the [facility] installed.  You can find the [facility] in the [facility] Store (Compare And Share) and Google Play Store ([employer]).  If you need help with [facility], help is available:  online at [email or web address]   or by phone at 1-705.424.3393    E-Pre-Check  Once your Virtual Visit is scheduled you will need to complete the ePre-Check in the [facility] before your visit.   During ePre-Check you will verify your insurance, demographics, and sign the Telehealth Consent form.        How to Start Your Virtual Visit  Once your ePre-check is complete, you can select "Begin Visit"  You can join the visit up to 15 minutes before your appointment time  After selecting Begin visit, you will be launched to a new page.  Allow access to your camera and microphone if prompted, and select Join Visit  You will them be in a virtual waiting room " while you wait for your provider to join.

## 2023-08-28 NOTE — LETTER
August 28, 2023        SARAY Ying,ANP-C  [address] [place] [id number]             [facility] - [facility]iat[facility]  [address] [place] [id number]  Phone: [phone]  Fax: [phone]   Patient: Blayne Beebe   MR Number: [id number]   YOB: 1960   Date of Visit: 8/28/2023       Dear SARAY Duke, ANP-C:    Thank you for referring Blayne Beebe to me for evaluation. Below are the relevant portions of my assessment and plan of care.     SUMMARY:  Blayne Beebe is a  63 y.o. male referred by SARAY Chanel for health and behavior evaluation to maximize surgical outcomes.  The patient appears absent of disabling psychopathology or disabilities which would prevent understanding and compliance with medical treatment.  There is no evidence of suicidality.   The patient has good knowledge about surgery, appropriate expectations for health and illness following sugery, adequate  understanding of the possible risks and somewhat of possible complications of this treatment option, and a high willingness to sustain effort for lifestyle changes and health adaptations which will be required of him.  Coping with illness effectively at this time.  He reports adequate compliance with previous medical treatment.  Blayne Beebe has good social support from his wife and friends. Caregiver is reportedly patient's wife and she is reportedly engaged and aware of post-surgical demands.  The patient exhibits a high degree of social stability.  The patient has experience using coping mechanisms to deal with stress. and The patient acknowledges no stressors expected to limit his ability to cope with the demands of surgery and recovery.  The patient reports no illicit drug use and that he has not drank alcohol in last 3 weeks He also reportedly drinks two cups of coffee in AM.  Patient noted he smoked two cigarettes yesterday and has started talking with Belem Chanel,  [facility] regarding smoking cessation.  No cognitive difficulties appeared evident during the session. Patient denied any difficulties with memory/concentration and was oriented to person, place, and day of the week.      RECOMMENDATIONS  Patient noted he has already talked with SARAY Duke ANP-C regarding smoking cessation. No additional pre-surgical psychological or behavioral interventions are recommended for this patient. He was provided with information about [facility] supportive services (individual and caregiver psychological interventions) should these interventions be of interest in the future. Patient appears to have some understanding of possible side effects, however possible side effects from his proposed procedure should be reviewed with patient prior to him giving consent for procedure.       If you have questions, please do not hesitate to call me.    Sincerely,      Rico Santos, Marilee           CC  No Recipients

## 2023-08-29 ENCOUNTER — HOSPITAL ENCOUNTER (OUTPATIENT)
Dept: RADIOLOGY | Facility: HOSPITAL | Age: 63
Discharge: HOME | End: 2023-08-29
Attending: FAMILY MEDICINE
Payer: COMMERCIAL

## 2023-08-29 ENCOUNTER — HOSPITAL ENCOUNTER (OUTPATIENT)
Dept: CARDIOLOGY | Facility: HOSPITAL | Age: 63
Discharge: HOME | End: 2023-08-29
Attending: FAMILY MEDICINE
Payer: COMMERCIAL

## 2023-08-29 ENCOUNTER — PATIENT MESSAGE (OUTPATIENT)
Dept: FAMILY MEDICINE | Facility: CLINIC | Age: 63
End: 2023-08-29
Payer: COMMERCIAL

## 2023-08-29 DIAGNOSIS — I25.10 ATHEROSCLEROSIS OF CORONARY ARTERY WITHOUT ANGINA PECTORIS, UNSPECIFIED VESSEL OR LESION TYPE, UNSPECIFIED WHETHER NATIVE OR TRANSPLANTED HEART: ICD-10-CM

## 2023-08-29 DIAGNOSIS — R94.31 ABNORMAL ELECTROCARDIOGRAM (ECG) (EKG): ICD-10-CM

## 2023-08-29 LAB
CV STRESS BASE HR: 63 BPM
DIASTOLIC BLOOD PRESSURE: 69 MMHG
OHS CV CPX 1 MINUTE RECOVERY HEART RATE: 82 BPM
OHS CV CPX 85 PERCENT MAX PREDICTED HEART RATE MALE: 134
OHS CV CPX MAX PREDICTED HEART RATE: 158
OHS CV CPX PATIENT IS FEMALE: 0
OHS CV CPX PATIENT IS MALE: 1
OHS CV CPX PEAK DIASTOLIC BLOOD PRESSURE: 73 MMHG
OHS CV CPX PEAK HEAR RATE: 101 BPM
OHS CV CPX PEAK RATE PRESSURE PRODUCT: NORMAL
OHS CV CPX PEAK SYSTOLIC BLOOD PRESSURE: 126 MMHG
OHS CV CPX PERCENT MAX PREDICTED HEART RATE ACHIEVED: 64
OHS CV CPX RATE PRESSURE PRODUCT PRESENTING: 7875
SYSTOLIC BLOOD PRESSURE: 125 MMHG

## 2023-08-29 PROCEDURE — 93018 CV STRESS TEST I&R ONLY: CPT | Mod: ,,, | Performed by: INTERNAL MEDICINE

## 2023-08-29 PROCEDURE — 93016 NUCLEAR STRESS TEST (CUPID ONLY): ICD-10-PCS | Mod: ,,, | Performed by: INTERNAL MEDICINE

## 2023-08-29 PROCEDURE — 78452 HT MUSCLE IMAGE SPECT MULT: CPT | Mod: TC

## 2023-08-29 PROCEDURE — 93017 CV STRESS TEST TRACING ONLY: CPT

## 2023-08-29 PROCEDURE — 93016 CV STRESS TEST SUPVJ ONLY: CPT | Mod: ,,, | Performed by: INTERNAL MEDICINE

## 2023-08-29 PROCEDURE — A9502 TC99M TETROFOSMIN: HCPCS

## 2023-08-29 PROCEDURE — 93018 NUCLEAR STRESS TEST (CUPID ONLY): ICD-10-PCS | Mod: ,,, | Performed by: INTERNAL MEDICINE

## 2023-08-29 PROCEDURE — 78452 HT MUSCLE IMAGE SPECT MULT: CPT | Mod: 26,,, | Performed by: STUDENT IN AN ORGANIZED HEALTH CARE EDUCATION/TRAINING PROGRAM

## 2023-08-29 PROCEDURE — 78452 NM MYOCARDIAL PERFUSION SPECT MULTI PHARM: ICD-10-PCS | Mod: 26,,, | Performed by: STUDENT IN AN ORGANIZED HEALTH CARE EDUCATION/TRAINING PROGRAM

## 2023-08-29 RX ORDER — REGADENOSON 0.08 MG/ML
0.4 INJECTION, SOLUTION INTRAVENOUS ONCE
Status: DISCONTINUED | OUTPATIENT
Start: 2023-08-29 | End: 2023-09-06 | Stop reason: HOSPADM

## 2023-09-01 ENCOUNTER — TELEPHONE (OUTPATIENT)
Dept: SURGERY | Facility: CLINIC | Age: 63
End: 2023-09-01
Payer: COMMERCIAL

## 2023-09-01 NOTE — TELEPHONE ENCOUNTER
Attempted to call pt and schedule prehab VV next week, no answer. Left detailed msg on vm with contact info.

## 2023-09-02 ENCOUNTER — PATIENT MESSAGE (OUTPATIENT)
Dept: SURGERY | Facility: CLINIC | Age: 63
End: 2023-09-02
Payer: COMMERCIAL

## 2023-09-05 ENCOUNTER — OFFICE VISIT (OUTPATIENT)
Dept: SURGERY | Facility: CLINIC | Age: 63
End: 2023-09-05
Payer: COMMERCIAL

## 2023-09-05 VITALS — WEIGHT: 128.5 LBS | BODY MASS INDEX: 18.98 KG/M2

## 2023-09-05 DIAGNOSIS — C16.0 MALIGNANT NEOPLASM OF GASTROESOPHAGEAL JUNCTION: Primary | ICD-10-CM

## 2023-09-05 DIAGNOSIS — Z72.0 TOBACCO USE: ICD-10-CM

## 2023-09-05 DIAGNOSIS — Z74.09 DECREASED FUNCTIONAL MOBILITY AND ENDURANCE: ICD-10-CM

## 2023-09-05 PROCEDURE — 3044F PR MOST RECENT HEMOGLOBIN A1C LEVEL <7.0%: ICD-10-PCS | Mod: CPTII,95,, | Performed by: NURSE PRACTITIONER

## 2023-09-05 PROCEDURE — 99213 PR OFFICE/OUTPT VISIT, EST, LEVL III, 20-29 MIN: ICD-10-PCS | Mod: 95,,, | Performed by: NURSE PRACTITIONER

## 2023-09-05 PROCEDURE — 99213 OFFICE O/P EST LOW 20 MIN: CPT | Mod: 95,,, | Performed by: NURSE PRACTITIONER

## 2023-09-05 PROCEDURE — 1159F PR MEDICATION LIST DOCUMENTED IN MEDICAL RECORD: ICD-10-PCS | Mod: CPTII,95,, | Performed by: NURSE PRACTITIONER

## 2023-09-05 PROCEDURE — 3008F PR BODY MASS INDEX (BMI) DOCUMENTED: ICD-10-PCS | Mod: CPTII,95,, | Performed by: NURSE PRACTITIONER

## 2023-09-05 PROCEDURE — 3044F HG A1C LEVEL LT 7.0%: CPT | Mod: CPTII,95,, | Performed by: NURSE PRACTITIONER

## 2023-09-05 PROCEDURE — 1159F MED LIST DOCD IN RCRD: CPT | Mod: CPTII,95,, | Performed by: NURSE PRACTITIONER

## 2023-09-05 PROCEDURE — 1160F PR REVIEW ALL MEDS BY PRESCRIBER/CLIN PHARMACIST DOCUMENTED: ICD-10-PCS | Mod: CPTII,95,, | Performed by: NURSE PRACTITIONER

## 2023-09-05 PROCEDURE — 3008F BODY MASS INDEX DOCD: CPT | Mod: CPTII,95,, | Performed by: NURSE PRACTITIONER

## 2023-09-05 PROCEDURE — 1160F RVW MEDS BY RX/DR IN RCRD: CPT | Mod: CPTII,95,, | Performed by: NURSE PRACTITIONER

## 2023-09-05 NOTE — Clinical Note
Please schedule prehab f/u VV on Friday 9/15/23 Can you try to coordinate this preop lab appt?  9/22/2023: Staging PET  9/27/2023: labs 9/28/2023: appt with Dr. Meyer and Dr. June

## 2023-09-05 NOTE — PROGRESS NOTES
The patient location is: home  The chief complaint leading to consultation is: prehab f/u    Visit type: audiovisual    Face to Face time with patient: 22 minutes  26 minutes of total time spent on the encounter, which includes face to face time and non-face to face time preparing to see the patient (eg, review of tests), Obtaining and/or reviewing separately obtained history, Documenting clinical information in the electronic or other health record, Independently interpreting results (not separately reported) and communicating results to the patient/family/caregiver, or Care coordination (not separately reported).     Each patient to whom he or she provides medical services by telemedicine is:  (1) informed of the relationship between the physician and patient and the respective role of any other health care provider with respect to management of the patient; and (2) notified that he or she may decline to receive medical services by telemedicine and may withdraw from such care at any time.      Prehabilitation Clinic Follow-Up    Mr. Blayne Beebe is a 63 y.o. male presented to the [facility] for prehabilitation, after initial prehab assessments were done 8/28/23. He remains at home in [place]. This is the 1st follow-up appointment. This Is not the final prehab visit.     Past Oncological History:   6/2023: abnormal lung CT screening identified mid esophageal mass with lymph nodes, stage III, [id number] at mid to lower esophagus.   7/16/2023 - 8/15/2023: neoadj CRT per Dr. June     Tentatively scheduled for esophagectomy 10/4/23 per Dr. Meyer    SUBJECTIVE:     Vital Signs:  Body mass index is 18.98 kg/m².     Today he reports feeling better, with more energy. States he has a good appetite, tolerating oral diet without N/V. Denies dysphagia. Drinks 2 Ensure daily plus 1 scoop of protein powder in yogurt daily. Oral diet recall includes: spaghetti, green beans, baked chicken, mashed potato, eggs with toast.  His home scale weight today was 128.5#. He is performing home PT exercises BID as instructed. He walked 20 minutes every day last week. His last cigarette was Saturday 9/2/23 (previously smoked 1/2-1 ppd x 40 years, failed smoking cessation program twice)    ASSESSMENTS:     Compliance    Steps? (walk at least half hour per day 5 days a week or 8223-8381 steps per day) YES  IMT? YES 10 breaths TID  CREON? NA  Protein? YES  Vitamin-D3? YES  Omega-3? NO cannot swallow capsule    8/29/2023: Cardiac Stress    The ECG portion of the study is abnormal but not diagnostic.    The patient reported no chest pain during the stress test.    During stress, rare PVCs are noted.    The nuclear portion of this study will be reported separately.    MANAGEMENT:     Patient Is on track to proceed with surgery in 4 weeks.     Treatment Plan    1) Patient is going to continue IMT (Inspiratory Muscle Training) at Men 60-70yo (27.8 cmH2O) to prevent pneumonia. Instructed to increase resistance later this week.  2) Patient is going to remain tobacco free  3) Patient is going to continue 1 scoop whey protein daily.   4) Patient is going to continue 2000 IU Vitamin D 1 time(s) daily.    5) Patient is not going to start Omega-3s daily as he is unable to swallow capsule.   6) Patient is not continue physical therapy. Instructed to perform home PT exercises BID. Discussed strategy to increase walking.  7) Patient is not continue dietitian. He will weigh at home twice a week. He will message Monday 9/11/23 with next home scale weight.       Please schedule prehab f/u VV on Friday 9/15/23    9/22/2023: Staging PET   9/27/2023: labs  9/28/2023: appt with Dr. Meyer and Dr. June        Nurse Practitioner, Prehabilitation Program  Surgical Oncology and General Surgery  [facility] [address]  [address], [place] [address]    Tel [phone]  Fax [phone]

## 2023-09-11 ENCOUNTER — PATIENT MESSAGE (OUTPATIENT)
Dept: SURGERY | Facility: CLINIC | Age: 63
End: 2023-09-11
Payer: COMMERCIAL

## 2023-09-12 ENCOUNTER — PATIENT MESSAGE (OUTPATIENT)
Dept: SURGERY | Facility: CLINIC | Age: 63
End: 2023-09-12
Payer: COMMERCIAL

## 2023-09-14 ENCOUNTER — OFFICE VISIT (OUTPATIENT)
Dept: SURGERY | Facility: CLINIC | Age: 63
End: 2023-09-14
Payer: COMMERCIAL

## 2023-09-14 VITALS — BODY MASS INDEX: 19.64 KG/M2 | WEIGHT: 133 LBS

## 2023-09-14 DIAGNOSIS — F17.201 TOBACCO USE DISORDER, MODERATE, IN EARLY REMISSION: ICD-10-CM

## 2023-09-14 DIAGNOSIS — C16.0 MALIGNANT NEOPLASM OF GASTROESOPHAGEAL JUNCTION: Primary | ICD-10-CM

## 2023-09-14 DIAGNOSIS — Z74.09 DECREASED FUNCTIONAL MOBILITY AND ENDURANCE: ICD-10-CM

## 2023-09-14 PROCEDURE — 3008F BODY MASS INDEX DOCD: CPT | Mod: CPTII,95,, | Performed by: NURSE PRACTITIONER

## 2023-09-14 PROCEDURE — 99212 OFFICE O/P EST SF 10 MIN: CPT | Mod: 95,,, | Performed by: NURSE PRACTITIONER

## 2023-09-14 PROCEDURE — 1160F RVW MEDS BY RX/DR IN RCRD: CPT | Mod: CPTII,95,, | Performed by: NURSE PRACTITIONER

## 2023-09-14 PROCEDURE — 3044F PR MOST RECENT HEMOGLOBIN A1C LEVEL <7.0%: ICD-10-PCS | Mod: CPTII,95,, | Performed by: NURSE PRACTITIONER

## 2023-09-14 PROCEDURE — 1159F MED LIST DOCD IN RCRD: CPT | Mod: CPTII,95,, | Performed by: NURSE PRACTITIONER

## 2023-09-14 PROCEDURE — 3008F PR BODY MASS INDEX (BMI) DOCUMENTED: ICD-10-PCS | Mod: CPTII,95,, | Performed by: NURSE PRACTITIONER

## 2023-09-14 PROCEDURE — 99212 PR OFFICE/OUTPT VISIT, EST, LEVL II, 10-19 MIN: ICD-10-PCS | Mod: 95,,, | Performed by: NURSE PRACTITIONER

## 2023-09-14 PROCEDURE — 3044F HG A1C LEVEL LT 7.0%: CPT | Mod: CPTII,95,, | Performed by: NURSE PRACTITIONER

## 2023-09-14 PROCEDURE — 1159F PR MEDICATION LIST DOCUMENTED IN MEDICAL RECORD: ICD-10-PCS | Mod: CPTII,95,, | Performed by: NURSE PRACTITIONER

## 2023-09-14 PROCEDURE — 1160F PR REVIEW ALL MEDS BY PRESCRIBER/CLIN PHARMACIST DOCUMENTED: ICD-10-PCS | Mod: CPTII,95,, | Performed by: NURSE PRACTITIONER

## 2023-09-14 NOTE — PROGRESS NOTES
Prehabilitation Clinic Follow-Up    Mr. Blayne Beebe is a 63 y.o. male presented to the [facility] for prehabilitation.   This is the 2nd follow-up appointment. This Is not the final prehab visit.     Past Oncological History:   6/2023: abnormal lung CT screening identified mid esophageal mass with lymph nodes, stage III, [id number] at mid to lower esophagus.   7/16/2023 - 8/15/2023: neoadj CRT per Dr. June    9/22/23: restaging PET, 9/28/23: restaging appt with Dr. Meyer  Tentatively scheduled for esophagectomy 10/4/23 per Dr. Meyer  SUBJECTIVE:     Vital Signs:  Body mass index is 19.64 kg/m².     Today he reports  feeling great. States he has a good appetite, eating 3 meals daily most every day plus snacks (on cake, cupcake, milkshake, fruit). He is tolerating oral diet without N/V/D. Denies dysphagia. Drinks 2 Ensure daily plus 1 scoop of protein powder in yogurt daily. His home scale weight today was 133.2#, up from 128.5#. He is performing home PT exercises BID as instructed. He walks 20 minutes every day last week. His last cigarette was Saturday 9/2/23 (previously smoked 1/2-1 ppd x 40 years, failed smoking cessation program twice). Remains afebrile.    ASSESSMENTS:     Compliance    Steps? (walk at least half hour per day 5 days a week or 8838-2207 steps per day) YES  IMT? YES 10 breaths BID  CREON? NA  Protein? YES  Vitamin-D3? YES  Omega-3? NO cannot swallow capsules  Smoking?  Quit 9/2/23 8/29/2023: Cardiac Stress    The ECG portion of the study is abnormal but not diagnostic.    The patient reported no chest pain during the stress test.    During stress, rare PVCs are noted.    The nuclear portion of this study will be reported separately.    PT evaluation:  Endurance is low, given slower 6 min walk  Balance acceptable, given 30 sec chair rise,TUG and single limp stance WNL  Strength acceptable, given  strength     August back to May 2023:        MANAGEMENT:     Patient Is on  track to proceed with surgery in 3 weeks.     Treatment Plan    1) Patient is going to continue IMT (Inspiratory Muscle Training) at Men 60-70yo (27.8 cmH2O) to prevent pneumonia. Encouraged to increase to TID and increase resistance  2) Patient is going to remain tobacco free  3) Patient is going to continue 1 scoop whey protein daily.   4) Patient is going to continue 2000 IU Vitamin D 1 time(s) daily.    5) Patient is continue home physical therapy exercises and walking regimen daily outside in the neighborhood. Reviewed progressive exercise walking program.   6) Patient is not continue dietitian. He will monitor home scale weight 2x week and report to this office. Will check albumin with next lab draw.    9/22/2023: Staging PET and labs  9/28/2023: appt with Dr. Meyer and Dr. June        Nurse Practitioner, Prehabilitation Program  Surgical Oncology and General Surgery  [facility] [address]  [address], [place] [address]    Tel [phone]  Fax [phone]

## 2023-09-22 ENCOUNTER — PATIENT MESSAGE (OUTPATIENT)
Dept: SURGERY | Facility: CLINIC | Age: 63
End: 2023-09-22
Payer: COMMERCIAL

## 2023-09-22 ENCOUNTER — HOSPITAL ENCOUNTER (OUTPATIENT)
Dept: RADIOLOGY | Facility: HOSPITAL | Age: 63
Discharge: HOME | End: 2023-09-22
Attending: INTERNAL MEDICINE
Payer: COMMERCIAL

## 2023-09-22 DIAGNOSIS — C16.0 MALIGNANT NEOPLASM OF GASTROESOPHAGEAL JUNCTION: ICD-10-CM

## 2023-09-22 LAB — POCT GLUCOSE: 106 MG/DL (ref 70–110)

## 2023-09-22 PROCEDURE — 78815 PET IMAGE W/CT SKULL-THIGH: CPT | Mod: 26,PS,, | Performed by: STUDENT IN AN ORGANIZED HEALTH CARE EDUCATION/TRAINING PROGRAM

## 2023-09-22 PROCEDURE — A9552 F18 FDG: HCPCS

## 2023-09-22 PROCEDURE — 78815 NM PET CT ROUTINE: ICD-10-PCS | Mod: 26,PS,, | Performed by: STUDENT IN AN ORGANIZED HEALTH CARE EDUCATION/TRAINING PROGRAM

## 2023-09-25 ENCOUNTER — PATIENT MESSAGE (OUTPATIENT)
Dept: SURGERY | Facility: CLINIC | Age: 63
End: 2023-09-25
Payer: COMMERCIAL

## 2023-09-25 DIAGNOSIS — C16.0 MALIGNANT NEOPLASM OF GASTROESOPHAGEAL JUNCTION: Primary | ICD-10-CM

## 2023-09-27 ENCOUNTER — PATIENT MESSAGE (OUTPATIENT)
Dept: SURGERY | Facility: HOSPITAL | Age: 63
End: 2023-09-27
Payer: COMMERCIAL

## 2023-09-28 ENCOUNTER — OFFICE VISIT (OUTPATIENT)
Dept: HEMATOLOGY/ONCOLOGY | Facility: CLINIC | Age: 63
End: 2023-09-28
Payer: COMMERCIAL

## 2023-09-28 ENCOUNTER — OFFICE VISIT (OUTPATIENT)
Dept: SURGERY | Facility: CLINIC | Age: 63
End: 2023-09-28
Payer: COMMERCIAL

## 2023-09-28 VITALS
HEIGHT: 69 IN | DIASTOLIC BLOOD PRESSURE: 76 MMHG | RESPIRATION RATE: 18 BRPM | BODY MASS INDEX: 19.86 KG/M2 | HEART RATE: 78 BPM | SYSTOLIC BLOOD PRESSURE: 147 MMHG | WEIGHT: 134.06 LBS | OXYGEN SATURATION: 100 %

## 2023-09-28 VITALS
HEIGHT: 69 IN | HEART RATE: 78 BPM | WEIGHT: 134.06 LBS | OXYGEN SATURATION: 100 % | SYSTOLIC BLOOD PRESSURE: 147 MMHG | BODY MASS INDEX: 19.86 KG/M2 | DIASTOLIC BLOOD PRESSURE: 76 MMHG

## 2023-09-28 DIAGNOSIS — C16.0 MALIGNANT NEOPLASM OF GASTROESOPHAGEAL JUNCTION: Primary | ICD-10-CM

## 2023-09-28 DIAGNOSIS — Z79.899 IMMUNODEFICIENCY DUE TO CHEMOTHERAPY: ICD-10-CM

## 2023-09-28 DIAGNOSIS — F17.210 NICOTINE DEPENDENCE, CIGARETTES, UNCOMPLICATED: ICD-10-CM

## 2023-09-28 DIAGNOSIS — D84.821 IMMUNODEFICIENCY DUE TO CHEMOTHERAPY: ICD-10-CM

## 2023-09-28 DIAGNOSIS — I70.0 ATHEROSCLEROSIS OF AORTA: ICD-10-CM

## 2023-09-28 DIAGNOSIS — T45.1X5A CHEMOTHERAPY-INDUCED THROMBOCYTOPENIA: ICD-10-CM

## 2023-09-28 DIAGNOSIS — Z74.09 DECREASED FUNCTIONAL MOBILITY AND ENDURANCE: ICD-10-CM

## 2023-09-28 DIAGNOSIS — F17.201 TOBACCO USE DISORDER, MODERATE, IN EARLY REMISSION: ICD-10-CM

## 2023-09-28 DIAGNOSIS — T45.1X5A CHEMOTHERAPY INDUCED NEUTROPENIA: ICD-10-CM

## 2023-09-28 DIAGNOSIS — K20.90 ESOPHAGITIS: ICD-10-CM

## 2023-09-28 DIAGNOSIS — D69.59 CHEMOTHERAPY-INDUCED THROMBOCYTOPENIA: ICD-10-CM

## 2023-09-28 DIAGNOSIS — T45.1X5A IMMUNODEFICIENCY DUE TO CHEMOTHERAPY: ICD-10-CM

## 2023-09-28 DIAGNOSIS — D70.1 CHEMOTHERAPY INDUCED NEUTROPENIA: ICD-10-CM

## 2023-09-28 DIAGNOSIS — J43.9 PULMONARY EMPHYSEMA, UNSPECIFIED EMPHYSEMA TYPE: ICD-10-CM

## 2023-09-28 PROCEDURE — 3077F PR MOST RECENT SYSTOLIC BLOOD PRESSURE >= 140 MM HG: ICD-10-PCS | Mod: CPTII,S$GLB,, | Performed by: NURSE PRACTITIONER

## 2023-09-28 PROCEDURE — 3044F HG A1C LEVEL LT 7.0%: CPT | Mod: CPTII,S$GLB,, | Performed by: NURSE PRACTITIONER

## 2023-09-28 PROCEDURE — 3078F PR MOST RECENT DIASTOLIC BLOOD PRESSURE < 80 MM HG: ICD-10-PCS | Mod: CPTII,S$GLB,, | Performed by: INTERNAL MEDICINE

## 2023-09-28 PROCEDURE — 3044F HG A1C LEVEL LT 7.0%: CPT | Mod: CPTII,S$GLB,, | Performed by: INTERNAL MEDICINE

## 2023-09-28 PROCEDURE — 99214 OFFICE O/P EST MOD 30 MIN: CPT | Mod: S$GLB,,, | Performed by: NURSE PRACTITIONER

## 2023-09-28 PROCEDURE — 99215 PR OFFICE/OUTPT VISIT, EST, LEVL V, 40-54 MIN: ICD-10-PCS | Mod: S$GLB,,, | Performed by: INTERNAL MEDICINE

## 2023-09-28 PROCEDURE — 3008F PR BODY MASS INDEX (BMI) DOCUMENTED: ICD-10-PCS | Mod: CPTII,S$GLB,, | Performed by: INTERNAL MEDICINE

## 2023-09-28 PROCEDURE — 3078F PR MOST RECENT DIASTOLIC BLOOD PRESSURE < 80 MM HG: ICD-10-PCS | Mod: CPTII,S$GLB,, | Performed by: NURSE PRACTITIONER

## 2023-09-28 PROCEDURE — 99214 PR OFFICE/OUTPT VISIT, EST, LEVL IV, 30-39 MIN: ICD-10-PCS | Mod: S$GLB,,, | Performed by: NURSE PRACTITIONER

## 2023-09-28 PROCEDURE — 3078F DIAST BP <80 MM HG: CPT | Mod: CPTII,S$GLB,, | Performed by: NURSE PRACTITIONER

## 2023-09-28 PROCEDURE — 99215 OFFICE O/P EST HI 40 MIN: CPT | Mod: S$GLB,,, | Performed by: INTERNAL MEDICINE

## 2023-09-28 PROCEDURE — 3077F SYST BP >= 140 MM HG: CPT | Mod: CPTII,S$GLB,, | Performed by: NURSE PRACTITIONER

## 2023-09-28 PROCEDURE — 99999 PR PBB SHADOW E&M-EST. PATIENT-LVL III: ICD-10-PCS | Mod: PBBFAC,,, | Performed by: INTERNAL MEDICINE

## 2023-09-28 PROCEDURE — 99999 PR PBB SHADOW E&M-EST. PATIENT-LVL III: CPT | Mod: PBBFAC,,, | Performed by: NURSE PRACTITIONER

## 2023-09-28 PROCEDURE — 3008F PR BODY MASS INDEX (BMI) DOCUMENTED: ICD-10-PCS | Mod: CPTII,S$GLB,, | Performed by: NURSE PRACTITIONER

## 2023-09-28 PROCEDURE — 99999 PR PBB SHADOW E&M-EST. PATIENT-LVL III: ICD-10-PCS | Mod: PBBFAC,,, | Performed by: NURSE PRACTITIONER

## 2023-09-28 PROCEDURE — 3008F BODY MASS INDEX DOCD: CPT | Mod: CPTII,S$GLB,, | Performed by: INTERNAL MEDICINE

## 2023-09-28 PROCEDURE — 3078F DIAST BP <80 MM HG: CPT | Mod: CPTII,S$GLB,, | Performed by: INTERNAL MEDICINE

## 2023-09-28 PROCEDURE — 3044F PR MOST RECENT HEMOGLOBIN A1C LEVEL <7.0%: ICD-10-PCS | Mod: CPTII,S$GLB,, | Performed by: INTERNAL MEDICINE

## 2023-09-28 PROCEDURE — 1159F PR MEDICATION LIST DOCUMENTED IN MEDICAL RECORD: ICD-10-PCS | Mod: CPTII,S$GLB,, | Performed by: INTERNAL MEDICINE

## 2023-09-28 PROCEDURE — 99999 PR PBB SHADOW E&M-EST. PATIENT-LVL III: CPT | Mod: PBBFAC,,, | Performed by: INTERNAL MEDICINE

## 2023-09-28 PROCEDURE — 3044F PR MOST RECENT HEMOGLOBIN A1C LEVEL <7.0%: ICD-10-PCS | Mod: CPTII,S$GLB,, | Performed by: NURSE PRACTITIONER

## 2023-09-28 PROCEDURE — 1159F MED LIST DOCD IN RCRD: CPT | Mod: CPTII,S$GLB,, | Performed by: INTERNAL MEDICINE

## 2023-09-28 PROCEDURE — 3077F PR MOST RECENT SYSTOLIC BLOOD PRESSURE >= 140 MM HG: ICD-10-PCS | Mod: CPTII,S$GLB,, | Performed by: INTERNAL MEDICINE

## 2023-09-28 PROCEDURE — 3077F SYST BP >= 140 MM HG: CPT | Mod: CPTII,S$GLB,, | Performed by: INTERNAL MEDICINE

## 2023-09-28 PROCEDURE — 3008F BODY MASS INDEX DOCD: CPT | Mod: CPTII,S$GLB,, | Performed by: NURSE PRACTITIONER

## 2023-09-28 RX ORDER — CYANOCOBALAMIN (VITAMIN B-12) 500 MCG
TABLET ORAL DAILY
COMMUNITY

## 2023-09-28 NOTE — PROGRESS NOTES
MEDICAL ONCOLOGY - ESTABLISHED PATIENT VISIT    Reason for visit: Esophageal adenocarcinoma    Best Contact Phone Number(s): [phone] (home)      Cancer/Stage/TNM:    Cancer Staging   Malignant neoplasm of gastroesophageal junction  Staging form: Esophagus - Adenocarcinoma, AJCC 8th Edition  - Clinical stage from 6/13/2023: Stage III (cT3, cN1, cM0, G2) - Signed by Sunday Jasso MD on 7/5/2023       Oncology History   Malignant neoplasm of gastroesophageal junction   6/13/2023 Cancer Staged    Staging form: Esophagus - Adenocarcinoma, AJCC 8th Edition  - Clinical stage from 6/13/2023: Stage III (cT3, cN1, cM0, G2)     6/23/2023 Initial Diagnosis    Malignant neoplasm of gastroesophageal junction     7/3/2023 Tumor Conference     [facility] UGI MULTIDISCIPLINARY TUMOR BOARD  PATIENT REVIEW FORM   ____________________________________________________________    CLINIC #: [id number]   DATE: 7/3/2023    DIAGNOSIS: esophageal CA    PRESENTER: Mitch    PATIENT SUMMARY:   This 64 y/o gentleman is a long time smoker with emphysema who had low dose screening lung CT in 4/2023 per his PCP. Given an abnormality on this scan, he had PET on 6/1/23 which identified large hypermetabolic mid esophageal mass with lymph nodes. He underwent EGD on 6/13/23 that found large fungating ulcerated mass with bleeding in middle third of esophagus to lower esophagus, 34-44cm. Pathology revealed moderately differentiated adenocarcinoma.   Staging PET reviewed - level 2 cervical lymph node with FDG uptake, nothing in lungs concerning  Staging EUS today per Dr. Duffy.   He has been evaluated by Dr. Vance in [place] and Dr. June.   He is scheduled to see rad onc, Dr. Jasso, Wednesday and IR for port placement on 7/12/23.     BOARD RECOMMENDATIONS:   Proceed with neoadj CRT, then restaging to consider surgical resection    CONSULT NEEDED:     [] Surgery    [] Hem/Onc    [] Rad/Onc    [] Dietary                 [] Social  Service    [] Psychology       [] AES  [] Radiology     Clinical Stage: Tumor 3 Node(s) 1 Metastasis 0      GROUP STAGE:  [] O    [] 1   [] II     [x] III   []IV  [] Local recurrence     [] Regional recurrence     [] Distant recurrence   Metastatic site(s): none         [x] [facility] Treatment Guidelines reviewed and care planned is consistent with guidelines.         (i.e., NCCN, NCI, PD, ACO, AUA, etc.)    PRESENTATION AT CANCER CONFERENCE:         [x] Prospective    [] Retrospective     [] Follow-Up         7/16/2023 -  Chemotherapy    Treatment Summary   Plan Name: OP ESOPHAGEAL PACLITAXEL CARBOPLATIN WEEKLY  Treatment Goal: Control  Status: Active  Start Date: 7/16/2023  End Date: 8/15/2023 (Planned)  Provider: Delta June MD  Chemotherapy: CARBOplatin (PARAPLATIN) 195 mg in sodium chloride 0.9% 304.5 mL chemo infusion, 195 mg (100 % of original dose 193 mg), Intravenous, Clinic/HOD 1 time, 1 of 1 cycle  Dose modification:   (original dose 193 mg, Cycle 1)  Administration: 195 mg (7/18/2023), 210 mg (7/24/2023), 230 mg (7/31/2023), 210 mg (8/8/2023)  PACLitaxeL (TAXOL) 50 mg/m2 = 84 mg in sodium chloride 0.9% 250 mL chemo infusion, 50 mg/m2 = 84 mg, Intravenous, Clinic/HOD 1 time, 1 of 1 cycle  Administration: 84 mg (7/18/2023)     7/18/2023 - 8/17/2023 Radiation Therapy    Treating physician: Sunday Jasso    Course: C1 Thorax 2023    Treatment Site Ref. ID Energy Dose/Fx (Gy) #Fx Dose Correction (Gy) Total Dose (Gy) Start Date End Date Elapsed Days   IM Esophagus PTV_High 6X 1.8 23 / 23 0 41.4 7/18/2023 8/17/2023 30             Interim History:   63 y.o. male with esophageal adenocarcinoma who presents for follow-up after completion of chemoradiation.  We did have to hold his fifth infusion of weekly carboplatin + Abraxane due to severe thrombocytopenia.  He has recovered well.  Eating well.  Reflux is controlled.  Remains active.      ECOG status is 0. Presents with his wife today.     ROS:    Review of Systems   Constitutional:  Negative for chills, fever, malaise/fatigue and weight loss.   HENT:  Negative for sore throat.    Eyes:  Negative for blurred vision and pain.   Respiratory:  Negative for cough and shortness of breath.    Cardiovascular:  Negative for chest pain and leg swelling.   Gastrointestinal:  Positive for heartburn. Negative for abdominal pain, constipation, diarrhea, nausea and vomiting.   Genitourinary:  Negative for dysuria and frequency.   Musculoskeletal:  Negative for back pain, falls and myalgias.   Skin:  Negative for rash.   Neurological:  Negative for dizziness, weakness and headaches.   Endo/Heme/Allergies:  Does not bruise/bleed easily.   Psychiatric/Behavioral:  Negative for depression. The patient is not nervous/anxious.    All other systems reviewed and are negative.      Past Medical History:   Past Medical History:   Diagnosis Date    Arthritis     COPD (chronic obstructive pulmonary disease)         Past Surgical History:   Past Surgical History:   Procedure Laterality Date    CERVICAL FUSION      COLONOSCOPY N/A 3/27/2018    Procedure: COLONOSCOPY;  Surgeon: Maria Teresa Morocho MD;  Location: [facility];  Service: Endoscopy;  Laterality: N/A;    COLONOSCOPY N/A 6/13/2023    Procedure: COLONOSCOPY;  Surgeon: Kelli Snell MD;  Location: [facility];  Service: Endoscopy;  Laterality: N/A;    ENDOSCOPIC ULTRASOUND OF UPPER GASTROINTESTINAL TRACT N/A 7/3/2023    Procedure: ULTRASOUND, UPPER GI TRACT, ENDOSCOPIC;  Surgeon: Ray Duffy MD;  Location: [facility];  Service: Endoscopy;  Laterality: N/A;    ESOPHAGOGASTRODUODENOSCOPY N/A 6/13/2023    Procedure: EGD (ESOPHAGOGASTRODUODENOSCOPY);  Surgeon: Kelli Snell MD;  Location: [facility];  Service: Endoscopy;  Laterality: N/A;    ESOPHAGOGASTRODUODENOSCOPY N/A 7/3/2023    Procedure: EGD (ESOPHAGOGASTRODUODENOSCOPY);  Surgeon: Ray Duffy MD;  Location: [facility];  Service: Endoscopy;  Laterality: N/A;     "INGUINAL HERNIA REPAIR Bilateral     Right x2, x1 left    ROTATOR CUFF REPAIR Right     x2        Family History:   Family History   Problem Relation Age of Onset    Diabetes Mother     Heart disease Father         CHF    Glaucoma Neg Hx     Colon cancer Neg Hx     Prostate cancer Neg Hx         Social History:   Social History     Tobacco Use    Smoking status: Former     Current packs/day: 0.25     Average packs/day: 0.3 packs/day for 40.7 years (10.2 ttl pk-yrs)     Types: Cigarettes     Start date: 1983     Passive exposure: Current    Smokeless tobacco: Never    Tobacco comments:     4 cigarettes a day   Substance Use Topics    Alcohol use: Yes     Comment: a couple of beers a day        I have reviewed and updated the patient's past medical, surgical, family and social histories.    Allergies:   Review of patient's allergies indicates:  No Known Allergies     Medications:   Current Outpatient Medications   Medication Sig Dispense Refill    cholecalciferol, vitamin D3, (VITAMIN D3) 10 mcg (400 unit) Tab Take by mouth once daily.      LIDOcaine-prilocaine (EMLA) cream Apply topically as needed (Port access). (Patient not taking: Reported on 7/31/2023) 30 g 1    pantoprazole (PROTONIX) 40 MG tablet Take 1 tablet (40 mg total) by mouth once daily. 90 tablet 3    sildenafil (VIAGRA) 100 MG tablet Take 1 tablet (100 mg total) by mouth daily as needed for Erectile Dysfunction. 10 tablet 6     No current facility-administered medications for this visit.        Physical Exam:   BP (!) 147/76 (BP Location: Left arm, Patient Position: Sitting, BP Method: Medium (Automatic))   Pulse 78   Resp 18   Ht 5' 9" (1.753 m)   Wt 60.8 kg (134 lb 0.6 oz)   SpO2 100%   BMI 19.79 kg/m²      ECOG Performance status: 0            Physical Exam  Vitals reviewed.   Constitutional:       General: He is not in acute distress.     Appearance: Normal appearance. He is normal weight.   HENT:      Head: Normocephalic and atraumatic.      " Right Ear: External ear normal.      Left Ear: External ear normal.      Nose: Nose normal.      Mouth/Throat:      Mouth: Mucous membranes are moist.      Pharynx: Oropharynx is clear. No posterior oropharyngeal erythema.   Eyes:      General: No scleral icterus.     Extraocular Movements: Extraocular movements intact.      Conjunctiva/sclera: Conjunctivae normal.      Pupils: Pupils are equal, round, and reactive to light.   Cardiovascular:      Rate and Rhythm: Normal rate and regular rhythm.      Pulses: Normal pulses.      Heart sounds: Normal heart sounds.   Pulmonary:      Effort: Pulmonary effort is normal.      Breath sounds: Normal breath sounds. No wheezing or rales.   Abdominal:      General: Bowel sounds are normal. There is no distension.      Palpations: Abdomen is soft.      Tenderness: There is no abdominal tenderness.   Musculoskeletal:         General: No swelling. Normal range of motion.      Cervical back: Normal range of motion and neck supple.   Skin:     General: Skin is warm.      Coloration: Skin is not jaundiced.      Findings: No erythema or rash.   Neurological:      General: No focal deficit present.      Mental Status: He is alert and oriented to person, place, and time. Mental status is at baseline.      Gait: Gait normal.   Psychiatric:         Mood and Affect: Mood normal.         Behavior: Behavior normal.         Thought Content: Thought content normal.         Judgment: Judgment normal.           Labs:   Recent Results (from the past 48 hour(s))   CBC Auto Differential    Collection Time: 09/27/23  9:46 AM   Result Value Ref Range    WBC 5.55 3.90 - 12.70 K/uL    RBC 4.02 (L) 4.60 - 6.20 M/uL    Hemoglobin 13.5 (L) 14.0 - 18.0 g/dL    Hematocrit 39.9 (L) 40.0 - 54.0 %    MCV 99 (H) 82 - 98 fL    MCH 33.6 (H) 27.0 - 31.0 pg    MCHC 33.8 32.0 - 36.0 g/dL    RDW 16.7 (H) 11.5 - 14.5 %    Platelets 238 150 - 450 K/uL    MPV 9.4 9.2 - 12.9 fL    Immature Granulocytes 1.1 (H) 0.0 - 0.5  %    Gran # (ANC) 3.2 1.8 - 7.7 K/uL    Immature Grans (Abs) 0.06 (H) 0.00 - 0.04 K/uL    Lymph # 1.3 1.0 - 4.8 K/uL    Mono # 0.8 0.3 - 1.0 K/uL    Eos # 0.2 0.0 - 0.5 K/uL    Baso # 0.05 0.00 - 0.20 K/uL    nRBC 0 0 /100 WBC    Gran % 57.4 38.0 - 73.0 %    Lymph % 23.1 18.0 - 48.0 %    Mono % 14.1 4.0 - 15.0 %    Eosinophil % 3.4 0.0 - 8.0 %    Basophil % 0.9 0.0 - 1.9 %    Differential Method Automated    Comprehensive Metabolic Panel    Collection Time: 09/27/23  9:46 AM   Result Value Ref Range    Sodium 142 136 - 145 mmol/L    Potassium 4.6 3.5 - 5.1 mmol/L    Chloride 107 95 - 110 mmol/L    CO2 22 (L) 23 - 29 mmol/L    Glucose 109 70 - 110 mg/dL    BUN 13 2 - 20 mg/dL    Creatinine 0.76 0.50 - 1.40 mg/dL    Calcium 9.4 8.7 - 10.5 mg/dL    Total Protein 8.2 6.0 - 8.4 g/dL    Albumin 4.5 3.5 - 5.2 g/dL    Total Bilirubin 0.7 0.1 - 1.0 mg/dL    Alkaline Phosphatase 120 38 - 126 U/L    AST 39 15 - 46 U/L    ALT 23 10 - 44 U/L    Anion Gap 13 8 - 16 mmol/L    eGFR >60.0 >60 mL/min/1.73 m^2   APTT    Collection Time: 09/27/23  9:46 AM   Result Value Ref Range    aPTT 27.6 21.0 - 32.0 sec   PROTIME-INR    Collection Time: 09/27/23  9:46 AM   Result Value Ref Range    Prothrombin Time 10.0 9.0 - 12.5 sec    INR 0.9 0.8 - 1.2        I have reviewed the pertinent labs. Mild anemia, improved leukopenia and thrombocytopenia. Normal CMP.    Imaging:       PET/CT 9/22/23:    Impression:     1. In this patient with biopsy-proven esophageal adenocarcinoma, there is persistent circumferential soft tissue thickening in the distal esophagus likely extending into the proximal stomach with interval decreased hypermetabolic activity.  Index cervical and AP window lymph nodes are decreased in size/uptake.  No new lymphadenopathy or suspicious lesions elsewhere.  2. Additional stable findings as above.    Path:   2. Gastroesophageal junction mass (biopsy):   Invasive adenocarcinoma, moderately differentiated   Background low and  high-grade glandular dysplasia   HER2 immunohistochemistry:  Equivocal.     Immunohistochemistry (IHC) Testing for Mismatch Repair (MMR) Proteins:   MLH1, MSH2, MSH6, PMS2 - Intact nuclear expression   Background nonneoplastic tissue/internal control with intact nuclear expression     There are exceptions to the above IHC interpretations. These results should not be considered in isolation, and clinical correlation with genetic counseling is recommended to assess the need for germline testing.     Interpretation: No loss of nuclear expression of MMR proteins: low probability of microsatellite instability       Assessment:       1. Malignant neoplasm of gastroesophageal junction    2. Immunodeficiency due to chemotherapy    3. Chemotherapy induced neutropenia    4. Chemotherapy-induced thrombocytopenia    5. Esophagitis    6. Pulmonary emphysema, unspecified emphysema type    7. Nicotine dependence, cigarettes, uncomplicated    8. Atherosclerosis of aorta                  Plan:             # Esophageal adenocarcinoma, chemotherapy induced neutropenia/thrombocytopenia  He appears to have a locally advanced adenocarcinoma of the middle and lower third of the esophagus, pMMR. PET/CT does not show clear distant metastatic disease.  We discussed the case and plan with Dr. Meyer and he feels the patient is surgically resectable as well.    Began cycle 1 on 7/18/23. He unfortunately had a reaction to the Taxol. During the Taxol infusion, he developed coughing, flushing, chest tightness and nausea. O2 sat was 92%, 2L O2 applied NC. We were notified and stopped the Taxol. He was able to proceed with the Carboplatin without complication.   We switched him to Abraxane 40 mg/m2 with week 2.  This was tolerated very well without issue. Has tolerated RT well and has completed 4 cycles of chemoRT. He completed radiation 8/18/23.  We did have to forego his last dose of chemotherapy due to cytopenias.    PET/CT on 9/22/23 showed  very nice response to treatment with reduction in SUV avidity of primary tumor.  Discussed with Dr. Meyer.  He will proceed with surgery next week.    I will see him back about 3 weeks post-op discuss potential for adjuvant nivolumab per [id number] pending path response.    # Esophagitis  Improved.  Secondary to chemo and radiation.    # COPD, tobacco abuse, aortic atherosclerosis  Counseled on tobacco cessation.  He has stopped smoking.  Normal SpO2.  No dyspnea.  Atherosclerosis noted on imaging.    The above information has been reviewed with the patient and all questions have been answered to their apparent satisfaction.  They understand that they can call the clinic with any questions.      Route Chart for Scheduling    Med Onc Chart Routing      Follow up with physician 4 weeks. with labs   Follow up with DEMARCO    Infusion scheduling note    Injection scheduling note    Labs CBC and CMP   Scheduling:  Preferred lab:  Lab interval:     Imaging    Pharmacy appointment    Other referrals                  Treatment Plan Information   OP ESOPHAGEAL PACLITAXEL CARBOPLATIN WEEKLY   Delta June MD   Upcoming Treatment Dates - OP ESOPHAGEAL PACLITAXEL CARBOPLATIN WEEKLY    8/15/2023       Pre-Medications       palonosetron (ALOXI) 0.25 mg with Dexamethasone (DECADRON) 12 mg in NS 50 mL IVPB       famotidine (PF) injection 20 mg       diphenhydrAMINE (BENADRYL) 50 mg in sodium chloride 0.9% 50 mL IVPB       Chemotherapy       PACLitaxeL protein-bound (ABRAXANE) in 14 mL infusion       CARBOplatin (PARAPLATIN) 210 mg in sodium chloride 0.9% 271 mL chemo infusion       Supportive Care       prochlorperazine injection Soln 10 mg

## 2023-09-28 NOTE — H&P (VIEW-ONLY)
Seen with Belem. He feels good. Stress test negative for ischemia. PET shows decreased but residual hypermetabolic activity of the primary tumor. An AP window and level 2 cervical lymph node are no longer PET avid. Hie PET was previously reviewed in Tumor Board, and the level 2 node was thought to be reactive. He is ready to proceed with esophagectomy at this point.     The risks and benefits of robotic assisted Woodrow esophagectomy were reviewed including but not limited to: infection, bleeding, anastomotic leak, conduit necrosis, j tube related complications, recurrence, positive margin, cardiovascular and pulmonary complications, death, and imponderables. He understands the risks and signed written informed consent to proceed.     Nas Meyer MD  Surgical Oncology

## 2023-10-02 ENCOUNTER — PATIENT MESSAGE (OUTPATIENT)
Dept: SURGERY | Facility: HOSPITAL | Age: 63
End: 2023-10-02
Payer: COMMERCIAL

## 2023-10-02 NOTE — PROGRESS NOTES
Encounter Date:  2023    Patient ID: Blayne Beebe  Age:  63 y.o. :  1960    Chief Complaint:  followup of esophageal AP     2023: abnormal lung CT screening identified mid esophageal mass with lymph nodes, stage III, [id number] at mid to lower esophagus.   2023 - 8/15/2023: neoadj CRT per Dr. June  23: restaging PET, 23: restaging appt with Dr. Meyre  Tentatively scheduled for esophagectomy 10/4/23 per Dr. Meyer    Interval History:  Mr. Beebe returns to clinic from [place], [place] accompanied by his wife. Since completing CRT, he has been actively participating in prehab in preparation for surgery. PFTs revealed sufficient lung volumes but decreased DLCO. He quit smoking 2023.  Restaging PET showed residual disease in esophagus but no evidence of distant metastasis. He states he has a good appetite, tolerating oral diet without N/V/D. Denies dysphagia. His weight is stable, home scale weight 135#. Drinks Ensure plus 1 scoop of protein powder supplement daily. He is eating 3 meals daily plus bedtime snack. Remains afebrile. Denies CP, SOB. Limited ROM in neck r/t C1-C2 fusion. Retired from [employer] in 2020.     New Data:  Imaging:  Dr. Meyer and I personally reviewed the following images:   2023: PET  In this patient with biopsy-proven esophageal adenocarcinoma, there is persistent circumferential soft tissue thickening in the distal esophagus likely extending into the proximal stomach with interval decreased hypermetabolic activity.  Index cervical and AP window lymph nodes are decreased in size/uptake.  No new lymphadenopathy or suspicious lesions elsewhere.    2023: NM myocardial perfusion spect  1. Scintigraphically negative for ischemia or infarct.  2. The global left ventricular systolic function is within normal limits with an LV ejection fraction of 58 % and no evidence of LV dilatation. Wall motion is normal.    Labs:      Chemistry        Component  Value Date/Time     09/27/2023 0946    K 4.6 09/27/2023 0946     09/27/2023 0946    CO2 22 (L) 09/27/2023 0946    BUN 13 09/27/2023 0946    CREATININE 0.76 09/27/2023 0946     09/27/2023 0946        Component Value Date/Time    CALCIUM 9.4 09/27/2023 0946    ALKPHOS 120 09/27/2023 0946    AST 39 09/27/2023 0946    ALT 23 09/27/2023 0946    BILITOT 0.7 09/27/2023 0946    ESTGFRAFRICA >60.0 01/28/2020 0803    EGFRNONAA >60.0 01/28/2020 0803        Lab Results   Component Value Date    WBC 5.55 09/27/2023    HGB 13.5 (L) 09/27/2023    HCT 39.9 (L) 09/27/2023    MCV 99 (H) 09/27/2023     09/27/2023     Lab Results   Component Value Date    HGBA1C 5.0 05/10/2023     Past Medical History:   Diagnosis Date    Arthritis     Cancer     COPD (chronic obstructive pulmonary disease)      Past Surgical History:   Procedure Laterality Date    CERVICAL FUSION      COLONOSCOPY N/A 3/27/2018    Procedure: COLONOSCOPY;  Surgeon: Maria Teresa Morocho MD;  Location: [facility];  Service: Endoscopy;  Laterality: N/A;    COLONOSCOPY N/A 6/13/2023    Procedure: COLONOSCOPY;  Surgeon: Kelli Snell MD;  Location: [facility];  Service: Endoscopy;  Laterality: N/A;    ENDOSCOPIC ULTRASOUND OF UPPER GASTROINTESTINAL TRACT N/A 7/3/2023    Procedure: ULTRASOUND, UPPER GI TRACT, ENDOSCOPIC;  Surgeon: Ray Duffy MD;  Location: [facility];  Service: Endoscopy;  Laterality: N/A;    ESOPHAGOGASTRODUODENOSCOPY N/A 6/13/2023    Procedure: EGD (ESOPHAGOGASTRODUODENOSCOPY);  Surgeon: Kelli Snell MD;  Location: [facility];  Service: Endoscopy;  Laterality: N/A;    ESOPHAGOGASTRODUODENOSCOPY N/A 7/3/2023    Procedure: EGD (ESOPHAGOGASTRODUODENOSCOPY);  Surgeon: Ray Duffy MD;  Location: [facility];  Service: Endoscopy;  Laterality: N/A;    INGUINAL HERNIA REPAIR Bilateral     Right x2, x1 left    ROTATOR CUFF REPAIR Right     x2     Current Outpatient Medications on File Prior to Visit   Medication Sig Dispense  "Refill    cholecalciferol, vitamin D3, (VITAMIN D3) 10 mcg (400 unit) Tab Take by mouth once daily.      pantoprazole (PROTONIX) 40 MG tablet Take 1 tablet (40 mg total) by mouth once daily. 90 tablet 3    sildenafil (VIAGRA) 100 MG tablet Take 1 tablet (100 mg total) by mouth daily as needed for Erectile Dysfunction. 10 tablet 6    LIDOcaine-prilocaine (EMLA) cream Apply topically as needed (Port access). (Patient not taking: Reported on 7/31/2023) 30 g 1     No current facility-administered medications on file prior to visit.     Review of patient's allergies indicates:  No Known Allergies    Family History:  His family history includes Diabetes in his mother; Heart disease in his father.     Social History:   reports that he has quit smoking. His smoking use included cigarettes. He started smoking about 40 years ago. He has a 10.2 pack-year smoking history. He has been exposed to tobacco smoke. He has never used smokeless tobacco. He reports current alcohol use. He reports that he does not use drugs.     ROS:    Pertinent positive/negatives detailed in HPI, all other systems negative.     Physical Exam:  BP (!) 147/76   Pulse 78   Ht 5' 9" (1.753 m)   Wt 60.8 kg (134 lb 0.6 oz)   SpO2 100%   BMI 19.79 kg/m²     Constitutional:  Non-toxic, no acute distress.    Eyes:  Sclerae anicteric, gaze symmetrical  Neck:  Trachea midline,  FROM  Resp:  Easy work of breathing  Abd:  Soft, non-tender, no masses, no ascites  Musculoskeletal:  Ambulatory, normal gait.  Extremities are symmetrical without lymphedema.  Neuro:  No gross deficits  Psych:  Awake, alert, oriented.  Answers and asks questions appropriately      ICD-10-CM ICD-9-CM    1. Malignant neoplasm of gastroesophageal junction  C16.0 151.0       2. Decreased functional mobility and endurance  Z74.09 780.99       3. Tobacco use disorder, moderate, in early remission  F17.201 305.1       Plan   This 64 y/o gentleman was diagnosed adenocarcinoma of the mid to " distal esophagus in June 2023. He completed neoadj CRT   Restaging PET without evidence of distant mets. PFTs acceptable. He participated in Prehab and quit smoking 9/2/2023. Reviewed robotic Woodrow esophagectomy, expected hospital stay and recovering including nutritional supplement with J feeds.   Risks and benefits including death, bleeding, infection, scar, pain/numbness, margin positivity, discovery of additional disease, anastomotic leakage, damage to local structures, need for additional procedures based on operative findings and imponderables were all reviewed.  He was given the opportunity to ask questions, which were all addressed.  He voiced understanding and wishes to proceed. Consents obtained and preop instructions given per RN. Tentatively scheduled for surgery on 10/4    Follow up for post operative care / hospital discharge.    Pt seen in conjunction with Dr. Meyer today.         Belem Chanel NP  Upper GI / Hepatobiliary Surgical Oncology  [facility] [place], [place]  Office: [phone]  Fax: [phone]

## 2023-10-03 ENCOUNTER — TELEPHONE (OUTPATIENT)
Dept: SURGERY | Facility: CLINIC | Age: 63
End: 2023-10-03
Payer: COMMERCIAL

## 2023-10-03 ENCOUNTER — ANESTHESIA EVENT (OUTPATIENT)
Dept: SURGERY | Facility: HOSPITAL | Age: 63
DRG: 328 | End: 2023-10-03
Payer: COMMERCIAL

## 2023-10-03 DIAGNOSIS — C16.0 MALIGNANT NEOPLASM OF GASTROESOPHAGEAL JUNCTION: Primary | ICD-10-CM

## 2023-10-03 RX ORDER — CELECOXIB 200 MG/1
400 CAPSULE ORAL
Status: CANCELLED | OUTPATIENT
Start: 2023-10-03

## 2023-10-03 RX ORDER — HEPARIN SODIUM 5000 [USP'U]/ML
5000 INJECTION, SOLUTION INTRAVENOUS; SUBCUTANEOUS
Status: CANCELLED | OUTPATIENT
Start: 2023-10-03

## 2023-10-03 RX ORDER — METRONIDAZOLE 500 MG/100ML
500 INJECTION, SOLUTION INTRAVENOUS
Status: CANCELLED | OUTPATIENT
Start: 2023-10-03

## 2023-10-03 RX ORDER — ACETAMINOPHEN 500 MG
1000 TABLET ORAL
Status: CANCELLED | OUTPATIENT
Start: 2023-10-03

## 2023-10-03 RX ORDER — CEFAZOLIN SODIUM 2 G/50ML
2 SOLUTION INTRAVENOUS
Status: CANCELLED | OUTPATIENT
Start: 2023-10-03

## 2023-10-03 NOTE — TELEPHONE ENCOUNTER
Spoke to pt/spouse and confirmed procedure for 10/4/23 with Dr. Meyer. Informed to arrive at [facility] on the 2nd floor on [facility] for 0600 and surgery time is approximately 0800. Surgery Instructions provided as follows:  instructed to remain NPO solids 8 hours prior to surgery, clear liquids until 2 hours prior to surgery, to shower with antibacterial soap the night before surgery and morning of surgery before arrival, not to apply any lotions, powders or deodorant, remove all metal and jewelry, to wear comfortable clothes, and leave all valuables at home. Medications reviewed and  instructed to hold medications on DOS. Spouse will accompany pt on DOS. Pt/spouse verbalized understanding to all of the above.

## 2023-10-03 NOTE — PRE-PROCEDURE INSTRUCTIONS
"PreOp Instructions given:   - Verbal medication information (what to hold and what to take)   - NPO guidelines   - Arrival place directions given; time to be given the day before procedure by the   Surgeon's Office   - Bathing with antibacterial soap   - Don't wear any jewelry or bring any valuables AM of surgery   - No makeup or moisturizer to face   - No perfume/cologne, powder, lotions or aftershave   Pt. verbalized understanding.   Pt denies any h/o Anesthesia/Sedation complications or side effects.  Epic documentation of "Difficult Intubation"  "

## 2023-10-03 NOTE — ANESTHESIA PREPROCEDURE EVALUATION
[facility]  Anesthesia Pre-Operative Evaluation         Patient Name: Blayne Beebe  YOB: 1960  MRN: [id number]    SUBJECTIVE:     Pre-operative evaluation for Procedure(s) (LRB):  XI ROBOTIC ESOPHAGECTOMY (N/A)     10/03/2023    Blayne Beebe is a 63 y.o. male w/ a significant PMHx of COPD (emphysema), hx of difficult intubation, HLD, and Stage III (cT3, cN1, cM0, G2) GEJ esophageal adenocarcinoma. He underwent EGD on 6/13/23 that found large fungating ulcerated mass with bleeding in middle third of esophagus to lower esophagus, 34-44cm. Pathology revealed moderately differentiated adenocarcinoma.     He underwent port placement on 7/12/23 for administration of neoadjuvant chemotherapy. Initiated chemotherapy (paclitaxel/carboplatin) 7/16/23-8/15/23. Spirometry is normal. Lung volume determination is normal. Airway mechanics show normal airway resistance and conductance. DLCO is moderately decreased.    Patient now presents for the above procedure(s).      LDA:        PowerPort A Cath Single Lumen 07/12/23 1344 Internal Jugular Right (Active)   Number of days: 82       Prev airway:   Intubation:     Induction:  Intravenous    Intubated:  Postinduction    Mask Ventilation:  Easy with oral airway    Attempts:  1    Attempted By:  Staff anesthesiologist    Method of Intubation:  Video laryngoscopy    Blade:  Zapata 3    Laryngeal View Grade: Grade IIA - cords partially seen      Difficult Airway Encountered?: Yes      Future Airway Recommendations:  Video laryngoscopy    Complications:  None    Airway Device:  Oral endotracheal tube    Airway Device Size:  7.5    Style/Cuff Inflation:  Cuffed (inflated to minimal occlusive pressure)    Tube secured:  21    Secured at:  The lips    Placement Verified By:  Capnometry    Complicating Factors:  Anterior larynx, short neck, small mouth and poor neck/head extension    Findings Post-Intubation:  BS equal bilateral and  atraumatic/condition of teeth unchanged      Patient Active Problem List   Diagnosis    Disc disease, degenerative, cervical    Erectile dysfunction    Elevated blood pressure reading without diagnosis of hypertension    Tobacco use disorder, moderate, in early remission    COPD (chronic obstructive pulmonary disease)    RBBB    Pulmonary nodule 1 cm or greater in diameter    Personal history of colonic polyps    Abnormal finding on GI tract imaging    Esophageal abnormality    Hard to intubate    Malignant neoplasm of gastroesophageal junction    Weakness of both hips    Decreased functional mobility and endurance       Review of patient's allergies indicates:  No Known Allergies    Current Inpatient Medications:      No current facility-administered medications on file prior to encounter.     Current Outpatient Medications on File Prior to Encounter   Medication Sig Dispense Refill    LIDOcaine-prilocaine (EMLA) cream Apply topically as needed (Port access). (Patient not taking: Reported on 7/31/2023) 30 g 1    pantoprazole (PROTONIX) 40 MG tablet Take 1 tablet (40 mg total) by mouth once daily. 90 tablet 3    sildenafil (VIAGRA) 100 MG tablet Take 1 tablet (100 mg total) by mouth daily as needed for Erectile Dysfunction. 10 tablet 6       Past Surgical History:   Procedure Laterality Date    CERVICAL FUSION      COLONOSCOPY N/A 3/27/2018    Procedure: COLONOSCOPY;  Surgeon: Maria Teresa Morocho MD;  Location: [facility];  Service: Endoscopy;  Laterality: N/A;    COLONOSCOPY N/A 6/13/2023    Procedure: COLONOSCOPY;  Surgeon: Kelli Snell MD;  Location: [facility];  Service: Endoscopy;  Laterality: N/A;    ENDOSCOPIC ULTRASOUND OF UPPER GASTROINTESTINAL TRACT N/A 7/3/2023    Procedure: ULTRASOUND, UPPER GI TRACT, ENDOSCOPIC;  Surgeon: Ray Duffy MD;  Location: [facility];  Service: Endoscopy;  Laterality: N/A;    ESOPHAGOGASTRODUODENOSCOPY N/A 6/13/2023    Procedure: EGD  (ESOPHAGOGASTRODUODENOSCOPY);  Surgeon: Kelli Snell MD;  Location: [facility] ENDO;  Service: Endoscopy;  Laterality: N/A;    ESOPHAGOGASTRODUODENOSCOPY N/A 7/3/2023    Procedure: EGD (ESOPHAGOGASTRODUODENOSCOPY);  Surgeon: Ray Duffy MD;  Location: [facility] ENDO;  Service: Endoscopy;  Laterality: N/A;    INGUINAL HERNIA REPAIR Bilateral     Right x2, x1 left    ROTATOR CUFF REPAIR Right     x2       Social History     Socioeconomic History    Marital status:    Tobacco Use    Smoking status: Former     Current packs/day: 0.25     Average packs/day: 0.3 packs/day for 40.8 years (10.2 ttl pk-yrs)     Types: Cigarettes     Start date: 1983     Passive exposure: Current    Smokeless tobacco: Never    Tobacco comments:     4 cigarettes a day   Substance and Sexual Activity    Alcohol use: Yes     Comment: a couple of beers a day    Drug use: No    Sexual activity: Yes     Partners: Female     Comment:        OBJECTIVE:     Vital Signs Range (Last 24H):         Significant Labs:  Lab Results   Component Value Date    WBC 5.55 09/27/2023    HGB 13.5 (L) 09/27/2023    HCT 39.9 (L) 09/27/2023     09/27/2023    CHOL 170 05/10/2023    TRIG 48 05/10/2023    HDL 71 05/10/2023    ALT 23 09/27/2023    AST 39 09/27/2023     09/27/2023    K 4.6 09/27/2023     09/27/2023    CREATININE 0.76 09/27/2023    BUN 13 09/27/2023    CO2 22 (L) 09/27/2023    TSH 0.741 05/10/2023    PSA 0.70 05/10/2023    INR 0.9 09/27/2023    HGBA1C 5.0 05/10/2023       Diagnostic Studies: No relevant studies.    EKG:   Results for orders placed or performed in visit on 08/22/23   EKG 12-lead    Collection Time: 08/22/23 11:09 AM    Narrative    Test Reason : C16.0,J44.9,F17.200,I45.10,Z01.818,D70.9,D64.81,T45.1X5A,D69.6,    Vent. Rate : 083 BPM     Atrial Rate : 083 BPM     P-R Int : 136 ms          QRS Dur : 130 ms      QT Int : 392 ms       P-R-T Axes : 082 089 -13 degrees     QTc Int : 460 ms    Normal sinus  rhythm  Possible Left atrial enlargement  Nonspecific intraventricular block  T wave abnormality, consider inferior ischemia  Abnormal ECG  When compared with ECG of 28-APR-2015 14:52,  Nonspecific intraventricular block has replaced Right bundle branch block  Confirmed by Manohar Yeboah MD ([id number]) on 8/29/2023 1:09:44 PM    Referred By: magui lopes           Confirmed By:Manohar Yeboah MD       2D ECHO:  TTE:  No results found for this or any previous visit.    KWAME:  No results found for this or any previous visit.    ASSESSMENT/PLAN:                                                                                                                  10/03/2023  Blayne Beebe is a 63 y.o., male.      Pre-op Assessment    I have reviewed the Patient Summary Reports.     I have reviewed the Nursing Notes. I have reviewed the NPO Status.   I have reviewed the Medications.     Review of Systems  Anesthesia Hx:  No problems with previous Anesthesia  History of prior surgery of interest to airway management or planning: Denies Family Hx of Anesthesia complications.   Denies Personal Hx of Anesthesia complications.   Social:  Smoker, Alcohol Use    Hematology/Oncology:  Hematology Normal   Oncology Normal     EENT/Dental:EENT/Dental Normal   Cardiovascular:   Exercise tolerance: good Dysrhythmias RBBB   Pulmonary:   COPD, moderate    Renal/:  Renal/ Normal     Hepatic/GI:  Hepatic/GI Normal    Musculoskeletal:   Arthritis   Spine Disorders: cervical Disc disease and Degenerative disease    Neurological:  Neurology Normal    Endocrine:  Endocrine Normal    Psych:  Psychiatric Normal           Physical Exam  General: Well nourished, Cooperative, Alert and Oriented    Airway:  Mallampati: III   Mouth Opening: Normal  TM Distance: Normal  Tongue: Normal  Neck ROM: Normal ROM    Dental:  Intact        Anesthesia Plan  Type of Anesthesia, risks & benefits discussed:    Anesthesia Type: Gen ETT  Intra-op Monitoring  Plan: Standard ASA Monitors, Art Line and Central Line  Post Op Pain Control Plan: multimodal analgesia  Induction:  IV  Airway Plan: Direct, Post-Induction  Informed Consent: Informed consent signed with the Patient and all parties understand the risks and agree with anesthesia plan.  All questions answered.   ASA Score: 3  Day of Surgery Review of History & Physical: H&P Update referred to the surgeon/provider.    Ready For Surgery From Anesthesia Perspective.     .

## 2023-10-04 ENCOUNTER — HOSPITAL ENCOUNTER (INPATIENT)
Facility: HOSPITAL | Age: 63
LOS: 6 days | Discharge: HOME | DRG: 328 | End: 2023-10-10
Attending: SURGERY | Admitting: SURGERY
Payer: COMMERCIAL

## 2023-10-04 ENCOUNTER — ANESTHESIA (OUTPATIENT)
Dept: SURGERY | Facility: HOSPITAL | Age: 63
DRG: 328 | End: 2023-10-04
Payer: COMMERCIAL

## 2023-10-04 DIAGNOSIS — C16.0 MALIGNANT NEOPLASM OF GASTROESOPHAGEAL JUNCTION: ICD-10-CM

## 2023-10-04 LAB
ABO + RH BLD: NORMAL
ALBUMIN SERPL BCP-MCNC: 3.6 G/DL (ref 3.5–5.2)
ALP SERPL-CCNC: 70 U/L (ref 55–135)
ALT SERPL W/O P-5'-P-CCNC: 29 U/L (ref 10–44)
ANION GAP SERPL CALC-SCNC: 14 MMOL/L (ref 8–16)
APTT PPP: 27.3 SEC (ref 21–32)
AST SERPL-CCNC: 49 U/L (ref 10–40)
BASOPHILS # BLD AUTO: 0 K/UL (ref 0–0.2)
BASOPHILS NFR BLD: 0 % (ref 0–1.9)
BILIRUB SERPL-MCNC: 0.6 MG/DL (ref 0.1–1)
BLD GP AB SCN CELLS X3 SERPL QL: NORMAL
BUN SERPL-MCNC: 12 MG/DL (ref 8–23)
CALCIUM SERPL-MCNC: 7.7 MG/DL (ref 8.7–10.5)
CHLORIDE SERPL-SCNC: 114 MMOL/L (ref 95–110)
CO2 SERPL-SCNC: 15 MMOL/L (ref 23–29)
CREAT SERPL-MCNC: 0.8 MG/DL (ref 0.5–1.4)
DIFFERENTIAL METHOD: ABNORMAL
EOSINOPHIL # BLD AUTO: 0 K/UL (ref 0–0.5)
EOSINOPHIL NFR BLD: 0 % (ref 0–8)
ERYTHROCYTE [DISTWIDTH] IN BLOOD BY AUTOMATED COUNT: 16.4 % (ref 11.5–14.5)
EST. GFR  (NO RACE VARIABLE): >60 ML/MIN/1.73 M^2
GLUCOSE SERPL-MCNC: 174 MG/DL (ref 70–110)
GLUCOSE SERPL-MCNC: 212 MG/DL (ref 70–110)
HCO3 UR-SCNC: 22 MMOL/L (ref 24–28)
HCT VFR BLD AUTO: 32.5 % (ref 40–54)
HCT VFR BLD CALC: 35 %PCV (ref 36–54)
HGB BLD-MCNC: 10.7 G/DL (ref 14–18)
IMM GRANULOCYTES # BLD AUTO: 0.05 K/UL (ref 0–0.04)
IMM GRANULOCYTES NFR BLD AUTO: 0.6 % (ref 0–0.5)
INR PPP: 1 (ref 0.8–1.2)
LACTATE SERPL-SCNC: 5.6 MMOL/L (ref 0.5–2.2)
LYMPHOCYTES # BLD AUTO: 0.3 K/UL (ref 1–4.8)
LYMPHOCYTES NFR BLD: 3.6 % (ref 18–48)
MAGNESIUM SERPL-MCNC: 1.6 MG/DL (ref 1.6–2.6)
MCH RBC QN AUTO: 35.3 PG (ref 27–31)
MCHC RBC AUTO-ENTMCNC: 32.9 G/DL (ref 32–36)
MCV RBC AUTO: 107 FL (ref 82–98)
MONOCYTES # BLD AUTO: 0.3 K/UL (ref 0.3–1)
MONOCYTES NFR BLD: 3 % (ref 4–15)
NEUTROPHILS # BLD AUTO: 8 K/UL (ref 1.8–7.7)
NEUTROPHILS NFR BLD: 92.8 % (ref 38–73)
NRBC BLD-RTO: 0 /100 WBC
PCO2 BLDA: 53.9 MMHG (ref 35–45)
PH SMN: 7.22 [PH] (ref 7.35–7.45)
PHOSPHATE SERPL-MCNC: 2.2 MG/DL (ref 2.7–4.5)
PLATELET # BLD AUTO: 123 K/UL (ref 150–450)
PMV BLD AUTO: 9.4 FL (ref 9.2–12.9)
PO2 BLDA: 100 MMHG (ref 80–100)
POC BE: -6 MMOL/L
POC IONIZED CALCIUM: 1.17 MMOL/L (ref 1.06–1.42)
POC SATURATED O2: 96 % (ref 95–100)
POC TCO2: 24 MMOL/L (ref 23–27)
POTASSIUM BLD-SCNC: 4.7 MMOL/L (ref 3.5–5.1)
POTASSIUM SERPL-SCNC: 3.3 MMOL/L (ref 3.5–5.1)
PROT SERPL-MCNC: 5.8 G/DL (ref 6–8.4)
PROTHROMBIN TIME: 11.2 SEC (ref 9–12.5)
RBC # BLD AUTO: 3.03 M/UL (ref 4.6–6.2)
SAMPLE: ABNORMAL
SODIUM BLD-SCNC: 138 MMOL/L (ref 136–145)
SODIUM SERPL-SCNC: 143 MMOL/L (ref 136–145)
SPECIMEN OUTDATE: NORMAL
WBC # BLD AUTO: 8.66 K/UL (ref 3.9–12.7)

## 2023-10-04 PROCEDURE — 83605 ASSAY OF LACTIC ACID: CPT

## 2023-10-04 PROCEDURE — 94761 N-INVAS EAR/PLS OXIMETRY MLT: CPT

## 2023-10-04 PROCEDURE — 88309 TISSUE EXAM BY PATHOLOGIST: CPT | Mod: 26,,, | Performed by: PATHOLOGY

## 2023-10-04 PROCEDURE — 88309 PR  SURG PATH,LEVEL VI: ICD-10-PCS | Mod: 26,,, | Performed by: PATHOLOGY

## 2023-10-04 PROCEDURE — 88332 PATH CONSLTJ SURG EA ADD BLK: CPT | Mod: 26,,, | Performed by: PATHOLOGY

## 2023-10-04 PROCEDURE — D9220A PRA ANESTHESIA: ICD-10-PCS | Mod: CRNA,,, | Performed by: NURSE ANESTHETIST, CERTIFIED REGISTERED

## 2023-10-04 PROCEDURE — 88331 PATH CONSLTJ SURG 1 BLK 1SPC: CPT | Mod: 26,,, | Performed by: PATHOLOGY

## 2023-10-04 PROCEDURE — 25000003 PHARM REV CODE 250: Performed by: NURSE ANESTHETIST, CERTIFIED REGISTERED

## 2023-10-04 PROCEDURE — 25000003 PHARM REV CODE 250

## 2023-10-04 PROCEDURE — 43288 PR ESOPHAGECTOMY, TOTAL/NEAR TOTAL, W/THORASC MOBL: ICD-10-PCS | Mod: 82,,, | Performed by: SURGERY

## 2023-10-04 PROCEDURE — 25000242 PHARM REV CODE 250 ALT 637 W/ HCPCS: Performed by: NURSE ANESTHETIST, CERTIFIED REGISTERED

## 2023-10-04 PROCEDURE — 20000000 HC ICU ROOM

## 2023-10-04 PROCEDURE — 25000242 PHARM REV CODE 250 ALT 637 W/ HCPCS: Performed by: NURSE PRACTITIONER

## 2023-10-04 PROCEDURE — 27000221 HC OXYGEN, UP TO 24 HOURS

## 2023-10-04 PROCEDURE — 88309 TISSUE EXAM BY PATHOLOGIST: CPT | Performed by: PATHOLOGY

## 2023-10-04 PROCEDURE — 83735 ASSAY OF MAGNESIUM: CPT

## 2023-10-04 PROCEDURE — 88331 PR  PATH CONSULT IN SURG,W FRZ SEC: ICD-10-PCS | Mod: 26,,, | Performed by: PATHOLOGY

## 2023-10-04 PROCEDURE — 84100 ASSAY OF PHOSPHORUS: CPT

## 2023-10-04 PROCEDURE — D9220A PRA ANESTHESIA: ICD-10-PCS | Mod: ANES,,, | Performed by: ANESTHESIOLOGY

## 2023-10-04 PROCEDURE — 43659 PR LAP PYLOROMYOTOMY: ICD-10-PCS | Mod: 82,,, | Performed by: SURGERY

## 2023-10-04 PROCEDURE — 86920 COMPATIBILITY TEST SPIN: CPT

## 2023-10-04 PROCEDURE — 80053 COMPREHEN METABOLIC PANEL: CPT

## 2023-10-04 PROCEDURE — 36000713 HC OR TIME LEV V EA ADD 15 MIN: Performed by: SURGERY

## 2023-10-04 PROCEDURE — D9220A PRA ANESTHESIA: Mod: CRNA,,, | Performed by: NURSE ANESTHETIST, CERTIFIED REGISTERED

## 2023-10-04 PROCEDURE — 88342 IMHCHEM/IMCYTCHM 1ST ANTB: CPT | Performed by: PATHOLOGY

## 2023-10-04 PROCEDURE — D9220A PRA ANESTHESIA: Mod: ANES,,, | Performed by: ANESTHESIOLOGY

## 2023-10-04 PROCEDURE — 25000003 PHARM REV CODE 250: Performed by: STUDENT IN AN ORGANIZED HEALTH CARE EDUCATION/TRAINING PROGRAM

## 2023-10-04 PROCEDURE — 63600175 PHARM REV CODE 636 W HCPCS: Performed by: STUDENT IN AN ORGANIZED HEALTH CARE EDUCATION/TRAINING PROGRAM

## 2023-10-04 PROCEDURE — 94664 DEMO&/EVAL PT USE INHALER: CPT

## 2023-10-04 PROCEDURE — C1894 INTRO/SHEATH, NON-LASER: HCPCS | Performed by: SURGERY

## 2023-10-04 PROCEDURE — 99499 UNLISTED E&M SERVICE: CPT | Mod: ,,, | Performed by: SURGERY

## 2023-10-04 PROCEDURE — 44015 INSERT NEEDLE CATH BOWEL: CPT | Mod: ,,, | Performed by: SURGERY

## 2023-10-04 PROCEDURE — 99499 NO LOS: ICD-10-PCS | Mod: ,,, | Performed by: SURGERY

## 2023-10-04 PROCEDURE — C1729 CATH, DRAINAGE: HCPCS | Performed by: SURGERY

## 2023-10-04 PROCEDURE — 99900035 HC TECH TIME PER 15 MIN (STAT)

## 2023-10-04 PROCEDURE — 88342 IMHCHEM/IMCYTCHM 1ST ANTB: CPT | Mod: 26,,, | Performed by: PATHOLOGY

## 2023-10-04 PROCEDURE — 27800903 OPTIME MED/SURG SUP & DEVICES OTHER IMPLANTS: Performed by: SURGERY

## 2023-10-04 PROCEDURE — 44015 INSERT NEEDLE CATH BOWEL: CPT | Mod: 82,,, | Performed by: SURGERY

## 2023-10-04 PROCEDURE — 43288 PR ESOPHAGECTOMY, TOTAL/NEAR TOTAL, W/THORASC MOBL: ICD-10-PCS | Mod: 22,,, | Performed by: SURGERY

## 2023-10-04 PROCEDURE — 63600175 PHARM REV CODE 636 W HCPCS: Performed by: SURGERY

## 2023-10-04 PROCEDURE — 85730 THROMBOPLASTIN TIME PARTIAL: CPT

## 2023-10-04 PROCEDURE — 43659 UNLISTED LAPS PX STOMACH: CPT | Mod: 82,,, | Performed by: SURGERY

## 2023-10-04 PROCEDURE — 88332 PATH CONSLTJ SURG EA ADD BLK: CPT | Performed by: PATHOLOGY

## 2023-10-04 PROCEDURE — 85610 PROTHROMBIN TIME: CPT

## 2023-10-04 PROCEDURE — 36620 INSERTION CATHETER ARTERY: CPT | Mod: ,,, | Performed by: ANESTHESIOLOGY

## 2023-10-04 PROCEDURE — 63600175 PHARM REV CODE 636 W HCPCS

## 2023-10-04 PROCEDURE — 43288 ESPHG THRSC MOBLJ: CPT | Mod: 82,,, | Performed by: SURGERY

## 2023-10-04 PROCEDURE — 27201423 OPTIME MED/SURG SUP & DEVICES STERILE SUPPLY: Performed by: SURGERY

## 2023-10-04 PROCEDURE — 37000009 HC ANESTHESIA EA ADD 15 MINS: Performed by: SURGERY

## 2023-10-04 PROCEDURE — 43288 ESPHG THRSC MOBLJ: CPT | Mod: 22,,, | Performed by: SURGERY

## 2023-10-04 PROCEDURE — 63600175 PHARM REV CODE 636 W HCPCS: Mod: JZ,JG | Performed by: NURSE ANESTHETIST, CERTIFIED REGISTERED

## 2023-10-04 PROCEDURE — 27201037 HC PRESSURE MONITORING SET UP

## 2023-10-04 PROCEDURE — 86901 BLOOD TYPING SEROLOGIC RH(D): CPT

## 2023-10-04 PROCEDURE — 94640 AIRWAY INHALATION TREATMENT: CPT

## 2023-10-04 PROCEDURE — P9045 ALBUMIN (HUMAN), 5%, 250 ML: HCPCS | Mod: JZ,JG | Performed by: NURSE ANESTHETIST, CERTIFIED REGISTERED

## 2023-10-04 PROCEDURE — 85025 COMPLETE CBC W/AUTO DIFF WBC: CPT

## 2023-10-04 PROCEDURE — 37000008 HC ANESTHESIA 1ST 15 MINUTES: Performed by: SURGERY

## 2023-10-04 PROCEDURE — 44015 PR INSERT TUBE-BOWEL,ENTERAL ALIMENT: ICD-10-PCS | Mod: ,,, | Performed by: SURGERY

## 2023-10-04 PROCEDURE — 36415 COLL VENOUS BLD VENIPUNCTURE: CPT | Performed by: SURGERY

## 2023-10-04 PROCEDURE — 88331 PATH CONSLTJ SURG 1 BLK 1SPC: CPT | Performed by: PATHOLOGY

## 2023-10-04 PROCEDURE — 63600175 PHARM REV CODE 636 W HCPCS: Performed by: NURSE ANESTHETIST, CERTIFIED REGISTERED

## 2023-10-04 PROCEDURE — 25000003 PHARM REV CODE 250: Performed by: ANESTHESIOLOGY

## 2023-10-04 PROCEDURE — 25000003 PHARM REV CODE 250: Performed by: SURGERY

## 2023-10-04 PROCEDURE — 36000712 HC OR TIME LEV V 1ST 15 MIN: Performed by: SURGERY

## 2023-10-04 PROCEDURE — 88332 PR  PATH CONSULT IN SURG,W ADDN FRZ SEC: ICD-10-PCS | Mod: 26,,, | Performed by: PATHOLOGY

## 2023-10-04 PROCEDURE — 36620 ARTERIAL: ICD-10-PCS | Mod: ,,, | Performed by: ANESTHESIOLOGY

## 2023-10-04 PROCEDURE — 27000646 HC AEROBIKA DEVICE

## 2023-10-04 PROCEDURE — 44015 PR INSERT TUBE-BOWEL,ENTERAL ALIMENT: ICD-10-PCS | Mod: 82,,, | Performed by: SURGERY

## 2023-10-04 PROCEDURE — P9045 ALBUMIN (HUMAN), 5%, 250 ML: HCPCS | Mod: JZ,JG

## 2023-10-04 PROCEDURE — 88342 CHG IMMUNOCYTOCHEMISTRY: ICD-10-PCS | Mod: 26,,, | Performed by: PATHOLOGY

## 2023-10-04 DEVICE — TUBE BLLN MIC JENUNAL FEED 12F: Type: IMPLANTABLE DEVICE | Site: ABDOMEN | Status: FUNCTIONAL

## 2023-10-04 RX ORDER — LIDOCAINE HYDROCHLORIDE 20 MG/ML
INJECTION, SOLUTION EPIDURAL; INFILTRATION; INTRACAUDAL; PERINEURAL
Status: DISCONTINUED | OUTPATIENT
Start: 2023-10-04 | End: 2023-10-04

## 2023-10-04 RX ORDER — LEVALBUTEROL INHALATION SOLUTION 0.63 MG/3ML
0.63 SOLUTION RESPIRATORY (INHALATION)
Status: DISCONTINUED | OUTPATIENT
Start: 2023-10-04 | End: 2023-10-10 | Stop reason: HOSPADM

## 2023-10-04 RX ORDER — ACETAMINOPHEN 500 MG
1000 TABLET ORAL ONCE
Status: DISCONTINUED | OUTPATIENT
Start: 2023-10-04 | End: 2023-10-04

## 2023-10-04 RX ORDER — ALBUMIN HUMAN 50 G/1000ML
25 SOLUTION INTRAVENOUS ONCE
Status: COMPLETED | OUTPATIENT
Start: 2023-10-04 | End: 2023-10-04

## 2023-10-04 RX ORDER — CELECOXIB 200 MG/1
400 CAPSULE ORAL
Status: DISCONTINUED | OUTPATIENT
Start: 2023-10-04 | End: 2023-10-04

## 2023-10-04 RX ORDER — GLUCAGON 1 MG
1 KIT INJECTION
Status: DISCONTINUED | OUTPATIENT
Start: 2023-10-04 | End: 2023-10-10 | Stop reason: HOSPADM

## 2023-10-04 RX ORDER — METOPROLOL TARTRATE 1 MG/ML
2.5 INJECTION, SOLUTION INTRAVENOUS EVERY 6 HOURS
Status: DISCONTINUED | OUTPATIENT
Start: 2023-10-05 | End: 2023-10-08

## 2023-10-04 RX ORDER — METRONIDAZOLE 500 MG/100ML
500 INJECTION, SOLUTION INTRAVENOUS
Status: DISCONTINUED | OUTPATIENT
Start: 2023-10-04 | End: 2023-10-04

## 2023-10-04 RX ORDER — SODIUM CHLORIDE, SODIUM LACTATE, POTASSIUM CHLORIDE, CALCIUM CHLORIDE 600; 310; 30; 20 MG/100ML; MG/100ML; MG/100ML; MG/100ML
INJECTION, SOLUTION INTRAVENOUS CONTINUOUS
Status: DISCONTINUED | OUTPATIENT
Start: 2023-10-04 | End: 2023-10-05

## 2023-10-04 RX ORDER — INSULIN ASPART 100 [IU]/ML
0-5 INJECTION, SOLUTION INTRAVENOUS; SUBCUTANEOUS EVERY 6 HOURS PRN
Status: DISCONTINUED | OUTPATIENT
Start: 2023-10-04 | End: 2023-10-10 | Stop reason: HOSPADM

## 2023-10-04 RX ORDER — SODIUM CHLORIDE 9 MG/ML
INJECTION, SOLUTION INTRAVENOUS CONTINUOUS
Status: DISCONTINUED | OUTPATIENT
Start: 2023-10-04 | End: 2023-10-07

## 2023-10-04 RX ORDER — ACETAMINOPHEN 10 MG/ML
INJECTION, SOLUTION INTRAVENOUS
Status: DISCONTINUED | OUTPATIENT
Start: 2023-10-04 | End: 2023-10-04

## 2023-10-04 RX ORDER — PROCHLORPERAZINE EDISYLATE 5 MG/ML
2.5 INJECTION INTRAMUSCULAR; INTRAVENOUS EVERY 6 HOURS PRN
Status: DISCONTINUED | OUTPATIENT
Start: 2023-10-04 | End: 2023-10-10 | Stop reason: HOSPADM

## 2023-10-04 RX ORDER — POTASSIUM CHLORIDE 7.45 MG/ML
40 INJECTION INTRAVENOUS
Status: DISCONTINUED | OUTPATIENT
Start: 2023-10-04 | End: 2023-10-07

## 2023-10-04 RX ORDER — HYDROMORPHONE HCL IN 0.9% NACL 6 MG/30 ML
PATIENT CONTROLLED ANALGESIA SYRINGE INTRAVENOUS CONTINUOUS
Status: DISCONTINUED | OUTPATIENT
Start: 2023-10-04 | End: 2023-10-06

## 2023-10-04 RX ORDER — ONDANSETRON 2 MG/ML
4 INJECTION INTRAMUSCULAR; INTRAVENOUS EVERY 6 HOURS PRN
Status: DISCONTINUED | OUTPATIENT
Start: 2023-10-04 | End: 2023-10-10 | Stop reason: HOSPADM

## 2023-10-04 RX ORDER — MUPIROCIN 20 MG/G
1 OINTMENT TOPICAL 2 TIMES DAILY
Status: COMPLETED | OUTPATIENT
Start: 2023-10-04 | End: 2023-10-09

## 2023-10-04 RX ORDER — CEFAZOLIN SODIUM 1 G/3ML
INJECTION, POWDER, FOR SOLUTION INTRAMUSCULAR; INTRAVENOUS
Status: DISCONTINUED | OUTPATIENT
Start: 2023-10-04 | End: 2023-10-04

## 2023-10-04 RX ORDER — FENTANYL CITRATE 50 UG/ML
25-200 INJECTION, SOLUTION INTRAMUSCULAR; INTRAVENOUS
Status: CANCELLED | OUTPATIENT
Start: 2023-10-04

## 2023-10-04 RX ORDER — GABAPENTIN 300 MG/1
300 CAPSULE ORAL 3 TIMES DAILY
Status: DISCONTINUED | OUTPATIENT
Start: 2023-10-04 | End: 2023-10-04

## 2023-10-04 RX ORDER — HEPARIN SODIUM 5000 [USP'U]/ML
5000 INJECTION, SOLUTION INTRAVENOUS; SUBCUTANEOUS
Status: DISCONTINUED | OUTPATIENT
Start: 2023-10-04 | End: 2023-10-04 | Stop reason: HOSPADM

## 2023-10-04 RX ORDER — HYDROMORPHONE HYDROCHLORIDE 1 MG/ML
1 INJECTION, SOLUTION INTRAMUSCULAR; INTRAVENOUS; SUBCUTANEOUS ONCE
Status: COMPLETED | OUTPATIENT
Start: 2023-10-04 | End: 2023-10-04

## 2023-10-04 RX ORDER — FENTANYL CITRATE 50 UG/ML
INJECTION, SOLUTION INTRAMUSCULAR; INTRAVENOUS
Status: DISCONTINUED | OUTPATIENT
Start: 2023-10-04 | End: 2023-10-04

## 2023-10-04 RX ORDER — MAGNESIUM SULFATE HEPTAHYDRATE 40 MG/ML
4 INJECTION, SOLUTION INTRAVENOUS
Status: DISCONTINUED | OUTPATIENT
Start: 2023-10-04 | End: 2023-10-07

## 2023-10-04 RX ORDER — DEXAMETHASONE SODIUM PHOSPHATE 4 MG/ML
INJECTION, SOLUTION INTRA-ARTICULAR; INTRALESIONAL; INTRAMUSCULAR; INTRAVENOUS; SOFT TISSUE
Status: DISCONTINUED | OUTPATIENT
Start: 2023-10-04 | End: 2023-10-04

## 2023-10-04 RX ORDER — ONDANSETRON 2 MG/ML
INJECTION INTRAMUSCULAR; INTRAVENOUS
Status: DISCONTINUED | OUTPATIENT
Start: 2023-10-04 | End: 2023-10-04

## 2023-10-04 RX ORDER — ACETAMINOPHEN 500 MG
1000 TABLET ORAL
Status: DISCONTINUED | OUTPATIENT
Start: 2023-10-04 | End: 2023-10-04 | Stop reason: HOSPADM

## 2023-10-04 RX ORDER — LIDOCAINE HYDROCHLORIDE 10 MG/ML
1 INJECTION, SOLUTION EPIDURAL; INFILTRATION; INTRACAUDAL; PERINEURAL ONCE AS NEEDED
Status: COMPLETED | OUTPATIENT
Start: 2023-10-04 | End: 2023-10-04

## 2023-10-04 RX ORDER — CALCIUM GLUCONATE 20 MG/ML
1 INJECTION, SOLUTION INTRAVENOUS
Status: DISCONTINUED | OUTPATIENT
Start: 2023-10-04 | End: 2023-10-07

## 2023-10-04 RX ORDER — HYDROMORPHONE HYDROCHLORIDE 2 MG/ML
INJECTION, SOLUTION INTRAMUSCULAR; INTRAVENOUS; SUBCUTANEOUS
Status: DISCONTINUED | OUTPATIENT
Start: 2023-10-04 | End: 2023-10-04

## 2023-10-04 RX ORDER — ALBUTEROL SULFATE 90 UG/1
AEROSOL, METERED RESPIRATORY (INHALATION)
Status: DISCONTINUED | OUTPATIENT
Start: 2023-10-04 | End: 2023-10-04

## 2023-10-04 RX ORDER — MAGNESIUM SULFATE HEPTAHYDRATE 40 MG/ML
2 INJECTION, SOLUTION INTRAVENOUS
Status: DISCONTINUED | OUTPATIENT
Start: 2023-10-04 | End: 2023-10-07

## 2023-10-04 RX ORDER — EPHEDRINE SULFATE 50 MG/ML
INJECTION, SOLUTION INTRAVENOUS
Status: DISCONTINUED | OUTPATIENT
Start: 2023-10-04 | End: 2023-10-04

## 2023-10-04 RX ORDER — BUPIVACAINE HYDROCHLORIDE 2.5 MG/ML
INJECTION, SOLUTION EPIDURAL; INFILTRATION; INTRACAUDAL
Status: DISCONTINUED | OUTPATIENT
Start: 2023-10-04 | End: 2023-10-04 | Stop reason: HOSPADM

## 2023-10-04 RX ORDER — DEXTROSE MONOHYDRATE, SODIUM CHLORIDE, AND POTASSIUM CHLORIDE 50; 1.49; 4.5 G/1000ML; G/1000ML; G/1000ML
INJECTION, SOLUTION INTRAVENOUS CONTINUOUS
Status: DISCONTINUED | OUTPATIENT
Start: 2023-10-04 | End: 2023-10-04

## 2023-10-04 RX ORDER — PROPOFOL 10 MG/ML
VIAL (ML) INTRAVENOUS
Status: DISCONTINUED | OUTPATIENT
Start: 2023-10-04 | End: 2023-10-04

## 2023-10-04 RX ORDER — LIDOCAINE HYDROCHLORIDE 10 MG/ML
INJECTION, SOLUTION EPIDURAL; INFILTRATION; INTRACAUDAL; PERINEURAL
Status: COMPLETED
Start: 2023-10-04 | End: 2023-10-04

## 2023-10-04 RX ORDER — PANTOPRAZOLE SODIUM 40 MG/10ML
40 INJECTION, POWDER, LYOPHILIZED, FOR SOLUTION INTRAVENOUS DAILY
Status: DISCONTINUED | OUTPATIENT
Start: 2023-10-05 | End: 2023-10-08

## 2023-10-04 RX ORDER — MIDAZOLAM HYDROCHLORIDE 1 MG/ML
.5-4 INJECTION INTRAMUSCULAR; INTRAVENOUS
Status: CANCELLED | OUTPATIENT
Start: 2023-10-04

## 2023-10-04 RX ORDER — PHENYLEPHRINE HCL IN 0.9% NACL 1 MG/10 ML
SYRINGE (ML) INTRAVENOUS
Status: DISCONTINUED | OUTPATIENT
Start: 2023-10-04 | End: 2023-10-04

## 2023-10-04 RX ORDER — CALCIUM GLUCONATE 20 MG/ML
3 INJECTION, SOLUTION INTRAVENOUS
Status: DISCONTINUED | OUTPATIENT
Start: 2023-10-04 | End: 2023-10-07

## 2023-10-04 RX ORDER — ALBUMIN HUMAN 50 G/1000ML
SOLUTION INTRAVENOUS
Status: DISCONTINUED | OUTPATIENT
Start: 2023-10-04 | End: 2023-10-04

## 2023-10-04 RX ORDER — MIDAZOLAM HYDROCHLORIDE 1 MG/ML
INJECTION, SOLUTION INTRAMUSCULAR; INTRAVENOUS
Status: DISCONTINUED | OUTPATIENT
Start: 2023-10-04 | End: 2023-10-04

## 2023-10-04 RX ORDER — CALCIUM GLUCONATE 20 MG/ML
2 INJECTION, SOLUTION INTRAVENOUS
Status: DISCONTINUED | OUTPATIENT
Start: 2023-10-04 | End: 2023-10-07

## 2023-10-04 RX ORDER — ACETAMINOPHEN 10 MG/ML
1000 INJECTION, SOLUTION INTRAVENOUS EVERY 8 HOURS
Status: COMPLETED | OUTPATIENT
Start: 2023-10-04 | End: 2023-10-05

## 2023-10-04 RX ORDER — ROCURONIUM BROMIDE 10 MG/ML
INJECTION, SOLUTION INTRAVENOUS
Status: DISCONTINUED | OUTPATIENT
Start: 2023-10-04 | End: 2023-10-04

## 2023-10-04 RX ORDER — HYDRALAZINE HYDROCHLORIDE 20 MG/ML
10 INJECTION INTRAMUSCULAR; INTRAVENOUS EVERY 4 HOURS PRN
Status: DISCONTINUED | OUTPATIENT
Start: 2023-10-04 | End: 2023-10-10 | Stop reason: HOSPADM

## 2023-10-04 RX ORDER — NALOXONE HCL 0.4 MG/ML
0.02 VIAL (ML) INJECTION
Status: DISCONTINUED | OUTPATIENT
Start: 2023-10-04 | End: 2023-10-06

## 2023-10-04 RX ORDER — KETAMINE HCL IN 0.9 % NACL 50 MG/5 ML
SYRINGE (ML) INTRAVENOUS
Status: DISCONTINUED | OUTPATIENT
Start: 2023-10-04 | End: 2023-10-04

## 2023-10-04 RX ORDER — LABETALOL HCL 20 MG/4 ML
20 SYRINGE (ML) INTRAVENOUS EVERY 4 HOURS PRN
Status: DISCONTINUED | OUTPATIENT
Start: 2023-10-04 | End: 2023-10-10 | Stop reason: HOSPADM

## 2023-10-04 RX ADMIN — DEXTROSE MONOHYDRATE 12.5 G: 25 INJECTION, SOLUTION INTRAVENOUS at 03:10

## 2023-10-04 RX ADMIN — PROPOFOL 50 MG: 10 INJECTION, EMULSION INTRAVENOUS at 08:10

## 2023-10-04 RX ADMIN — MUPIROCIN 1 G: 20 OINTMENT TOPICAL at 08:10

## 2023-10-04 RX ADMIN — HEPARIN SODIUM 5000 UNITS: 5000 INJECTION INTRAVENOUS; SUBCUTANEOUS at 06:10

## 2023-10-04 RX ADMIN — METRONIDAZOLE 500 MG: 500 INJECTION, SOLUTION INTRAVENOUS at 02:10

## 2023-10-04 RX ADMIN — EPHEDRINE SULFATE 5 MG: 50 INJECTION INTRAVENOUS at 03:10

## 2023-10-04 RX ADMIN — Medication 100 MCG: at 10:10

## 2023-10-04 RX ADMIN — ONDANSETRON 500 MG: 2 INJECTION INTRAMUSCULAR; INTRAVENOUS at 03:10

## 2023-10-04 RX ADMIN — SODIUM CHLORIDE 0.25 MCG/KG/MIN: 9 INJECTION, SOLUTION INTRAVENOUS at 10:10

## 2023-10-04 RX ADMIN — MAGNESIUM SULFATE HEPTAHYDRATE 2 G: 40 INJECTION, SOLUTION INTRAVENOUS at 10:10

## 2023-10-04 RX ADMIN — LIDOCAINE HYDROCHLORIDE 50 MG: 20 INJECTION, SOLUTION EPIDURAL; INFILTRATION; INTRACAUDAL at 11:10

## 2023-10-04 RX ADMIN — ALBUMIN (HUMAN) 500 ML: 12.5 SOLUTION INTRAVENOUS at 03:10

## 2023-10-04 RX ADMIN — SODIUM CHLORIDE: 9 INJECTION, SOLUTION INTRAVENOUS at 06:10

## 2023-10-04 RX ADMIN — CEFAZOLIN 2 G: 330 INJECTION, POWDER, FOR SOLUTION INTRAMUSCULAR; INTRAVENOUS at 09:10

## 2023-10-04 RX ADMIN — INSULIN HUMAN 10 UNITS: 100 INJECTION, SOLUTION PARENTERAL at 03:10

## 2023-10-04 RX ADMIN — FENTANYL CITRATE 100 MCG: 50 INJECTION INTRAMUSCULAR; INTRAVENOUS at 08:10

## 2023-10-04 RX ADMIN — ROCURONIUM BROMIDE 20 MG: 10 INJECTION INTRAVENOUS at 06:10

## 2023-10-04 RX ADMIN — DEXTROSE, SODIUM CHLORIDE, AND POTASSIUM CHLORIDE: 5; .45; .15 INJECTION INTRAVENOUS at 08:10

## 2023-10-04 RX ADMIN — METHOCARBAMOL 500 MG: 100 INJECTION, SOLUTION INTRAMUSCULAR; INTRAVENOUS at 09:10

## 2023-10-04 RX ADMIN — Medication 25 MG: at 08:10

## 2023-10-04 RX ADMIN — ROCURONIUM BROMIDE 20 MG: 10 INJECTION INTRAVENOUS at 03:10

## 2023-10-04 RX ADMIN — ROCURONIUM BROMIDE 30 MG: 10 INJECTION INTRAVENOUS at 12:10

## 2023-10-04 RX ADMIN — ALBUTEROL SULFATE 10 PUFF: 108 INHALANT RESPIRATORY (INHALATION) at 03:10

## 2023-10-04 RX ADMIN — MIDAZOLAM 2 MG: 1 INJECTION INTRAMUSCULAR; INTRAVENOUS at 08:10

## 2023-10-04 RX ADMIN — PROPOFOL 150 MG: 10 INJECTION, EMULSION INTRAVENOUS at 08:10

## 2023-10-04 RX ADMIN — SODIUM CHLORIDE: 0.9 INJECTION, SOLUTION INTRAVENOUS at 07:10

## 2023-10-04 RX ADMIN — ROCURONIUM BROMIDE 50 MG: 10 INJECTION INTRAVENOUS at 10:10

## 2023-10-04 RX ADMIN — Medication: at 10:10

## 2023-10-04 RX ADMIN — ALBUMIN (HUMAN) 25 G: 12.5 SOLUTION INTRAVENOUS at 10:10

## 2023-10-04 RX ADMIN — EPHEDRINE SULFATE 5 MG: 50 INJECTION INTRAVENOUS at 04:10

## 2023-10-04 RX ADMIN — Medication 100 MCG: at 01:10

## 2023-10-04 RX ADMIN — GLYCOPYRROLATE 0.2 MG: 0.2 INJECTION INTRAMUSCULAR; INTRAVENOUS at 09:10

## 2023-10-04 RX ADMIN — SODIUM PHOSPHATE, MONOBASIC, MONOHYDRATE AND SODIUM PHOSPHATE, DIBASIC, ANHYDROUS 20.01 MMOL: 142; 276 INJECTION, SOLUTION INTRAVENOUS at 10:10

## 2023-10-04 RX ADMIN — DEXAMETHASONE SODIUM PHOSPHATE 12 MG: 4 INJECTION INTRA-ARTICULAR; INTRALESIONAL; INTRAMUSCULAR; INTRAVENOUS; SOFT TISSUE at 10:10

## 2023-10-04 RX ADMIN — POTASSIUM CHLORIDE 10 MEQ: 7.46 INJECTION, SOLUTION INTRAVENOUS at 11:10

## 2023-10-04 RX ADMIN — CEFAZOLIN 2 G: 330 INJECTION, POWDER, FOR SOLUTION INTRAMUSCULAR; INTRAVENOUS at 05:10

## 2023-10-04 RX ADMIN — LIDOCAINE HYDROCHLORIDE 80 MG: 20 INJECTION, SOLUTION EPIDURAL; INFILTRATION; INTRACAUDAL at 08:10

## 2023-10-04 RX ADMIN — ROCURONIUM BROMIDE 30 MG: 10 INJECTION INTRAVENOUS at 11:10

## 2023-10-04 RX ADMIN — HYDROMORPHONE HYDROCHLORIDE 0.8 MG: 2 INJECTION INTRAMUSCULAR; INTRAVENOUS; SUBCUTANEOUS at 08:10

## 2023-10-04 RX ADMIN — HYDROMORPHONE HYDROCHLORIDE 1 MG: 1 INJECTION, SOLUTION INTRAMUSCULAR; INTRAVENOUS; SUBCUTANEOUS at 09:10

## 2023-10-04 RX ADMIN — PROPOFOL 50 MG: 10 INJECTION, EMULSION INTRAVENOUS at 12:10

## 2023-10-04 RX ADMIN — POTASSIUM CHLORIDE 10 MEQ: 7.46 INJECTION, SOLUTION INTRAVENOUS at 10:10

## 2023-10-04 RX ADMIN — ROCURONIUM BROMIDE 20 MG: 10 INJECTION INTRAVENOUS at 12:10

## 2023-10-04 RX ADMIN — ONDANSETRON 500 MG: 2 INJECTION INTRAMUSCULAR; INTRAVENOUS at 02:10

## 2023-10-04 RX ADMIN — ALBUTEROL SULFATE 10 PUFF: 108 INHALANT RESPIRATORY (INHALATION) at 05:10

## 2023-10-04 RX ADMIN — Medication 50 MCG: at 08:10

## 2023-10-04 RX ADMIN — SUGAMMADEX 400 MG: 100 INJECTION, SOLUTION INTRAVENOUS at 07:10

## 2023-10-04 RX ADMIN — ACETAMINOPHEN 1000 MG: 10 INJECTION, SOLUTION INTRAVENOUS at 09:10

## 2023-10-04 RX ADMIN — ALBUTEROL SULFATE 10 PUFF: 108 INHALANT RESPIRATORY (INHALATION) at 06:10

## 2023-10-04 RX ADMIN — ALBUMIN (HUMAN) 500 ML: 12.5 SOLUTION INTRAVENOUS at 02:10

## 2023-10-04 RX ADMIN — ONDANSETRON 4 MG: 2 INJECTION INTRAMUSCULAR; INTRAVENOUS at 07:10

## 2023-10-04 RX ADMIN — LIDOCAINE HYDROCHLORIDE 2 MG: 10 INJECTION, SOLUTION EPIDURAL; INFILTRATION; INTRACAUDAL; PERINEURAL at 06:10

## 2023-10-04 RX ADMIN — PROPOFOL 25 MG: 10 INJECTION, EMULSION INTRAVENOUS at 08:10

## 2023-10-04 RX ADMIN — ROCURONIUM BROMIDE 20 MG: 10 INJECTION INTRAVENOUS at 02:10

## 2023-10-04 RX ADMIN — SODIUM CHLORIDE, SODIUM GLUCONATE, SODIUM ACETATE, POTASSIUM CHLORIDE, MAGNESIUM CHLORIDE, SODIUM PHOSPHATE, DIBASIC, AND POTASSIUM PHOSPHATE: .53; .5; .37; .037; .03; .012; .00082 INJECTION, SOLUTION INTRAVENOUS at 04:10

## 2023-10-04 RX ADMIN — FENTANYL CITRATE 25 MCG: 50 INJECTION INTRAMUSCULAR; INTRAVENOUS at 08:10

## 2023-10-04 RX ADMIN — ROCURONIUM BROMIDE 50 MG: 10 INJECTION INTRAVENOUS at 09:10

## 2023-10-04 RX ADMIN — SODIUM CHLORIDE, SODIUM GLUCONATE, SODIUM ACETATE, POTASSIUM CHLORIDE, MAGNESIUM CHLORIDE, SODIUM PHOSPHATE, DIBASIC, AND POTASSIUM PHOSPHATE: .53; .5; .37; .037; .03; .012; .00082 INJECTION, SOLUTION INTRAVENOUS at 08:10

## 2023-10-04 RX ADMIN — EPHEDRINE SULFATE 5 MG: 50 INJECTION INTRAVENOUS at 06:10

## 2023-10-04 RX ADMIN — Medication 15 MG: at 09:10

## 2023-10-04 RX ADMIN — SODIUM CHLORIDE, POTASSIUM CHLORIDE, SODIUM LACTATE AND CALCIUM CHLORIDE: 600; 310; 30; 20 INJECTION, SOLUTION INTRAVENOUS at 09:10

## 2023-10-04 RX ADMIN — LEVALBUTEROL HYDROCHLORIDE 0.63 MG: 0.63 SOLUTION RESPIRATORY (INHALATION) at 09:10

## 2023-10-04 RX ADMIN — CEFAZOLIN 2 G: 330 INJECTION, POWDER, FOR SOLUTION INTRAMUSCULAR; INTRAVENOUS at 01:10

## 2023-10-04 RX ADMIN — Medication 10 MG: at 10:10

## 2023-10-04 RX ADMIN — ROCURONIUM BROMIDE 50 MG: 10 INJECTION INTRAVENOUS at 08:10

## 2023-10-04 RX ADMIN — Medication 100 MCG: at 11:10

## 2023-10-04 RX ADMIN — Medication 100 MCG: at 12:10

## 2023-10-04 RX ADMIN — HYDROMORPHONE HYDROCHLORIDE 0.2 MG: 2 INJECTION INTRAMUSCULAR; INTRAVENOUS; SUBCUTANEOUS at 07:10

## 2023-10-04 RX ADMIN — ROCURONIUM BROMIDE 20 MG: 10 INJECTION INTRAVENOUS at 05:10

## 2023-10-04 NOTE — PLAN OF CARE
[facility]       HOME  HEALTH ORDERS                                    FACE TO FACE ENCOUNTER      Patient Name: Blayne Beebe  YOB: 1960    PCP: Zachariah Reyna MD   PCP Address: [address] / [place] [address]  PCP Phone Number: [phone]  PCP Fax: [phone]    Encounter Date: 10/10/2023    Admit to Home Health    Diagnoses:  Active Hospital Problems    Diagnosis  POA    *Malignant neoplasm of gastroesophageal junction [C16.0]  Yes      Resolved Hospital Problems   No resolved problems to display.       Future Appointments   Date Time Provider Department Center   10/19/2023  9:00 AM [facility] [id number] 350 LB LIMIT [facility] XRAY OP [address]   10/19/2023 10:30 AM [facility]   [facility]2023  8[facility]40 [facility] LAB HO [address]   10/23/2023  9:30 AM Delta June [facility]           I have seen and examined this patient face to face today. My clinical findings that support the need for the home health skilled services and home bound status are the following:  Weakness/numbness causing balance and gait disturbance due to Surgery making it taxing to leave home.    Allergies:Review of patient's allergies indicates:  No Known Allergies    Diet: NPO    Activities: activity as tolerated    Nursing:   SN to complete comprehensive assessment including routine vital signs. Instruct on disease process and s/s of complications to report to MD. Review/verify medication list sent home with the patient at time of discharge  and instruct patient/caregiver as needed. Frequency may be adjusted depending on start of care date.    Notify MD if SBP > 160 or < 90; DBP > 90 or < 50; HR > 120 or < 50; Temp > 101    Medications: Review discharge medications with patient and family and provide education.      Current Discharge Medication List        START taking these  medications    Details   acetaminophen (TYLENOL) 650 mg/20.3 mL Soln 31.2 mLs (999.0148 mg total) by Per J Tube route every 8 (eight) hours as needed for Pain.  Qty: 630 mL, Refills: 0      enoxaparin (LOVENOX) 40 mg/0.4 mL Syrg Inject 0.4 mLs (40 mg total) into the skin evry 24 hours (prophylaxis, 1700). for 14 days  Qty: 5.6 mL, Refills: 0      !! oxyCODONE (ROXICODONE) 5 mg/5 mL Soln 5 mLs (5 mg total) by Per J Tube route every 4 (four) hours as needed.  Qty: 473 mL, Refills: 0    Comments: Quantity prescribed more than 7 day supply? No      !! oxyCODONE (ROXICODONE) 5 mg/5 mL Soln 10 mLs (10 mg total) by Per J Tube route every 4 (four) hours as needed.  Qty: 473 mL, Refills: 0    Comments: Quantity prescribed more than 7 day supply? No       !! - Potential duplicate medications found. Please discuss with provider.        CONTINUE these medications which have NOT CHANGED    Details   cholecalciferol, vitamin D3, (VITAMIN D3) 10 mcg (400 unit) Tab Take by mouth once daily.      pantoprazole (PROTONIX) 40 MG tablet Take 1 tablet (40 mg total) by mouth once daily.  Qty: 90 tablet, Refills: 3    Associated Diagnoses: Malignant neoplasm of gastroesophageal junction      LIDOcaine-prilocaine (EMLA) cream Apply topically as needed (Port access).  Qty: 30 g, Refills: 1    Associated Diagnoses: Malignant neoplasm of gastroesophageal junction      sildenafil (VIAGRA) 100 MG tablet Take 1 tablet (100 mg total) by mouth daily as needed for Erectile Dysfunction.  Qty: 10 tablet, Refills: 6    Associated Diagnoses: Erectile dysfunction, unspecified erectile dysfunction type             Disciplines requested: Nursing Services to provide:   Other: S/P Esophagectomy     Flush jejunostomy tube with 60cc water TID.     left neck incision with derma bond. May shower.     Free water flushes via jejunostomy tube @ 55cc/hour while tube feeds are infusing.     Monitor abdomen for redness, oozing from staple site, abdominal distension,  or pain not controlled by pain medication.     TUBE FEEDS via jejunostomy tube: [place] 1.4 (or equivalent) @ 60cc/hour from 2p-8a.  Increase by 10cc each night to a goal of 65cc/hour.    Vitals signs daily. Call MD with Temperature > 101, SBP > 180, HR < 50.     Physician to follow patient's care (the person listed here will be responsible for signing ongoing orders): Other: Dr. Ray Paulino, Dr. MIGUEL Flores, Dr. Gonzales Paulino, Dr. Hank Pérez, Dr. Nas Meyer at [phone], [phone] or [phone] office; [phone] fax Requested Start of Care Date: 10/4/2023      I certify that this patient is confined to his home and needs intermittent skilled nursing care.      Electronically Signed  _________________________________  Aysha Villeda NP  10/10/2023

## 2023-10-04 NOTE — ANESTHESIA PROCEDURE NOTES
Arterial    Diagnosis: Esophageal Cancer    Patient location during procedure: done in OR    Staffing  Authorizing Provider: Mague Jin MD  Performing Provider: June Gupta MD    Staffing  Performed by: June Gupta MD  Authorized by: Mague Jin MD    Anesthesiologist was present at the time of the procedure.    Preanesthetic Checklist  Completed: patient identified, IV checked, site marked, risks and benefits discussed, surgical consent, monitors and equipment checked, pre-op evaluation, timeout performed and anesthesia consent givenArterial  Skin Prep: chlorhexidine gluconate  Local Infiltration: none  Orientation: left  Location: ulnar    Catheter Size: 20 G  Catheter placement by Ultrasound guidance. Heme positive aspiration all ports.   Vessel Caliber: patent, compressibility normal  Needle advanced into vessel with real time Ultrasound guidance.  Assessment  Dressing: secured with tape and tegaderm

## 2023-10-04 NOTE — ANESTHESIA PROCEDURE NOTES
Intubation    Date/Time: 10/4/2023 8:54 AM    Performed by: June Gupta MD  Authorized by: Mague Jin MD    Intubation:     Induction:  Intravenous    Intubated:  Postinduction    Mask Ventilation:  Easy mask    Attempts:  More than 3    Attempted By:  Resident anesthesiologist    Method of Intubation:  Video laryngoscopy    Blade:  Zapata 3    Laryngeal View Grade: Grade IIA - cords partially seen      Attempted By (2nd Attempt):  Staff anesthesiologist    Method of Intubation (2nd Attempt):  Video laryngoscopy    Blade (2nd Attempt):  Zapata 3    Laryngeal View Grade (2nd Attempt): Grade IIb - only the arytenoids and epiglottis seen      Attempted By (3rd Attempt):  Staff anesthesiologist    Method of Intubation (3rd Attempt):  Fiberoptic    Blade (3rd Attempt):  Zapata 4    Laryngeal View Grade (3rd Attempt): Grade IV - neither epiglottis nor glottis seen      Difficult Airway Encountered?: Yes      Future Airway Recommendations:  Fiberoptic in room with bougie. Pt's airway is extremly friable as well.    Complications:  None    Airway Device:  Oral endotracheal tube and endobronchial blocker right    Airway Device Size:  7.5    Style/Cuff Inflation:  Cuffed (inflated to minimal occlusive pressure)    Placement Verified By:  Capnometry    Complicating Factors:  Small mouth, anterior larynx, poor neck/head extension and bleeding in oropharynx    Findings Post-Intubation:  BS equal bilateral

## 2023-10-04 NOTE — PROGRESS NOTES
Pt states he is unable to swallow any pills. Dr. Jin notified; MD states ok to hold pre-op oral meds. MD also notified pt unable to remove ring from left hand. Verbalizes understanding, no new orders.

## 2023-10-04 NOTE — ASSESSMENT & PLAN NOTE
  Neuro/Psych:   -- Sedation: not sedated  -- Pain: IV tylenol, IV robaxin, PCA             Cards:   -- HDS, no pressors required  -- MAPS>65  -- if patient becomes hypotensive, attempt fluid resuscitation before initiating pressors as the use of pressors may negative impact the surgical anastomosis       Pulm:   -- Goal O2 sat > 90%  -- Stable on room air  -- Has history of COPD  -- Patient has history of difficult intubation, if intubation is needed please reach out to anesthesia staff  -- Given surgical anastomosis, use of BiPAP and CPAP is contraindicated       Renal:  -- Keep caldera for strict I/O  -- BUN/Cr        FEN / GI:   -- Replace lytes as needed  -- Daily CMP  -- Nutrition: Strict NPO  -- mIVF  -- PRN zofran/compazine   -- J-tube in place  -- IV Pantoprazole  -- Use of NSAIDS is contraindicated given surgical manipulation of GI tract        ID:   -- Tm: afebrile; WBC wnl  -- Abx: complete perioperative abx      Heme/Onc:   -- H/H stable   -- Daily CBC  -- DVT ppx - management per primary       Endo:   --Crit Care Gluc goal 140-180        PPx:   Feeding: Strict NPO  Analgesia/Sedation: MMPC  Thromboembolic prevention: pending  HOB >30: yes  Stress Ulcer ppx: ppi  Glucose control: Critical care goal 140-180 g/dl, ISS    Lines/Drains/Airway: A line, 2xPIV, caldera, R-CT, sheila drain, j-tube      Dispo/Code Status/Palliative:   -- SICU / Full Code

## 2023-10-04 NOTE — HPI
Blayne Beebe is a 63 y.o. male with Stage III (cT3, cN1, cM0, G2) GEJ esophageal adenocarcinoma who underwent esophagectomy today. He has a pmh notable for tobacco use disorder, COPD (emphysema), HLD, difficult prior intubation, and cervical spine surgery. He underwent port placement on 7/12/23 and underwent neoadjuvant crt. Initiated chemotherapy (paclitaxel/carboplatin) 7/16/23-8/15/23 and tolerated well with only symptoms of fatigue. He presents to SICU after undergoing robotic assisted [place] esophagectomy with Dr. Meyer today.     He arrives to SICU extubated, not sedated, HDS not any gtts. His surgery was uncomplicated with an EBL of 200cc but was notable for being a difficult intubation/extubation that required assistance of multiple anesthesia staff. He arrives with a J tube, right chest tube, caldera, 19F sternal sheila drain, and left radial art line. Immediate postoperative plans include close monitoring and pain control.

## 2023-10-04 NOTE — SUBJECTIVE & OBJECTIVE
Follow-up For: Procedure(s) (LRB):  XI ROBOTIC ESOPHAGECTOMY (N/A)    Post-Operative Day: Day of Surgery     Past Medical History:   Diagnosis Date    Arthritis     Cancer     COPD (chronic obstructive pulmonary disease)        Past Surgical History:   Procedure Laterality Date    CERVICAL FUSION      COLONOSCOPY N/A 3/27/2018    Procedure: COLONOSCOPY;  Surgeon: Maria Teresa Morocho MD;  Location: [facility];  Service: Endoscopy;  Laterality: N/A;    COLONOSCOPY N/A 6/13/2023    Procedure: COLONOSCOPY;  Surgeon: Kelli Snell MD;  Location: [facility];  Service: Endoscopy;  Laterality: N/A;    ENDOSCOPIC ULTRASOUND OF UPPER GASTROINTESTINAL TRACT N/A 7/3/2023    Procedure: ULTRASOUND, UPPER GI TRACT, ENDOSCOPIC;  Surgeon: Ray Duffy MD;  Location: [facility];  Service: Endoscopy;  Laterality: N/A;    ESOPHAGOGASTRODUODENOSCOPY N/A 6/13/2023    Procedure: EGD (ESOPHAGOGASTRODUODENOSCOPY);  Surgeon: Kelli Snell MD;  Location: [facility];  Service: Endoscopy;  Laterality: N/A;    ESOPHAGOGASTRODUODENOSCOPY N/A 7/3/2023    Procedure: EGD (ESOPHAGOGASTRODUODENOSCOPY);  Surgeon: Ray Duffy MD;  Location: [facility];  Service: Endoscopy;  Laterality: N/A;    INGUINAL HERNIA REPAIR Bilateral     Right x2, x1 left    ROTATOR CUFF REPAIR Right     x2       Review of patient's allergies indicates:  No Known Allergies    Family History       Problem Relation (Age of Onset)    Diabetes Mother    Heart disease Father          Tobacco Use    Smoking status: Former     Current packs/day: 0.25     Average packs/day: 0.3 packs/day for 40.8 years (10.2 ttl pk-yrs)     Types: Cigarettes     Start date: 1983     Passive exposure: Current    Smokeless tobacco: Never    Tobacco comments:     4 cigarettes a day   Substance and Sexual Activity    Alcohol use: Yes     Comment: a couple of beers a day    Drug use: No    Sexual activity: Yes     Partners: Female     Comment:       Unable to perform ROS, patient in pain    Objective:     Vital Signs (Most Recent):  Temp: 97.3 °F (36.3 °C) (10/04/23 0629)  Pulse: 68 (10/04/23 0629)  Resp: 18 (10/04/23 0629)  BP: (!) 154/76 (10/04/23 0629)  SpO2: 100 % (10/04/23 0629) Vital Signs (24h Range):  Temp:  [97.3 °F (36.3 °C)] 97.3 °F (36.3 °C)  Pulse:  [68] 68  Resp:  [18] 18  SpO2:  [100 %] 100 %  BP: (154)/(76) 154/76     Weight: 61.2 kg (135 lb)  Body mass index is 19.94 kg/m².      Intake/Output Summary (Last 24 hours) at 10/4/2023 1131  Last data filed at 10/4/2023 1117  Gross per 24 hour   Intake 1850 ml   Output --   Net 1850 ml          Physical Exam  Vitals and nursing note reviewed.   Constitutional:       General: He is not in acute distress.     Interventions: He is sedated.   HENT:      Head: Normocephalic and atraumatic.      Right Ear: External ear normal.      Left Ear: External ear normal.      Nose: Nose normal.   Eyes:      Conjunctiva/sclera: Conjunctivae normal.   Neck:      Comments: Left lateral neck incision cdi  Palpable subcutaneous emphysema in chest and neck  Cardiovascular:      Rate and Rhythm: Normal rate and regular rhythm.   Pulmonary:      Effort: Pulmonary effort is normal. No respiratory distress.      Comments: R chest tube in place w/sanguinous output  Abdominal:      General: Abdomen is flat. There is no distension.      Palpations: Abdomen is soft.      Comments: J tube in place  Sternal sheila drain output SS   Genitourinary:     Comments: Car in place draining clear yellow urine  Skin:     General: Skin is warm and dry.            Vents:       Lines/Drains/Airways       Central Venous Catheter Line  Duration                  PowerPort A Cath Single Lumen 07/12/23 1344 Internal Jugular Right 83 days              Drain  Duration                  Urethral Catheter 10/04/23 0830 Non-latex;Straight-tip;Silicone 16 Fr. <1 day              Airway  Duration                  Airway - Non-Surgical 10/04/23 0854 <1 day              Arterial Line  Duration  "            Arterial Line 10/04/23 1005 Left Other (Comment) <1 day              Peripheral Intravenous Line  Duration                  Peripheral IV - Single Lumen 18 G Right Hand -- days         Peripheral IV - Single Lumen 10/04/23 0630 18 G Left Forearm <1 day                    Significant Labs:    CBC/Anemia Profile:  No results for input(s): "WBC", "HGB", "HCT", "PLT", "MCV", "RDW", "IRON", "FERRITIN", "RETIC", "FOLATE", "[id number]", "OCCULTBLOOD" in the last 48 hours.     Chemistries:  No results for input(s): "NA", "K", "CL", "CO2", "BUN", "CREATININE", "CALCIUM", "ALBUMIN", "PROT", "BILITOT", "ALKPHOS", "ALT", "AST", "GLUCOSE", "MG", "PHOS" in the last 48 hours.      Significant Imaging:   "

## 2023-10-05 LAB
ALBUMIN SERPL BCP-MCNC: 3.9 G/DL (ref 3.5–5.2)
ALLENS TEST: ABNORMAL
ALP SERPL-CCNC: 61 U/L (ref 55–135)
ALT SERPL W/O P-5'-P-CCNC: 28 U/L (ref 10–44)
ANION GAP SERPL CALC-SCNC: 8 MMOL/L (ref 8–16)
AST SERPL-CCNC: 58 U/L (ref 10–40)
BASOPHILS # BLD AUTO: 0.01 K/UL (ref 0–0.2)
BASOPHILS NFR BLD: 0.1 % (ref 0–1.9)
BILIRUB SERPL-MCNC: 0.5 MG/DL (ref 0.1–1)
BUN SERPL-MCNC: 10 MG/DL (ref 8–23)
CALCIUM SERPL-MCNC: 8 MG/DL (ref 8.7–10.5)
CHLORIDE SERPL-SCNC: 109 MMOL/L (ref 95–110)
CO2 SERPL-SCNC: 20 MMOL/L (ref 23–29)
CREAT SERPL-MCNC: 0.7 MG/DL (ref 0.5–1.4)
DELSYS: ABNORMAL
DIFFERENTIAL METHOD: ABNORMAL
EOSINOPHIL # BLD AUTO: 0 K/UL (ref 0–0.5)
EOSINOPHIL NFR BLD: 0.1 % (ref 0–8)
ERYTHROCYTE [DISTWIDTH] IN BLOOD BY AUTOMATED COUNT: 16.3 % (ref 11.5–14.5)
EST. GFR  (NO RACE VARIABLE): >60 ML/MIN/1.73 M^2
FLOW: 4
FLOW: 4
GLUCOSE SERPL-MCNC: 149 MG/DL (ref 70–110)
GLUCOSE SERPL-MCNC: 164 MG/DL (ref 70–110)
GLUCOSE SERPL-MCNC: 179 MG/DL (ref 70–110)
GLUCOSE SERPL-MCNC: 190 MG/DL (ref 70–110)
GLUCOSE SERPL-MCNC: 204 MG/DL (ref 70–110)
GLUCOSE SERPL-MCNC: 289 MG/DL (ref 70–110)
HCO3 UR-SCNC: 18.8 MMOL/L (ref 24–28)
HCO3 UR-SCNC: 21.4 MMOL/L (ref 24–28)
HCO3 UR-SCNC: 22.4 MMOL/L (ref 24–28)
HCO3 UR-SCNC: 22.5 MMOL/L (ref 24–28)
HCO3 UR-SCNC: 23.6 MMOL/L (ref 24–28)
HCO3 UR-SCNC: 23.9 MMOL/L (ref 24–28)
HCO3 UR-SCNC: 24 MMOL/L (ref 24–28)
HCO3 UR-SCNC: 25 MMOL/L (ref 24–28)
HCT VFR BLD AUTO: 28.8 % (ref 40–54)
HCT VFR BLD CALC: 28 %PCV (ref 36–54)
HCT VFR BLD CALC: 29 %PCV (ref 36–54)
HCT VFR BLD CALC: 30 %PCV (ref 36–54)
HCT VFR BLD CALC: 30 %PCV (ref 36–54)
HCT VFR BLD CALC: 31 %PCV (ref 36–54)
HCT VFR BLD CALC: 37 %PCV (ref 36–54)
HGB BLD-MCNC: 9.6 G/DL (ref 14–18)
IMM GRANULOCYTES # BLD AUTO: 0.03 K/UL (ref 0–0.04)
IMM GRANULOCYTES NFR BLD AUTO: 0.3 % (ref 0–0.5)
LACTATE SERPL-SCNC: 2.2 MMOL/L (ref 0.5–2.2)
LDH SERPL L TO P-CCNC: 1.58 MMOL/L (ref 0.36–1.25)
LYMPHOCYTES # BLD AUTO: 0.3 K/UL (ref 1–4.8)
LYMPHOCYTES NFR BLD: 3.3 % (ref 18–48)
MAGNESIUM SERPL-MCNC: 1.9 MG/DL (ref 1.6–2.6)
MCH RBC QN AUTO: 34.9 PG (ref 27–31)
MCHC RBC AUTO-ENTMCNC: 33.3 G/DL (ref 32–36)
MCV RBC AUTO: 105 FL (ref 82–98)
MODE: ABNORMAL
MODE: ABNORMAL
MONOCYTES # BLD AUTO: 0.6 K/UL (ref 0.3–1)
MONOCYTES NFR BLD: 6.5 % (ref 4–15)
NEUTROPHILS # BLD AUTO: 8.9 K/UL (ref 1.8–7.7)
NEUTROPHILS NFR BLD: 89.7 % (ref 38–73)
NRBC BLD-RTO: 0 /100 WBC
PCO2 BLDA: 37.2 MMHG (ref 35–45)
PCO2 BLDA: 39.2 MMHG (ref 35–45)
PCO2 BLDA: 40.3 MMHG (ref 35–45)
PCO2 BLDA: 47.5 MMHG (ref 35–45)
PCO2 BLDA: 48.6 MMHG (ref 35–45)
PCO2 BLDA: 65.9 MMHG (ref 35–45)
PCO2 BLDA: 69.4 MMHG (ref 35–45)
PCO2 BLDA: 87.9 MMHG (ref 35–45)
PH SMN: 7.06 [PH] (ref 7.35–7.45)
PH SMN: 7.12 [PH] (ref 7.35–7.45)
PH SMN: 7.14 [PH] (ref 7.35–7.45)
PH SMN: 7.28 [PH] (ref 7.35–7.45)
PH SMN: 7.3 [PH] (ref 7.35–7.45)
PH SMN: 7.31 [PH] (ref 7.35–7.45)
PH SMN: 7.35 [PH] (ref 7.35–7.45)
PH SMN: 7.39 [PH] (ref 7.35–7.45)
PHOSPHATE SERPL-MCNC: 3.1 MG/DL (ref 2.7–4.5)
PLATELET # BLD AUTO: 115 K/UL (ref 150–450)
PMV BLD AUTO: 9.6 FL (ref 9.2–12.9)
PO2 BLDA: 102 MMHG (ref 80–100)
PO2 BLDA: 106 MMHG (ref 80–100)
PO2 BLDA: 119 MMHG (ref 80–100)
PO2 BLDA: 126 MMHG (ref 80–100)
PO2 BLDA: 418 MMHG (ref 80–100)
PO2 BLDA: 89 MMHG (ref 80–100)
PO2 BLDA: 91 MMHG (ref 80–100)
PO2 BLDA: 98 MMHG (ref 80–100)
POC BE: -1 MMOL/L
POC BE: -2 MMOL/L
POC BE: -3 MMOL/L
POC BE: -4 MMOL/L
POC BE: -5 MMOL/L
POC BE: -5 MMOL/L
POC BE: -7 MMOL/L
POC BE: -8 MMOL/L
POC IONIZED CALCIUM: 1.08 MMOL/L (ref 1.06–1.42)
POC IONIZED CALCIUM: 1.17 MMOL/L (ref 1.06–1.42)
POC IONIZED CALCIUM: 1.21 MMOL/L (ref 1.06–1.42)
POC IONIZED CALCIUM: 1.22 MMOL/L (ref 1.06–1.42)
POC IONIZED CALCIUM: 1.32 MMOL/L (ref 1.06–1.42)
POC IONIZED CALCIUM: 1.44 MMOL/L (ref 1.06–1.42)
POC SATURATED O2: 100 % (ref 95–100)
POC SATURATED O2: 95 % (ref 95–100)
POC SATURATED O2: 97 % (ref 95–100)
POC TCO2: 20 MMOL/L (ref 23–27)
POC TCO2: 23 MMOL/L (ref 23–27)
POC TCO2: 24 MMOL/L (ref 23–27)
POC TCO2: 24 MMOL/L (ref 23–27)
POC TCO2: 25 MMOL/L (ref 23–27)
POC TCO2: 25 MMOL/L (ref 23–27)
POC TCO2: 26 MMOL/L (ref 23–27)
POC TCO2: 28 MMOL/L (ref 23–27)
POCT GLUCOSE: 101 MG/DL (ref 70–110)
POCT GLUCOSE: 240 MG/DL (ref 70–110)
POCT GLUCOSE: 91 MG/DL (ref 70–110)
POCT GLUCOSE: 97 MG/DL (ref 70–110)
POTASSIUM BLD-SCNC: 3.6 MMOL/L (ref 3.5–5.1)
POTASSIUM BLD-SCNC: 3.6 MMOL/L (ref 3.5–5.1)
POTASSIUM BLD-SCNC: 4.3 MMOL/L (ref 3.5–5.1)
POTASSIUM BLD-SCNC: 4.6 MMOL/L (ref 3.5–5.1)
POTASSIUM BLD-SCNC: 4.6 MMOL/L (ref 3.5–5.1)
POTASSIUM BLD-SCNC: 6.5 MMOL/L (ref 3.5–5.1)
POTASSIUM SERPL-SCNC: 4.1 MMOL/L (ref 3.5–5.1)
PROT SERPL-MCNC: 6.2 G/DL (ref 6–8.4)
RBC # BLD AUTO: 2.75 M/UL (ref 4.6–6.2)
SAMPLE: ABNORMAL
SITE: ABNORMAL
SODIUM BLD-SCNC: 135 MMOL/L (ref 136–145)
SODIUM BLD-SCNC: 138 MMOL/L (ref 136–145)
SODIUM BLD-SCNC: 138 MMOL/L (ref 136–145)
SODIUM BLD-SCNC: 139 MMOL/L (ref 136–145)
SODIUM BLD-SCNC: 141 MMOL/L (ref 136–145)
SODIUM BLD-SCNC: 142 MMOL/L (ref 136–145)
SODIUM SERPL-SCNC: 137 MMOL/L (ref 136–145)
WBC # BLD AUTO: 9.87 K/UL (ref 3.9–12.7)

## 2023-10-05 PROCEDURE — 63600175 PHARM REV CODE 636 W HCPCS: Performed by: SURGERY

## 2023-10-05 PROCEDURE — 27000492 HC SLEEVE, SCD T/L

## 2023-10-05 PROCEDURE — 63600175 PHARM REV CODE 636 W HCPCS: Performed by: STUDENT IN AN ORGANIZED HEALTH CARE EDUCATION/TRAINING PROGRAM

## 2023-10-05 PROCEDURE — 25000003 PHARM REV CODE 250: Performed by: SURGERY

## 2023-10-05 PROCEDURE — 97165 OT EVAL LOW COMPLEX 30 MIN: CPT

## 2023-10-05 PROCEDURE — 94664 DEMO&/EVAL PT USE INHALER: CPT

## 2023-10-05 PROCEDURE — 25000242 PHARM REV CODE 250 ALT 637 W/ HCPCS: Performed by: NURSE PRACTITIONER

## 2023-10-05 PROCEDURE — 80053 COMPREHEN METABOLIC PANEL: CPT | Performed by: STUDENT IN AN ORGANIZED HEALTH CARE EDUCATION/TRAINING PROGRAM

## 2023-10-05 PROCEDURE — C9113 INJ PANTOPRAZOLE SODIUM, VIA: HCPCS | Performed by: STUDENT IN AN ORGANIZED HEALTH CARE EDUCATION/TRAINING PROGRAM

## 2023-10-05 PROCEDURE — 63600175 PHARM REV CODE 636 W HCPCS

## 2023-10-05 PROCEDURE — 82800 BLOOD PH: CPT

## 2023-10-05 PROCEDURE — 94761 N-INVAS EAR/PLS OXIMETRY MLT: CPT

## 2023-10-05 PROCEDURE — 82330 ASSAY OF CALCIUM: CPT

## 2023-10-05 PROCEDURE — 97116 GAIT TRAINING THERAPY: CPT

## 2023-10-05 PROCEDURE — 84132 ASSAY OF SERUM POTASSIUM: CPT

## 2023-10-05 PROCEDURE — 97535 SELF CARE MNGMENT TRAINING: CPT

## 2023-10-05 PROCEDURE — 25000003 PHARM REV CODE 250: Performed by: STUDENT IN AN ORGANIZED HEALTH CARE EDUCATION/TRAINING PROGRAM

## 2023-10-05 PROCEDURE — 20600001 HC STEP DOWN PRIVATE ROOM

## 2023-10-05 PROCEDURE — 84295 ASSAY OF SERUM SODIUM: CPT

## 2023-10-05 PROCEDURE — 84100 ASSAY OF PHOSPHORUS: CPT | Performed by: STUDENT IN AN ORGANIZED HEALTH CARE EDUCATION/TRAINING PROGRAM

## 2023-10-05 PROCEDURE — 25000003 PHARM REV CODE 250

## 2023-10-05 PROCEDURE — 27000491 HC MATTRESS, SOFT CARE OVERLAY

## 2023-10-05 PROCEDURE — 85025 COMPLETE CBC W/AUTO DIFF WBC: CPT | Performed by: STUDENT IN AN ORGANIZED HEALTH CARE EDUCATION/TRAINING PROGRAM

## 2023-10-05 PROCEDURE — 99900035 HC TECH TIME PER 15 MIN (STAT)

## 2023-10-05 PROCEDURE — 27000221 HC OXYGEN, UP TO 24 HOURS

## 2023-10-05 PROCEDURE — 97162 PT EVAL MOD COMPLEX 30 MIN: CPT

## 2023-10-05 PROCEDURE — 83605 ASSAY OF LACTIC ACID: CPT | Performed by: SURGERY

## 2023-10-05 PROCEDURE — 85014 HEMATOCRIT: CPT

## 2023-10-05 PROCEDURE — 82803 BLOOD GASES ANY COMBINATION: CPT

## 2023-10-05 PROCEDURE — 97530 THERAPEUTIC ACTIVITIES: CPT

## 2023-10-05 PROCEDURE — 83735 ASSAY OF MAGNESIUM: CPT | Performed by: STUDENT IN AN ORGANIZED HEALTH CARE EDUCATION/TRAINING PROGRAM

## 2023-10-05 PROCEDURE — 94640 AIRWAY INHALATION TREATMENT: CPT

## 2023-10-05 PROCEDURE — 37799 UNLISTED PX VASCULAR SURGERY: CPT

## 2023-10-05 RX ORDER — SENNOSIDES 8.8 MG/5ML
5 LIQUID ORAL NIGHTLY
Status: DISCONTINUED | OUTPATIENT
Start: 2023-10-05 | End: 2023-10-10 | Stop reason: HOSPADM

## 2023-10-05 RX ORDER — TAMSULOSIN HYDROCHLORIDE 0.4 MG/1
0.4 CAPSULE ORAL DAILY
Status: DISCONTINUED | OUTPATIENT
Start: 2023-10-06 | End: 2023-10-10

## 2023-10-05 RX ORDER — ACETAMINOPHEN 650 MG/20.3ML
1000 LIQUID ORAL EVERY 8 HOURS
Status: DISCONTINUED | OUTPATIENT
Start: 2023-10-05 | End: 2023-10-09

## 2023-10-05 RX ORDER — DEXTROSE MONOHYDRATE, SODIUM CHLORIDE, AND POTASSIUM CHLORIDE 50; 1.49; 4.5 G/1000ML; G/1000ML; G/1000ML
INJECTION, SOLUTION INTRAVENOUS CONTINUOUS
Status: DISCONTINUED | OUTPATIENT
Start: 2023-10-05 | End: 2023-10-06

## 2023-10-05 RX ORDER — ENOXAPARIN SODIUM 100 MG/ML
40 INJECTION SUBCUTANEOUS EVERY 24 HOURS
Status: DISCONTINUED | OUTPATIENT
Start: 2023-10-05 | End: 2023-10-10

## 2023-10-05 RX ADMIN — METOROPROLOL TARTRATE 2.5 MG: 5 INJECTION, SOLUTION INTRAVENOUS at 05:10

## 2023-10-05 RX ADMIN — ACETAMINOPHEN 1000 MG: 10 INJECTION, SOLUTION INTRAVENOUS at 05:10

## 2023-10-05 RX ADMIN — PANTOPRAZOLE SODIUM 40 MG: 40 INJECTION, POWDER, FOR SOLUTION INTRAVENOUS at 09:10

## 2023-10-05 RX ADMIN — MUPIROCIN 1 G: 20 OINTMENT TOPICAL at 08:10

## 2023-10-05 RX ADMIN — POTASSIUM CHLORIDE, DEXTROSE MONOHYDRATE AND SODIUM CHLORIDE: 150; 5; 450 INJECTION, SOLUTION INTRAVENOUS at 04:10

## 2023-10-05 RX ADMIN — MUPIROCIN 1 G: 20 OINTMENT TOPICAL at 09:10

## 2023-10-05 RX ADMIN — ENOXAPARIN SODIUM 40 MG: 40 INJECTION SUBCUTANEOUS at 05:10

## 2023-10-05 RX ADMIN — ACETAMINOPHEN 1000 MG: 10 INJECTION, SOLUTION INTRAVENOUS at 01:10

## 2023-10-05 RX ADMIN — ACETAMINOPHEN 999.01 MG: 160 SOLUTION ORAL at 11:10

## 2023-10-05 RX ADMIN — Medication: at 08:10

## 2023-10-05 RX ADMIN — POTASSIUM CHLORIDE 10 MEQ: 7.46 INJECTION, SOLUTION INTRAVENOUS at 01:10

## 2023-10-05 RX ADMIN — INSULIN ASPART 1 UNITS: 100 INJECTION, SOLUTION INTRAVENOUS; SUBCUTANEOUS at 12:10

## 2023-10-05 RX ADMIN — METOROPROLOL TARTRATE 2.5 MG: 5 INJECTION, SOLUTION INTRAVENOUS at 12:10

## 2023-10-05 RX ADMIN — POTASSIUM CHLORIDE 10 MEQ: 7.46 INJECTION, SOLUTION INTRAVENOUS at 02:10

## 2023-10-05 RX ADMIN — METHOCARBAMOL 500 MG: 100 INJECTION, SOLUTION INTRAMUSCULAR; INTRAVENOUS at 02:10

## 2023-10-05 RX ADMIN — METHOCARBAMOL 500 MG: 100 INJECTION, SOLUTION INTRAMUSCULAR; INTRAVENOUS at 11:10

## 2023-10-05 RX ADMIN — LEVALBUTEROL HYDROCHLORIDE 0.63 MG: 0.63 SOLUTION RESPIRATORY (INHALATION) at 02:10

## 2023-10-05 RX ADMIN — METHOCARBAMOL 500 MG: 100 INJECTION, SOLUTION INTRAMUSCULAR; INTRAVENOUS at 06:10

## 2023-10-05 RX ADMIN — SENNOSIDES 5 ML: 8.8 SYRUP ORAL at 08:10

## 2023-10-05 RX ADMIN — LEVALBUTEROL HYDROCHLORIDE 0.63 MG: 0.63 SOLUTION RESPIRATORY (INHALATION) at 07:10

## 2023-10-05 RX ADMIN — LEVALBUTEROL HYDROCHLORIDE 0.63 MG: 0.63 SOLUTION RESPIRATORY (INHALATION) at 08:10

## 2023-10-05 RX ADMIN — POTASSIUM CHLORIDE, DEXTROSE MONOHYDRATE AND SODIUM CHLORIDE: 150; 5; 450 INJECTION, SOLUTION INTRAVENOUS at 09:10

## 2023-10-05 NOTE — PLAN OF CARE
Problem: Adult Inpatient Plan of Care  Goal: Absence of Hospital-Acquired Illness or Injury  Outcome: Ongoing, Progressing  Intervention: Prevent Skin Injury  Flowsheets (Taken 10/5/2023 1721)  Skin Protection:   adhesive use limited   skin-to-skin areas padded     Problem: Infection  Goal: Absence of Infection Signs and Symptoms  Outcome: Ongoing, Progressing  Intervention: Prevent or Manage Infection  Flowsheets (Taken 10/5/2023 1721)  Infection Management: aseptic technique maintained  Isolation Precautions: precautions initiated     Problem: Fall Injury Risk  Goal: Absence of Fall and Fall-Related Injury  Outcome: Ongoing, Progressing  Intervention: Identify and Manage Contributors  Flowsheets (Taken 10/5/2023 1721)  Self-Care Promotion:   BADL personal objects within reach   BADL personal routines maintained  Medication Review/Management: medications reviewed     Problem: Skin Injury Risk Increased  Goal: Skin Health and Integrity  Outcome: Ongoing, Progressing  Intervention: Promote and Optimize Oral Intake  Flowsheets (Taken 10/5/2023 1721)  Oral Nutrition Promotion: calorie-dense foods provided

## 2023-10-05 NOTE — PT/OT/SLP EVAL
Physical Therapy Evaluation    Patient Name:  Blayne Beebe   MRN:  [id number]  Admit Date: 10/4/2023  Admitting Diagnosis:  <principal problem not specified>  Length of Stay: 1 days  Recent Surgery: Procedure(s) (LRB):  XI ROBOTIC ESOPHAGECTOMY (N/A)  INSERTION, JEJUNOSTOMY TUBE  ROBOTIC REPAIR, HERNIA, PARAESOPHAGEAL  PYLOROMYOTOMY 1 Day Post-Op    Recommendations:     Discharge Recommendations:  other (see comments) (defer to CM/SW)   Discharge Equipment Recommendations: none       Assessment:     Blayne Beebe is a 63 y.o. male admitted with a medical diagnosis of <principal problem not specified>.      Problem List: impaired endurance, impaired functional mobility, gait instability, impaired cardiopulmonary response to activity  Rehab Prognosis: Good; patient would benefit from acute skilled PT services to address these deficits and reach maximum level of function.      Plan:     During this hospitalization, patient to be seen 4 x/week to address the identified rehab impairments via gait training, therapeutic activities, therapeutic exercises, neuromuscular re-education and progress towards the established goals.    Plan of Care Expires:  11/04/23    Subjective   Communicated with RN prior to session.  Patient found up in chair upon PT entry to room, agreeable to evaluation. Blayne Beebe's alone during session.    Chief Complaint: No chief complaint on file.    Patient/Family Comments/goals: to get better  Pain/Comfort:  Pain Rating 1: 0/10  Pain Rating Post-Intervention 1: 0/10    Living Environment:  Pt lives with wife in a [facility] with a  entrance. Pt was ind with ambulation and ADLs.  Patient uses DME as follows: none. DME owned (not currently used): none.    Patient reports they will have assistance from wife upon discharge.    Objective:   Patient found with: telemetry, pulse ox (continuous), blood pressure cuff, central line, peripheral IV, chest tube, PADMA drain, caldera catheter, arterial  "line, PCA, oxygen (J-tube)     General Precautions: Standard, Cardiac fall   Orthopedic Precautions:N/A   Braces: N/A   Oxygen Device: Nasal Cannula   Vitals: BP (!) 132/57   Pulse 65   Temp 98.1 °F (36.7 °C) (Oral)   Resp 19   Ht 5' 9" (1.753 m)   Wt 61.2 kg (135 lb)   SpO2 95%   BMI 19.94 kg/m²     Exams:  Cognition:   Alert and Cooperative  Ox4  Command following: Follows multistep  commands  Fluency: clear/fluent    RLE ROM: WFL  RLE Strength: WFL  LLE ROM: WFL  LLE Strength: WFL      Outcome Measures:  AM-PAC 6 CLICK MOBILITY  Turning over in bed (including adjusting bedclothes, sheets and blankets)?: 3  Sitting down on and standing up from a chair with arms (e.g., wheelchair, bedside commode, etc.): 3  Moving from lying on back to sitting on the side of the bed?: 3  Moving to and from a bed to a chair (including a wheelchair)?: 3  Need to walk in hospital room?: 3  Climbing 3-5 steps with a railing?: 3  Basic Mobility Total Score: 18     Functional Mobility:  Additional staff present: OT  Bed Mobility:  Not performed 2nd to pt found in chair     Transfers:   Sit <> Stand Transfer: stand by assistance with no assistive device from chair      Gait:   Patient ambulated: 10ft + 20ft (ADLs standing at the sink between trials)   Patient required: standy by assistance  Patient used: no assistive device  Gait Pattern observed: reciprocal gait  Gait Deviation(s): decreased step length and decreased elena  Impairments due to:  post surgical instability   Comments:   No LOB  Mild SOB  Verbal cuing for pacing, deep breathing, and purposeful steps      Therapeutic Activities, Exercises, & Education:   Educated pt on PT role/POC  Educated pt on importance of OOB activity and daily ambulation   Pt verbalized understanding     Pt performed ADLs standing at the sink with OT    Patient left up in chair with all lines intact, call button in reach, and RN notified.    GOALS:   Multidisciplinary Problems       Physical " Therapy Goals          Problem: Physical Therapy    Goal Priority Disciplines Outcome Goal Variances Interventions   Physical Therapy Goal     PT, PT/OT Ongoing, Progressing     Description: Goals to be met by: 10/20/2023    Patient will increase functional independence with mobility by performin. Supine to sit with Supervision   2. Sit to stand transfer with Supervision  3. Gait  x 400 feet with Supervision.                          History:     Past Medical History:   Diagnosis Date    Arthritis     Cancer     COPD (chronic obstructive pulmonary disease)        Past Surgical History:   Procedure Laterality Date    CERVICAL FUSION      COLONOSCOPY N/A 3/27/2018    Procedure: COLONOSCOPY;  Surgeon: Maria Teresa Morocho MD;  Location: [facility];  Service: Endoscopy;  Laterality: N/A;    COLONOSCOPY N/A 2023    Procedure: COLONOSCOPY;  Surgeon: Kelli Snell MD;  Location: [facility];  Service: Endoscopy;  Laterality: N/A;    ENDOSCOPIC ULTRASOUND OF UPPER GASTROINTESTINAL TRACT N/A 7/3/2023    Procedure: ULTRASOUND, UPPER GI TRACT, ENDOSCOPIC;  Surgeon: Ray Duffy MD;  Location: [facility];  Service: Endoscopy;  Laterality: N/A;    ESOPHAGOGASTRODUODENOSCOPY N/A 2023    Procedure: EGD (ESOPHAGOGASTRODUODENOSCOPY);  Surgeon: Kelli Snell MD;  Location: [facility];  Service: Endoscopy;  Laterality: N/A;    ESOPHAGOGASTRODUODENOSCOPY N/A 7/3/2023    Procedure: EGD (ESOPHAGOGASTRODUODENOSCOPY);  Surgeon: Ray Duffy MD;  Location: [facility];  Service: Endoscopy;  Laterality: N/A;    INGUINAL HERNIA REPAIR Bilateral     Right x2, x1 left    PLACEMENT OF JEJUNOSTOMY TUBE  10/4/2023    Procedure: INSERTION, JEJUNOSTOMY TUBE;  Surgeon: Nas Meyer MD;  Location: [facility] OR [address] [place] [address];  Service: General;;    PYLOROMYOTOMY  10/4/2023    Procedure: PYLOROMYOTOMY;  Surgeon: Nas Meyer MD;  [facility];  Service: General;;    ROBOT-ASSISTED SURGICAL REMOVAL OF ESOPHAGUS USING  DA BALWINDER XI N/A 10/4/2023    Procedure: XI ROBOTIC ESOPHAGECTOMY;  Surgeon: Nas Meyer MD;  Location: [facility] OR [address] [place] [address];  Service: General;  Laterality: N/A;    ROBOTIC REPAIR, HERNIA, PARAESOPHAGEAL  10/4/2023    Procedure: ROBOTIC REPAIR, HERNIA, PARAESOPHAGEAL;  Surgeon: Nas Meyer MD;  Location: [facility] OR [address] [place] [address];  Service: General;;    ROTATOR CUFF REPAIR Right     x2       Time Tracking:     PT Received On: 10/05/23  PT Start Time: 0835     PT Stop Time: 0903  PT Total Time (min): 28 min     Billable Minutes: Evaluation 5, Gait Training 8, and Therapeutic Activity 15

## 2023-10-05 NOTE — ASSESSMENT & PLAN NOTE
Blayne Beebe is a 63 y.o. male with Stage III (cT3, cN1, cM0, G2) GEJ esophageal adenocarcinoma who underwent esophagectomy today. He has a pmh notable for tobacco use disorder, COPD (emphysema), HLD, difficult prior intubation, and cervical spine surgery. Initiated chemotherapy (paclitaxel/carboplatin) 7/16/23-8/15/23 and tolerated well with only symptoms of fatigue. He presents to SICU after undergoing robotic assisted Woodrow esophagectomy with Dr. Meyer. HDS, extubated, not on any gtts, AO4.       Neuro/Psych:   -- Sedation: not sedated  -- Pain: IV tylenol, IV robaxin, PCA             Cards:   -- HDS, no pressors required  -- MAPS>65  -- if patient becomes hypotensive, attempt fluid resuscitation before initiating pressors as the use of pressors may negative impact the surgical anastomosis.       Pulm:   -- Goal O2 sat > 90%  -- Stable on room air  -- Has history of COPD  -- Patient has history of difficult intubation, if intubation is needed please reach out to anesthesia staff  -- Given surgical anastomosis, use of BiPAP and CPAP is contraindicated       Renal:  -- Keep caldera for strict I/O  -- BUN/Cr 10/0.7  -- 2.8 L UOP      FEN / GI:   -- Net +4.3 L  -- Replace lytes as needed  -- Daily CMP  -- Nutrition: Strict NPO  -- mIVF  -- PRN zofran/compazine   -- J-tube in place  -- IV Pantoprazole  -- Use of NSAIDS is contraindicated given surgical manipulation of GI tract     ID:   -- Tm: Afebrile; WBC wnl  -- Abx: None      Heme/Onc:   -- H/H stable HgB 9.6  -- Daily CBC  -- DVT ppx - management per primary       Endo:   --Crit Care Gluc goal 140-180     PPx:   Feeding: Strict NPO  Analgesia/Sedation: MMPC  Thromboembolic prevention: pending  HOB >30: yes  Stress Ulcer ppx: ppi  Glucose control: Critical care goal 140-180 g/dl, ISS    Lines/Drains/Airway: A line, 2xPIV, caldera, R-CT, sheila drain, j-tube      Dispo/Code Status/Palliative:   -- SICU / Full Code  -- Stable for stepdown

## 2023-10-05 NOTE — OP NOTE
Preop Dx: Esophageal adenocarcinoma  Postop Dx: same  Surgeon: Nas Meyer  Assistant: Ray Paulino  Operative Procedure: Total thoracic esophagectomy, proximal gastrectomy, and cervical esophagogastrostomy via right chest, abdomen, and left neck, robotic-assisted  Operative Detail:  I assisted Dr Meyer throughout this complex case.  After induction of general endotracheal anesthesia, the patient was placed in the left lateral decubitus position and the right hemithorax was prepped and draped in sterile fashion.  Access to the right chest was gained with an 8 mm robotic trocar placed in the 5th intercostal space at approximately the mid axillary line.  10 mm of insufflation pressure was applied.  We have limited access which makes this portion of the procedure considerably more difficult than normal.  This is due to the presence of dense pleural adhesions particularly around the apex of the right lung as well as multiple emphysematous blebs which can not be decompressed and make retraction of the lung difficult.  In addition, we have only partial control of right lung ventilation with our endobronchial blocker and the right upper lobe continues to be ventilated.  Despite these obstacles we are able to accomplish our objectives in the chest although this takes about twice as long as in the usual case.  The mediastinal pleura is opened and the azygous vein is isolated and taken with a vascular staple load.  Access to lymph node stations is limited by our overall poor access but the esophagus is mobilized and encircled with a Penrose drain and using traction on the strain the esophagus was mobilized with surrounding soft tissue from the hiatus inferiorly to the apex of the chest superiorly.  A Penrose drain secured with an endoloop is left at the lower limit and a 2nd 1 at the upper limit of our dissection.  The robotic instruments were then withdrawn and the robot is undocked and the right lung is re-expanded.   Prior to this a 24 Ananth is been placed as a right chest tube and this was connected to Pleur-Evac suction and secured.  The patient is then placed in the supine position on the operating table and the endobronchial blocker was removed.  The neck anterior chest and abdomen were prepped and draped in sterile fashion.  The Veress needle was introduced in the supraumbilical position and intraperitoneal positioning is confirmed.  Insufflation is carried out without difficulty.  An 8 mm robotic trocar is placed just to the right of the umbilicus in this patient with a relatively small abdominal domain.  We do have a free space and under direct laparoscopic vision 3 additional robotic trocars are added, as well as a liver retractor port and an assistant port.  The patient is placed in steep reverse Trendelenburg position.  Robotic instruments are inserted under direct vision and we again moved to the console.  The stomach is grasped and elevated superiorly and the gastrocolic omentum was taken down.  There are significant adhesions in the lesser sac which are divided.  The entire greater curvature of the stomach is mobilized and outside of and carefully preserving the right gastroepiploic arcade.  Inferiorly mobilization extends to the base of the right gastroepiploic pedicle.  The gastrohepatic omentum is widely taken and all soft tissue around the right left kisha are swept down onto the specimen.  The left gastric pedicle is elevated and left gastric node dissection was performed the left gastric vessels were then taken using a vascular staple load.  A pyloromyotomy is performed in the usual fashion.  Dissection within the right kisha communicates with our previous thoracic dissection and the Penrose drain a line around the lower esophagus is brought into the abdomen and traction on this is used to complete the final mobilization of the GE junction and lower esophagus from the lower mediastinum.  The Penrose drain was  then removed.  Our stomach is now entirely free.  The stomach is divided using the robotic BECCA stapler then an oblique fashion to include most of the lesser curve, the cardia, and the attached left gastric lymph nodes on block with the esophagus.  This creates a long gastric conduit based on the greater curvature of the stomach in the right gastroepiploic arcade.  Loose or fragmented staples are carefully removed from the staple line on both the specimen and conduit side.  The specimen is then stuffed into the right chest and posterior crural repair was performed with U-stitches of 2-0 silk.  The upper aspect of the conduit is then hitched to the lower aspect of the specimen with a 2-0 silk U-stitch.  Robotic instruments were then withdrawn the abdomen is desufflated to a pressure of 5 mm and the patient is taken out of reverse Trendelenburg position.  The left cervical counter incision was then made and deepened medial to the sternocleidomastoid muscle and to the left carotid entering the prevertebral space and elevating the esophagus.  Without difficulty the Penrose that it been placed around the upper thoracic esophagus is grasped and elevated into the neck facilitating mobilization of the lower cervical esophagus.  Gentle traction on the esophagus then delivers the specimen to the neck at the same time bringing up the conduit.  We do have ample length.  The specimen was detached from the conduit and the lower cervical esophagus is divided using a purple load of the Covidien stapler.  Side-to-side esophagogastrostomy was then carried out in the usual fashion using a 2nd purple Covidien load.  The transverse enterotomies were closed with interrupted 3-0 Vicryl.  The neck is irrigated with sterile saline and closed over Ananth drainage.  Returning to the abdomen, Dr. Meyer completes a robotic feeding jejunostomy and abdominal wall trocar closure.  The patient has tolerated this lengthy procedure well and is brought  to the intensive care unit in stable condition.  EBL: 200 ccs  CPT code:

## 2023-10-05 NOTE — NURSING
Nurses Note -- 4 Eyes      10/4/2023   11:46 PM      Skin assessed during: Admit from OR      [x] No Altered Skin Integrity Present    [x]Prevention Measures Documented  Sacral and heel foams applied, SCDs in use, bed plugged in and working.    Wound Care Consulted? No    Attending Nurse:  Paz Gil RN/Staff Member:   SAMANTHA Sherwood

## 2023-10-05 NOTE — TRANSFER OF CARE
"Anesthesia Transfer of Care Note    Patient: Blayne D III Steib    Procedure(s) Performed: Procedure(s) (LRB):  XI ROBOTIC ESOPHAGECTOMY (N/A)  INSERTION, JEJUNOSTOMY TUBE  ROBOTIC REPAIR, HERNIA, PARAESOPHAGEAL  PYLOROMYOTOMY    Patient location: ICU    Anesthesia Type: general    Transport from OR: Transported from OR on 6-10 L/min O2 by face mask with adequate spontaneous ventilation    Post pain: adequate analgesia    Post vital signs: stable    Level of consciousness: awake    Nausea/Vomiting: no nausea/vomiting    Complications: none    Transfer of care protocol was followed      Last vitals:   Visit Vitals  BP (!) 146/70 (BP Location: Right arm, Patient Position: Lying)   Pulse 99   Temp 36.3 °C (97.3 °F) (Temporal)   Resp 17   Ht 5' 9" (1.753 m)   Wt 61.2 kg (135 lb)   SpO2 96%   BMI 19.94 kg/m²     "

## 2023-10-05 NOTE — H&P
[facility] - Surgical Intensive Care  Critical Care - Surgery  History & Physical    Patient Name: Blayne Beebe  MRN: [id number]  Admission Date: 10/4/2023  Code Status: Full Code  Attending Physician: Mitch  Primary Care Provider: Zachariah Reyna MD   Principal Problem: <principal problem not specified>    Subjective:     HPI:  Blayne Beebe is a 63 y.o. male with Stage III (cT3, cN1, cM0, G2) GEJ esophageal adenocarcinoma who underwent esophagectomy today. He has a pmh notable for tobacco use disorder, COPD (emphysema), HLD, difficult prior intubation, and cervical spine surgery. He underwent port placement on 7/12/23 and underwent neoadjuvant crt. Initiated chemotherapy (paclitaxel/carboplatin) 7/16/23-8/15/23 and tolerated well with only symptoms of fatigue. He presents to SICU after undergoing robotic assisted Woodrow esophagectomy with Dr. Meyer today.     He arrives to SICU extubated, not sedated, HDS not any gtts. His surgery was uncomplicated with an EBL of 200cc but was notable for being a difficult intubation/extubation that required assistance of multiple anesthesia staff. He arrives with a J tube, right chest tube, caldera, 19F sternal sheila drain, and left radial art line. Immediate postoperative plans include close monitoring and pain control.       Hospital/ICU Course:  No notes on file    Follow-up For: Procedure(s) (LRB):  XI ROBOTIC ESOPHAGECTOMY (N/A)    Post-Operative Day: Day of Surgery     Past Medical History:   Diagnosis Date    Arthritis     Cancer     COPD (chronic obstructive pulmonary disease)        Past Surgical History:   Procedure Laterality Date    CERVICAL FUSION      COLONOSCOPY N/A 3/27/2018    Procedure: COLONOSCOPY;  Surgeon: Maria Teresa Morocho MD;  Location: [facility] ENDO;  Service: Endoscopy;  Laterality: N/A;    COLONOSCOPY N/A 6/13/2023    Procedure: COLONOSCOPY;  Surgeon: Kelli Snell MD;  Location: [facility] ENDO;  Service: Endoscopy;  Laterality: N/A;     ENDOSCOPIC ULTRASOUND OF UPPER GASTROINTESTINAL TRACT N/A 7/3/2023    Procedure: ULTRASOUND, UPPER GI TRACT, ENDOSCOPIC;  Surgeon: Ray Duffy MD;  Location: [facility];  Service: Endoscopy;  Laterality: N/A;    ESOPHAGOGASTRODUODENOSCOPY N/A 6/13/2023    Procedure: EGD (ESOPHAGOGASTRODUODENOSCOPY);  Surgeon: Kelli Snell MD;  Location[facility] ENDO;  Service: Endoscopy;  Laterality: N/A;    ESOPHAGOGASTRODUODENOSCOPY N/A 7/3/2023    Procedure: EGD (ESOPHAGOGASTRODUODENOSCOPY);  Surgeon: Ray Duffy MD;  Location: [facility] ENDO;  Service: Endoscopy;  Laterality: N/A;    INGUINAL HERNIA REPAIR Bilateral     Right x2, x1 left    ROTATOR CUFF REPAIR Right     x2       Review of patient's allergies indicates:  No Known Allergies    Family History       Problem Relation (Age of Onset)    Diabetes Mother    Heart disease Father          Tobacco Use    Smoking status: Former     Current packs/day: 0.25     Average packs/day: 0.3 packs/day for 40.8 years (10.2 ttl pk-yrs)     Types: Cigarettes     Start date: 1983     Passive exposure: Current    Smokeless tobacco: Never    Tobacco comments:     4 cigarettes a day   Substance and Sexual Activity    Alcohol use: Yes     Comment: a couple of beers a day    Drug use: No    Sexual activity: Yes     Partners: Female     Comment:       Unable to perform ROS, patient in pain   Objective:     Vital Signs (Most Recent):  Temp: 97.3 °F (36.3 °C) (10/04/23 0629)  Pulse: 68 (10/04/23 0629)  Resp: 18 (10/04/23 0629)  BP: (!) 154/76 (10/04/23 0629)  SpO2: 100 % (10/04/23 0629) Vital Signs (24h Range):  Temp:  [97.3 °F (36.3 °C)] 97.3 °F (36.3 °C)  Pulse:  [68] 68  Resp:  [18] 18  SpO2:  [100 %] 100 %  BP: (154)/(76) 154/76     Weight: 61.2 kg (135 lb)  Body mass index is 19.94 kg/m².      Intake/Output Summary (Last 24 hours) at 10/4/2023 1131  Last data filed at 10/4/2023 1117  Gross per 24 hour   Intake 1850 ml   Output --   Net 1850 ml          Physical  "Exam  Vitals and nursing note reviewed.   Constitutional:       General: He is not in acute distress.     Interventions: He is sedated.   HENT:      Head: Normocephalic and atraumatic.      Right Ear: External ear normal.      Left Ear: External ear normal.      Nose: Nose normal.   Eyes:      Conjunctiva/sclera: Conjunctivae normal.   Neck:      Comments: Left lateral neck incision cdi  Palpable subcutaneous emphysema in chest and neck  Cardiovascular:      Rate and Rhythm: Normal rate and regular rhythm.   Pulmonary:      Effort: Pulmonary effort is normal. No respiratory distress.      Comments: R chest tube in place w/sanguinous output  Abdominal:      General: Abdomen is flat. There is no distension.      Palpations: Abdomen is soft.      Comments: J tube in place  Sternal sheila drain output SS   Genitourinary:     Comments: Car in place draining clear yellow urine  Skin:     General: Skin is warm and dry.            Vents:       Lines/Drains/Airways       Central Venous Catheter Line  Duration                  PowerPort A Cath Single Lumen 07/12/23 1344 Internal Jugular Right 83 days              Drain  Duration                  Urethral Catheter 10/04/23 0830 Non-latex;Straight-tip;Silicone 16 Fr. <1 day              Airway  Duration                  Airway - Non-Surgical 10/04/23 0854 <1 day              Arterial Line  Duration             Arterial Line 10/04/23 1005 Left Other (Comment) <1 day              Peripheral Intravenous Line  Duration                  Peripheral IV - Single Lumen 18 G Right Hand -- days         Peripheral IV - Single Lumen 10/04/23 0630 18 G Left Forearm <1 day                    Significant Labs:    CBC/Anemia Profile:  No results for input(s): "WBC", "HGB", "HCT", "PLT", "MCV", "RDW", "IRON", "FERRITIN", "RETIC", "FOLATE", "[id number]", "OCCULTBLOOD" in the last 48 hours.     Chemistries:  No results for input(s): "NA", "K", "CL", "CO2", "BUN", "CREATININE", "CALCIUM", " ""ALBUMIN", "PROT", "BILITOT", "ALKPHOS", "ALT", "AST", "GLUCOSE", "MG", "PHOS" in the last 48 hours.      Significant Imaging:     Assessment/Plan:     Oncology  Malignant neoplasm of gastroesophageal junction    Neuro/Psych:   -- Sedation: not sedated  -- Pain: IV tylenol, IV robaxin, PCA             Cards:   -- HDS, no pressors required  -- MAPS>65  -- if patient becomes hypotensive, attempt fluid resuscitation before initiating pressors as the use of pressors may negative impact the surgical anastomosis       Pulm:   -- Goal O2 sat > 90%  -- Stable on room air  -- Has history of COPD  -- Patient has history of difficult intubation, if intubation is needed please reach out to anesthesia staff  -- Given surgical anastomosis, use of BiPAP and CPAP is contraindicated       Renal:  -- Keep caldera for strict I/O  -- BUN/Cr        FEN / GI:   -- Replace lytes as needed  -- Daily CMP  -- Nutrition: Strict NPO  -- mIVF  -- PRN zofran/compazine   -- J-tube in place  -- IV Pantoprazole  -- Use of NSAIDS is contraindicated given surgical manipulation of GI tract        ID:   -- Tm: afebrile; WBC wnl  -- Abx: complete perioperative abx      Heme/Onc:   -- H/H stable   -- Daily CBC  -- DVT ppx - management per primary       Endo:   --Crit Care Gluc goal 140-180        PPx:   Feeding: Strict NPO  Analgesia/Sedation: MMPC  Thromboembolic prevention: pending  HOB >30: yes  Stress Ulcer ppx: ppi  Glucose control: Critical care goal 140-180 g/dl, ISS    Lines/Drains/Airway: A line, 2xPIV, caldera, R-CT, sheila drain, j-tube      Dispo/Code Status/Palliative:   -- SICU / Full Code            Syd Lua MD  Critical Care - Surgery  [facility] - Surgical Intensive Care  "

## 2023-10-05 NOTE — PLAN OF CARE
Evaluation completed. Goals established.    Problem: Occupational Therapy  Goal: Occupational Therapy Goal  Description: Goals to be met by: 10/19/2023     Patient will increase functional independence with ADLs by performing:    UE Dressing with Quanah.  LE Dressing with Quanah.  Grooming while standing at sink with Quanah.  Toileting from toilet with Quanah for hygiene and clothing management.   Toilet transfer to toilet with Quanah.    Outcome: Ongoing, Progressing   Jonathon Espino OTR/L

## 2023-10-05 NOTE — PLAN OF CARE
[facility] - Surgical Intensive Care  Initial Discharge Assessment       Primary Care Provider: Zachariah Reyna MD    Admission Diagnosis: Malignant neoplasm of gastroesophageal junction [C16.0]    Admission Date: 10/4/2023  Expected Discharge Date:     Transition of Care Barriers: None    Payor: [employer] / Plan: [facility] CHOICE PLUS / Product Type: Commercial /     Extended Emergency Contact Information  Primary Emergency Contact: Aislinn Beebe  Address: [address] [place] [address] [place]  Mobile Phone: [phone]  Relation: Spouse  Secondary Emergency Contact: Ed Beebe  Address: [address]           [place], [place] [address] [place] [address]  Mobile Phone: [phone]  Relation: Son    Discharge Plan A: Home with family, Home, Home Health  Discharge Plan B: Home, Home with family      [id number] [place] - [address]  [address]  [place] [address]  Phone: [phone] Fax: [phone]    [facility] - [place] - [address]  [email or web address]  [place] [address]  Phone: [phone] Fax: [phone]    [id number] - [place], [place] - [address] AT [facility] [address]  [address]  [place] [facility] [address]  Phone: [phone] Fax: [phone]      Initial Assessment (most recent)       Adult Discharge Assessment - 10/05/23 1017          Discharge Assessment    Assessment Type Discharge Planning Assessment     Confirmed/corrected address, phone number and insurance Yes     Confirmed Demographics Correct on Facesheet     Source of Information patient     When was your last doctors appointment? 05/15/23     Communicated NARCISO with patient/caregiver Date not available/Unable to determine     People in Home spouse     Do you expect to return to your current living situation? Yes     Do you have help at home or someone to help you manage your care at home?  Yes     Prior to hospitilization cognitive status: No Deficits     Current cognitive status: No Deficits     Home Layout Able to live on 1st floor     Equipment Currently Used at Home none     Readmission within 30 days? No     Patient currently being followed by outpatient case management? No     Do you currently have service(s) that help you manage your care at home? No     Do you take prescription medications? No     Do you have prescription coverage? Yes     Do you have any problems affording any of your prescribed medications? TBD     Who is going to help you get home at discharge? [employer]ITESH CAVANAUGH [phone]     How do you get to doctors appointments? car, drives self;family or friend will provide     Are you on dialysis? No     Do you take coumadin? No     DME Needed Upon Discharge  --   TBD    Discharge Plan discussed with: Patient     Transition of Care Barriers None     Discharge Plan A Home with family;Home;Home Health     Discharge Plan B Home;Home with family        Physical Activity    On average, how many days per week do you engage in moderate to strenuous exercise (like a brisk walk)? 6 days     On average, how many minutes do you engage in exercise at this level? 50 min        Financial Resource Strain    How hard is it for you to pay for the very basics like food, housing, medical care, and heating? Not hard at all        Housing Stability    In the last 12 months, was there a time when you were not able to pay the mortgage or rent on time? No     In the last 12 months, was there a time when you did not have a steady place to sleep or slept in a shelter (including now)? No        Transportation Needs    In the past 12 months, has lack of transportation kept you from medical appointments or from getting medications? No     In the past 12 months, has lack of transportation kept you from meetings, work, or from getting things needed for daily living? No        Food Insecurity    Within the past  12 months, you worried that your food would run out before you got the money to buy more. Never true     Within the past 12 months, the food you bought just didn't last and you didn't have money to get more. Never true        Social Connections    In a typical week, how many times do you talk on the phone with family, friends, or neighbors? Patient refused     How often do you get together with friends or relatives? Patient refused     How often do you attend [employer] or [employer]ism services? Never     Do you belong to any clubs or organizations such as [employer] groups, unions, fraternal or athletic groups, or school groups? No     How often do you attend meetings of the clubs or organizations you belong to? Never     Are you , , , , never , or living with a partner?         Alcohol Use    Q1: How often do you have a drink containing alcohol? Patient refused     Q2: How many drinks containing alcohol do you have on a typical day when you are drinking? Patient refused     Q3: How often do you have six or more drinks on one occasion? Patient refused                      Patient lives in a 1 story house with his wife he is not on dialysis and does not take coumadin he has a ride home

## 2023-10-05 NOTE — PROGRESS NOTES
[facility] - Surgical Intensive Care  General Surgery  Progress Note    Subjective:     History of Present Illness:  No notes on file    Post-Op Info:  Procedure(s) (LRB):  XI ROBOTIC ESOPHAGECTOMY (N/A)  INSERTION, JEJUNOSTOMY TUBE  ROBOTIC REPAIR, HERNIA, PARAESOPHAGEAL  PYLOROMYOTOMY   1 Day Post-Op     Interval History: POD 1 from esophagectomy. Difficulty intubating yesterday and noted to have some pharyngeal swelling upon extubation. Has remained stable overnight. Chest tube with no evidence of air leak. Pain fairly well controlled.    Medications:  Continuous Infusions:   sodium chloride 0.9% 10 mL/hr at 10/05/23 0600    hydromorphone in 0.9 % NaCl 6 mg/30 ml      lactated ringers 75 mL/hr at 10/05/23 0636     Scheduled Meds:   acetaminophen  1,000 mg Intravenous Q8H    levalbuterol  0.63 mg Nebulization Q6H WAKE    methocarbamol (ROBAXIN) IVPB  500 mg Intravenous Q8H    metoprolol  2.5 mg Intravenous Q6H    mupirocin  1 g Nasal BID    pantoprazole  40 mg Intravenous Daily     PRN Meds:calcium gluconate IVPB, calcium gluconate IVPB, calcium gluconate IVPB, dextrose 10%, dextrose 10%, glucagon (human recombinant), hydrALAZINE, insulin aspart U-100, labetalol, magnesium sulfate IVPB, magnesium sulfate IVPB, naloxone, ondansetron, potassium chloride **AND** potassium chloride **AND** potassium chloride, prochlorperazine, sodium phosphate 15 mmol in dextrose 5 % (D5W) 250 mL IVPB, sodium phosphate 20.01 mmol in dextrose 5 % (D5W) 250 mL IVPB, sodium phosphate 30 mmol in dextrose 5 % (D5W) 250 mL IVPB     Review of patient's allergies indicates:  No Known Allergies  Objective:     Vital Signs (Most Recent):  Temp: 97.7 °F (36.5 °C) (10/05/23 0300)  Pulse: 71 (10/05/23 0700)  Resp: 14 (10/05/23 0700)  BP: 132/70 (10/05/23 0600)  SpO2: 98 % (10/05/23 0700) Vital Signs (24h Range):  Temp:  [97.5 °F (36.4 °C)-97.7 °F (36.5 °C)] 97.7 °F (36.5 °C)  Pulse:  [] 71  Resp:  [5-28] 14  SpO2:  [93 %-100 %] 98  %  BP: (130-165)/(56-77) 132/70  Arterial Line BP: (114-171)/(46-71) 114/46     Weight: 61.2 kg (135 lb)  Body mass index is 19.94 kg/m².    Intake/Output - Last 3 Shifts         10/03 0700  10/04 0659 10/04 0700  10/05 0659 10/05 0700  10/06 0659    I.V. (mL/kg)  2864.3 (46.8)     IV Piggyback  4989.6     Total Intake(mL/kg)  7854 (128.3)     Urine (mL/kg/hr)  2845 (1.9)     Drains  58     Chest Tube  650     Total Output  3553     Net  +4301                     Physical Exam  Vitals and nursing note reviewed.   Constitutional:       General: He is not in acute distress.     Interventions: He is sedated.   HENT:      Head: Normocephalic and atraumatic.      Right Ear: External ear normal.      Left Ear: External ear normal.      Nose: Nose normal.   Eyes:      Conjunctiva/sclera: Conjunctivae normal.   Neck:      Comments: Left lateral neck incision cdi  PADMA with SS output  Cardiovascular:      Rate and Rhythm: Normal rate and regular rhythm.   Pulmonary:      Effort: Pulmonary effort is normal. No respiratory distress.      Comments: R chest tube in place to suction  Abdominal:      General: Abdomen is flat. There is no distension.      Palpations: Abdomen is soft.      Comments: J tube in place   Genitourinary:     Comments: Car in place draining clear yellow urine  Skin:     General: Skin is warm and dry.          Significant Labs:  I have reviewed all pertinent lab results within the past 24 hours.  CBC:   Recent Labs   Lab 10/05/23  0307   WBC 9.87   RBC 2.75*   HGB 9.6*   HCT 28.8*   *   *   MCH 34.9*   MCHC 33.3     CMP:   Recent Labs   Lab 10/05/23  0307   *   CALCIUM 8.0*   ALBUMIN 3.9   PROT 6.2      K 4.1   CO2 20*      BUN 10   CREATININE 0.7   ALKPHOS 61   ALT 28   AST 58*   BILITOT 0.5       Significant Diagnostics:  I have reviewed all pertinent imaging results/findings within the past 24 hours.    Assessment/Plan:     Malignant neoplasm of gastroesophageal  junction  63 year old male with a PMH of esophageal adenocarcinoma. Now s/p 3-hole esophagectomy on 10/04/23.    - NPO  - Hold off on J tube feeds today  - mIVF continued but dropped down to 75mL/hr  - Continue chest tube. Will exchange pleuravac. If no air leak will plan to transition to water seal  - Wean supplemental oxygen  - Continue PCA. Multimodals   - Metoprolol IV  - Protonix IV  - Keep caldera today  - PRN anti-emetics  - PT/OT  - PPx Lovenox    Dispo: Remain in SICU today        Wilder Wall MD  General Surgery  [facility] - Surgical Intensive Care

## 2023-10-05 NOTE — PT/OT/SLP EVAL
Occupational Therapy   Co-Evaluation w/ PT    Name: Blayne Beebe  MRN: [id number]  Admitting Diagnosis: <principal problem not specified>  Recent Surgery: Procedure(s) (LRB):  XI ROBOTIC ESOPHAGECTOMY (N/A)  INSERTION, JEJUNOSTOMY TUBE  ROBOTIC REPAIR, HERNIA, PARAESOPHAGEAL  PYLOROMYOTOMY 1 Day Post-Op  Co-treat performed due to acuity and complexity of pt's medical status with 2 skilled disciplines needed to optimize pts functional performance in ICU setting.   Recommendations:     Discharge Recommendations: other (see comments)  Discharge Equipment Recommendations:  none  Barriers to discharge:   None    Assessment:     Blayne Beebe is a 63 y.o. male with a medical diagnosis of <principal problem not specified>.  Pt tolerated session well with vitals remaining stable throughout session. Pt requiring SBA for mobility this date.  Pt continues benefit from OT services. He presents with deficits listed below. Performance deficits affecting function: weakness, impaired endurance, impaired self care skills, impaired functional mobility, gait instability, impaired balance, decreased coordination, impaired skin.      Rehab Prognosis: Good; patient would benefit from acute skilled OT services to address these deficits and reach maximum level of function.       Plan:     Patient to be seen 3 x/week to address the above listed problems via self-care/home management, therapeutic activities, therapeutic exercises  Plan of Care Expires: 11/05/23  Plan of Care Reviewed with: patient    Subjective     Chief Complaint: no complaints verbalized this date.  Patient/Family Comments/goals: Return to PLOF    Occupational Profile:  Living Environment: Pt lives with wife in a H with threshold to enter and WIS.   Previous level of function: (I) ADLs and functional mobility without DME use. Currently drives  Roles and Routines: Retired, caregiver to self,   Equipment Used at Home: none  Assistance upon Discharge: Wife  will be able to assist if needed.     Pain/Comfort:  Pain Rating 1: 0/10    Patients cultural, spiritual, Anabaptism conflicts given the current situation: no    Objective:     Communicated with: RN prior to session.  Patient found up in chair with peripheral IV, arterial line, caldera catheter, chest tube, PADMA drain, PCA, oxygen, telemetry, pulse ox (continuous), blood pressure cuff upon OT entry to room.    General Precautions: Standard, fall  Orthopedic Precautions: N/A  Braces: N/A  Respiratory Status: Nasal cannula, flow 3 L/min    Occupational Performance:    Bed Mobility:    No bed mobility observed. Pt up in chair.     Functional Mobility/Transfers:  Patient completed Sit <> Stand Transfer with stand by assistance  with  no assistive device   Functional Mobility: Pt walked ~30' with SBA.     Activities of Daily Living:  Grooming: CGA progressing to SBA  at sink for oral care and g/h.     Cognitive/Visual Perceptual:  Cognitive/Psychosocial Skills:     -       Oriented to: Person, Place, Time, and Situation   -       Follows Commands/attention:Follows multistep  commands  -       Communication: clear/fluent  -       Safety awareness/insight to disability: intact   -       Mood/Affect/Coping skills/emotional control: Appropriate to situation, Cooperative, and Pleasant    Physical Exam:  Dominant hand: -       R  Upper Extremity Range of Motion:  -       Right Upper Extremity: WFL  -       Left Upper Extremity: WFL  Upper Extremity Strength: -       Right Upper Extremity: WFL  -       Left Upper Extremity: WFL   Strength: -       Right Upper Extremity: WFL  -       Left Upper Extremity: WFL    AMPAC 6 Click ADL:  AMPAC Total Score: 20    Treatment & Education:  Role of OT and OT POC  Educated on OOB activity with staff A and impact on increasing functional independence.  Educated on importance of progressive mobilization to increase strength and endurance.      Patient left up in chair with all lines intact,  call button in reach, and RN notified    GOALS:   Multidisciplinary Problems       Occupational Therapy Goals          Problem: Occupational Therapy    Goal Priority Disciplines Outcome Interventions   Occupational Therapy Goal     OT, PT/OT Ongoing, Progressing    Description: Goals to be met by: 10/19/2023     Patient will increase functional independence with ADLs by performing:    UE Dressing with Bledsoe.  LE Dressing with Bledsoe.  Grooming while standing at sink with Bledsoe.  Toileting from toilet with Bledsoe for hygiene and clothing management.   Toilet transfer to toilet with Bledsoe.                         History:     Past Medical History:   Diagnosis Date    Arthritis     Cancer     COPD (chronic obstructive pulmonary disease)          Past Surgical History:   Procedure Laterality Date    CERVICAL FUSION      COLONOSCOPY N/A 3/27/2018    Procedure: COLONOSCOPY;  Surgeon: Maria Teresa Morocho MD;  Location: [facility];  Service: Endoscopy;  Laterality: N/A;    COLONOSCOPY N/A 6/13/2023    Procedure: COLONOSCOPY;  Surgeon: Kelli Snell MD;  Location: [facility];  Service: Endoscopy;  Laterality: N/A;    ENDOSCOPIC ULTRASOUND OF UPPER GASTROINTESTINAL TRACT N/A 7/3/2023    Procedure: ULTRASOUND, UPPER GI TRACT, ENDOSCOPIC;  Surgeon: Ray Duffy MD;  Location: [facility];  Service: Endoscopy;  Laterality: N/A;    ESOPHAGOGASTRODUODENOSCOPY N/A 6/13/2023    Procedure: EGD (ESOPHAGOGASTRODUODENOSCOPY);  Surgeon: Kelli Snell MD;  Location: [facility];  Service: Endoscopy;  Laterality: N/A;    ESOPHAGOGASTRODUODENOSCOPY N/A 7/3/2023    Procedure: EGD (ESOPHAGOGASTRODUODENOSCOPY);  Surgeon: Ray Duffy MD;  Location: [facility];  Service: Endoscopy;  Laterality: N/A;    INGUINAL HERNIA REPAIR Bilateral     Right x2, x1 left    PLACEMENT OF JEJUNOSTOMY TUBE  10/4/2023    Procedure: INSERTION, JEJUNOSTOMY TUBE;  Surgeon: Nas Meyer MD;  Location: [facility] OR [address] [place] [address];   Service: General;;    PYLOROMYOTOMY  10/4/2023    Procedure: PYLOROMYOTOMY;  Surgeon: Nas Meyer MD;  Location: [facility] OR [address] [place] [address];  Service: General;;    ROBOT-ASSISTED SURGICAL REMOVAL OF ESOPHAGUS USING DA BALWINDER XI N/A 10/4/2023    Procedure: XI ROBOTIC ESOPHAGECTOMY;  Surgeon: Nas Meyer MD;  Location: [address];  Service: General;  Laterality: N/A;    ROBOTIC REPAIR, HERNIA, PARAESOPHAGEAL  10/4/2023    Procedure: ROBOTIC REPAIR, HERNIA, PARAESOPHAGEAL;  Surgeon: Nas Meyer MD;  Location: [address];  Service: General;;    ROTATOR CUFF REPAIR Right     x2       Time Tracking:     OT Date of Treatment: 10/05/23  OT Start Time: 0836  OT Stop Time: 0903  OT Total Time (min): 27 min    Billable Minutes:Evaluation 15  Self Care/Home Management 10    10/5/2023

## 2023-10-05 NOTE — PLAN OF CARE
SICU PLAN OF CARE NOTE    Dx: <principal problem not specified>    Goals of Care: SBP <170    Vital Signs (last 12 hours):   Temp:  [97.5 °F (36.4 °C)]   Pulse:  []   Resp:  [5-28]   BP: (131-165)/(62-77)   SpO2:  [93 %-100 %]   Arterial Line BP: (135-171)/(48-71)      Neuro: AAO x4, Arouses to Voice, Follows Commands, and Moves All Extremities    Cardiac: NSR 70s    Respiratory: Nasal Cannula    Gtts: MIVF and PCA     Urine Output: Urinary Catheter 2060 cc/shift    Drains:   PADMA Drain, total output 58 cc/shift  Chest Tube, total output 650 cc/shift     Diet: NPO     Labs/Accuchecks: All labs reviewed. K, Mag, Phos replaced. Accuchecks Q6.    Skin: No new skin breakdown noted this shift. Bed plugged in and working. Protective foams and SCDs in use. Frequent weight shift assistance provided.

## 2023-10-05 NOTE — PROGRESS NOTES
[facility] - [facility]  Critical Care - Surgery  Progress Note    Patient Name: Blayne Beebe  MRN: [id number]  Admission Date: 10/4/2023  Hospital Length of Stay: 1 days  Code Status: Full Code  Attending Provider: Nas Myeer MD  Primary Care Provider: Zachariah Reyna MD   Principal Problem: <principal problem not specified>    Subjective:     Hospital/ICU Course:  No notes on file    Interval History/Significant Events: Patient did well overnight. No acute events. Remains on PCA. No complaints this AM, sitting comfortably in bed. AO4. 60 cc sanguinous output from PADMA drain. 650 cc from CT. Stable.    Follow-up For: Procedure(s) (LRB):  XI ROBOTIC ESOPHAGECTOMY (N/A)  INSERTION, JEJUNOSTOMY TUBE  ROBOTIC REPAIR, HERNIA, PARAESOPHAGEAL  PYLOROMYOTOMY    Post-Operative Day: 1 Day Post-Op    Objective:     Vital Signs (Most Recent):  Temp: 97.7 °F (36.5 °C) (10/05/23 0300)  Pulse: 75 (10/05/23 0615)  Resp: 20 (10/05/23 0615)  BP: 132/70 (10/05/23 0600)  SpO2: (!) 94 % (10/05/23 0615) Vital Signs (24h Range):  Temp:  [97.5 °F (36.4 °C)-97.7 °F (36.5 °C)] 97.7 °F (36.5 °C)  Pulse:  [] 75  Resp:  [5-28] 20  SpO2:  [93 %-100 %] 94 %  BP: (130-165)/(56-77) 132/70  Arterial Line BP: (119-171)/(48-71) 119/50     Weight: 61.2 kg (135 lb)  Body mass index is 19.94 kg/m².      Intake/Output Summary (Last 24 hours) at 10/5/2023 0634  Last data filed at 10/5/2023 0600  Gross per 24 hour   Intake 7853.97 ml   Output 3553 ml   Net 4300.97 ml          Physical Exam  Vitals and nursing note reviewed.   Constitutional:       General: He is not in acute distress.     Interventions: He is sedated.   HENT:      Head: Normocephalic and atraumatic.      Right Ear: External ear normal.      Left Ear: External ear normal.      Nose: Nose normal.   Eyes:      Conjunctiva/sclera: Conjunctivae normal.   Neck:      Comments: Left lateral neck incision cdi  Palpable subcutaneous emphysema in chest and  neck  Cardiovascular:      Rate and Rhythm: Normal rate and regular rhythm.   Pulmonary:      Effort: Pulmonary effort is normal. No respiratory distress.      Comments: R chest tube in place w/sanguinous output  Abdominal:      General: Abdomen is flat. There is no distension.      Palpations: Abdomen is soft.      Comments: J tube in place  Sternal sheila drain output SS   Genitourinary:     Comments: Car in place draining clear yellow urine  Skin:     General: Skin is warm and dry.            Vents:       Lines/Drains/Airways       Central Venous Catheter Line  Duration                  PowerPort A Cath Single Lumen 07/12/23 1344 Internal Jugular Right 84 days              Drain  Duration                  Chest Tube 10/04/23 1200 Tube - 1 Right 24 Fr. <1 day         Closed/Suction Drain 10/04/23 1710 Tube - 1 Left;Anterior Neck Bulb 19 Fr. <1 day         Gastrostomy/Enterostomy 10/04/23 1730 Jejunostomy tube LUQ feeding <1 day         Urethral Catheter 10/04/23 0830 Non-latex;Straight-tip;Silicone 16 Fr. <1 day              Arterial Line  Duration             Arterial Line 10/04/23 1005 Left Other (Comment) <1 day              Peripheral Intravenous Line  Duration                  Peripheral IV - Single Lumen 18 G Right Hand -- days         Peripheral IV - Single Lumen 10/04/23 0630 18 G Left Forearm 1 day                    Significant Labs:    CBC/Anemia Profile:  Recent Labs   Lab 10/04/23  1030 10/04/23  2011 10/05/23  0307   WBC  --  8.66 9.87   HGB  --  10.7* 9.6*   HCT 35* 32.5* 28.8*   PLT  --  123* 115*   MCV  --  107* 105*   RDW  --  16.4* 16.3*        Chemistries:  Recent Labs   Lab 10/04/23  2011 10/05/23  0307    137   K 3.3* 4.1   * 109   CO2 15* 20*   BUN 12 10   CREATININE 0.8 0.7   CALCIUM 7.7* 8.0*   ALBUMIN 3.6 3.9   PROT 5.8* 6.2   BILITOT 0.6 0.5   ALKPHOS 70 61   ALT 29 28   AST 49* 58*   MG 1.6 1.9   PHOS 2.2* 3.1       All pertinent labs within the past 24 hours have been  reviewed.    Significant Imaging:  I have reviewed all pertinent imaging results/findings within the past 24 hours.  I have reviewed and interpreted all pertinent imaging results/findings within the past 24 hours.    Assessment/Plan:     Oncology  Malignant neoplasm of gastroesophageal junction  Blayne Beebe is a 63 y.o. male with Stage III (cT3, cN1, cM0, G2) GEJ esophageal adenocarcinoma who underwent esophagectomy today. He has a pmh notable for tobacco use disorder, COPD (emphysema), HLD, difficult prior intubation, and cervical spine surgery. Initiated chemotherapy (paclitaxel/carboplatin) 7/16/23-8/15/23 and tolerated well with only symptoms of fatigue. He presents to SICU after undergoing robotic assisted [place] esophagectomy with Dr. Meyer. HDS, extubated, not on any gtts, AO4.       Neuro/Psych:   -- Sedation: not sedated  -- Pain: IV tylenol, IV robaxin, PCA             Cards:   -- HDS, no pressors required  -- MAPS>65  -- if patient becomes hypotensive, attempt fluid resuscitation before initiating pressors as the use of pressors may negative impact the surgical anastomosis.       Pulm:   -- Goal O2 sat > 90%  -- Stable on room air  -- Has history of COPD  -- Patient has history of difficult intubation, if intubation is needed please reach out to anesthesia staff  -- Given surgical anastomosis, use of BiPAP and CPAP is contraindicated       Renal:  -- Keep caldera for strict I/O  -- BUN/Cr 10/0.7  -- 2.8 L UOP      FEN / GI:   -- Net +4.3 L  -- Replace lytes as needed  -- Daily CMP  -- Nutrition: Strict NPO  -- mIVF  -- PRN zofran/compazine   -- J-tube in place  -- IV Pantoprazole  -- Use of NSAIDS is contraindicated given surgical manipulation of GI tract     ID:   -- Tm: Afebrile; WBC wnl  -- Abx: None      Heme/Onc:   -- H/H stable HgB 9.6  -- Daily CBC  -- DVT ppx - management per primary       Endo:   --Crit Care Gluc goal 140-180     PPx:   Feeding: Strict NPO  Analgesia/Sedation:  MMPC  Thromboembolic prevention: pending  HOB >30: yes  Stress Ulcer ppx: ppi  Glucose control: Critical care goal 140-180 g/dl, ISS    Lines/Drains/Airway: A line, 2xPIV, caldera, R-CT, sheila drain, j-tube      Dispo/Code Status/Palliative:   -- SICU / Full Code  -- Stable for stepdown       Critical care was time spent personally by me on the following activities: development of treatment plan with patient or surrogate and bedside caregivers, discussions with consultants, evaluation of patient's response to treatment, examination of patient, ordering and performing treatments and interventions, ordering and review of laboratory studies, ordering and review of radiographic studies, pulse oximetry, re-evaluation of patient's condition.  This critical care time did not overlap with that of any other provider or involve time for any procedures.     Radha Hay MD  Critical Care - Surgery  [facility] - [facility]

## 2023-10-05 NOTE — SUBJECTIVE & OBJECTIVE
Interval History/Significant Events: Patient did well overnight. No acute events. Remains on PCA. No complaints this AM, sitting comfortably in bed. AO4. 60 cc sanguinous output from PADMA drain. 650 cc from CT. Stable.    Follow-up For: Procedure(s) (LRB):  XI ROBOTIC ESOPHAGECTOMY (N/A)  INSERTION, JEJUNOSTOMY TUBE  ROBOTIC REPAIR, HERNIA, PARAESOPHAGEAL  PYLOROMYOTOMY    Post-Operative Day: 1 Day Post-Op    Objective:     Vital Signs (Most Recent):  Temp: 97.7 °F (36.5 °C) (10/05/23 0300)  Pulse: 75 (10/05/23 0615)  Resp: 20 (10/05/23 0615)  BP: 132/70 (10/05/23 0600)  SpO2: (!) 94 % (10/05/23 0615) Vital Signs (24h Range):  Temp:  [97.5 °F (36.4 °C)-97.7 °F (36.5 °C)] 97.7 °F (36.5 °C)  Pulse:  [] 75  Resp:  [5-28] 20  SpO2:  [93 %-100 %] 94 %  BP: (130-165)/(56-77) 132/70  Arterial Line BP: (119-171)/(48-71) 119/50     Weight: 61.2 kg (135 lb)  Body mass index is 19.94 kg/m².      Intake/Output Summary (Last 24 hours) at 10/5/2023 0634  Last data filed at 10/5/2023 0600  Gross per 24 hour   Intake 7853.97 ml   Output 3553 ml   Net 4300.97 ml          Physical Exam  Vitals and nursing note reviewed.   Constitutional:       General: He is not in acute distress.     Interventions: He is sedated.   HENT:      Head: Normocephalic and atraumatic.      Right Ear: External ear normal.      Left Ear: External ear normal.      Nose: Nose normal.   Eyes:      Conjunctiva/sclera: Conjunctivae normal.   Neck:      Comments: Left lateral neck incision cdi  Palpable subcutaneous emphysema in chest and neck  Cardiovascular:      Rate and Rhythm: Normal rate and regular rhythm.   Pulmonary:      Effort: Pulmonary effort is normal. No respiratory distress.      Comments: R chest tube in place w/sanguinous output  Abdominal:      General: Abdomen is flat. There is no distension.      Palpations: Abdomen is soft.      Comments: J tube in place  Sternal sheila drain output SS   Genitourinary:     Comments: Car in place draining  clear yellow urine  Skin:     General: Skin is warm and dry.            Vents:       Lines/Drains/Airways       Central Venous Catheter Line  Duration                  PowerPort A Cath Single Lumen 07/12/23 1344 Internal Jugular Right 84 days              Drain  Duration                  Chest Tube 10/04/23 1200 Tube - 1 Right 24 Fr. <1 day         Closed/Suction Drain 10/04/23 1710 Tube - 1 Left;Anterior Neck Bulb 19 Fr. <1 day         Gastrostomy/Enterostomy 10/04/23 1730 Jejunostomy tube LUQ feeding <1 day         Urethral Catheter 10/04/23 0830 Non-latex;Straight-tip;Silicone 16 Fr. <1 day              Arterial Line  Duration             Arterial Line 10/04/23 1005 Left Other (Comment) <1 day              Peripheral Intravenous Line  Duration                  Peripheral IV - Single Lumen 18 G Right Hand -- days         Peripheral IV - Single Lumen 10/04/23 0630 18 G Left Forearm 1 day                    Significant Labs:    CBC/Anemia Profile:  Recent Labs   Lab 10/04/23  1030 10/04/23  2011 10/05/23  0307   WBC  --  8.66 9.87   HGB  --  10.7* 9.6*   HCT 35* 32.5* 28.8*   PLT  --  123* 115*   MCV  --  107* 105*   RDW  --  16.4* 16.3*        Chemistries:  Recent Labs   Lab 10/04/23  2011 10/05/23  0307    137   K 3.3* 4.1   * 109   CO2 15* 20*   BUN 12 10   CREATININE 0.8 0.7   CALCIUM 7.7* 8.0*   ALBUMIN 3.6 3.9   PROT 5.8* 6.2   BILITOT 0.6 0.5   ALKPHOS 70 61   ALT 29 28   AST 49* 58*   MG 1.6 1.9   PHOS 2.2* 3.1       All pertinent labs within the past 24 hours have been reviewed.    Significant Imaging:  I have reviewed all pertinent imaging results/findings within the past 24 hours.  I have reviewed and interpreted all pertinent imaging results/findings within the past 24 hours.

## 2023-10-05 NOTE — NURSING
Pt admit to SICU [id number] from OR. VSS, connected to wall monitor. 5L NC. Labs collected and sent.   SICU resident and charge nurse at bedside for report.  Pt wife at bedside and staying the night. Pt oriented to room, call bell within reach. Bed in lowest position and wheels locked, instructed to call nurse before moving.     Skin assessed; Sacral foams and SCDs in place. 4 eyes assessment complete.

## 2023-10-05 NOTE — SUBJECTIVE & OBJECTIVE
Interval History: POD 1 from esophagectomy. Difficulty intubating yesterday and noted to have some pharyngeal swelling upon extubation. Has remained stable overnight. Chest tube with no evidence of air leak. Pain fairly well controlled.    Medications:  Continuous Infusions:   sodium chloride 0.9% 10 mL/hr at 10/05/23 0600    hydromorphone in 0.9 % NaCl 6 mg/30 ml      lactated ringers 75 mL/hr at 10/05/23 0636     Scheduled Meds:   acetaminophen  1,000 mg Intravenous Q8H    levalbuterol  0.63 mg Nebulization Q6H WAKE    methocarbamol (ROBAXIN) IVPB  500 mg Intravenous Q8H    metoprolol  2.5 mg Intravenous Q6H    mupirocin  1 g Nasal BID    pantoprazole  40 mg Intravenous Daily     PRN Meds:calcium gluconate IVPB, calcium gluconate IVPB, calcium gluconate IVPB, dextrose 10%, dextrose 10%, glucagon (human recombinant), hydrALAZINE, insulin aspart U-100, labetalol, magnesium sulfate IVPB, magnesium sulfate IVPB, naloxone, ondansetron, potassium chloride **AND** potassium chloride **AND** potassium chloride, prochlorperazine, sodium phosphate 15 mmol in dextrose 5 % (D5W) 250 mL IVPB, sodium phosphate 20.01 mmol in dextrose 5 % (D5W) 250 mL IVPB, sodium phosphate 30 mmol in dextrose 5 % (D5W) 250 mL IVPB     Review of patient's allergies indicates:  No Known Allergies  Objective:     Vital Signs (Most Recent):  Temp: 97.7 °F (36.5 °C) (10/05/23 0300)  Pulse: 71 (10/05/23 0700)  Resp: 14 (10/05/23 0700)  BP: 132/70 (10/05/23 0600)  SpO2: 98 % (10/05/23 0700) Vital Signs (24h Range):  Temp:  [97.5 °F (36.4 °C)-97.7 °F (36.5 °C)] 97.7 °F (36.5 °C)  Pulse:  [] 71  Resp:  [5-28] 14  SpO2:  [93 %-100 %] 98 %  BP: (130-165)/(56-77) 132/70  Arterial Line BP: (114-171)/(46-71) 114/46     Weight: 61.2 kg (135 lb)  Body mass index is 19.94 kg/m².    Intake/Output - Last 3 Shifts         10/03 0700  10/04 0659 10/04 0700  10/05 0659 10/05 0700  10/06 0659    I.V. (mL/kg)  2864.3 (46.8)     IV Piggyback  4989.6     Total  Intake(mL/kg)  7854 (128.3)     Urine (mL/kg/hr)  2845 (1.9)     Drains  58     Chest Tube  650     Total Output  3553     Net  +4301                     Physical Exam  Vitals and nursing note reviewed.   Constitutional:       General: He is not in acute distress.     Interventions: He is sedated.   HENT:      Head: Normocephalic and atraumatic.      Right Ear: External ear normal.      Left Ear: External ear normal.      Nose: Nose normal.   Eyes:      Conjunctiva/sclera: Conjunctivae normal.   Neck:      Comments: Left lateral neck incision cdi  PADMA with SS output  Cardiovascular:      Rate and Rhythm: Normal rate and regular rhythm.   Pulmonary:      Effort: Pulmonary effort is normal. No respiratory distress.      Comments: R chest tube in place to suction  Abdominal:      General: Abdomen is flat. There is no distension.      Palpations: Abdomen is soft.      Comments: J tube in place   Genitourinary:     Comments: Car in place draining clear yellow urine  Skin:     General: Skin is warm and dry.          Significant Labs:  I have reviewed all pertinent lab results within the past 24 hours.  CBC:   Recent Labs   Lab 10/05/23  0307   WBC 9.87   RBC 2.75*   HGB 9.6*   HCT 28.8*   *   *   MCH 34.9*   MCHC 33.3     CMP:   Recent Labs   Lab 10/05/23  0307   *   CALCIUM 8.0*   ALBUMIN 3.9   PROT 6.2      K 4.1   CO2 20*      BUN 10   CREATININE 0.7   ALKPHOS 61   ALT 28   AST 58*   BILITOT 0.5       Significant Diagnostics:  I have reviewed all pertinent imaging results/findings within the past 24 hours.

## 2023-10-05 NOTE — ANESTHESIA POSTPROCEDURE EVALUATION
Anesthesia Post Evaluation    Patient: Blayne D III Steib    Procedure(s) Performed: Procedure(s) (LRB):  XI ROBOTIC ESOPHAGECTOMY (N/A)  INSERTION, JEJUNOSTOMY TUBE  ROBOTIC REPAIR, HERNIA, PARAESOPHAGEAL  PYLOROMYOTOMY    Final Anesthesia Type: general      Patient location during evaluation: ICU  Patient participation: Yes- Able to Participate  Level of consciousness: awake  Post-procedure vital signs: reviewed and stable  Pain management: adequate  Airway patency: patent    PONV status at discharge: No PONV  Anesthetic complications: no      Cardiovascular status: blood pressure returned to baseline  Respiratory status: unassisted  Hydration status: euvolemic  Follow-up not needed.          Vitals Value Taken Time   /70 10/04/23 2008   Temp  10/04/23 2015   Pulse 101 10/04/23 2014   Resp 19 10/04/23 2014   SpO2 96 % 10/04/23 2014   Vitals shown include unvalidated device data.      No case tracking events are documented in the log.      Pain/Yessy Score: No data recorded

## 2023-10-05 NOTE — OP NOTE
[facility] - Surgical Intensive Care  Surgery Department  Operative Note       Date of Procedure: 10/4/2023     Procedure: Procedure(s) (LRB):  XI ROBOTIC ESOPHAGECTOMY (N/A)  INSERTION, JEJUNOSTOMY TUBE  ROBOTIC REPAIR, HERNIA, PARAESOPHAGEAL  PYLOROMYOTOMY     Surgeon(s) and Role:     * Nas Meyer MD - Primary     * Ray Paulino MD - Assisting     * Smita Romeo MD - Resident - Chief        Pre-Operative Diagnosis:   Distal esophageal adenocarcinoma    Pre-Operative Variables:  Preoperative diagnosis: Distal esophageal adenocarcinoma  Going into surgery, the lesion was believed to be resectable  Preoperative treatment: Chemoradiation with CarboTaxol    Post-Operative Diagnosis:   Same    Comorbidities:  COPD  History of cervical fusion    Anesthesia: General    Operative Findings:   No evidence of distant intraperitoneal metastases   Frozen sections negative at margins  Resection status: R0 pending final pathology. No gross disease remaining post dissection.  Right apical lung adhesions and extensive right upper lobe emphysematous blebs  Type 2 paraesophageal hernia    Procedures:  Robotic assisted Woodrow three field esophagectomy  Pyloromyotomy  Paraesophageal hernia repair  Jejunostomy tube placement  22 modifier - I am attaching a 22 modifier as the thoracic portion of the procedure required 100% more effort than normal due to poor single lung ventilation, emphysematous changes in the right lung, and right lung adhesions.       Estimated Blood Loss (EBL):  200 mL           Indications:  Blayne Beebe is a 63 year old man with a distal esophageal adenocarcinoma who completed preoperative chemoradiation and presents for robotic assisted Woodrow esophagectomy. Risks and benefits were reviewed including but not limited to: bleeding, infection, anastomotic leak, recurrent laryngeal nerve injury, j tube related complications, damage to local structures, cardiovascular and pulmonary  complications, need for additional procedures, death, and imponderables. He understands and signed written informed consent to proceed.    Details:  The patient was transported to the operating room and satisfactory anesthesia established. The patient had a difficult airway, and the anesthesia team was unable to place a double lumen endotracheal tube, so a single lumen tube with a bronchial blocker was placed instead. Preoperative antibiotics were administered. The patient was placed in the left lateral decubitus, semi-prone position and extremities were padded and protected throughout. A caldera catheter and a foot board were placed. An appropriate timeout was performed. Right lung isolation was confirmed by bronchoscopy.     Three robotic trocars and one assistant port were placed in the chest, and gentle CO2 insufflation was begun. The right upper lobe was inflated throughout the case, which made the thoracic esophageal dissection significantly more difficult. There were blebs throughout the right upper lobe. There were adhesions from the chest wall to the right upper lobe that were divided with cautery. The inferior pulmonary ligament was divided and the pleura overlying the esophagus incised up to the level of the azygos vein. The vein was dissected and divided with the robotic stapling device. The esophagus at this level was circumferentially dissected and encircled with a penrose to facilitate retraction. The esophagus was then dissected with monopolar and bipolar cautery down to the hiatus, mobilizing level 8 nodes into the specimen field. A new penrose was encircled here and stuffed down into the hiatus for the abdominal portion of the procedure.    The mid and upper thoracic esophagus were then mobilized, taking care to stay right on the organ here and bluntly mobilizing the airway anteriorly. This was mobilized to the thoracic inlet and the penrose oriented for retreival in the neck. A 24 Dive[place] sheila drain  was cut to size and inserted through the assistant port and secured with a chromic suture oriented posteriorly from the base to the apex. The incisions were infiltrated with Exparel.    Incisions were closed and the patient was re-positioned supine and prepped and draped.    The Veress needle was used to enter the abdomen just superior to the umbilicus. The abdomen was insufflated. Once the abdomen was insufflated, a 5 mm optiview trocar was used to place a 5 mm port in the left abdomen just superior to the umbilicus. The abdomen was inspected and there was no evidence of injury or peritoneal disease. A laparoscopic TAP block was performed with Exparel. Two 8 mm robotic trocars and a 12 mm robotic trocar were then inserted across the abdomen in a straight line in line with this port. The 5 mm port was upsized to an 8 mm robotic trocar. A 5 mm liver retractor trocar was placed at the right costal margin, and a 12 mm assistant port was also placed in the lower abdomen. The liver retractor was positioned to expose the hiatus. The patient was placed in reverse Trendelenburg position and the robot docked.    The lesser sac was entered through the gastrocolic ligament 2-3 cm off of the gastroepiploic vessels. This dissection was carried superiorly, coning in on the stomach at the level of the short gastric vessels until the gastrosplenic ligament was divided and the left kisha identified. Inferiorly this line continued to free up and adhesions to the gastroepiploic pedicle to mobilize the antrum and pylorus.     The lesser omentum was incised and divided to expose the right kisha. The stomach was elevated to expose the origin of the left gastric artery. Lymph nodes at the base were mobilized up with the specimen, and a vascular stapler was used to divide the pedicle at the origin of the left gastric. The peritoneum overlying the stomach at the hiatus was taken down. There was a type 3 paraesophageal hernia with a portion of  the stomach and the GE junction in the mediastinum. The previously placed penrose drain was encountered and used to apply traction to the GE junction and mobilize it and the intrathoracic stomach fully from the mediastinum.    An area on the lesser curve appropriate to start the conduit was selected and the branches of the right gastric artery were divided here to create a landing zone. Serial 60 mm robotic stapler firings were used to form a 4cm tubularized conduit. Loose staples were removed from the conduit and the specimen. The tip of the conduit was then sutured to the specimen to facilitate bringing it up to the neck.    A pyloromyotomy was performed with 2-0 silk stitch through the muscle, dividing over it with cautery. The pyloromyotomy was then closed with 3-0 silk sutures.     The hiatal opening was  larger than normal due to the paraesophageal hernia. The hiatus was closed posteriorly with 2 2-0 silk U stitches.     The robot was then undocked and a neck incision was made on the anterior border of the sternocleidomastoid muscle down to the sternal notch. Platysma was divided and the SCM mobilized laterally. The jugular neurovascular bundle was encountered and mobilized laterally with blunt retraction and sharp dissection to preserve the vagus, dividing small thyroidal vessels, until the anterior cervical spine was encountered. The space was opened and the thoracic penrose encountered and the esophagus was brought into the field.    The specimen was then retrieved at the neck and sent for frozen margins which returned as negative for malignancy. Appropriate anastomotic site was identified and a 45 mm purple endo-BECCA stapler used to form a side-side stapled esophagogastrostomy. The common esophagogastrotomy was closed with interrupted 3-0 Vicryl suture. The anastomosis was reduced into the neck. A 19 Kenyan sheila drain was left in the neck, which was closed in two layers with vicryl and monocryl  suture.    A robotic jejunostomy tube was then placed. The ligament of Treitz was identified and a mobile loop of proximal bowel selected. The robot was docked. A 3-0 Vicryl pursestring suture was placed. A 12 [place] j tube was placed through the abdominal wall. An enterotomy was made, and the tube was advanced distally in the small bowel. The pursestring suture was tied. The bowel was secured to the abdominal wall using a 3-0 v lock suture circumferentially taking alternating bites between the abdominal wall and the bowel. The tube was flushed copiously to ensure luminal placement.    12mm trocar sites were closed with the suture passer and the gas was evacuated. All incisions were closed with monocryl and dermabond.    All needle, lap, and sponge counts were reported as correct. I communicated the intraoperative findings with the family following the procedure.     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 Total thoracic esophagus, proximal stomach, and lymph nodes--Frozen, walked to pathology by ORA.     References:    Click here for ordering Quick Tip   Question Answer Comment   Procedure Type: General Surgery    Specimen Class: Known or suspected malignancy    Which provider would you like to cc? MERRITT MEYER        10/04/23 1814                            Condition: Good    Disposition: ICU - extubated and stable.    Attestation: I was present and scrubbed for the entire procedure.    Assistance: Due to having no qualified resident during critical portion of the case, Dr. Ray Paulino acted as an assistant.    Merritt Meyer MD  Staff Surgeon  Surgical Oncology

## 2023-10-05 NOTE — BRIEF OP NOTE
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 Healthy appearing conduit and anastomoses.     Estimated Blood Loss: * No values recorded between 10/4/2023  9:32 AM and 10/4/2023  7:37 PM *    Estimated Blood Loss has not been documented. EBL = 200 mL.         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 Total thoracic esophagus, proximal stomach, and lymph nodes--Frozen, walked to pathology by ORA.     References:    Click here for ordering Quick Tip   Question Answer Comment   Procedure Type: General Surgery    Specimen Class: Known or suspected malignancy    Which provider would you like to cc? MERRITT MEYER        10/04/23 1814                    [id number]

## 2023-10-05 NOTE — ASSESSMENT & PLAN NOTE
63 year old male with a PMH of esophageal adenocarcinoma. Now s/p 3-hole esophagectomy on 10/04/23.    - NPO  - Hold off on J tube feeds today  - mIVF continued but dropped down to 75mL/hr  - Continue chest tube. Will exchange pleuravac. If no air leak will plan to transition to water seal  - Wean supplemental oxygen  - Continue PCA. Multimodals   - Metoprolol IV  - Protonix IV  - Keep caldera today  - PRN anti-emetics  - PT/OT  - PPx Lovenox    Dispo: Remain in SICU today

## 2023-10-05 NOTE — PLAN OF CARE
Problem: Physical Therapy  Goal: Physical Therapy Goal  Description: Goals to be met by: 10/20/2023    Patient will increase functional independence with mobility by performin. Supine to sit with Supervision   2. Sit to stand transfer with Supervision  3. Gait  x 400 feet with Supervision.     Outcome: Ongoing, Progressing     Eval completed. Goals appropriate.

## 2023-10-06 LAB
ALBUMIN SERPL BCP-MCNC: 3.5 G/DL (ref 3.5–5.2)
ALP SERPL-CCNC: 71 U/L (ref 55–135)
ALT SERPL W/O P-5'-P-CCNC: 37 U/L (ref 10–44)
ANION GAP SERPL CALC-SCNC: 6 MMOL/L (ref 8–16)
AST SERPL-CCNC: 64 U/L (ref 10–40)
BASOPHILS # BLD AUTO: 0.01 K/UL (ref 0–0.2)
BASOPHILS NFR BLD: 0.1 % (ref 0–1.9)
BILIRUB SERPL-MCNC: 0.6 MG/DL (ref 0.1–1)
BUN SERPL-MCNC: 7 MG/DL (ref 8–23)
CALCIUM SERPL-MCNC: 8.6 MG/DL (ref 8.7–10.5)
CHLORIDE SERPL-SCNC: 104 MMOL/L (ref 95–110)
CO2 SERPL-SCNC: 26 MMOL/L (ref 23–29)
CREAT SERPL-MCNC: 0.7 MG/DL (ref 0.5–1.4)
DIFFERENTIAL METHOD: ABNORMAL
EOSINOPHIL # BLD AUTO: 0 K/UL (ref 0–0.5)
EOSINOPHIL NFR BLD: 0.1 % (ref 0–8)
ERYTHROCYTE [DISTWIDTH] IN BLOOD BY AUTOMATED COUNT: 16.6 % (ref 11.5–14.5)
EST. GFR  (NO RACE VARIABLE): >60 ML/MIN/1.73 M^2
GLUCOSE SERPL-MCNC: 107 MG/DL (ref 70–110)
HCT VFR BLD AUTO: 31.3 % (ref 40–54)
HGB BLD-MCNC: 10.5 G/DL (ref 14–18)
IMM GRANULOCYTES # BLD AUTO: 0.1 K/UL (ref 0–0.04)
IMM GRANULOCYTES NFR BLD AUTO: 0.7 % (ref 0–0.5)
LYMPHOCYTES # BLD AUTO: 0.8 K/UL (ref 1–4.8)
LYMPHOCYTES NFR BLD: 5.4 % (ref 18–48)
MAGNESIUM SERPL-MCNC: 1.7 MG/DL (ref 1.6–2.6)
MCH RBC QN AUTO: 35.1 PG (ref 27–31)
MCHC RBC AUTO-ENTMCNC: 33.5 G/DL (ref 32–36)
MCV RBC AUTO: 105 FL (ref 82–98)
MONOCYTES # BLD AUTO: 1 K/UL (ref 0.3–1)
MONOCYTES NFR BLD: 6.6 % (ref 4–15)
NEUTROPHILS # BLD AUTO: 12.5 K/UL (ref 1.8–7.7)
NEUTROPHILS NFR BLD: 87.1 % (ref 38–73)
NRBC BLD-RTO: 0 /100 WBC
PHOSPHATE SERPL-MCNC: 2.2 MG/DL (ref 2.7–4.5)
PLATELET # BLD AUTO: 124 K/UL (ref 150–450)
PMV BLD AUTO: 9.8 FL (ref 9.2–12.9)
POCT GLUCOSE: 111 MG/DL (ref 70–110)
POCT GLUCOSE: 112 MG/DL (ref 70–110)
POCT GLUCOSE: 116 MG/DL (ref 70–110)
POCT GLUCOSE: 128 MG/DL (ref 70–110)
POTASSIUM SERPL-SCNC: 4.3 MMOL/L (ref 3.5–5.1)
PROT SERPL-MCNC: 6.1 G/DL (ref 6–8.4)
RBC # BLD AUTO: 2.99 M/UL (ref 4.6–6.2)
SODIUM SERPL-SCNC: 136 MMOL/L (ref 136–145)
WBC # BLD AUTO: 14.38 K/UL (ref 3.9–12.7)

## 2023-10-06 PROCEDURE — 94664 DEMO&/EVAL PT USE INHALER: CPT

## 2023-10-06 PROCEDURE — C9113 INJ PANTOPRAZOLE SODIUM, VIA: HCPCS | Performed by: STUDENT IN AN ORGANIZED HEALTH CARE EDUCATION/TRAINING PROGRAM

## 2023-10-06 PROCEDURE — 63600175 PHARM REV CODE 636 W HCPCS: Performed by: STUDENT IN AN ORGANIZED HEALTH CARE EDUCATION/TRAINING PROGRAM

## 2023-10-06 PROCEDURE — 80053 COMPREHEN METABOLIC PANEL: CPT | Performed by: STUDENT IN AN ORGANIZED HEALTH CARE EDUCATION/TRAINING PROGRAM

## 2023-10-06 PROCEDURE — 85025 COMPLETE CBC W/AUTO DIFF WBC: CPT | Performed by: STUDENT IN AN ORGANIZED HEALTH CARE EDUCATION/TRAINING PROGRAM

## 2023-10-06 PROCEDURE — 25000003 PHARM REV CODE 250

## 2023-10-06 PROCEDURE — 97116 GAIT TRAINING THERAPY: CPT | Mod: CQ

## 2023-10-06 PROCEDURE — 20600001 HC STEP DOWN PRIVATE ROOM

## 2023-10-06 PROCEDURE — 99900035 HC TECH TIME PER 15 MIN (STAT)

## 2023-10-06 PROCEDURE — 94761 N-INVAS EAR/PLS OXIMETRY MLT: CPT

## 2023-10-06 PROCEDURE — 25000003 PHARM REV CODE 250: Performed by: STUDENT IN AN ORGANIZED HEALTH CARE EDUCATION/TRAINING PROGRAM

## 2023-10-06 PROCEDURE — 63600175 PHARM REV CODE 636 W HCPCS

## 2023-10-06 PROCEDURE — 27000221 HC OXYGEN, UP TO 24 HOURS

## 2023-10-06 PROCEDURE — 84100 ASSAY OF PHOSPHORUS: CPT | Performed by: STUDENT IN AN ORGANIZED HEALTH CARE EDUCATION/TRAINING PROGRAM

## 2023-10-06 PROCEDURE — 27000646 HC AEROBIKA DEVICE

## 2023-10-06 PROCEDURE — 25000003 PHARM REV CODE 250: Performed by: SURGERY

## 2023-10-06 PROCEDURE — 83735 ASSAY OF MAGNESIUM: CPT | Performed by: STUDENT IN AN ORGANIZED HEALTH CARE EDUCATION/TRAINING PROGRAM

## 2023-10-06 PROCEDURE — 25000242 PHARM REV CODE 250 ALT 637 W/ HCPCS: Performed by: NURSE PRACTITIONER

## 2023-10-06 PROCEDURE — 94640 AIRWAY INHALATION TREATMENT: CPT

## 2023-10-06 PROCEDURE — 36415 COLL VENOUS BLD VENIPUNCTURE: CPT | Performed by: STUDENT IN AN ORGANIZED HEALTH CARE EDUCATION/TRAINING PROGRAM

## 2023-10-06 PROCEDURE — 25000242 PHARM REV CODE 250 ALT 637 W/ HCPCS

## 2023-10-06 RX ORDER — OXYCODONE HCL 5 MG/5 ML
10 SOLUTION, ORAL ORAL EVERY 4 HOURS PRN
Status: DISCONTINUED | OUTPATIENT
Start: 2023-10-06 | End: 2023-10-10 | Stop reason: HOSPADM

## 2023-10-06 RX ORDER — HYDROMORPHONE HYDROCHLORIDE 1 MG/ML
0.2 INJECTION, SOLUTION INTRAMUSCULAR; INTRAVENOUS; SUBCUTANEOUS EVERY 6 HOURS PRN
Status: DISCONTINUED | OUTPATIENT
Start: 2023-10-06 | End: 2023-10-09

## 2023-10-06 RX ORDER — OXYCODONE HCL 5 MG/5 ML
5 SOLUTION, ORAL ORAL EVERY 4 HOURS PRN
Status: DISCONTINUED | OUTPATIENT
Start: 2023-10-06 | End: 2023-10-10 | Stop reason: HOSPADM

## 2023-10-06 RX ADMIN — OXYCODONE HYDROCHLORIDE 10 MG: 5 SOLUTION ORAL at 12:10

## 2023-10-06 RX ADMIN — HYDROMORPHONE HYDROCHLORIDE 0.2 MG: 1 INJECTION, SOLUTION INTRAMUSCULAR; INTRAVENOUS; SUBCUTANEOUS at 02:10

## 2023-10-06 RX ADMIN — SENNOSIDES 5 ML: 8.8 SYRUP ORAL at 09:10

## 2023-10-06 RX ADMIN — ENOXAPARIN SODIUM 40 MG: 40 INJECTION SUBCUTANEOUS at 06:10

## 2023-10-06 RX ADMIN — ACETAMINOPHEN 999.01 MG: 160 SOLUTION ORAL at 09:10

## 2023-10-06 RX ADMIN — METOROPROLOL TARTRATE 2.5 MG: 5 INJECTION, SOLUTION INTRAVENOUS at 06:10

## 2023-10-06 RX ADMIN — OXYCODONE HYDROCHLORIDE 10 MG: 5 SOLUTION ORAL at 06:10

## 2023-10-06 RX ADMIN — METOROPROLOL TARTRATE 2.5 MG: 5 INJECTION, SOLUTION INTRAVENOUS at 12:10

## 2023-10-06 RX ADMIN — MUPIROCIN 1 G: 20 OINTMENT TOPICAL at 08:10

## 2023-10-06 RX ADMIN — METHOCARBAMOL 500 MG: 100 INJECTION, SOLUTION INTRAMUSCULAR; INTRAVENOUS at 09:10

## 2023-10-06 RX ADMIN — LEVALBUTEROL HYDROCHLORIDE 0.63 MG: 0.63 SOLUTION RESPIRATORY (INHALATION) at 08:10

## 2023-10-06 RX ADMIN — ACETAMINOPHEN 999.01 MG: 160 SOLUTION ORAL at 02:10

## 2023-10-06 RX ADMIN — SODIUM PHOSPHATE, MONOBASIC, MONOHYDRATE AND SODIUM PHOSPHATE, DIBASIC, ANHYDROUS 20.01 MMOL: 142; 276 INJECTION, SOLUTION INTRAVENOUS at 10:10

## 2023-10-06 RX ADMIN — PANTOPRAZOLE SODIUM 40 MG: 40 INJECTION, POWDER, FOR SOLUTION INTRAVENOUS at 08:10

## 2023-10-06 RX ADMIN — Medication: at 06:10

## 2023-10-06 RX ADMIN — MUPIROCIN 1 G: 20 OINTMENT TOPICAL at 10:10

## 2023-10-06 RX ADMIN — METHOCARBAMOL 500 MG: 100 INJECTION, SOLUTION INTRAMUSCULAR; INTRAVENOUS at 05:10

## 2023-10-06 RX ADMIN — LEVALBUTEROL HYDROCHLORIDE 0.63 MG: 0.63 SOLUTION RESPIRATORY (INHALATION) at 02:10

## 2023-10-06 RX ADMIN — LEVALBUTEROL HYDROCHLORIDE 0.63 MG: 0.63 SOLUTION RESPIRATORY (INHALATION) at 09:10

## 2023-10-06 RX ADMIN — METHOCARBAMOL 500 MG: 100 INJECTION, SOLUTION INTRAMUSCULAR; INTRAVENOUS at 02:10

## 2023-10-06 NOTE — ASSESSMENT & PLAN NOTE
63 year old male with a PMH of esophageal adenocarcinoma. Now s/p 3-hole esophagectomy on 10/04/23.    - NPO  - will discuss initiating tube feeds today  - mIVF continued but dropped down to 75mL/hr  - exchanged pleuravac yesterday. Placed to water seal  - Wean supplemental oxygen  - Continue PCA. Multimodals   - Metoprolol IV  - Protonix IV  - Keep caldera today  - PRN anti-emetics  - PT/OT  - PPx Lovenox    Dispo: Remain in SICU today

## 2023-10-06 NOTE — PROGRESS NOTES
[facility] - [facility]  General Surgery  Progress Note    Subjective:     History of Present Illness:  No notes on file    Post-Op Info:  Procedure(s) (LRB):  XI ROBOTIC ESOPHAGECTOMY (N/A)  INSERTION, JEJUNOSTOMY TUBE  ROBOTIC REPAIR, HERNIA, PARAESOPHAGEAL  PYLOROMYOTOMY   2 Days Post-Op     Interval History: POD 2 from esophagectomy. Pain well controlled, stepped down to floor overnight. Was out of bed to chair most of day yesterday. CT to water seal with 245cc SS output. Neck PADMA with 85 SS out.     Medications:  Continuous Infusions:   sodium chloride 0.9% 10 mL/hr at 10/05/23 2000    hydromorphone in 0.9 % NaCl 6 mg/30 ml       Scheduled Meds:   acetaminophen  999.0148 mg Per J Tube Q8H    enoxparin  40 mg Subcutaneous Q24H (prophylaxis, 1700)    levalbuterol  0.63 mg Nebulization Q6H WAKE    methocarbamol (ROBAXIN) IVPB  500 mg Intravenous Q8H    metoprolol  2.5 mg Intravenous Q6H    mupirocin  1 g Nasal BID    pantoprazole  40 mg Intravenous Daily    sennosides 8.8 mg/5 ml  5 mL Per J Tube QHS    tamsulosin  0.4 mg Oral Daily     PRN Meds:calcium gluconate IVPB, calcium gluconate IVPB, calcium gluconate IVPB, dextrose 10%, dextrose 10%, glucagon (human recombinant), hydrALAZINE, insulin aspart U-100, labetalol, magnesium sulfate IVPB, magnesium sulfate IVPB, naloxone, ondansetron, potassium chloride **AND** potassium chloride **AND** potassium chloride, prochlorperazine, sodium phosphate 15 mmol in dextrose 5 % (D5W) 250 mL IVPB, sodium phosphate 20.01 mmol in dextrose 5 % (D5W) 250 mL IVPB, sodium phosphate 30 mmol in dextrose 5 % (D5W) 250 mL IVPB     Review of patient's allergies indicates:  No Known Allergies  Objective:     Vital Signs (Most Recent):  Temp: 98.1 °F (36.7 °C) (10/06/23 0737)  Pulse: 72 (10/06/23 0737)  Resp: 20 (10/06/23 0737)  BP: 139/70 (10/06/23 0737)  SpO2: (!) 91 % (10/06/23 0737) Vital Signs (24h Range):  Temp:  [97.7 °F (36.5 °C)-98.4 °F (36.9 °C)] 98.1 °F (36.7 °C)  Pulse:  [64-79]  72  Resp:  [10-37] 20  SpO2:  [91 %-100 %] 91 %  BP: (117-145)/(57-73) 139/70  Arterial Line BP: (119-127)/(49-55) 119/49     Weight: 61.2 kg (135 lb)  Body mass index is 19.93 kg/m².    Intake/Output - Last 3 Shifts         10/04 0700  10/05 0659 10/05 0700  10/06 0659 10/06 0700  10/07 0659    I.V. (mL/kg) 2864.3 (46.8) 1096.2 (17.9) 164.2 (2.7)    NG/GT  60     IV Piggyback 4989.6 430.5 97.4    Total Intake(mL/kg) 7854 (128.3) 1586.7 (25.9) 261.6 (4.3)    Urine (mL/kg/hr) 2845 (1.9) 2950 (2)     Drains 58 85     Stool  0     Chest Tube 650 280     Total Output 3553 3315     Net +4301 -1728.3 +261.6           Stool Occurrence  0 x              Physical Exam  Vitals and nursing note reviewed.   Constitutional:       General: He is not in acute distress.     Interventions: He is sedated.   HENT:      Head: Normocephalic and atraumatic.      Right Ear: External ear normal.      Left Ear: External ear normal.      Nose: Nose normal.   Eyes:      Conjunctiva/sclera: Conjunctivae normal.   Neck:      Comments: Left lateral neck incision cdi  PADMA with SS output  Cardiovascular:      Rate and Rhythm: Normal rate and regular rhythm.   Pulmonary:      Effort: Pulmonary effort is normal. No respiratory distress.      Comments: R chest tube in place to water seal.  Abdominal:      General: Abdomen is flat. There is no distension.      Palpations: Abdomen is soft.      Comments: J tube in place   Genitourinary:     Comments: Car in place draining clear yellow urine  Skin:     General: Skin is warm and dry.          Significant Labs:  I have reviewed all pertinent lab results within the past 24 hours.    Significant Diagnostics:  I have reviewed all pertinent imaging results/findings within the past 24 hours.    Assessment/Plan:     * Malignant neoplasm of gastroesophageal junction  63 year old male with a PMH of esophageal adenocarcinoma. Now s/p 3-hole esophagectomy on 10/04/23.    - NPO  - J tube feeds at [facility] today  -  mIVF continued but dropped down to 75mL/hr  - exchanged pleuravac yesterday. Placed to water seal  - Wean supplemental oxygen  - dc PCA, transition to oxy elixir per J tube and dilaudid breakthrough. multimodals  - Metoprolol IV  - Protonix IV  - PRN anti-emetics  - PT/OT  - PPx Lovenox    Dispo: continue floor care        Darya Guzman MD  General Surgery  [facility]

## 2023-10-06 NOTE — CONSULTS
Recommendations    1) TF RECS: Tere Neff 1.4 @ 65ml/hr x 18 hours to provide 1638 kcals, 86g PRO, 819 ml fluid w/ additonal FWF per MD     2) ADAT per MD     3) Following    Goals: Meet % een/epn by next Rd f/u  Nutrition Goal Status: new  Communication of RD Recs: other (comment) (POC)    Will follow up    Thanks,    Jessy Chavez RD, LDN

## 2023-10-06 NOTE — SUBJECTIVE & OBJECTIVE
Interval History: POD 2 from esophagectomy. Pain well controlled, stepped down to floor overnight. Was out of bed to chair most of day yesterday. CT to water seal with 245cc SS output. Neck PADMA with 85 SS out.     Medications:  Continuous Infusions:   sodium chloride 0.9% 10 mL/hr at 10/05/23 2000    hydromorphone in 0.9 % NaCl 6 mg/30 ml       Scheduled Meds:   acetaminophen  999.0148 mg Per J Tube Q8H    enoxparin  40 mg Subcutaneous Q24H (prophylaxis, 1700)    levalbuterol  0.63 mg Nebulization Q6H WAKE    methocarbamol (ROBAXIN) IVPB  500 mg Intravenous Q8H    metoprolol  2.5 mg Intravenous Q6H    mupirocin  1 g Nasal BID    pantoprazole  40 mg Intravenous Daily    sennosides 8.8 mg/5 ml  5 mL Per J Tube QHS    tamsulosin  0.4 mg Oral Daily     PRN Meds:calcium gluconate IVPB, calcium gluconate IVPB, calcium gluconate IVPB, dextrose 10%, dextrose 10%, glucagon (human recombinant), hydrALAZINE, insulin aspart U-100, labetalol, magnesium sulfate IVPB, magnesium sulfate IVPB, naloxone, ondansetron, potassium chloride **AND** potassium chloride **AND** potassium chloride, prochlorperazine, sodium phosphate 15 mmol in dextrose 5 % (D5W) 250 mL IVPB, sodium phosphate 20.01 mmol in dextrose 5 % (D5W) 250 mL IVPB, sodium phosphate 30 mmol in dextrose 5 % (D5W) 250 mL IVPB     Review of patient's allergies indicates:  No Known Allergies  Objective:     Vital Signs (Most Recent):  Temp: 98.1 °F (36.7 °C) (10/06/23 0737)  Pulse: 72 (10/06/23 0737)  Resp: 20 (10/06/23 0737)  BP: 139/70 (10/06/23 0737)  SpO2: (!) 91 % (10/06/23 0737) Vital Signs (24h Range):  Temp:  [97.7 °F (36.5 °C)-98.4 °F (36.9 °C)] 98.1 °F (36.7 °C)  Pulse:  [64-79] 72  Resp:  [10-37] 20  SpO2:  [91 %-100 %] 91 %  BP: (117-145)/(57-73) 139/70  Arterial Line BP: (119-127)/(49-55) 119/49     Weight: 61.2 kg (135 lb)  Body mass index is 19.93 kg/m².    Intake/Output - Last 3 Shifts         10/04 0700  10/05 0659 10/05 0700  10/06 0659 10/06 0700  10/07 0659     I.V. (mL/kg) 2864.3 (46.8) 1096.2 (17.9) 164.2 (2.7)    NG/GT  60     IV Piggyback 4989.6 430.5 97.4    Total Intake(mL/kg) 7854 (128.3) 1586.7 (25.9) 261.6 (4.3)    Urine (mL/kg/hr) 2845 (1.9) 2950 (2)     Drains 58 85     Stool  0     Chest Tube 650 280     Total Output 3553 3315     Net +4301 -1728.3 +261.6           Stool Occurrence  0 x              Physical Exam  Vitals and nursing note reviewed.   Constitutional:       General: He is not in acute distress.     Interventions: He is sedated.   HENT:      Head: Normocephalic and atraumatic.      Right Ear: External ear normal.      Left Ear: External ear normal.      Nose: Nose normal.   Eyes:      Conjunctiva/sclera: Conjunctivae normal.   Neck:      Comments: Left lateral neck incision cdi  PADMA with SS output  Cardiovascular:      Rate and Rhythm: Normal rate and regular rhythm.   Pulmonary:      Effort: Pulmonary effort is normal. No respiratory distress.      Comments: R chest tube in place to water seal.  Abdominal:      General: Abdomen is flat. There is no distension.      Palpations: Abdomen is soft.      Comments: J tube in place   Genitourinary:     Comments: Car in place draining clear yellow urine  Skin:     General: Skin is warm and dry.          Significant Labs:  I have reviewed all pertinent lab results within the past 24 hours.    Significant Diagnostics:  I have reviewed all pertinent imaging results/findings within the past 24 hours.

## 2023-10-06 NOTE — RESPIRATORY THERAPY
RAPID RESPONSE RESPIRATORY THERAPY ETCO2 CHECK         Time of visit: 727     Code Status: Full Code   : 1960  Bed: 1049/1049 A:   MRN: [id number]  Time spent at the bedside: < 15 min    SITUATION    Evaluated patient for: ETCo2 compliance    BACKGROUND    Why is the patient in the hospital?: Malignant neoplasm of gastroesophageal junction    Patient has a past medical history of Arthritis, Cancer, and COPD (chronic obstructive pulmonary disease).    24 Hours Vitals Range:  Temp:  [97.7 °F (36.5 °C)-98.4 °F (36.9 °C)]   Pulse:  [64-79]   Resp:  [10-37]   BP: (117-145)/(57-73)   SpO2:  [91 %-100 %]   Arterial Line BP: (119-130)/(49-55)     Labs:    Recent Labs     10/04/23  2011 10/05/23  0307 10/06/23  0430    137 136   K 3.3* 4.1 4.3   * 109 104   CO2 15* 20* 26   BUN 12 10 7*   CREATININE 0.8 0.7 0.7   * 190* 107   PHOS 2.2* 3.1 2.2*   MG 1.6 1.9 1.7        Recent Labs     10/04/23  2358 10/05/23  0454 10/05/23  0838   PH 7.311* 7.352 7.387   PCO2 37.2 40.3 39.2   PO2 98 91 89   HCO3 18.8* 22.4* 23.6*   POCSATURATED 97 97 97   BE -7 -3 -1       ASSESSMENT/INTERVENTIONS      Last VS   Temp: 97.8 °F (36.6 °C) (10/06 0533)  Pulse: 76 (10/06 0533)  Resp: 17 (10/06 0658)  BP: 132/62 (10/06 0614)  SpO2: 92 % (10/06 0533)  Arterial Line BP: 119/49 (10/05 1600)    Level of Consciousness: Level of Consciousness (AVPU): alert  Respiratory Effort: Respiratory Effort: Unlabored Expansion/Accessory Muscle Usage: Expansion/Accessory Muscles/Retractions: no use of accessory muscles, no retractions, expansion symmetric  All Lung Field Breath Sounds: All Lung Fields Breath Sounds: clear  Is the ETCO2 monitor on? Yes  Is the patient wearing a cannula? Yes  Are ETCO2 orders placed? Yes  Is the patient on a PCA pump? Yes  ETCO2 monitored: ETCO2 (mmHg): 31 mmHg  Ambu at bedside:      Active Orders   Respiratory Care    ACAPELLA TREATMENT Q6H WAKE     Frequency: Q6H WAKE     Number of Occurrences: Until  Specified     Order Comments: Q10x Q 1hour w/a      END TIDAL CO2 MONITOR Q12H     Frequency: Q12H     Number of Occurrences: Until Specified    Incentive spirometry     Frequency: Q6H WAKE     Number of Occurrences: Until Specified     Order Comments: Q10x Q1 hour w/a      Incentive spirometry     Frequency: Q4H     Number of Occurrences: Until Specified     Order Comments: Q4 while awake until discharge.  Educate patient in use; Keep incentive spirometer within reach; and Document incentive spirometer volume every 4 hours while awake.    ((10 sets per hour (3-5 inspirations per set)) on original order)      Inhalation Treatment Q6H WAKE     Frequency: Q6H WAKE     Number of Occurrences: Until Specified    Oxygen Continuous     Frequency: Continuous     Number of Occurrences: Until Specified     Order Questions:      Device type: Low flow      Device: Nasal Cannula (1- 5 Liters)      LPM: 1-5      Titrate O2 per Oxygen Titration Protocol: Yes      To maintain SpO2 goal of: >= 88%      Notify MD of: Inability to achieve desired SpO2; Sudden change in patient status and requires 20% increase in FiO2; Patient requires >60% FiO2    POCT ARTERIAL BLOOD GAS Blood Gas     Frequency: Q4H     Number of Occurrences: 3 Occurrences     Order Comments: Notify Physician if: see parameters below.       Order Questions:      Component: Blood Gas    Pulse Oximetry Q Shift     Frequency: Q Shift     Number of Occurrences: Until Specified       RECOMMENDATIONS    We recommend: RRT Recs: Continue POC per primary team.      FOLLOW-UP    Please call back the Rapid Response RT, Katerina Angela RRT at x [phone] for any questions or concerns.

## 2023-10-06 NOTE — PT/OT/SLP PROGRESS
Physical Therapy Treatment    Patient Name:  Blayne Beebe   MRN:  [id number]    Recommendations:     Discharge Recommendations: other (see comments)  Discharge Equipment Recommendations: none  Barriers to discharge: None    Assessment:     Blayne Beebe is a 63 y.o. male admitted with a medical diagnosis of Malignant neoplasm of gastroesophageal junction.  He presents with the following impairments/functional limitations: impaired endurance, impaired self care skills, impaired functional mobility, impaired cardiopulmonary response to activity, gait instability.    Rehab Prognosis: Good; patient would benefit from acute skilled PT services to address these deficits and reach maximum level of function.    Recent Surgery: Procedure(s) (LRB):  XI ROBOTIC ESOPHAGECTOMY (N/A)  INSERTION, JEJUNOSTOMY TUBE  ROBOTIC REPAIR, HERNIA, PARAESOPHAGEAL  PYLOROMYOTOMY 2 Days Post-Op    Plan:     During this hospitalization, patient to be seen 4 x/week to address the identified rehab impairments via gait training, therapeutic activities, therapeutic exercises, neuromuscular re-education and progress toward the following goals:    Plan of Care Expires:  11/04/23    Subjective     Chief Complaint: pain  Patient/Family Comments/goals: pt and spouse agreeable to ambulation on unit 3 x daily at minimum  Pain/Comfort:  Pain Rating 1: 6/10  Location - Side 1: Right  Location - Orientation 1: generalized  Location 1: flank  Pain Addressed 1: Pre-medicate for activity, Reposition, Distraction, Cessation of Activity  Pain Rating Post-Intervention 1: 6/10      Objective:     Communicated with RN prior to session.  Patient found up in chair with telemetry, pulse ox (continuous), peripheral IV, chest tube, PADMA drain upon PTA entry to room. RN in to bedside to remove pt from oxygen and to disconnect IV.     General Precautions: Standard, fall  Orthopedic Precautions: N/A  Braces: N/A  Respiratory Status: Room air     Functional  Mobility:  Transfers:     Sit to Stand:  independence with no AD  Gait: Pt ambulates ~200 ft with no AD and supervision. 2 brief standing rests d/t pain. Pt denies SOB. Antalgic posturing, slow elena.       AM-PAC 6 CLICK MOBILITY  Turning over in bed (including adjusting bedclothes, sheets and blankets)?: 3  Sitting down on and standing up from a chair with arms (e.g., wheelchair, bedside commode, etc.): 3  Moving from lying on back to sitting on the side of the bed?: 3  Moving to and from a bed to a chair (including a wheelchair)?: 3  Need to walk in hospital room?: 3  Climbing 3-5 steps with a railing?: 3  Basic Mobility Total Score: 18     Patient left up in chair with all lines intact, call button in reach, and spouse present..    GOALS:   Multidisciplinary Problems       Physical Therapy Goals          Problem: Physical Therapy    Goal Priority Disciplines Outcome Goal Variances Interventions   Physical Therapy Goal     PT, PT/OT Ongoing, Progressing     Description: Goals to be met by: 10/20/2023    Patient will increase functional independence with mobility by performin. Supine to sit with Supervision   2. Sit to stand transfer with Supervision  3. Gait  x 400 feet with Supervision.                          Time Tracking:     PT Received On: 10/06/23  PT Start Time: 1350     PT Stop Time: 1402  PT Total Time (min): 12 min     Billable Minutes: Gait Training 12    Treatment Type: Treatment  PT/PTA: PTA     Number of PTA visits since last PT visit: 1     10/06/2023

## 2023-10-06 NOTE — PHYSICIAN QUERY
PT Name: Blayne Beebe  MR #: [id number]     DOCUMENTATION CLARIFICATION     CDS: Chely CERRATO RN  Contact information: [email or web address]    This form is a permanent document in the medical record.    Query Date: October 6, 2023    By submitting this query, we are merely seeking further clarification of documentation to reflect the severity of illness of your patient. Please utilize your independent clinical judgment when addressing the question(s) below.    The Medical Record reflects the following:     Indicators   Supporting Clinical Findings Location in Medical Record   X Lab Value(s)  10/04/23 20:11 10/05/23 03:07 10/06/23 04:30   Potassium 3.3 (L) 4.1 4.3      10/04/23 20:11 10/05/23 03:07 10/06/23 04:30   Phosphorus Level 2.2 (L) 3.1 2.2 (L)    Labs 10/4-10/6          Labs 10/4-10/6     X Treatment/Medication potassium chloride 10 mEq in 100 mL IVPB        sodium phosphate 20.01 mmol in dextrose 5 % (D5W) 250 mL IVPB   MAR 10/4/23 2253, 2300, 2358, 10/5/23 0000, 0100, 0203, 0300      MAR 10/4/23 2247, 2300, 10/5/23 0000, 0100, 0200    Other       Provider, please specify the diagnosis or diagnoses that correspond(s) to the above indicators. Tyler all that apply:    [ x ] Hypokalemia   [ x ] Hypophosphatemia   [   ] Other electrolyte disturbance (please specify): __________   [   ]  Clinically Undetermined       Please document in your progress notes daily for the duration of treatment until resolved, and include in your discharge summary.

## 2023-10-06 NOTE — PLAN OF CARE
- Received patient with Chest Tube to water seal.  - 2 PIV patent and intact. IV Fluids D5 .45NSS + 20mEq KCL at 50ml/hr.  - on PCA with pain level of 7/10.  - skin checked with RN; Foleys draining a yellow urine with adequate output.  - PADMA drain on the left neck,  J-tube LUQ  - CBG checked every q6 - no coverage given.  - On telemetry NSR with episodes of PVC's  - Chest Xray done.  - WC      Problem: Adult Inpatient Plan of Care  Goal: Plan of Care Review  Outcome: Ongoing, Progressing  Goal: Patient-Specific Goal (Individualized)  Outcome: Ongoing, Progressing  Goal: Absence of Hospital-Acquired Illness or Injury  Outcome: Ongoing, Progressing  Goal: Optimal Comfort and Wellbeing  Outcome: Ongoing, Progressing  Goal: Readiness for Transition of Care  Outcome: Ongoing, Not Progressing     Problem: Fall Injury Risk  Goal: Absence of Fall and Fall-Related Injury  Outcome: Ongoing, Progressing

## 2023-10-06 NOTE — NURSING
.Nurses Note -- 4 Eyes      10/6/2023   3:19 AM      Skin assessed during: Admit      [x] No Altered Skin Integrity Present    [x]Prevention Measures Documented      [] Yes- Altered Skin Integrity Present or Discovered   [] LDA Added if Not in Epic (Describe Wound)   [] New Altered Skin Integrity was Present on Admit and Documented in LDA   [] Wound Image Taken    Wound Care Consulted? No    Attending Nurse:  SAMANTHA Johnson    Second RN/Staff Member:  SAMANTHA Calvo

## 2023-10-06 NOTE — PLAN OF CARE
Problem: Adult Inpatient Plan of Care  Goal: Plan of Care Review  Outcome: Ongoing, Progressing  Goal: Absence of Hospital-Acquired Illness or Injury  Outcome: Ongoing, Progressing  Goal: Optimal Comfort and Wellbeing  Outcome: Ongoing, Progressing     Problem: Bowel Motility Impaired (Surgery Nonspecified)  Goal: Effective Bowel Elimination  Outcome: Ongoing, Progressing     Problem: Fluid and Electrolyte Imbalance (Surgery Nonspecified)  Goal: Fluid and Electrolyte Balance  Outcome: Ongoing, Progressing     Problem: Pain (Surgery Nonspecified)  Goal: Acceptable Pain Control  Outcome: Ongoing, Progressing

## 2023-10-06 NOTE — NURSING
EOSS: Patient stable throughout shift; Car removed at start of shift and patient voiding spontaneously without difficulty; ambulated in barrientos x 2 and in room/to bathroom multiple times; tube feed started in afternoon and patient tolerating thus far; pain well controlled with prn meds; patient and family updated frequently on treatment and POC; All invasive lines/drains/tubes patent and secure;  comfort and safety maintained; VSS; Report to Elizabeth SINGH

## 2023-10-07 LAB
ALBUMIN SERPL BCP-MCNC: 3.2 G/DL (ref 3.5–5.2)
ALP SERPL-CCNC: 105 U/L (ref 55–135)
ALT SERPL W/O P-5'-P-CCNC: 36 U/L (ref 10–44)
ANION GAP SERPL CALC-SCNC: 8 MMOL/L (ref 8–16)
AST SERPL-CCNC: 42 U/L (ref 10–40)
BASOPHILS # BLD AUTO: 0.02 K/UL (ref 0–0.2)
BASOPHILS NFR BLD: 0.1 % (ref 0–1.9)
BILIRUB SERPL-MCNC: 0.9 MG/DL (ref 0.1–1)
BUN SERPL-MCNC: 8 MG/DL (ref 8–23)
CALCIUM SERPL-MCNC: 8.8 MG/DL (ref 8.7–10.5)
CHLORIDE SERPL-SCNC: 105 MMOL/L (ref 95–110)
CO2 SERPL-SCNC: 22 MMOL/L (ref 23–29)
CREAT SERPL-MCNC: 0.6 MG/DL (ref 0.5–1.4)
CRP SERPL-MCNC: 191.24 MG/L (ref 0–3.19)
DIFFERENTIAL METHOD: ABNORMAL
EOSINOPHIL # BLD AUTO: 0 K/UL (ref 0–0.5)
EOSINOPHIL NFR BLD: 0.1 % (ref 0–8)
ERYTHROCYTE [DISTWIDTH] IN BLOOD BY AUTOMATED COUNT: 15.8 % (ref 11.5–14.5)
EST. GFR  (NO RACE VARIABLE): >60 ML/MIN/1.73 M^2
GLUCOSE SERPL-MCNC: 103 MG/DL (ref 70–110)
HCT VFR BLD AUTO: 33.9 % (ref 40–54)
HGB BLD-MCNC: 11.4 G/DL (ref 14–18)
IMM GRANULOCYTES # BLD AUTO: 0.15 K/UL (ref 0–0.04)
IMM GRANULOCYTES NFR BLD AUTO: 0.8 % (ref 0–0.5)
LYMPHOCYTES # BLD AUTO: 0.7 K/UL (ref 1–4.8)
LYMPHOCYTES NFR BLD: 3.6 % (ref 18–48)
MAGNESIUM SERPL-MCNC: 1.6 MG/DL (ref 1.6–2.6)
MCH RBC QN AUTO: 35 PG (ref 27–31)
MCHC RBC AUTO-ENTMCNC: 33.6 G/DL (ref 32–36)
MCV RBC AUTO: 104 FL (ref 82–98)
MONOCYTES # BLD AUTO: 1.5 K/UL (ref 0.3–1)
MONOCYTES NFR BLD: 8.3 % (ref 4–15)
NEUTROPHILS # BLD AUTO: 15.7 K/UL (ref 1.8–7.7)
NEUTROPHILS NFR BLD: 87.1 % (ref 38–73)
NRBC BLD-RTO: 0 /100 WBC
PHOSPHATE SERPL-MCNC: 1.7 MG/DL (ref 2.7–4.5)
PLATELET # BLD AUTO: 163 K/UL (ref 150–450)
PMV BLD AUTO: 9.9 FL (ref 9.2–12.9)
POCT GLUCOSE: 107 MG/DL (ref 70–110)
POCT GLUCOSE: 113 MG/DL (ref 70–110)
POCT GLUCOSE: 118 MG/DL (ref 70–110)
POCT GLUCOSE: 130 MG/DL (ref 70–110)
POTASSIUM SERPL-SCNC: 3.7 MMOL/L (ref 3.5–5.1)
PROT SERPL-MCNC: 6.2 G/DL (ref 6–8.4)
RBC # BLD AUTO: 3.26 M/UL (ref 4.6–6.2)
SODIUM SERPL-SCNC: 135 MMOL/L (ref 136–145)
WBC # BLD AUTO: 18.03 K/UL (ref 3.9–12.7)

## 2023-10-07 PROCEDURE — 25000242 PHARM REV CODE 250 ALT 637 W/ HCPCS: Performed by: NURSE PRACTITIONER

## 2023-10-07 PROCEDURE — 94664 DEMO&/EVAL PT USE INHALER: CPT

## 2023-10-07 PROCEDURE — 25000003 PHARM REV CODE 250: Performed by: SURGERY

## 2023-10-07 PROCEDURE — 36415 COLL VENOUS BLD VENIPUNCTURE: CPT | Performed by: NURSE PRACTITIONER

## 2023-10-07 PROCEDURE — 25000003 PHARM REV CODE 250: Performed by: STUDENT IN AN ORGANIZED HEALTH CARE EDUCATION/TRAINING PROGRAM

## 2023-10-07 PROCEDURE — 94640 AIRWAY INHALATION TREATMENT: CPT

## 2023-10-07 PROCEDURE — 86141 C-REACTIVE PROTEIN HS: CPT | Performed by: NURSE PRACTITIONER

## 2023-10-07 PROCEDURE — 99900035 HC TECH TIME PER 15 MIN (STAT)

## 2023-10-07 PROCEDURE — 83735 ASSAY OF MAGNESIUM: CPT | Performed by: STUDENT IN AN ORGANIZED HEALTH CARE EDUCATION/TRAINING PROGRAM

## 2023-10-07 PROCEDURE — 20600001 HC STEP DOWN PRIVATE ROOM

## 2023-10-07 PROCEDURE — 27000646 HC AEROBIKA DEVICE

## 2023-10-07 PROCEDURE — 85025 COMPLETE CBC W/AUTO DIFF WBC: CPT | Performed by: STUDENT IN AN ORGANIZED HEALTH CARE EDUCATION/TRAINING PROGRAM

## 2023-10-07 PROCEDURE — 80053 COMPREHEN METABOLIC PANEL: CPT | Performed by: STUDENT IN AN ORGANIZED HEALTH CARE EDUCATION/TRAINING PROGRAM

## 2023-10-07 PROCEDURE — 94799 UNLISTED PULMONARY SVC/PX: CPT

## 2023-10-07 PROCEDURE — C9113 INJ PANTOPRAZOLE SODIUM, VIA: HCPCS | Performed by: STUDENT IN AN ORGANIZED HEALTH CARE EDUCATION/TRAINING PROGRAM

## 2023-10-07 PROCEDURE — 25000003 PHARM REV CODE 250

## 2023-10-07 PROCEDURE — 94761 N-INVAS EAR/PLS OXIMETRY MLT: CPT

## 2023-10-07 PROCEDURE — 84100 ASSAY OF PHOSPHORUS: CPT | Performed by: STUDENT IN AN ORGANIZED HEALTH CARE EDUCATION/TRAINING PROGRAM

## 2023-10-07 PROCEDURE — 27000221 HC OXYGEN, UP TO 24 HOURS

## 2023-10-07 PROCEDURE — 63600175 PHARM REV CODE 636 W HCPCS: Performed by: STUDENT IN AN ORGANIZED HEALTH CARE EDUCATION/TRAINING PROGRAM

## 2023-10-07 RX ORDER — BISACODYL 10 MG
10 SUPPOSITORY, RECTAL RECTAL DAILY
Status: DISCONTINUED | OUTPATIENT
Start: 2023-10-07 | End: 2023-10-09

## 2023-10-07 RX ADMIN — ACETAMINOPHEN 999.01 MG: 160 SOLUTION ORAL at 10:10

## 2023-10-07 RX ADMIN — BISACODYL 10 MG: 10 SUPPOSITORY RECTAL at 12:10

## 2023-10-07 RX ADMIN — LEVALBUTEROL HYDROCHLORIDE 0.63 MG: 0.63 SOLUTION RESPIRATORY (INHALATION) at 08:10

## 2023-10-07 RX ADMIN — METOROPROLOL TARTRATE 2.5 MG: 5 INJECTION, SOLUTION INTRAVENOUS at 12:10

## 2023-10-07 RX ADMIN — SENNOSIDES 5 ML: 8.8 SYRUP ORAL at 08:10

## 2023-10-07 RX ADMIN — PANTOPRAZOLE SODIUM 40 MG: 40 INJECTION, POWDER, FOR SOLUTION INTRAVENOUS at 08:10

## 2023-10-07 RX ADMIN — METHOCARBAMOL 500 MG: 100 INJECTION, SOLUTION INTRAMUSCULAR; INTRAVENOUS at 05:10

## 2023-10-07 RX ADMIN — MUPIROCIN 1 G: 20 OINTMENT TOPICAL at 08:10

## 2023-10-07 RX ADMIN — LEVALBUTEROL HYDROCHLORIDE 0.63 MG: 0.63 SOLUTION RESPIRATORY (INHALATION) at 07:10

## 2023-10-07 RX ADMIN — METOROPROLOL TARTRATE 2.5 MG: 5 INJECTION, SOLUTION INTRAVENOUS at 05:10

## 2023-10-07 RX ADMIN — LEVALBUTEROL HYDROCHLORIDE 0.63 MG: 0.63 SOLUTION RESPIRATORY (INHALATION) at 02:10

## 2023-10-07 RX ADMIN — METHOCARBAMOL 500 MG: 100 INJECTION, SOLUTION INTRAMUSCULAR; INTRAVENOUS at 01:10

## 2023-10-07 RX ADMIN — ENOXAPARIN SODIUM 40 MG: 40 INJECTION SUBCUTANEOUS at 06:10

## 2023-10-07 RX ADMIN — ACETAMINOPHEN 999.01 MG: 160 SOLUTION ORAL at 05:10

## 2023-10-07 RX ADMIN — METOROPROLOL TARTRATE 2.5 MG: 5 INJECTION, SOLUTION INTRAVENOUS at 06:10

## 2023-10-07 NOTE — SUBJECTIVE & OBJECTIVE
Interval History: No acute events. AF and HDS on 1L NC. Started on tube feeds yesterday and tolerating well. Not yet passing flatus or having Bms. Pain is well controlled. Going for walks in the barrientos. CT with SS output. No air leak noted. CXR without any significant findings. Neck PADMA drain with SS output, no foul smell.     Medications:  Continuous Infusions:   sodium chloride 0.9% 10 mL/hr at 10/05/23 2000     Scheduled Meds:   acetaminophen  999.0148 mg Per J Tube Q8H    bisacodyL  10 mg Rectal Daily    enoxparin  40 mg Subcutaneous Q24H (prophylaxis, 1700)    levalbuterol  0.63 mg Nebulization Q6H WAKE    methocarbamol (ROBAXIN) IVPB  500 mg Intravenous Q8H    metoprolol  2.5 mg Intravenous Q6H    mupirocin  1 g Nasal BID    pantoprazole  40 mg Intravenous Daily    sennosides 8.8 mg/5 ml  5 mL Per J Tube QHS    tamsulosin  0.4 mg Oral Daily     PRN Meds:dextrose 10%, dextrose 10%, glucagon (human recombinant), hydrALAZINE, HYDROmorphone, insulin aspart U-100, labetalol, ondansetron, oxyCODONE, oxyCODONE, prochlorperazine     Review of patient's allergies indicates:  No Known Allergies  Objective:     Vital Signs (Most Recent):  Temp: 97.9 °F (36.6 °C) (10/07/23 0712)  Pulse: 74 (10/07/23 0824)  Resp: 16 (10/07/23 0824)  BP: 123/66 (10/07/23 0712)  SpO2: 96 % (10/07/23 0824) Vital Signs (24h Range):  Temp:  [97.7 °F (36.5 °C)-98.7 °F (37.1 °C)] 97.9 °F (36.6 °C)  Pulse:  [60-89] 74  Resp:  [16-20] 16  SpO2:  [93 %-96 %] 96 %  BP: (103-148)/(55-77) 123/66     Weight: 61.2 kg (135 lb)  Body mass index is 19.93 kg/m².    Intake/Output - Last 3 Shifts         10/05 0700  10/06 0659 10/06 0700  10/07 0659 10/07 0700  10/08 0659    I.V. (mL/kg) 1096.2 (17.9) 167.2 (2.7)     NG/GT 60 462 30    IV Piggyback 430.5 586.3     Total Intake(mL/kg) 1586.7 (25.9) 1215.5 (19.9) 30 (0.5)    Urine (mL/kg/hr) 2950 (2) 2500 (1.7) 400 (1.9)    Drains 85 20     Stool 0      Chest Tube 280 115     Total Output 3315 2635 400    Net  -1728.3 -1419.5 -370           Stool Occurrence 0 x               Physical Exam  Vitals and nursing note reviewed.   Constitutional:       General: He is not in acute distress.     Interventions: He is sedated.   HENT:      Head: Normocephalic and atraumatic.      Right Ear: External ear normal.      Left Ear: External ear normal.      Nose: Nose normal.   Eyes:      General: No scleral icterus.     Extraocular Movements: Extraocular movements intact.   Neck:      Comments: Left lateral neck incision cdi  PADMA with SS output  Breath not foul smelling  Cardiovascular:      Rate and Rhythm: Normal rate and regular rhythm.   Pulmonary:      Effort: Pulmonary effort is normal. No respiratory distress.      Comments: R chest tube in place to water seal with SS output. No air leak  Abdominal:      General: Abdomen is flat. There is no distension.      Palpations: Abdomen is soft.      Comments: J tube in place   Skin:     General: Skin is warm and dry.   Neurological:      General: No focal deficit present.      Cranial Nerves: No cranial nerve deficit.   Psychiatric:         Mood and Affect: Mood normal.         Behavior: Behavior normal.          Significant Labs:  I have reviewed all pertinent lab results within the past 24 hours.  CBC:   Recent Labs   Lab 10/07/23  0352   WBC 18.03*   RBC 3.26*   HGB 11.4*   HCT 33.9*      *   MCH 35.0*   MCHC 33.6     CMP:   Recent Labs   Lab 10/07/23  0352      CALCIUM 8.8   ALBUMIN 3.2*   PROT 6.2   *   K 3.7   CO2 22*      BUN 8   CREATININE 0.6   ALKPHOS 105   ALT 36   AST 42*   BILITOT 0.9       Significant Diagnostics:  I have reviewed all pertinent imaging results/findings within the past 24 hours.

## 2023-10-07 NOTE — PLAN OF CARE
.[facility] Plan of Care Note  Dx: Malignant neoplasm of gastroesophageal junction ; S/P Esophagectomy  J-Tube insertion, Paraesophageal Pyloromyotomy    Shift Events : Patient on reclining chair with O2 1L NC, chest tube to water seal, PADMA drain on left neck. PIV flushed, patent and saline lock. Tube feeds via J-tube at 20ml/hr. Due meds given.      Goals of Care: Pain Control    Neuro: AAOX4    Vital Signs:  WDL    Respiratory: 1L NC    Diet: STRICT NPO    Is patient tolerating current diet? NA    GTTS: None    Urine Output/Bowel Movement:  Adequate    Drains/Tubes/Tube Feeds (include total output/shift):  Chest Tube Right Flank, PADMA Left neck, J-Tube LUQ    Lines: 2 PIV right and left arm, patent and intact.      Accuchecks: q6    Skin:  Intact except for the lap sites.    Fall Risk Score: 12    Activity level?  SBA X1    Any scheduled procedures?  Xray    Any safety concerns?  Fall Risk    Other:     Problem: Adult Inpatient Plan of Care  Goal: Plan of Care Review  Outcome: Ongoing, Progressing  Goal: Patient-Specific Goal (Individualized)  Outcome: Ongoing, Progressing  Goal: Absence of Hospital-Acquired Illness or Injury  Outcome: Ongoing, Progressing  Goal: Optimal Comfort and Wellbeing  Outcome: Ongoing, Progressing  Goal: Readiness for Transition of Care  Outcome: Ongoing, Not Progressing     Problem: Fall Injury Risk  Goal: Absence of Fall and Fall-Related Injury  Outcome: Ongoing, Progressing     Problem: Skin Injury Risk Increased  Goal: Skin Health and Integrity  Outcome: Ongoing, Progressing

## 2023-10-07 NOTE — ASSESSMENT & PLAN NOTE
63 year old male with a PMH of esophageal adenocarcinoma. Now s/p 3-hole esophagectomy on 10/04/23.    - NPO  - Increase tube feeds to 30 mL/hr  - DC chest tube today. Can DC tele later today if no issues following CT removal  - Wean supplemental oxygen  - Continue MM pain meds  - CRP POD#3 and #5  - DC mIVF   - Metoprolol IV  - Protonix IV  - Keep caldera today  - PRN anti-emetics  - PO flomax  - PT/OT  - PPx Lovenox    Dispo: not yet medically stable for discharge

## 2023-10-07 NOTE — PROGRESS NOTES
[facility]  General Surgery  Progress Note    Subjective:     History of Present Illness:  No notes on file    Post-Op Info:  Procedure(s) (LRB):  XI ROBOTIC ESOPHAGECTOMY (N/A)  INSERTION, JEJUNOSTOMY TUBE  ROBOTIC REPAIR, HERNIA, PARAESOPHAGEAL  PYLOROMYOTOMY   3 Days Post-Op     Interval History: No acute events. AF and HDS on 1L NC. Started on tube feeds yesterday and tolerating well. Not yet passing flatus or having Bms. Pain is well controlled. Going for walks in the barrientos. CT with SS output. No air leak noted. CXR without any significant findings. Neck PADMA drain with SS output, no foul smell.     Medications:  Continuous Infusions:   sodium chloride 0.9% 10 mL/hr at 10/05/23 2000     Scheduled Meds:   acetaminophen  999.0148 mg Per J Tube Q8H    bisacodyL  10 mg Rectal Daily    enoxparin  40 mg Subcutaneous Q24H (prophylaxis, 1700)    levalbuterol  0.63 mg Nebulization Q6H WAKE    methocarbamol (ROBAXIN) IVPB  500 mg Intravenous Q8H    metoprolol  2.5 mg Intravenous Q6H    mupirocin  1 g Nasal BID    pantoprazole  40 mg Intravenous Daily    sennosides 8.8 mg/5 ml  5 mL Per J Tube QHS    tamsulosin  0.4 mg Oral Daily     PRN Meds:dextrose 10%, dextrose 10%, glucagon (human recombinant), hydrALAZINE, HYDROmorphone, insulin aspart U-100, labetalol, ondansetron, oxyCODONE, oxyCODONE, prochlorperazine     Review of patient's allergies indicates:  No Known Allergies  Objective:     Vital Signs (Most Recent):  Temp: 97.9 °F (36.6 °C) (10/07/23 0712)  Pulse: 74 (10/07/23 0824)  Resp: 16 (10/07/23 0824)  BP: 123/66 (10/07/23 0712)  SpO2: 96 % (10/07/23 0824) Vital Signs (24h Range):  Temp:  [97.7 °F (36.5 °C)-98.7 °F (37.1 °C)] 97.9 °F (36.6 °C)  Pulse:  [60-89] 74  Resp:  [16-20] 16  SpO2:  [93 %-96 %] 96 %  BP: (103-148)/(55-77) 123/66     Weight: 61.2 kg (135 lb)  Body mass index is 19.93 kg/m².    Intake/Output - Last 3 Shifts         10/05 0700  10/06 0659 10/06 0700  10/07 0659 10/07 0700  10/08  0659    I.V. (mL/kg) 1096.2 (17.9) 167.2 (2.7)     NG/GT 60 462 30    IV Piggyback 430.5 586.3     Total Intake(mL/kg) 1586.7 (25.9) 1215.5 (19.9) 30 (0.5)    Urine (mL/kg/hr) 2950 (2) 2500 (1.7) 400 (1.9)    Drains 85 20     Stool 0      Chest Tube 280 115     Total Output 3315 2635 400    Net -1728.3 -1419.5 -370           Stool Occurrence 0 x               Physical Exam  Vitals and nursing note reviewed.   Constitutional:       General: He is not in acute distress.     Interventions: He is sedated.   HENT:      Head: Normocephalic and atraumatic.      Right Ear: External ear normal.      Left Ear: External ear normal.      Nose: Nose normal.   Eyes:      General: No scleral icterus.     Extraocular Movements: Extraocular movements intact.   Neck:      Comments: Left lateral neck incision cdi  PADMA with SS output  Breath not foul smelling  Cardiovascular:      Rate and Rhythm: Normal rate and regular rhythm.   Pulmonary:      Effort: Pulmonary effort is normal. No respiratory distress.      Comments: R chest tube in place to water seal with SS output. No air leak  Abdominal:      General: Abdomen is flat. There is no distension.      Palpations: Abdomen is soft.      Comments: J tube in place   Skin:     General: Skin is warm and dry.   Neurological:      General: No focal deficit present.      Cranial Nerves: No cranial nerve deficit.   Psychiatric:         Mood and Affect: Mood normal.         Behavior: Behavior normal.          Significant Labs:  I have reviewed all pertinent lab results within the past 24 hours.  CBC:   Recent Labs   Lab 10/07/23  0352   WBC 18.03*   RBC 3.26*   HGB 11.4*   HCT 33.9*      *   MCH 35.0*   MCHC 33.6     CMP:   Recent Labs   Lab 10/07/23  0352      CALCIUM 8.8   ALBUMIN 3.2*   PROT 6.2   *   K 3.7   CO2 22*      BUN 8   CREATININE 0.6   ALKPHOS 105   ALT 36   AST 42*   BILITOT 0.9       Significant Diagnostics:  I have reviewed all pertinent imaging  results/findings within the past 24 hours.         Assessment/Plan:     * Malignant neoplasm of gastroesophageal junction  63 year old male with a PMH of esophageal adenocarcinoma. Now s/p 3-hole esophagectomy on 10/04/23.    - NPO  - Increase tube feeds to 30 mL/hr  - DC chest tube today. Can DC tele later today if no issues following CT removal  - Wean supplemental oxygen  - Continue MM pain meds  - CRP POD#3 and #5  - DC mIVF   - Metoprolol IV  - Protonix IV  - Keep caldera today  - PRN anti-emetics  - PO flomax  - PT/OT  - PPx Lovenox    Dispo: not yet medically stable for discharge        Smita Romeo MD  General Surgery  [facility]

## 2023-10-07 NOTE — PLAN OF CARE
[facility] Plan of Care Note  Dx Malignant neoplasm of gastroesophageal junction     Shift Events chest tube removed. Ambulated hallway x3    Goals of Care: pain control, pulmonary hygine     Neuro: A&O4    Vital Signs: vss    Respiratory: 1l    Diet: NPO- tube feeds     Is patient tolerating current diet? N/a    GTTS: none    Urine Output/Bowel Movement: urine output adequate, no BM today     Drains/Tubes/Tube Feeds (include total output/shift): see flow sheet    Lines: piv x2      Accuchecks:Q6    Skin: lap sites     Fall Risk Score: 12    Activity level? SBA    Any scheduled procedures? No    Any safety concerns? no    Other: n.a      Problem: Adult Inpatient Plan of Care  Goal: Plan of Care Review  Outcome: Ongoing, Progressing  Goal: Patient-Specific Goal (Individualized)  Outcome: Ongoing, Progressing  Goal: Absence of Hospital-Acquired Illness or Injury  Outcome: Ongoing, Progressing  Goal: Optimal Comfort and Wellbeing  Outcome: Ongoing, Progressing  Goal: Readiness for Transition of Care  Outcome: Ongoing, Progressing     Problem: Infection  Goal: Absence of Infection Signs and Symptoms  Outcome: Ongoing, Progressing     Problem: Fall Injury Risk  Goal: Absence of Fall and Fall-Related Injury  Outcome: Ongoing, Progressing     Problem: Skin Injury Risk Increased  Goal: Skin Health and Integrity  Outcome: Ongoing, Progressing     Problem: Pain Acute  Goal: Acceptable Pain Control and Functional Ability  Outcome: Ongoing, Progressing     Problem: Bowel Motility Impaired (Surgery Nonspecified)  Goal: Effective Bowel Elimination  Outcome: Ongoing, Progressing     Problem: Fluid and Electrolyte Imbalance (Surgery Nonspecified)  Goal: Fluid and Electrolyte Balance  Outcome: Ongoing, Progressing     Problem: Pain (Surgery Nonspecified)  Goal: Acceptable Pain Control  Outcome: Ongoing, Progressing     Problem: Postoperative Nausea and Vomiting (Surgery Nonspecified)  Goal: Nausea and Vomiting Relief  Outcome: Ongoing,  Progressing

## 2023-10-08 LAB
ALBUMIN SERPL BCP-MCNC: 2.9 G/DL (ref 3.5–5.2)
ALP SERPL-CCNC: 113 U/L (ref 55–135)
ALT SERPL W/O P-5'-P-CCNC: 24 U/L (ref 10–44)
ANION GAP SERPL CALC-SCNC: 9 MMOL/L (ref 8–16)
AST SERPL-CCNC: 18 U/L (ref 10–40)
BASOPHILS # BLD AUTO: 0.03 K/UL (ref 0–0.2)
BASOPHILS NFR BLD: 0.2 % (ref 0–1.9)
BILIRUB SERPL-MCNC: 0.8 MG/DL (ref 0.1–1)
BLD PROD TYP BPU: NORMAL
BLOOD UNIT EXPIRATION DATE: NORMAL
BLOOD UNIT TYPE CODE: 5100
BLOOD UNIT TYPE: NORMAL
BUN SERPL-MCNC: 13 MG/DL (ref 8–23)
CALCIUM SERPL-MCNC: 9 MG/DL (ref 8.7–10.5)
CHLORIDE SERPL-SCNC: 106 MMOL/L (ref 95–110)
CO2 SERPL-SCNC: 23 MMOL/L (ref 23–29)
CODING SYSTEM: NORMAL
CREAT SERPL-MCNC: 0.6 MG/DL (ref 0.5–1.4)
CROSSMATCH INTERPRETATION: NORMAL
CRP SERPL-MCNC: 157.56 MG/L (ref 0–3.19)
DIFFERENTIAL METHOD: ABNORMAL
DISPENSE STATUS: NORMAL
EOSINOPHIL # BLD AUTO: 0.1 K/UL (ref 0–0.5)
EOSINOPHIL NFR BLD: 0.9 % (ref 0–8)
ERYTHROCYTE [DISTWIDTH] IN BLOOD BY AUTOMATED COUNT: 15.4 % (ref 11.5–14.5)
EST. GFR  (NO RACE VARIABLE): >60 ML/MIN/1.73 M^2
GLUCOSE SERPL-MCNC: 100 MG/DL (ref 70–110)
HCT VFR BLD AUTO: 33.4 % (ref 40–54)
HGB BLD-MCNC: 11.2 G/DL (ref 14–18)
IMM GRANULOCYTES # BLD AUTO: 0.07 K/UL (ref 0–0.04)
IMM GRANULOCYTES NFR BLD AUTO: 0.5 % (ref 0–0.5)
LYMPHOCYTES # BLD AUTO: 0.7 K/UL (ref 1–4.8)
LYMPHOCYTES NFR BLD: 5.3 % (ref 18–48)
MAGNESIUM SERPL-MCNC: 1.8 MG/DL (ref 1.6–2.6)
MCH RBC QN AUTO: 35 PG (ref 27–31)
MCHC RBC AUTO-ENTMCNC: 33.5 G/DL (ref 32–36)
MCV RBC AUTO: 104 FL (ref 82–98)
MONOCYTES # BLD AUTO: 1.5 K/UL (ref 0.3–1)
MONOCYTES NFR BLD: 10.9 % (ref 4–15)
NEUTROPHILS # BLD AUTO: 11.1 K/UL (ref 1.8–7.7)
NEUTROPHILS NFR BLD: 82.2 % (ref 38–73)
NRBC BLD-RTO: 0 /100 WBC
NUM UNITS TRANS PACKED RBC: NORMAL
PHOSPHATE SERPL-MCNC: 2.4 MG/DL (ref 2.7–4.5)
PLATELET # BLD AUTO: 169 K/UL (ref 150–450)
PMV BLD AUTO: 9.8 FL (ref 9.2–12.9)
POCT GLUCOSE: 105 MG/DL (ref 70–110)
POCT GLUCOSE: 108 MG/DL (ref 70–110)
POTASSIUM SERPL-SCNC: 3.5 MMOL/L (ref 3.5–5.1)
PROT SERPL-MCNC: 6.2 G/DL (ref 6–8.4)
RBC # BLD AUTO: 3.2 M/UL (ref 4.6–6.2)
SODIUM SERPL-SCNC: 138 MMOL/L (ref 136–145)
WBC # BLD AUTO: 13.52 K/UL (ref 3.9–12.7)

## 2023-10-08 PROCEDURE — 83735 ASSAY OF MAGNESIUM: CPT | Performed by: STUDENT IN AN ORGANIZED HEALTH CARE EDUCATION/TRAINING PROGRAM

## 2023-10-08 PROCEDURE — 63600175 PHARM REV CODE 636 W HCPCS: Performed by: STUDENT IN AN ORGANIZED HEALTH CARE EDUCATION/TRAINING PROGRAM

## 2023-10-08 PROCEDURE — 85025 COMPLETE CBC W/AUTO DIFF WBC: CPT | Performed by: STUDENT IN AN ORGANIZED HEALTH CARE EDUCATION/TRAINING PROGRAM

## 2023-10-08 PROCEDURE — 94640 AIRWAY INHALATION TREATMENT: CPT

## 2023-10-08 PROCEDURE — 94664 DEMO&/EVAL PT USE INHALER: CPT

## 2023-10-08 PROCEDURE — 27000221 HC OXYGEN, UP TO 24 HOURS

## 2023-10-08 PROCEDURE — 25000003 PHARM REV CODE 250: Performed by: SURGERY

## 2023-10-08 PROCEDURE — 20600001 HC STEP DOWN PRIVATE ROOM

## 2023-10-08 PROCEDURE — 25000003 PHARM REV CODE 250

## 2023-10-08 PROCEDURE — 99900035 HC TECH TIME PER 15 MIN (STAT)

## 2023-10-08 PROCEDURE — C9113 INJ PANTOPRAZOLE SODIUM, VIA: HCPCS | Performed by: STUDENT IN AN ORGANIZED HEALTH CARE EDUCATION/TRAINING PROGRAM

## 2023-10-08 PROCEDURE — 27000646 HC AEROBIKA DEVICE

## 2023-10-08 PROCEDURE — 80053 COMPREHEN METABOLIC PANEL: CPT | Performed by: STUDENT IN AN ORGANIZED HEALTH CARE EDUCATION/TRAINING PROGRAM

## 2023-10-08 PROCEDURE — 94799 UNLISTED PULMONARY SVC/PX: CPT

## 2023-10-08 PROCEDURE — 36415 COLL VENOUS BLD VENIPUNCTURE: CPT | Performed by: NURSE PRACTITIONER

## 2023-10-08 PROCEDURE — 86141 C-REACTIVE PROTEIN HS: CPT | Performed by: NURSE PRACTITIONER

## 2023-10-08 PROCEDURE — 25000242 PHARM REV CODE 250 ALT 637 W/ HCPCS: Performed by: NURSE PRACTITIONER

## 2023-10-08 PROCEDURE — 94761 N-INVAS EAR/PLS OXIMETRY MLT: CPT

## 2023-10-08 PROCEDURE — 84100 ASSAY OF PHOSPHORUS: CPT | Performed by: STUDENT IN AN ORGANIZED HEALTH CARE EDUCATION/TRAINING PROGRAM

## 2023-10-08 RX ORDER — PANTOPRAZOLE SODIUM 20 MG/1
40 TABLET, DELAYED RELEASE ORAL DAILY
Status: DISCONTINUED | OUTPATIENT
Start: 2023-10-09 | End: 2023-10-10 | Stop reason: HOSPADM

## 2023-10-08 RX ORDER — FUROSEMIDE 10 MG/ML
10 INJECTION INTRAMUSCULAR; INTRAVENOUS ONCE
Status: COMPLETED | OUTPATIENT
Start: 2023-10-08 | End: 2023-10-08

## 2023-10-08 RX ADMIN — LEVALBUTEROL HYDROCHLORIDE 0.63 MG: 0.63 SOLUTION RESPIRATORY (INHALATION) at 02:10

## 2023-10-08 RX ADMIN — MUPIROCIN 1 G: 20 OINTMENT TOPICAL at 10:10

## 2023-10-08 RX ADMIN — PANTOPRAZOLE SODIUM 40 MG: 40 INJECTION, POWDER, FOR SOLUTION INTRAVENOUS at 09:10

## 2023-10-08 RX ADMIN — MUPIROCIN 1 G: 20 OINTMENT TOPICAL at 09:10

## 2023-10-08 RX ADMIN — FUROSEMIDE 10 MG: 10 INJECTION, SOLUTION INTRAMUSCULAR; INTRAVENOUS at 09:10

## 2023-10-08 RX ADMIN — ENOXAPARIN SODIUM 40 MG: 40 INJECTION SUBCUTANEOUS at 05:10

## 2023-10-08 RX ADMIN — LEVALBUTEROL HYDROCHLORIDE 0.63 MG: 0.63 SOLUTION RESPIRATORY (INHALATION) at 08:10

## 2023-10-08 RX ADMIN — METOROPROLOL TARTRATE 2.5 MG: 5 INJECTION, SOLUTION INTRAVENOUS at 12:10

## 2023-10-08 RX ADMIN — ACETAMINOPHEN 999.01 MG: 160 SOLUTION ORAL at 05:10

## 2023-10-08 RX ADMIN — METOROPROLOL TARTRATE 2.5 MG: 5 INJECTION, SOLUTION INTRAVENOUS at 06:10

## 2023-10-08 RX ADMIN — ACETAMINOPHEN 999.01 MG: 160 SOLUTION ORAL at 06:10

## 2023-10-08 RX ADMIN — LEVALBUTEROL HYDROCHLORIDE 0.63 MG: 0.63 SOLUTION RESPIRATORY (INHALATION) at 09:10

## 2023-10-08 NOTE — SUBJECTIVE & OBJECTIVE
Interval History: Did well yesterday. CT removed and currently on 1L NC. CXR for this morning pending. Has good pain control and has been OOB ambulating the halls. Labs reassuring this morning with downtrending WBC and CRP. Passing flatus but no Bms yet    Medications:  Continuous Infusions:  Scheduled Meds:   acetaminophen  999.0148 mg Per J Tube Q8H    bisacodyL  10 mg Rectal Daily    enoxparin  40 mg Subcutaneous Q24H (prophylaxis, 1700)    furosemide (LASIX) injection  10 mg Intravenous Once    levalbuterol  0.63 mg Nebulization Q6H WAKE    mupirocin  1 g Nasal BID    pantoprazole  40 mg Intravenous Daily    sennosides 8.8 mg/5 ml  5 mL Per J Tube QHS    tamsulosin  0.4 mg Oral Daily     PRN Meds:dextrose 10%, dextrose 10%, glucagon (human recombinant), hydrALAZINE, HYDROmorphone, insulin aspart U-100, labetalol, ondansetron, oxyCODONE, oxyCODONE, prochlorperazine     Review of patient's allergies indicates:  No Known Allergies  Objective:     Vital Signs (Most Recent):  Temp: 98.6 °F (37 °C) (10/08/23 0713)  Pulse: 70 (10/08/23 0829)  Resp: 18 (10/08/23 0829)  BP: 109/62 (10/08/23 0713)  SpO2: 96 % (10/08/23 0829) Vital Signs (24h Range):  Temp:  [98 °F (36.7 °C)-98.6 °F (37 °C)] 98.6 °F (37 °C)  Pulse:  [62-86] 70  Resp:  [16-20] 18  SpO2:  [92 %-96 %] 96 %  BP: (109-132)/(62-76) 109/62     Weight: 61.2 kg (135 lb)  Body mass index is 19.93 kg/m².    Intake/Output - Last 3 Shifts         10/06 0700  10/07 0659 10/07 0700  10/08 0659 10/08 0700  10/09 0659    I.V. (mL/kg) 167.2 (2.7)      NG/ 402     IV Piggyback 586.3      Total Intake(mL/kg) 1215.5 (19.9) 402 (6.6)     Urine (mL/kg/hr) 2500 (1.7) 700 (0.5)     Drains 20 5     Stool  0     Chest Tube 115 10     Total Output 2635 715     Net -1419.5 -313            Urine Occurrence  1 x     Stool Occurrence  0 x              Physical Exam  Vitals and nursing note reviewed.   Constitutional:       General: He is not in acute distress.     Interventions: He  is sedated.   HENT:      Head: Normocephalic and atraumatic.      Right Ear: External ear normal.      Left Ear: External ear normal.      Nose: Nose normal.   Eyes:      General: No scleral icterus.     Extraocular Movements: Extraocular movements intact.   Neck:      Comments: Left lateral neck incision cdi  PADMA with SS output  Breath not foul smelling  Cardiovascular:      Rate and Rhythm: Normal rate and regular rhythm.   Pulmonary:      Effort: Pulmonary effort is normal. No respiratory distress.   Abdominal:      General: Abdomen is flat. There is no distension.      Palpations: Abdomen is soft.      Comments: J tube in place   Skin:     General: Skin is warm and dry.   Neurological:      General: No focal deficit present.      Cranial Nerves: No cranial nerve deficit.   Psychiatric:         Mood and Affect: Mood normal.         Behavior: Behavior normal.          Significant Labs:  I have reviewed all pertinent lab results within the past 24 hours.  CBC:   Recent Labs   Lab 10/08/23  0501   WBC 13.52*   RBC 3.20*   HGB 11.2*   HCT 33.4*      *   MCH 35.0*   MCHC 33.5     CMP:   Recent Labs   Lab 10/08/23  0501      CALCIUM 9.0   ALBUMIN 2.9*   PROT 6.2      K 3.5   CO2 23      BUN 13   CREATININE 0.6   ALKPHOS 113   ALT 24   AST 18   BILITOT 0.8       Significant Diagnostics:  I have reviewed all pertinent imaging results/findings within the past 24 hours.

## 2023-10-08 NOTE — PLAN OF CARE
[facility] Plan of Care Note  Dx Malignant neoplasm of gastroesophageal junction      Shift Events tele d/c. Tube feed increased to 40ml/hr tolerating well. Ambulated hallways x4     Goals of Care: pain control, pulmonary hygine      Neuro: A&O4     Vital Signs: vss     Respiratory: 1l     Diet: NPO- tube feeds      Is patient tolerating current diet? N/a     GTTS: none     Urine Output/Bowel Movement: urine output adequate, no BM today      Drains/Tubes/Tube Feeds (include total output/shift): see flow sheet     Lines: piv x2        Accuchecks:Q6     Skin: lap sites      Fall Risk Score: 12     Activity level? SBA     Any scheduled procedures? No     Any safety concerns? no     Other: n.a  Problem: Adult Inpatient Plan of Care  Goal: Plan of Care Review  Outcome: Ongoing, Progressing  Goal: Patient-Specific Goal (Individualized)  Outcome: Ongoing, Progressing  Goal: Absence of Hospital-Acquired Illness or Injury  Outcome: Ongoing, Progressing  Goal: Optimal Comfort and Wellbeing  Outcome: Ongoing, Progressing  Goal: Readiness for Transition of Care  Outcome: Ongoing, Progressing     Problem: Infection  Goal: Absence of Infection Signs and Symptoms  Outcome: Ongoing, Progressing     Problem: Fall Injury Risk  Goal: Absence of Fall and Fall-Related Injury  Outcome: Ongoing, Progressing     Problem: Skin Injury Risk Increased  Goal: Skin Health and Integrity  Outcome: Ongoing, Progressing     Problem: Pain Acute  Goal: Acceptable Pain Control and Functional Ability  Outcome: Ongoing, Progressing     Problem: Bowel Motility Impaired (Surgery Nonspecified)  Goal: Effective Bowel Elimination  Outcome: Ongoing, Progressing     Problem: Fluid and Electrolyte Imbalance (Surgery Nonspecified)  Goal: Fluid and Electrolyte Balance  Outcome: Ongoing, Progressing     Problem: Pain (Surgery Nonspecified)  Goal: Acceptable Pain Control  Outcome: Ongoing, Progressing     Problem: Postoperative Nausea and Vomiting (Surgery  Nonspecified)  Goal: Nausea and Vomiting Relief  Outcome: Ongoing, Progressing

## 2023-10-08 NOTE — ASSESSMENT & PLAN NOTE
63 year old male with a PMH of esophageal adenocarcinoma. Now s/p 3-hole esophagectomy on 10/04/23.    - Increase tube feeds to 40 mL/hr  - CXR this morning to eval after CT removal  - Wean supplemental oxygen. Will give lasix 10 mg IV today  - Continue MM pain meds  - DC metop  - DC tele  - Protonix IV  - PRN anti-emetics  - PO flomax  - PT/OT  - PPx Lovenox    Dispo: not yet medically stable for discharge

## 2023-10-08 NOTE — PROGRESS NOTES
[facility]imitri Javier - [facility]  General Surgery  Progress Note    Subjective:     History of Present Illness:  No notes on file    Post-Op Info:  Procedure(s) (LRB):  XI ROBOTIC ESOPHAGECTOMY (N/A)  INSERTION, JEJUNOSTOMY TUBE  ROBOTIC REPAIR, HERNIA, PARAESOPHAGEAL  PYLOROMYOTOMY   4 Days Post-Op     Interval History: Did well yesterday. CT removed and currently on 1L NC. CXR for this morning pending. Has good pain control and has been OOB ambulating the halls. Labs reassuring this morning with downtrending WBC and CRP. Passing flatus but no Bms yet    Medications:  Continuous Infusions:  Scheduled Meds:   acetaminophen  999.0148 mg Per J Tube Q8H    bisacodyL  10 mg Rectal Daily    enoxparin  40 mg Subcutaneous Q24H (prophylaxis, 1700)    furosemide (LASIX) injection  10 mg Intravenous Once    levalbuterol  0.63 mg Nebulization Q6H WAKE    mupirocin  1 g Nasal BID    pantoprazole  40 mg Intravenous Daily    sennosides 8.8 mg/5 ml  5 mL Per J Tube QHS    tamsulosin  0.4 mg Oral Daily     PRN Meds:dextrose 10%, dextrose 10%, glucagon (human recombinant), hydrALAZINE, HYDROmorphone, insulin aspart U-100, labetalol, ondansetron, oxyCODONE, oxyCODONE, prochlorperazine     Review of patient's allergies indicates:  No Known Allergies  Objective:     Vital Signs (Most Recent):  Temp: 98.6 °F (37 °C) (10/08/23 0713)  Pulse: 70 (10/08/23 0829)  Resp: 18 (10/08/23 0829)  BP: 109/62 (10/08/23 0713)  SpO2: 96 % (10/08/23 0829) Vital Signs (24h Range):  Temp:  [98 °F (36.7 °C)-98.6 °F (37 °C)] 98.6 °F (37 °C)  Pulse:  [62-86] 70  Resp:  [16-20] 18  SpO2:  [92 %-96 %] 96 %  BP: (109-132)/(62-76) 109/62     Weight: 61.2 kg (135 lb)  Body mass index is 19.93 kg/m².    Intake/Output - Last 3 Shifts         10/06 0700  10/07 0659 10/07 0700  10/08 0659 10/08 0700  10/09 0659    I.V. (mL/kg) 167.2 (2.7)      NG/ 402     IV Piggyback 586.3      Total Intake(mL/kg) 1215.5 (19.9) 402 (6.6)     Urine (mL/kg/hr) 2500 (1.7) 700 (0.5)      Drains 20 5     Stool  0     Chest Tube 115 10     Total Output 2635 715     Net -1419.5 -313            Urine Occurrence  1 x     Stool Occurrence  0 x              Physical Exam  Vitals and nursing note reviewed.   Constitutional:       General: He is not in acute distress.     Interventions: He is sedated.   HENT:      Head: Normocephalic and atraumatic.      Right Ear: External ear normal.      Left Ear: External ear normal.      Nose: Nose normal.   Eyes:      General: No scleral icterus.     Extraocular Movements: Extraocular movements intact.   Neck:      Comments: Left lateral neck incision cdi  PADMA with SS output  Breath not foul smelling  Cardiovascular:      Rate and Rhythm: Normal rate and regular rhythm.   Pulmonary:      Effort: Pulmonary effort is normal. No respiratory distress.   Abdominal:      General: Abdomen is flat. There is no distension.      Palpations: Abdomen is soft.      Comments: J tube in place   Skin:     General: Skin is warm and dry.   Neurological:      General: No focal deficit present.      Cranial Nerves: No cranial nerve deficit.   Psychiatric:         Mood and Affect: Mood normal.         Behavior: Behavior normal.          Significant Labs:  I have reviewed all pertinent lab results within the past 24 hours.  CBC:   Recent Labs   Lab 10/08/23  0501   WBC 13.52*   RBC 3.20*   HGB 11.2*   HCT 33.4*      *   MCH 35.0*   MCHC 33.5     CMP:   Recent Labs   Lab 10/08/23  0501      CALCIUM 9.0   ALBUMIN 2.9*   PROT 6.2      K 3.5   CO2 23      BUN 13   CREATININE 0.6   ALKPHOS 113   ALT 24   AST 18   BILITOT 0.8       Significant Diagnostics:  I have reviewed all pertinent imaging results/findings within the past 24 hours.    Assessment/Plan:     * Malignant neoplasm of gastroesophageal junction  63 year old male with a PMH of esophageal adenocarcinoma. Now s/p 3-hole esophagectomy on 10/04/23.    - Increase tube feeds to 40 mL/hr  - CXR this morning  to eval after CT removal  - Wean supplemental oxygen. Will give lasix 10 mg IV today  - Continue MM pain meds  - DC metop  - DC tele  - Protonix IV  - PRN anti-emetics  - PO flomax  - PT/OT  - PPx Lovenox    Dispo: not yet medically stable for discharge        Smita Romeo MD  General Surgery  [facility]

## 2023-10-09 DIAGNOSIS — C16.0 MALIGNANT NEOPLASM OF GASTROESOPHAGEAL JUNCTION: Primary | ICD-10-CM

## 2023-10-09 LAB
ALBUMIN SERPL BCP-MCNC: 3 G/DL (ref 3.5–5.2)
ALP SERPL-CCNC: 136 U/L (ref 55–135)
ALT SERPL W/O P-5'-P-CCNC: 21 U/L (ref 10–44)
ANION GAP SERPL CALC-SCNC: 10 MMOL/L (ref 8–16)
AST SERPL-CCNC: 15 U/L (ref 10–40)
BASOPHILS # BLD AUTO: 0.05 K/UL (ref 0–0.2)
BASOPHILS NFR BLD: 0.4 % (ref 0–1.9)
BILIRUB SERPL-MCNC: 0.8 MG/DL (ref 0.1–1)
BUN SERPL-MCNC: 18 MG/DL (ref 8–23)
CALCIUM SERPL-MCNC: 9.1 MG/DL (ref 8.7–10.5)
CHLORIDE SERPL-SCNC: 107 MMOL/L (ref 95–110)
CO2 SERPL-SCNC: 23 MMOL/L (ref 23–29)
CREAT SERPL-MCNC: 0.7 MG/DL (ref 0.5–1.4)
CRP SERPL-MCNC: 108.9 MG/L (ref 0–8.2)
DIFFERENTIAL METHOD: ABNORMAL
EOSINOPHIL # BLD AUTO: 0.3 K/UL (ref 0–0.5)
EOSINOPHIL NFR BLD: 2 % (ref 0–8)
ERYTHROCYTE [DISTWIDTH] IN BLOOD BY AUTOMATED COUNT: 15.1 % (ref 11.5–14.5)
EST. GFR  (NO RACE VARIABLE): >60 ML/MIN/1.73 M^2
GLUCOSE SERPL-MCNC: 104 MG/DL (ref 70–110)
HCT VFR BLD AUTO: 34.2 % (ref 40–54)
HGB BLD-MCNC: 11.5 G/DL (ref 14–18)
IMM GRANULOCYTES # BLD AUTO: 0.09 K/UL (ref 0–0.04)
IMM GRANULOCYTES NFR BLD AUTO: 0.7 % (ref 0–0.5)
LYMPHOCYTES # BLD AUTO: 1 K/UL (ref 1–4.8)
LYMPHOCYTES NFR BLD: 7.6 % (ref 18–48)
MAGNESIUM SERPL-MCNC: 1.8 MG/DL (ref 1.6–2.6)
MCH RBC QN AUTO: 35 PG (ref 27–31)
MCHC RBC AUTO-ENTMCNC: 33.6 G/DL (ref 32–36)
MCV RBC AUTO: 104 FL (ref 82–98)
MONOCYTES # BLD AUTO: 1.7 K/UL (ref 0.3–1)
MONOCYTES NFR BLD: 13.2 % (ref 4–15)
NEUTROPHILS # BLD AUTO: 9.7 K/UL (ref 1.8–7.7)
NEUTROPHILS NFR BLD: 76.1 % (ref 38–73)
NRBC BLD-RTO: 0 /100 WBC
PHOSPHATE SERPL-MCNC: 2.8 MG/DL (ref 2.7–4.5)
PLATELET # BLD AUTO: 183 K/UL (ref 150–450)
PMV BLD AUTO: 9.5 FL (ref 9.2–12.9)
POCT GLUCOSE: 107 MG/DL (ref 70–110)
POCT GLUCOSE: 128 MG/DL (ref 70–110)
POCT GLUCOSE: 99 MG/DL (ref 70–110)
POTASSIUM SERPL-SCNC: 3.7 MMOL/L (ref 3.5–5.1)
PROT SERPL-MCNC: 6.5 G/DL (ref 6–8.4)
RBC # BLD AUTO: 3.29 M/UL (ref 4.6–6.2)
SODIUM SERPL-SCNC: 140 MMOL/L (ref 136–145)
WBC # BLD AUTO: 12.76 K/UL (ref 3.9–12.7)

## 2023-10-09 PROCEDURE — 94799 UNLISTED PULMONARY SVC/PX: CPT

## 2023-10-09 PROCEDURE — 94640 AIRWAY INHALATION TREATMENT: CPT

## 2023-10-09 PROCEDURE — 85025 COMPLETE CBC W/AUTO DIFF WBC: CPT | Performed by: STUDENT IN AN ORGANIZED HEALTH CARE EDUCATION/TRAINING PROGRAM

## 2023-10-09 PROCEDURE — 94761 N-INVAS EAR/PLS OXIMETRY MLT: CPT

## 2023-10-09 PROCEDURE — 99900035 HC TECH TIME PER 15 MIN (STAT)

## 2023-10-09 PROCEDURE — 25000242 PHARM REV CODE 250 ALT 637 W/ HCPCS: Performed by: NURSE PRACTITIONER

## 2023-10-09 PROCEDURE — 25000003 PHARM REV CODE 250

## 2023-10-09 PROCEDURE — 84100 ASSAY OF PHOSPHORUS: CPT | Performed by: STUDENT IN AN ORGANIZED HEALTH CARE EDUCATION/TRAINING PROGRAM

## 2023-10-09 PROCEDURE — 97110 THERAPEUTIC EXERCISES: CPT

## 2023-10-09 PROCEDURE — 94664 DEMO&/EVAL PT USE INHALER: CPT

## 2023-10-09 PROCEDURE — 83735 ASSAY OF MAGNESIUM: CPT | Performed by: STUDENT IN AN ORGANIZED HEALTH CARE EDUCATION/TRAINING PROGRAM

## 2023-10-09 PROCEDURE — 86140 C-REACTIVE PROTEIN: CPT

## 2023-10-09 PROCEDURE — 63600175 PHARM REV CODE 636 W HCPCS: Performed by: STUDENT IN AN ORGANIZED HEALTH CARE EDUCATION/TRAINING PROGRAM

## 2023-10-09 PROCEDURE — 20600001 HC STEP DOWN PRIVATE ROOM

## 2023-10-09 PROCEDURE — 80053 COMPREHEN METABOLIC PANEL: CPT | Performed by: STUDENT IN AN ORGANIZED HEALTH CARE EDUCATION/TRAINING PROGRAM

## 2023-10-09 PROCEDURE — 25000003 PHARM REV CODE 250: Performed by: SURGERY

## 2023-10-09 RX ORDER — ACETAMINOPHEN 650 MG/20.3ML
1000 LIQUID ORAL EVERY 8 HOURS PRN
Status: DISCONTINUED | OUTPATIENT
Start: 2023-10-09 | End: 2023-10-10 | Stop reason: HOSPADM

## 2023-10-09 RX ADMIN — ENOXAPARIN SODIUM 40 MG: 40 INJECTION SUBCUTANEOUS at 05:10

## 2023-10-09 RX ADMIN — PANTOPRAZOLE SODIUM 40 MG: 20 TABLET, DELAYED RELEASE ORAL at 08:10

## 2023-10-09 RX ADMIN — LEVALBUTEROL HYDROCHLORIDE 0.63 MG: 0.63 SOLUTION RESPIRATORY (INHALATION) at 08:10

## 2023-10-09 RX ADMIN — LEVALBUTEROL HYDROCHLORIDE 0.63 MG: 0.63 SOLUTION RESPIRATORY (INHALATION) at 02:10

## 2023-10-09 RX ADMIN — ACETAMINOPHEN 999.01 MG: 650 SOLUTION ORAL at 09:10

## 2023-10-09 RX ADMIN — MUPIROCIN 1 G: 20 OINTMENT TOPICAL at 08:10

## 2023-10-09 RX ADMIN — ACETAMINOPHEN 999.01 MG: 160 SOLUTION ORAL at 10:10

## 2023-10-09 NOTE — PT/OT/SLP PROGRESS
Occupational Therapy   Treatment and discharge    Name: Blayne Beebe  MRN: [id number]  Admitting Diagnosis:  Malignant neoplasm of gastroesophageal junction  5 Days Post-Op    Recommendations:     Discharge Recommendations: other (see comments)  Discharge Equipment Recommendations:  none  Barriers to discharge:       Assessment:     Blayne Beebe is a 63 y.o. male with a medical diagnosis of Malignant neoplasm of gastroesophageal junction. Performance deficits affecting function are impaired endurance, weakness. He presents with no acute functional deficits at this time. Patient reporting successful showering completion and grooming care throughout the weekend.Patient able to perform all ADL tasks with independence and is performing at his functional baseline at this time; therefore, patient has met all set OT goals at this time and will be d/c from acute OT services.     Rehab Prognosis:  Fair; patient would benefit from acute skilled OT services to address these deficits and reach maximum level of function.       Plan:     Patient to be seen 3 x/week to address the above listed problems via self-care/home management, therapeutic activities, therapeutic exercises  Plan of Care Expires: 11/05/23  Plan of Care Reviewed with: patient, spouse    Subjective     Chief Complaint: None at this time   Patient/Family Comments/goals: D/C     Objective:     Communicated with: RN prior to session.  Patient found up in chair with   upon OT entry to room.    General Precautions: Standard, fall    Orthopedic Precautions:N/A  Braces: N/A  Respiratory Status: Room air     Occupational Performance:     Bed Mobility:    Not completed; patient in chair at time of OT entry      Functional Mobility/Transfers:  Patient completed Sit <> Stand Transfer with stand by assistance  with  no assistive device   Functional Mobility: Patient ambulated ~400 feet in barrientos with no LOB noted at this time.     Activities of Daily Living:  Upper  Body Dressing: independence    Lower Body Dressing: independence        [facility] 6 Click ADL: 18    Treatment & Education:  Patient educated on fall prevention in the home and safety precautions in preparation for upcoming d/c.     Patient left up in chair with all lines intact, call button in reach, and wife present    GOALS:   Multidisciplinary Problems       Occupational Therapy Goals       Not on file              Multidisciplinary Problems (Resolved)          Problem: Occupational Therapy    Goal Priority Disciplines Outcome Interventions   Occupational Therapy Goal   (Resolved)     OT, PT/OT Met    Description: Goals to be met by: 10/19/2023     Patient will increase functional independence with ADLs by performing:    UE Dressing with [place].  LE Dressing with [place].  Grooming while standing at sink with [place].  Toileting from toilet with [place] for hygiene and clothing management.   Toilet transfer to toilet with [place].                         Time Tracking:     OT Date of Treatment: 10/09/23  OT Start Time: 1010  OT Stop Time: 1020  OT Total Time (min): 10 min    Billable Minutes:Therapeutic Activity 10 minutes    OT/AMEE: OT          10/9/2023

## 2023-10-09 NOTE — PLAN OF CARE
[facility] Plan of Care Note  Dx Malignant neoplasm of gastroesophageal junction      Shift Events PADMA drain removed. Showered today. Moderate liquid bowel movement today, passing gas. Tube feed increased to 50ml/hr tolerating well. Ambulated hallways multiple times today      Goals of Care: pain control, pulmonary hygine      Neuro: A&O4     Vital Signs: vss     Respiratory: RA     Diet: NPO- tube feeds      Is patient tolerating current diet? yes     GTTS: none     Urine Output/Bowel Movement: urine output adequate, LBM 10/9     Drains/Tubes/Tube Feeds (include total output/shift): see flow sheet     Lines: PIV X1        Accuchecks:Q6     Skin: lap sites      Fall Risk Score: 3     Activity level? independent      Any scheduled procedures? No     Any safety concerns? no  Problem: Adult Inpatient Plan of Care  Goal: Plan of Care Review  Outcome: Ongoing, Progressing  Goal: Patient-Specific Goal (Individualized)  Outcome: Ongoing, Progressing  Goal: Absence of Hospital-Acquired Illness or Injury  Outcome: Ongoing, Progressing  Goal: Optimal Comfort and Wellbeing  Outcome: Ongoing, Progressing  Goal: Readiness for Transition of Care  Outcome: Ongoing, Progressing     Problem: Infection  Goal: Absence of Infection Signs and Symptoms  Outcome: Ongoing, Progressing     Problem: Fall Injury Risk  Goal: Absence of Fall and Fall-Related Injury  Outcome: Ongoing, Progressing     Problem: Skin Injury Risk Increased  Goal: Skin Health and Integrity  Outcome: Ongoing, Progressing     Problem: Pain Acute  Goal: Acceptable Pain Control and Functional Ability  Outcome: Ongoing, Progressing     Problem: Bowel Motility Impaired (Surgery Nonspecified)  Goal: Effective Bowel Elimination  Outcome: Ongoing, Progressing     Problem: Fluid and Electrolyte Imbalance (Surgery Nonspecified)  Goal: Fluid and Electrolyte Balance  Outcome: Ongoing, Progressing     Problem: Pain (Surgery Nonspecified)  Goal: Acceptable Pain Control  Outcome: Ongoing,  Progressing     Problem: Postoperative Nausea and Vomiting (Surgery Nonspecified)  Goal: Nausea and Vomiting Relief  Outcome: Ongoing, Progressing

## 2023-10-09 NOTE — PLAN OF CARE
[facility] Plan of Care Note  Dx Malignant neoplasm of gastroesophageal junction      Shift Events: None     Goals of Care: Adjust tube feeds, monitor     Neuro: A&O4     Vital Signs: VSS     Respiratory: Room air     Diet: NPO- tube feeds @ 40mL/hr     Is patient tolerating current diet? Tolerating feeds, remains NPO     GTTS: none     Urine Output/Bowel Movement: Adequate urine output, reports x2 BM - diarrhea 10/08     Drains/Tubes/Tube Feeds (include total output/shift): PADMA drain no output     Lines: PIV x2        Accuchecks:Q6     Skin: Lap sites     Fall Risk Score: 12     Activity level? SBA     Any scheduled procedures? No     Any safety concerns? no     Other:

## 2023-10-09 NOTE — PLAN OF CARE
Problem: Occupational Therapy  Goal: Occupational Therapy Goal  Description: Goals to be met by: 10/19/2023     Patient will increase functional independence with ADLs by performing:    UE Dressing with [place].  LE Dressing with [place].  Grooming while standing at sink with [place].  Toileting from toilet with [place] for hygiene and clothing management.   Toilet transfer to toilet with [place].    10/9/2023 1329 by Trina Thakur, OT  Outcome: Met  At this time patient has met all goals in OT POC and will be d/c from Acute OT services.

## 2023-10-09 NOTE — PT/OT/SLP DISCHARGE
Physical Therapy Discharge Summary    Name: Blayne Beebe  MRN: [id number]   Principal Problem: Malignant neoplasm of gastroesophageal junction     Patient Discharged from acute Physical Therapy on 10/9.  Please refer to prior PT noted date on 10/6 for functional status.     Assessment:       Patient fatigued and sleeping following OT session.   Patient has met all goals and is not appropriate for therapy. Patient's wife reported patient is independent and he wishes to DC therapy. Encouraged to request PT orders should he have a decline in function.      Objective:     GOALS:   Multidisciplinary Problems       Physical Therapy Goals          Problem: Physical Therapy    Goal Priority Disciplines Outcome Goal Variances Interventions   Physical Therapy Goal     PT, PT/OT Ongoing, Progressing     Description: Goals to be met by: 10/20/2023    Patient will increase functional independence with mobility by performin. Supine to sit with Supervision   2. Sit to stand transfer with Supervision  3. Gait  x 400 feet with Supervision.                          Reasons for Discontinuation of Therapy Services  Satisfactory goal achievement. and patient's family requesting to DC therapy.    He is ambulating in halls multiple times a day without assist.   Patient's wife encouraged patient to ambulate, sit up in chair 3x/day to prevent deconditioning during hospitalization. She verbalized understanding and agreement to mobilize wth assist for safety.     Plan:     Patient Discharged to: Home no PT services needed.      10/9/2023

## 2023-10-09 NOTE — PROGRESS NOTES
[facility]imitri Javier - [facility]  General Surgery  Progress Note    Subjective:     History of Present Illness:  No notes on file    Post-Op Info:  Procedure(s) (LRB):  XI ROBOTIC ESOPHAGECTOMY (N/A)  INSERTION, JEJUNOSTOMY TUBE  ROBOTIC REPAIR, HERNIA, PARAESOPHAGEAL  PYLOROMYOTOMY   5 Days Post-Op     Interval History: Out of bed to chair this AM. Pain well controlled. Had 1 bowel movement yesterday. Minimal SS output from neck PADMA drain. Room air, no O2 requirements after lasix yesterday.    Medications:  Continuous Infusions:  Scheduled Meds:   acetaminophen  999.0148 mg Per J Tube Q8H    bisacodyL  10 mg Rectal Daily    enoxparin  40 mg Subcutaneous Q24H (prophylaxis, 1700)    levalbuterol  0.63 mg Nebulization Q6H WAKE    mupirocin  1 g Nasal BID    pantoprazole  40 mg Oral Daily    sennosides 8.8 mg/5 ml  5 mL Per J Tube QHS    tamsulosin  0.4 mg Oral Daily     PRN Meds:dextrose 10%, dextrose 10%, glucagon (human recombinant), hydrALAZINE, HYDROmorphone, insulin aspart U-100, labetalol, ondansetron, oxyCODONE, oxyCODONE, prochlorperazine     Review of patient's allergies indicates:  No Known Allergies  Objective:     Vital Signs (Most Recent):  Temp: 98 °F (36.7 °C) (10/09/23 0428)  Pulse: 73 (10/09/23 0428)  Resp: 18 (10/09/23 0428)  BP: 139/68 (10/09/23 0428)  SpO2: (!) 93 % (10/09/23 0428) Vital Signs (24h Range):  Temp:  [98 °F (36.7 °C)-99 °F (37.2 °C)] 98 °F (36.7 °C)  Pulse:  [68-79] 73  Resp:  [16-18] 18  SpO2:  [93 %-98 %] 93 %  BP: (114-139)/(62-79) 139/68     Weight: 61.2 kg (135 lb)  Body mass index is 19.93 kg/m².    Intake/Output - Last 3 Shifts         10/07 0700  10/08 0659 10/08 0700  10/09 0659 10/09 0700  10/10 0659    I.V. (mL/kg)       NG/ 30     IV Piggyback       Total Intake(mL/kg) 402 (6.6) 30 (0.5)     Urine (mL/kg/hr) 700 (0.5) 1100 (0.7)     Drains 5 0     Stool 0 0     Chest Tube 10      Total Output 715 1100     Net -313 -1070            Urine Occurrence 1 x 1 x     Stool Occurrence  0 x 1 x              Physical Exam  Vitals and nursing note reviewed.   Constitutional:       General: He is not in acute distress.     Interventions: He is sedated.   HENT:      Head: Normocephalic and atraumatic.      Right Ear: External ear normal.      Left Ear: External ear normal.      Nose: Nose normal.   Eyes:      General: No scleral icterus.     Extraocular Movements: Extraocular movements intact.   Neck:      Comments: Left lateral neck incision cdi  PADMA with SS output  Breath not foul smelling  Cardiovascular:      Rate and Rhythm: Normal rate and regular rhythm.   Pulmonary:      Effort: Pulmonary effort is normal. No respiratory distress.   Abdominal:      General: Abdomen is flat. There is no distension.      Palpations: Abdomen is soft.      Comments: J tube in place   Skin:     General: Skin is warm and dry.   Neurological:      General: No focal deficit present.      Cranial Nerves: No cranial nerve deficit.   Psychiatric:         Mood and Affect: Mood normal.         Behavior: Behavior normal.          Significant Labs:  I have reviewed all pertinent lab results within the past 24 hours.    Significant Diagnostics:  I have reviewed all pertinent imaging results/findings within the past 24 hours.    Assessment/Plan:     * Malignant neoplasm of gastroesophageal junction  63 year old male with a PMH of esophageal adenocarcinoma. Now s/p 3-hole esophagectomy on 10/04/23.    - Increase tube feeds to 50 mL/hr  - lasix 10 mg IV yesterday, now on room air  - Continue MM pain meds  - DC metop  - DC tele  - Protonix IV  - PRN anti-emetics  - PO flomax  - PT/OT  - PPx Lovenox  - neck PADMA with minimal SS output, discuss removing today    Dispo: potential for discharge tomorrow 10/10        Darya Guzman MD  General Surgery  [facility]

## 2023-10-09 NOTE — ASSESSMENT & PLAN NOTE
63 year old male with a PMH of esophageal adenocarcinoma. Now s/p 3-hole esophagectomy on 10/04/23.    - Increase tube feeds to 50 mL/hr  - lasix 10 mg IV yesterday, now on room air  - Continue MM pain meds  - DC metop  - DC tele  - Protonix IV  - PRN anti-emetics  - PO flomax  - PT/OT  - PPx Lovenox  - neck PADMA with minimal SS output, discuss removing today    Dispo: potential for discharge tomorrow 10/10

## 2023-10-10 VITALS
WEIGHT: 135 LBS | DIASTOLIC BLOOD PRESSURE: 86 MMHG | SYSTOLIC BLOOD PRESSURE: 137 MMHG | TEMPERATURE: 98 F | OXYGEN SATURATION: 99 % | BODY MASS INDEX: 19.99 KG/M2 | RESPIRATION RATE: 16 BRPM | HEIGHT: 69 IN | HEART RATE: 76 BPM

## 2023-10-10 LAB
ALBUMIN SERPL BCP-MCNC: 3 G/DL (ref 3.5–5.2)
ALP SERPL-CCNC: 177 U/L (ref 55–135)
ALT SERPL W/O P-5'-P-CCNC: 19 U/L (ref 10–44)
ANION GAP SERPL CALC-SCNC: 12 MMOL/L (ref 8–16)
AST SERPL-CCNC: 18 U/L (ref 10–40)
BASOPHILS # BLD AUTO: 0.03 K/UL (ref 0–0.2)
BASOPHILS NFR BLD: 0.3 % (ref 0–1.9)
BILIRUB SERPL-MCNC: 0.7 MG/DL (ref 0.1–1)
BUN SERPL-MCNC: 20 MG/DL (ref 8–23)
CALCIUM SERPL-MCNC: 9.1 MG/DL (ref 8.7–10.5)
CHLORIDE SERPL-SCNC: 109 MMOL/L (ref 95–110)
CO2 SERPL-SCNC: 20 MMOL/L (ref 23–29)
CREAT SERPL-MCNC: 0.7 MG/DL (ref 0.5–1.4)
DIFFERENTIAL METHOD: ABNORMAL
EOSINOPHIL # BLD AUTO: 0.2 K/UL (ref 0–0.5)
EOSINOPHIL NFR BLD: 2.2 % (ref 0–8)
ERYTHROCYTE [DISTWIDTH] IN BLOOD BY AUTOMATED COUNT: 14.9 % (ref 11.5–14.5)
EST. GFR  (NO RACE VARIABLE): >60 ML/MIN/1.73 M^2
GLUCOSE SERPL-MCNC: 111 MG/DL (ref 70–110)
HCT VFR BLD AUTO: 33.2 % (ref 40–54)
HGB BLD-MCNC: 11.2 G/DL (ref 14–18)
IMM GRANULOCYTES # BLD AUTO: 0.1 K/UL (ref 0–0.04)
IMM GRANULOCYTES NFR BLD AUTO: 1 % (ref 0–0.5)
LYMPHOCYTES # BLD AUTO: 0.9 K/UL (ref 1–4.8)
LYMPHOCYTES NFR BLD: 8.6 % (ref 18–48)
MAGNESIUM SERPL-MCNC: 1.9 MG/DL (ref 1.6–2.6)
MCH RBC QN AUTO: 34.7 PG (ref 27–31)
MCHC RBC AUTO-ENTMCNC: 33.7 G/DL (ref 32–36)
MCV RBC AUTO: 103 FL (ref 82–98)
MONOCYTES # BLD AUTO: 1.5 K/UL (ref 0.3–1)
MONOCYTES NFR BLD: 14.3 % (ref 4–15)
NEUTROPHILS # BLD AUTO: 7.7 K/UL (ref 1.8–7.7)
NEUTROPHILS NFR BLD: 73.6 % (ref 38–73)
NRBC BLD-RTO: 0 /100 WBC
PHOSPHATE SERPL-MCNC: 4 MG/DL (ref 2.7–4.5)
PLATELET # BLD AUTO: 193 K/UL (ref 150–450)
PMV BLD AUTO: 9.7 FL (ref 9.2–12.9)
POCT GLUCOSE: 108 MG/DL (ref 70–110)
POCT GLUCOSE: 109 MG/DL (ref 70–110)
POTASSIUM SERPL-SCNC: 3.7 MMOL/L (ref 3.5–5.1)
PROT SERPL-MCNC: 6.6 G/DL (ref 6–8.4)
RBC # BLD AUTO: 3.23 M/UL (ref 4.6–6.2)
SODIUM SERPL-SCNC: 141 MMOL/L (ref 136–145)
WBC # BLD AUTO: 10.4 K/UL (ref 3.9–12.7)

## 2023-10-10 PROCEDURE — 84100 ASSAY OF PHOSPHORUS: CPT | Performed by: STUDENT IN AN ORGANIZED HEALTH CARE EDUCATION/TRAINING PROGRAM

## 2023-10-10 PROCEDURE — 25000003 PHARM REV CODE 250: Performed by: SURGERY

## 2023-10-10 PROCEDURE — 94640 AIRWAY INHALATION TREATMENT: CPT

## 2023-10-10 PROCEDURE — 85025 COMPLETE CBC W/AUTO DIFF WBC: CPT | Performed by: STUDENT IN AN ORGANIZED HEALTH CARE EDUCATION/TRAINING PROGRAM

## 2023-10-10 PROCEDURE — 80053 COMPREHEN METABOLIC PANEL: CPT | Performed by: STUDENT IN AN ORGANIZED HEALTH CARE EDUCATION/TRAINING PROGRAM

## 2023-10-10 PROCEDURE — 83735 ASSAY OF MAGNESIUM: CPT | Performed by: STUDENT IN AN ORGANIZED HEALTH CARE EDUCATION/TRAINING PROGRAM

## 2023-10-10 PROCEDURE — 36415 COLL VENOUS BLD VENIPUNCTURE: CPT | Performed by: STUDENT IN AN ORGANIZED HEALTH CARE EDUCATION/TRAINING PROGRAM

## 2023-10-10 PROCEDURE — 99900035 HC TECH TIME PER 15 MIN (STAT)

## 2023-10-10 PROCEDURE — 94761 N-INVAS EAR/PLS OXIMETRY MLT: CPT

## 2023-10-10 PROCEDURE — 25000242 PHARM REV CODE 250 ALT 637 W/ HCPCS: Performed by: NURSE PRACTITIONER

## 2023-10-10 PROCEDURE — 94664 DEMO&/EVAL PT USE INHALER: CPT

## 2023-10-10 PROCEDURE — 63600175 PHARM REV CODE 636 W HCPCS: Performed by: NURSE PRACTITIONER

## 2023-10-10 RX ORDER — ENOXAPARIN SODIUM 100 MG/ML
40 INJECTION SUBCUTANEOUS EVERY 24 HOURS
Qty: 5.6 ML | Refills: 0 | Status: SHIPPED | OUTPATIENT
Start: 2023-10-10 | End: 2023-10-10 | Stop reason: SDUPTHER

## 2023-10-10 RX ORDER — ENOXAPARIN SODIUM 100 MG/ML
40 INJECTION SUBCUTANEOUS EVERY 24 HOURS
Qty: 11.2 ML | Refills: 0 | Status: SHIPPED | OUTPATIENT
Start: 2023-10-10 | End: 2023-11-07

## 2023-10-10 RX ORDER — ENOXAPARIN SODIUM 100 MG/ML
40 INJECTION SUBCUTANEOUS EVERY 24 HOURS
Qty: 8.8 ML | Refills: 0 | Status: SHIPPED | OUTPATIENT
Start: 2023-10-10 | End: 2023-10-10 | Stop reason: SDUPTHER

## 2023-10-10 RX ORDER — OXYCODONE HCL 5 MG/5 ML
5 SOLUTION, ORAL ORAL EVERY 4 HOURS PRN
Qty: 210 ML | Refills: 0 | Status: ON HOLD | OUTPATIENT
Start: 2023-10-10 | End: 2024-03-19 | Stop reason: HOSPADM

## 2023-10-10 RX ORDER — ACETAMINOPHEN 650 MG/20.3ML
1000 LIQUID ORAL EVERY 8 HOURS PRN
Qty: 630 ML | Refills: 0 | Status: SHIPPED | OUTPATIENT
Start: 2023-10-10 | End: 2023-10-17

## 2023-10-10 RX ORDER — OXYCODONE HCL 5 MG/5 ML
10 SOLUTION, ORAL ORAL EVERY 4 HOURS PRN
Qty: 473 ML | Refills: 0 | Status: SHIPPED | OUTPATIENT
Start: 2023-10-10 | End: 2023-10-10 | Stop reason: HOSPADM

## 2023-10-10 RX ORDER — ENOXAPARIN SODIUM 100 MG/ML
40 INJECTION SUBCUTANEOUS EVERY 24 HOURS
Status: DISCONTINUED | OUTPATIENT
Start: 2023-10-10 | End: 2023-10-10 | Stop reason: HOSPADM

## 2023-10-10 RX ADMIN — PANTOPRAZOLE SODIUM 40 MG: 20 TABLET, DELAYED RELEASE ORAL at 08:10

## 2023-10-10 RX ADMIN — ENOXAPARIN SODIUM 40 MG: 100 INJECTION SUBCUTANEOUS at 10:10

## 2023-10-10 RX ADMIN — LEVALBUTEROL HYDROCHLORIDE 0.63 MG: 0.63 SOLUTION RESPIRATORY (INHALATION) at 07:10

## 2023-10-10 NOTE — PLAN OF CARE
[facility] Plan of Care Note  Dx Malignant neoplasm of gastroesophageal junction      Shift Events Tolerating tube feed    Goals of Care: Discharge planning     Neuro: A&O4     Vital Signs: VSS     Respiratory: RA     Diet: NPO- tube feeds      Is patient tolerating current diet? yes     GTTS: none     Urine Output/Bowel Movement: urine output adequate, LBM 10/9     Drains/Tubes/Tube Feeds (include total output/shift): see flow sheet     Lines: PIV X1        Accuchecks:Q6     Skin: lap sites      Fall Risk Score: 3     Activity level? independent      Any scheduled procedures? No

## 2023-10-10 NOTE — DISCHARGE SUMMARY
[facility]ust[facility]  General Surgery  Discharge Summary      Patient Name: Blayne Beebe  MRN: [id number]  Admission Date: 10/4/2023  Hospital Length of Stay: 6 days  Discharge Date and Time:  10/10/2023 12:02 PM  Attending Physician: Nas Meyer MD   Discharging Provider: Shirley Villeda NP  Primary Care Provider: Zachariah Reyna MD    HPI: Mr. Beebe is a 62 y/o male here for an  esophagectomy.       Procedure(s) (LRB):  XI ROBOTIC ESOPHAGECTOMY (N/A)  INSERTION, JEJUNOSTOMY TUBE  ROBOTIC REPAIR, HERNIA, PARAESOPHAGEAL  PYLOROMYOTOMY      Indwelling Lines/Drains at time of discharge:   Lines/Drains/Airways       Central Venous Catheter Line  Duration                  PowerPort A Cath Single Lumen 07/12/23 1344 Internal Jugular Right 89 days              Drain  Duration                  Gastrostomy/Enterostomy 10/04/23 1730 Jejunostomy tube LUQ feeding 5 days                  Hospital Course: Mr. Beebe is a 63 year old male with a PMH of esophageal adenocarcinoma. He is s/p 3-hole esophagectomy on 10/04/23.  The patient is tolerating tube feeds via jejunostomy tube ([employer] [place] 1.4 erlin at 60cc/hour from 2pm-8am with a goal of 65cc/hour).  He is NPO except for protonix medication with a sip of water. He is voiding and ambulating without difficulty.  Patient with no complaints of nausea, vomiting, chest pain or shortness of breath.  His vital signs stable. He is afebrile. He is positive for flatus and positive for bowel sounds.    CV: RRR  Neck: left neck incision c/d/I with derma bond intact.   Lungs: CTA bilaterally  Abdomen:  Soft, non-tender, non-distended, positive for bowel sounds, incision clean, dry and intact.      Goals of Care Treatment Preferences:  Code Status: Full Code      Consults:   Consults (From admission, onward)          Status Ordering Provider     Inpatient consult to Registered Dietitian/Nutritionist  Once        Provider:  (Not yet assigned)    SHIRLEY Vincent  "           Significant Diagnostic Studies: Labs:   CMP   Recent Labs   Lab 10/09/23  0301 10/10/23  0325    141   K 3.7 3.7    109   CO2 23 20*    111*   BUN 18 20   CREATININE 0.7 0.7   CALCIUM 9.1 9.1   PROT 6.5 6.6   ALBUMIN 3.0* 3.0*   BILITOT 0.8 0.7   ALKPHOS 136* 177*   AST 15 18   ALT 21 19   ANIONGAP 10 12    and CBC   Recent Labs   Lab 10/09/23  0301 10/10/23  0325   WBC 12.76* 10.40   HGB 11.5* 11.2*   HCT 34.2* 33.2*    193       Pending Diagnostic Studies:       Procedure Component Value Units Date/Time    Specimen to Pathology, Surgery General Surgery [[id number]] Collected: 10/04/23 1817    Order Status: Sent Lab Status: In process Updated: 10/05/23 1101    Specimen: Tissue           Final Active Diagnoses:    Diagnosis Date Noted POA    PRINCIPAL PROBLEM:  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    Specialty: Surgery  Contact information:  [address] [place] [id number] [phone]                           Patient Instructions:      WALKER FOR HOME USE     Order Specific Question Answer Comments   Type of Walker: Adult (5'4"-6'6")    With wheels? Yes    Height: 5' 9.02" (1.753 m)    Weight: 61.2 kg (135 lb)    Length of need (1-99 months): 99    Does patient have medical equipment at home? none    Please check all that apply: Patient's condition impairs ambulation.    Please check all that apply: Patient is unable to safely ambulate without equipment.    Please check all that apply: Walker will be used for gait training.      Notify your health care provider if you experience any of the following:  temperature >100.4     Notify your health care provider if you experience any of the following:  persistent nausea and vomiting or " diarrhea     Notify your health care provider if you experience any of the following:  severe uncontrolled pain     Notify your health care provider if you experience any of the following:  redness, tenderness, or signs of infection (pain, swelling, redness, odor or green/yellow discharge around incision site)     Notify your health care provider if you experience any of the following:  difficulty breathing or increased cough     Notify your health care provider if you experience any of the following:  severe persistent headache     Notify your health care provider if you experience any of the following:  worsening rash     Notify your health care provider if you experience any of the following:  persistent dizziness, light-headedness, or visual disturbances     Notify your health care provider if you experience any of the following:  increased confusion or weakness     No dressing needed     Tube Feedings/Formulas     Order Specific Question Answer Comments   Select Adult Formula: Other [email or web address]   Route: Jejunostomy    Formula Rate (mL/hr): 65      Activity as tolerated     Medications:  Reconciled Home Medications:      Medication List        START taking these medications      acetaminophen 650 mg/20.3 mL Soln  Commonly known as: TYLENOL  31.2 mLs (999.0148 mg total) by Per J Tube route every 8 (eight) hours as needed for Pain.     enoxaparin 40 mg/0.4 mL Syrg  Commonly known as: LOVENOX  Inject 0.4 mLs (40 mg total) into the skin evry 24 hours . for 14 days     oxyCODONE 5 mg/5 mL Soln  Commonly known as: ROXICODONE  5 mLs (5 mg total) by Per J Tube route every 4 (four) hours as needed.            CONTINUE taking these medications      pantoprazole 40 MG tablet  Commonly known as: PROTONIX  Take 1 tablet (40 mg total) by mouth once daily.     sildenafiL 100 MG tablet  Commonly known as: VIAGRA  Take 1 tablet (100 mg total) by mouth daily as needed for Erectile Dysfunction.     VITAMIN D3 10 mcg (400 unit)  Tab  Generic drug: cholecalciferol (vitamin D3)  Take by mouth once daily.            ASK your doctor about these medications      LIDOcaine-prilocaine cream  Commonly known as: EMLA  Apply topically as needed (Port access).            Time spent on the discharge of patient: 20 minutes    Aysha Villeda NP  General Surgery  [facility]

## 2023-10-10 NOTE — PROGRESS NOTES
[facility]imit[facility]avier - [facility]  General Surgery  Progress Note    Subjective:     History of Present Illness:  No notes on file    Post-Op Info:  Procedure(s) (LRB):  XI ROBOTIC ESOPHAGECTOMY (N/A)  INSERTION, JEJUNOSTOMY TUBE  ROBOTIC REPAIR, HERNIA, PARAESOPHAGEAL  PYLOROMYOTOMY   6 Days Post-Op     Interval History: Doing very well this AM. Has been out of bed to chair and ambulating. Having bowel function. Neck PADMA removed yesterday, no issues. Vitals stable. Tube feeds @ 50.    Medications:  Continuous Infusions:  Scheduled Meds:   enoxparin  40 mg Subcutaneous Q24H (prophylaxis, 1700)    levalbuterol  0.63 mg Nebulization Q6H WAKE    pantoprazole  40 mg Oral Daily    sennosides 8.8 mg/5 ml  5 mL Per J Tube QHS     PRN Meds:acetaminophen, dextrose 10%, dextrose 10%, glucagon (human recombinant), hydrALAZINE, insulin aspart U-100, labetalol, ondansetron, oxyCODONE, oxyCODONE, prochlorperazine     Review of patient's allergies indicates:  No Known Allergies  Objective:     Vital Signs (Most Recent):  Temp: 98.2 °F (36.8 °C) (10/10/23 0711)  Pulse: 91 (10/10/23 0711)  Resp: 18 (10/10/23 0711)  BP: 137/86 (10/10/23 0711)  SpO2: (!) 93 % (10/10/23 0711) Vital Signs (24h Range):  Temp:  [97.8 °F (36.6 °C)-98.8 °F (37.1 °C)] 98.2 °F (36.8 °C)  Pulse:  [70-91] 91  Resp:  [16-20] 18  SpO2:  [90 %-98 %] 93 %  BP: (111-142)/(65-86) 137/86     Weight: 61.2 kg (135 lb)  Body mass index is 19.93 kg/m².    Intake/Output - Last 3 Shifts         10/08 0700  10/09 0659 10/09 0700  10/10 0659 10/10 0700  10/11 0659    P.O.  25     NG/GT 30 1052     Total Intake(mL/kg) 30 (0.5) 1077 (17.6)     Urine (mL/kg/hr) 1100 (0.7) 400 (0.3)     Drains 0 0     Stool 0 0     Chest Tube       Total Output 1100 400     Net -1070 +677            Urine Occurrence 1 x 1 x     Stool Occurrence 1 x 2 x              Physical Exam  Vitals and nursing note reviewed.   Constitutional:       General: He is not in acute distress.     Interventions: He is sedated.    HENT:      Head: Normocephalic and atraumatic.      Right Ear: External ear normal.      Left Ear: External ear normal.      Nose: Nose normal.   Eyes:      General: No scleral icterus.     Extraocular Movements: Extraocular movements intact.   Neck:      Comments: Left lateral neck incision cdi  PADMA removed  Breath not foul smelling  Cardiovascular:      Rate and Rhythm: Normal rate and regular rhythm.   Pulmonary:      Effort: Pulmonary effort is normal. No respiratory distress.   Abdominal:      General: Abdomen is flat. There is no distension.      Palpations: Abdomen is soft.      Comments: J tube in place   Skin:     General: Skin is warm and dry.   Neurological:      General: No focal deficit present.      Cranial Nerves: No cranial nerve deficit.   Psychiatric:         Mood and Affect: Mood normal.         Behavior: Behavior normal.          Significant Labs:  I have reviewed all pertinent lab results within the past 24 hours.    Significant Diagnostics:  I have reviewed all pertinent imaging results/findings within the past 24 hours.    Assessment/Plan:     * Malignant neoplasm of gastroesophageal junction  63 year old male with a PMH of esophageal adenocarcinoma. Now s/p 3-hole esophagectomy on 10/04/23.    - Increase tube feeds to goal 65 mL/hr for 18 hrs.  - Continue MM pain meds  - DC metop  - DC tele  - Protonix IV  - PRN anti-emetics  - PO flomax  - PT/OT  - PPx Lovenox  - neck PADMA removed 10/9    Dispo: possible discharge later today        Darya Guzman MD  General Surgery  [facility]

## 2023-10-10 NOTE — ASSESSMENT & PLAN NOTE
63 year old male with a PMH of esophageal adenocarcinoma. Now s/p 3-hole esophagectomy on 10/04/23.    - Increase tube feeds to goal 65 mL/hr for 18 hrs.  - Continue MM pain meds  - DC metop  - DC tele  - Protonix IV  - PRN anti-emetics  - PO flomax  - PT/OT  - PPx Lovenox  - neck PADMA removed 10/9    Dispo: possible discharge later today

## 2023-10-10 NOTE — SUBJECTIVE & OBJECTIVE
Interval History: Doing very well this AM. Has been out of bed to chair and ambulating. Having bowel function. Neck PADMA removed yesterday, no issues. Vitals stable. Tube feeds @ 50.    Medications:  Continuous Infusions:  Scheduled Meds:   enoxparin  40 mg Subcutaneous Q24H (prophylaxis, 1700)    levalbuterol  0.63 mg Nebulization Q6H WAKE    pantoprazole  40 mg Oral Daily    sennosides 8.8 mg/5 ml  5 mL Per J Tube QHS     PRN Meds:acetaminophen, dextrose 10%, dextrose 10%, glucagon (human recombinant), hydrALAZINE, insulin aspart U-100, labetalol, ondansetron, oxyCODONE, oxyCODONE, prochlorperazine     Review of patient's allergies indicates:  No Known Allergies  Objective:     Vital Signs (Most Recent):  Temp: 98.2 °F (36.8 °C) (10/10/23 0711)  Pulse: 91 (10/10/23 0711)  Resp: 18 (10/10/23 0711)  BP: 137/86 (10/10/23 0711)  SpO2: (!) 93 % (10/10/23 0711) Vital Signs (24h Range):  Temp:  [97.8 °F (36.6 °C)-98.8 °F (37.1 °C)] 98.2 °F (36.8 °C)  Pulse:  [70-91] 91  Resp:  [16-20] 18  SpO2:  [90 %-98 %] 93 %  BP: (111-142)/(65-86) 137/86     Weight: 61.2 kg (135 lb)  Body mass index is 19.93 kg/m².    Intake/Output - Last 3 Shifts         10/08 0700  10/09 0659 10/09 0700  10/10 0659 10/10 0700  10/11 0659    P.O.  25     NG/GT 30 1052     Total Intake(mL/kg) 30 (0.5) 1077 (17.6)     Urine (mL/kg/hr) 1100 (0.7) 400 (0.3)     Drains 0 0     Stool 0 0     Chest Tube       Total Output 1100 400     Net -1070 +677            Urine Occurrence 1 x 1 x     Stool Occurrence 1 x 2 x              Physical Exam  Vitals and nursing note reviewed.   Constitutional:       General: He is not in acute distress.     Interventions: He is sedated.   HENT:      Head: Normocephalic and atraumatic.      Right Ear: External ear normal.      Left Ear: External ear normal.      Nose: Nose normal.   Eyes:      General: No scleral icterus.     Extraocular Movements: Extraocular movements intact.   Neck:      Comments: Left lateral neck incision  cdi  PADMA removed  Breath not foul smelling  Cardiovascular:      Rate and Rhythm: Normal rate and regular rhythm.   Pulmonary:      Effort: Pulmonary effort is normal. No respiratory distress.   Abdominal:      General: Abdomen is flat. There is no distension.      Palpations: Abdomen is soft.      Comments: J tube in place   Skin:     General: Skin is warm and dry.   Neurological:      General: No focal deficit present.      Cranial Nerves: No cranial nerve deficit.   Psychiatric:         Mood and Affect: Mood normal.         Behavior: Behavior normal.          Significant Labs:  I have reviewed all pertinent lab results within the past 24 hours.    Significant Diagnostics:  I have reviewed all pertinent imaging results/findings within the past 24 hours.

## 2023-10-11 ENCOUNTER — PATIENT MESSAGE (OUTPATIENT)
Dept: SURGERY | Facility: CLINIC | Age: 63
End: 2023-10-11
Payer: COMMERCIAL

## 2023-10-11 ENCOUNTER — PATIENT OUTREACH (OUTPATIENT)
Dept: ADMINISTRATIVE | Facility: CLINIC | Age: 63
End: 2023-10-11
Payer: COMMERCIAL

## 2023-10-11 DIAGNOSIS — Z90.49 H/O ESOPHAGECTOMY: ICD-10-CM

## 2023-10-11 DIAGNOSIS — Z98.890 H/O ESOPHAGECTOMY: ICD-10-CM

## 2023-10-11 DIAGNOSIS — C16.0 MALIGNANT NEOPLASM OF GASTROESOPHAGEAL JUNCTION: Primary | ICD-10-CM

## 2023-10-11 DIAGNOSIS — Z98.890: ICD-10-CM

## 2023-10-11 PROCEDURE — G0180 MD CERTIFICATION HHA PATIENT: HCPCS | Mod: ,,, | Performed by: SURGERY

## 2023-10-11 PROCEDURE — G0180 PR HOME HEALTH MD CERTIFICATION: ICD-10-PCS | Mod: ,,, | Performed by: SURGERY

## 2023-10-11 NOTE — PROGRESS NOTES
C3 nurse spoke with Blayne Beebe's spouse Rekha for a TCC post hospital discharge follow up call. Pts spouse denies any new symptoms or complaints. She stated the liquid tylenol was not given from pharmacy, so she is picking it up today. The patient has a postop appt with Nas Meyer MD on 10/19/23 at 1030 with xray at 0900 and the C3 nurse scheduled a HOSFU with the pts PCP, Dr. Zachariah Reyna on 10/30/23 at 1330. The pts spouse denied the need to be seen sooner and she states she has the PCP's clinic number if they decide they want a closer appt. She RCB around 1430 bc she wants to check with her spouse to see if he would rather a NPHV. No messages routed at this time.     Update: Spoke again with the pts spouse Rekha and she requested a NPHV so they could be seen sooner. NPHV referral placed and the HOSFU with Zachariah Reyna MD on 10/30/23 cancelled. The pts spouse verbalized understanding.

## 2023-10-11 NOTE — PROGRESS NOTES
C3 nurse attempted to contact Blayne Beebe for a TCC post hospital discharge follow up call. No answer. Left voicemail with callback information. The patient has a postop appt with Nas Meyer MD on 10/19/23 at 1030 with xray at 0900 but does not have a scheduled HOSFU appointment with his PCP, Zachariah Reyna MD within 5-7 days of his discharge date of 10/10/23. No messages routed at this time.

## 2023-10-12 ENCOUNTER — PES CALL (OUTPATIENT)
Dept: HOME HEALTH SERVICES | Facility: CLINIC | Age: 63
End: 2023-10-12
Payer: COMMERCIAL

## 2023-10-16 LAB
FINAL PATHOLOGIC DIAGNOSIS: NORMAL
FROZEN SECTION DIAGNOSIS: NORMAL
FROZEN SECTION FOOTNOTE: NORMAL
GROSS: NORMAL
Lab: NORMAL
MICROSCOPIC EXAM: NORMAL

## 2023-10-18 NOTE — PROGRESS NOTES
"Oncology Nutrition Assessment for Medical Nutrition Therapy  Follow-up Visit    Blayne Beebe   1960    Referring Provider: No ref. provider found      Reason for Visit: nutrition counseling and education    PMHx:   Past Medical History:   Diagnosis Date    Arthritis     Cancer     COPD (chronic obstructive pulmonary disease)        Nutrition Assessment    This is a 63 y.o.male with a medical diagnosis of GEJ adenocarcinoma s/p chemoradiation and now esophagectomy 10/4. He is known to me from previous appointment as part of prehab program. He was discharged NPO and on TF of Newser Standard 1.4 at 80mL/hr x ~17hrs (4 cartons/day, provides 1820kcal/day and 80g protein/day) + water flushes at 55mL/hr for every hour on feeds. He reports this is going well.    Weight: 56.85 kg (125 lb 5.3 oz)  Height: 5' 9" (1.753 m)  BMI: 18.5    Usual BW: 140-145lb  Weight Change: 10lb loss from COVID 2/2023 then another 5-10lb loss    Allergies: Patient has no known allergies.    Current Medications:  Current Outpatient Medications:     cholecalciferol, vitamin D3, (VITAMIN D3) 10 mcg (400 unit) Tab, Take by mouth once daily., Disp: , Rfl:     enoxaparin (LOVENOX) 40 mg/0.4 mL Syrg, Inject 0.4 mLs (40 mg total) into the skin every 24 hours (prophylaxis, 5pm) for 28 days., Disp: 11.2 mL, Rfl: 0    LIDOcaine-prilocaine (EMLA) cream, Apply topically as needed (Port access). (Patient not taking: Reported on 7/31/2023), Disp: 30 g, Rfl: 1    oxyCODONE (ROXICODONE) 5 mg/5 mL Soln, 5 mLs (5 mg total) by Per J Tube route every 4 (four) hours as needed. (Patient not taking: Reported on 10/11/2023), Disp: 210 mL, Rfl: 0    pantoprazole (PROTONIX) 40 MG tablet, Take 1 tablet (40 mg total) by mouth once daily., Disp: 90 tablet, Rfl: 3    sildenafil (VIAGRA) 100 MG tablet, Take 1 tablet (100 mg total) by mouth daily as needed for Erectile Dysfunction. (Patient not taking: Reported on 10/11/2023), Disp: 10 tablet, Rfl: " 6    Food/medication interactions noted: none    Vitamins/Supplements: none reported    Labs: Reviewed    Nutrition Diagnosis    Problem: moderate malnutrition  Etiology (related to): inadequate energy and protein intake  Signs/Symptoms (as evidenced by): reports of inadequate PO intake, weight loss, and both fat & muscle wasting present    Nutrition Intervention    Nutrition Prescription   1990 kcals (35kcal/kg)  80g protein (1.4g/kg)   1990mL fluid (35mL/kg)    Recommendations:  Cleared for CL diet today by surg onc  Continue 4 cartons of TF/day  Continue water flushes at 55mL/hr for every hour on feeds  Flush J-tube with at least 60mL water before and after feeds    Materials Provided/Reviewed: Post-op Diet Progression handout    Nutrition Monitoring and Evaluation    Monitor: diet education needs, energy intake, rate/formulation of tube feeding, and weight status    Goals: prevent further weight loss and encourage weight gain    Follow up: Patient provided with contact information and advised to call/message with questions or to make future appointment if further intervention is needed.    Communication to referring provider/care team: note available in chart; discussed with CHARI Chanel NP    Counseling time: 10 minutes    Noelle Morelos, MS, RD, LDN

## 2023-10-19 ENCOUNTER — OFFICE VISIT (OUTPATIENT)
Dept: SURGERY | Facility: CLINIC | Age: 63
End: 2023-10-19
Payer: COMMERCIAL

## 2023-10-19 ENCOUNTER — HOSPITAL ENCOUNTER (OUTPATIENT)
Dept: RADIOLOGY | Facility: HOSPITAL | Age: 63
Discharge: HOME | End: 2023-10-19
Attending: SURGERY
Payer: COMMERCIAL

## 2023-10-19 ENCOUNTER — CLINICAL SUPPORT (OUTPATIENT)
Dept: HEMATOLOGY/ONCOLOGY | Facility: CLINIC | Age: 63
End: 2023-10-19
Payer: COMMERCIAL

## 2023-10-19 VITALS
SYSTOLIC BLOOD PRESSURE: 130 MMHG | DIASTOLIC BLOOD PRESSURE: 74 MMHG | WEIGHT: 125.31 LBS | OXYGEN SATURATION: 100 % | BODY MASS INDEX: 18.56 KG/M2 | HEIGHT: 69 IN | HEART RATE: 68 BPM

## 2023-10-19 DIAGNOSIS — Z78.9 ON ENTERAL NUTRITION AT HOME: ICD-10-CM

## 2023-10-19 DIAGNOSIS — C16.0 MALIGNANT NEOPLASM OF GASTROESOPHAGEAL JUNCTION: Primary | ICD-10-CM

## 2023-10-19 DIAGNOSIS — Z93.4 JEJUNOSTOMY TUBE PRESENT: ICD-10-CM

## 2023-10-19 DIAGNOSIS — C16.0 MALIGNANT NEOPLASM OF GASTROESOPHAGEAL JUNCTION: ICD-10-CM

## 2023-10-19 DIAGNOSIS — Z71.3 NUTRITIONAL COUNSELING: Primary | ICD-10-CM

## 2023-10-19 DIAGNOSIS — Z74.09 DECREASED FUNCTIONAL MOBILITY AND ENDURANCE: ICD-10-CM

## 2023-10-19 PROBLEM — R93.3 ABNORMAL FINDING ON GI TRACT IMAGING: Status: RESOLVED | Noted: 2023-06-07 | Resolved: 2023-10-19

## 2023-10-19 PROBLEM — K22.9 ESOPHAGEAL ABNORMALITY: Status: RESOLVED | Noted: 2023-06-07 | Resolved: 2023-10-19

## 2023-10-19 PROCEDURE — 3075F PR MOST RECENT SYSTOLIC BLOOD PRESS GE 130-139MM HG: ICD-10-PCS | Mod: CPTII,S$GLB,, | Performed by: NURSE PRACTITIONER

## 2023-10-19 PROCEDURE — 3078F DIAST BP <80 MM HG: CPT | Mod: CPTII,S$GLB,, | Performed by: NURSE PRACTITIONER

## 2023-10-19 PROCEDURE — 1160F PR REVIEW ALL MEDS BY PRESCRIBER/CLIN PHARMACIST DOCUMENTED: ICD-10-PCS | Mod: CPTII,S$GLB,, | Performed by: NURSE PRACTITIONER

## 2023-10-19 PROCEDURE — 99999 PR PBB SHADOW E&M-EST. PATIENT-LVL IV: CPT | Mod: PBBFAC,,, | Performed by: NURSE PRACTITIONER

## 2023-10-19 PROCEDURE — 99024 PR POST-OP FOLLOW-UP VISIT: ICD-10-PCS | Mod: S$GLB,,, | Performed by: NURSE PRACTITIONER

## 2023-10-19 PROCEDURE — 99999 PR PBB SHADOW E&M-EST. PATIENT-LVL IV: ICD-10-PCS | Mod: PBBFAC,,, | Performed by: NURSE PRACTITIONER

## 2023-10-19 PROCEDURE — 99999 PR PBB SHADOW E&M-EST. PATIENT-LVL I: CPT | Mod: PBBFAC,,, | Performed by: DIETITIAN, REGISTERED

## 2023-10-19 PROCEDURE — 1159F PR MEDICATION LIST DOCUMENTED IN MEDICAL RECORD: ICD-10-PCS | Mod: CPTII,S$GLB,, | Performed by: NURSE PRACTITIONER

## 2023-10-19 PROCEDURE — 99999 PR PBB SHADOW E&M-EST. PATIENT-LVL I: ICD-10-PCS | Mod: PBBFAC,,, | Performed by: DIETITIAN, REGISTERED

## 2023-10-19 PROCEDURE — 3044F PR MOST RECENT HEMOGLOBIN A1C LEVEL <7.0%: ICD-10-PCS | Mod: CPTII,S$GLB,, | Performed by: NURSE PRACTITIONER

## 2023-10-19 PROCEDURE — 1160F RVW MEDS BY RX/DR IN RCRD: CPT | Mod: CPTII,S$GLB,, | Performed by: NURSE PRACTITIONER

## 2023-10-19 PROCEDURE — 74220 FL ESOPHAGRAM COMPLETE: ICD-10-PCS | Mod: 26,,, | Performed by: RADIOLOGY

## 2023-10-19 PROCEDURE — 3078F PR MOST RECENT DIASTOLIC BLOOD PRESSURE < 80 MM HG: ICD-10-PCS | Mod: CPTII,S$GLB,, | Performed by: NURSE PRACTITIONER

## 2023-10-19 PROCEDURE — 99024 POSTOP FOLLOW-UP VISIT: CPT | Mod: S$GLB,,, | Performed by: NURSE PRACTITIONER

## 2023-10-19 PROCEDURE — 74220 X-RAY XM ESOPHAGUS 1CNTRST: CPT | Mod: 26,,, | Performed by: RADIOLOGY

## 2023-10-19 PROCEDURE — 97803 MED NUTRITION INDIV SUBSEQ: CPT | Mod: S$GLB,,, | Performed by: DIETITIAN, REGISTERED

## 2023-10-19 PROCEDURE — 25500020 PHARM REV CODE 255: Performed by: SURGERY

## 2023-10-19 PROCEDURE — 97803 PR MED NUTR THER, SUBSQ, INDIV, EA 15 MIN: ICD-10-PCS | Mod: S$GLB,,, | Performed by: DIETITIAN, REGISTERED

## 2023-10-19 PROCEDURE — 3044F HG A1C LEVEL LT 7.0%: CPT | Mod: CPTII,S$GLB,, | Performed by: NURSE PRACTITIONER

## 2023-10-19 PROCEDURE — 1159F MED LIST DOCD IN RCRD: CPT | Mod: CPTII,S$GLB,, | Performed by: NURSE PRACTITIONER

## 2023-10-19 PROCEDURE — 3075F SYST BP GE 130 - 139MM HG: CPT | Mod: CPTII,S$GLB,, | Performed by: NURSE PRACTITIONER

## 2023-10-19 PROCEDURE — 74220 X-RAY XM ESOPHAGUS 1CNTRST: CPT | Mod: TC

## 2023-10-19 RX ADMIN — IOHEXOL 75 ML: 350 INJECTION, SOLUTION INTRAVENOUS at 09:10

## 2023-10-19 NOTE — PROGRESS NOTES
"  Post-Op Follow-up Visit:   10/19/2023  Patient ID: Blayne Beebe is a 63 y.o. male, born 1960    Chief Complaint   Patient presents with    Post-op Evaluation     6/2023: abnormal lung CT screening identified mid esophageal mass with lymph nodes, stage III, [id number] at mid to lower esophagus.   7/16/2023 - 8/15/2023: neoadj CRT per Dr. June  9/22/23: restaging PET, 9/28/23: restaging appt with Dr. Meyer  10/4/23: s/p esophagectomy per Dr. Meyer    Interval History: This 62 y/o gentleman returns to clinic with his wife from [place]. After completing neoadj CRT then prehab, he underwent robotic esophagectomy earlier this month. He was discharged to home on POD 6 on 10/10/23, NPO with J feeds. He had post op esophagram done earlier this AM.   Overall he reports feeling well, no significant pain, only c/o soreness to R flank worse when he coughs or sneezes. He is not taking any Rx or OTC pain meds. He remains NPO, tolerating J feeds [employer] 1.4 at 80cc/hr > 18 hours/ day. Denies missing any feedings. He gained ~ 10 # during prehab which he has lost since surgery. He is doing some walking for activity. + difficulty sleeping. Denies CP, SOB. Remains afebrile. Remains tobacco free.     Lab Results   Component Value Date    ALBUMIN 3.0 (L) 10/10/2023     Physical Exam:  /74   Pulse 68   Ht 5' 9" (1.753 m)   Wt 56.8 kg (125 lb 5.3 oz)   SpO2 100%   BMI 18.51 kg/m²     General:  Non-toxic, ambulatory  Abd:  Soft, non-tender  Incision:  L anterior neck and several R flank and abd trocar sites with edges well approximated, dermabond intact without erythema or exudate. J tube sutured in place without drainage.     Pathology:  Tumor Site Distal esophagus (low thoracic esophagus) Relationship of Tumor to Esophagogastric Junction Tumor midpoint lies in the distal esophagus AND tumor involves the esophagogastric junction Histologic Type Adenocarcinoma Histologic Grade G2, moderately differentiated Tumor " Size Greatest dimension in Centimeters (cm): 6.5 cm Tumor Extent Invades muscularis propria Treatment Effect Present, with residual cancer showing evident tumor regression, but more than single cells or rare small groups of cancer cells (partial response, score 2) Lymphovascular Invasion Not identified Perineural Invasion Not identified MARGINS All margins negative for invasive carcinoma REGIONAL LYMPH NODES All regional lymph nodes negative for tumor Number of Lymph Nodes Examined: 18 DISTANT METASTASIS Not applicable PATHOLOGIC STAGE CLASSIFICATION (pTNM, AJCC 8th Edition) Reporting of pT, pN, and (when applicable) pM categories is based on information available to the pathologist at the time the report is issued. As per the AJCC (Chapter 1, 8th Ed.) it is the managing physicians responsibility to establish the final pathologic stage based upon all pertinent information, including but potentially not limited to this pathology report. pT Category pT2: Tumor invades the muscularis propria  pN Category pN0: No regional lymph node metastasis pM Category Not applicable - pM cannot be determined from the submitted specimen(s)      ICD-10-CM ICD-9-CM    1. Malignant neoplasm of gastroesophageal junction  C16.0 151.0       2. Decreased functional mobility and endurance  Z74.09 780.99       3. Jejunostomy tube present  Z93.4 V44.4       4. On enteral nutrition at home  Z78.9 V49.89       Plan   Reviewed pathology with him and his wife. Printed copy provided today. Once recovered, recommend adj immunotherapy which he will discuss in more detail with Dr. Stearns at upcoming appt next week. We discussed the need for continued clinical/radiographic follow-up.  Continue J feeds as prescribed. Await final read on esophagram but personal review of images did not reveal obvious leak. Advance to CL diet. Written instructions provided and discussed small volume frequently. F/u with CHARI Morelos RD today. Monitor home scale weight.    Increase physical activity, no heavy lifting until 6 weeks post op  Remain tobacco free     Follow up in about 2 weeks (around 11/2/2023).    Questions were asked and answered to patient and his wife's satisfaction.      Pt seen in conjunction with Dr. Meyer today.           Belem Chanel NP  Upper GI / Hepatobiliary Surgical Oncology  [facility] [place], [place]  Office: [phone]  Fax: [phone]

## 2023-10-19 NOTE — PATIENT INSTRUCTIONS
Post-Esophagectomy Nutrition Guidelines  Nutrition is very important for healing and to prevent weight loss after esophageal surgery. Remember, because of the surgery, your esophagus may not be able to move foods as easily from your mouth to your stomach. Certain foods can block the esophagus or are difficult to swallow. Some people complain of food sticking or have midsternal (behind the breastbone) pain. Your surgeon may ask you to avoid solid foods entirely for the first several weeks at home. The guidelines below will ensure optimal diet tolerance after surgery.      The Diet Progression after Esophageal Surgery    Step 1: Clear Liquid Diet--  ? Broth, bouillon  ? Clear juices (apple, cranberry, grape, etc) diluted with water, Coconut Water (no pulp),  Gatorade/G2, Tea (green tea, herbal teas are best)  ? Jello, sugar-free Jello  ? Nutrition Beverages like Boost Breeze® or Ensure Clear ®          [email or web address]

## 2023-10-20 ENCOUNTER — OFFICE VISIT (OUTPATIENT)
Dept: HOME HEALTH SERVICES | Facility: CLINIC | Age: 63
End: 2023-10-20
Payer: COMMERCIAL

## 2023-10-20 DIAGNOSIS — C16.0 MALIGNANT NEOPLASM OF GASTROESOPHAGEAL JUNCTION: ICD-10-CM

## 2023-10-20 DIAGNOSIS — R63.8 ALTERATION IN NUTRITION ASSOCIATED WITH TUBE FEEDING: ICD-10-CM

## 2023-10-20 DIAGNOSIS — Z98.890 H/O ESOPHAGECTOMY: ICD-10-CM

## 2023-10-20 DIAGNOSIS — Z93.4 JEJUNOSTOMY PRESENT: ICD-10-CM

## 2023-10-20 DIAGNOSIS — Z90.49 H/O ESOPHAGECTOMY: ICD-10-CM

## 2023-10-20 PROCEDURE — 3044F HG A1C LEVEL LT 7.0%: CPT | Mod: CPTII,S$GLB,, | Performed by: NURSE PRACTITIONER

## 2023-10-20 PROCEDURE — 99348 HOME/RES VST EST LOW MDM 30: CPT | Mod: S$GLB,,, | Performed by: NURSE PRACTITIONER

## 2023-10-20 PROCEDURE — 1160F PR REVIEW ALL MEDS BY PRESCRIBER/CLIN PHARMACIST DOCUMENTED: ICD-10-PCS | Mod: CPTII,S$GLB,, | Performed by: NURSE PRACTITIONER

## 2023-10-20 PROCEDURE — 99348 PR HOME VISIT,ESTAB PATIENT,LEVEL II: ICD-10-PCS | Mod: S$GLB,,, | Performed by: NURSE PRACTITIONER

## 2023-10-20 PROCEDURE — 1159F MED LIST DOCD IN RCRD: CPT | Mod: CPTII,S$GLB,, | Performed by: NURSE PRACTITIONER

## 2023-10-20 PROCEDURE — 3044F PR MOST RECENT HEMOGLOBIN A1C LEVEL <7.0%: ICD-10-PCS | Mod: CPTII,S$GLB,, | Performed by: NURSE PRACTITIONER

## 2023-10-20 PROCEDURE — 1160F RVW MEDS BY RX/DR IN RCRD: CPT | Mod: CPTII,S$GLB,, | Performed by: NURSE PRACTITIONER

## 2023-10-20 PROCEDURE — 1111F PR DISCHARGE MEDS RECONCILED W/ CURRENT OUTPATIENT MED LIST: ICD-10-PCS | Mod: CPTII,S$GLB,, | Performed by: NURSE PRACTITIONER

## 2023-10-20 PROCEDURE — 1111F DSCHRG MED/CURRENT MED MERGE: CPT | Mod: CPTII,S$GLB,, | Performed by: NURSE PRACTITIONER

## 2023-10-20 PROCEDURE — 1159F PR MEDICATION LIST DOCUMENTED IN MEDICAL RECORD: ICD-10-PCS | Mod: CPTII,S$GLB,, | Performed by: NURSE PRACTITIONER

## 2023-10-20 NOTE — ASSESSMENT & PLAN NOTE
S/p esophagectomy  NPO except clear liquids  No pain  TF in place for 18 hrs daily  Follow up with oncology as scheduled

## 2023-10-20 NOTE — PROGRESS NOTES
[facility]  Transitional Care Management (TCM) Home Visit    Encounter Provider: Martita Ley   PCP: Zachariah Reyna MD  Consult Requested By: Dr. 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 male was recently admitted to the hospital, this is their TCM encounter.    Hospital Course Synopsis:  Pt has esophageal adenocarcinoma.  Procedure(s) (LRB):  XI ROBOTIC ESOPHAGECTOMY (N/A)  INSERTION, JEJUNOSTOMY TUBE  ROBOTIC REPAIR, HERNIA, PARAESOPHAGEAL  PYLOROMYOTOMY      DECISION MAKING TODAY       Assessment & Plan:  1. Malignant neoplasm of gastroesophageal junction  Assessment & Plan:  S/p esophagectomy  NPO except clear liquids  No pain  TF in place for 18 hrs daily  Follow up with oncology as scheduled    Orders:  -     Ambulatory referral/consult to [facility]    2. H/O esophagectomy  Assessment & Plan:  See jejunostomy    Orders:  -     Ambulatory referral/consult to [facility]    3. Jejunostomy present  Overview:  Placed 10/4/23    Assessment & Plan:  S/p esophagectomy for cancer.  Kangaroo pump infusion 18 hrs daily  Site with D/I dressing           Medication List on Discharge:     Medication List            Accurate as of October 20, 2023 12:42 PM. If you have any questions, ask your nurse or doctor.                CONTINUE taking these medications      enoxaparin 40 mg/0.4 mL Syrg  Commonly known as: LOVENOX  Inject 0.4 mLs (40 mg total) into the skin every 24 hours (prophylaxis, 5pm) for 28 days.     LIDOcaine-prilocaine cream  Commonly known as: EMLA  Apply topically as needed (Port access).     oxyCODONE 5 mg/5 mL Soln  Commonly  known as: ROXICODONE  5 mLs (5 mg total) by Per J Tube route every 4 (four) hours as needed.     pantoprazole 40 MG tablet  Commonly known as: PROTONIX  Take 1 tablet (40 mg total) by mouth once daily.     sildenafiL 100 MG tablet  Commonly known as: VIAGRA  Take 1 tablet (100 mg total) by mouth daily as needed for Erectile Dysfunction.     VITAMIN D3 10 mcg (400 unit) Tab  Generic drug: cholecalciferol (vitamin D3)  Take by mouth once daily.              Medication Reconciliation:  Were medications changed on discharge? Yes  Were medications in the home? Yes  Is the patient taking the medications as directed? Yes  Does the patient understand the medications and changes? Yes  Does updated med list accurately reflects meds patient is currently taking? Yes    ENVIRONMENT OF CARE      Family and/or Caregiver present at visit?  No  Name of Caregiver:   History provided by: patient    Advance Care Planning   Advanced Care Planning Status:  Patient has had an ACP conversation  Living Will: No  Power of : Yes  LaPOST: No           Impression upon entering the home:  Physical Dwelling: single family home   Appearance of home environment: cleaniness: clean  Functional Status: independent  Mobility: ambulatory  Nutritional access:  Tube feeding in place  Home Health: Yes,  Agency [facility]    DME/Supplies:  Kangaroo Pump      Diagnostic tests reviewed/disposition: No diagnosic tests pending after this hospitalization.  Disease/illness education:   Establishment or re-establishment of referral orders for community resources: No other necessary community resources.   Discussion with other health care providers: No discussion with other health care providers necessary.   Does patient have a PCP at [facility]? Yes   Repatriation plan with PCP?    Does patient have an ostomy (ileostomy, colostomy, suprapubic catheter, nephrostomy tube, tracheostomy, PEG tube, pleurex catheter, cholecystostomy, etc)? Yes. Is it on problem  list?yes  Were BPAs reviewed? Yes    Social History     Socioeconomic History    Marital status:    Tobacco Use    Smoking status: Former     Current packs/day: 0.25     Average packs/day: 0.3 packs/day for 40.8 years (10.2 ttl pk-yrs)     Types: Cigarettes     Start date: 1983     Passive exposure: Current    Smokeless tobacco: Never    Tobacco comments:     4 cigarettes a day   Substance and Sexual Activity    Alcohol use: Yes     Comment: a couple of beers a day    Drug use: No    Sexual activity: Yes     Partners: Female     Comment:      Social Determinants of Health     Financial Resource Strain: Low Risk  (10/5/2023)    Overall Financial Resource Strain (CARDIA)     Difficulty of Paying Living Expenses: Not hard at all   Food Insecurity: No Food Insecurity (10/5/2023)    Hunger Vital Sign     Worried About Running Out of Food in the Last Year: Never true     Ran Out of Food in the Last Year: Never true   Transportation Needs: No Transportation Needs (10/5/2023)    PRAPARE - Transportation     Lack of Transportation (Medical): No     Lack of Transportation (Non-Medical): No   Physical Activity: Sufficiently Active (10/5/2023)    Exercise Vital Sign     Days of Exercise per Week: 6 days     Minutes of Exercise per Session: 50 min   Social Connections: Unknown (10/5/2023)    Social Connection and Isolation Panel [NHANES]     Frequency of Communication with Friends and Family: Patient refused     Frequency of Social Gatherings with Friends and Family: Patient refused     Attends [employer] Services: Never     Active Member of Clubs or Organizations: No     Attends Club or Organization Meetings: Never     Marital Status:    Housing Stability: Unknown (10/5/2023)    Housing Stability Vital Sign     Unable to Pay for Housing in the Last Year: No     Unstable Housing in the Last Year: No         OBJECTIVE:     Vital Signs:  There were no vitals filed for this visit.    Review of Systems    Constitutional:  Negative for activity change, fatigue and fever.   HENT: Negative.     Eyes: Negative.    Respiratory:  Negative for chest tightness.    Cardiovascular: Negative.  Negative for leg swelling.   Gastrointestinal: Negative.    Endocrine: Negative.    Genitourinary: Negative.    Musculoskeletal: Negative.    Skin: Negative.    Allergic/Immunologic: Negative.    Neurological: Negative.    Hematological: Negative.    Psychiatric/Behavioral: Negative.  Negative for agitation.    All other systems reviewed and are negative.      Physical Exam:  Physical Exam  Vitals reviewed.   Constitutional:       General: He is not in acute distress.     Appearance: He is well-developed.   HENT:      Head: Normocephalic and atraumatic.      Nose: Nose normal.      Mouth/Throat:      Mouth: Mucous membranes are dry.   Eyes:      Pupils: Pupils are equal, round, and reactive to light.   Neck:      Comments: Healing incision noted  Cardiovascular:      Rate and Rhythm: Normal rate and regular rhythm.      Heart sounds: Normal heart sounds.   Pulmonary:      Effort: Pulmonary effort is normal.      Breath sounds: Normal breath sounds.   Abdominal:      General: Bowel sounds are normal.      Palpations: Abdomen is soft.      Comments: Peg tube in place, healing incisions   Musculoskeletal:         General: Normal range of motion.      Cervical back: Normal range of motion and neck supple.   Skin:     General: Skin is warm and dry.   Neurological:      Mental Status: He is alert and oriented to person, place, and time.      Cranial Nerves: No cranial nerve deficit.   Psychiatric:         Behavior: Behavior normal.         Thought Content: Thought content normal.         Judgment: Judgment normal.         INSTRUCTIONS FOR PATIENT:     Scheduled Follow-up, Appts Reviewed with Modifications if Needed: Yes  Future Appointments   Date Time Provider Department Center   10/23/2023  8:40 AM LAB, HEMONC CANCER [facility] REMI Reyes  [facility]   10/23/2023  9:30 AM Delta June MD [facility] HEMONC2 Eric [facility]   11/2/2023  9:00 AM Belem Chanel APRN,ANP-C [facility]       Signature: Martita Ley NP    Transition of Care Visit:  I have reviewed and updated the history and problem list.  I have reconciled the medication list.  I have discussed the hospitalization and current medical issues, prognosis and plans with the patient/family.

## 2023-10-20 NOTE — ASSESSMENT & PLAN NOTE
TF via jejunostomy  18 hrs daily at 65 cc/hr via pump  Feeds from 2 pm to 8 am daily  Continue current plan

## 2023-10-21 NOTE — PROGRESS NOTES
He looks great. No pain. Tolerating TEN. Weight down 10 pounds since the operation but stable compared to what it was post-chemoradiation. His esophagram shows no leak. Abdomen soft, non-tender. Neck without erythema, healing well.     Pathology reviewed. It shows a ypT2N0 esophageal adenocarcinoma with negative margins and 0/18 lymph nodes.     - Start clear liquids. Can advance to full liquids in one week if doing well.   - Return to clinic in two weeks.  - He will need one year of adjuvant Nivolumab with Dr. June.     Nas Meyer MD  Surgical Oncology

## 2023-10-23 ENCOUNTER — LAB VISIT (OUTPATIENT)
Dept: LAB | Facility: HOSPITAL | Age: 63
End: 2023-10-23
Attending: INTERNAL MEDICINE
Payer: COMMERCIAL

## 2023-10-23 ENCOUNTER — OFFICE VISIT (OUTPATIENT)
Dept: HEMATOLOGY/ONCOLOGY | Facility: CLINIC | Age: 63
End: 2023-10-23
Payer: COMMERCIAL

## 2023-10-23 VITALS
OXYGEN SATURATION: 99 % | HEART RATE: 65 BPM | SYSTOLIC BLOOD PRESSURE: 130 MMHG | TEMPERATURE: 98 F | DIASTOLIC BLOOD PRESSURE: 72 MMHG | BODY MASS INDEX: 19.37 KG/M2 | HEIGHT: 69 IN | RESPIRATION RATE: 18 BRPM | WEIGHT: 130.75 LBS

## 2023-10-23 DIAGNOSIS — T45.1X5A CHEMOTHERAPY INDUCED NEUTROPENIA: ICD-10-CM

## 2023-10-23 DIAGNOSIS — K20.90 ESOPHAGITIS: ICD-10-CM

## 2023-10-23 DIAGNOSIS — I70.0 ATHEROSCLEROSIS OF AORTA: ICD-10-CM

## 2023-10-23 DIAGNOSIS — D69.59 CHEMOTHERAPY-INDUCED THROMBOCYTOPENIA: ICD-10-CM

## 2023-10-23 DIAGNOSIS — J43.9 PULMONARY EMPHYSEMA, UNSPECIFIED EMPHYSEMA TYPE: ICD-10-CM

## 2023-10-23 DIAGNOSIS — F17.210 NICOTINE DEPENDENCE, CIGARETTES, UNCOMPLICATED: ICD-10-CM

## 2023-10-23 DIAGNOSIS — E44.0 MODERATE MALNUTRITION: ICD-10-CM

## 2023-10-23 DIAGNOSIS — T45.1X5A CHEMOTHERAPY-INDUCED THROMBOCYTOPENIA: ICD-10-CM

## 2023-10-23 DIAGNOSIS — C16.0 MALIGNANT NEOPLASM OF GASTROESOPHAGEAL JUNCTION: Primary | ICD-10-CM

## 2023-10-23 DIAGNOSIS — D70.1 CHEMOTHERAPY INDUCED NEUTROPENIA: ICD-10-CM

## 2023-10-23 DIAGNOSIS — C16.0 MALIGNANT NEOPLASM OF GASTROESOPHAGEAL JUNCTION: ICD-10-CM

## 2023-10-23 LAB
ALBUMIN SERPL BCP-MCNC: 3.6 G/DL (ref 3.5–5.2)
ALP SERPL-CCNC: 159 U/L (ref 55–135)
ALT SERPL W/O P-5'-P-CCNC: 19 U/L (ref 10–44)
ANION GAP SERPL CALC-SCNC: 7 MMOL/L (ref 8–16)
AST SERPL-CCNC: 18 U/L (ref 10–40)
BILIRUB SERPL-MCNC: 0.2 MG/DL (ref 0.1–1)
BUN SERPL-MCNC: 16 MG/DL (ref 8–23)
CALCIUM SERPL-MCNC: 9.8 MG/DL (ref 8.7–10.5)
CHLORIDE SERPL-SCNC: 105 MMOL/L (ref 95–110)
CO2 SERPL-SCNC: 27 MMOL/L (ref 23–29)
CREAT SERPL-MCNC: 0.8 MG/DL (ref 0.5–1.4)
ERYTHROCYTE [DISTWIDTH] IN BLOOD BY AUTOMATED COUNT: 12.9 % (ref 11.5–14.5)
EST. GFR  (NO RACE VARIABLE): >60 ML/MIN/1.73 M^2
GLUCOSE SERPL-MCNC: 95 MG/DL (ref 70–110)
HCT VFR BLD AUTO: 34.6 % (ref 40–54)
HGB BLD-MCNC: 11.4 G/DL (ref 14–18)
IMM GRANULOCYTES # BLD AUTO: 0.06 K/UL (ref 0–0.04)
MCH RBC QN AUTO: 34 PG (ref 27–31)
MCHC RBC AUTO-ENTMCNC: 32.9 G/DL (ref 32–36)
MCV RBC AUTO: 103 FL (ref 82–98)
NEUTROPHILS # BLD AUTO: 4.2 K/UL (ref 1.8–7.7)
PLATELET # BLD AUTO: 335 K/UL (ref 150–450)
PMV BLD AUTO: 9.1 FL (ref 9.2–12.9)
POTASSIUM SERPL-SCNC: 4.6 MMOL/L (ref 3.5–5.1)
PROT SERPL-MCNC: 7.6 G/DL (ref 6–8.4)
RBC # BLD AUTO: 3.35 M/UL (ref 4.6–6.2)
SODIUM SERPL-SCNC: 139 MMOL/L (ref 136–145)
WBC # BLD AUTO: 6.48 K/UL (ref 3.9–12.7)

## 2023-10-23 PROCEDURE — 3075F SYST BP GE 130 - 139MM HG: CPT | Mod: CPTII,S$GLB,, | Performed by: INTERNAL MEDICINE

## 2023-10-23 PROCEDURE — 36415 COLL VENOUS BLD VENIPUNCTURE: CPT | Performed by: INTERNAL MEDICINE

## 2023-10-23 PROCEDURE — 1159F MED LIST DOCD IN RCRD: CPT | Mod: CPTII,S$GLB,, | Performed by: INTERNAL MEDICINE

## 2023-10-23 PROCEDURE — 3008F PR BODY MASS INDEX (BMI) DOCUMENTED: ICD-10-PCS | Mod: CPTII,S$GLB,, | Performed by: INTERNAL MEDICINE

## 2023-10-23 PROCEDURE — 85027 COMPLETE CBC AUTOMATED: CPT | Performed by: INTERNAL MEDICINE

## 2023-10-23 PROCEDURE — 3078F PR MOST RECENT DIASTOLIC BLOOD PRESSURE < 80 MM HG: ICD-10-PCS | Mod: CPTII,S$GLB,, | Performed by: INTERNAL MEDICINE

## 2023-10-23 PROCEDURE — 80053 COMPREHEN METABOLIC PANEL: CPT | Performed by: INTERNAL MEDICINE

## 2023-10-23 PROCEDURE — 3008F BODY MASS INDEX DOCD: CPT | Mod: CPTII,S$GLB,, | Performed by: INTERNAL MEDICINE

## 2023-10-23 PROCEDURE — 99999 PR PBB SHADOW E&M-EST. PATIENT-LVL III: CPT | Mod: PBBFAC,,, | Performed by: INTERNAL MEDICINE

## 2023-10-23 PROCEDURE — 1159F PR MEDICATION LIST DOCUMENTED IN MEDICAL RECORD: ICD-10-PCS | Mod: CPTII,S$GLB,, | Performed by: INTERNAL MEDICINE

## 2023-10-23 PROCEDURE — 3078F DIAST BP <80 MM HG: CPT | Mod: CPTII,S$GLB,, | Performed by: INTERNAL MEDICINE

## 2023-10-23 PROCEDURE — 99214 OFFICE O/P EST MOD 30 MIN: CPT | Mod: S$GLB,,, | Performed by: INTERNAL MEDICINE

## 2023-10-23 PROCEDURE — 99999 PR PBB SHADOW E&M-EST. PATIENT-LVL III: ICD-10-PCS | Mod: PBBFAC,,, | Performed by: INTERNAL MEDICINE

## 2023-10-23 PROCEDURE — 3044F PR MOST RECENT HEMOGLOBIN A1C LEVEL <7.0%: ICD-10-PCS | Mod: CPTII,S$GLB,, | Performed by: INTERNAL MEDICINE

## 2023-10-23 PROCEDURE — 99214 PR OFFICE/OUTPT VISIT, EST, LEVL IV, 30-39 MIN: ICD-10-PCS | Mod: S$GLB,,, | Performed by: INTERNAL MEDICINE

## 2023-10-23 PROCEDURE — 3044F HG A1C LEVEL LT 7.0%: CPT | Mod: CPTII,S$GLB,, | Performed by: INTERNAL MEDICINE

## 2023-10-23 PROCEDURE — 3075F PR MOST RECENT SYSTOLIC BLOOD PRESS GE 130-139MM HG: ICD-10-PCS | Mod: CPTII,S$GLB,, | Performed by: INTERNAL MEDICINE

## 2023-10-23 PROCEDURE — 1111F PR DISCHARGE MEDS RECONCILED W/ CURRENT OUTPATIENT MED LIST: ICD-10-PCS | Mod: CPTII,S$GLB,, | Performed by: INTERNAL MEDICINE

## 2023-10-23 PROCEDURE — 1111F DSCHRG MED/CURRENT MED MERGE: CPT | Mod: CPTII,S$GLB,, | Performed by: INTERNAL MEDICINE

## 2023-10-24 NOTE — PLAN OF CARE
DISCONTINUE ON PATHWAY REGIMEN - Gastroesophageal    [id number]        Paclitaxel       Carboplatin           Additional Orders: Given with concurrent chemoradiotherapy.  GCSF   therapy with RT is a relative contraindication.    **Always confirm dose/schedule in your pharmacy ordering system**    REASON: Other Reason  PRIOR TREATMENT: [id number]  TREATMENT RESPONSE: Partial Response (ME)    START ON PATHWAY REGIMEN - Gastroesophageal    [id number]        Nivolumab (Opdivo)       Nivolumab (Opdivo)           Additional Orders: Serious immune-mediated adverse events can occur with   nivolumab. Please monitor your patient and refer to the linked immune-mediated   adverse reaction management materials for more information.    **Always confirm dose/schedule in your pharmacy ordering system**    Patient Characteristics:  Esophageal & GE Junction, Adenocarcinoma, Post-Neoadjuvant Therapy and   Resection, M0 (Neoadjuvant Pathologic Staging), ypT+ or ypN+  Therapeutic Status: Post-Neoadjuvant Therapy and Resection, M0 (Neoadjuvant   Pathologic Staging)  Histology: Adenocarcinoma  Disease Classification: GE Junction  AJCC N Category: ypN0  AJCC M Category: cM0  AJCC 8 Stage Grouping: I  AJCC Grade: G2  AJCC T Category: ypT2  Intent of Therapy:  Curative Intent, Not Discussed with Patient

## 2023-10-27 ENCOUNTER — PATIENT MESSAGE (OUTPATIENT)
Dept: SURGERY | Facility: CLINIC | Age: 63
End: 2023-10-27
Payer: COMMERCIAL

## 2023-10-27 ENCOUNTER — TELEPHONE (OUTPATIENT)
Dept: SURGERY | Facility: CLINIC | Age: 63
End: 2023-10-27
Payer: COMMERCIAL

## 2023-10-27 NOTE — TELEPHONE ENCOUNTER
On Monday 10/23/23, during f/u visit with Dr. June, his diet was advanced to full liquids with instructions to maintain 4 cartons of J tube feedings.   Today he reports he is feeling well, no specific complaints. Tolerating full liquids including cream of broccoli soup, ice cream, milkshake, cream of wheat, grits, pudding. He drinks 1 Ensure daily. Denies dysphagia. Denies N/V/D. He continues to instill 4 cartons of J feeds nightly. Current home scale weight today = 131.2#.   He has resumed driving, staying active, running errands.   Remains tobacco free.     Plan: advance to soft food diet. Lower J feeds to 3 cartons/ nightly. Continue at least 1 Ensure daily and monitor home scale weight 2x week.   Keep upcoming clinic visit for next week.

## 2023-11-01 NOTE — PROGRESS NOTES
"  Post-Op Follow-up Visit:   11/2/2023  Patient ID: Blayne Beebe is a 63 y.o. male, born 1960    Chief Complaint   Patient presents with    Post-op Evaluation     6/2023: abnormal lung CT screening identified mid esophageal mass with lymph nodes, stage III, [id number] at mid to lower esophagus.   7/16/2023 - 8/15/2023: neoadj CRT per Dr. June  9/22/23: restaging PET, 9/28/23: restaging appt with Dr. Meyer  10/4/23: s/p esophagectomy per Dr. Meyer    Interval History: This 64 y/o gentleman returns to clinic with his wife from [place]. After completing neoadj CRT then prehab, he underwent robotic esophagectomy last month. Diet was advanced to soft foods last Friday. Denies dysphagia. Denies N/V/D. Oral diet recall includes pasta salad, thick creamy soups, gumbo, rice, potato salad, cake, yogurt, eggs with toast. He drinks 1 Ensure daily.   He remains on Shortlist 1.4 J feedings 3 cartons/ day, reports tolerating J feeds well, denies abd pain, bloating, cramping. J tube flushes easily.   Remains afebrile. He resumed driving and is walking for exercise.   Scheduled to begin adj IO per Dr. June on 11/15/23.     Physical Exam:  /62   Pulse 80   Ht 5' 9" (1.753 m)   Wt 60.9 kg (134 lb 2.4 oz)   SpO2 100%   BMI 19.81 kg/m²     General:  Non-toxic, ambulatory  Abd:  Soft, non-tender  Incision:  L anterior neck and several R flank and abd trocar sites with edges well approximated, dermabond intact without erythema or exudate. J tube sutured in place without drainage    Pathology:  Tumor Site Distal esophagus (low thoracic esophagus) Relationship of Tumor to Esophagogastric Junction Tumor midpoint lies in the distal esophagus AND tumor involves the esophagogastric junction Histologic Type Adenocarcinoma Histologic Grade G2, moderately differentiated Tumor Size Greatest dimension in Centimeters (cm): 6.5 cm Tumor Extent Invades muscularis propria Treatment Effect Present, with residual cancer showing " evident tumor regression, but more than single cells or rare small groups of cancer cells (partial response, score 2) Lymphovascular Invasion Not identified Perineural Invasion Not identified MARGINS All margins negative for invasive carcinoma REGIONAL LYMPH NODES All regional lymph nodes negative for tumor Number of Lymph Nodes Examined: 18 DISTANT METASTASIS Not applicable   PATHOLOGIC STAGE CLASSIFICATION (pTNM, AJCC 8th Edition) Reporting of pT, pN, and (when applicable) pM categories is based on information available to the pathologist at the time the report is issued. As per the AJCC (Chapter 1, 8th Ed.) it is the managing physicians responsibility to establish the final pathologic stage based upon all pertinent information, including but potentially not limited to this pathology report. pT Category pT2: Tumor invades the muscularis propria  pN Category pN0: No regional lymph node metastasis pM Category Not applicable - pM cannot be determined from the submitted specimen(s)      ICD-10-CM ICD-9-CM    1. Malignant neoplasm of gastroesophageal junction  C16.0 151.0       2. H/O esophagectomy  Z98.890 V15.29     Z90.49        3. Alteration in nutrition associated with tube feeding  R63.8 783.9       4. Jejunostomy present  Z93.4 V44.4       5. On enteral nutrition at home  Z78.9 V49.89       Plan     Reviewed pathology briefly, agree with adj immunotherapy.   Increase physical activity but no heavy lifting.   Decrease Jfeeds to 2 cartons/ day. Monitor home scale weight. He would like to weigh 140-145# as he previously weighed before sick with covid this year. He requested to slowly wean off J feeds.   Advance to regular diet. Reviewed post esophagectomy diet, small frequent meals throughout the day, chew food well, eat slowly, smaller portions.   Questions were asked and answered to patient and his wife's satisfaction.      RTC on 11/15/23, coordinate care when he is here for infusion.    Discussed case with   Mitch who agrees with the above plan of care.         Belem Chanel NP  Upper GI / Hepatobiliary Surgical Oncology  [facility] [place], [place]  Office: [phone]  Fax: [phone]

## 2023-11-02 ENCOUNTER — OFFICE VISIT (OUTPATIENT)
Dept: SURGERY | Facility: CLINIC | Age: 63
End: 2023-11-02
Payer: COMMERCIAL

## 2023-11-02 VITALS
SYSTOLIC BLOOD PRESSURE: 101 MMHG | HEART RATE: 80 BPM | WEIGHT: 134.13 LBS | BODY MASS INDEX: 19.87 KG/M2 | DIASTOLIC BLOOD PRESSURE: 62 MMHG | HEIGHT: 69 IN | OXYGEN SATURATION: 100 %

## 2023-11-02 DIAGNOSIS — Z93.4 JEJUNOSTOMY PRESENT: ICD-10-CM

## 2023-11-02 DIAGNOSIS — Z98.890 H/O ESOPHAGECTOMY: ICD-10-CM

## 2023-11-02 DIAGNOSIS — Z78.9 ON ENTERAL NUTRITION AT HOME: ICD-10-CM

## 2023-11-02 DIAGNOSIS — C16.0 MALIGNANT NEOPLASM OF GASTROESOPHAGEAL JUNCTION: Primary | ICD-10-CM

## 2023-11-02 DIAGNOSIS — Z90.49 H/O ESOPHAGECTOMY: ICD-10-CM

## 2023-11-02 DIAGNOSIS — R63.8 ALTERATION IN NUTRITION ASSOCIATED WITH TUBE FEEDING: ICD-10-CM

## 2023-11-02 PROCEDURE — 99024 POSTOP FOLLOW-UP VISIT: CPT | Mod: S$GLB,,, | Performed by: NURSE PRACTITIONER

## 2023-11-02 PROCEDURE — 3044F HG A1C LEVEL LT 7.0%: CPT | Mod: CPTII,S$GLB,, | Performed by: NURSE PRACTITIONER

## 2023-11-02 PROCEDURE — 1159F MED LIST DOCD IN RCRD: CPT | Mod: CPTII,S$GLB,, | Performed by: NURSE PRACTITIONER

## 2023-11-02 PROCEDURE — 1160F PR REVIEW ALL MEDS BY PRESCRIBER/CLIN PHARMACIST DOCUMENTED: ICD-10-PCS | Mod: CPTII,S$GLB,, | Performed by: NURSE PRACTITIONER

## 2023-11-02 PROCEDURE — 3078F DIAST BP <80 MM HG: CPT | Mod: CPTII,S$GLB,, | Performed by: NURSE PRACTITIONER

## 2023-11-02 PROCEDURE — 3074F SYST BP LT 130 MM HG: CPT | Mod: CPTII,S$GLB,, | Performed by: NURSE PRACTITIONER

## 2023-11-02 PROCEDURE — 99024 PR POST-OP FOLLOW-UP VISIT: ICD-10-PCS | Mod: S$GLB,,, | Performed by: NURSE PRACTITIONER

## 2023-11-02 PROCEDURE — 3078F PR MOST RECENT DIASTOLIC BLOOD PRESSURE < 80 MM HG: ICD-10-PCS | Mod: CPTII,S$GLB,, | Performed by: NURSE PRACTITIONER

## 2023-11-02 PROCEDURE — 1160F RVW MEDS BY RX/DR IN RCRD: CPT | Mod: CPTII,S$GLB,, | Performed by: NURSE PRACTITIONER

## 2023-11-02 PROCEDURE — 3044F PR MOST RECENT HEMOGLOBIN A1C LEVEL <7.0%: ICD-10-PCS | Mod: CPTII,S$GLB,, | Performed by: NURSE PRACTITIONER

## 2023-11-02 PROCEDURE — 3074F PR MOST RECENT SYSTOLIC BLOOD PRESSURE < 130 MM HG: ICD-10-PCS | Mod: CPTII,S$GLB,, | Performed by: NURSE PRACTITIONER

## 2023-11-02 PROCEDURE — 1159F PR MEDICATION LIST DOCUMENTED IN MEDICAL RECORD: ICD-10-PCS | Mod: CPTII,S$GLB,, | Performed by: NURSE PRACTITIONER

## 2023-11-02 PROCEDURE — 99999 PR PBB SHADOW E&M-EST. PATIENT-LVL III: ICD-10-PCS | Mod: PBBFAC,,, | Performed by: NURSE PRACTITIONER

## 2023-11-02 PROCEDURE — 99999 PR PBB SHADOW E&M-EST. PATIENT-LVL III: CPT | Mod: PBBFAC,,, | Performed by: NURSE PRACTITIONER

## 2023-11-10 ENCOUNTER — EXTERNAL HOME HEALTH (OUTPATIENT)
Dept: HOME HEALTH SERVICES | Facility: HOSPITAL | Age: 63
End: 2023-11-10
Payer: COMMERCIAL

## 2023-11-15 ENCOUNTER — LAB VISIT (OUTPATIENT)
Dept: LAB | Facility: HOSPITAL | Age: 63
End: 2023-11-15
Attending: INTERNAL MEDICINE
Payer: COMMERCIAL

## 2023-11-15 ENCOUNTER — OFFICE VISIT (OUTPATIENT)
Dept: SURGERY | Facility: CLINIC | Age: 63
End: 2023-11-15
Payer: COMMERCIAL

## 2023-11-15 ENCOUNTER — OFFICE VISIT (OUTPATIENT)
Dept: HEMATOLOGY/ONCOLOGY | Facility: CLINIC | Age: 63
End: 2023-11-15
Payer: COMMERCIAL

## 2023-11-15 ENCOUNTER — INFUSION (OUTPATIENT)
Dept: INFUSION THERAPY | Facility: HOSPITAL | Age: 63
End: 2023-11-15
Payer: COMMERCIAL

## 2023-11-15 VITALS
SYSTOLIC BLOOD PRESSURE: 127 MMHG | DIASTOLIC BLOOD PRESSURE: 67 MMHG | WEIGHT: 135.13 LBS | TEMPERATURE: 98 F | OXYGEN SATURATION: 99 % | BODY MASS INDEX: 20.01 KG/M2 | HEIGHT: 69 IN | HEART RATE: 66 BPM

## 2023-11-15 VITALS
HEART RATE: 66 BPM | WEIGHT: 135.13 LBS | OXYGEN SATURATION: 99 % | DIASTOLIC BLOOD PRESSURE: 67 MMHG | BODY MASS INDEX: 20.01 KG/M2 | HEIGHT: 69 IN | SYSTOLIC BLOOD PRESSURE: 127 MMHG | TEMPERATURE: 98 F

## 2023-11-15 VITALS — SYSTOLIC BLOOD PRESSURE: 115 MMHG | HEART RATE: 71 BPM | DIASTOLIC BLOOD PRESSURE: 64 MMHG

## 2023-11-15 DIAGNOSIS — E44.0 MODERATE MALNUTRITION: ICD-10-CM

## 2023-11-15 DIAGNOSIS — C16.0 MALIGNANT NEOPLASM OF GASTROESOPHAGEAL JUNCTION: Primary | ICD-10-CM

## 2023-11-15 DIAGNOSIS — Z90.49 H/O ESOPHAGECTOMY: ICD-10-CM

## 2023-11-15 DIAGNOSIS — K20.90 ESOPHAGITIS: ICD-10-CM

## 2023-11-15 DIAGNOSIS — D70.1 CHEMOTHERAPY INDUCED NEUTROPENIA: ICD-10-CM

## 2023-11-15 DIAGNOSIS — C16.0 MALIGNANT NEOPLASM OF GASTROESOPHAGEAL JUNCTION: ICD-10-CM

## 2023-11-15 DIAGNOSIS — Z93.4 JEJUNOSTOMY PRESENT: ICD-10-CM

## 2023-11-15 DIAGNOSIS — J43.9 PULMONARY EMPHYSEMA, UNSPECIFIED EMPHYSEMA TYPE: ICD-10-CM

## 2023-11-15 DIAGNOSIS — D69.59 CHEMOTHERAPY-INDUCED THROMBOCYTOPENIA: ICD-10-CM

## 2023-11-15 DIAGNOSIS — F17.201 TOBACCO USE DISORDER, MODERATE, IN EARLY REMISSION: ICD-10-CM

## 2023-11-15 DIAGNOSIS — T45.1X5A ANTINEOPLASTIC CHEMOTHERAPY INDUCED ANEMIA: ICD-10-CM

## 2023-11-15 DIAGNOSIS — I70.0 ATHEROSCLEROSIS OF AORTA: ICD-10-CM

## 2023-11-15 DIAGNOSIS — R63.8 ALTERATION IN NUTRITION ASSOCIATED WITH TUBE FEEDING: ICD-10-CM

## 2023-11-15 DIAGNOSIS — D64.81 ANTINEOPLASTIC CHEMOTHERAPY INDUCED ANEMIA: ICD-10-CM

## 2023-11-15 DIAGNOSIS — T45.1X5A CHEMOTHERAPY INDUCED NEUTROPENIA: ICD-10-CM

## 2023-11-15 DIAGNOSIS — T45.1X5A CHEMOTHERAPY-INDUCED THROMBOCYTOPENIA: ICD-10-CM

## 2023-11-15 DIAGNOSIS — Z98.890 H/O ESOPHAGECTOMY: ICD-10-CM

## 2023-11-15 DIAGNOSIS — Z74.09 DECREASED FUNCTIONAL MOBILITY AND ENDURANCE: ICD-10-CM

## 2023-11-15 DIAGNOSIS — F17.210 NICOTINE DEPENDENCE, CIGARETTES, UNCOMPLICATED: ICD-10-CM

## 2023-11-15 LAB
ALBUMIN SERPL BCP-MCNC: 3.6 G/DL (ref 3.5–5.2)
ALP SERPL-CCNC: 125 U/L (ref 55–135)
ALT SERPL W/O P-5'-P-CCNC: 25 U/L (ref 10–44)
ANION GAP SERPL CALC-SCNC: 7 MMOL/L (ref 8–16)
AST SERPL-CCNC: 23 U/L (ref 10–40)
BILIRUB SERPL-MCNC: 0.3 MG/DL (ref 0.1–1)
BUN SERPL-MCNC: 23 MG/DL (ref 8–23)
CALCIUM SERPL-MCNC: 9.7 MG/DL (ref 8.7–10.5)
CHLORIDE SERPL-SCNC: 105 MMOL/L (ref 95–110)
CO2 SERPL-SCNC: 28 MMOL/L (ref 23–29)
CREAT SERPL-MCNC: 0.9 MG/DL (ref 0.5–1.4)
ERYTHROCYTE [DISTWIDTH] IN BLOOD BY AUTOMATED COUNT: 12.6 % (ref 11.5–14.5)
EST. GFR  (NO RACE VARIABLE): >60 ML/MIN/1.73 M^2
GLUCOSE SERPL-MCNC: 92 MG/DL (ref 70–110)
HCT VFR BLD AUTO: 36.7 % (ref 40–54)
HGB BLD-MCNC: 12.4 G/DL (ref 14–18)
IMM GRANULOCYTES # BLD AUTO: 0.05 K/UL (ref 0–0.04)
MCH RBC QN AUTO: 33.2 PG (ref 27–31)
MCHC RBC AUTO-ENTMCNC: 33.8 G/DL (ref 32–36)
MCV RBC AUTO: 98 FL (ref 82–98)
NEUTROPHILS # BLD AUTO: 4.1 K/UL (ref 1.8–7.7)
PLATELET # BLD AUTO: 239 K/UL (ref 150–450)
PMV BLD AUTO: 8.8 FL (ref 9.2–12.9)
POTASSIUM SERPL-SCNC: 4.7 MMOL/L (ref 3.5–5.1)
PROT SERPL-MCNC: 7.4 G/DL (ref 6–8.4)
RBC # BLD AUTO: 3.74 M/UL (ref 4.6–6.2)
SODIUM SERPL-SCNC: 140 MMOL/L (ref 136–145)
TSH SERPL DL<=0.005 MIU/L-ACNC: 0.88 UIU/ML (ref 0.4–4)
WBC # BLD AUTO: 6.42 K/UL (ref 3.9–12.7)

## 2023-11-15 PROCEDURE — 99999 PR PBB SHADOW E&M-EST. PATIENT-LVL IV: ICD-10-PCS | Mod: PBBFAC,,, | Performed by: REGISTERED NURSE

## 2023-11-15 PROCEDURE — 1159F PR MEDICATION LIST DOCUMENTED IN MEDICAL RECORD: ICD-10-PCS | Mod: CPTII,S$GLB,, | Performed by: REGISTERED NURSE

## 2023-11-15 PROCEDURE — 84443 ASSAY THYROID STIM HORMONE: CPT | Performed by: INTERNAL MEDICINE

## 2023-11-15 PROCEDURE — 80053 COMPREHEN METABOLIC PANEL: CPT | Performed by: INTERNAL MEDICINE

## 2023-11-15 PROCEDURE — 99215 OFFICE O/P EST HI 40 MIN: CPT | Mod: S$GLB,,, | Performed by: REGISTERED NURSE

## 2023-11-15 PROCEDURE — 3078F PR MOST RECENT DIASTOLIC BLOOD PRESSURE < 80 MM HG: ICD-10-PCS | Mod: CPTII,S$GLB,, | Performed by: NURSE PRACTITIONER

## 2023-11-15 PROCEDURE — 96413 CHEMO IV INFUSION 1 HR: CPT

## 2023-11-15 PROCEDURE — 3044F HG A1C LEVEL LT 7.0%: CPT | Mod: CPTII,S$GLB,, | Performed by: NURSE PRACTITIONER

## 2023-11-15 PROCEDURE — 3074F PR MOST RECENT SYSTOLIC BLOOD PRESSURE < 130 MM HG: ICD-10-PCS | Mod: CPTII,S$GLB,, | Performed by: REGISTERED NURSE

## 2023-11-15 PROCEDURE — 3078F DIAST BP <80 MM HG: CPT | Mod: CPTII,S$GLB,, | Performed by: REGISTERED NURSE

## 2023-11-15 PROCEDURE — 3008F PR BODY MASS INDEX (BMI) DOCUMENTED: ICD-10-PCS | Mod: CPTII,S$GLB,, | Performed by: REGISTERED NURSE

## 2023-11-15 PROCEDURE — 3078F DIAST BP <80 MM HG: CPT | Mod: CPTII,S$GLB,, | Performed by: NURSE PRACTITIONER

## 2023-11-15 PROCEDURE — 3044F PR MOST RECENT HEMOGLOBIN A1C LEVEL <7.0%: ICD-10-PCS | Mod: CPTII,S$GLB,, | Performed by: REGISTERED NURSE

## 2023-11-15 PROCEDURE — 63600175 PHARM REV CODE 636 W HCPCS: Performed by: INTERNAL MEDICINE

## 2023-11-15 PROCEDURE — 99999 PR PBB SHADOW E&M-EST. PATIENT-LVL IV: CPT | Mod: PBBFAC,,, | Performed by: REGISTERED NURSE

## 2023-11-15 PROCEDURE — 1160F RVW MEDS BY RX/DR IN RCRD: CPT | Mod: CPTII,S$GLB,, | Performed by: REGISTERED NURSE

## 2023-11-15 PROCEDURE — 99024 POSTOP FOLLOW-UP VISIT: CPT | Mod: S$GLB,,, | Performed by: NURSE PRACTITIONER

## 2023-11-15 PROCEDURE — 99215 PR OFFICE/OUTPT VISIT, EST, LEVL V, 40-54 MIN: ICD-10-PCS | Mod: S$GLB,,, | Performed by: REGISTERED NURSE

## 2023-11-15 PROCEDURE — 3044F HG A1C LEVEL LT 7.0%: CPT | Mod: CPTII,S$GLB,, | Performed by: REGISTERED NURSE

## 2023-11-15 PROCEDURE — 3044F PR MOST RECENT HEMOGLOBIN A1C LEVEL <7.0%: ICD-10-PCS | Mod: CPTII,S$GLB,, | Performed by: NURSE PRACTITIONER

## 2023-11-15 PROCEDURE — 99999 PR PBB SHADOW E&M-EST. PATIENT-LVL III: ICD-10-PCS | Mod: PBBFAC,,, | Performed by: NURSE PRACTITIONER

## 2023-11-15 PROCEDURE — 99024 PR POST-OP FOLLOW-UP VISIT: ICD-10-PCS | Mod: S$GLB,,, | Performed by: NURSE PRACTITIONER

## 2023-11-15 PROCEDURE — 1160F PR REVIEW ALL MEDS BY PRESCRIBER/CLIN PHARMACIST DOCUMENTED: ICD-10-PCS | Mod: CPTII,S$GLB,, | Performed by: REGISTERED NURSE

## 2023-11-15 PROCEDURE — 3078F PR MOST RECENT DIASTOLIC BLOOD PRESSURE < 80 MM HG: ICD-10-PCS | Mod: CPTII,S$GLB,, | Performed by: REGISTERED NURSE

## 2023-11-15 PROCEDURE — 25000003 PHARM REV CODE 250: Performed by: INTERNAL MEDICINE

## 2023-11-15 PROCEDURE — 85027 COMPLETE CBC AUTOMATED: CPT | Performed by: INTERNAL MEDICINE

## 2023-11-15 PROCEDURE — A4216 STERILE WATER/SALINE, 10 ML: HCPCS | Performed by: INTERNAL MEDICINE

## 2023-11-15 PROCEDURE — 3074F PR MOST RECENT SYSTOLIC BLOOD PRESSURE < 130 MM HG: ICD-10-PCS | Mod: CPTII,S$GLB,, | Performed by: NURSE PRACTITIONER

## 2023-11-15 PROCEDURE — 3008F BODY MASS INDEX DOCD: CPT | Mod: CPTII,S$GLB,, | Performed by: REGISTERED NURSE

## 2023-11-15 PROCEDURE — 99999 PR PBB SHADOW E&M-EST. PATIENT-LVL III: CPT | Mod: PBBFAC,,, | Performed by: NURSE PRACTITIONER

## 2023-11-15 PROCEDURE — 3074F SYST BP LT 130 MM HG: CPT | Mod: CPTII,S$GLB,, | Performed by: NURSE PRACTITIONER

## 2023-11-15 PROCEDURE — 1159F MED LIST DOCD IN RCRD: CPT | Mod: CPTII,S$GLB,, | Performed by: REGISTERED NURSE

## 2023-11-15 PROCEDURE — 3074F SYST BP LT 130 MM HG: CPT | Mod: CPTII,S$GLB,, | Performed by: REGISTERED NURSE

## 2023-11-15 PROCEDURE — 36415 COLL VENOUS BLD VENIPUNCTURE: CPT | Performed by: INTERNAL MEDICINE

## 2023-11-15 RX ORDER — DIPHENHYDRAMINE HYDROCHLORIDE 50 MG/ML
50 INJECTION INTRAMUSCULAR; INTRAVENOUS ONCE AS NEEDED
Status: DISCONTINUED | OUTPATIENT
Start: 2023-11-15 | End: 2023-11-15 | Stop reason: HOSPADM

## 2023-11-15 RX ORDER — HEPARIN 100 UNIT/ML
500 SYRINGE INTRAVENOUS
Status: CANCELLED | OUTPATIENT
Start: 2023-11-15

## 2023-11-15 RX ORDER — SODIUM CHLORIDE 0.9 % (FLUSH) 0.9 %
10 SYRINGE (ML) INJECTION
Status: CANCELLED | OUTPATIENT
Start: 2023-11-15

## 2023-11-15 RX ORDER — PROCHLORPERAZINE EDISYLATE 5 MG/ML
10 INJECTION INTRAMUSCULAR; INTRAVENOUS ONCE AS NEEDED
Status: DISCONTINUED | OUTPATIENT
Start: 2023-11-15 | End: 2023-11-15 | Stop reason: HOSPADM

## 2023-11-15 RX ORDER — EPINEPHRINE 0.3 MG/.3ML
0.3 INJECTION SUBCUTANEOUS ONCE AS NEEDED
Status: CANCELLED | OUTPATIENT
Start: 2023-11-15

## 2023-11-15 RX ORDER — SODIUM CHLORIDE 0.9 % (FLUSH) 0.9 %
10 SYRINGE (ML) INJECTION
Status: DISCONTINUED | OUTPATIENT
Start: 2023-11-15 | End: 2023-11-15 | Stop reason: HOSPADM

## 2023-11-15 RX ORDER — EPINEPHRINE 0.3 MG/.3ML
0.3 INJECTION SUBCUTANEOUS ONCE AS NEEDED
Status: DISCONTINUED | OUTPATIENT
Start: 2023-11-15 | End: 2023-11-15 | Stop reason: HOSPADM

## 2023-11-15 RX ORDER — DIPHENHYDRAMINE HYDROCHLORIDE 50 MG/ML
50 INJECTION INTRAMUSCULAR; INTRAVENOUS ONCE AS NEEDED
Status: CANCELLED | OUTPATIENT
Start: 2023-11-15

## 2023-11-15 RX ORDER — PROCHLORPERAZINE EDISYLATE 5 MG/ML
10 INJECTION INTRAMUSCULAR; INTRAVENOUS ONCE AS NEEDED
Status: CANCELLED
Start: 2023-11-15

## 2023-11-15 RX ORDER — HEPARIN 100 UNIT/ML
500 SYRINGE INTRAVENOUS
Status: DISCONTINUED | OUTPATIENT
Start: 2023-11-15 | End: 2023-11-15 | Stop reason: HOSPADM

## 2023-11-15 RX ADMIN — Medication 10 ML: at 01:11

## 2023-11-15 RX ADMIN — SODIUM CHLORIDE: 9 INJECTION, SOLUTION INTRAVENOUS at 12:11

## 2023-11-15 RX ADMIN — SODIUM CHLORIDE 480 MG: 9 INJECTION, SOLUTION INTRAVENOUS at 12:11

## 2023-11-15 RX ADMIN — HEPARIN 500 UNITS: 100 SYRINGE at 01:11

## 2023-11-15 NOTE — PLAN OF CARE
12:30-Labs, hx, and medications reviewed, pt meets parameters for treatment today. Assessment completed and plan of care reviewed. Pt verbalized understanding. PAC accessed with no complications. Pt voices no new complaints or concerns, will continue to monitor for safety.

## 2023-11-15 NOTE — H&P (VIEW-ONLY)
"  Post-Op Follow-up Visit:   11/15/2023  Patient ID: Blayne Beebe is a 63 y.o. male, born 1960    Chief Complaint   Patient presents with    Post-op Evaluation     6/2023: abnormal lung CT screening identified mid esophageal mass with lymph nodes, stage III, [id number] at mid to lower esophagus.   7/16/2023 - 8/15/2023: neoadj CRT per Dr. June  9/22/23: restaging PET, 9/28/23: restaging appt with Dr. Meyer  10/4/23: s/p esophagectomy per Dr. Meyer    Interval History: Lisa 62 y/o gentleman returns to clinic from [place] with his wife. He is scheduled to begin adj immunotherapy per Dr. June today. He was last seen in clinic on 11/2/23 when J feeds lowered to 2 cartons/ night and advanced to regular diet. He continues to infuse 2 cartons of J feeds nightly. Denies any difficulty with J tube or feedings, and tube flushes easily.   Overall he is feeling well. Appetite is improving, denies dysphagia. Tolerating regular diet without N/V/D. Weight up to 135#. He drinks 1 Ensure daily. Oral diet recall includes: eggs, yogurt, donut, coffee, waffle; tunafish or egg salad sandwich, white beans, spaghetti, cake.     Physical Exam:  /67 (BP Location: Right arm, Patient Position: Sitting)   Pulse 66   Temp 97.9 °F (36.6 °C) (Oral)   Ht 5' 9" (1.753 m)   Wt 61.3 kg (135 lb 2.3 oz)   SpO2 99%   BMI 19.96 kg/m²     General:  Non-toxic, ambulatory  Abd:  Soft, non-tender  Incision:  J tube sutures in place without erythema or exudate.     Pathology:  Tumor Site Distal esophagus (low thoracic esophagus) Relationship of Tumor to Esophagogastric Junction Tumor midpoint lies in the distal esophagus AND tumor involves the esophagogastric junction Histologic Type Adenocarcinoma Histologic Grade G2, moderately differentiated Tumor Size Greatest dimension in Centimeters (cm): 6.5 cm Tumor Extent Invades muscularis propria Treatment Effect Present, with residual cancer showing evident tumor regression, but more than " single cells or rare small groups of cancer cells (partial response, score 2) Lymphovascular Invasion Not identified Perineural Invasion Not identified MARGINS All margins negative for invasive carcinoma REGIONAL LYMPH NODES All regional lymph nodes negative for tumor Number of Lymph Nodes Examined: 18 DISTANT METASTASIS Not applicable   PATHOLOGIC STAGE CLASSIFICATION (pTNM, AJCC 8th Edition) Reporting of pT, pN, and (when applicable) pM categories is based on information available to the pathologist at the time the report is issued. As per the AJCC (Chapter 1, 8th Ed.) it is the managing physicians responsibility to establish the final pathologic stage based upon all pertinent information, including but potentially not limited to this pathology report. pT Category pT2: Tumor invades the muscularis propria  pN Category pN0: No regional lymph node metastasis pM Category Not applicable - pM cannot be determined from the submitted specimen(s)      ICD-10-CM ICD-9-CM    1. Malignant neoplasm of gastroesophageal junction  C16.0 151.0       2. H/O esophagectomy  Z98.890 V15.29     Z90.49        3. Jejunostomy present  Z93.4 V44.4       4. Alteration in nutrition associated with tube feeding  R63.8 783.9       5. Decreased functional mobility and endurance  Z74.09 780.99       6. Tobacco use disorder, moderate, in early remission  F17.201 305.1       Plan     Reviewed pathology, agree with plan for adj immunotherapy. Next infusion scheduled for 12/12/23.   Continue regular oral diet, focus on protein and adequate hydration  He requested to remain on J feeds at night to increase his weight closer to baseline of 140#. When his weight is acceptable, he may d/c TF. Continue to monitor home scale.   Increase physical activity  Remain tobacco free    RTC 12/12/23.     Questions were asked and answered to patient and his wife's satisfaction.            Belem Chanle NP  Upper GI / Hepatobiliary Surgical Oncology  [facility]  [place], [place]  Office: [phone]  Fax: [phone]

## 2023-11-15 NOTE — PROGRESS NOTES
"  Post-Op Follow-up Visit:   11/15/2023  Patient ID: Blayne Beebe is a 63 y.o. male, born 1960    Chief Complaint   Patient presents with    Post-op Evaluation     6/2023: abnormal lung CT screening identified mid esophageal mass with lymph nodes, stage III, [id number] at mid to lower esophagus.   7/16/2023 - 8/15/2023: neoadj CRT per Dr. June  9/22/23: restaging PET, 9/28/23: restaging appt with Dr. Meyer  10/4/23: s/p esophagectomy per Dr. Meyer    Interval History: Lisa 64 y/o gentleman returns to clinic from [place] with his wife. He is scheduled to begin adj immunotherapy per Dr. June today. He was last seen in clinic on 11/2/23 when J feeds lowered to 2 cartons/ night and advanced to regular diet. He continues to infuse 2 cartons of J feeds nightly. Denies any difficulty with J tube or feedings, and tube flushes easily.   Overall he is feeling well. Appetite is improving, denies dysphagia. Tolerating regular diet without N/V/D. Weight up to 135#. He drinks 1 Ensure daily. Oral diet recall includes: eggs, yogurt, donut, coffee, waffle; tunafish or egg salad sandwich, white beans, spaghetti, cake.     Physical Exam:  /67 (BP Location: Right arm, Patient Position: Sitting)   Pulse 66   Temp 97.9 °F (36.6 °C) (Oral)   Ht 5' 9" (1.753 m)   Wt 61.3 kg (135 lb 2.3 oz)   SpO2 99%   BMI 19.96 kg/m²     General:  Non-toxic, ambulatory  Abd:  Soft, non-tender  Incision:  J tube sutures in place without erythema or exudate.     Pathology:  Tumor Site Distal esophagus (low thoracic esophagus) Relationship of Tumor to Esophagogastric Junction Tumor midpoint lies in the distal esophagus AND tumor involves the esophagogastric junction Histologic Type Adenocarcinoma Histologic Grade G2, moderately differentiated Tumor Size Greatest dimension in Centimeters (cm): 6.5 cm Tumor Extent Invades muscularis propria Treatment Effect Present, with residual cancer showing evident tumor regression, but more than " single cells or rare small groups of cancer cells (partial response, score 2) Lymphovascular Invasion Not identified Perineural Invasion Not identified MARGINS All margins negative for invasive carcinoma REGIONAL LYMPH NODES All regional lymph nodes negative for tumor Number of Lymph Nodes Examined: 18 DISTANT METASTASIS Not applicable   PATHOLOGIC STAGE CLASSIFICATION (pTNM, AJCC 8th Edition) Reporting of pT, pN, and (when applicable) pM categories is based on information available to the pathologist at the time the report is issued. As per the AJCC (Chapter 1, 8th Ed.) it is the managing physicians responsibility to establish the final pathologic stage based upon all pertinent information, including but potentially not limited to this pathology report. pT Category pT2: Tumor invades the muscularis propria  pN Category pN0: No regional lymph node metastasis pM Category Not applicable - pM cannot be determined from the submitted specimen(s)      ICD-10-CM ICD-9-CM    1. Malignant neoplasm of gastroesophageal junction  C16.0 151.0       2. H/O esophagectomy  Z98.890 V15.29     Z90.49        3. Jejunostomy present  Z93.4 V44.4       4. Alteration in nutrition associated with tube feeding  R63.8 783.9       5. Decreased functional mobility and endurance  Z74.09 780.99       6. Tobacco use disorder, moderate, in early remission  F17.201 305.1       Plan     Reviewed pathology, agree with plan for adj immunotherapy. Next infusion scheduled for 12/12/23.   Continue regular oral diet, focus on protein and adequate hydration  He requested to remain on J feeds at night to increase his weight closer to baseline of 140#. When his weight is acceptable, he may d/c TF. Continue to monitor home scale.   Increase physical activity  Remain tobacco free    RTC 12/12/23.     Questions were asked and answered to patient and his wife's satisfaction.            Belem Chanel NP  Upper GI / Hepatobiliary Surgical Oncology  [facility]  [place], [place]  Office: [phone]  Fax: [phone]

## 2023-11-15 NOTE — PROGRESS NOTES
MEDICAL ONCOLOGY - ESTABLISHED PATIENT VISIT    Reason for visit: Esophageal adenocarcinoma    Best Contact Phone Number(s): [phone] (home)      Cancer/Stage/TNM:    Cancer Staging   Malignant neoplasm of gastroesophageal junction  Staging form: Esophagus - Adenocarcinoma, AJCC 8th Edition  - Clinical stage from 6/13/2023: Stage III (cT3, cN1, cM0, G2) - Signed by Sunday Jasso MD on 7/5/2023       Oncology History   Malignant neoplasm of gastroesophageal junction   6/13/2023 Cancer Staged    Staging form: Esophagus - Adenocarcinoma, AJCC 8th Edition  - Clinical stage from 6/13/2023: Stage III (cT3, cN1, cM0, G2)     6/23/2023 Initial Diagnosis    Malignant neoplasm of gastroesophageal junction     7/3/2023 Tumor Conference     [facility] UGI MULTIDISCIPLINARY TUMOR BOARD  PATIENT REVIEW FORM   ____________________________________________________________    CLINIC #: [id number]   DATE: 7/3/2023    DIAGNOSIS: esophageal CA    PRESENTER: Mitch    PATIENT SUMMARY:   This 64 y/o gentleman is a long time smoker with emphysema who had low dose screening lung CT in 4/2023 per his PCP. Given an abnormality on this scan, he had PET on 6/1/23 which identified large hypermetabolic mid esophageal mass with lymph nodes. He underwent EGD on 6/13/23 that found large fungating ulcerated mass with bleeding in middle third of esophagus to lower esophagus, 34-44cm. Pathology revealed moderately differentiated adenocarcinoma.   Staging PET reviewed - level 2 cervical lymph node with FDG uptake, nothing in lungs concerning  Staging EUS today per Dr. Duffy.   He has been evaluated by Dr. Vance in [place] and Dr. June.   He is scheduled to see rad onc, Dr. Jasso, Wednesday and IR for port placement on 7/12/23.     BOARD RECOMMENDATIONS:   Proceed with neoadj CRT, then restaging to consider surgical resection    CONSULT NEEDED:     [] Surgery    [] Hem/Onc    [] Rad/Onc    [] Dietary                 [] Social  Service    [] Psychology       [] AES  [] Radiology     Clinical Stage: Tumor 3 Node(s) 1 Metastasis 0      GROUP STAGE:  [] O    [] 1   [] II     [x] III   []IV  [] Local recurrence     [] Regional recurrence     [] Distant recurrence   Metastatic site(s): none         [x] [facility] Treatment Guidelines reviewed and care planned is consistent with guidelines.         (i.e., NCCN, NCI, PD, ACO, AUA, etc.)    PRESENTATION AT CANCER CONFERENCE:         [x] Prospective    [] Retrospective     [] Follow-Up         7/16/2023 - 8/8/2023 Chemotherapy    Treatment Summary   Plan Name: OP ESOPHAGEAL PACLITAXEL CARBOPLATIN WEEKLY  Treatment Goal: Control  Status: Inactive  Start Date: 7/16/2023  End Date: 8/8/2023  Provider: Delta June MD  Chemotherapy: CARBOplatin (PARAPLATIN) 195 mg in sodium chloride 0.9% 304.5 mL chemo infusion, 195 mg (100 % of original dose 193 mg), Intravenous, Clinic/HOD 1 time, 1 of 1 cycle  Dose modification:   (original dose 193 mg, Cycle 1)  Administration: 195 mg (7/18/2023), 210 mg (7/24/2023), 230 mg (7/31/2023), 210 mg (8/8/2023)  PACLitaxeL (TAXOL) 50 mg/m2 = 84 mg in sodium chloride 0.9% 250 mL chemo infusion, 50 mg/m2 = 84 mg, Intravenous, Clinic/HOD 1 time, 1 of 1 cycle  Administration: 84 mg (7/18/2023)     7/18/2023 - 8/17/2023 Radiation Therapy    Treating physician: Sunday Jasso    Course: C1 [place]ax 2023    Treatment Site Ref. ID Energy Dose/Fx (Gy) #Fx Dose Correction (Gy) Total Dose (Gy) Start Date End Date Elapsed Days   IM Esophagus PTV_High 6X 1.8 23 / 23 0 41.4 7/18/2023 8/17/2023 30        11/15/2023 -  Chemotherapy    Treatment Summary   Plan Name: OP NIVOLUMAB 480 MG Q4W  Treatment Goal: Curative  Status: Active  Start Date: 11/15/2023 (Planned)  End Date: 9/18/2024 (Planned)  Provider: Delta June MD  Chemotherapy: [No matching medication found in this treatment plan]          Interim History:   63 y.o. male with esophageal adenocarcinoma who presents for  follow-up after completion of chemoradiation and esophagogastrectomy with Dr. Meyer on 10/4/23. Scheduled to start nivolumab today. Feeling well overall. Down to 2 cartons per day of tube feeds. Tolerating solid foods PO without significant issues. No nausea or dysphagia. Mild diarrhea, though he feels this is related to wider variety of foods eaten. Continues with mild soreness to R chest wall though no definitive pain. States this is slightly improved from prior.     ECOG status is 0. Presents with his wife today.     ROS:   Review of Systems   Constitutional:  Negative for chills, fever, malaise/fatigue and weight loss.   HENT:  Negative for sore throat.    Eyes:  Negative for blurred vision and pain.   Respiratory:  Negative for cough and shortness of breath.    Cardiovascular:  Positive for chest pain. Negative for leg swelling.   Gastrointestinal:  Negative for abdominal pain, constipation, diarrhea, heartburn, nausea and vomiting.   Genitourinary:  Negative for dysuria and frequency.   Musculoskeletal:  Negative for back pain, falls and myalgias.   Skin:  Negative for rash.   Neurological:  Negative for dizziness, weakness and headaches.   Endo/Heme/Allergies:  Does not bruise/bleed easily.   Psychiatric/Behavioral:  Negative for depression. The patient is not nervous/anxious.    All other systems reviewed and are negative.      Past Medical History:   Past Medical History:   Diagnosis Date    Arthritis     Cancer     COPD (chronic obstructive pulmonary disease)         Past Surgical History:   Past Surgical History:   Procedure Laterality Date    CERVICAL FUSION      COLONOSCOPY N/A 3/27/2018    Procedure: COLONOSCOPY;  Surgeon: Maria Teresa Morocho MD;  Location: [facility];  Service: Endoscopy;  Laterality: N/A;    COLONOSCOPY N/A 6/13/2023    Procedure: COLONOSCOPY;  Surgeon: Kelli Snell MD;  Location: [facility];  Service: Endoscopy;  Laterality: N/A;    ENDOSCOPIC ULTRASOUND OF UPPER GASTROINTESTINAL  TRACT N/A 7/3/2023    Procedure: ULTRASOUND, UPPER GI TRACT, ENDOSCOPIC;  Surgeon: Ray Duffy MD;  Location: [facility];  Service: Endoscopy;  Laterality: N/A;    ESOPHAGOGASTRODUODENOSCOPY N/A 6/13/2023    Procedure: EGD (ESOPHAGOGASTRODUODENOSCOPY);  Surgeon: Kelli Snell MD;  Location: [facility] END[facility];  Service: Endoscopy;  Laterality: N/A;    ESOPHAGOGASTRODUODENOSCOPY N/A 7/3/2023    Procedure: EGD (ESOPHAGOGASTRODUODENOSCOPY);  Surgeon: Ray Duffy MD;  Location: [facility];  Service: Endoscopy;  Laterality: N/A;    INGUINAL HERNIA REPAIR Bilateral     Right x2, x1 left    PLACEMENT OF JEJUNOSTOMY TUBE  10/4/2023    Procedure: INSERTION, JEJUNOSTOMY TUBE;  Surgeon: Nas Meyer MD;  Location: [facility] OR [address] [place] [address];  Service: General;;    PYLOROMYOTOMY  10/4/2023    Procedure: PYLOROMYOTOMY;  Surgeon: Nas Meyer MD;  Location: [facility] OR [address] [place] [address];  Service: General;;    ROBOT-ASSISTED SURGICAL REMOVAL OF ESOPHAGUS USING DA BALWINDER XI N/A 10/4/2023    Procedure: XI ROBOTIC ESOPHAGECTOMY;  Surgeon: Nas Meyer MD;  Location: [facility] OR [address] [place] [address];  Service: General;  Laterality: N/A;    ROBOTIC REPAIR, HERNIA, PARAESOPHAGEAL  10/4/2023    Procedure: ROBOTIC REPAIR, HERNIA, PARAESOPHAGEAL;  Surgeon: Nas Meyer MD;  Location: [facility] OR [address] [place] [address];  Service: General;;    ROTATOR CUFF REPAIR Right     x2        Family History:   Family History   Problem Relation Age of Onset    Diabetes Mother     Heart disease Father         CHF    Glaucoma Neg Hx     Colon cancer Neg Hx     Prostate cancer Neg Hx         Social History:   Social History     Tobacco Use    Smoking status: Former     Current packs/day: 0.25     Average packs/day: 0.2 packs/day for 40.9 years (10.2 ttl pk-yrs)     Types: Cigarettes     Start date: 1983     Passive exposure: Current    Smokeless tobacco: Never    Tobacco comments:     Done smoking since september   Substance Use Topics    Alcohol use: Yes     Comment: a couple of beers a  "day        I have reviewed and updated the patient's past medical, surgical, family and social histories.    Allergies:   Review of patient's allergies indicates:  No Known Allergies     Medications:   Current Outpatient Medications   Medication Sig Dispense Refill    cholecalciferol, vitamin D3, (VITAMIN D3) 10 mcg (400 unit) Tab Take by mouth once daily.      LIDOcaine-prilocaine (EMLA) cream Apply topically as needed (Port access). (Patient not taking: Reported on 7/31/2023) 30 g 1    oxyCODONE (ROXICODONE) 5 mg/5 mL Soln 5 mLs (5 mg total) by Per J Tube route every 4 (four) hours as needed. (Patient not taking: Reported on 10/11/2023) 210 mL 0    pantoprazole (PROTONIX) 40 MG tablet Take 1 tablet (40 mg total) by mouth once daily. 90 tablet 3    sildenafil (VIAGRA) 100 MG tablet Take 1 tablet (100 mg total) by mouth daily as needed for Erectile Dysfunction. (Patient not taking: Reported on 10/11/2023) 10 tablet 6     No current facility-administered medications for this visit.        Physical Exam:   /67 (BP Location: Left arm, Patient Position: Sitting, BP Method: Large (Automatic))   Pulse 66   Temp 97.9 °F (36.6 °C) (Oral)   Ht 5' 9" (1.753 m)   Wt 61.3 kg (135 lb 2.3 oz)   SpO2 99%   BMI 19.96 kg/m²      ECOG Performance status: 0          Physical Exam  Vitals reviewed.   Constitutional:       General: He is not in acute distress.     Appearance: Normal appearance. He is normal weight.   HENT:      Head: Normocephalic and atraumatic.      Right Ear: External ear normal.      Left Ear: External ear normal.      Nose: Nose normal.      Mouth/Throat:      Mouth: Mucous membranes are moist.      Pharynx: Oropharynx is clear. No posterior oropharyngeal erythema.   Eyes:      General: No scleral icterus.     Extraocular Movements: Extraocular movements intact.      Conjunctiva/sclera: Conjunctivae normal.      Pupils: Pupils are equal, round, and reactive to light.   Cardiovascular:      Rate and " Rhythm: Normal rate and regular rhythm.      Pulses: Normal pulses.      Heart sounds: Normal heart sounds.   Pulmonary:      Effort: Pulmonary effort is normal.      Breath sounds: Normal breath sounds. No wheezing or rales.   Abdominal:      General: Bowel sounds are normal. There is no distension.      Palpations: Abdomen is soft.      Tenderness: There is no abdominal tenderness.      Comments: J tube in place;  Surgical wounds well healing   Musculoskeletal:         General: No swelling. Normal range of motion.      Cervical back: Normal range of motion and neck supple.   Skin:     General: Skin is warm.      Coloration: Skin is not jaundiced.      Findings: No erythema or rash.   Neurological:      General: No focal deficit present.      Mental Status: He is alert and oriented to person, place, and time. Mental status is at baseline.      Gait: Gait normal.   Psychiatric:         Mood and Affect: Mood normal.         Behavior: Behavior normal.         Thought Content: Thought content normal.         Judgment: Judgment normal.           Labs:   Recent Results (from the past 48 hour(s))   CBC Oncology    Collection Time: 11/15/23  9:39 AM   Result Value Ref Range    WBC 6.42 3.90 - 12.70 K/uL    RBC 3.74 (L) 4.60 - 6.20 M/uL    Hemoglobin 12.4 (L) 14.0 - 18.0 g/dL    Hematocrit 36.7 (L) 40.0 - 54.0 %    MCV 98 82 - 98 fL    MCH 33.2 (H) 27.0 - 31.0 pg    MCHC 33.8 32.0 - 36.0 g/dL    RDW 12.6 11.5 - 14.5 %    Platelets 239 150 - 450 K/uL    MPV 8.8 (L) 9.2 - 12.9 fL    Gran # (ANC) 4.1 1.8 - 7.7 K/uL    Immature Grans (Abs) 0.05 (H) 0.00 - 0.04 K/uL   Comprehensive Metabolic Panel    Collection Time: 11/15/23  9:39 AM   Result Value Ref Range    Sodium 140 136 - 145 mmol/L    Potassium 4.7 3.5 - 5.1 mmol/L    Chloride 105 95 - 110 mmol/L    CO2 28 23 - 29 mmol/L    Glucose 92 70 - 110 mg/dL    BUN 23 8 - 23 mg/dL    Creatinine 0.9 0.5 - 1.4 mg/dL    Calcium 9.7 8.7 - 10.5 mg/dL    Total Protein 7.4 6.0 - 8.4  g/dL    Albumin 3.6 3.5 - 5.2 g/dL    Total Bilirubin 0.3 0.1 - 1.0 mg/dL    Alkaline Phosphatase 125 55 - 135 U/L    AST 23 10 - 40 U/L    ALT 25 10 - 44 U/L    eGFR >60.0 >60 mL/min/1.73 m^2    Anion Gap 7 (L) 8 - 16 mmol/L   TSH    Collection Time: 11/15/23  9:39 AM   Result Value Ref Range    TSH 0.876 0.400 - 4.000 uIU/mL      I have reviewed the pertinent labs.     Imaging:       PET/CT 9/22/23:    Impression:     1. In this patient with biopsy-proven esophageal adenocarcinoma, there is persistent circumferential soft tissue thickening in the distal esophagus likely extending into the proximal stomach with interval decreased hypermetabolic activity.  Index cervical and AP window lymph nodes are decreased in size/uptake.  No new lymphadenopathy or suspicious lesions elsewhere.  2. Additional stable findings as above.    Path:   2. Gastroesophageal junction mass (biopsy):   Invasive adenocarcinoma, moderately differentiated   Background low and high-grade glandular dysplasia   HER2 immunohistochemistry:  Equivocal.     Immunohistochemistry (IHC) Testing for Mismatch Repair (MMR) Proteins:   MLH1, MSH2, MSH6, PMS2 - Intact nuclear expression   Background nonneoplastic tissue/internal control with intact nuclear expression     There are exceptions to the above IHC interpretations. These results should not be considered in isolation, and clinical correlation with genetic counseling is recommended to assess the need for germline testing.     Interpretation: No loss of nuclear expression of MMR proteins: low probability of microsatellite instability       Assessment:       1. Malignant neoplasm of gastroesophageal junction    2. Chemotherapy induced neutropenia    3. Chemotherapy-induced thrombocytopenia    4. Esophagitis    5. Pulmonary emphysema, unspecified emphysema type    6. Nicotine dependence, cigarettes, uncomplicated    7. Atherosclerosis of aorta    8. Moderate malnutrition    9. Antineoplastic  chemotherapy induced anemia       Plan:        # Esophageal adenocarcinoma, chemotherapy induced neutropenia/thrombocytopenia  He initially presented with a locally advanced adenocarcinoma of the middle and lower third of the esophagus, pMMR. PET/CT on 6/1/23 did not show clear distant metastatic disease.  We discussed the case and plan with Dr. Meyer and he feels the patient is surgically resectable as well.    Began cycle 1 on 7/18/23. He unfortunately had a reaction to the Taxol. During the Taxol infusion, he developed coughing, flushing, chest tightness and nausea. O2 sat was 92%, 2L O2 applied NC. We were notified and stopped the Taxol. He was able to proceed with the Carboplatin without complication.   We switched him to Abraxane 40 mg/m2 with week 2.  This was tolerated very well without issue. Has tolerated RT well and has completed 4 cycles of chemoRT. He completed radiation 8/18/23.  We did have to forego his last dose of chemotherapy due to cytopenias.    PET/CT on 9/22/23 showed very nice response to treatment with reduction in SUV avidity of primary tumor.  Discussed with Dr. Meyer.      He underwent esophagogastrectomy with Dr. Meyer on 10/4/23.  Pathology showed ypT2N0 with negative margins and 18 negative lymph nodes.    Because of his residual disease, I have recommended adjuvant nivolumab per [id number].  Will request PD-L1 testing but proceed with q4week nivolumab 480 mg regardless.  He is in agreement.    Patient consented for immunotherapy 11/15/2023. An extensive discussion was had which included a thorough discussion of the risk and benefits of treatment and alternatives.  Risks, including but not limited to, fatigue, diarrhea, nausea/vomiting, loss of appetite, skin reactions and rashes, flu-like symptoms, fever, infusion-related reactions including allergic reactions, inflammation of stomach or intestines, inflammation of liver, inflammation of brain or nervous system, inflammation of  lungs, inflammation of pancreas, inflammation of kidneys, inflammation of the eyes, inflammation of hormone producing glands, inflammation of the joints including activation of arthritis, impaired kidney function, impaired liver function, problems with sleep, unstable blood sugars, blood pressure changes, infertility, bone marrow damage (anemia, thrombocytopenia, immune suppression, neutropenia), sterility, local damage at possible injection sites, and rarely death were all discussed. Consented the patient to the treatment plan and the patient was educated on the planned duration of the treatment and schedule of the treatment administration. The patient agrees with the plan, and all questions have been answered to their satisfaction. Consent was signed the patient, provider, and a third party witness.      Presents today for cycle 1.   Labs reviewed, adequate for treatment.   RTC in 4 weeks for next cycle.     # Esophagitis  Improved.  Secondary to chemo and radiation.    # COPD, tobacco abuse, aortic atherosclerosis  Counseled on tobacco cessation.  He has stopped smoking.  Normal SpO2.  No dyspnea.  Atherosclerosis noted on imaging.    # Malnutrition  Following with surgery team as he will transition ultimately off tube feeds.  Weight up 5 lbs.      Patient is in agreement with the proposed treatment plan. All questions were answered to the patient's satisfaction. Pt knows to call clinic if anything is needed before the next clinic visit.    Patient discussed with collaborating physician, Dr. June.    At least 40 minutes were spent today on this encounter including face to face time with the patient, data gathering/interpretation and documentation.       Amanda Armstrong, MSN, APRN, ACCNS-  Hematology and Medical Oncology  Clinical Nurse Specialist to Dr. June, Dr. Riley & Dr. Hernandez    Route Chart for Scheduling    Med Onc Chart Routing      Follow up with physician 4 weeks. as scheduled   Follow up with DEMARCO  . Rtc in 8 weeks with labs to see DEMARCO for cycle 3   Infusion scheduling note   infusion every 4 weeks   Injection scheduling note    Labs CBC, CMP and TSH   Scheduling:  Preferred lab:  Lab interval: every 4 weeks     Imaging    Pharmacy appointment    Other referrals              Treatment Plan Information   OP NIVOLUMAB 480 MG Q4W   Delta June MD   Upcoming Treatment Dates - OP NIVOLUMAB 480 MG Q4W    11/15/2023       Chemotherapy       nivolumab 480 mg in sodium chloride 0.9% 98 mL infusion  12/13/2023       Chemotherapy       nivolumab 480 mg in sodium chloride 0.9% 98 mL infusion  1/10/2024       Chemotherapy       nivolumab 480 mg in sodium chloride 0.9% 98 mL infusion  2/7/2024       Chemotherapy       nivolumab 480 mg in sodium chloride 0.9% 98 mL infusion

## 2023-11-15 NOTE — PLAN OF CARE
13:30-Pt tolerated opdivo C1 infusion well with no complaints or complications, VSS throughout treatment. Educated patient on medications administered including side effects, possible reactions, and chemotherapy precautions, verbalized understanding. Pt aware to call provider with any questions or concerns, aware of upcoming appts, ambulatory from clinic with steady gait, no distress noted.

## 2023-11-27 ENCOUNTER — TELEPHONE (OUTPATIENT)
Dept: SURGERY | Facility: CLINIC | Age: 63
End: 2023-11-27
Payer: COMMERCIAL

## 2023-11-27 NOTE — TELEPHONE ENCOUNTER
"Pt spouse called with c/o Mr. Beebe is having difficulty swallowing over the last few days. Pt has eaten turkey, oysters, pop tart, ravioli and has what he describes as occasional "reflux with spitting up". He does not have difficulty drinking fluids at this time. Pt states he is able to drink without difficulty or choking.   Pt also reports his top stitch popping on feeding tube over the weekend. Tube still has one remaining stitch on bottom and is being secured by ACE bandage.   Instructed pt to eat full liquids for now and Dr. Meyer/CHARI Chanel NP will be notified. Pt will receive a call back later today. Pt/spouse verbalized understanding.   "

## 2023-11-30 ENCOUNTER — CLINICAL SUPPORT (OUTPATIENT)
Dept: HEMATOLOGY/ONCOLOGY | Facility: CLINIC | Age: 63
End: 2023-11-30
Payer: COMMERCIAL

## 2023-11-30 ENCOUNTER — OFFICE VISIT (OUTPATIENT)
Dept: SURGERY | Facility: CLINIC | Age: 63
End: 2023-11-30
Payer: COMMERCIAL

## 2023-11-30 ENCOUNTER — ANESTHESIA EVENT (OUTPATIENT)
Dept: SURGERY | Facility: HOSPITAL | Age: 63
End: 2023-11-30
Payer: COMMERCIAL

## 2023-11-30 VITALS
HEIGHT: 69 IN | SYSTOLIC BLOOD PRESSURE: 111 MMHG | WEIGHT: 135.5 LBS | HEART RATE: 71 BPM | DIASTOLIC BLOOD PRESSURE: 68 MMHG | OXYGEN SATURATION: 98 % | BODY MASS INDEX: 20.07 KG/M2

## 2023-11-30 DIAGNOSIS — Z90.49 H/O ESOPHAGECTOMY: ICD-10-CM

## 2023-11-30 DIAGNOSIS — Z98.890 H/O ESOPHAGECTOMY: ICD-10-CM

## 2023-11-30 DIAGNOSIS — C16.0 MALIGNANT NEOPLASM OF GASTROESOPHAGEAL JUNCTION: ICD-10-CM

## 2023-11-30 DIAGNOSIS — K22.2 ESOPHAGEAL STRICTURE: ICD-10-CM

## 2023-11-30 DIAGNOSIS — Z93.4 JEJUNOSTOMY PRESENT: ICD-10-CM

## 2023-11-30 DIAGNOSIS — R63.8 ALTERATION IN NUTRITION ASSOCIATED WITH TUBE FEEDING: ICD-10-CM

## 2023-11-30 DIAGNOSIS — R13.19 ESOPHAGEAL DYSPHAGIA: Primary | ICD-10-CM

## 2023-11-30 DIAGNOSIS — Z71.3 NUTRITIONAL COUNSELING: Primary | ICD-10-CM

## 2023-11-30 DIAGNOSIS — E44.0 MODERATE MALNUTRITION: ICD-10-CM

## 2023-11-30 DIAGNOSIS — C16.0 MALIGNANT NEOPLASM OF GASTROESOPHAGEAL JUNCTION: Primary | ICD-10-CM

## 2023-11-30 PROCEDURE — 99024 PR POST-OP FOLLOW-UP VISIT: ICD-10-PCS | Mod: S$GLB,,, | Performed by: NURSE PRACTITIONER

## 2023-11-30 PROCEDURE — 99999 PR PBB SHADOW E&M-EST. PATIENT-LVL IV: CPT | Mod: PBBFAC,,, | Performed by: NURSE PRACTITIONER

## 2023-11-30 PROCEDURE — 99999 PR PBB SHADOW E&M-EST. PATIENT-LVL IV: ICD-10-PCS | Mod: PBBFAC,,, | Performed by: NURSE PRACTITIONER

## 2023-11-30 PROCEDURE — 3078F PR MOST RECENT DIASTOLIC BLOOD PRESSURE < 80 MM HG: ICD-10-PCS | Mod: CPTII,S$GLB,, | Performed by: NURSE PRACTITIONER

## 2023-11-30 PROCEDURE — 3078F DIAST BP <80 MM HG: CPT | Mod: CPTII,S$GLB,, | Performed by: NURSE PRACTITIONER

## 2023-11-30 PROCEDURE — 3044F PR MOST RECENT HEMOGLOBIN A1C LEVEL <7.0%: ICD-10-PCS | Mod: CPTII,S$GLB,, | Performed by: NURSE PRACTITIONER

## 2023-11-30 PROCEDURE — 3074F SYST BP LT 130 MM HG: CPT | Mod: CPTII,S$GLB,, | Performed by: NURSE PRACTITIONER

## 2023-11-30 PROCEDURE — 97803 MED NUTRITION INDIV SUBSEQ: CPT | Mod: S$GLB,,, | Performed by: DIETITIAN, REGISTERED

## 2023-11-30 PROCEDURE — 99999 PR PBB SHADOW E&M-EST. PATIENT-LVL I: ICD-10-PCS | Mod: PBBFAC,,, | Performed by: DIETITIAN, REGISTERED

## 2023-11-30 PROCEDURE — 3044F HG A1C LEVEL LT 7.0%: CPT | Mod: CPTII,S$GLB,, | Performed by: NURSE PRACTITIONER

## 2023-11-30 PROCEDURE — 97803 PR MED NUTR THER, SUBSQ, INDIV, EA 15 MIN: ICD-10-PCS | Mod: S$GLB,,, | Performed by: DIETITIAN, REGISTERED

## 2023-11-30 PROCEDURE — 99999 PR PBB SHADOW E&M-EST. PATIENT-LVL I: CPT | Mod: PBBFAC,,, | Performed by: DIETITIAN, REGISTERED

## 2023-11-30 PROCEDURE — 3074F PR MOST RECENT SYSTOLIC BLOOD PRESSURE < 130 MM HG: ICD-10-PCS | Mod: CPTII,S$GLB,, | Performed by: NURSE PRACTITIONER

## 2023-11-30 PROCEDURE — 99024 POSTOP FOLLOW-UP VISIT: CPT | Mod: S$GLB,,, | Performed by: NURSE PRACTITIONER

## 2023-11-30 NOTE — PROGRESS NOTES
"Oncology Nutrition Assessment for Medical Nutrition Therapy  Follow-up Visit    Blayne Beebe   1960    Referring Provider: No ref. provider found      Reason for Visit: nutrition counseling and education    PMHx:   Past Medical History:   Diagnosis Date    Arthritis     Cancer     COPD (chronic obstructive pulmonary disease)        Nutrition Assessment    This is a 63 y.o.male with a medical diagnosis of GEJ adenocarcinoma s/p chemoradiation and now esophagectomy 10/4. He is known to me from previous appointment as part of prehab program. He was discharged NPO and on TF of [employer] Standard 1.4. At his last surg onc appointment, NP lowered TF to 2 cartons/day and advanced him to regular diet. He reports this had been gone well until Thanksgiving night. He reports he started to develop what sounds like food getting stuck and then regurgitation. He reports it would even start happening with water or tea. He reports yogurt and ice cream have been working. He increased to 3 cartons/day when he started eating less. He is also supplementing with Ensure.    Weight: 61.45 kg (135 lb 7.6 oz)  Height: 5' 9" (1.753 m)  BMI: 20    Usual BW: 140-145lb  Weight Change: 10lb loss from COVID 2/2023 then another 5-10lb loss - since regaining    Allergies: Patient has no known allergies.    Current Medications:  Current Outpatient Medications:     cholecalciferol, vitamin D3, (VITAMIN D3) 10 mcg (400 unit) Tab, Take by mouth once daily., Disp: , Rfl:     LIDOcaine-prilocaine (EMLA) cream, Apply topically as needed (Port access). (Patient not taking: Reported on 7/31/2023), Disp: 30 g, Rfl: 1    oxyCODONE (ROXICODONE) 5 mg/5 mL Soln, 5 mLs (5 mg total) by Per J Tube route every 4 (four) hours as needed. (Patient not taking: Reported on 10/11/2023), Disp: 210 mL, Rfl: 0    pantoprazole (PROTONIX) 40 MG tablet, Take 1 tablet (40 mg total) by mouth once daily., Disp: 90 tablet, Rfl: 3    sildenafil (VIAGRA) 100 MG tablet, " Take 1 tablet (100 mg total) by mouth daily as needed for Erectile Dysfunction. (Patient not taking: Reported on 10/11/2023), Disp: 10 tablet, Rfl: 6    Food/medication interactions noted: none    Vitamins/Supplements: none reported    Labs: Reviewed    Nutrition Diagnosis    Problem: moderate malnutrition  Etiology (related to): inadequate energy and protein intake  Signs/Symptoms (as evidenced by): reports of inadequate PO intake, weight loss, and both fat & muscle wasting present  Status: Improving    Nutrition Intervention    Nutrition Prescription   2151 kcals (35kcal/kg)  86g protein (1.4g/kg)   2151mL fluid (35mL/kg)    Recommendations:  PO diet as tolerated - talked about trying softer foods until EGD w/dilation  Continue to supplement with Ensure  Increase to 3 cartons TF/day (4 on days when intake is more poor)  Continue water flushes at 55mL/hr for every hour on feeds  Flush J-tube with at least 60mL water before and after feeds    Materials Provided/Reviewed: none    Nutrition Monitoring and Evaluation    Monitor: diet education needs, energy intake, rate/formulation of tube feeding, and weight status    Goals: prevent further weight loss and encourage weight gain    Follow up: Patient provided with contact information and advised to call/message with questions or to make future appointment if further intervention is needed.    Communication to referring provider/care team: note available in chart; discussed with Dr. Meyer    Counseling time: 15 minutes    Noelle Morelos MS, RD, LDN

## 2023-11-30 NOTE — PROGRESS NOTES
"    Post-Op Follow-up Visit:   11/30/2023  Patient ID: Blayne Beebe is a 63 y.o. male, born 1960    Chief Complaint   Patient presents with    Post-op Evaluation     6/2023: abnormal lung CT screening identified mid esophageal mass with lymph nodes, stage III, [id number] at mid to lower esophagus.   7/16/2023 - 8/15/2023: neoadj CRT per Dr. June  9/22/23: restaging PET, 9/28/23: restaging appt with Dr. Meyer  10/4/23: s/p esophagectomy per Dr. Meyer  11/15/23: start adj IO per Dr. June    Interval History: This 64 y/o gentleman returns to clinic with his wife from [place]. He was last seen in clinic on 11/8/23. He remains on TF, tolerating without difficulty and J tube is functioning properly. However, it is secured with only one stitch at this time.   He was taking 2 cartons daily but was instructed to increase to 3 cartons earlier this week. During the weekend after Thanksgiving, he began to notice intermittent dysphagia with regurgitation to certain foods. Therefore, he changed his diet back to mechanical soft foods. Continues to drink Ensure supplement.     Physical Exam:  /68   Pulse 71   Ht 5' 9" (1.753 m)   Wt 61.4 kg (135 lb 7.6 oz)   SpO2 98%   BMI 20.01 kg/m²     General:  Non-toxic, ambulatory  Abd:  Soft, non-tender  Incision:  J tube secured with 1 suture      ICD-10-CM ICD-9-CM    1. Esophageal dysphagia  R13.19 787.29       2. Malignant neoplasm of gastroesophageal junction  C16.0 151.0 Case Request Operating Room: EGD, WITH BALLOON DILATION      3. H/O esophagectomy  Z98.890 V15.29     Z90.49        4. Alteration in nutrition associated with tube feeding  R63.8 783.9       5. Jejunostomy present  Z93.4 V44.4       Plan   Discussed possible anastomotic stricture, plan for EGD with possible dilation per Dr. Meyer ASAP.  Continue J feeds, 3 cartons/ daily. To be seen by CHARI Morelos RD today. Monitor home scale weight.   Secured J tube in place with 2 additional sutures, " flushed easily to 50cc water.   Increase activity as tolerated.     Questions were asked and answered to patient and his wife's satisfaction.            Belem Chanel NP  Upper GI / Hepatobiliary Surgical Oncology  [facility] [place], [place]  Office: [phone]  Fax: [phone]

## 2023-11-30 NOTE — H&P (VIEW-ONLY)
"    Post-Op Follow-up Visit:   11/30/2023  Patient ID: Blayne Beebe is a 63 y.o. male, born 1960    Chief Complaint   Patient presents with    Post-op Evaluation     6/2023: abnormal lung CT screening identified mid esophageal mass with lymph nodes, stage III, [id number] at mid to lower esophagus.   7/16/2023 - 8/15/2023: neoadj CRT per Dr. June  9/22/23: restaging PET, 9/28/23: restaging appt with Dr. Meyer  10/4/23: s/p esophagectomy per Dr. Meyer  11/15/23: start adj IO per Dr. June    Interval History: This 64 y/o gentleman returns to clinic with his wife from [place]. He was last seen in clinic on 11/8/23. He remains on TF, tolerating without difficulty and J tube is functioning properly. However, it is secured with only one stitch at this time.   He was taking 2 cartons daily but was instructed to increase to 3 cartons earlier this week. During the weekend after Thanksgiving, he began to notice intermittent dysphagia with regurgitation to certain foods. Therefore, he changed his diet back to mechanical soft foods. Continues to drink Ensure supplement.     Physical Exam:  /68   Pulse 71   Ht 5' 9" (1.753 m)   Wt 61.4 kg (135 lb 7.6 oz)   SpO2 98%   BMI 20.01 kg/m²     General:  Non-toxic, ambulatory  Abd:  Soft, non-tender  Incision:  J tube secured with 1 suture      ICD-10-CM ICD-9-CM    1. Esophageal dysphagia  R13.19 787.29       2. Malignant neoplasm of gastroesophageal junction  C16.0 151.0 Case Request Operating Room: EGD, WITH BALLOON DILATION      3. H/O esophagectomy  Z98.890 V15.29     Z90.49        4. Alteration in nutrition associated with tube feeding  R63.8 783.9       5. Jejunostomy present  Z93.4 V44.4       Plan   Discussed possible anastomotic stricture, plan for EGD with possible dilation per Dr. Meyer ASAP.  Continue J feeds, 3 cartons/ daily. To be seen by CHARI Morelos RD today. Monitor home scale weight.   Secured J tube in place with 2 additional sutures, " flushed easily to 50cc water.   Increase activity as tolerated.     Questions were asked and answered to patient and his wife's satisfaction.            Belem Chanel NP  Upper GI / Hepatobiliary Surgical Oncology  [facility] [place], [place]  Office: [phone]  Fax: [phone]

## 2023-12-01 ENCOUNTER — HOSPITAL ENCOUNTER (OUTPATIENT)
Facility: HOSPITAL | Age: 63
Discharge: HOME | End: 2023-12-01
Attending: SURGERY | Admitting: SURGERY
Payer: COMMERCIAL

## 2023-12-01 ENCOUNTER — ANESTHESIA (OUTPATIENT)
Dept: SURGERY | Facility: HOSPITAL | Age: 63
End: 2023-12-01
Payer: COMMERCIAL

## 2023-12-01 ENCOUNTER — TELEPHONE (OUTPATIENT)
Dept: SURGERY | Facility: CLINIC | Age: 63
End: 2023-12-01
Payer: COMMERCIAL

## 2023-12-01 VITALS
SYSTOLIC BLOOD PRESSURE: 127 MMHG | HEART RATE: 80 BPM | WEIGHT: 131.81 LBS | HEIGHT: 69 IN | BODY MASS INDEX: 19.52 KG/M2 | TEMPERATURE: 98 F | OXYGEN SATURATION: 95 % | DIASTOLIC BLOOD PRESSURE: 70 MMHG | RESPIRATION RATE: 18 BRPM

## 2023-12-01 DIAGNOSIS — C16.0 MALIGNANT NEOPLASM OF GASTROESOPHAGEAL JUNCTION: Primary | ICD-10-CM

## 2023-12-01 DIAGNOSIS — K22.2 ESOPHAGEAL STRICTURE: ICD-10-CM

## 2023-12-01 DIAGNOSIS — C16.0 MALIGNANT NEOPLASM OF GASTROESOPHAGEAL JUNCTION: ICD-10-CM

## 2023-12-01 PROCEDURE — 43249 PR EGD, FLEX, W/BALL DILATION, < 30MM: ICD-10-PCS | Mod: 78,,, | Performed by: SURGERY

## 2023-12-01 PROCEDURE — 25000003 PHARM REV CODE 250

## 2023-12-01 PROCEDURE — 63600175 PHARM REV CODE 636 W HCPCS: Performed by: REGISTERED NURSE

## 2023-12-01 PROCEDURE — 36000707: Performed by: SURGERY

## 2023-12-01 PROCEDURE — D9220A PRA ANESTHESIA: Mod: ANES,,, | Performed by: ANESTHESIOLOGY

## 2023-12-01 PROCEDURE — 25000003 PHARM REV CODE 250: Performed by: REGISTERED NURSE

## 2023-12-01 PROCEDURE — 71000015 HC POSTOP RECOV 1ST HR: Performed by: SURGERY

## 2023-12-01 PROCEDURE — D9220A PRA ANESTHESIA: ICD-10-PCS | Mod: CRNA,,, | Performed by: REGISTERED NURSE

## 2023-12-01 PROCEDURE — D9220A PRA ANESTHESIA: ICD-10-PCS | Mod: ANES,,, | Performed by: ANESTHESIOLOGY

## 2023-12-01 PROCEDURE — 63600175 PHARM REV CODE 636 W HCPCS

## 2023-12-01 PROCEDURE — 43249 ESOPH EGD DILATION <30 MM: CPT | Mod: 78,,, | Performed by: SURGERY

## 2023-12-01 PROCEDURE — 71000044 HC DOSC ROUTINE RECOVERY FIRST HOUR: Performed by: SURGERY

## 2023-12-01 PROCEDURE — D9220A PRA ANESTHESIA: Mod: CRNA,,, | Performed by: REGISTERED NURSE

## 2023-12-01 PROCEDURE — 37000009 HC ANESTHESIA EA ADD 15 MINS: Performed by: SURGERY

## 2023-12-01 PROCEDURE — C1726 CATH, BAL DIL, NON-VASCULAR: HCPCS | Performed by: SURGERY

## 2023-12-01 PROCEDURE — 37000008 HC ANESTHESIA 1ST 15 MINUTES: Performed by: SURGERY

## 2023-12-01 PROCEDURE — 36000706: Performed by: SURGERY

## 2023-12-01 RX ORDER — PHENYLEPHRINE HYDROCHLORIDE 10 MG/ML
INJECTION INTRAVENOUS
Status: DISCONTINUED | OUTPATIENT
Start: 2023-12-01 | End: 2023-12-01

## 2023-12-01 RX ORDER — FENTANYL CITRATE 50 UG/ML
INJECTION, SOLUTION INTRAMUSCULAR; INTRAVENOUS
Status: DISCONTINUED | OUTPATIENT
Start: 2023-12-01 | End: 2023-12-01

## 2023-12-01 RX ORDER — PROCHLORPERAZINE EDISYLATE 5 MG/ML
5 INJECTION INTRAMUSCULAR; INTRAVENOUS EVERY 30 MIN PRN
Status: DISCONTINUED | OUTPATIENT
Start: 2023-12-01 | End: 2023-12-01 | Stop reason: HOSPADM

## 2023-12-01 RX ORDER — SUCCINYLCHOLINE CHLORIDE 20 MG/ML
INJECTION INTRAMUSCULAR; INTRAVENOUS
Status: DISCONTINUED | OUTPATIENT
Start: 2023-12-01 | End: 2023-12-01

## 2023-12-01 RX ORDER — PROPOFOL 10 MG/ML
VIAL (ML) INTRAVENOUS
Status: DISCONTINUED | OUTPATIENT
Start: 2023-12-01 | End: 2023-12-01

## 2023-12-01 RX ORDER — HYDROMORPHONE HYDROCHLORIDE 1 MG/ML
0.2 INJECTION, SOLUTION INTRAMUSCULAR; INTRAVENOUS; SUBCUTANEOUS EVERY 5 MIN PRN
Status: DISCONTINUED | OUTPATIENT
Start: 2023-12-01 | End: 2023-12-01 | Stop reason: HOSPADM

## 2023-12-01 RX ORDER — ONDANSETRON 2 MG/ML
4 INJECTION INTRAMUSCULAR; INTRAVENOUS DAILY PRN
Status: DISCONTINUED | OUTPATIENT
Start: 2023-12-01 | End: 2023-12-01 | Stop reason: HOSPADM

## 2023-12-01 RX ORDER — LIDOCAINE HYDROCHLORIDE 20 MG/ML
INJECTION INTRAVENOUS
Status: DISCONTINUED | OUTPATIENT
Start: 2023-12-01 | End: 2023-12-01

## 2023-12-01 RX ORDER — DEXAMETHASONE SODIUM PHOSPHATE 4 MG/ML
INJECTION, SOLUTION INTRA-ARTICULAR; INTRALESIONAL; INTRAMUSCULAR; INTRAVENOUS; SOFT TISSUE
Status: DISCONTINUED | OUTPATIENT
Start: 2023-12-01 | End: 2023-12-01

## 2023-12-01 RX ORDER — ONDANSETRON 2 MG/ML
INJECTION INTRAMUSCULAR; INTRAVENOUS
Status: DISCONTINUED | OUTPATIENT
Start: 2023-12-01 | End: 2023-12-01

## 2023-12-01 RX ORDER — MIDAZOLAM HYDROCHLORIDE 1 MG/ML
INJECTION, SOLUTION INTRAMUSCULAR; INTRAVENOUS
Status: DISCONTINUED | OUTPATIENT
Start: 2023-12-01 | End: 2023-12-01

## 2023-12-01 RX ORDER — ROCURONIUM BROMIDE 10 MG/ML
INJECTION, SOLUTION INTRAVENOUS
Status: DISCONTINUED | OUTPATIENT
Start: 2023-12-01 | End: 2023-12-01

## 2023-12-01 RX ADMIN — ONDANSETRON 4 MG: 2 INJECTION INTRAMUSCULAR; INTRAVENOUS at 03:12

## 2023-12-01 RX ADMIN — GLYCOPYRROLATE 0.2 MG: 0.2 INJECTION, SOLUTION INTRAMUSCULAR; INTRAVENOUS at 02:12

## 2023-12-01 RX ADMIN — FENTANYL CITRATE 100 MCG: 50 INJECTION, SOLUTION INTRAMUSCULAR; INTRAVENOUS at 02:12

## 2023-12-01 RX ADMIN — PHENYLEPHRINE HYDROCHLORIDE 200 MCG: 10 INJECTION INTRAVENOUS at 03:12

## 2023-12-01 RX ADMIN — SODIUM CHLORIDE, SODIUM GLUCONATE, SODIUM ACETATE, POTASSIUM CHLORIDE, MAGNESIUM CHLORIDE, SODIUM PHOSPHATE, DIBASIC, AND POTASSIUM PHOSPHATE: .53; .5; .37; .037; .03; .012; .00082 INJECTION, SOLUTION INTRAVENOUS at 03:12

## 2023-12-01 RX ADMIN — SUCCINYLCHOLINE CHLORIDE 120 MG: 20 INJECTION, SOLUTION INTRAMUSCULAR; INTRAVENOUS at 02:12

## 2023-12-01 RX ADMIN — DEXAMETHASONE SODIUM PHOSPHATE 4 MG: 4 INJECTION, SOLUTION INTRAMUSCULAR; INTRAVENOUS at 02:12

## 2023-12-01 RX ADMIN — SUCCINYLCHOLINE CHLORIDE 80 MG: 20 INJECTION, SOLUTION INTRAMUSCULAR; INTRAVENOUS at 02:12

## 2023-12-01 RX ADMIN — ROCURONIUM BROMIDE 30 MG: 10 INJECTION INTRAVENOUS at 02:12

## 2023-12-01 RX ADMIN — SUGAMMADEX 200 MG: 100 INJECTION, SOLUTION INTRAVENOUS at 03:12

## 2023-12-01 RX ADMIN — PHENYLEPHRINE HYDROCHLORIDE 200 MCG: 10 INJECTION INTRAVENOUS at 02:12

## 2023-12-01 RX ADMIN — LIDOCAINE HYDROCHLORIDE 50 MG: 20 INJECTION INTRAVENOUS at 02:12

## 2023-12-01 RX ADMIN — SUCCINYLCHOLINE CHLORIDE 100 MG: 20 INJECTION, SOLUTION INTRAMUSCULAR; INTRAVENOUS at 02:12

## 2023-12-01 RX ADMIN — PROPOFOL 200 MG: 10 INJECTION, EMULSION INTRAVENOUS at 02:12

## 2023-12-01 RX ADMIN — PHENYLEPHRINE HYDROCHLORIDE 100 MCG: 10 INJECTION INTRAVENOUS at 02:12

## 2023-12-01 RX ADMIN — MIDAZOLAM HYDROCHLORIDE 1 MG: 1 INJECTION, SOLUTION INTRAMUSCULAR; INTRAVENOUS at 01:12

## 2023-12-01 RX ADMIN — SODIUM CHLORIDE: 0.9 INJECTION, SOLUTION INTRAVENOUS at 01:12

## 2023-12-01 NOTE — TELEPHONE ENCOUNTER
Attempted to call pt/spouse to notify that procedure has been pushed back to 1330 and arrival time has now changed to 1130. Unable to reach either, detailed messages left on both voice mails with contact info.

## 2023-12-01 NOTE — ANESTHESIA PREPROCEDURE EVALUATION
"                                                                                                             2023  Blayne Beebe is a 63 y.o., male.  Pre-operative evaluation for EGD, WITH BALLOON DILATION (N/A)    Chief Complaint:    PMH:  Smoker with COPD  CA of gastroesophageal junction s/p rad tx , esophagectomy 10/40/23 and chemo tx.  Good rsponse to tx.  Recent onset of dysphagia to solid foods (JTube feedings OK.)  Patient was intubated with MacGrath 4 for esophagectomy (marked difficult airway)   Cardiac eval: RBBB no ischemia and EF 55 % per eval 2023 regadenoson stress test -   "Abnormal but not diagnostic."   HTN        Vital Signs Range (Last 24H):  Temp:  [36.7 °C (98.1 °F)]   Pulse:  [64]   Resp:  [20]   BP: (110)/(68)   SpO2:  [100 %]       CBC:     Recent Labs   Lab 11/15/23  0939   WBC 6.42   RBC 3.74*   HGB 12.4*   HCT 36.7*      MCV 98   MCH 33.2*   MCHC 33.8       CMP:   Recent Labs   Lab 11/15/23  0939      K 4.7      CO2 28   BUN 23   CREATININE 0.9   GLU 92   CALCIUM 9.7   ALBUMIN 3.6   PROT 7.4   ALKPHOS 125   ALT 25   AST 23   BILITOT 0.3       INR:  No results for input(s): "PT", "INR", "PROTIME", "APTT" in the last 720 hours.      Diagnostic Studies:      EKD Echo:     Pre-op Assessment    I have reviewed the Patient Summary Reports.     I have reviewed the Nursing Notes. I have reviewed the NPO Status.   I have reviewed the Medications.     Review of Systems  Anesthesia Hx:  No problems with previous Anesthesia                Social:  Non-Smoker, No Alcohol Use       Neurological:  Neurology Normal                                             Anesthesia Plan  Type of Anesthesia, risks & benefits discussed:    Anesthesia Type: Gen ETT  Intra-op Monitoring Plan: Standard ASA Monitors  Post Op Pain Control Plan: multimodal analgesia  Induction:  IV  Airway Plan: Video and Fiberoptic  Informed Consent: Informed consent signed with the " Patient and all parties understand the risks and agree with anesthesia plan.  All questions answered.   ASA Score: 3  Day of Surgery Review of History & Physical: H&P Update referred to the surgeon/provider.    Ready For Surgery From Anesthesia Perspective.     .

## 2023-12-01 NOTE — ANESTHESIA PROCEDURE NOTES
Intubation    Date/Time: 12/1/2023 2:26 PM    Performed by: Yovany Elizondo MD  Authorized by: Yovany Elizondo MD    Intubation:     Induction:  Intravenous    Intubated:  Postinduction    Mask Ventilation:  Easy mask    Attempts:  3    Attempted By:  Resident anesthesiologist    Method of Intubation:  Video laryngoscopy and bougie    Blade:  Abigail 4    Laryngeal View Grade: Grade IV - neither epiglottis nor glottis seen      Attempted By (2nd Attempt):  Resident anesthesiologist    Method of Intubation (2nd Attempt):  Fiberoptic and video laryngoscopy    Blade (2nd Attempt):  Zapata 3    Laryngeal View Grade (2nd Attempt): Grade IV - neither epiglottis nor glottis seen      Laryngeal View Grade (2nd Attempt) comment:  Copious secretions in airway    Attempted By (3rd Attempt):  Staff anesthesiologist    Method of Intubation (3rd Attempt):  Video laryngoscopy and bougie    Blade (3rd Attempt):  Abigail 3    Difficult Airway Encountered?: Yes      Future Airway Recommendations:  Fiberoptic and bougie    Complications:  None    Airway Device Size:  7.5    Style/Cuff Inflation:  Cuffed    Tube secured:  22    Secured at:  The lips    Placement Verified By:  Capnometry    Complicating Factors:  Anterior larynx, poor neck/head extension, small mouth and short neck    Findings Post-Intubation:  BS equal bilateral and atraumatic/condition of teeth unchanged

## 2023-12-01 NOTE — BRIEF OP NOTE
[facility] - Surgery ([facility])  Brief Operative Note    Surgery Date: 12/1/2023     Surgeon(s) and Role:     * Nas Meyer MD - Primary     * Ana María Guevara MD - Resident - Assisting        Pre-op Diagnosis:  Malignant neoplasm of gastroesophageal junction [C16.0]    Post-op Diagnosis:  Post-Op Diagnosis Codes:     * Malignant neoplasm of gastroesophageal junction [C16.0]    Procedure(s) (LRB):  EGD, WITH BALLOON DILATION (N/A)    Anesthesia: General    Operative Findings: EGD with balloon dilation, obvious narrowing near anastomosis, post-dilation able to pass scope through to pylorus     Estimated Blood Loss: <5         Specimens:   Specimen (24h ago, onward)      None              Discharge Note    OUTCOME: Patient tolerated treatment/procedure well without complication and is now ready for discharge.    DISPOSITION: Home or Self Care    FINAL DIAGNOSIS:  <principal problem not specified>    FOLLOWUP: In clinic    DISCHARGE INSTRUCTIONS:    Discharge Procedure Orders   Diet clear liquid   Order Comments: Clear liquid diet x1 day then full liquid diet x1 then can begin eating a regular diet     Activity as tolerated

## 2023-12-01 NOTE — PLAN OF CARE
Dc instructions reviewed with pt and wife, verbalized understanding, VSS, IV removed intact, no distress noted

## 2023-12-01 NOTE — TRANSFER OF CARE
"Anesthesia Transfer of Care Note    Patient: Blayne D III Steib    Procedure(s) Performed: Procedure(s) (LRB):  EGD, WITH BALLOON DILATION (N/A)    Patient location: [facility]    Anesthesia Type: general    Transport from OR: Transported from OR on 6-10 L/min O2 by face mask with adequate spontaneous ventilation    Post pain: adequate analgesia    Post assessment: no apparent anesthetic complications and tolerated procedure well    Post vital signs: stable    Level of consciousness: awake and alert    Nausea/Vomiting: no nausea/vomiting    Complications: none    Transfer of care protocol was followedComments: Nurse at bedside, VSS, spont reg resp noted      Last vitals: Visit Vitals  /66   Pulse 98   Temp 36.9 °C (98.4 °F) (Temporal)   Resp 17   Ht 5' 9" (1.753 m)   Wt 59.8 kg (131 lb 13.4 oz)   SpO2 100%   BMI 19.47 kg/m²     "

## 2023-12-02 NOTE — PROGRESS NOTES
Seen with Belem. Having new dysphagia with the feeling of solid foods getting caught in his neck. His symptoms are suggestive of a cervical esophagogastric anastomotic stricture. Plan or EGD with possible dilation.     Nas Meyer MD  Surgical Oncology

## 2023-12-02 NOTE — OP NOTE
[facility] - Surgery ([facility])  Surgery Department  Operative Note       Date of Procedure: 12/1/2023     Surgeon(s):  Surgeon(s) and Role:     * Nas Meyer MD - Primary     * Ana María Guevara MD - Resident - Assisting      Pre-Operative Diagnosis:   Esophageal adenocarcinoma  Suspected cervical esophagogastric anastomotic stricture    Post-Operative Diagnosis:   Same  Cervical esophagogastric anastomotic stricture     Anesthesia: General    Operative Findings:   Cervical esophagogastric anastomotic stricture. Unable to pass endoscope through stricture initially, but the scope was able to pass after serial dilations to 18 mm.     Procedures:  Esophagogastroduodenoscopy (EGD), with transendoscopic balloon dilation of esophagus (<30 mm)    Estimated Blood Loss (EBL): <10 mL    Specimen(s):    Specimen (24h ago, onward)      None                   Indications:  Blanye Beebe is a 63 year old man 2 months s/p esophagectomy who presents with new onset dysphagia and suspected cervical esophagogastric anastomotic stricture. Risks and benefits of EGD with dilation were reviewed including but not limited to: bleeding, infection, damage to local structures, perforation of GI tract, need for additional procedures, death, and imponderables.  He understands and signed written informed consent to proceed.    Details:  The patient was transported to the operating room and satisfactory anesthesia established.  Preoperative antibiotics were administered.  The patient was placed in the supine position    The adult gastroscope was introduced into the mouth, passed into the hypopharynx and cervical esophagus under endoscopic vision. There was some tortuosity of the proximal esophagus at 15 cm that I could not navigate initially with the scope. A dilation was performed in this area with a 12-15 mm balloon. The balloon was inflated to 3, 4.5, and then 6 ATMs for  a minute each. The scope then passed easily. The anastomosis  was at 20 cm.  There was a stenosis, and I was unable to pass the scope initially. This was traversed with a balloon dilator, and dilated up progressively from 12 mm to 18 mm. It was held for one minute at each interval and then for 15 minutes at 18 mm. In between dilations, the stenosis was examined and was without perforation. On final dilation, we were able to traverse the stricture, and there was some expected/intended minimal bleeding.    The endoscope was advanced past the anastomosis into the gastric conduit and to the pylorus. Upon withdrawal, the mucosa was examined circumferentially with no evidence of pathology.       I communicated the intraoperative findings with the family following the procedure.     Condition: Good    Disposition: PACU - hemodynamically stable.    Attestation: I was present and scrubbed for the key portions of the procedure.    Nas Meyer MD  Staff Surgeon  Surgical Oncology

## 2023-12-04 DIAGNOSIS — C16.0 MALIGNANT NEOPLASM OF GASTROESOPHAGEAL JUNCTION: ICD-10-CM

## 2023-12-04 DIAGNOSIS — K22.2 ESOPHAGEAL STRICTURE: Primary | ICD-10-CM

## 2023-12-04 NOTE — ANESTHESIA POSTPROCEDURE EVALUATION
Anesthesia Post Evaluation    Patient: Blayne D III Steib    Procedure(s) Performed: Procedure(s) (LRB):  EGD, WITH BALLOON DILATION (N/A)    Final Anesthesia Type: general      Patient location during evaluation: PACU  Patient participation: Yes- Able to Participate  Level of consciousness: awake and alert and oriented  Post-procedure vital signs: reviewed and stable  Pain management: adequate  Airway patency: patent    PONV status at discharge: No PONV  Anesthetic complications: no      Cardiovascular status: stable  Respiratory status: unassisted, spontaneous ventilation and room air  Hydration status: euvolemic  Follow-up not needed.              Vitals Value Taken Time   /70 12/01/23 1606   Temp 36.9 °C (98.4 °F) 12/01/23 1606   Pulse 73 12/01/23 1606   Resp 18 12/01/23 1606   SpO2 94 % 12/01/23 1606   Vitals shown include unvalidated device data.      No case tracking events are documented in the log.      Pain/Yessy Score: No data recorded

## 2023-12-07 ENCOUNTER — PATIENT MESSAGE (OUTPATIENT)
Dept: SURGERY | Facility: CLINIC | Age: 63
End: 2023-12-07
Payer: COMMERCIAL

## 2023-12-07 NOTE — PRE-PROCEDURE INSTRUCTIONS
PREOP INSTRUCTIONS TO PATIENT'S WIFE:  No food,milk or milk products for 8 hours before surgery.  Clear liquids like water,gatorade,apple juice are allowed up until 2 hours before surgery.  Instructed to follow the surgeon's instructions if they differ from these.  Shower instructions as well as directions to the Surgery Center were given.  Encouraged to wear loose fitting,comfortable clothing.  Medication instructions for pm prior to and am of procedure reviewed.  Instructed to avoid taking vitamins,supplements,aspirin and ibuprofen the morning of surgery.    Patient's wife denies patient having any side effects or issues with anesthesia or sedation.     Patient does not know arrival time.Explained that this information comes from the surgeon's office and if they haven't heard from them by 2 or 3 pm 12/8/2023 to call the office.Patient stated an understanding.

## 2023-12-08 ENCOUNTER — TELEPHONE (OUTPATIENT)
Dept: SURGERY | Facility: CLINIC | Age: 63
End: 2023-12-08
Payer: COMMERCIAL

## 2023-12-08 NOTE — TELEPHONE ENCOUNTER
Attempted to call pt/spouse on home and cell numbers. Unable to reach either on contact numbers. Left detailed message on both contact number voice mails regarding arrival/surgery time for Monday, 12/11/23 EGD w balloon dilation procedure. Contact info also given.

## 2023-12-10 DIAGNOSIS — C16.0 MALIGNANT NEOPLASM OF GASTROESOPHAGEAL JUNCTION: Primary | ICD-10-CM

## 2023-12-10 DIAGNOSIS — K22.2 ESOPHAGEAL STRICTURE: ICD-10-CM

## 2023-12-11 ENCOUNTER — HOSPITAL ENCOUNTER (OUTPATIENT)
Facility: HOSPITAL | Age: 63
Discharge: HOME | End: 2023-12-11
Attending: SURGERY | Admitting: SURGERY
Payer: COMMERCIAL

## 2023-12-11 ENCOUNTER — ANESTHESIA EVENT (OUTPATIENT)
Dept: SURGERY | Facility: HOSPITAL | Age: 63
End: 2023-12-11
Payer: COMMERCIAL

## 2023-12-11 ENCOUNTER — ANESTHESIA (OUTPATIENT)
Dept: SURGERY | Facility: HOSPITAL | Age: 63
End: 2023-12-11
Payer: COMMERCIAL

## 2023-12-11 VITALS
RESPIRATION RATE: 18 BRPM | OXYGEN SATURATION: 97 % | SYSTOLIC BLOOD PRESSURE: 126 MMHG | DIASTOLIC BLOOD PRESSURE: 72 MMHG | TEMPERATURE: 98 F | HEART RATE: 68 BPM

## 2023-12-11 DIAGNOSIS — C16.0 MALIGNANT NEOPLASM OF GASTROESOPHAGEAL JUNCTION: ICD-10-CM

## 2023-12-11 DIAGNOSIS — K22.2 ESOPHAGEAL STRICTURE: ICD-10-CM

## 2023-12-11 PROCEDURE — 37000008 HC ANESTHESIA 1ST 15 MINUTES: Performed by: SURGERY

## 2023-12-11 PROCEDURE — 36000706: Performed by: SURGERY

## 2023-12-11 PROCEDURE — 43249 ESOPH EGD DILATION <30 MM: CPT | Mod: 78,,, | Performed by: SURGERY

## 2023-12-11 PROCEDURE — C1726 CATH, BAL DIL, NON-VASCULAR: HCPCS | Performed by: SURGERY

## 2023-12-11 PROCEDURE — 36000707: Performed by: SURGERY

## 2023-12-11 PROCEDURE — 25000003 PHARM REV CODE 250

## 2023-12-11 PROCEDURE — 37000009 HC ANESTHESIA EA ADD 15 MINS: Performed by: SURGERY

## 2023-12-11 PROCEDURE — 71000015 HC POSTOP RECOV 1ST HR: Performed by: SURGERY

## 2023-12-11 PROCEDURE — 43249 PR EGD, FLEX, W/BALL DILATION, < 30MM: ICD-10-PCS | Mod: 78,,, | Performed by: SURGERY

## 2023-12-11 PROCEDURE — 71000044 HC DOSC ROUTINE RECOVERY FIRST HOUR: Performed by: SURGERY

## 2023-12-11 PROCEDURE — 63600175 PHARM REV CODE 636 W HCPCS

## 2023-12-11 PROCEDURE — D9220A PRA ANESTHESIA: Mod: ,,, | Performed by: ANESTHESIOLOGY

## 2023-12-11 PROCEDURE — D9220A PRA ANESTHESIA: ICD-10-PCS | Mod: ,,, | Performed by: ANESTHESIOLOGY

## 2023-12-11 RX ORDER — PROPOFOL 10 MG/ML
VIAL (ML) INTRAVENOUS
Status: DISCONTINUED | OUTPATIENT
Start: 2023-12-11 | End: 2023-12-11

## 2023-12-11 RX ORDER — SODIUM CHLORIDE 0.9 % (FLUSH) 0.9 %
10 SYRINGE (ML) INJECTION
Status: DISCONTINUED | OUTPATIENT
Start: 2023-12-11 | End: 2023-12-11 | Stop reason: HOSPADM

## 2023-12-11 RX ORDER — MIDAZOLAM HYDROCHLORIDE 1 MG/ML
INJECTION, SOLUTION INTRAMUSCULAR; INTRAVENOUS
Status: DISCONTINUED | OUTPATIENT
Start: 2023-12-11 | End: 2023-12-11

## 2023-12-11 RX ORDER — ONDANSETRON 2 MG/ML
INJECTION INTRAMUSCULAR; INTRAVENOUS
Status: DISCONTINUED | OUTPATIENT
Start: 2023-12-11 | End: 2023-12-11

## 2023-12-11 RX ORDER — DEXAMETHASONE SODIUM PHOSPHATE 4 MG/ML
INJECTION, SOLUTION INTRA-ARTICULAR; INTRALESIONAL; INTRAMUSCULAR; INTRAVENOUS; SOFT TISSUE
Status: DISCONTINUED | OUTPATIENT
Start: 2023-12-11 | End: 2023-12-11

## 2023-12-11 RX ORDER — PROCHLORPERAZINE EDISYLATE 5 MG/ML
5 INJECTION INTRAMUSCULAR; INTRAVENOUS EVERY 30 MIN PRN
Status: DISCONTINUED | OUTPATIENT
Start: 2023-12-11 | End: 2023-12-11 | Stop reason: HOSPADM

## 2023-12-11 RX ORDER — FENTANYL CITRATE 50 UG/ML
INJECTION, SOLUTION INTRAMUSCULAR; INTRAVENOUS
Status: DISCONTINUED | OUTPATIENT
Start: 2023-12-11 | End: 2023-12-11

## 2023-12-11 RX ORDER — PHENYLEPHRINE HYDROCHLORIDE 10 MG/ML
INJECTION INTRAVENOUS
Status: DISCONTINUED | OUTPATIENT
Start: 2023-12-11 | End: 2023-12-11

## 2023-12-11 RX ORDER — ACETAMINOPHEN 500 MG
500 TABLET ORAL EVERY 6 HOURS PRN
Refills: 0 | COMMUNITY
Start: 2023-12-11

## 2023-12-11 RX ORDER — ROCURONIUM BROMIDE 10 MG/ML
INJECTION, SOLUTION INTRAVENOUS
Status: DISCONTINUED | OUTPATIENT
Start: 2023-12-11 | End: 2023-12-11

## 2023-12-11 RX ORDER — LIDOCAINE HYDROCHLORIDE 20 MG/ML
INJECTION, SOLUTION EPIDURAL; INFILTRATION; INTRACAUDAL; PERINEURAL
Status: DISCONTINUED | OUTPATIENT
Start: 2023-12-11 | End: 2023-12-11

## 2023-12-11 RX ADMIN — PROPOFOL 150 MG: 10 INJECTION, EMULSION INTRAVENOUS at 11:12

## 2023-12-11 RX ADMIN — FENTANYL CITRATE 100 MCG: 50 INJECTION, SOLUTION INTRAMUSCULAR; INTRAVENOUS at 11:12

## 2023-12-11 RX ADMIN — PHENYLEPHRINE HYDROCHLORIDE 200 MCG: 10 INJECTION INTRAVENOUS at 12:12

## 2023-12-11 RX ADMIN — ONDANSETRON 4 MG: 2 INJECTION INTRAMUSCULAR; INTRAVENOUS at 12:12

## 2023-12-11 RX ADMIN — SUGAMMADEX 400 MG: 100 INJECTION, SOLUTION INTRAVENOUS at 12:12

## 2023-12-11 RX ADMIN — MIDAZOLAM HYDROCHLORIDE 2 MG: 1 INJECTION, SOLUTION INTRAMUSCULAR; INTRAVENOUS at 11:12

## 2023-12-11 RX ADMIN — LIDOCAINE HYDROCHLORIDE 100 MG: 20 INJECTION, SOLUTION EPIDURAL; INFILTRATION; INTRACAUDAL; PERINEURAL at 11:12

## 2023-12-11 RX ADMIN — DEXAMETHASONE SODIUM PHOSPHATE 8 MG: 4 INJECTION, SOLUTION INTRAMUSCULAR; INTRAVENOUS at 12:12

## 2023-12-11 RX ADMIN — SODIUM CHLORIDE: 0.9 INJECTION, SOLUTION INTRAVENOUS at 11:12

## 2023-12-11 RX ADMIN — ROCURONIUM BROMIDE 50 MG: 10 INJECTION INTRAVENOUS at 11:12

## 2023-12-11 NOTE — ANESTHESIA PREPROCEDURE EVALUATION
[facility]  Anesthesia Pre-Operative Evaluation    Patient Name: Blayne Beebe  YOB: 1960  MRN: [id number]    SUBJECTIVE:     Pre-operative evaluation for Procedure(s) (LRB):  EGD, WITH BALLOON DILATION (N/A)    12/11/2023    Blayne Beebe is a 63 y.o. male with hx of cervical fusion, COPD, gastroesophageal junction cancer s/p rad, esophagectomy 10/40/23 and chemo tx presenting for redo EGD balloon dilation with Dr. Meyer.      Patient now presents for the above procedure(s).    LDA: None documented.       PowerPort A Cath Single Lumen 07/12/23 1344 Internal Jugular Right (Active)   Line Necessity Review Longterm central access required 11/15/23 1330   Site Assessment No drainage;No redness;No swelling;No warmth 11/15/23 1330   Dressing Type Transparent (Tegaderm) 11/15/23 1330   Dressing Status Clean;Dry;Intact 11/15/23 1330   Dressing Intervention Integrity maintained 11/15/23 1330   Date on Dressing 11/15/23 11/15/23 1308   Patency/Care blood return present;flushed w/o difficulty;heparin locked 11/15/23 1330   Status Deaccessed 11/15/23 1330   Accessed by: M.AASEN 11/15/23 1308   Needle Insertion Date 11/15/23 11/15/23 1308   Needle Insertion Time 1230 11/15/23 1308   Type of Needle Mann 11/15/23 1308   Gauge 20 11/15/23 1308   Needle Length 3/4 in 11/15/23 1308   Needle Status Removed 11/15/23 1330   Flush Performed Yes 11/15/23 1330   Needle Removal Date 11/15/23 11/15/23 1330   Needle Removal Time 1330 11/15/23 1330   Deaccessed By M.AASEN 11/15/23 1330   Number of days: 151            Gastrostomy/Enterostomy 10/04/23 1730 Jejunostomy tube LUQ feeding (Active)   Number of days: 67       Prev airway:     Intubation     Date/Time: 12/1/2023 2:26 PM     Performed by: Yovany Elizondo MD  Authorized by: Yovany Elizondo MD    Intubation:     Induction:  Intravenous    Intubated:  Postinduction    Mask Ventilation:  Easy mask    Attempts:  3    Attempted By:   Resident anesthesiologist    Method of Intubation:  Video laryngoscopy and bougie    Blade:  Abigail 4    Laryngeal View Grade: Grade IV - neither epiglottis nor glottis seen      Attempted By (2nd Attempt):  Resident anesthesiologist    Method of Intubation (2nd Attempt):  Fiberoptic and video laryngoscopy    Blade (2nd Attempt):  Zapata 3    Laryngeal View Grade (2nd Attempt): Grade IV - neither epiglottis nor glottis seen      Laryngeal View Grade (2nd Attempt) comment:  Copious secretions in airway    Attempted By (3rd Attempt):  Staff anesthesiologist    Method of Intubation (3rd Attempt):  Video laryngoscopy and bougie    Blade (3rd Attempt):  Abigail 3    Difficult Airway Encountered?: Yes      Future Airway Recommendations:  Fiberoptic and bougie    Complications:  None    Airway Device Size:  7.5    Style/Cuff Inflation:  Cuffed    Tube secured:  22    Secured at:  The lips    Placement Verified By:  Capnometry    Complicating Factors:  Anterior larynx, poor neck/head extension, small mouth and short neck    Findings Post-Intubation:  BS equal bilateral and atraumatic/condition of teeth unchanged       Drips: None documented.      Patient Active Problem List   Diagnosis    Disc disease, degenerative, cervical    Erectile dysfunction    Tobacco use disorder, moderate, in early remission    COPD (chronic obstructive pulmonary disease)    RBBB    Pulmonary nodule 1 cm or greater in diameter    Personal history of colonic polyps    Hard to intubate    Malignant neoplasm of gastroesophageal junction    Weakness of both hips    Decreased functional mobility and endurance    Jejunostomy present    H/O esophagectomy    Alteration in nutrition associated with tube feeding       Review of patient's allergies indicates:  No Known Allergies    Current Inpatient Medications:      Antibiotics (From admission, onward)      None            VTE Risk Mitigation (From admission, onward)      None            No current  facility-administered medications on file prior to encounter.     Current Outpatient Medications on File Prior to Encounter   Medication Sig Dispense Refill    pantoprazole (PROTONIX) 40 MG tablet Take 1 tablet (40 mg total) by mouth once daily. 90 tablet 3    cholecalciferol, vitamin D3, (VITAMIN D3) 10 mcg (400 unit) Tab Take by mouth once daily.      LIDOcaine-prilocaine (EMLA) cream Apply topically as needed (Port access). (Patient not taking: Reported on 7/31/2023) 30 g 1    oxyCODONE (ROXICODONE) 5 mg/5 mL Soln 5 mLs (5 mg total) by Per J Tube route every 4 (four) hours as needed. (Patient not taking: Reported on 10/11/2023) 210 mL 0    sildenafil (VIAGRA) 100 MG tablet Take 1 tablet (100 mg total) by mouth daily as needed for Erectile Dysfunction. (Patient not taking: Reported on 10/11/2023) 10 tablet 6       Past Surgical History:   Procedure Laterality Date    CERVICAL FUSION      COLONOSCOPY N/A 3/27/2018    Procedure: COLONOSCOPY;  Surgeon: Maria Teresa Morocho MD;  Location: [facility];  Service: Endoscopy;  Laterality: N/A;    COLONOSCOPY N/A 6/13/2023    Procedure: COLONOSCOPY;  Surgeon: Kelli Snell MD;  Location: [facility];  Service: Endoscopy;  Laterality: N/A;    EGD, WITH BALLOON DILATION N/A 12/1/2023    Procedure: EGD, WITH BALLOON DILATION;  Surgeon: Nas Meyer MD;  Location: [facility] OR [address] [place] [address];  Service: General;  Laterality: N/A;    ENDOSCOPIC ULTRASOUND OF UPPER GASTROINTESTINAL TRACT N/A 7/3/2023    Procedure: ULTRASOUND, UPPER GI TRACT, ENDOSCOPIC;  Surgeon: Ray Duffy MD;  Location: [facility];  Service: Endoscopy;  Laterality: N/A;    ESOPHAGOGASTRODUODENOSCOPY N/A 6/13/2023    Procedure: EGD (ESOPHAGOGASTRODUODENOSCOPY);  Surgeon: Kelli Snell MD;  Location: [facility];  Service: Endoscopy;  Laterality: N/A;    ESOPHAGOGASTRODUODENOSCOPY N/A 7/3/2023    Procedure: EGD (ESOPHAGOGASTRODUODENOSCOPY);  Surgeon: Ray Duffy MD;  Location: [facility];  Service: Endoscopy;   Laterality: N/A;    INGUINAL HERNIA REPAIR Bilateral     Right x2, x1 left    PLACEMENT OF JEJUNOSTOMY TUBE  10/4/2023    Procedure: INSERTION, JEJUNOSTOMY TUBE;  Surgeon: Nas Meyer MD;  Location: [facility] OR [facility] FLR;  Service: General;;    PYLOROMYOTOMY  10/4/2023    Procedure: PYLOROMYOTOMY;  Surgeon: Nas Meyer MD;  Location: [facility] OR [facility] FLR;  Service: General;;    ROBOT-ASSISTED SURGICAL REMOVAL OF ESOPHAGUS USING DA BALWINDER XI N/A 10/4/2023    Procedure: XI ROBOTIC ESOPHAGECTOMY;  Surgeon: Nas Meyer MD;  Location: [facility] OR 2ND FLR;  Service: General;  Laterality: N/A;    ROBOTIC REPAIR, HERNIA, PARAESOPHAGEAL  10/4/2023    Procedure: ROBOTIC REPAIR, HERNIA, PARAESOPHAGEAL;  Surgeon: Nas Meyer MD;  [facility];  Service: General;;    ROTATOR CUFF REPAIR Right     x2       Social History     Socioeconomic History    Marital status:    Tobacco Use    Smoking status: Former     Current packs/day: 0.25     Average packs/day: 0.2 packs/day for 40.9 years (10.2 ttl pk-yrs)     Types: Cigarettes     Start date: 1983     Passive exposure: Current    Smokeless tobacco: Never    Tobacco comments:     Done smoking since september   Substance and Sexual Activity    Alcohol use: Yes     Comment: a couple of beers a day    Drug use: No    Sexual activity: Yes     Partners: Female     Comment:      Social Determinants of Health     Financial Resource Strain: Low Risk  (10/5/2023)    Overall Financial Resource Strain (CARDIA)     Difficulty of Paying Living Expenses: Not hard at all   Food Insecurity: No Food Insecurity (10/5/2023)    Hunger Vital Sign     Worried About Running Out of Food in the Last Year: Never true     Ran Out of Food in the Last Year: Never true   Transportation Needs: No Transportation Needs (10/5/2023)    PRAPARE - Transportation     Lack of Transportation (Medical): No     Lack of Transportation (Non-Medical): No   Physical Activity: Sufficiently  Active (10/5/2023)    Exercise Vital Sign     Days of Exercise per Week: 6 days     Minutes of Exercise per Session: 50 min   Social Connections: Unknown (10/5/2023)    Social Connection and Isolation Panel [NHANES]     Frequency of Communication with Friends and Family: Patient refused     Frequency of Social Gatherings with Friends and Family: Patient refused     Attends Temple Services: Never     Active Member of Clubs or Organizations: No     Attends Club or Organization Meetings: Never     Marital Status:    Housing Stability: Unknown (10/5/2023)    Housing Stability Vital Sign     Unable to Pay for Housing in the Last Year: No     Unstable Housing in the Last Year: No       OBJECTIVE:     Vital Signs Range (Last 24H):         Significant Labs:  Lab Results   Component Value Date    WBC 6.42 11/15/2023    HGB 12.4 (L) 11/15/2023    HCT 36.7 (L) 11/15/2023     11/15/2023    CHOL 170 05/10/2023    TRIG 48 05/10/2023    HDL 71 05/10/2023    ALT 25 11/15/2023    AST 23 11/15/2023     11/15/2023    K 4.7 11/15/2023     11/15/2023    CREATININE 0.9 11/15/2023    BUN 23 11/15/2023    CO2 28 11/15/2023    TSH 0.876 11/15/2023    PSA 0.70 05/10/2023    INR 1.0 10/04/2023    HGBA1C 5.0 05/10/2023       Diagnostic Studies: No relevant studies.    EKG:   Results for orders placed or performed in visit on 08/22/23   EKG 12-lead    Collection Time: 08/22/23 11:09 AM    Narrative    Test Reason : C16.0,J44.9,F17.200,I45.10,Z01.818,D70.9,D64.81,T45.1X5A,D69.6,    Vent. Rate : 083 BPM     Atrial Rate : 083 BPM     P-R Int : 136 ms          QRS Dur : 130 ms      QT Int : 392 ms       P-R-T Axes : 082 089 -13 degrees     QTc Int : 460 ms    Normal sinus rhythm  Possible Left atrial enlargement  Nonspecific intraventricular block  T wave abnormality, consider inferior ischemia  Abnormal ECG  When compared with ECG of 28-APR-2015 14:52,  Nonspecific intraventricular block has replaced Right bundle  branch block  Confirmed by Manohar Yeboah MD ([id number]) on 8/29/2023 1:09:44 PM    Referred By: magui lopes           Confirmed By:Manohar Yeboah MD       2D ECHO:  TTE:  No results found for this or any previous visit.    KWAME:  No results found for this or any previous visit.    ASSESSMENT/PLAN:           Pre-op Assessment    I have reviewed the Patient Summary Reports.     I have reviewed the Nursing Notes. I have reviewed the NPO Status.   I have reviewed the Medications.     Review of Systems  Anesthesia Hx:   History of prior surgery of interest to airway management or planning:             Social:  Smoker       Hematology/Oncology:                      Current/Recent Cancer.         chemotherapy, surgery and radiation       Cardiovascular:      Denies Hypertension.    Denies CAD.        Denies CHF.                                 Pulmonary:   COPD      Denies Sleep Apnea.                Renal/:   Denies Chronic Renal Disease.                Hepatic/GI:      Denies GERD. Denies Liver Disease.            Musculoskeletal:  Arthritis          Spine Disorders:             Neurological:  Denies TIA.  Denies CVA.    Denies Seizures.                                Endocrine:  Denies Diabetes. Denies Hypothyroidism.  Denies Hyperthyroidism.             Physical Exam  General: Alert and Oriented    Airway:  Mallampati: II   Mouth Opening: Normal  TM Distance: Normal  Tongue: Normal  Neck ROM: Normal ROM    Dental:  Periodontal disease    Chest/Lungs:  Normal Respiratory Rate    Heart:  Rate: Normal        Anesthesia Plan  Type of Anesthesia, risks & benefits discussed:    Anesthesia Type: Gen ETT  Intra-op Monitoring Plan: Standard ASA Monitors  Post Op Pain Control Plan: multimodal analgesia and IV/PO Opioids PRN  Induction:  IV  Airway Plan: Direct and Video, Post-Induction  ASA Score: 3  Day of Surgery Review of History & Physical: H&P Update referred to the surgeon/provider.    Ready For Surgery From Anesthesia  Perspective.     .

## 2023-12-11 NOTE — ANESTHESIA PROCEDURE NOTES
Intubation    Date/Time: 12/11/2023 12:01 PM    Performed by: Cj Spears MD  Authorized by: Melquiades Laurent III, MD    Intubation:     Induction:  Intravenous    Intubated:  Postinduction    Mask Ventilation:  Easy mask    Attempts:  3    Attempted By:  Resident anesthesiologist    Method of Intubation:  Video laryngoscopy    Blade:  Glidescope 3    Laryngeal View Grade: Grade IIA - cords partially seen      Attempted By (2nd Attempt):  Staff anesthesiologist    Method of Intubation (2nd Attempt):  Video laryngoscopy    Blade (2nd Attempt):  Glidescope 3    Laryngeal View Grade (2nd Attempt): Grade IIa - cords partially seen      Attempted By (3rd Attempt):  Staff anesthesiologist    Method of Intubation (3rd Attempt):  Video laryngoscopy    Blade (3rd Attempt):  Glidescope 3    Laryngeal View Grade (3rd Attempt): Grade I - full view of cords      Difficult Airway Encountered?: Yes      Future Airway Recommendations:  Glidescope 3    Complications:  Soft tissue trauma    Airway Device:  Oral endotracheal tube    Airway Device Size:  7.5    Style/Cuff Inflation:  Cuffed    Tube secured:  22    Secured at:  The lips    Placement Verified By:  Capnometry    Complicating Factors:  Anterior larynx, small mouth and narrow palate    Findings Post-Intubation:  BS equal bilateral  Notes:      Recommend glidescope for future procedures as this was the best view documented in chart. Very anterior airway with small mouth and limited room for instrumentation. Top right lateral incisor noted to be loose during intubation with small amount of bleeding. Remains in place after intubation.

## 2023-12-11 NOTE — BRIEF OP NOTE
[facility] - Surgery ([facility])  Brief Operative Note    SUMMARY     Surgery Date: 12/11/2023     Surgeon(s) and Role:     * Nas Meyer MD - Primary     * Ana María Guevara MD - Resident - Assisting        Pre-op Diagnosis:  Esophageal stricture [K22.2]    Post-op Diagnosis:  Post-Op Diagnosis Codes:     * Esophageal stricture [K22.2]    Procedure(s) (LRB):  EGD, WITH BALLOON DILATION (N/A)    Anesthesia: General    Implants:  * No implants in log *    Operative Findings: EGD with balloon dilation, narrowing near anastomosis, scope was able to be traversed following dilation    Estimated Blood Loss: Minimal     Estimated Blood Loss has not been documented. EBL = <5.         Specimens:   Specimen (24h ago, onward)      None            [id number]

## 2023-12-11 NOTE — ANESTHESIA POSTPROCEDURE EVALUATION
Anesthesia Post Evaluation    Patient: Blayne ECKERT III Abiel    Procedure(s) Performed: Procedure(s) (LRB):  EGD, WITH BALLOON DILATION (N/A)    Final Anesthesia Type: general      Patient location during evaluation: PACU  Patient participation: Yes- Able to Participate  Level of consciousness: awake and alert  Post-procedure vital signs: reviewed and stable  Pain management: adequate  Airway patency: patent    PONV status at discharge: No PONV  Anesthetic complications: no (Upon interview, patient denies any loose teeth. States all are the same condition as pre op.)      Cardiovascular status: blood pressure returned to baseline  Respiratory status: unassisted  Hydration status: euvolemic  Follow-up not needed.              Vitals Value Taken Time   /72 12/11/23 1401   Temp 36.6 °C (97.9 °F) 12/11/23 1345   Pulse 68 12/11/23 1400   Resp 18 12/11/23 1400   SpO2 97 % 12/11/23 1400   Vitals shown include unvalidated device data.      No case tracking events are documented in the log.      Pain/Yessy Score: Yessy Score: 10 (12/11/2023  1:15 PM)

## 2023-12-11 NOTE — DISCHARGE SUMMARY
[facility] - Surgery (2nd Fl)  Discharge Note  Short Stay    Procedure(s) (LRB):  EGD, WITH BALLOON DILATION (N/A)      OUTCOME: Patient tolerated treatment/procedure well without complication and is now ready for discharge.    DISPOSITION: Home or Self Care    FINAL DIAGNOSIS:  <principal problem not specified>    FOLLOWUP: In clinic    DISCHARGE INSTRUCTIONS:    Discharge Procedure Orders   Diet clear liquid   Order Comments: Clear liquid diet today, then may resume a regular diet     Notify your health care provider if you experience any of the following:  persistent nausea and vomiting or diarrhea     Notify your health care provider if you experience any of the following:  severe uncontrolled pain     Notify your health care provider if you experience any of the following:  difficulty breathing or increased cough     Activity as tolerated         Clinical Reference Documents Added to Patient Instructions         Document    UPPER GI ENDOSCOPY DISCHARGE INSTRUCTIONS (ENGLISH)            TIME SPENT ON DISCHARGE: 10 minutes

## 2023-12-11 NOTE — OP NOTE
[facility] - Surgery ([facility])  Surgery Department  Operative Note       Date of Procedure: 12/11/2023     Surgeon(s):  Surgeon(s) and Role:     * Nas Meyer MD - Primary     * Ana María Guevara MD - Resident - Assisting      Pre-Operative Diagnosis:   Cervical esophagogastric anastomotic stricture    Post-Operative Diagnosis:   Same     Anesthesia: General    Operative Findings:   Persistent anastomotic stricture at 20 cm from the incisors. Dilated to 20 mm      Procedures:  Esophagogastroduodenoscopy (EGD), with transendoscopic balloon dilation of esophagus (<30 mm)    Estimated Blood Loss (EBL): <2 mL    Specimen(s):    Specimen (24h ago, onward)      None                   Indications:  Blayne Beebe is a 63 year old man s/p esophagectomy with a cervical esophagogastric anastomotic stricture. I dilated the stricture 10 days ago. His symptoms of dysphagia have improved, and he presents for a repeat EGD and endoscopic dilation. Risks and benefits were reviewed including but not limited to: bleeding, infection, damage to local structures, perforation of GI tract, need for additional procedures, death, and imponderables.  He understands and signed written informed consent to proceed.    Details:  The patient was transported to the operating room and satisfactory anesthesia established. The patient was placed in the supine position    The adult gastroscope was introduced into the mouth, passed into the hypopharynx and cervical esophagus under endoscopic vision. The anastomosis was at 20 cm.  There was a moderate anastomotic stenosis that was less narrow than it was on his previous scope, but the scope still could not pass prior to dilation. This was traversed with a balloon dilator and dilated up progressively to 20 mm. It was held for 1 minute at each diameter and for 15 minutes at 20 mm. In between dilations, the stenosis was examined and was without perforation. On final dilation, I was still unable  to traverse the stricture. It was dilated again with an 18-20 mm balloon, and the balloon was held for 5 minutes at 20 mm. The scope could easily cross the anastomosis at that point.     The endoscope was advanced past the anastomosis into the gastric conduit, and distally to the pylorus. Upon withdrawal, the mucosa was examined circumferentially with no evidence of pathology.     I communicated the intraoperative findings with the family following the procedure.     Condition: Good    Disposition: PACU - hemodynamically stable.    Attestation: I was present and scrubbed for the key portions of the procedure.    Nas Meyer MD  Staff Surgeon  Surgical Oncology

## 2023-12-11 NOTE — PLAN OF CARE
Chart reviewed. Preop nursing care completed per orders. Safe surgery checklist completed. Wife at bedside. Belongings placed in postop locker. Call bell within reach. Instructed pt to call for assistance.

## 2023-12-11 NOTE — TRANSFER OF CARE
Anesthesia Transfer of Care Note    Patient: Blayne D III Steib    Procedure(s) Performed: Procedure(s) (LRB):  EGD, WITH BALLOON DILATION (N/A)    Patient location: PACU    Transport from OR: Transported from OR on 6-10 L/min O2 by face mask with adequate spontaneous ventilation. Upon arrival to PACU/ICU, patient attached to 100% O2 by T-piece with adequate spontaneous ventilation    Post pain: adequate analgesia    Post assessment: no apparent anesthetic complications    Post vital signs: stable    Level of consciousness: awake, alert and oriented    Nausea/Vomiting: no nausea/vomiting    Complications: none    Transfer of care protocol was followed      Last vitals: Visit Vitals  /67 (BP Location: Left arm, Patient Position: Lying)   Pulse 70   Temp 37.2 °C (99 °F) (Temporal)   Resp 18   SpO2 97%

## 2023-12-11 NOTE — INTERVAL H&P NOTE
The patient has been examined and the H&P has been reviewed:    I concur with the findings and no changes have occurred since H&P was written.    Surgery risks, benefits and alternative options discussed and understood by patient/family.    Physical Exam:  General: no acute distress  Neuro: awake, alert, oriented x3  Cardio: RRR, 2+ radial  Resp: Moving air appropriately, breathing even and unlabored  Abd: Soft, non-tender, non-distended  Ext: Warm and well perfused    There are no hospital problems to display for this patient.

## 2023-12-12 ENCOUNTER — INFUSION (OUTPATIENT)
Dept: INFUSION THERAPY | Facility: HOSPITAL | Age: 63
End: 2023-12-12
Payer: COMMERCIAL

## 2023-12-12 ENCOUNTER — LAB VISIT (OUTPATIENT)
Dept: LAB | Facility: HOSPITAL | Age: 63
End: 2023-12-12
Attending: INTERNAL MEDICINE
Payer: COMMERCIAL

## 2023-12-12 ENCOUNTER — OFFICE VISIT (OUTPATIENT)
Dept: HEMATOLOGY/ONCOLOGY | Facility: CLINIC | Age: 63
End: 2023-12-12
Payer: COMMERCIAL

## 2023-12-12 VITALS
HEIGHT: 69 IN | DIASTOLIC BLOOD PRESSURE: 56 MMHG | SYSTOLIC BLOOD PRESSURE: 115 MMHG | HEART RATE: 67 BPM | BODY MASS INDEX: 20.11 KG/M2 | RESPIRATION RATE: 18 BRPM | WEIGHT: 135.81 LBS | OXYGEN SATURATION: 95 %

## 2023-12-12 DIAGNOSIS — I70.0 ATHEROSCLEROSIS OF AORTA: ICD-10-CM

## 2023-12-12 DIAGNOSIS — F17.210 NICOTINE DEPENDENCE, CIGARETTES, UNCOMPLICATED: ICD-10-CM

## 2023-12-12 DIAGNOSIS — C16.0 MALIGNANT NEOPLASM OF GASTROESOPHAGEAL JUNCTION: ICD-10-CM

## 2023-12-12 DIAGNOSIS — E44.0 MODERATE MALNUTRITION: ICD-10-CM

## 2023-12-12 DIAGNOSIS — J43.9 PULMONARY EMPHYSEMA, UNSPECIFIED EMPHYSEMA TYPE: ICD-10-CM

## 2023-12-12 DIAGNOSIS — K20.90 ESOPHAGITIS: ICD-10-CM

## 2023-12-12 DIAGNOSIS — C16.0 MALIGNANT NEOPLASM OF GASTROESOPHAGEAL JUNCTION: Primary | ICD-10-CM

## 2023-12-12 LAB
ALBUMIN SERPL BCP-MCNC: 3.6 G/DL (ref 3.5–5.2)
ALP SERPL-CCNC: 126 U/L (ref 55–135)
ALT SERPL W/O P-5'-P-CCNC: 22 U/L (ref 10–44)
ANION GAP SERPL CALC-SCNC: 7 MMOL/L (ref 8–16)
AST SERPL-CCNC: 17 U/L (ref 10–40)
BILIRUB SERPL-MCNC: 0.3 MG/DL (ref 0.1–1)
BUN SERPL-MCNC: 18 MG/DL (ref 8–23)
CALCIUM SERPL-MCNC: 9.5 MG/DL (ref 8.7–10.5)
CHLORIDE SERPL-SCNC: 104 MMOL/L (ref 95–110)
CO2 SERPL-SCNC: 26 MMOL/L (ref 23–29)
CREAT SERPL-MCNC: 0.8 MG/DL (ref 0.5–1.4)
ERYTHROCYTE [DISTWIDTH] IN BLOOD BY AUTOMATED COUNT: 12 % (ref 11.5–14.5)
EST. GFR  (NO RACE VARIABLE): >60 ML/MIN/1.73 M^2
GLUCOSE SERPL-MCNC: 106 MG/DL (ref 70–110)
HCT VFR BLD AUTO: 34.7 % (ref 40–54)
HGB BLD-MCNC: 12 G/DL (ref 14–18)
IMM GRANULOCYTES # BLD AUTO: 0.1 K/UL (ref 0–0.04)
MCH RBC QN AUTO: 32.7 PG (ref 27–31)
MCHC RBC AUTO-ENTMCNC: 34.6 G/DL (ref 32–36)
MCV RBC AUTO: 95 FL (ref 82–98)
NEUTROPHILS # BLD AUTO: 10.9 K/UL (ref 1.8–7.7)
PLATELET # BLD AUTO: 271 K/UL (ref 150–450)
PMV BLD AUTO: 9.1 FL (ref 9.2–12.9)
POTASSIUM SERPL-SCNC: 4.5 MMOL/L (ref 3.5–5.1)
PROT SERPL-MCNC: 7.3 G/DL (ref 6–8.4)
RBC # BLD AUTO: 3.67 M/UL (ref 4.6–6.2)
SODIUM SERPL-SCNC: 137 MMOL/L (ref 136–145)
TSH SERPL DL<=0.005 MIU/L-ACNC: 0.48 UIU/ML (ref 0.4–4)
WBC # BLD AUTO: 13.7 K/UL (ref 3.9–12.7)

## 2023-12-12 PROCEDURE — 3044F PR MOST RECENT HEMOGLOBIN A1C LEVEL <7.0%: ICD-10-PCS | Mod: CPTII,S$GLB,, | Performed by: INTERNAL MEDICINE

## 2023-12-12 PROCEDURE — 3078F DIAST BP <80 MM HG: CPT | Mod: CPTII,S$GLB,, | Performed by: INTERNAL MEDICINE

## 2023-12-12 PROCEDURE — 84443 ASSAY THYROID STIM HORMONE: CPT | Performed by: INTERNAL MEDICINE

## 2023-12-12 PROCEDURE — 3074F PR MOST RECENT SYSTOLIC BLOOD PRESSURE < 130 MM HG: ICD-10-PCS | Mod: CPTII,S$GLB,, | Performed by: INTERNAL MEDICINE

## 2023-12-12 PROCEDURE — 36415 COLL VENOUS BLD VENIPUNCTURE: CPT | Performed by: INTERNAL MEDICINE

## 2023-12-12 PROCEDURE — 96413 CHEMO IV INFUSION 1 HR: CPT

## 2023-12-12 PROCEDURE — 99215 PR OFFICE/OUTPT VISIT, EST, LEVL V, 40-54 MIN: ICD-10-PCS | Mod: S$GLB,,, | Performed by: INTERNAL MEDICINE

## 2023-12-12 PROCEDURE — 99215 OFFICE O/P EST HI 40 MIN: CPT | Mod: S$GLB,,, | Performed by: INTERNAL MEDICINE

## 2023-12-12 PROCEDURE — A4216 STERILE WATER/SALINE, 10 ML: HCPCS | Performed by: INTERNAL MEDICINE

## 2023-12-12 PROCEDURE — 63600175 PHARM REV CODE 636 W HCPCS: Mod: JZ,JG | Performed by: INTERNAL MEDICINE

## 2023-12-12 PROCEDURE — 25000003 PHARM REV CODE 250: Performed by: INTERNAL MEDICINE

## 2023-12-12 PROCEDURE — 99999 PR PBB SHADOW E&M-EST. PATIENT-LVL III: ICD-10-PCS | Mod: PBBFAC,,, | Performed by: INTERNAL MEDICINE

## 2023-12-12 PROCEDURE — 3008F BODY MASS INDEX DOCD: CPT | Mod: CPTII,S$GLB,, | Performed by: INTERNAL MEDICINE

## 2023-12-12 PROCEDURE — 99999 PR PBB SHADOW E&M-EST. PATIENT-LVL III: CPT | Mod: PBBFAC,,, | Performed by: INTERNAL MEDICINE

## 2023-12-12 PROCEDURE — 3044F HG A1C LEVEL LT 7.0%: CPT | Mod: CPTII,S$GLB,, | Performed by: INTERNAL MEDICINE

## 2023-12-12 PROCEDURE — 85027 COMPLETE CBC AUTOMATED: CPT | Performed by: INTERNAL MEDICINE

## 2023-12-12 PROCEDURE — 3078F PR MOST RECENT DIASTOLIC BLOOD PRESSURE < 80 MM HG: ICD-10-PCS | Mod: CPTII,S$GLB,, | Performed by: INTERNAL MEDICINE

## 2023-12-12 PROCEDURE — 80053 COMPREHEN METABOLIC PANEL: CPT | Performed by: INTERNAL MEDICINE

## 2023-12-12 PROCEDURE — 3008F PR BODY MASS INDEX (BMI) DOCUMENTED: ICD-10-PCS | Mod: CPTII,S$GLB,, | Performed by: INTERNAL MEDICINE

## 2023-12-12 PROCEDURE — 3074F SYST BP LT 130 MM HG: CPT | Mod: CPTII,S$GLB,, | Performed by: INTERNAL MEDICINE

## 2023-12-12 RX ORDER — EPINEPHRINE 0.3 MG/.3ML
0.3 INJECTION SUBCUTANEOUS ONCE AS NEEDED
Status: CANCELLED | OUTPATIENT
Start: 2023-12-13

## 2023-12-12 RX ORDER — PROCHLORPERAZINE EDISYLATE 5 MG/ML
10 INJECTION INTRAMUSCULAR; INTRAVENOUS ONCE AS NEEDED
Status: CANCELLED
Start: 2023-12-13

## 2023-12-12 RX ORDER — DIPHENHYDRAMINE HYDROCHLORIDE 50 MG/ML
50 INJECTION INTRAMUSCULAR; INTRAVENOUS ONCE AS NEEDED
Status: CANCELLED | OUTPATIENT
Start: 2023-12-13

## 2023-12-12 RX ORDER — SODIUM CHLORIDE 0.9 % (FLUSH) 0.9 %
10 SYRINGE (ML) INJECTION
Status: DISCONTINUED | OUTPATIENT
Start: 2023-12-12 | End: 2023-12-12 | Stop reason: HOSPADM

## 2023-12-12 RX ORDER — SODIUM CHLORIDE 0.9 % (FLUSH) 0.9 %
10 SYRINGE (ML) INJECTION
Status: CANCELLED | OUTPATIENT
Start: 2023-12-13

## 2023-12-12 RX ORDER — DIPHENHYDRAMINE HYDROCHLORIDE 50 MG/ML
50 INJECTION INTRAMUSCULAR; INTRAVENOUS ONCE AS NEEDED
Status: DISCONTINUED | OUTPATIENT
Start: 2023-12-12 | End: 2023-12-12 | Stop reason: HOSPADM

## 2023-12-12 RX ORDER — HEPARIN 100 UNIT/ML
500 SYRINGE INTRAVENOUS
Status: CANCELLED | OUTPATIENT
Start: 2023-12-13

## 2023-12-12 RX ORDER — EPINEPHRINE 0.3 MG/.3ML
0.3 INJECTION SUBCUTANEOUS ONCE AS NEEDED
Status: DISCONTINUED | OUTPATIENT
Start: 2023-12-12 | End: 2023-12-12 | Stop reason: HOSPADM

## 2023-12-12 RX ORDER — PROCHLORPERAZINE EDISYLATE 5 MG/ML
10 INJECTION INTRAMUSCULAR; INTRAVENOUS ONCE AS NEEDED
Status: DISCONTINUED | OUTPATIENT
Start: 2023-12-12 | End: 2023-12-12 | Stop reason: HOSPADM

## 2023-12-12 RX ORDER — HEPARIN 100 UNIT/ML
500 SYRINGE INTRAVENOUS
Status: DISCONTINUED | OUTPATIENT
Start: 2023-12-12 | End: 2023-12-12 | Stop reason: HOSPADM

## 2023-12-12 RX ADMIN — SODIUM CHLORIDE 480 MG: 9 INJECTION, SOLUTION INTRAVENOUS at 12:12

## 2023-12-12 RX ADMIN — HEPARIN 500 UNITS: 100 SYRINGE at 12:12

## 2023-12-12 RX ADMIN — Medication 10 ML: at 12:12

## 2023-12-12 RX ADMIN — SODIUM CHLORIDE: 9 INJECTION, SOLUTION INTRAVENOUS at 11:12

## 2023-12-12 NOTE — PLAN OF CARE
Patient given Opdivo today. Tolerated well. PAC accessed with positive blood return.     Pt declined AVS, uses [facility]. RTC 1/9.  Discharged home, ambulated independently.

## 2023-12-12 NOTE — PROGRESS NOTES
MEDICAL ONCOLOGY - ESTABLISHED PATIENT VISIT    Reason for visit: Esophageal adenocarcinoma    Best Contact Phone Number(s): [phone] (home)      Cancer/Stage/TNM:    Cancer Staging   Malignant neoplasm of gastroesophageal junction  Staging form: Esophagus - Adenocarcinoma, AJCC 8th Edition  - Clinical stage from 6/13/2023: Stage III (cT3, cN1, cM0, G2) - Signed by Sunday Jasso MD on 7/5/2023       Oncology History   Malignant neoplasm of gastroesophageal junction   6/13/2023 Cancer Staged    Staging form: Esophagus - Adenocarcinoma, AJCC 8th Edition  - Clinical stage from 6/13/2023: Stage III (cT3, cN1, cM0, G2)     6/23/2023 Initial Diagnosis    Malignant neoplasm of gastroesophageal junction     7/3/2023 Tumor Conference     [facility] UGI MULTIDISCIPLINARY TUMOR BOARD  PATIENT REVIEW FORM   ____________________________________________________________    CLINIC #: [id number]   DATE: 7/3/2023    DIAGNOSIS: esophageal CA    PRESENTER: Mitch    PATIENT SUMMARY:   This 64 y/o gentleman is a long time smoker with emphysema who had low dose screening lung CT in 4/2023 per his PCP. Given an abnormality on this scan, he had PET on 6/1/23 which identified large hypermetabolic mid esophageal mass with lymph nodes. He underwent EGD on 6/13/23 that found large fungating ulcerated mass with bleeding in middle third of esophagus to lower esophagus, 34-44cm. Pathology revealed moderately differentiated adenocarcinoma.   Staging PET reviewed - level 2 cervical lymph node with FDG uptake, nothing in lungs concerning  Staging EUS today per Dr. Duffy.   He has been evaluated by Dr. Vance in [place] and Dr. June.   He is scheduled to see rad onc, Dr. Jasso, Wednesday and IR for port placement on 7/12/23.     BOARD RECOMMENDATIONS:   Proceed with neoadj CRT, then restaging to consider surgical resection    CONSULT NEEDED:     [] Surgery    [] Hem/Onc    [] Rad/Onc    [] Dietary                 [] Social  Service    [] Psychology       [] AES  [] Radiology     Clinical Stage: Tumor 3 Node(s) 1 Metastasis 0      GROUP STAGE:  [] O    [] 1   [] II     [x] III   []IV  [] Local recurrence     [] Regional recurrence     [] Distant recurrence   Metastatic site(s): none         [x] [facility] Treatment Guidelines reviewed and care planned is consistent with guidelines.         (i.e., NCCN, NCI, PD, ACO, AUA, etc.)    PRESENTATION AT CANCER CONFERENCE:         [x] Prospective    [] Retrospective     [] Follow-Up         7/16/2023 - 8/8/2023 Chemotherapy    Treatment Summary   Plan Name: OP ESOPHAGEAL PACLITAXEL CARBOPLATIN WEEKLY  Treatment Goal: Control  Status: Inactive  Start Date: 7/16/2023  End Date: 8/8/2023  Provider: Delta June MD  Chemotherapy: CARBOplatin (PARAPLATIN) 195 mg in sodium chloride 0.9% 304.5 mL chemo infusion, 195 mg (100 % of original dose 193 mg), Intravenous, Clinic/HOD 1 time, 1 of 1 cycle  Dose modification:   (original dose 193 mg, Cycle 1)  Administration: 195 mg (7/18/2023), 210 mg (7/24/2023), 230 mg (7/31/2023), 210 mg (8/8/2023)  PACLitaxeL (TAXOL) 50 mg/m2 = 84 mg in sodium chloride 0.9% 250 mL chemo infusion, 50 mg/m2 = 84 mg, Intravenous, Clinic/HOD 1 time, 1 of 1 cycle  Administration: 84 mg (7/18/2023)     7/18/2023 - 8/17/2023 Radiation Therapy    Treating physician: Sunday Jasso    Course: C1 Thorax 2023    Treatment Site Ref. ID Energy Dose/Fx (Gy) #Fx Dose Correction (Gy) Total Dose (Gy) Start Date End Date Elapsed Days   IM Esophagus PTV_High 6X 1.8 23 / 23 0 41.4 7/18/2023 8/17/2023 30        11/15/2023 -  Chemotherapy    Treatment Summary   Plan Name: OP NIVOLUMAB 480 MG Q4W  Treatment Goal: Curative  Status: Active  Start Date: 11/15/2023  End Date: 9/18/2024 (Planned)  Provider: Delta June MD  Chemotherapy: [No matching medication found in this treatment plan]     12/1/2023 Procedure    Surgeon(s) and Role: Nas Meyer MD - Primary     Pre-Operative  Diagnosis:   Esophageal adenocarcinoma  Suspected cervical esophagogastric anastomotic stricture     Post-Operative Diagnosis:   Same  Cervical esophagogastric anastomotic stricture      Operative Findings:    Cervical esophagogastric anastomotic stricture. Unable to pass endoscope through stricture initially, but the scope was able to pass after serial dilations to 18 mm.      Procedures:  Esophagogastroduodenoscopy (EGD), with transendoscopic balloon dilation of esophagus (<30 mm)                Interim History:   63 y.o. male with esophageal adenocarcinoma who presents for cycle 2 of adjuvant nivolumab.  He tolerated his first dose well.  He has undergone two esophageal stricture dilations which began to be an issue for him around Thanksgiving. He does feel that the dilations have been helpful.  He has continued on soft foods, has not yet tried thicker foods.  No discernable adverse effects from his first dose of nivolumab.  Still using 2 cartons of his tube feeds per day.    ECOG status is 0. Presents with his wife today.     ROS:   Review of Systems   Constitutional:  Negative for chills, fever, malaise/fatigue and weight loss.   HENT:  Negative for sore throat.    Eyes:  Negative for blurred vision and pain.   Respiratory:  Negative for cough and shortness of breath.    Cardiovascular:  Negative for chest pain and leg swelling.   Gastrointestinal:  Negative for abdominal pain, constipation, diarrhea, heartburn, nausea and vomiting.        Dysphagia   Genitourinary:  Negative for dysuria and frequency.   Musculoskeletal:  Negative for back pain, falls and myalgias.   Skin:  Negative for rash.   Neurological:  Negative for dizziness, weakness and headaches.   Endo/Heme/Allergies:  Does not bruise/bleed easily.   Psychiatric/Behavioral:  Negative for depression. The patient is not nervous/anxious.    All other systems reviewed and are negative.      Past Medical History:   Past Medical History:   Diagnosis Date     Arthritis     Cancer     COPD (chronic obstructive pulmonary disease)         Past Surgical History:   Past Surgical History:   Procedure Laterality Date    CERVICAL FUSION      COLONOSCOPY N/A 3/27/2018    Procedure: COLONOSCOPY;  Surgeon: Maria Teresa Morocho MD;  Location: [facility];  Service: Endoscopy;  Laterality: N/A;    COLONOSCOPY N/A 6/13/2023    Procedure: COLONOSCOPY;  Surgeon: Kelli Snell MD;  Location: [facility];  Service: Endoscopy;  Laterality: N/A;    EGD, WITH BALLOON DILATION N/A 12/1/2023    Procedure: EGD, WITH BALLOON DILATION;  Surgeon: Nas Meyer MD;  Location: [facility] OR [address] [place] [address];  Service: General;  Laterality: N/A;    EGD, WITH BALLOON DILATION N/A 12/11/2023    Procedure: EGD, WITH BALLOON DILATION;  Surgeon: Nas Meyer MD;  Location: [facility] OR [address] [place] [address];  Service: General;  Laterality: N/A;    ENDOSCOPIC ULTRASOUND OF UPPER GASTROINTESTINAL TRACT N/A 7/3/2023    Procedure: ULTRASOUND, UPPER GI TRACT, ENDOSCOPIC;  Surgeon: Ray Duffy MD;  Location: [facility];  Service: Endoscopy;  Laterality: N/A;    ESOPHAGOGASTRODUODENOSCOPY N/A 6/13/2023    Procedure: EGD (ESOPHAGOGASTRODUODENOSCOPY);  Surgeon: Kelli Snell MD;  Location: [facility];  Service: Endoscopy;  Laterality: N/A;    ESOPHAGOGASTRODUODENOSCOPY N/A 7/3/2023    Procedure: EGD (ESOPHAGOGASTRODUODENOSCOPY);  Surgeon: Ray Duffy MD;  Location: [facility];  Service: Endoscopy;  Laterality: N/A;    INGUINAL HERNIA REPAIR Bilateral     Right x2, x1 left    PLACEMENT OF JEJUNOSTOMY TUBE  10/4/2023    Procedure: INSERTION, JEJUNOSTOMY TUBE;  Surgeon: Nas Meyer MD;  Location: [address];  Service: General;;    PYLOROMYOTOMY  10/4/2023    Procedure: PYLOROMYOTOMY;  Surgeon: Nas Meyer MD;  Location: [facility] OR [address] [place] [address];  Service: General;;    ROBOT-ASSISTED SURGICAL REMOVAL OF ESOPHAGUS USING DA BALWINDER XI N/A 10/4/2023    Procedure: XI ROBOTIC ESOPHAGECTOMY;  Surgeon: Nas Meyer MD;   [facility];  Service: General;  Laterality: N/A;    ROBOTIC REPAIR, HERNIA, PARAESOPHAGEAL  10/4/2023    Procedure: ROBOTIC REPAIR, HERNIA, PARAESOPHAGEAL;  Surgeon: Nas Meyer MD;  [facility];  Service: General;;    ROTATOR CUFF REPAIR Right     x2        Family History:   Family History   Problem Relation Age of Onset    Diabetes Mother     Heart disease Father         CHF    Glaucoma Neg Hx     Colon cancer Neg Hx     Prostate cancer Neg Hx         Social History:   Social History     Tobacco Use    Smoking status: Former     Current packs/day: 0.25     Average packs/day: 0.3 packs/day for 40.9 years (10.2 ttl pk-yrs)     Types: Cigarettes     Start date: 1983     Passive exposure: Current    Smokeless tobacco: Never    Tobacco comments:     Done smoking since september   Substance Use Topics    Alcohol use: Yes     Comment: a couple of beers a day        I have reviewed and updated the patient's past medical, surgical, family and social histories.    Allergies:   Review of patient's allergies indicates:  No Known Allergies     Medications:   Current Outpatient Medications   Medication Sig Dispense Refill    acetaminophen (TYLENOL) 500 MG tablet Take 1 tablet (500 mg total) by mouth every 6 (six) hours as needed for Pain.  0    cholecalciferol, vitamin D3, (VITAMIN D3) 10 mcg (400 unit) Tab Take by mouth once daily.      LIDOcaine-prilocaine (EMLA) cream Apply topically as needed (Port access). (Patient not taking: Reported on 7/31/2023) 30 g 1    oxyCODONE (ROXICODONE) 5 mg/5 mL Soln 5 mLs (5 mg total) by Per J Tube route every 4 (four) hours as needed. (Patient not taking: Reported on 10/11/2023) 210 mL 0    pantoprazole (PROTONIX) 40 MG tablet Take 1 tablet (40 mg total) by mouth once daily. 90 tablet 3    sildenafil (VIAGRA) 100 MG tablet Take 1 tablet (100 mg total) by mouth daily as needed for Erectile Dysfunction. (Patient not taking: Reported on 10/11/2023) 10 tablet  "6     No current facility-administered medications for this visit.        Physical Exam:   BP (!) 115/56 (BP Location: Left arm, Patient Position: Sitting, BP Method: Medium (Automatic))   Pulse 67   Resp 18   Ht 5' 9" (1.753 m)   Wt 61.6 kg (135 lb 12.9 oz)   SpO2 95%   BMI 20.05 kg/m²      ECOG Performance status: 0          Physical Exam  Vitals reviewed.   Constitutional:       General: He is not in acute distress.     Appearance: Normal appearance. He is normal weight.   HENT:      Head: Normocephalic and atraumatic.      Right Ear: External ear normal.      Left Ear: External ear normal.      Nose: Nose normal.      Mouth/Throat:      Mouth: Mucous membranes are moist.      Pharynx: Oropharynx is clear. No posterior oropharyngeal erythema.   Eyes:      General: No scleral icterus.     Extraocular Movements: Extraocular movements intact.      Conjunctiva/sclera: Conjunctivae normal.      Pupils: Pupils are equal, round, and reactive to light.   Cardiovascular:      Rate and Rhythm: Normal rate and regular rhythm.      Pulses: Normal pulses.      Heart sounds: Normal heart sounds.   Pulmonary:      Effort: Pulmonary effort is normal.      Breath sounds: Normal breath sounds. No wheezing or rales.   Abdominal:      General: Bowel sounds are normal. There is no distension.      Palpations: Abdomen is soft.      Tenderness: There is no abdominal tenderness.      Comments: J tube in place;  Surgical wounds well healing   Musculoskeletal:         General: No swelling. Normal range of motion.      Cervical back: Normal range of motion and neck supple.   Skin:     General: Skin is warm.      Coloration: Skin is not jaundiced.      Findings: No erythema or rash.   Neurological:      General: No focal deficit present.      Mental Status: He is alert and oriented to person, place, and time. Mental status is at baseline.      Gait: Gait normal.   Psychiatric:         Mood and Affect: Mood normal.         Behavior: " Behavior normal.         Thought Content: Thought content normal.         Judgment: Judgment normal.           Labs:   Recent Results (from the past 48 hour(s))   CBC Oncology    Collection Time: 12/12/23  9:01 AM   Result Value Ref Range    WBC 13.70 (H) 3.90 - 12.70 K/uL    RBC 3.67 (L) 4.60 - 6.20 M/uL    Hemoglobin 12.0 (L) 14.0 - 18.0 g/dL    Hematocrit 34.7 (L) 40.0 - 54.0 %    MCV 95 82 - 98 fL    MCH 32.7 (H) 27.0 - 31.0 pg    MCHC 34.6 32.0 - 36.0 g/dL    RDW 12.0 11.5 - 14.5 %    Platelets 271 150 - 450 K/uL    MPV 9.1 (L) 9.2 - 12.9 fL    Gran # (ANC) 10.9 (H) 1.8 - 7.7 K/uL    Immature Grans (Abs) 0.10 (H) 0.00 - 0.04 K/uL   Comprehensive Metabolic Panel    Collection Time: 12/12/23  9:01 AM   Result Value Ref Range    Sodium 137 136 - 145 mmol/L    Potassium 4.5 3.5 - 5.1 mmol/L    Chloride 104 95 - 110 mmol/L    CO2 26 23 - 29 mmol/L    Glucose 106 70 - 110 mg/dL    BUN 18 8 - 23 mg/dL    Creatinine 0.8 0.5 - 1.4 mg/dL    Calcium 9.5 8.7 - 10.5 mg/dL    Total Protein 7.3 6.0 - 8.4 g/dL    Albumin 3.6 3.5 - 5.2 g/dL    Total Bilirubin 0.3 0.1 - 1.0 mg/dL    Alkaline Phosphatase 126 55 - 135 U/L    AST 17 10 - 40 U/L    ALT 22 10 - 44 U/L    eGFR >60.0 >60 mL/min/1.73 m^2    Anion Gap 7 (L) 8 - 16 mmol/L   TSH    Collection Time: 12/12/23  9:01 AM   Result Value Ref Range    TSH 0.476 0.400 - 4.000 uIU/mL      I have reviewed the pertinent labs.  Stable mild anemia.  Normal Cr and LFTs.    Imaging:       PET/CT 9/22/23:    Impression:     1. In this patient with biopsy-proven esophageal adenocarcinoma, there is persistent circumferential soft tissue thickening in the distal esophagus likely extending into the proximal stomach with interval decreased hypermetabolic activity.  Index cervical and AP window lymph nodes are decreased in size/uptake.  No new lymphadenopathy or suspicious lesions elsewhere.  2. Additional stable findings as above.    Path:   2. Gastroesophageal junction mass (biopsy):    Invasive adenocarcinoma, moderately differentiated   Background low and high-grade glandular dysplasia   HER2 immunohistochemistry:  Equivocal.     Immunohistochemistry (IHC) Testing for Mismatch Repair (MMR) Proteins:   MLH1, MSH2, MSH6, PMS2 - Intact nuclear expression   Background nonneoplastic tissue/internal control with intact nuclear expression     There are exceptions to the above IHC interpretations. These results should not be considered in isolation, and clinical correlation with genetic counseling is recommended to assess the need for germline testing.     Interpretation: No loss of nuclear expression of MMR proteins: low probability of microsatellite instability       Assessment:       1. Malignant neoplasm of gastroesophageal junction    2. Pulmonary emphysema, unspecified emphysema type    3. Nicotine dependence, cigarettes, uncomplicated    4. Esophagitis    5. Atherosclerosis of aorta    6. Moderate malnutrition         Plan:        # Esophageal adenocarcinoma, chemotherapy induced neutropenia/thrombocytopenia  He initially presented with a locally advanced adenocarcinoma of the middle and lower third of the esophagus, pMMR. PET/CT on 6/1/23 did not show clear distant metastatic disease.  We discussed the case and plan with Dr. Meyer and he feels the patient is surgically resectable as well.    Began cycle 1 on 7/18/23. He unfortunately had a reaction to the Taxol. During the Taxol infusion, he developed coughing, flushing, chest tightness and nausea. O2 sat was 92%, 2L O2 applied NC. We were notified and stopped the Taxol. He was able to proceed with the Carboplatin without complication.   We switched him to Abraxane 40 mg/m2 with week 2.  This was tolerated very well without issue. Has tolerated RT well and has completed 4 cycles of chemoRT. He completed radiation 8/18/23.  We did have to forego his last dose of chemotherapy due to cytopenias.    PET/CT on 9/22/23 showed very nice response to  treatment with reduction in SUV avidity of primary tumor.  Discussed with Dr. Meyer.      He underwent esophagogastrectomy with Dr. Meyer on 10/4/23.  Pathology showed [id number] with negative margins and 18 negative lymph nodes.    Because of his residual disease, I have recommended adjuvant nivolumab per [id number].  Will request PD-L1 testing but proceed with q4week nivolumab 480 mg regardless.  He was in agreement.    Began cycle 1 on 11/15/23.    Presents today for cycle 2.   Labs reviewed, adequate for treatment.   RTC in 4 weeks for next cycle.   Will perform surveillance CT in April 2024.    # Esophagitis  Improved.  Secondary to chemo and radiation.  Recent stricture currently being dilated with Dr. Meyer with some improvement.    # COPD, tobacco abuse, aortic atherosclerosis  Counseled on tobacco cessation.  He has stopped smoking.  Normal SpO2.  No dyspnea.  Atherosclerosis noted on imaging.    # Malnutrition  Following with surgery team as he will transition ultimately off tube feeds.  Weight stable.    Patient is in agreement with the proposed treatment plan. All questions were answered to the patient's satisfaction. Pt knows to call clinic if anything is needed before the next clinic visit.    Delta June MD  Hematology/Oncology  [facility]      Route Chart for Scheduling    Med Onc Chart Routing      Follow up with physician 2 months. for nivolumab   Follow up with [facility] 4 weeks. for nivolumab   Infusion scheduling note    Injection scheduling note    Labs CBC, CMP and TSH   Scheduling:  Preferred lab:  Lab interval:     Imaging    Pharmacy appointment    Other referrals              Treatment Plan Information   OP NIVOLUMAB 480 MG Q4W   Delta June MD   Upcoming Treatment Dates - OP NIVOLUMAB 480 MG Q4W    12/13/2023       Chemotherapy       nivolumab 480 mg in sodium chloride 0.9% 98 mL infusion  1/10/2024       Chemotherapy       nivolumab 480 mg in sodium  chloride 0.9% 98 mL infusion  2/7/2024       Chemotherapy       nivolumab 480 mg in sodium chloride 0.9% 98 mL infusion  3/6/2024       Chemotherapy       nivolumab 480 mg in sodium chloride 0.9% 98 mL infusion

## 2023-12-14 ENCOUNTER — PATIENT MESSAGE (OUTPATIENT)
Dept: SURGERY | Facility: CLINIC | Age: 63
End: 2023-12-14
Payer: COMMERCIAL

## 2023-12-15 DIAGNOSIS — C16.0 MALIGNANT NEOPLASM OF GASTROESOPHAGEAL JUNCTION: Primary | ICD-10-CM

## 2023-12-19 ENCOUNTER — ANESTHESIA EVENT (OUTPATIENT)
Dept: SURGERY | Facility: HOSPITAL | Age: 63
End: 2023-12-19
Payer: COMMERCIAL

## 2023-12-19 ENCOUNTER — TELEPHONE (OUTPATIENT)
Dept: SURGERY | Facility: CLINIC | Age: 63
End: 2023-12-19
Payer: COMMERCIAL

## 2023-12-19 DIAGNOSIS — R13.19 ESOPHAGEAL DYSPHAGIA: ICD-10-CM

## 2023-12-19 DIAGNOSIS — C16.0 MALIGNANT NEOPLASM OF GASTROESOPHAGEAL JUNCTION: Primary | ICD-10-CM

## 2023-12-19 NOTE — ANESTHESIA PREPROCEDURE EVALUATION
[facility]  Anesthesia Pre-Operative Evaluation    Patient Name: Blayne Beebe  YOB: 1960  MRN: [id number]    SUBJECTIVE:     Pre-operative evaluation for Procedure(s) (LRB):  EGD, WITH BALLOON DILATION (N/A)    12/19/2023    Blayne Beebe is a 63 y.o. male with hx of cervical fusion, COPD, gastroesophageal junction cancer s/p rad, esophagectomy 10/40/23 and chemo tx presenting for multiple redo EGD balloon dilations with Dr. Meyer.     Patient now presents for the above procedure(s).      LDA: None documented.       PowerPort A Cath Single Lumen 07/12/23 1344 Internal Jugular Right (Active)   Dressing Type Transparent (Tegaderm) 12/12/23 1136   Dressing Status Clean;Dry;Intact 12/12/23 1136   Dressing Intervention First dressing 12/12/23 1136   Patency/Care flushed w/o difficulty;blood return present;heparin locked 12/12/23 1259   Status Deaccessed 12/12/23 1259   Accessed by: Sami 12/12/23 1136   Needle Insertion Date 12/12/23 12/12/23 1136   Needle Insertion Time 1136 12/12/23 1136   Type of Needle Mann 12/12/23 1136   Gauge 20 12/12/23 1136   Needle Length 3/4 in 12/12/23 1136   Needle Status Removed 12/12/23 1259   Flush Performed Yes 12/12/23 1259   Needle Removal Date 12/12/23 12/12/23 1259   Needle Removal Time 1259 12/12/23 1259   Deaccessed By manuel 12/12/23 1259   Number of days: 160            Gastrostomy/Enterostomy 10/04/23 1730 Jejunostomy tube LUQ feeding (Active)   Number of days: 76       Prev airway:   Date/Time: 12/11/2023 12:01 PM    Intubation:     Induction:  Intravenous    Intubated:  Postinduction    Mask Ventilation:  Easy mask    Attempts:  3    Attempted By:  Resident anesthesiologist    Method of Intubation:  Video laryngoscopy    Blade:  Glidescope 3    Laryngeal View Grade: Grade IIA - cords partially seen      Attempted By (2nd Attempt):  Staff anesthesiologist    Method of Intubation (2nd Attempt):  Video laryngoscopy    Blade (2nd  Attempt):  Glidescope 3    Laryngeal View Grade (2nd Attempt): Grade IIa - cords partially seen      Attempted By (3rd Attempt):  Staff anesthesiologist    Method of Intubation (3rd Attempt):  Video laryngoscopy    Blade (3rd Attempt):  Glidescope 3    Laryngeal View Grade (3rd Attempt): Grade I - full view of cords      Difficult Airway Encountered?: Yes      Future Airway Recommendations:  Glidescope 3    Complications:  Soft tissue trauma    Airway Device:  Oral endotracheal tube    Airway Device Size:  7.5    Style/Cuff Inflation:  Cuffed    Tube secured:  22    Secured at:  The lips    Placement Verified By:  Capnometry    Complicating Factors:  Anterior larynx, small mouth and narrow palate    Findings Post-Intubation:  BS equal bilateral  Notes:      Recommend glidescope for future procedures as this was the best view documented in chart. Very anterior airway with small mouth and limited room for instrumentation. Top right lateral incisor noted to be loose during intubation with small amount of bleeding. Remains in place after intubation.     Drips: None documented.      Patient Active Problem List   Diagnosis    Disc disease, degenerative, cervical    Erectile dysfunction    Tobacco use disorder, moderate, in early remission    COPD (chronic obstructive pulmonary disease)    RBBB    Pulmonary nodule 1 cm or greater in diameter    Personal history of colonic polyps    Hard to intubate    Malignant neoplasm of gastroesophageal junction    Weakness of both hips    Decreased functional mobility and endurance    Jejunostomy present    H/O esophagectomy    Alteration in nutrition associated with tube feeding       Review of patient's allergies indicates:  No Known Allergies    Current Inpatient Medications:      Antibiotics (From admission, onward)      None            VTE Risk Mitigation (From admission, onward)      None            No current facility-administered medications on file prior to encounter.      Current Outpatient Medications on File Prior to Encounter   Medication Sig Dispense Refill    cholecalciferol, vitamin D3, (VITAMIN D3) 10 mcg (400 unit) Tab Take by mouth once daily.      LIDOcaine-prilocaine (EMLA) cream Apply topically as needed (Port access). (Patient not taking: Reported on 7/31/2023) 30 g 1    oxyCODONE (ROXICODONE) 5 mg/5 mL Soln 5 mLs (5 mg total) by Per J Tube route every 4 (four) hours as needed. (Patient not taking: Reported on 10/11/2023) 210 mL 0    pantoprazole (PROTONIX) 40 MG tablet Take 1 tablet (40 mg total) by mouth once daily. 90 tablet 3    sildenafil (VIAGRA) 100 MG tablet Take 1 tablet (100 mg total) by mouth daily as needed for Erectile Dysfunction. (Patient not taking: Reported on 10/11/2023) 10 tablet 6       Past Surgical History:   Procedure Laterality Date    CERVICAL FUSION      COLONOSCOPY N/A 3/27/2018    Procedure: COLONOSCOPY;  Surgeon: Maria Teresa Morocho MD;  Location: [facility];  Service: Endoscopy;  Laterality: N/A;    COLONOSCOPY N/A 6/13/2023    Procedure: COLONOSCOPY;  Surgeon: Kelli Snell MD;  Location: [facility];  Service: Endoscopy;  Laterality: N/A;    EGD, WITH BALLOON DILATION N/A 12/1/2023    Procedure: EGD, WITH BALLOON DILATION;  Surgeon: Nas Meyer MD;  Location: [facility] OR [address] [place] [address];  Service: General;  Laterality: N/A;    EGD, WITH BALLOON DILATION N/A 12/11/2023    Procedure: EGD, WITH BALLOON DILATION;  Surgeon: Nas Meyer MD;  Location: [facility] OR [address] [place] [address];  Service: General;  Laterality: N/A;    ENDOSCOPIC ULTRASOUND OF UPPER GASTROINTESTINAL TRACT N/A 7/3/2023    Procedure: ULTRASOUND, UPPER GI TRACT, ENDOSCOPIC;  Surgeon: Ray Duffy MD;  Location: [facility];  Service: Endoscopy;  Laterality: N/A;    ESOPHAGOGASTRODUODENOSCOPY N/A 6/13/2023    Procedure: EGD (ESOPHAGOGASTRODUODENOSCOPY);  Surgeon: Kelli Snell MD;  Location: [facility];  Service: Endoscopy;  Laterality: N/A;    ESOPHAGOGASTRODUODENOSCOPY N/A 7/3/2023     Procedure: EGD (ESOPHAGOGASTRODUODENOSCOPY);  Surgeon: Ray Duffy MD;  Location: [facility];  Service: Endoscopy;  Laterality: N/A;    INGUINAL HERNIA REPAIR Bilateral     Right x2, x1 left    PLACEMENT OF JEJUNOSTOMY TUBE  10/4/2023    Procedure: INSERTION, JEJUNOSTOMY TUBE;  Surgeon: Nas Meyer MD;  [facility];  Service: General;;    PYLOROMYOTOMY  10/4/2023    Procedure: PYLOROMYOTOMY;  Surgeon: Nas Meyer MD;  Location: [facility] OR [address] [place] [address];  Service: General;;    ROBOT-ASSISTED SURGICAL REMOVAL OF ESOPHAGUS USING DA BALWINDER XI N/A 10/4/2023    Procedure: XI ROBOTIC ESOPHAGECTOMY;  Surgeon: Nas Meyer MD;  [facility];  Service: General;  Laterality: N/A;    ROBOTIC REPAIR, HERNIA, PARAESOPHAGEAL  10/4/2023    Procedure: ROBOTIC REPAIR, HERNIA, PARAESOPHAGEAL;  Surgeon: Nas Meyer MD;  Location: [facility] OR [address] [place] [address];  Service: General;;    ROTATOR CUFF REPAIR Right     x2       Social History     Socioeconomic History    Marital status:    Tobacco Use    Smoking status: Former     Current packs/day: 0.25     Average packs/day: 0.3 packs/day for 41.0 years (10.2 ttl pk-yrs)     Types: Cigarettes     Start date: 1983     Passive exposure: Current    Smokeless tobacco: Never    Tobacco comments:     Done smoking since september   Substance and Sexual Activity    Alcohol use: Yes     Comment: a couple of beers a day    Drug use: No    Sexual activity: Yes     Partners: Female     Comment:      Social Determinants of Health     Financial Resource Strain: Low Risk  (10/5/2023)    Overall Financial Resource Strain (CARDIA)     Difficulty of Paying Living Expenses: Not hard at all   Food Insecurity: No Food Insecurity (10/5/2023)    Hunger Vital Sign     Worried About Running Out of Food in the Last Year: Never true     Ran Out of Food in the Last Year: Never true   Transportation Needs: No Transportation Needs (10/5/2023)    PRAPARE - Transportation      Lack of Transportation (Medical): No     Lack of Transportation (Non-Medical): No   Physical Activity: Sufficiently Active (10/5/2023)    Exercise Vital Sign     Days of Exercise per Week: 6 days     Minutes of Exercise per Session: 50 min   Social Connections: Unknown (10/5/2023)    Social Connection and Isolation Panel [NHANES]     Frequency of Communication with Friends and Family: Patient declined     Frequency of Social Gatherings with Friends and Family: Patient declined     Attends Latter-day Services: Never     Active Member of Clubs or Organizations: No     Attends Club or Organization Meetings: Never     Marital Status:    Housing Stability: Unknown (10/5/2023)    Housing Stability Vital Sign     Unable to Pay for Housing in the Last Year: No     Unstable Housing in the Last Year: No       OBJECTIVE:     Vital Signs Range (Last 24H):         Significant Labs:  Lab Results   Component Value Date    WBC 13.70 (H) 12/12/2023    HGB 12.0 (L) 12/12/2023    HCT 34.7 (L) 12/12/2023     12/12/2023    CHOL 170 05/10/2023    TRIG 48 05/10/2023    HDL 71 05/10/2023    ALT 22 12/12/2023    AST 17 12/12/2023     12/12/2023    K 4.5 12/12/2023     12/12/2023    CREATININE 0.8 12/12/2023    BUN 18 12/12/2023    CO2 26 12/12/2023    TSH 0.476 12/12/2023    PSA 0.70 05/10/2023    INR 1.0 10/04/2023    HGBA1C 5.0 05/10/2023       Diagnostic Studies: No relevant studies.    EKG:   Results for orders placed or performed in visit on 08/22/23   EKG 12-lead    Collection Time: 08/22/23 11:09 AM    Narrative    Test Reason : C16.0,J44.9,F17.200,I45.10,Z01.818,D70.9,D64.81,T45.1X5A,D69.6,    Vent. Rate : 083 BPM     Atrial Rate : 083 BPM     P-R Int : 136 ms          QRS Dur : 130 ms      QT Int : 392 ms       P-R-T Axes : 082 089 -13 degrees     QTc Int : 460 ms    Normal sinus rhythm  Possible Left atrial enlargement  Nonspecific intraventricular block  T wave abnormality, consider inferior  ischemia  Abnormal ECG  When compared with ECG of 28-APR-2015 14:52,  Nonspecific intraventricular block has replaced Right bundle branch block  Confirmed by Manohar Yeboah MD ([id number]) on 8/29/2023 1:09:44 PM    Referred By: magui lopes           Confirmed By:Manohar Yeboah MD       2D ECHO:  TTE:  No results found for this or any previous visit.    KWAME:  No results found for this or any previous visit.    ASSESSMENT/PLAN:           Pre-op Assessment    I have reviewed the Patient Summary Reports.     I have reviewed the Nursing Notes. I have reviewed the NPO Status.   I have reviewed the Medications.     Review of Systems  Anesthesia Hx:   History of prior surgery of interest to airway management or planning:           Personal Hx of Anesthesia complications   Difficult Intubation                  Social:  Smoker       Hematology/Oncology:                      Current/Recent Cancer.         surgery       Cardiovascular:      Denies Hypertension.    Denies CAD.        Denies CHF.                                 Pulmonary:   COPD      Denies Sleep Apnea.                Renal/:   Denies Chronic Renal Disease.                Hepatic/GI:      Denies GERD. Denies Liver Disease.            Musculoskeletal:  Arthritis          Spine Disorders:             Neurological:  Denies TIA.  Denies CVA.    Denies Seizures.                                Endocrine:  Denies Diabetes. Denies Hypothyroidism.  Denies Hyperthyroidism.             Physical Exam  General: Alert and Oriented    Airway:  Mallampati: II   Mouth Opening: Normal  TM Distance: Normal  Tongue: Normal  Neck ROM: Extension Decreased    Dental:  Periodontal disease    Chest/Lungs:  Normal Respiratory Rate    Heart:  Rate: Normal        Anesthesia Plan  Type of Anesthesia, risks & benefits discussed:    Anesthesia Type: Gen ETT  Intra-op Monitoring Plan: Standard ASA Monitors  Post Op Pain Control Plan: multimodal analgesia and IV/PO Opioids PRN  Induction:   IV  Airway Plan: Video, Post-Induction  Informed Consent: Informed consent signed with the Patient and all parties understand the risks and agree with anesthesia plan.  All questions answered.   ASA Score: 3  Day of Surgery Review of History & Physical: H&P Update referred to the surgeon/provider.    Ready For Surgery From Anesthesia Perspective.     .

## 2023-12-19 NOTE — PRE-PROCEDURE INSTRUCTIONS
PREOP INSTRUCTIONS TO PATIENT'S WIFE:  No food,milk or milk products for 8 hours before surgery.  Clear liquids like water,gatorade,apple juice are allowed up until 2 hours before surgery.  Instructed to follow the surgeon's instructions if they differ from these.  Shower instructions as well as directions to the Surgery Center were given.  Encouraged to wear loose fitting,comfortable clothing.  Medication instructions for pm prior to and am of procedure reviewed.  Instructed to avoid taking vitamins,supplements,aspirin and ibuprofen the morning of surgery.

## 2023-12-19 NOTE — TELEPHONE ENCOUNTER
Spoke to Mr. Beebe/Rekha (spouse) and confirmed procedure for 12/20/23 with Dr. Meyer. Informed to arrive at the Day of Surgery Center on the 2nd floor on [facility] for 0600 and surgery time is approximately 0800. Surgery Instructions provided as follows:  instructed to remain NPO solids 8 hours prior to surgery, clear liquids until 2 hours prior to surgery, to shower with antibacterial soap the night before surgery and morning of surgery before arrival, not to apply any lotions, powders or deodorant, remove all metal and jewelry, to wear comfortable clothes, and leave all valuables at home. Medications reviewed and  instructed to hold medications on DOS, unless instructed differently by pre op nurse. Confirmed pt  will have transportation home and spouse will accompany pt on DOS. Instructed to bring surgery folder on DOS. Pt/spouse verbalized understanding to all of the above.

## 2023-12-20 ENCOUNTER — HOSPITAL ENCOUNTER (OUTPATIENT)
Facility: HOSPITAL | Age: 63
Discharge: HOME | End: 2023-12-20
Attending: SURGERY | Admitting: SURGERY
Payer: COMMERCIAL

## 2023-12-20 ENCOUNTER — ANESTHESIA (OUTPATIENT)
Dept: SURGERY | Facility: HOSPITAL | Age: 63
End: 2023-12-20
Payer: COMMERCIAL

## 2023-12-20 VITALS
DIASTOLIC BLOOD PRESSURE: 68 MMHG | TEMPERATURE: 98 F | HEART RATE: 68 BPM | RESPIRATION RATE: 16 BRPM | OXYGEN SATURATION: 96 % | BODY MASS INDEX: 19.33 KG/M2 | WEIGHT: 135 LBS | HEIGHT: 70 IN | SYSTOLIC BLOOD PRESSURE: 126 MMHG

## 2023-12-20 DIAGNOSIS — R13.19 ESOPHAGEAL DYSPHAGIA: ICD-10-CM

## 2023-12-20 DIAGNOSIS — C16.0 MALIGNANT NEOPLASM OF GASTROESOPHAGEAL JUNCTION: ICD-10-CM

## 2023-12-20 PROCEDURE — 43249 ESOPH EGD DILATION <30 MM: CPT | Mod: 79,,, | Performed by: SURGERY

## 2023-12-20 PROCEDURE — 43249 PR EGD, FLEX, W/BALL DILATION, < 30MM: ICD-10-PCS | Mod: 79,,, | Performed by: SURGERY

## 2023-12-20 PROCEDURE — 36000706: Performed by: SURGERY

## 2023-12-20 PROCEDURE — D9220A PRA ANESTHESIA: Mod: ,,, | Performed by: ANESTHESIOLOGY

## 2023-12-20 PROCEDURE — 37000009 HC ANESTHESIA EA ADD 15 MINS: Performed by: SURGERY

## 2023-12-20 PROCEDURE — 63600175 PHARM REV CODE 636 W HCPCS

## 2023-12-20 PROCEDURE — C1726 CATH, BAL DIL, NON-VASCULAR: HCPCS | Performed by: SURGERY

## 2023-12-20 PROCEDURE — 36000707: Performed by: SURGERY

## 2023-12-20 PROCEDURE — 71000015 HC POSTOP RECOV 1ST HR: Performed by: SURGERY

## 2023-12-20 PROCEDURE — D9220A PRA ANESTHESIA: ICD-10-PCS | Mod: ,,, | Performed by: ANESTHESIOLOGY

## 2023-12-20 PROCEDURE — 37000008 HC ANESTHESIA 1ST 15 MINUTES: Performed by: SURGERY

## 2023-12-20 PROCEDURE — 25000003 PHARM REV CODE 250

## 2023-12-20 PROCEDURE — 71000044 HC DOSC ROUTINE RECOVERY FIRST HOUR: Performed by: SURGERY

## 2023-12-20 RX ORDER — SODIUM CHLORIDE 0.9 % (FLUSH) 0.9 %
10 SYRINGE (ML) INJECTION
Status: DISCONTINUED | OUTPATIENT
Start: 2023-12-20 | End: 2023-12-20 | Stop reason: HOSPADM

## 2023-12-20 RX ORDER — FENTANYL CITRATE 50 UG/ML
INJECTION, SOLUTION INTRAMUSCULAR; INTRAVENOUS
Status: DISCONTINUED | OUTPATIENT
Start: 2023-12-20 | End: 2023-12-20

## 2023-12-20 RX ORDER — LIDOCAINE HYDROCHLORIDE 20 MG/ML
INJECTION, SOLUTION EPIDURAL; INFILTRATION; INTRACAUDAL; PERINEURAL
Status: DISCONTINUED | OUTPATIENT
Start: 2023-12-20 | End: 2023-12-20

## 2023-12-20 RX ORDER — PROPOFOL 10 MG/ML
VIAL (ML) INTRAVENOUS
Status: DISCONTINUED | OUTPATIENT
Start: 2023-12-20 | End: 2023-12-20

## 2023-12-20 RX ORDER — FENTANYL CITRATE 50 UG/ML
25 INJECTION, SOLUTION INTRAMUSCULAR; INTRAVENOUS EVERY 5 MIN PRN
Status: DISCONTINUED | OUTPATIENT
Start: 2023-12-20 | End: 2023-12-20 | Stop reason: HOSPADM

## 2023-12-20 RX ORDER — ROCURONIUM BROMIDE 10 MG/ML
INJECTION, SOLUTION INTRAVENOUS
Status: DISCONTINUED | OUTPATIENT
Start: 2023-12-20 | End: 2023-12-20

## 2023-12-20 RX ORDER — ONDANSETRON 2 MG/ML
INJECTION INTRAMUSCULAR; INTRAVENOUS
Status: DISCONTINUED | OUTPATIENT
Start: 2023-12-20 | End: 2023-12-20

## 2023-12-20 RX ORDER — PHENYLEPHRINE HCL IN 0.9% NACL 1 MG/10 ML
SYRINGE (ML) INTRAVENOUS
Status: DISCONTINUED | OUTPATIENT
Start: 2023-12-20 | End: 2023-12-20

## 2023-12-20 RX ORDER — DEXAMETHASONE SODIUM PHOSPHATE 4 MG/ML
INJECTION, SOLUTION INTRA-ARTICULAR; INTRALESIONAL; INTRAMUSCULAR; INTRAVENOUS; SOFT TISSUE
Status: DISCONTINUED | OUTPATIENT
Start: 2023-12-20 | End: 2023-12-20

## 2023-12-20 RX ORDER — MIDAZOLAM HYDROCHLORIDE 1 MG/ML
INJECTION, SOLUTION INTRAMUSCULAR; INTRAVENOUS
Status: DISCONTINUED | OUTPATIENT
Start: 2023-12-20 | End: 2023-12-20

## 2023-12-20 RX ORDER — PROCHLORPERAZINE EDISYLATE 5 MG/ML
5 INJECTION INTRAMUSCULAR; INTRAVENOUS EVERY 30 MIN PRN
Status: DISCONTINUED | OUTPATIENT
Start: 2023-12-20 | End: 2023-12-20 | Stop reason: HOSPADM

## 2023-12-20 RX ORDER — CEFAZOLIN SODIUM 1 G/3ML
INJECTION, POWDER, FOR SOLUTION INTRAMUSCULAR; INTRAVENOUS
Status: DISCONTINUED | OUTPATIENT
Start: 2023-12-20 | End: 2023-12-20

## 2023-12-20 RX ADMIN — ONDANSETRON 4 MG: 2 INJECTION INTRAMUSCULAR; INTRAVENOUS at 08:12

## 2023-12-20 RX ADMIN — PROPOFOL 50 MG: 10 INJECTION, EMULSION INTRAVENOUS at 08:12

## 2023-12-20 RX ADMIN — CEFAZOLIN 2 G: 330 INJECTION, POWDER, FOR SOLUTION INTRAMUSCULAR; INTRAVENOUS at 08:12

## 2023-12-20 RX ADMIN — PROPOFOL 200 MG: 10 INJECTION, EMULSION INTRAVENOUS at 08:12

## 2023-12-20 RX ADMIN — SUGAMMADEX 200 MG: 100 INJECTION, SOLUTION INTRAVENOUS at 08:12

## 2023-12-20 RX ADMIN — MIDAZOLAM 2 MG: 1 INJECTION INTRAMUSCULAR; INTRAVENOUS at 07:12

## 2023-12-20 RX ADMIN — Medication 100 MCG: at 08:12

## 2023-12-20 RX ADMIN — SODIUM CHLORIDE: 0.9 INJECTION, SOLUTION INTRAVENOUS at 07:12

## 2023-12-20 RX ADMIN — Medication 200 MCG: at 08:12

## 2023-12-20 RX ADMIN — DEXAMETHASONE SODIUM PHOSPHATE 8 MG: 4 INJECTION INTRA-ARTICULAR; INTRALESIONAL; INTRAMUSCULAR; INTRAVENOUS; SOFT TISSUE at 08:12

## 2023-12-20 RX ADMIN — ROCURONIUM BROMIDE 50 MG: 10 INJECTION INTRAVENOUS at 08:12

## 2023-12-20 RX ADMIN — FENTANYL CITRATE 50 MCG: 50 INJECTION INTRAMUSCULAR; INTRAVENOUS at 08:12

## 2023-12-20 RX ADMIN — LIDOCAINE HYDROCHLORIDE 60 MG: 20 INJECTION, SOLUTION EPIDURAL; INFILTRATION; INTRACAUDAL at 08:12

## 2023-12-20 NOTE — DISCHARGE SUMMARY
[facility] - [facility] (2nd Fl)  Discharge Note  Short Stay    Procedure(s) (LRB):  EGD, WITH BALLOON DILATION (N/A)      OUTCOME: Patient tolerated treatment/procedure well without complication and is now ready for discharge.    DISPOSITION: Home or Self Care    FINAL DIAGNOSIS:  <principal problem not specified>    FOLLOWUP: In clinic    DISCHARGE INSTRUCTIONS:    Discharge Procedure Orders   Diet clear liquid   Order Comments: Clear liquid diet for today, then regular tomorrow     Notify your health care provider if you experience any of the following:  persistent nausea and vomiting or diarrhea     Notify your health care provider if you experience any of the following:  severe uncontrolled pain     Notify your health care provider if you experience any of the following:  difficulty breathing or increased cough     Activity as tolerated        TIME SPENT ON DISCHARGE: 10 minutes

## 2023-12-20 NOTE — ANESTHESIA PROCEDURE NOTES
Intubation    Date/Time: 12/20/2023 8:14 AM    Performed by: Cj Spears MD  Authorized by: Alejandrina Moore MD    Intubation:     Induction:  Intravenous    Intubated:  Postinduction    Mask Ventilation:  Easy mask    Attempts:  3    Attempted By:  Resident anesthesiologist    Method of Intubation:  Video laryngoscopy    Blade:  Glidescope 3    Laryngeal View Grade: Grade IIA - cords partially seen      Attempted By (2nd Attempt):  Staff anesthesiologist    Method of Intubation (2nd Attempt):  Video laryngoscopy    Blade (2nd Attempt):  Glidescope 3    Laryngeal View Grade (2nd Attempt): Grade IIa - cords partially seen      Attempted By (3rd Attempt):  Staff anesthesiologist    Method of Intubation (3rd Attempt):  Video laryngoscopy    Blade (3rd Attempt):  Glidescope 3    Laryngeal View Grade (3rd Attempt): Grade IIa - cords partially seen      Difficult Airway Encountered?: Yes      Future Airway Recommendations:  Need to use a glidescope with size 3 blade    Complications:  None    Airway Device:  Oral endotracheal tube    Airway Device Size:  7.5    Style/Cuff Inflation:  Cuffed    Tube secured:  22    Secured at:  The lips    Placement Verified By:  Capnometry    Complicating Factors:  None    Findings Post-Intubation:  BS equal bilateral and atraumatic/condition of teeth unchanged  Notes:      He is an easy mask but has a small mouth with poor dentition and a very anterior larynx making it very difficult for instrumentation. Best to use a glidescope with a size 3 blade to get a Grade 1/2a view. Second attempt with glidescope 3 attempted with bougie that was advanced to the cords but unable to advance beyond cords. Third attempt with glidescope 3 and 7.5 ETT successfully advanced past cords with ETT at extreme angle.

## 2023-12-20 NOTE — H&P
Surgical Oncology History and Physical    Encounter Date:  2023    Patient ID: Blayne Beebe  Age:  63 y.o. :  1960    Chief Complaint:  Dysphagia      History:    Mr. Beebe is a 63 y.o. male who presents for EGD with dilation for cervical esophagogastric anastomotic stricture. Dysphagia improved.     Past Medical History:   Diagnosis Date    Arthritis     Cancer     COPD (chronic obstructive pulmonary disease)      Past Surgical History:   Procedure Laterality Date    CERVICAL FUSION      COLONOSCOPY N/A 3/27/2018    Procedure: COLONOSCOPY;  Surgeon: Maria Teresa Morocho MD;  Location: [facility];  Service: Endoscopy;  Laterality: N/A;    COLONOSCOPY N/A 2023    Procedure: COLONOSCOPY;  Surgeon: Kelli Snell MD;  Location: [facility];  Service: Endoscopy;  Laterality: N/A;    EGD, WITH BALLOON DILATION N/A 2023    Procedure: EGD, WITH BALLOON DILATION;  Surgeon: Nas Meyer MD;  Location: [facility] OR [address] [place] [address];  Service: General;  Laterality: N/A;    EGD, WITH BALLOON DILATION N/A 2023    Procedure: EGD, WITH BALLOON DILATION;  Surgeon: Nas Meyer MD;  [facility];  Service: General;  Laterality: N/A;    ENDOSCOPIC ULTRASOUND OF UPPER GASTROINTESTINAL TRACT N/A 7/3/2023    Procedure: ULTRASOUND, UPPER GI TRACT, ENDOSCOPIC;  Surgeon: Ray Duffy MD;  Location: [facility];  Service: Endoscopy;  Laterality: N/A;    ESOPHAGOGASTRODUODENOSCOPY N/A 2023    Procedure: EGD (ESOPHAGOGASTRODUODENOSCOPY);  Surgeon: Kelli Snell MD;  Location: [facility];  Service: Endoscopy;  Laterality: N/A;    ESOPHAGOGASTRODUODENOSCOPY N/A 7/3/2023    Procedure: EGD (ESOPHAGOGASTRODUODENOSCOPY);  Surgeon: Ray Duffy MD;  Location: [facility];  Service: Endoscopy;  Laterality: N/A;    INGUINAL HERNIA REPAIR Bilateral     Right x2, x1 left    PLACEMENT OF JEJUNOSTOMY TUBE  10/4/2023    Procedure: INSERTION, JEJUNOSTOMY TUBE;  Surgeon: Nas Meyer MD;   [facility];  Service: General;;    PYLOROMYOTOMY  10/4/2023    Procedure: PYLOROMYOTOMY;  Surgeon: Nas Meyer MD;  [facility];  Service: General;;    ROBOT-ASSISTED SURGICAL REMOVAL OF ESOPHAGUS USING DA BALWINDER XI N/A 10/4/2023    Procedure: XI ROBOTIC ESOPHAGECTOMY;  Surgeon: Nas Meyer MD;  [facility];  Service: General;  Laterality: N/A;    ROBOTIC REPAIR, HERNIA, PARAESOPHAGEAL  10/4/2023    Procedure: ROBOTIC REPAIR, HERNIA, PARAESOPHAGEAL;  Surgeon: Nas Meyer MD;  [facility];  Service: General;;    ROTATOR CUFF REPAIR Right     x2     No current facility-administered medications on file prior to encounter.     Current Outpatient Medications on File Prior to Encounter   Medication Sig Dispense Refill    pantoprazole (PROTONIX) 40 MG tablet Take 1 tablet (40 mg total) by mouth once daily. 90 tablet 3    cholecalciferol, vitamin D3, (VITAMIN D3) 10 mcg (400 unit) Tab Take by mouth once daily.      LIDOcaine-prilocaine (EMLA) cream Apply topically as needed (Port access). (Patient not taking: Reported on 7/31/2023) 30 g 1    oxyCODONE (ROXICODONE) 5 mg/5 mL Soln 5 mLs (5 mg total) by Per J Tube route every 4 (four) hours as needed. (Patient not taking: Reported on 10/11/2023) 210 mL 0    sildenafil (VIAGRA) 100 MG tablet Take 1 tablet (100 mg total) by mouth daily as needed for Erectile Dysfunction. (Patient not taking: Reported on 10/11/2023) 10 tablet 6     Review of patient's allergies indicates:  No Known Allergies    Family History:  His family history includes Diabetes in his mother; Heart disease in his father.     Social History:  He reports that he has quit smoking. His smoking use included cigarettes. He started smoking about 40 years ago. He has a 10.2 pack-year smoking history. He has been exposed to tobacco smoke. He has never used smokeless tobacco. He reports current alcohol use. He reports that he does not use  drugs.     ROS:     Review of Systems  Pertinent positive/negatives detailed in HPI, all other systems negative.     Physical Exam:  There were no vitals taken for this visit.    Physical Exam    Constitutional:  Non-toxic, no acute distress.  Performance status: ECOG 0  Eyes:  Sclerae anicteric, gaze symmetrical  Neck:  Trachea midline, thyroid, non enlarged without palpable nodules,  FROM  Resp:  Easy work of breathing, no wheezes  CV:  Regular pulse, no JVD  Abd:  Soft, non-tender, no masses, no hepatosplenomegaly, no ascites, no superficial varices  Lymphatics:  No cervical, supraclavicular, axillary, or inguinal lymphadenopathy  Musculoskeletal:  Ambulatory, normal gait, no muscle wasting  Neuro:  No gross deficits  Psych:  Awake, alert, oriented.  Answers and asks questions appropriately    Assessment: 62 yo M with anastomotic stricture here for EGD with dilation    Plan:  - Proceed with surgery. Consent obtained.     Nas Meyer MD  [facility]urgical Oncology  [facility] 1960

## 2023-12-20 NOTE — TRANSFER OF CARE
"Anesthesia Transfer of Care Note    Patient: Blayne D III Steib    Procedure(s) Performed: Procedure(s) (LRB):  EGD, WITH BALLOON DILATION (N/A)    Patient location: [facility]    Anesthesia Type: general    Transport from OR: Transported from OR on 6-10 L/min O2 by face mask with adequate spontaneous ventilation. Upon arrival to PACU/ICU, patient attached to 100% O2 by T-piece with adequate spontaneous ventilation    Post pain: adequate analgesia    Post assessment: no apparent anesthetic complications    Post vital signs: stable    Level of consciousness: awake, alert and oriented    Nausea/Vomiting: no nausea/vomiting    Complications: none    Transfer of care protocol was followed      Last vitals: Visit Vitals  /61 (BP Location: Right arm, Patient Position: Lying)   Pulse 61   Temp 36.9 °C (98.5 °F) (Temporal)   Resp 17   Ht 5' 10" (1.778 m)   Wt 61.2 kg (135 lb)   SpO2 98%   BMI 19.37 kg/m²     "

## 2023-12-20 NOTE — ANESTHESIA POSTPROCEDURE EVALUATION
Anesthesia Post Evaluation    Patient: Blayne D III Steib    Procedure(s) Performed: Procedure(s) (LRB):  EGD, WITH BALLOON DILATION (N/A)    Final Anesthesia Type: general      Patient location during evaluation: PACU  Patient participation: Yes- Able to Participate  Level of consciousness: awake and alert and oriented  Post-procedure vital signs: reviewed and stable  Pain management: adequate  Airway patency: patent    PONV status at discharge: No PONV  Anesthetic complications: no      Cardiovascular status: hemodynamically stable  Respiratory status: unassisted and spontaneous ventilation  Hydration status: euvolemic  Follow-up not needed.          Vitals Value Taken Time   /65 12/20/23 0902   Temp 36.7 °C (98.1 °F) 12/20/23 0902   Pulse 70 12/20/23 0922   Resp 16 12/20/23 0902   SpO2 98 % 12/20/23 0922   Vitals shown include unvalidated device data.      No case tracking events are documented in the log.      Pain/Yessy Score: Yessy Score: 8 (12/20/2023  9:02 AM)

## 2023-12-20 NOTE — OP NOTE
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 Less narrow than on previous endoscopies but still unable to easily accommodate an adult endoscope prior to dilation.   Anastomosis dilated to 20 mm. Easily able to pass a scope after dilation to 18 mm.     Procedures:  Esophagogastroduodenoscopy (EGD), with transendoscopic balloon dilation of esophagus (<30 mm)    Estimated Blood Loss (EBL): <2 mL    Specimen(s):    Specimen (24h ago, onward)      None                   Indications:  Blayne Beebe is a 63 year old man with esophageal adenocarcinoma s/p esophagectomy with a cervical esophagogastric anastomotic stricture. Risks and benefits of EGD with balloon dilation were reviewed including bleeding, infection, damage to local structures, perforation of GI tract, need for additional procedures, death, and imponderables.  He understands and signed written informed consent to proceed.    Details: The patient was transported to the operating room and satisfactory anesthesia established. Preoperative antibiotics were administered. The patient was placed in the supine position    The adult gastroscope was introduced into the mouth, passed into the hypopharynx and cervical esophagus under endoscopic vision. The anastomosis was at 20 cm.  There was a mild stenosis. It was clearly less narrow than before, but it still did not easily accommodate the adult endoscope prior to dilation..  This was traversed with a balloon dilator, and dilated up progressively from 15 to 20 mm, held for 1 minute at each interval and for 15  minutes at 20 mm. In between dilations, the stenosis was examined and was without perforation. Once the dilation had been dilated to 18 mm, we were able to traverse the stricture. I continued dilating up to 20 mm. There was some expected/intended minimal bleeding.    The endoscope was advanced past the anastomosis into the gastric conduit, and distally thru the pylorus into the first portion of the duodenum.  Upon withdrawal, the mucosa was examined circumferentially, with healthy mucosa with no abnormalities.    I communicated the intraoperative findings with the family following the procedure.     Condition: Good    Disposition: PACU - hemodynamically stable.    Attestation: I was present and scrubbed for the entire procedure.    Nas Meyer MD  Staff Surgeon  Surgical Oncology

## 2023-12-28 NOTE — PROGRESS NOTES
Oncology Nutrition Assessment for Medical Nutrition Therapy  Follow-up Visit    Blayne Beebe   1960    Referring Provider: No ref. provider found      Reason for Visit: nutrition counseling and education    The patient location is: [place]  The chief complaint leading to consultation is: nutrition follow-up    Visit type: audiovisual    Face to Face time with patient: 9 minutes  15 minutes of total time spent on the encounter, which includes face to face time and non-face to face time preparing to see the patient (eg, review of tests), Obtaining and/or reviewing separately obtained history, Documenting clinical information in the electronic or other health record, Independently interpreting results (not separately reported) and communicating results to the patient/family/caregiver, or Care coordination (not separately reported).     Each patient to whom he or she provides medical services by telemedicine is:  (1) informed of the relationship between the physician and patient and the respective role of any other health care provider with respect to management of the patient; and (2) notified that he or she may decline to receive medical services by telemedicine and may withdraw from such care at any time.    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 He is known to me from previous appointments. At our last appointment, he had developed increased issues swallowing and regurgitation (even with water at times). We increased TF to 3-4 cartons/day depending on how much PO intake. Since then he underwent EGD w/dilation 12/1, 12/11, and again 12/20. He reports he is no longer having trouble swallowing, eating well. He reports maintaining his weight at 135lb. He continues TF, 2 cartons  "overnight. He is no longer drinking Ensure, was giving him issues with diarrhea. He reports some increased resistance with flushing tube after feedings but not before.     Weight: 135 lb - per patient  Height: 5' 9" (1.753 m)  BMI: 20    Usual BW: 140-145lb  Weight Change: 10lb loss from COVID 2/2023 then another 5-10lb loss - since regaining    Allergies: Patient has no known allergies.    Current Medications:  Current Outpatient Medications:     acetaminophen (TYLENOL) 500 MG tablet, Take 1 tablet (500 mg total) by mouth every 6 (six) hours as needed for Pain., Disp: , Rfl: 0    cholecalciferol, vitamin D3, (VITAMIN D3) 10 mcg (400 unit) Tab, Take by mouth once daily., Disp: , Rfl:     LIDOcaine-prilocaine (EMLA) cream, Apply topically as needed (Port access). (Patient not taking: Reported on 7/31/2023), Disp: 30 g, Rfl: 1    oxyCODONE (ROXICODONE) 5 mg/5 mL Soln, 5 mLs (5 mg total) by Per J Tube route every 4 (four) hours as needed. (Patient not taking: Reported on 10/11/2023), Disp: 210 mL, Rfl: 0    pantoprazole (PROTONIX) 40 MG tablet, Take 1 tablet (40 mg total) by mouth once daily., Disp: 90 tablet, Rfl: 3    sildenafil (VIAGRA) 100 MG tablet, Take 1 tablet (100 mg total) by mouth daily as needed for Erectile Dysfunction. (Patient not taking: Reported on 10/11/2023), Disp: 10 tablet, Rfl: 6    Food/medication interactions noted: none    Vitamins/Supplements: none reported    Labs: Reviewed    Nutrition Diagnosis    Problem: moderate malnutrition  Etiology (related to): inadequate energy and protein intake  Signs/Symptoms (as evidenced by): reports of inadequate PO intake, weight loss, and both fat & muscle wasting present  Status: Improving    Nutrition Intervention    Nutrition Prescription   2151 kcals (35kcal/kg)  86g protein (1.4g/kg)   2151mL fluid (35mL/kg)    Recommendations:  Increase PO diet as tolerated  Make meals/snacks high in calories and protein  Continue 2 cartons/day TF for 2 more weeks - " patient in agreement with plan  Continue water flushes at 55mL/hr for every hour on feeds  Flush J-tube with at least 60mL water before and after feeds    Materials Provided/Reviewed: none    Nutrition Monitoring and Evaluation    Monitor: diet education needs, energy intake, rate/formulation of tube feeding, and weight status    Goals: prevent further weight loss and encourage weight gain    Follow up: in 2-3 weeks    Communication to referring provider/care team: note available in chart    Counseling time: 9 minutes    Noelle Morelos MS, RD, LDN

## 2023-12-29 ENCOUNTER — CLINICAL SUPPORT (OUTPATIENT)
Dept: HEMATOLOGY/ONCOLOGY | Facility: CLINIC | Age: 63
End: 2023-12-29
Payer: COMMERCIAL

## 2023-12-29 DIAGNOSIS — Z71.3 NUTRITIONAL COUNSELING: Primary | ICD-10-CM

## 2023-12-29 DIAGNOSIS — C16.0 MALIGNANT NEOPLASM OF GASTROESOPHAGEAL JUNCTION: ICD-10-CM

## 2023-12-29 DIAGNOSIS — E44.0 MODERATE MALNUTRITION: ICD-10-CM

## 2023-12-29 PROCEDURE — 97803 MED NUTRITION INDIV SUBSEQ: CPT | Mod: 95,,, | Performed by: DIETITIAN, REGISTERED

## 2023-12-29 PROCEDURE — 97803 PR MED NUTR THER, SUBSQ, INDIV, EA 15 MIN: ICD-10-PCS | Mod: 95,,, | Performed by: DIETITIAN, REGISTERED

## 2024-01-07 ENCOUNTER — PATIENT MESSAGE (OUTPATIENT)
Dept: SURGERY | Facility: CLINIC | Age: 64
End: 2024-01-07
Payer: COMMERCIAL

## 2024-01-08 DIAGNOSIS — R13.19 ESOPHAGEAL DYSPHAGIA: Primary | ICD-10-CM

## 2024-01-08 DIAGNOSIS — C16.0 MALIGNANT NEOPLASM OF GASTROESOPHAGEAL JUNCTION: ICD-10-CM

## 2024-01-09 ENCOUNTER — INFUSION (OUTPATIENT)
Dept: INFUSION THERAPY | Facility: HOSPITAL | Age: 64
End: 2024-01-09
Payer: COMMERCIAL

## 2024-01-09 ENCOUNTER — OFFICE VISIT (OUTPATIENT)
Dept: HEMATOLOGY/ONCOLOGY | Facility: CLINIC | Age: 64
End: 2024-01-09
Payer: COMMERCIAL

## 2024-01-09 ENCOUNTER — OFFICE VISIT (OUTPATIENT)
Dept: SURGERY | Facility: CLINIC | Age: 64
End: 2024-01-09
Payer: COMMERCIAL

## 2024-01-09 ENCOUNTER — ANESTHESIA EVENT (OUTPATIENT)
Dept: SURGERY | Facility: HOSPITAL | Age: 64
End: 2024-01-09
Payer: COMMERCIAL

## 2024-01-09 ENCOUNTER — LAB VISIT (OUTPATIENT)
Dept: LAB | Facility: HOSPITAL | Age: 64
End: 2024-01-09
Payer: COMMERCIAL

## 2024-01-09 VITALS
DIASTOLIC BLOOD PRESSURE: 65 MMHG | TEMPERATURE: 98 F | OXYGEN SATURATION: 99 % | HEART RATE: 70 BPM | WEIGHT: 131.63 LBS | BODY MASS INDEX: 18.85 KG/M2 | SYSTOLIC BLOOD PRESSURE: 100 MMHG | RESPIRATION RATE: 18 BRPM | HEIGHT: 70 IN

## 2024-01-09 VITALS
BODY MASS INDEX: 18.85 KG/M2 | SYSTOLIC BLOOD PRESSURE: 101 MMHG | DIASTOLIC BLOOD PRESSURE: 59 MMHG | HEART RATE: 76 BPM | HEIGHT: 70 IN | OXYGEN SATURATION: 99 % | WEIGHT: 131.63 LBS

## 2024-01-09 DIAGNOSIS — I70.0 ATHEROSCLEROSIS OF AORTA: ICD-10-CM

## 2024-01-09 DIAGNOSIS — Z90.49 H/O ESOPHAGECTOMY: ICD-10-CM

## 2024-01-09 DIAGNOSIS — J44.9 CHRONIC OBSTRUCTIVE PULMONARY DISEASE, UNSPECIFIED COPD TYPE: ICD-10-CM

## 2024-01-09 DIAGNOSIS — Z98.890 H/O ESOPHAGECTOMY: ICD-10-CM

## 2024-01-09 DIAGNOSIS — C16.0 MALIGNANT NEOPLASM OF GASTROESOPHAGEAL JUNCTION: Primary | ICD-10-CM

## 2024-01-09 DIAGNOSIS — J43.9 PULMONARY EMPHYSEMA, UNSPECIFIED EMPHYSEMA TYPE: ICD-10-CM

## 2024-01-09 DIAGNOSIS — K20.90 ESOPHAGITIS: ICD-10-CM

## 2024-01-09 DIAGNOSIS — C16.0 MALIGNANT NEOPLASM OF GASTROESOPHAGEAL JUNCTION: ICD-10-CM

## 2024-01-09 DIAGNOSIS — Z79.899 IMMUNODEFICIENCY DUE TO CHEMOTHERAPY: ICD-10-CM

## 2024-01-09 DIAGNOSIS — D84.821 IMMUNODEFICIENCY DUE TO CHEMOTHERAPY: ICD-10-CM

## 2024-01-09 DIAGNOSIS — R13.19 ESOPHAGEAL DYSPHAGIA: ICD-10-CM

## 2024-01-09 DIAGNOSIS — E44.0 MODERATE MALNUTRITION: ICD-10-CM

## 2024-01-09 DIAGNOSIS — Z93.4 JEJUNOSTOMY PRESENT: ICD-10-CM

## 2024-01-09 DIAGNOSIS — T45.1X5A IMMUNODEFICIENCY DUE TO CHEMOTHERAPY: ICD-10-CM

## 2024-01-09 DIAGNOSIS — R53.0 NEOPLASTIC MALIGNANT RELATED FATIGUE: ICD-10-CM

## 2024-01-09 DIAGNOSIS — F17.210 NICOTINE DEPENDENCE, CIGARETTES, UNCOMPLICATED: ICD-10-CM

## 2024-01-09 DIAGNOSIS — R63.8 ALTERATION IN NUTRITION ASSOCIATED WITH TUBE FEEDING: ICD-10-CM

## 2024-01-09 LAB
ALBUMIN SERPL BCP-MCNC: 3.9 G/DL (ref 3.5–5.2)
ALP SERPL-CCNC: 122 U/L (ref 55–135)
ALT SERPL W/O P-5'-P-CCNC: 11 U/L (ref 10–44)
ANION GAP SERPL CALC-SCNC: 7 MMOL/L (ref 8–16)
AST SERPL-CCNC: 17 U/L (ref 10–40)
BASOPHILS # BLD AUTO: 0.04 K/UL (ref 0–0.2)
BASOPHILS NFR BLD: 0.7 % (ref 0–1.9)
BILIRUB SERPL-MCNC: 0.8 MG/DL (ref 0.1–1)
BUN SERPL-MCNC: 11 MG/DL (ref 8–23)
CALCIUM SERPL-MCNC: 9.6 MG/DL (ref 8.7–10.5)
CHLORIDE SERPL-SCNC: 104 MMOL/L (ref 95–110)
CO2 SERPL-SCNC: 27 MMOL/L (ref 23–29)
CREAT SERPL-MCNC: 0.8 MG/DL (ref 0.5–1.4)
DIFFERENTIAL METHOD BLD: ABNORMAL
EOSINOPHIL # BLD AUTO: 0.2 K/UL (ref 0–0.5)
EOSINOPHIL NFR BLD: 3.6 % (ref 0–8)
ERYTHROCYTE [DISTWIDTH] IN BLOOD BY AUTOMATED COUNT: 13.5 % (ref 11.5–14.5)
EST. GFR  (NO RACE VARIABLE): >60 ML/MIN/1.73 M^2
GLUCOSE SERPL-MCNC: 107 MG/DL (ref 70–110)
HCT VFR BLD AUTO: 40.1 % (ref 40–54)
HGB BLD-MCNC: 13.6 G/DL (ref 14–18)
IMM GRANULOCYTES # BLD AUTO: 0.08 K/UL (ref 0–0.04)
IMM GRANULOCYTES NFR BLD AUTO: 1.5 % (ref 0–0.5)
LYMPHOCYTES # BLD AUTO: 1 K/UL (ref 1–4.8)
LYMPHOCYTES NFR BLD: 18.5 % (ref 18–48)
MCH RBC QN AUTO: 31.9 PG (ref 27–31)
MCHC RBC AUTO-ENTMCNC: 33.9 G/DL (ref 32–36)
MCV RBC AUTO: 94 FL (ref 82–98)
MONOCYTES # BLD AUTO: 0.6 K/UL (ref 0.3–1)
MONOCYTES NFR BLD: 11.6 % (ref 4–15)
NEUTROPHILS # BLD AUTO: 3.5 K/UL (ref 1.8–7.7)
NEUTROPHILS NFR BLD: 64.1 % (ref 38–73)
NRBC BLD-RTO: 0 /100 WBC
PLATELET # BLD AUTO: 299 K/UL (ref 150–450)
PMV BLD AUTO: 8.7 FL (ref 9.2–12.9)
POTASSIUM SERPL-SCNC: 4.8 MMOL/L (ref 3.5–5.1)
PROT SERPL-MCNC: 7.7 G/DL (ref 6–8.4)
RBC # BLD AUTO: 4.26 M/UL (ref 4.6–6.2)
SODIUM SERPL-SCNC: 138 MMOL/L (ref 136–145)
TSH SERPL DL<=0.005 MIU/L-ACNC: 1.01 UIU/ML (ref 0.4–4)
WBC # BLD AUTO: 5.5 K/UL (ref 3.9–12.7)

## 2024-01-09 PROCEDURE — 99999 PR PBB SHADOW E&M-EST. PATIENT-LVL II: CPT | Mod: PBBFAC,,, | Performed by: PHYSICIAN ASSISTANT

## 2024-01-09 PROCEDURE — 99024 POSTOP FOLLOW-UP VISIT: CPT | Mod: S$GLB,,, | Performed by: NURSE PRACTITIONER

## 2024-01-09 PROCEDURE — 63600175 PHARM REV CODE 636 W HCPCS: Performed by: PHYSICIAN ASSISTANT

## 2024-01-09 PROCEDURE — 84443 ASSAY THYROID STIM HORMONE: CPT | Performed by: INTERNAL MEDICINE

## 2024-01-09 PROCEDURE — 80053 COMPREHEN METABOLIC PANEL: CPT | Performed by: PHYSICIAN ASSISTANT

## 2024-01-09 PROCEDURE — 3074F SYST BP LT 130 MM HG: CPT | Mod: CPTII,S$GLB,, | Performed by: NURSE PRACTITIONER

## 2024-01-09 PROCEDURE — 3078F DIAST BP <80 MM HG: CPT | Mod: CPTII,S$GLB,, | Performed by: NURSE PRACTITIONER

## 2024-01-09 PROCEDURE — 1160F RVW MEDS BY RX/DR IN RCRD: CPT | Mod: CPTII,S$GLB,, | Performed by: PHYSICIAN ASSISTANT

## 2024-01-09 PROCEDURE — 96413 CHEMO IV INFUSION 1 HR: CPT

## 2024-01-09 PROCEDURE — 99215 OFFICE O/P EST HI 40 MIN: CPT | Mod: S$GLB,,, | Performed by: PHYSICIAN ASSISTANT

## 2024-01-09 PROCEDURE — 99999 PR PBB SHADOW E&M-EST. PATIENT-LVL III: CPT | Mod: PBBFAC,,, | Performed by: NURSE PRACTITIONER

## 2024-01-09 PROCEDURE — 85025 COMPLETE CBC W/AUTO DIFF WBC: CPT | Performed by: PHYSICIAN ASSISTANT

## 2024-01-09 PROCEDURE — 36415 COLL VENOUS BLD VENIPUNCTURE: CPT | Performed by: PHYSICIAN ASSISTANT

## 2024-01-09 PROCEDURE — 25000003 PHARM REV CODE 250: Performed by: PHYSICIAN ASSISTANT

## 2024-01-09 PROCEDURE — 1159F MED LIST DOCD IN RCRD: CPT | Mod: CPTII,S$GLB,, | Performed by: PHYSICIAN ASSISTANT

## 2024-01-09 RX ORDER — SODIUM CHLORIDE 0.9 % (FLUSH) 0.9 %
10 SYRINGE (ML) INJECTION
Status: DISCONTINUED | OUTPATIENT
Start: 2024-01-09 | End: 2024-01-09 | Stop reason: HOSPADM

## 2024-01-09 RX ORDER — DIPHENHYDRAMINE HYDROCHLORIDE 50 MG/ML
50 INJECTION, SOLUTION INTRAMUSCULAR; INTRAVENOUS ONCE AS NEEDED
Status: CANCELLED | OUTPATIENT
Start: 2024-01-10

## 2024-01-09 RX ORDER — HEPARIN 100 UNIT/ML
500 SYRINGE INTRAVENOUS
Status: CANCELLED | OUTPATIENT
Start: 2024-01-10

## 2024-01-09 RX ORDER — PROCHLORPERAZINE EDISYLATE 5 MG/ML
10 INJECTION INTRAMUSCULAR; INTRAVENOUS ONCE AS NEEDED
Status: DISCONTINUED | OUTPATIENT
Start: 2024-01-09 | End: 2024-01-09 | Stop reason: HOSPADM

## 2024-01-09 RX ORDER — DIPHENHYDRAMINE HYDROCHLORIDE 50 MG/ML
50 INJECTION, SOLUTION INTRAMUSCULAR; INTRAVENOUS ONCE AS NEEDED
Status: DISCONTINUED | OUTPATIENT
Start: 2024-01-09 | End: 2024-01-09 | Stop reason: HOSPADM

## 2024-01-09 RX ORDER — EPINEPHRINE 0.3 MG/.3ML
0.3 INJECTION SUBCUTANEOUS ONCE AS NEEDED
Status: DISCONTINUED | OUTPATIENT
Start: 2024-01-09 | End: 2024-01-09 | Stop reason: HOSPADM

## 2024-01-09 RX ORDER — HEPARIN 100 UNIT/ML
500 SYRINGE INTRAVENOUS
Status: DISCONTINUED | OUTPATIENT
Start: 2024-01-09 | End: 2024-01-09 | Stop reason: HOSPADM

## 2024-01-09 RX ORDER — SODIUM CHLORIDE 0.9 % (FLUSH) 0.9 %
10 SYRINGE (ML) INJECTION
Status: CANCELLED | OUTPATIENT
Start: 2024-01-10

## 2024-01-09 RX ORDER — PROCHLORPERAZINE EDISYLATE 5 MG/ML
10 INJECTION INTRAMUSCULAR; INTRAVENOUS ONCE AS NEEDED
Status: CANCELLED
Start: 2024-01-10

## 2024-01-09 RX ORDER — EPINEPHRINE 0.3 MG/.3ML
0.3 INJECTION SUBCUTANEOUS ONCE AS NEEDED
Status: CANCELLED | OUTPATIENT
Start: 2024-01-10

## 2024-01-09 RX ADMIN — SODIUM CHLORIDE: 9 INJECTION, SOLUTION INTRAVENOUS at 12:01

## 2024-01-09 RX ADMIN — HEPARIN 500 UNITS: 100 SYRINGE at 01:01

## 2024-01-09 RX ADMIN — SODIUM CHLORIDE 480 MG: 9 INJECTION, SOLUTION INTRAVENOUS at 12:01

## 2024-01-09 NOTE — PROGRESS NOTES
Notified Mr. Beebe and spouse of arrival time for EGD with dilation scheduled for 1/10/24. Pt/spouse verbalized understanding.

## 2024-01-09 NOTE — Clinical Note
C/o dysphagia since Sunday. Scheduled for 4th EGD with dilation tomorrow. Recommend postpone f/u with RD (currently scheduled for this Friday 1/12 for possible trial d/c J feeds). Remain on 2 cartons/ day.

## 2024-01-09 NOTE — PROGRESS NOTES
MEDICAL ONCOLOGY - ESTABLISHED PATIENT VISIT    Reason for visit: Esophageal adenocarcinoma    Best Contact Phone Number(s): [phone] (home)      Cancer/Stage/TNM:    Cancer Staging   Malignant neoplasm of gastroesophageal junction  Staging form: Esophagus - Adenocarcinoma, AJCC 8th Edition  - Clinical stage from 6/13/2023: Stage III (cT3, cN1, cM0, G2) - Signed by Sunday Jasso MD on 7/5/2023       Oncology History   Malignant neoplasm of gastroesophageal junction   6/13/2023 Cancer Staged    Staging form: Esophagus - Adenocarcinoma, AJCC 8th Edition  - Clinical stage from 6/13/2023: Stage III (cT3, cN1, cM0, G2)     6/23/2023 Initial Diagnosis    Malignant neoplasm of gastroesophageal junction     7/3/2023 Tumor Conference     [facility] UGI MULTIDISCIPLINARY TUMOR BOARD  PATIENT REVIEW FORM   ____________________________________________________________    CLINIC #: [id number]   DATE: 7/3/2023    DIAGNOSIS: esophageal CA    PRESENTER: Mitch    PATIENT SUMMARY:   This 64 y/o gentleman is a long time smoker with emphysema who had low dose screening lung CT in 4/2023 per his PCP. Given an abnormality on this scan, he had PET on 6/1/23 which identified large hypermetabolic mid esophageal mass with lymph nodes. He underwent EGD on 6/13/23 that found large fungating ulcerated mass with bleeding in middle third of esophagus to lower esophagus, 34-44cm. Pathology revealed moderately differentiated adenocarcinoma.   Staging PET reviewed - level 2 cervical lymph node with FDG uptake, nothing in lungs concerning  Staging EUS today per Dr. Duffy.   He has been evaluated by Dr. Vance in [place] and Dr. June.   He is scheduled to see rad onc, Dr. Jasso, Wednesday and IR for port placement on 7/12/23.     BOARD RECOMMENDATIONS:   Proceed with neoadj CRT, then restaging to consider surgical resection    CONSULT NEEDED:     [] Surgery    [] Hem/Onc    [] Rad/Onc    [] Dietary                 [] Social  Service    [] Psychology       [] AES  [] Radiology     Clinical Stage: Tumor 3 Node(s) 1 Metastasis 0      GROUP STAGE:  [] O    [] 1   [] II     [x] III   []IV  [] Local recurrence     [] Regional recurrence     [] Distant recurrence   Metastatic site(s): none         [x] [facility] Treatment Guidelines reviewed and care planned is consistent with guidelines.         (i.e., NCCN, NCI, PD, ACO, AUA, etc.)    PRESENTATION AT CANCER CONFERENCE:         [x] Prospective    [] Retrospective     [] Follow-Up         7/16/2023 - 8/8/2023 Chemotherapy    Treatment Summary   Plan Name: OP ESOPHAGEAL PACLITAXEL CARBOPLATIN WEEKLY  Treatment Goal: Control  Status: Inactive  Start Date: 7/16/2023  End Date: 8/8/2023  Provider: Delta June MD  Chemotherapy: CARBOplatin (PARAPLATIN) 195 mg in sodium chloride 0.9% 304.5 mL chemo infusion, 195 mg (100 % of original dose 193 mg), Intravenous, Clinic/HOD 1 time, 1 of 1 cycle  Dose modification:   (original dose 193 mg, Cycle 1)  Administration: 195 mg (7/18/2023), 210 mg (7/24/2023), 230 mg (7/31/2023), 210 mg (8/8/2023)  PACLitaxeL (TAXOL) 50 mg/m2 = 84 mg in sodium chloride 0.9% 250 mL chemo infusion, 50 mg/m2 = 84 mg, Intravenous, Clinic/HOD 1 time, 1 of 1 cycle  Administration: 84 mg (7/18/2023)     7/18/2023 - 8/17/2023 Radiation Therapy    Treating physician: Sunday Jasso    Course: C1 Thorax 2023    Treatment Site Ref. ID Energy Dose/Fx (Gy) #Fx Dose Correction (Gy) Total Dose (Gy) Start Date End Date Elapsed Days   IM Esophagus PTV_High 6X 1.8 23 / 23 0 41.4 7/18/2023 8/17/2023 30        11/15/2023 -  Chemotherapy    Treatment Summary   Plan Name: OP NIVOLUMAB 480 MG Q4W  Treatment Goal: Curative  Status: Active  Start Date: 11/15/2023  End Date: 9/18/2024 (Planned)  Provider: Delta June MD  Chemotherapy: [No matching medication found in this treatment plan]     12/1/2023 Procedure    Surgeon(s) and Role: Nas Meyer MD - Primary     Pre-Operative  Diagnosis:   Esophageal adenocarcinoma  Suspected cervical esophagogastric anastomotic stricture     Post-Operative Diagnosis:   Same  Cervical esophagogastric anastomotic stricture      Operative Findings:    Cervical esophagogastric anastomotic stricture. Unable to pass endoscope through stricture initially, but the scope was able to pass after serial dilations to 18 mm.      Procedures:  Esophagogastroduodenoscopy (EGD), with transendoscopic balloon dilation of esophagus (<30 mm)                Interim History:   63 y.o. male with esophageal adenocarcinoma who presents for cycle 3 of adjuvant nivolumab.  He tolerated his first dose well.  He has undergone multiple esophageal stricture dilations which began to be an issue for him around Thanksgiving. He did feel that the dilations had been helpful but has not noticed much improvement with the last couple of procedures.  He has continued on soft foods and does tolerate thicker foods like bread when he does not have any narrowing of the esophagus. Unfortunately, he is not able to identify when the increased narrowing will occur. He is not able to tolerate medications either unless they are very small tablets.  No discernable adverse effects from his first dose of nivolumab.  Still using 2 cartons of his tube feeds per day. Was seen by Surg Oncology today for evaluation of his feeding tube. Otherwise, he is doing well.     ECOG status is 0. Presents with his wife today. He is scheduled for a repeat EGD tomorrow for dilation    ROS:   Review of Systems   Constitutional:  Negative for chills, fever, malaise/fatigue and weight loss.   HENT:  Negative for sore throat.    Eyes:  Negative for blurred vision and pain.   Respiratory:  Negative for cough and shortness of breath.    Cardiovascular:  Negative for chest pain and leg swelling.   Gastrointestinal:  Negative for abdominal pain, constipation, diarrhea, heartburn, nausea and vomiting.        Dysphagia    Genitourinary:  Negative for dysuria and frequency.   Musculoskeletal:  Negative for back pain, falls and myalgias.   Skin:  Negative for rash.   Neurological:  Negative for dizziness, weakness and headaches.   Endo/Heme/Allergies:  Does not bruise/bleed easily.   Psychiatric/Behavioral:  Negative for depression. The patient is not nervous/anxious.    All other systems reviewed and are negative.      Past Medical History:   Past Medical History:   Diagnosis Date    Arthritis     Cancer     COPD (chronic obstructive pulmonary disease)         Past Surgical History:   Past Surgical History:   Procedure Laterality Date    CERVICAL FUSION      COLONOSCOPY N/A 3/27/2018    Procedure: COLONOSCOPY;  Surgeon: Maria Teresa Morocho MD;  Location: [facility];  Service: Endoscopy;  Laterality: N/A;    COLONOSCOPY N/A 6/13/2023    Procedure: COLONOSCOPY;  Surgeon: Kelli Snell MD;  Location: [facility];  Service: Endoscopy;  Laterality: N/A;    EGD, WITH BALLOON DILATION N/A 12/1/2023    Procedure: EGD, WITH BALLOON DILATION;  Surgeon: Nas Meyer MD;  Location: [facility] OR [address] [place] [address];  Service: General;  Laterality: N/A;    EGD, WITH BALLOON DILATION N/A 12/11/2023    Procedure: EGD, WITH BALLOON DILATION;  Surgeon: Nas Meyer MD;  Location: [facility] OR [address] [place] [address];  Service: General;  Laterality: N/A;    EGD, WITH BALLOON DILATION N/A 12/20/2023    Procedure: EGD, WITH BALLOON DILATION;  Surgeon: Nas Meyer MD;  Location: [facility] OR [address] [place] [address];  Service: General;  Laterality: N/A;    ENDOSCOPIC ULTRASOUND OF UPPER GASTROINTESTINAL TRACT N/A 7/3/2023    Procedure: ULTRASOUND, UPPER GI TRACT, ENDOSCOPIC;  Surgeon: Ray Duffy MD;  Location: [facility];  Service: Endoscopy;  Laterality: N/A;    ESOPHAGOGASTRODUODENOSCOPY N/A 6/13/2023    Procedure: EGD (ESOPHAGOGASTRODUODENOSCOPY);  Surgeon: Kelli Snell MD;  Location: [facility];  Service: Endoscopy;  Laterality: N/A;    ESOPHAGOGASTRODUODENOSCOPY N/A 7/3/2023     Procedure: EGD (ESOPHAGOGASTRODUODENOSCOPY);  Surgeon: Ray Duffy MD;  Location: [facility];  Service: Endoscopy;  Laterality: N/A;    INGUINAL HERNIA REPAIR Bilateral     Right x2, x1 left    PLACEMENT OF JEJUNOSTOMY TUBE  10/4/2023    Procedure: INSERTION, JEJUNOSTOMY TUBE;  Surgeon: Nas Meyer MD;  Location: [facility] OR [address] [place] [address];  Service: General;;    PYLOROMYOTOMY  10/4/2023    Procedure: PYLOROMYOTOMY;  Surgeon: Nas Meyer MD;  Location: [facility] OR [address] [place] [address];  Service: General;;    ROBOT-ASSISTED SURGICAL REMOVAL OF ESOPHAGUS USING DA BALWINDER XI N/A 10/4/2023    Procedure: XI ROBOTIC ESOPHAGECTOMY;  Surgeon: Nas Meyer MD;  Location: [facility] OR [address] [place] [address];  Service: General;  Laterality: N/A;    ROBOTIC REPAIR, HERNIA, PARAESOPHAGEAL  10/4/2023    Procedure: ROBOTIC REPAIR, HERNIA, PARAESOPHAGEAL;  Surgeon: Nas Meyer MD;  Location: [address];  Service: General;;    ROTATOR CUFF REPAIR Right     x2        Family History:   Family History   Problem Relation Age of Onset    Diabetes Mother     Heart disease Father         CHF    Glaucoma Neg Hx     Colon cancer Neg Hx     Prostate cancer Neg Hx         Social History:   Social History     Tobacco Use    Smoking status: Former     Current packs/day: 0.25     Average packs/day: 0.3 packs/day for 41.0 years (10.3 ttl pk-yrs)     Types: Cigarettes     Start date: 1983     Passive exposure: Current    Smokeless tobacco: Never    Tobacco comments:     Done smoking since september   Substance Use Topics    Alcohol use: Yes     Comment: a couple of beers a day        I have reviewed and updated the patient's past medical, surgical, family and social histories.    Allergies:   Review of patient's allergies indicates:  No Known Allergies     Medications:   Current Outpatient Medications   Medication Sig Dispense Refill    acetaminophen (TYLENOL) 500 MG tablet Take 1 tablet (500 mg total) by mouth every 6 (six) hours as needed for Pain. (Patient not  taking: Reported on 1/9/2024)  0    cholecalciferol, vitamin D3, (VITAMIN D3) 10 mcg (400 unit) Tab Take by mouth once daily.      LIDOcaine-prilocaine (EMLA) cream Apply topically as needed (Port access). (Patient not taking: Reported on 7/31/2023) 30 g 1    oxyCODONE (ROXICODONE) 5 mg/5 mL Soln 5 mLs (5 mg total) by Per J Tube route every 4 (four) hours as needed. (Patient not taking: Reported on 10/11/2023) 210 mL 0    pantoprazole (PROTONIX) 40 MG tablet Take 1 tablet (40 mg total) by mouth once daily. 90 tablet 3    sildenafil (VIAGRA) 100 MG tablet Take 1 tablet (100 mg total) by mouth daily as needed for Erectile Dysfunction. (Patient not taking: Reported on 10/11/2023) 10 tablet 6     No current facility-administered medications for this visit.        Physical Exam:   There were no vitals taken for this visit.     ECOG Performance status: 0          Physical Exam  Vitals reviewed.   Constitutional:       General: He is not in acute distress.     Appearance: Normal appearance. He is normal weight.   HENT:      Head: Normocephalic and atraumatic.      Right Ear: External ear normal.      Left Ear: External ear normal.      Nose: Nose normal.      Mouth/Throat:      Mouth: Mucous membranes are moist.      Pharynx: Oropharynx is clear. No posterior oropharyngeal erythema.   Eyes:      General: No scleral icterus.     Extraocular Movements: Extraocular movements intact.      Conjunctiva/sclera: Conjunctivae normal.      Pupils: Pupils are equal, round, and reactive to light.   Cardiovascular:      Rate and Rhythm: Normal rate and regular rhythm.      Pulses: Normal pulses.      Heart sounds: Normal heart sounds.   Pulmonary:      Effort: Pulmonary effort is normal.      Breath sounds: Normal breath sounds. No wheezing or rales.   Abdominal:      General: Bowel sounds are normal. There is no distension.      Palpations: Abdomen is soft.      Tenderness: There is no abdominal tenderness.      Comments: J tube in  place;  Surgical wounds well healing   Musculoskeletal:         General: No swelling. Normal range of motion.      Cervical back: Normal range of motion and neck supple.   Skin:     General: Skin is warm.      Coloration: Skin is not jaundiced.      Findings: No erythema or rash.   Neurological:      General: No focal deficit present.      Mental Status: He is alert and oriented to person, place, and time. Mental status is at baseline.      Gait: Gait normal.   Psychiatric:         Mood and Affect: Mood normal.         Behavior: Behavior normal.         Thought Content: Thought content normal.         Judgment: Judgment normal.           Labs:   Recent Results (from the past 48 hour(s))   CBC W/ AUTO DIFFERENTIAL    Collection Time: 01/09/24 11:00 AM   Result Value Ref Range    WBC 5.50 3.90 - 12.70 K/uL    RBC 4.26 (L) 4.60 - 6.20 M/uL    Hemoglobin 13.6 (L) 14.0 - 18.0 g/dL    Hematocrit 40.1 40.0 - 54.0 %    MCV 94 82 - 98 fL    MCH 31.9 (H) 27.0 - 31.0 pg    MCHC 33.9 32.0 - 36.0 g/dL    RDW 13.5 11.5 - 14.5 %    Platelets 299 150 - 450 K/uL    MPV 8.7 (L) 9.2 - 12.9 fL    Immature Granulocytes 1.5 (H) 0.0 - 0.5 %    Gran # (ANC) 3.5 1.8 - 7.7 K/uL    Immature Grans (Abs) 0.08 (H) 0.00 - 0.04 K/uL    Lymph # 1.0 1.0 - 4.8 K/uL    Mono # 0.6 0.3 - 1.0 K/uL    Eos # 0.2 0.0 - 0.5 K/uL    Baso # 0.04 0.00 - 0.20 K/uL    nRBC 0 0 /100 WBC    Gran % 64.1 38.0 - 73.0 %    Lymph % 18.5 18.0 - 48.0 %    Mono % 11.6 4.0 - 15.0 %    Eosinophil % 3.6 0.0 - 8.0 %    Basophil % 0.7 0.0 - 1.9 %    Differential Method Automated    Comprehensive Metabolic Panel    Collection Time: 01/09/24 11:00 AM   Result Value Ref Range    Sodium 138 136 - 145 mmol/L    Potassium 4.8 3.5 - 5.1 mmol/L    Chloride 104 95 - 110 mmol/L    CO2 27 23 - 29 mmol/L    Glucose 107 70 - 110 mg/dL    BUN 11 8 - 23 mg/dL    Creatinine 0.8 0.5 - 1.4 mg/dL    Calcium 9.6 8.7 - 10.5 mg/dL    Total Protein 7.7 6.0 - 8.4 g/dL    Albumin 3.9 3.5 - 5.2 g/dL     Total Bilirubin 0.8 0.1 - 1.0 mg/dL    Alkaline Phosphatase 122 55 - 135 U/L    AST 17 10 - 40 U/L    ALT 11 10 - 44 U/L    eGFR >60.0 >60 mL/min/1.73 m^2    Anion Gap 7 (L) 8 - 16 mmol/L        I have reviewed the pertinent labs.      Imaging:       PET/CT 9/22/23:    Impression:     1. In this patient with biopsy-proven esophageal adenocarcinoma, there is persistent circumferential soft tissue thickening in the distal esophagus likely extending into the proximal stomach with interval decreased hypermetabolic activity.  Index cervical and AP window lymph nodes are decreased in size/uptake.  No new lymphadenopathy or suspicious lesions elsewhere.  2. Additional stable findings as above.    Path:   2. Gastroesophageal junction mass (biopsy):   Invasive adenocarcinoma, moderately differentiated   Background low and high-grade glandular dysplasia   HER2 immunohistochemistry:  Equivocal.     Immunohistochemistry (IHC) Testing for Mismatch Repair (MMR) Proteins:   MLH1, MSH2, MSH6, PMS2 - Intact nuclear expression   Background nonneoplastic tissue/internal control with intact nuclear expression     There are exceptions to the above IHC interpretations. These results should not be considered in isolation, and clinical correlation with genetic counseling is recommended to assess the need for germline testing.     Interpretation: No loss of nuclear expression of MMR proteins: low probability of microsatellite instability       Assessment:       1. Malignant neoplasm of gastroesophageal junction    2. Immunodeficiency due to chemotherapy    3. Esophagitis    4. Pulmonary emphysema, unspecified emphysema type    5. Nicotine dependence, cigarettes, uncomplicated    6. Atherosclerosis of aorta    7. Moderate malnutrition    8. Chronic obstructive pulmonary disease, unspecified COPD type    9. Neoplastic malignant related fatigue           Plan:        # Esophageal adenocarcinoma, chemotherapy induced  neutropenia/thrombocytopenia  He initially presented with a locally advanced adenocarcinoma of the middle and lower third of the esophagus, pMMR. PET/CT on 6/1/23 did not show clear distant metastatic disease.  We discussed the case and plan with Dr. Meyer and he feels the patient is surgically resectable as well.    Began cycle 1 on 7/18/23. He unfortunately had a reaction to the Taxol. During the Taxol infusion, he developed coughing, flushing, chest tightness and nausea. O2 sat was 92%, 2L O2 applied NC. We were notified and stopped the Taxol. He was able to proceed with the Carboplatin without complication.   We switched him to Abraxane 40 mg/m2 with week 2.  This was tolerated very well without issue. Has tolerated RT well and has completed 4 cycles of chemoRT. He completed radiation 8/18/23.  We did have to forego his last dose of chemotherapy due to cytopenias.    PET/CT on 9/22/23 showed very nice response to treatment with reduction in SUV avidity of primary tumor.  Discussed with Dr. Meyer.      He underwent esophagogastrectomy with Dr. Meyer on 10/4/23.  Pathology showed [id number] with negative margins and 18 negative lymph nodes.    Because of his residual disease, I have recommended adjuvant nivolumab per [id number].  Will request PD-L1 testing but proceed with q4week nivolumab 480 mg regardless.  He was in agreement.    Began cycle 1 on 11/15/23 and received cycle 2 on 12/12/2023  Doing well and continues to do well.  Presents today for cycle 3.   Labs reviewed, adequate for treatment.   Proceed with cycle 3 today    RTC in 4 weeks for next cycle.   Will perform surveillance CT in April 2024.    # Esophagitis  Plateau, despite having multiple dilations. He is able to tolerate soft foods still and using his feeding tube.  Will have repeat EGD tomorrow. Will continue to monitor. Can possibly consider trying a muscle relaxant to help improve esophageal spasms that could occur.  Secondary to chemo and  radiation.  Recent stricture currently being dilated with Dr. Meyer with some improvement.    # COPD, tobacco abuse, aortic atherosclerosis  Counseled on tobacco cessation.  He has stopped smoking.  Normal SpO2.  No dyspnea.  Atherosclerosis noted on imaging.    # Malnutrition  Following with surgery team as he will transition ultimately off tube feeds.  Weight stable.    Patient is in agreement with the proposed treatment plan. All questions were answered to the patient's satisfaction. Pt knows to call clinic if anything is needed before the next clinic visit.        DIANA Toledo, PA-C  Physician Assistant Certified  Dept of Hematology/Oncology  PA-C to Dr. Hernandez, Dr. June and Dr. Saleem       Route Chart for Scheduling    Med Onc Chart Routing      Follow up with physician 1 month and 2 months. See Dr. June as scheduled in 4 weeks for Nivolumab. See Dr. June in 2 mos for Nivolumab   Follow up with DEMARCO 3 months. See DEMARCO or Dr. June in 3 mos for Nivolumab   Infusion scheduling note    Injection scheduling note    Labs    Imaging    Pharmacy appointment    Other referrals              Treatment Plan Information   OP NIVOLUMAB 480 MG Q4W   Delta June MD   Upcoming Treatment Dates - OP NIVOLUMAB 480 MG Q4W    1/10/2024       Chemotherapy       nivolumab 480 mg in sodium chloride 0.9% 98 mL infusion  2/7/2024       Chemotherapy       nivolumab 480 mg in sodium chloride 0.9% 98 mL infusion  3/6/2024       Chemotherapy       nivolumab 480 mg in sodium chloride 0.9% 98 mL infusion  4/3/2024       Chemotherapy       nivolumab 480 mg in sodium chloride 0.9% 98 mL infusion

## 2024-01-09 NOTE — PLAN OF CARE
Pt admitted for C3 Opdivo. Labs reviewed and assessment performed. Pt offered no complaints. Pt tolerated treatment well. Port de-accessed and Pt discharged home

## 2024-01-09 NOTE — H&P (VIEW-ONLY)
Post-Op Follow-up Visit:   1/9/2024  Patient ID: Blayne Beebe is a 63 y.o. male, born 1960    Chief Complaint   Patient presents with    Post-op Evaluation     6/2023: abnormal lung CT screening identified mid esophageal mass with lymph nodes, stage III, [id number] at mid to lower esophagus.   7/16/2023 - 8/15/2023: neoadj CRT per Dr. June  9/22/23: restaging PET, 9/28/23: restaging appt with Dr. Meyer  10/4/23: s/p esophagectomy per Dr. Meyer  11/15/23: start adj IO per Dr. June  12/1/2023: EGD with dilation per Dr. Meyer  12/11/2023:  EGD with dilation per Dr. Meyer  12/20/2023:  EGD with dilation per Dr. Meyer    Interval History: This 62 y/o gentleman returns to clinic with his wife from [place]. He was last seen in clinic in 11/2023. Since then, he has undergone serial EGD with dilation per Dr. Meyer. Swallowing had improved enough he was tolerating eggs, gumbo until Sunday. Now he is only drinking water, Gatorade, jello, popsicle. Currently NOT drinking protein supplement. Denies nausea/ diarrhea. Having regular BMs. He continues to instill 2 cartons of feeding nightly via J tube. He had difficulty flushing J tube with syringe so he flushed for 30 minutes or so via infusion pump. He reports home scale weight stable at 134-135#. He was seen recently by CHARI Morelos RD on 12/29/23.   Remains afebrile. Denies CP, SOB.     Lab Results   Component Value Date    ALBUMIN 3.9 01/09/2024       Chemistry        Component Value Date/Time     01/09/2024 1100    K 4.8 01/09/2024 1100     01/09/2024 1100    CO2 27 01/09/2024 1100    BUN 11 01/09/2024 1100    CREATININE 0.8 01/09/2024 1100     01/09/2024 1100        Component Value Date/Time    CALCIUM 9.6 01/09/2024 1100    ALKPHOS 122 01/09/2024 1100    AST 17 01/09/2024 1100    ALT 11 01/09/2024 1100    BILITOT 0.8 01/09/2024 1100    ESTGFRAFRICA >60.0 01/28/2020 0803    EGFRNONAA >60.0 01/28/2020 0803        Lab Results  "  Component Value Date    WBC 5.50 01/09/2024    HGB 13.6 (L) 01/09/2024    HCT 40.1 01/09/2024    MCV 94 01/09/2024     01/09/2024       Physical Exam:  BP (!) 101/59   Pulse 76   Ht 5' 10" (1.778 m)   Wt 59.7 kg (131 lb 9.8 oz)   SpO2 99%   BMI 18.88 kg/m²     General:  Non-toxic, ambulatory  Abd:  Soft, non-tender  Incision:  J tube secured with 3 sutures     Pathology:  Tumor Site Distal esophagus (low thoracic esophagus) Relationship of Tumor to Esophagogastric Junction Tumor midpoint lies in the distal esophagus AND tumor involves the esophagogastric junction Histologic Type Adenocarcinoma Histologic Grade G2, moderately differentiated Tumor Size Greatest dimension in Centimeters (cm): 6.5 cm Tumor Extent Invades muscularis propria Treatment Effect Present, with residual cancer showing evident tumor regression, but more than single cells or rare small groups of cancer cells (partial response, score 2) Lymphovascular Invasion Not identified Perineural Invasion Not identified MARGINS All margins negative for invasive carcinoma REGIONAL LYMPH NODES All regional lymph nodes negative for tumor Number of Lymph Nodes Examined: 18 DISTANT METASTASIS Not applicable PATHOLOGIC STAGE CLASSIFICATION (pTNM, AJCC 8th Edition) Reporting of pT, pN, and (when applicable) pM categories is based on information available to the pathologist at the time the report is issued. As per the AJCC (Chapter 1, 8th Ed.) it is the managing physicians responsibility to establish the final pathologic stage based upon all pertinent information, including but potentially not limited to this pathology report. pT Category pT2: Tumor invades the muscularis propria  pN Category pN0: No regional lymph node metastasis pM Category Not applicable - pM cannot be determined from the submitted specimen(s)      ICD-10-CM ICD-9-CM    1. Malignant neoplasm of gastroesophageal junction  C16.0 151.0       2. Esophageal dysphagia  R13.19 787.29     "   3. H/O esophagectomy  Z98.890 V15.29     Z90.49        4. Jejunostomy present  Z93.4 V44.4       5. Alteration in nutrition associated with tube feeding  R63.8 783.9       Plan     Scheduled for adj immunotherapy today, proceed if cleared by med onc  Continue J feeds 2 cartons until swallowing improves. Monitor home scale weight. Recommend postpone f/u with RD given upcoming EGD procedure.   Scheduled for EGD with dilation 1/10/2024 per Dr. Meyer. He will exchange J tube tomorrow in OR.     RTC prn    Questions were asked and answered to patient and his wife's satisfaction.    Discussed case with Dr. Meyer today in person in clinic, who agrees with the above plan of care.        Belem Chanel NP  Upper GI / Hepatobiliary Surgical Oncology  [facility] [place], [place]  Office: [phone]  Fax: [phone]

## 2024-01-10 ENCOUNTER — ANESTHESIA (OUTPATIENT)
Dept: SURGERY | Facility: HOSPITAL | Age: 64
End: 2024-01-10
Payer: COMMERCIAL

## 2024-01-10 ENCOUNTER — HOSPITAL ENCOUNTER (OUTPATIENT)
Facility: HOSPITAL | Age: 64
Discharge: HOME | End: 2024-01-10
Attending: SURGERY | Admitting: SURGERY
Payer: COMMERCIAL

## 2024-01-10 VITALS
HEART RATE: 90 BPM | SYSTOLIC BLOOD PRESSURE: 147 MMHG | RESPIRATION RATE: 13 BRPM | DIASTOLIC BLOOD PRESSURE: 89 MMHG | BODY MASS INDEX: 18.85 KG/M2 | OXYGEN SATURATION: 96 % | WEIGHT: 131.63 LBS | TEMPERATURE: 98 F | HEIGHT: 70 IN

## 2024-01-10 DIAGNOSIS — R13.19 ESOPHAGEAL DYSPHAGIA: ICD-10-CM

## 2024-01-10 DIAGNOSIS — C16.0 MALIGNANT NEOPLASM OF GASTROESOPHAGEAL JUNCTION: ICD-10-CM

## 2024-01-10 PROCEDURE — D9220A PRA ANESTHESIA: Mod: CRNA,,, | Performed by: NURSE ANESTHETIST, CERTIFIED REGISTERED

## 2024-01-10 PROCEDURE — 36000707: Performed by: SURGERY

## 2024-01-10 PROCEDURE — 63600175 PHARM REV CODE 636 W HCPCS: Performed by: NURSE ANESTHETIST, CERTIFIED REGISTERED

## 2024-01-10 PROCEDURE — 71000015 HC POSTOP RECOV 1ST HR: Performed by: SURGERY

## 2024-01-10 PROCEDURE — 37000008 HC ANESTHESIA 1ST 15 MINUTES: Performed by: SURGERY

## 2024-01-10 PROCEDURE — 36000706: Performed by: SURGERY

## 2024-01-10 PROCEDURE — 25000003 PHARM REV CODE 250: Performed by: NURSE ANESTHETIST, CERTIFIED REGISTERED

## 2024-01-10 PROCEDURE — 71000044 HC DOSC ROUTINE RECOVERY FIRST HOUR: Performed by: SURGERY

## 2024-01-10 PROCEDURE — C1726 CATH, BAL DIL, NON-VASCULAR: HCPCS | Performed by: SURGERY

## 2024-01-10 PROCEDURE — 43249 ESOPH EGD DILATION <30 MM: CPT | Mod: ,,, | Performed by: SURGERY

## 2024-01-10 PROCEDURE — 37000009 HC ANESTHESIA EA ADD 15 MINS: Performed by: SURGERY

## 2024-01-10 PROCEDURE — D9220A PRA ANESTHESIA: Mod: ANES,,, | Performed by: ANESTHESIOLOGY

## 2024-01-10 PROCEDURE — 27201423 OPTIME MED/SURG SUP & DEVICES STERILE SUPPLY: Performed by: SURGERY

## 2024-01-10 RX ORDER — CEFAZOLIN SODIUM 1 G/3ML
INJECTION, POWDER, FOR SOLUTION INTRAMUSCULAR; INTRAVENOUS
Status: DISCONTINUED | OUTPATIENT
Start: 2024-01-10 | End: 2024-01-10

## 2024-01-10 RX ORDER — SUCCINYLCHOLINE CHLORIDE 20 MG/ML
INJECTION INTRAMUSCULAR; INTRAVENOUS
Status: DISCONTINUED | OUTPATIENT
Start: 2024-01-10 | End: 2024-01-10

## 2024-01-10 RX ORDER — MIDAZOLAM HYDROCHLORIDE 1 MG/ML
INJECTION, SOLUTION INTRAMUSCULAR; INTRAVENOUS
Status: DISCONTINUED | OUTPATIENT
Start: 2024-01-10 | End: 2024-01-10

## 2024-01-10 RX ORDER — FENTANYL CITRATE 50 UG/ML
INJECTION, SOLUTION INTRAMUSCULAR; INTRAVENOUS
Status: DISCONTINUED | OUTPATIENT
Start: 2024-01-10 | End: 2024-01-10

## 2024-01-10 RX ORDER — ONDANSETRON 2 MG/ML
4 INJECTION INTRAMUSCULAR; INTRAVENOUS DAILY PRN
Status: DISCONTINUED | OUTPATIENT
Start: 2024-01-10 | End: 2024-01-10 | Stop reason: HOSPADM

## 2024-01-10 RX ORDER — FENTANYL CITRATE 50 UG/ML
25 INJECTION, SOLUTION INTRAMUSCULAR; INTRAVENOUS EVERY 5 MIN PRN
Status: DISCONTINUED | OUTPATIENT
Start: 2024-01-10 | End: 2024-01-10 | Stop reason: HOSPADM

## 2024-01-10 RX ORDER — PROPOFOL 10 MG/ML
VIAL (ML) INTRAVENOUS
Status: DISCONTINUED | OUTPATIENT
Start: 2024-01-10 | End: 2024-01-10

## 2024-01-10 RX ORDER — LIDOCAINE HYDROCHLORIDE 10 MG/ML
INJECTION, SOLUTION EPIDURAL; INFILTRATION; INTRACAUDAL; PERINEURAL
Status: DISCONTINUED
Start: 2024-01-10 | End: 2024-01-10 | Stop reason: HOSPADM

## 2024-01-10 RX ORDER — LIDOCAINE HYDROCHLORIDE 20 MG/ML
INJECTION INTRAVENOUS
Status: DISCONTINUED | OUTPATIENT
Start: 2024-01-10 | End: 2024-01-10

## 2024-01-10 RX ORDER — ONDANSETRON 2 MG/ML
INJECTION INTRAMUSCULAR; INTRAVENOUS
Status: DISCONTINUED | OUTPATIENT
Start: 2024-01-10 | End: 2024-01-10

## 2024-01-10 RX ORDER — PHENYLEPHRINE HYDROCHLORIDE 10 MG/ML
INJECTION INTRAVENOUS
Status: DISCONTINUED | OUTPATIENT
Start: 2024-01-10 | End: 2024-01-10

## 2024-01-10 RX ORDER — DEXAMETHASONE SODIUM PHOSPHATE 4 MG/ML
INJECTION, SOLUTION INTRA-ARTICULAR; INTRALESIONAL; INTRAMUSCULAR; INTRAVENOUS; SOFT TISSUE
Status: DISCONTINUED | OUTPATIENT
Start: 2024-01-10 | End: 2024-01-10

## 2024-01-10 RX ADMIN — FENTANYL CITRATE 50 MCG: 50 INJECTION, SOLUTION INTRAMUSCULAR; INTRAVENOUS at 09:01

## 2024-01-10 RX ADMIN — SODIUM CHLORIDE, SODIUM GLUCONATE, SODIUM ACETATE, POTASSIUM CHLORIDE, MAGNESIUM CHLORIDE, SODIUM PHOSPHATE, DIBASIC, AND POTASSIUM PHOSPHATE: .53; .5; .37; .037; .03; .012; .00082 INJECTION, SOLUTION INTRAVENOUS at 10:01

## 2024-01-10 RX ADMIN — SODIUM CHLORIDE: 0.9 INJECTION, SOLUTION INTRAVENOUS at 09:01

## 2024-01-10 RX ADMIN — MIDAZOLAM HYDROCHLORIDE 1 MG: 1 INJECTION, SOLUTION INTRAMUSCULAR; INTRAVENOUS at 09:01

## 2024-01-10 RX ADMIN — FENTANYL CITRATE 25 MCG: 50 INJECTION, SOLUTION INTRAMUSCULAR; INTRAVENOUS at 10:01

## 2024-01-10 RX ADMIN — CEFAZOLIN 2 G: 330 INJECTION, POWDER, FOR SOLUTION INTRAMUSCULAR; INTRAVENOUS at 09:01

## 2024-01-10 RX ADMIN — PROPOFOL 80 MG: 10 INJECTION, EMULSION INTRAVENOUS at 09:01

## 2024-01-10 RX ADMIN — PHENYLEPHRINE HYDROCHLORIDE 100 MCG: 10 INJECTION INTRAVENOUS at 10:01

## 2024-01-10 RX ADMIN — SUCCINYLCHOLINE CHLORIDE 140 MG: 20 INJECTION, SOLUTION INTRAMUSCULAR; INTRAVENOUS at 09:01

## 2024-01-10 RX ADMIN — PROPOFOL 120 MG: 10 INJECTION, EMULSION INTRAVENOUS at 09:01

## 2024-01-10 RX ADMIN — ONDANSETRON 4 MG: 2 INJECTION INTRAMUSCULAR; INTRAVENOUS at 10:01

## 2024-01-10 RX ADMIN — DEXAMETHASONE SODIUM PHOSPHATE 8 MG: 4 INJECTION, SOLUTION INTRAMUSCULAR; INTRAVENOUS at 10:01

## 2024-01-10 RX ADMIN — LIDOCAINE HYDROCHLORIDE 100 MG: 20 INJECTION INTRAVENOUS at 09:01

## 2024-01-10 NOTE — BRIEF OP NOTE
[facility] - Surgery ([facility])  Brief Operative Note    Surgery Date: 1/10/2024     Surgeon(s) and Role:     * Nas Meyer MD - Primary     * Joel Awad MD - Resident - Assisting        Pre-op Diagnosis:  Esophageal dysphagia [R13.19]    Post-op Diagnosis:  Post-Op Diagnosis Codes:     * Esophageal dysphagia [R13.19]    Procedure(s) (LRB):  EGD, WITH BALLOON DILATION (N/A)    Anesthesia: General    Operative Findings: EGD with balloon dilation with 15, 18, 20 and jejunostomy tube replacement without apparent complication     Estimated Blood Loss: minimal         Specimens:   Specimen (24h ago, onward)      None              Discharge Note    OUTCOME: Patient tolerated treatment/procedure well without complication and is now ready for discharge.    DISPOSITION: Home or Self Care    FINAL DIAGNOSIS:  Esophageal dysphagia    FOLLOWUP: In clinic    DISCHARGE INSTRUCTIONS:    Discharge Procedure Orders   Change dressing (specify)   Order Comments: You had a EGD with balloon dilation and J tube replacement performed.     Please alternate tylenol and ibuprofen for pain as needed as directed by the bottle.     Continue post-esophagectomy diet instructions     Please follow up in clinic with Dr. Lovell in Clinic as needed     Notify your health care provider if you experience any of the following:  temperature >100.4     Notify your health care provider if you experience any of the following:  persistent nausea and vomiting or diarrhea     Notify your health care provider if you experience any of the following:  severe uncontrolled pain     Notify your health care provider if you experience any of the following:  redness, tenderness, or signs of infection (pain, swelling, redness, odor or green/yellow discharge around incision site)     Notify your health care provider if you experience any of the following:  difficulty breathing or increased cough     Notify your health care provider if you experience any of the  following:  severe persistent headache     Notify your health care provider if you experience any of the following:  worsening rash     Notify your health care provider if you experience any of the following:  persistent dizziness, light-headedness, or visual disturbances     Notify your health care provider if you experience any of the following:  increased confusion or weakness     Activity as tolerated

## 2024-01-10 NOTE — ANESTHESIA POSTPROCEDURE EVALUATION
Anesthesia Post Evaluation    Patient: Blayne D III Steib    Procedure(s) Performed: Procedure(s) (LRB):  EGD, WITH BALLOON DILATION (N/A)    Final Anesthesia Type: general      Patient location during evaluation: PACU  Patient participation: Yes- Able to Participate  Level of consciousness: awake and alert  Post-procedure vital signs: reviewed and stable  Pain management: adequate  Airway patency: patent    PONV status at discharge: No PONV  Anesthetic complications: no      Cardiovascular status: hemodynamically stable  Respiratory status: spontaneous ventilation  Follow-up not needed.              Vitals Value Taken Time   /78 01/10/24 1116   Temp 36.6 °C (97.8 °F) 01/10/24 1107   Pulse 94 01/10/24 1126   Resp 15 01/10/24 1126   SpO2 96 % 01/10/24 1126   Vitals shown include unvalidated device data.      No case tracking events are documented in the log.      Pain/Yessy Score: Yessy Score: 8 (1/10/2024 11:07 AM)

## 2024-01-10 NOTE — TRANSFER OF CARE
"Anesthesia Transfer of Care Note    Patient: Blayne D III Steib    Procedure(s) Performed: Procedure(s) (LRB):  EGD, WITH BALLOON DILATION (N/A)    Patient location: PACU    Anesthesia Type: general    Transport from OR: Transported from OR on 6-10 L/min O2 by face mask with adequate spontaneous ventilation    Post pain: adequate analgesia    Post assessment: no apparent anesthetic complications and tolerated procedure well    Post vital signs: stable    Level of consciousness: awake    Nausea/Vomiting: no nausea/vomiting    Complications: none    Transfer of care protocol was followed    Last vitals: Visit Vitals  BP (!) 153/86   Pulse 103   Temp 36.6 °C (97.9 °F) (Oral)   Resp 13   Ht 5' 10" (1.778 m)   Wt 59.7 kg (131 lb 9.8 oz)   SpO2 99%   BMI 18.88 kg/m²     "

## 2024-01-10 NOTE — ANESTHESIA PREPROCEDURE EVALUATION
01/10/2024  Blayne Beebe is a 63 y.o., male.  Pre-operative evaluation for Procedure(s) (LRB):  EGD, WITH BALLOON DILATION (N/A)    Blayne Beebe is a 63 y.o. male   H/o difficult airway (small mouth, poor extension, anterior larynx, poor dentition); last 3 surgeries requiring 3 attempts     Patient Active Problem List   Diagnosis    Disc disease, degenerative, cervical    Erectile dysfunction    Tobacco use disorder, moderate, in early remission    COPD (chronic obstructive pulmonary disease)    RBBB    Pulmonary nodule 1 cm or greater in diameter    Personal history of colonic polyps    Hard to intubate    Malignant neoplasm of gastroesophageal junction    Weakness of both hips    Decreased functional mobility and endurance    Jejunostomy present    H/O esophagectomy    Alteration in nutrition associated with tube feeding       Past Surgical History:   Procedure Laterality Date    CERVICAL FUSION      COLONOSCOPY N/A 3/27/2018    Procedure: COLONOSCOPY;  Surgeon: Maria Teresa Morocho MD;  Location: [facility] ENDO;  Service: Endoscopy;  Laterality: N/A;    COLONOSCOPY N/A 6/13/2023    Procedure: COLONOSCOPY;  Surgeon: Kelli Snell MD;  Location: [facility] ENDO;  Service: Endoscopy;  Laterality: N/A;    EGD, WITH BALLOON DILATION N/A 12/1/2023    Procedure: EGD, WITH BALLOON DILATION;  Surgeon: Nas Meyer MD;  Location: [address];  Service: General;  Laterality: N/A;    EGD, WITH BALLOON DILATION N/A 12/11/2023    Procedure: EGD, WITH BALLOON DILATION;  Surgeon: Nas Meyer MD;  Location: [facility] OR [address] [place] [address];  Service: General;  Laterality: N/A;    EGD, WITH BALLOON DILATION N/A 12/20/2023    Procedure: EGD, WITH BALLOON DILATION;  Surgeon: Nas Meyer MD;  [facility];  Service: General;  Laterality: N/A;    ENDOSCOPIC ULTRASOUND OF UPPER GASTROINTESTINAL TRACT N/A  7/3/2023    Procedure: ULTRASOUND, UPPER GI TRACT, ENDOSCOPIC;  Surgeon: Ray Duffy MD;  Location: [facility] ENDO;  Service: Endoscopy;  Laterality: N/A;    ESOPHAGOGASTRODUODENOSCOPY N/A 6/13/2023    Procedure: EGD (ESOPHAGOGASTRODUODENOSCOPY);  Surgeon: Kelil Snell MD;  Location: [facility];  Service: Endoscopy;  Laterality: N/A;    ESOPHAGOGASTRODUODENOSCOPY N/A 7/3/2023    Procedure: EGD (ESOPHAGOGASTRODUODENOSCOPY);  Surgeon: Ray Duffy MD;  Location: [facility];  Service: Endoscopy;  Laterality: N/A;    INGUINAL HERNIA REPAIR Bilateral     Right x2, x1 left    PLACEMENT OF JEJUNOSTOMY TUBE  10/4/2023    Procedure: INSERTION, JEJUNOSTOMY TUBE;  Surgeon: Nas Meyer MD;  Location: [facility] OR [facility];  Service: General;;    PYLOROMYOTOMY  10/4/2023    Procedure: PYLOROMYOTOMY;  Surgeon: Nas Meyer MD;  [facility];  Service: General;;    ROBOT-ASSISTED SURGICAL REMOVAL OF ESOPHAGUS USING DA BALWINDER XI N/A 10/4/2023    Procedure: XI ROBOTIC ESOPHAGECTOMY;  Surgeon: Nas Meyer MD;  [facility];  Service: General;  Laterality: N/A;    ROBOTIC REPAIR, HERNIA, PARAESOPHAGEAL  10/4/2023    Procedure: ROBOTIC REPAIR, HERNIA, PARAESOPHAGEAL;  Surgeon: Nas Meyer MD;  Location: [facility] OR [address] [place] [address];  Service: General;;    ROTATOR CUFF REPAIR Right     x2       Social History     Socioeconomic History    Marital status:    Tobacco Use    Smoking status: Former     Current packs/day: 0.25     Average packs/day: 0.3 packs/day for 41.0 years (10.3 ttl pk-yrs)     Types: Cigarettes     Start date: 1983     Passive exposure: Current    Smokeless tobacco: Never    Tobacco comments:     Done smoking since september   Substance and Sexual Activity    Alcohol use: Yes     Comment: a couple of beers a day    Drug use: No    Sexual activity: Yes     Partners: Female     Comment:      Social Determinants of Health     Financial Resource Strain: Patient Declined  (12/29/2023)    Overall Financial Resource Strain (CARDIA)     Difficulty of Paying Living Expenses: Patient declined   Food Insecurity: Patient Declined (12/29/2023)    Hunger Vital Sign     Worried About Running Out of Food in the Last Year: Patient declined     Ran Out of Food in the Last Year: Patient declined   Transportation Needs: Patient Declined (12/29/2023)    PRAPARE - Transportation     Lack of Transportation (Medical): Patient declined     Lack of Transportation (Non-Medical): Patient declined   Physical Activity: Sufficiently Active (12/29/2023)    Exercise Vital Sign     Days of Exercise per Week: 5 days     Minutes of Exercise per Session: 40 min   Stress: No Stress Concern Present (12/29/2023)    [place] [employer] - Occupational Stress Questionnaire     Feeling of Stress : Not at all   Social Connections: Unknown (12/29/2023)    Social Connection and Isolation Panel [NHANES]     Frequency of Communication with Friends and Family: Patient declined     Frequency of Social Gatherings with Friends and Family: Patient declined     Attends Shinto Services: Never     Active Member of Clubs or Organizations: No     Attends Club or Organization Meetings: Patient declined     Marital Status:    Housing Stability: Patient Declined (12/29/2023)    Housing Stability Vital Sign     Unable to Pay for Housing in the Last Year: Patient declined     Unstable Housing in the Last Year: Patient declined       No current facility-administered medications on file prior to encounter.     Current Outpatient Medications on File Prior to Encounter   Medication Sig Dispense Refill    acetaminophen (TYLENOL) 500 MG tablet Take 1 tablet (500 mg total) by mouth every 6 (six) hours as needed for Pain. (Patient not taking: Reported on 1/9/2024)  0    cholecalciferol, vitamin D3, (VITAMIN D3) 10 mcg (400 unit) Tab Take by mouth once daily.      LIDOcaine-prilocaine (EMLA) cream Apply topically as  "needed (Port access). (Patient not taking: Reported on 7/31/2023) 30 g 1    oxyCODONE (ROXICODONE) 5 mg/5 mL Soln 5 mLs (5 mg total) by Per J Tube route every 4 (four) hours as needed. (Patient not taking: Reported on 10/11/2023) 210 mL 0    pantoprazole (PROTONIX) 40 MG tablet Take 1 tablet (40 mg total) by mouth once daily. 90 tablet 3    sildenafil (VIAGRA) 100 MG tablet Take 1 tablet (100 mg total) by mouth daily as needed for Erectile Dysfunction. (Patient not taking: Reported on 10/11/2023) 10 tablet 6       Review of patient's allergies indicates:  No Known Allergies      CBC:   Recent Labs     01/09/24  1100   WBC 5.50   RBC 4.26*   HGB 13.6*   HCT 40.1      MCV 94   MCH 31.9*   MCHC 33.9       CMP:   Recent Labs     01/09/24  1100      K 4.8      CO2 27   BUN 11   CREATININE 0.8      CALCIUM 9.6   ALBUMIN 3.9   PROT 7.7   ALKPHOS 122   ALT 11   AST 17   BILITOT 0.8       INR  No results for input(s): "PT", "INR", "PROTIME", "APTT" in the last 72 hours.      Diagnostic Studies:    EKG:   Results for orders placed or performed in visit on 08/22/23   EKG 12-lead    Collection Time: 08/22/23 11:09 AM    Narrative    Test Reason : C16.0,J44.9,F17.200,I45.10,Z01.818,D70.9,D64.81,T45.1X5A,D69.6,    Vent. Rate : 083 BPM     Atrial Rate : 083 BPM     P-R Int : 136 ms          QRS Dur : 130 ms      QT Int : 392 ms       P-R-T Axes : 082 089 -13 degrees     QTc Int : 460 ms    Normal sinus rhythm  Possible Left atrial enlargement  Nonspecific intraventricular block  T wave abnormality, consider inferior ischemia  Abnormal ECG  When compared with ECG of 28-APR-2015 14:52,  Nonspecific intraventricular block has replaced Right bundle branch block  Confirmed by Manohar Yeboah MD ([id number]) on 8/29/2023 1:09:44 PM    Referred By: magui marianne           Confirmed By:Manohar Yeboah MD       TTE:  No results found for this or any previous visit.  No results found for: "EF"   No results found for " "this or any previous visit.    KWAME:  No results found for this or any previous visit.    Stress Test:  Results for orders placed during the hospital encounter of 08/29/23    Nuclear Stress Test    Interpretation Summary    The ECG portion of the study is abnormal but not diagnostic.    The patient reported no chest pain during the stress test.    During stress, rare PVCs are noted.    The nuclear portion of this study will be reported separately.       LHC:  No results found for this or any previous visit.       PFT:  No results found for: "FEV1", "FVC", "CAS7JWF", "TLC", "DLCO"     ALLERGIES:   Review of patient's allergies indicates:  No Known Allergies  LDA:          Lines/Drains/Airways       Central Venous Catheter Line  Duration                  PowerPort A Cath Single Lumen 07/12/23 1344 Internal Jugular Right 181 days              Drain  Duration                  Gastrostomy/Enterostomy 10/04/23 1730 Jejunostomy tube LUQ feeding 97 days                   Anesthesia Evaluation      Airway   Mallampati: III  TM distance: 4 - 6 cm  Neck ROM: Extension Decreased  Dental      Pulmonary    (+) COPD  Cardiovascular     Rate: Normal    Neuro/Psych      GI/Hepatic/Renal      Endo/Other    Abdominal                           Pre-op Assessment    I have reviewed the Patient Summary Reports.     I have reviewed the Nursing Notes. I have reviewed the NPO Status.   I have reviewed the Medications.     Review of Systems  Anesthesia Hx:  No problems with previous Anesthesia             Denies Family Hx of Anesthesia complications.    Denies Personal Hx of Anesthesia complications.                    Cardiovascular:  Cardiovascular Normal                                            Pulmonary:   COPD                     Renal/:  Renal/ Normal                 Neurological:  Neurology Normal                                      Endocrine:  Endocrine Normal                Physical Exam    Airway:  Mallampati: III   Mouth " Opening: Small, but > 3cm  TM Distance: 4 - 6 cm  Tongue: Normal  Neck ROM: Extension Decreased    Dental:  Loose upper right canine; understands the risk of further dental trauma and agrees to proceed  Chest/Lungs:  Normal Respiratory Rate    Heart:  Rate: Normal        Anesthesia Plan  Type of Anesthesia, risks & benefits discussed:    Anesthesia Type: Gen ETT  Intra-op Monitoring Plan: Standard ASA Monitors  Post Op Pain Control Plan: multimodal analgesia and IV/PO Opioids PRN  Induction:  IV and rapid sequence  Airway Plan: Video, Post-Induction  Informed Consent: Informed consent signed with the Patient and all parties understand the risks and agree with anesthesia plan.  All questions answered. Patient consented to blood products? Yes  ASA Score: 3  Day of Surgery Review of History & Physical: H&P Update referred to the surgeon/provider.    Ready For Surgery From Anesthesia Perspective.     .

## 2024-01-10 NOTE — ANESTHESIA PROCEDURE NOTES
Intubation    Date/Time: 1/10/2024 9:50 AM    Performed by: Sherice Dozier MD  Authorized by: Sherice Dozier MD    Intubation:     Induction:  Rapid sequence induction    Intubated:  Postinduction    Mask Ventilation:  Easy with oral airway    Attempts:  3    Attempted By:  CRNA    Method of Intubation:  Video laryngoscopy    Blade:  Glidescope 3    Laryngeal View Grade: Grade IV - neither epiglottis nor glottis seen      Attempted By (2nd Attempt):  CRNA    Method of Intubation (2nd Attempt):  Video laryngoscopy    Blade (2nd Attempt):  Glidescope 3    Laryngeal View Grade (2nd Attempt): Grade III - only epiglottis visible      Attempted By (3rd Attempt):  Staff anesthesiologist    Method of Intubation (3rd Attempt):  Video laryngoscopy    Blade (3rd Attempt):  Glidescope 3    Laryngeal View Grade (3rd Attempt): Grade IIb - only arytenoids and epiglottis seen      Difficult Airway Encountered?: Yes      Future Airway Recommendations:  Remove pillow, Glidescope 3, cricoid pressure, bougie    Complications:  None    Airway Device:  Oral endotracheal tube    Airway Device Size:  7.0    Style/Cuff Inflation:  Cuffed    Inflation Amount (mL):  10    Tube secured:  24    Secured at:  The teeth    Placement Verified By:  Capnometry    Complicating Factors:  Anterior larynx, small mouth, large/floppy epiglottis and poor neck/head extension    Findings Post-Intubation:  BS equal bilateral and atraumatic/condition of teeth unchanged  Notes:      Gildescope 3 in room; 1st quick attempt with Zapata 3 yielded no view, quickly switched to Glidescope 3. Grade IV view despite cricoid pressure and attempted neck extension while on pillow. Pt able to be easily BMV with oral airway in place after 1st attempt. 2nd attempt with Glidescope 3, secretions prevalent and tissue appeared friable but no active bleeding, suctioned without issue. Grade III/IV view; cricoid pressure maintained, bougie attempted unsuccessfully. Easy  BMV with oral airway. 3rd attempt: Removed pillow which allowed slightly more neck extension, Glidescope 3, cricoid pressure, Grade IIb view, bougie passed easily and uneventfully and was able to place 7.0 ETT over bougie.

## 2024-01-10 NOTE — OP NOTE
Dimitri Javier - Surgery ([facility])  Surgery Department  Operative Note       Date of Procedure: 1/10/2024     Surgeon(s):  Surgeon(s) and Role:     * Nas Meyer MD - Primary     * Joel Awad MD - Resident - Assisting      Pre-Operative Diagnosis:   Esophageal dysphagia [R13.19]  Cervical esophagogastric anastomotic stricture    Post-Operative Diagnosis:   Same  Same     Anesthesia: General    Operative Findings:   Anastomotic stricture at 20 cm. Could not pass scope initially, but the scope easily passed after dilation with a 12-15 mm balloon.   Healthy conduit distal to the anastomosis    Procedures:  Esophagogastroduodenoscopy (EGD), with transendoscopic balloon dilation of esophagus (<30 mm)    Estimated Blood Loss (EBL): <2 mL    Specimen(s):    Specimen (24h ago, onward)      None                   Indications:  Blayne Beebe is a 63 year old man with a cervical esophagogastric anastomotic stricture following esophagectomy. His dysphagia had resolved after 3 prior anastomotic dilations, but it returned 3 days ago. Risks and benefits of EGD and endoscopic balloon dilation of the stricture were reviewed including bleeding, infection, damage to local structures, perforation of GI tract, need for additional procedures, death, and imponderables.  He understands and signed informed consent to proceed.    Details:  The patient was transported to the operating room and satisfactory anesthesia established. Preoperative antibiotics were administered. The patient was placed in the supine position    The adult gastroscope was introduced into the mouth, passed into the hypopharynx and cervical esophagus under endoscopic vision. The anastomosis was at 20 cm.  There was a moderate stenosis that initially could not accommodate the adult endoscope. This was traversed with a balloon dilator, and dilated up progressively from 12 - 15 mm, held for 1 minute at each interval. The scope easily passed the stricture after the  anastomosis was dilated to 15 mm. The anastomosis was then further dilated sequentially to 20 mm, and the balloon was held for 1 minute at each interval. At 20 mm, the balloon was held for 15 minutes. In between dilations, the stenosis was examined and was without perforation. After final dilation, the scope was advanced distally. There was some expected/intented minimal bleeding at the anastomosis.    The endoscope was advanced past the anastomosis into the gastric conduit, and distally to the pylorus. Upon withdrawal, the mucosa was examined circumferentially, with no pathology identified.        I communicated the intraoperative findings with the family following the procedure.     Condition: Good    Disposition: PACU - hemodynamically stable.    Attestation: I was present and scrubbed for the entire procedure.    Nas eMyer MD  Staff Surgeon  Surgical Oncology

## 2024-01-17 LAB
DNA RANGE(S) EXAMINED NAR: NORMAL
GENE DIS ANL INTERP-IMP: POSITIVE
GENE DIS ASSESSED: NORMAL
MSI CA SPEC-IMP: NOT DETECTED
REASON FOR STUDY: NORMAL
TEMPUS LCA: NORMAL
TEMPUS PORTAL: NORMAL

## 2024-01-18 LAB
DNA RANGE(S) EXAMINED NAR: NORMAL
GENE DIS ANL INTERP-IMP: POSITIVE
GENE DIS ASSESSED: NORMAL
GENE MUT TESTED BLD/T: 5.8 M/MB
MSI CA SPEC-IMP: NORMAL
REASON FOR STUDY: NORMAL
TEMPUS AMENDMENTNOTE1: NORMAL
TEMPUS FUSIONADDENDUM: NORMAL
TEMPUS PD-L1 (22C3) COMBINED POSITIVE SCORE: 1
TEMPUS PD-L1 (22C3) TUMOR PROPORTION SCORE: <1 %
TEMPUS PORTAL: NORMAL
TEMPUS TRIAL1: NORMAL
TEMPUS TRIAL2: NORMAL
TEMPUS TRIALCOUNT: 2

## 2024-01-18 NOTE — PROGRESS NOTES
"Oncology Nutrition Assessment for Medical Nutrition Therapy  Follow-up Visit    Blayne Beebe   1960    Referring Provider: No ref. provider found      Reason for Visit: nutrition counseling and education    The patient location is: [place]  The chief complaint leading to consultation is: nutrition follow-up    Visit type: audiovisual    Face to Face time with patient: 9 minutes  15 minutes of total time spent on the encounter, which includes face to face time and non-face to face time preparing to see the patient (eg, review of tests), Obtaining and/or reviewing separately obtained history, Documenting clinical information in the electronic or other health record, Independently interpreting results (not separately reported) and communicating results to the patient/family/caregiver, or Care coordination (not separately reported).     Each patient to whom he or she provides medical services by telemedicine is:  (1) informed of the relationship between the physician and patient and the respective role of any other health care provider with respect to management of the patient; and (2) notified that he or she may decline to receive medical services by telemedicine and may withdraw from such care at any time.    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 S/p cycle 3 adjuvant Opdivo. He is known to me from previous appointments. He most recently had another EGD w/dilation and J-tube exchange 1/10. He reports that he has been eating well and no trouble swallowing. Sticking to mostly soft foods but is eating ground meat. He continues on TF overnight, 2 cartons/day.     Weight: 133 lb - per patient  Height: 5' 9" (1.753 m)  BMI: 18.9    Usual BW: 140-145lb  Weight Change: 10lb loss from COVID " 2/2023 then another 5-10lb loss    Allergies: Patient has no known allergies.    Current Medications:  Current Outpatient Medications:     acetaminophen (TYLENOL) 500 MG tablet, Take 1 tablet (500 mg total) by mouth every 6 (six) hours as needed for Pain. (Patient not taking: Reported on 1/9/2024), Disp: , Rfl: 0    cholecalciferol, vitamin D3, (VITAMIN D3) 10 mcg (400 unit) Tab, Take by mouth once daily., Disp: , Rfl:     LIDOcaine-prilocaine (EMLA) cream, Apply topically as needed (Port access). (Patient not taking: Reported on 7/31/2023), Disp: 30 g, Rfl: 1    oxyCODONE (ROXICODONE) 5 mg/5 mL Soln, 5 mLs (5 mg total) by Per J Tube route every 4 (four) hours as needed. (Patient not taking: Reported on 10/11/2023), Disp: 210 mL, Rfl: 0    pantoprazole (PROTONIX) 40 MG tablet, Take 1 tablet (40 mg total) by mouth once daily., Disp: 90 tablet, Rfl: 3    sildenafil (VIAGRA) 100 MG tablet, Take 1 tablet (100 mg total) by mouth daily as needed for Erectile Dysfunction. (Patient not taking: Reported on 10/11/2023), Disp: 10 tablet, Rfl: 6    Food/medication interactions noted: none    Vitamins/Supplements: none reported    Labs: Reviewed    Nutrition Diagnosis    Problem: moderate malnutrition  Etiology (related to): inadequate energy and protein intake  Signs/Symptoms (as evidenced by): reports of inadequate PO intake, weight loss, and both fat & muscle wasting present  Status: Improving    Nutrition Intervention    Nutrition Prescription   2090 kcals (35kcal/kg)  84g protein (1.4g/kg)   2090mL fluid (35mL/kg)    Recommendations:  Increase PO diet as tolerated  Make meals/snacks high in calories and protein  Continue 2 cartons/day TF for 2 more weeks - patient in agreement with plan  Continue water flushes at 55mL/hr for every hour on feeds  Flush J-tube with at least 60mL water before and after feeds    Materials Provided/Reviewed: none    Nutrition Monitoring and Evaluation    Monitor: diet education needs, energy  intake, rate/formulation of tube feeding, and weight status    Goals: prevent further weight loss and encourage weight gain    Follow up: in 2-3 weeks    Communication to referring provider/care team: note available in chart    Counseling time: 9 minutes    Noelle Morelos MS, RD, LDN

## 2024-01-19 ENCOUNTER — CLINICAL SUPPORT (OUTPATIENT)
Dept: HEMATOLOGY/ONCOLOGY | Facility: CLINIC | Age: 64
End: 2024-01-19
Payer: COMMERCIAL

## 2024-01-19 DIAGNOSIS — C16.0 MALIGNANT NEOPLASM OF GASTROESOPHAGEAL JUNCTION: ICD-10-CM

## 2024-01-19 DIAGNOSIS — Z71.3 NUTRITIONAL COUNSELING: Primary | ICD-10-CM

## 2024-01-19 PROCEDURE — 97803 MED NUTRITION INDIV SUBSEQ: CPT | Mod: 95,,, | Performed by: DIETITIAN, REGISTERED

## 2024-01-29 ENCOUNTER — TELEPHONE (OUTPATIENT)
Dept: HEMATOLOGY/ONCOLOGY | Facility: CLINIC | Age: 64
End: 2024-01-29
Payer: COMMERCIAL

## 2024-01-29 NOTE — TELEPHONE ENCOUNTER
I called patient to review [facility] financial assistance, unable to reach patient, lvm, and sent mychart message

## 2024-02-05 NOTE — PROGRESS NOTES
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 S/p cycle 3 adjuvant Opdivo. He is known to me from previous appointments. He reports that he started having trouble swallowing lately, only able to do creamy soups and pureed beans. He reports it is not as bad as last time but he is afraid to push it. He continues on TF overnight, 2 cartons/day. He is maintaining his weight.    Weight: 60.7 kg (133 lb 14.9 oz)  Height: 5' 9" (1.753 m)  BMI: Body mass index is 19.22 kg/m².   IBW: Ideal body weight: 70.7 kg (155 lb 13.8 oz)    Usual BW: 140-145lb  Weight Change: 10lb loss from COVID 2/2023 then another 5-10lb loss - since maintaining    Allergies: Patient has no known allergies.    Current Medications:  Current Outpatient Medications:     acetaminophen (TYLENOL) 500 MG tablet, Take 1 tablet (500 mg total) by mouth every 6 (six) hours as needed for Pain. (Patient not taking: Reported on 1/9/2024), Disp: , Rfl: 0    cholecalciferol, vitamin D3, (VITAMIN D3) 10 mcg (400 unit) Tab, Take by mouth once daily., Disp: , Rfl:     LIDOcaine-prilocaine (EMLA) cream, Apply topically as needed (Port access). (Patient not taking: Reported on 7/31/2023), Disp: 30 g, Rfl: 1    oxyCODONE (ROXICODONE) 5 mg/5 mL Soln, 5 mLs (5 mg total) by Per J Tube route every 4 (four) hours as needed. (Patient not taking: Reported on 10/11/2023), Disp: 210 mL, Rfl: 0    pantoprazole (PROTONIX) 40 MG tablet, Take 1 tablet (40 mg total) by mouth once daily., Disp: 90 tablet, Rfl: 3    sildenafil (VIAGRA) 100 MG tablet, Take " 1 tablet (100 mg total) by mouth daily as needed for Erectile Dysfunction. (Patient not taking: Reported on 10/11/2023), Disp: 10 tablet, Rfl: 6    Food/medication interactions noted: none    Vitamins/Supplements: none reported    Labs: Reviewed    Nutrition Diagnosis    Problem: moderate malnutrition  Etiology (related to): inadequate energy and protein intake  Signs/Symptoms (as evidenced by): reports of inadequate PO intake, weight loss, and both fat & muscle wasting present  Status: Improving    Nutrition Intervention    Nutrition Prescription   2090 kcals (35kcal/kg)  84g protein (1.4g/kg)   2090mL fluid (35mL/kg)    Recommendations:  PO diet as tolerated - Dr. Meyer notified of swallowing issues  Make meals/snacks high in calories and protein  Increase to 3 cartons/day TF given swallowing issues  Continue water flushes at 55mL/hr for every hour on feeds  Flush J-tube with at least 60mL water before and after feeds    Materials Provided/Reviewed: none    Nutrition Monitoring and Evaluation    Monitor: diet education needs, energy intake, rate/formulation of tube feeding, and weight status    Goals: prevent further weight loss and encourage weight gain    Follow up: in 2-3 weeks    Communication to referring provider/care team: note available in chart    Counseling time: 10 minutes    Noelle Morelos, MS, RD, LDN

## 2024-02-06 ENCOUNTER — CLINICAL SUPPORT (OUTPATIENT)
Dept: HEMATOLOGY/ONCOLOGY | Facility: CLINIC | Age: 64
End: 2024-02-06
Attending: INTERNAL MEDICINE
Payer: COMMERCIAL

## 2024-02-06 ENCOUNTER — OFFICE VISIT (OUTPATIENT)
Dept: HEMATOLOGY/ONCOLOGY | Facility: CLINIC | Age: 64
End: 2024-02-06
Payer: COMMERCIAL

## 2024-02-06 ENCOUNTER — INFUSION (OUTPATIENT)
Dept: INFUSION THERAPY | Facility: HOSPITAL | Age: 64
End: 2024-02-06
Payer: COMMERCIAL

## 2024-02-06 ENCOUNTER — LAB VISIT (OUTPATIENT)
Dept: LAB | Facility: HOSPITAL | Age: 64
End: 2024-02-06
Attending: INTERNAL MEDICINE
Payer: COMMERCIAL

## 2024-02-06 VITALS
HEIGHT: 69 IN | SYSTOLIC BLOOD PRESSURE: 127 MMHG | DIASTOLIC BLOOD PRESSURE: 70 MMHG | HEART RATE: 66 BPM | WEIGHT: 132.94 LBS | BODY MASS INDEX: 19.69 KG/M2 | TEMPERATURE: 99 F | OXYGEN SATURATION: 100 %

## 2024-02-06 VITALS
BODY MASS INDEX: 19.69 KG/M2 | HEART RATE: 62 BPM | WEIGHT: 132.94 LBS | OXYGEN SATURATION: 100 % | RESPIRATION RATE: 18 BRPM | HEIGHT: 69 IN | DIASTOLIC BLOOD PRESSURE: 69 MMHG | SYSTOLIC BLOOD PRESSURE: 130 MMHG

## 2024-02-06 VITALS — WEIGHT: 133.94 LBS | BODY MASS INDEX: 19.22 KG/M2

## 2024-02-06 DIAGNOSIS — I70.0 ATHEROSCLEROSIS OF AORTA: ICD-10-CM

## 2024-02-06 DIAGNOSIS — C16.0 MALIGNANT NEOPLASM OF GASTROESOPHAGEAL JUNCTION: ICD-10-CM

## 2024-02-06 DIAGNOSIS — Z71.3 NUTRITIONAL COUNSELING: Primary | ICD-10-CM

## 2024-02-06 DIAGNOSIS — E44.0 MODERATE MALNUTRITION: ICD-10-CM

## 2024-02-06 DIAGNOSIS — J43.9 PULMONARY EMPHYSEMA, UNSPECIFIED EMPHYSEMA TYPE: ICD-10-CM

## 2024-02-06 DIAGNOSIS — F17.210 NICOTINE DEPENDENCE, CIGARETTES, UNCOMPLICATED: ICD-10-CM

## 2024-02-06 DIAGNOSIS — K20.90 ESOPHAGITIS: ICD-10-CM

## 2024-02-06 DIAGNOSIS — C16.0 MALIGNANT NEOPLASM OF GASTROESOPHAGEAL JUNCTION: Primary | ICD-10-CM

## 2024-02-06 DIAGNOSIS — R13.19 ESOPHAGEAL DYSPHAGIA: Primary | ICD-10-CM

## 2024-02-06 DIAGNOSIS — T45.1X5A IMMUNODEFICIENCY DUE TO CHEMOTHERAPY: ICD-10-CM

## 2024-02-06 DIAGNOSIS — D84.821 IMMUNODEFICIENCY DUE TO CHEMOTHERAPY: ICD-10-CM

## 2024-02-06 DIAGNOSIS — Z79.899 IMMUNODEFICIENCY DUE TO CHEMOTHERAPY: ICD-10-CM

## 2024-02-06 LAB
ALBUMIN SERPL BCP-MCNC: 3.5 G/DL (ref 3.5–5.2)
ALP SERPL-CCNC: 108 U/L (ref 55–135)
ALT SERPL W/O P-5'-P-CCNC: 9 U/L (ref 10–44)
ANION GAP SERPL CALC-SCNC: 9 MMOL/L (ref 8–16)
AST SERPL-CCNC: 15 U/L (ref 10–40)
BILIRUB SERPL-MCNC: 0.4 MG/DL (ref 0.1–1)
BUN SERPL-MCNC: 9 MG/DL (ref 8–23)
CALCIUM SERPL-MCNC: 9.2 MG/DL (ref 8.7–10.5)
CHLORIDE SERPL-SCNC: 103 MMOL/L (ref 95–110)
CO2 SERPL-SCNC: 26 MMOL/L (ref 23–29)
CREAT SERPL-MCNC: 0.7 MG/DL (ref 0.5–1.4)
ERYTHROCYTE [DISTWIDTH] IN BLOOD BY AUTOMATED COUNT: 14.7 % (ref 11.5–14.5)
EST. GFR  (NO RACE VARIABLE): >60 ML/MIN/1.73 M^2
GLUCOSE SERPL-MCNC: 114 MG/DL (ref 70–110)
HCT VFR BLD AUTO: 38.6 % (ref 40–54)
HGB BLD-MCNC: 13 G/DL (ref 14–18)
IMM GRANULOCYTES # BLD AUTO: 0.05 K/UL (ref 0–0.04)
MCH RBC QN AUTO: 33 PG (ref 27–31)
MCHC RBC AUTO-ENTMCNC: 33.7 G/DL (ref 32–36)
MCV RBC AUTO: 98 FL (ref 82–98)
NEUTROPHILS # BLD AUTO: 3.1 K/UL (ref 1.8–7.7)
PLATELET # BLD AUTO: 277 K/UL (ref 150–450)
PMV BLD AUTO: 8.4 FL (ref 9.2–12.9)
POTASSIUM SERPL-SCNC: 4.2 MMOL/L (ref 3.5–5.1)
PROT SERPL-MCNC: 6.9 G/DL (ref 6–8.4)
RBC # BLD AUTO: 3.94 M/UL (ref 4.6–6.2)
SODIUM SERPL-SCNC: 138 MMOL/L (ref 136–145)
TSH SERPL DL<=0.005 MIU/L-ACNC: 1.45 UIU/ML (ref 0.4–4)
WBC # BLD AUTO: 5.3 K/UL (ref 3.9–12.7)

## 2024-02-06 PROCEDURE — 99215 OFFICE O/P EST HI 40 MIN: CPT | Mod: S$GLB,,, | Performed by: INTERNAL MEDICINE

## 2024-02-06 PROCEDURE — 36415 COLL VENOUS BLD VENIPUNCTURE: CPT | Performed by: INTERNAL MEDICINE

## 2024-02-06 PROCEDURE — 99999 PR PBB SHADOW E&M-EST. PATIENT-LVL I: CPT | Mod: PBBFAC,,, | Performed by: DIETITIAN, REGISTERED

## 2024-02-06 PROCEDURE — 84443 ASSAY THYROID STIM HORMONE: CPT | Performed by: INTERNAL MEDICINE

## 2024-02-06 PROCEDURE — 3008F BODY MASS INDEX DOCD: CPT | Mod: CPTII,S$GLB,, | Performed by: INTERNAL MEDICINE

## 2024-02-06 PROCEDURE — 97803 MED NUTRITION INDIV SUBSEQ: CPT | Mod: S$GLB,,, | Performed by: DIETITIAN, REGISTERED

## 2024-02-06 PROCEDURE — 63600175 PHARM REV CODE 636 W HCPCS: Mod: JZ,JG | Performed by: INTERNAL MEDICINE

## 2024-02-06 PROCEDURE — 96413 CHEMO IV INFUSION 1 HR: CPT

## 2024-02-06 PROCEDURE — 25000003 PHARM REV CODE 250: Performed by: INTERNAL MEDICINE

## 2024-02-06 PROCEDURE — 3078F DIAST BP <80 MM HG: CPT | Mod: CPTII,S$GLB,, | Performed by: INTERNAL MEDICINE

## 2024-02-06 PROCEDURE — A4216 STERILE WATER/SALINE, 10 ML: HCPCS | Performed by: INTERNAL MEDICINE

## 2024-02-06 PROCEDURE — 80053 COMPREHEN METABOLIC PANEL: CPT | Performed by: INTERNAL MEDICINE

## 2024-02-06 PROCEDURE — 85027 COMPLETE CBC AUTOMATED: CPT | Performed by: INTERNAL MEDICINE

## 2024-02-06 PROCEDURE — 3074F SYST BP LT 130 MM HG: CPT | Mod: CPTII,S$GLB,, | Performed by: INTERNAL MEDICINE

## 2024-02-06 PROCEDURE — 99999 PR PBB SHADOW E&M-EST. PATIENT-LVL III: CPT | Mod: PBBFAC,,, | Performed by: INTERNAL MEDICINE

## 2024-02-06 RX ORDER — PROCHLORPERAZINE EDISYLATE 5 MG/ML
10 INJECTION INTRAMUSCULAR; INTRAVENOUS ONCE AS NEEDED
Status: DISCONTINUED | OUTPATIENT
Start: 2024-02-06 | End: 2024-02-06 | Stop reason: HOSPADM

## 2024-02-06 RX ORDER — EPINEPHRINE 0.3 MG/.3ML
0.3 INJECTION SUBCUTANEOUS ONCE AS NEEDED
Status: CANCELLED | OUTPATIENT
Start: 2024-02-07

## 2024-02-06 RX ORDER — SODIUM CHLORIDE 0.9 % (FLUSH) 0.9 %
10 SYRINGE (ML) INJECTION
Status: CANCELLED | OUTPATIENT
Start: 2024-02-07

## 2024-02-06 RX ORDER — DIPHENHYDRAMINE HYDROCHLORIDE 50 MG/ML
50 INJECTION INTRAMUSCULAR; INTRAVENOUS ONCE AS NEEDED
Status: CANCELLED | OUTPATIENT
Start: 2024-02-07

## 2024-02-06 RX ORDER — PROCHLORPERAZINE EDISYLATE 5 MG/ML
10 INJECTION INTRAMUSCULAR; INTRAVENOUS ONCE AS NEEDED
Status: CANCELLED
Start: 2024-02-07

## 2024-02-06 RX ORDER — EPINEPHRINE 0.3 MG/.3ML
0.3 INJECTION SUBCUTANEOUS ONCE AS NEEDED
Status: DISCONTINUED | OUTPATIENT
Start: 2024-02-06 | End: 2024-02-06 | Stop reason: HOSPADM

## 2024-02-06 RX ORDER — HEPARIN 100 UNIT/ML
500 SYRINGE INTRAVENOUS
Status: CANCELLED | OUTPATIENT
Start: 2024-02-07

## 2024-02-06 RX ORDER — DIPHENHYDRAMINE HYDROCHLORIDE 50 MG/ML
50 INJECTION INTRAMUSCULAR; INTRAVENOUS ONCE AS NEEDED
Status: DISCONTINUED | OUTPATIENT
Start: 2024-02-06 | End: 2024-02-06 | Stop reason: HOSPADM

## 2024-02-06 RX ORDER — SODIUM CHLORIDE 0.9 % (FLUSH) 0.9 %
10 SYRINGE (ML) INJECTION
Status: DISCONTINUED | OUTPATIENT
Start: 2024-02-06 | End: 2024-02-06 | Stop reason: HOSPADM

## 2024-02-06 RX ORDER — HEPARIN 100 UNIT/ML
500 SYRINGE INTRAVENOUS
Status: DISCONTINUED | OUTPATIENT
Start: 2024-02-06 | End: 2024-02-06 | Stop reason: HOSPADM

## 2024-02-06 RX ADMIN — Medication 10 ML: at 11:02

## 2024-02-06 RX ADMIN — HEPARIN 500 UNITS: 100 SYRINGE at 11:02

## 2024-02-06 RX ADMIN — SODIUM CHLORIDE 480 MG: 9 INJECTION, SOLUTION INTRAVENOUS at 10:02

## 2024-02-06 NOTE — PLAN OF CARE
Pt received Opdivo today and tolerated well, without complications. Educated patient about Opdivo (indications, side effects, possible reactions, chemotherapy precautions) and verbalized understanding.  VSS. CW port positive for blood return, saline flushed, Heparin flush instilled to dwell and de accessed prior to DC. Pt DC with no distress noted, ambulated off unit, w/o event, w/ fx, pleased.

## 2024-02-06 NOTE — PROGRESS NOTES
MEDICAL ONCOLOGY - ESTABLISHED PATIENT VISIT    Reason for visit: Esophageal adenocarcinoma    Best Contact Phone Number(s): [phone] (home)      Cancer/Stage/TNM:    Cancer Staging   Malignant neoplasm of gastroesophageal junction  Staging form: Esophagus - Adenocarcinoma, AJCC 8th Edition  - Clinical stage from 6/13/2023: Stage III (cT3, cN1, cM0, G2) - Signed by Sunday Jasso MD on 7/5/2023       Oncology History   Malignant neoplasm of gastroesophageal junction   6/13/2023 Cancer Staged    Staging form: Esophagus - Adenocarcinoma, AJCC 8th Edition  - Clinical stage from 6/13/2023: Stage III (cT3, cN1, cM0, G2)     6/23/2023 Initial Diagnosis    Malignant neoplasm of gastroesophageal junction     7/3/2023 Tumor Conference     [facility] UGI MULTIDISCIPLINARY TUMOR BOARD  PATIENT REVIEW FORM   ____________________________________________________________    CLINIC #: [id number]   DATE: 7/3/2023    DIAGNOSIS: esophageal CA    PRESENTER: Mitch    PATIENT SUMMARY:   This 64 y/o gentleman is a long time smoker with emphysema who had low dose screening lung CT in 4/2023 per his PCP. Given an abnormality on this scan, he had PET on 6/1/23 which identified large hypermetabolic mid esophageal mass with lymph nodes. He underwent EGD on 6/13/23 that found large fungating ulcerated mass with bleeding in middle third of esophagus to lower esophagus, 34-44cm. Pathology revealed moderately differentiated adenocarcinoma.   Staging PET reviewed - level 2 cervical lymph node with FDG uptake, nothing in lungs concerning  Staging EUS today per Dr. Duffy.   He has been evaluated by Dr. Vance in [place] and Dr. June.   He is scheduled to see rad onc, Dr. Jasso, Wednesday and IR for port placement on 7/12/23.     BOARD RECOMMENDATIONS:   Proceed with neoadj CRT, then restaging to consider surgical resection    CONSULT NEEDED:     [] Surgery    [] Hem/Onc    [] Rad/Onc    [] Dietary                 [] Social  Service    [] Psychology       [] AES  [] Radiology     Clinical Stage: Tumor 3 Node(s) 1 Metastasis 0      GROUP STAGE:  [] O    [] 1   [] II     [x] III   []IV  [] Local recurrence     [] Regional recurrence     [] Distant recurrence   Metastatic site(s): none         [x] [facility] Treatment Guidelines reviewed and care planned is consistent with guidelines.         (i.e., NCCN, NCI, PD, ACO, AUA, etc.)    PRESENTATION AT CANCER CONFERENCE:         [x] Prospective    [] Retrospective     [] Follow-Up         7/16/2023 - 8/8/2023 Chemotherapy    Treatment Summary   Plan Name: OP ESOPHAGEAL PACLITAXEL CARBOPLATIN WEEKLY  Treatment Goal: Control  Status: Inactive  Start Date: 7/16/2023  End Date: 8/8/2023  Provider: Delta June MD  Chemotherapy: CARBOplatin (PARAPLATIN) 195 mg in sodium chloride 0.9% 304.5 mL chemo infusion, 195 mg (100 % of original dose 193 mg), Intravenous, Clinic/HOD 1 time, 1 of 1 cycle  Dose modification:   (original dose 193 mg, Cycle 1)  Administration: 195 mg (7/18/2023), 210 mg (7/24/2023), 230 mg (7/31/2023), 210 mg (8/8/2023)  PACLitaxeL (TAXOL) 50 mg/m2 = 84 mg in sodium chloride 0.9% 250 mL chemo infusion, 50 mg/m2 = 84 mg, Intravenous, Clinic/HOD 1 time, 1 of 1 cycle  Administration: 84 mg (7/18/2023)     7/18/2023 - 8/17/2023 Radiation Therapy    Treating physician: Sunday Jasso    Course: C1 Thorax 2023    Treatment Site Ref. ID Energy Dose/Fx (Gy) #Fx Dose Correction (Gy) Total Dose (Gy) Start Date End Date Elapsed Days   IM Esophagus PTV_High 6X 1.8 23 / 23 0 41.4 7/18/2023 8/17/2023 30        11/15/2023 -  Chemotherapy    Treatment Summary   Plan Name: OP NIVOLUMAB 480 MG Q4W  Treatment Goal: Curative  Status: Active  Start Date: 11/15/2023  End Date: 9/18/2024 (Planned)  Provider: Delta June MD  Chemotherapy: [No matching medication found in this treatment plan]     12/1/2023 Procedure    Surgeon(s) and Role: Nas Meyer MD - Primary     Pre-Operative  Diagnosis:   Esophageal adenocarcinoma  Suspected cervical esophagogastric anastomotic stricture     Post-Operative Diagnosis:   Same  Cervical esophagogastric anastomotic stricture      Operative Findings:    Cervical esophagogastric anastomotic stricture. Unable to pass endoscope through stricture initially, but the scope was able to pass after serial dilations to 18 mm.      Procedures:  Esophagogastroduodenoscopy (EGD), with transendoscopic balloon dilation of esophagus (<30 mm)                Interim History:   63 y.o. male with esophageal adenocarcinoma who presents for cycle 4 of adjuvant nivolumab.  He is feeling well overall.  Has noticed a bit worsening of his dysphagia in the last 1-2 days.  Last dilation with Dr. Meyer was on 1/10/24.  Still maintaining his weight.  No pain.  No rash, diarrhea or dyspnea.     ECOG status is 0. Presents with his wife today.     ROS:   Review of Systems   Constitutional:  Negative for chills, fever, malaise/fatigue and weight loss.   HENT:  Negative for sore throat.    Eyes:  Negative for blurred vision and pain.   Respiratory:  Negative for cough and shortness of breath.    Cardiovascular:  Negative for chest pain and leg swelling.   Gastrointestinal:  Negative for abdominal pain, constipation, diarrhea, heartburn, nausea and vomiting.        Dysphagia   Genitourinary:  Negative for dysuria and frequency.   Musculoskeletal:  Negative for back pain, falls and myalgias.   Skin:  Negative for rash.   Neurological:  Negative for dizziness, weakness and headaches.   Endo/Heme/Allergies:  Does not bruise/bleed easily.   Psychiatric/Behavioral:  Negative for depression. The patient is not nervous/anxious.    All other systems reviewed and are negative.      Past Medical History:   Past Medical History:   Diagnosis Date    Arthritis     Cancer     COPD (chronic obstructive pulmonary disease)         Past Surgical History:   Past Surgical History:   Procedure Laterality Date     CERVICAL FUSION      COLONOSCOPY N/A 3/27/2018    Procedure: COLONOSCOPY;  Surgeon: Maria Teresa Morocho MD;  Location: [facility] ENDO;  Service: Endoscopy;  Laterality: N/A;    COLONOSCOPY N/A 6/13/2023    Procedure: COLONOSCOPY;  Surgeon: Kelli Snell MD;  Location: [facility];  Service: Endoscopy;  Laterality: N/A;    EGD, WITH BALLOON DILATION N/A 12/1/2023    Procedure: EGD, WITH BALLOON DILATION;  Surgeon: Nas Meyer MD;  Location: [facility] OR [facility] FLR;  Service: General;  Laterality: N/A;    EGD, WITH BALLOON DILATION N/A 12/11/2023    Procedure: EGD, WITH BALLOON DILATION;  Surgeon: Nas Meyer MD;  [facility];  Service: General;  Laterality: N/A;    EGD, WITH BALLOON DILATION N/A 12/20/2023    Procedure: EGD, WITH BALLOON DILATION;  Surgeon: Nas Meyer MD;  [facility];  Service: General;  Laterality: N/A;    EGD, WITH BALLOON DILATION N/A 1/10/2024    Procedure: EGD, WITH BALLOON DILATION;  Surgeon: Nas Meyer MD;  [facility];  Service: General;  Laterality: N/A;    ENDOSCOPIC ULTRASOUND OF UPPER GASTROINTESTINAL TRACT N/A 7/3/2023    Procedure: ULTRASOUND, UPPER GI TRACT, ENDOSCOPIC;  Surgeon: Ray Duffy MD;  Location: [facility];  Service: Endoscopy;  Laterality: N/A;    ESOPHAGOGASTRODUODENOSCOPY N/A 6/13/2023    Procedure: EGD (ESOPHAGOGASTRODUODENOSCOPY);  Surgeon: Kelli Snell MD;  Location: [facility];  Service: Endoscopy;  Laterality: N/A;    ESOPHAGOGASTRODUODENOSCOPY N/A 7/3/2023    Procedure: EGD (ESOPHAGOGASTRODUODENOSCOPY);  Surgeon: Ray Duffy MD;  Location: [facility];  Service: Endoscopy;  Laterality: N/A;    INGUINAL HERNIA REPAIR Bilateral     Right x2, x1 left    PLACEMENT OF JEJUNOSTOMY TUBE  10/4/2023    Procedure: INSERTION, JEJUNOSTOMY TUBE;  Surgeon: Nas Meyer MD;  Location: [address] FLR;  Service: General;;    PYLOROMYOTOMY  10/4/2023    Procedure: PYLOROMYOTOMY;  Surgeon: Nas Meyer,  MD;  Location: [facility] OR [address] [place] [address];  Service: General;;    ROBOT-ASSISTED SURGICAL REMOVAL OF ESOPHAGUS USING DA BALWINDER XI N/A 10/4/2023    Procedure: XI ROBOTIC ESOPHAGECTOMY;  Surgeon: Nas Meyer MD;  Location: [address];  Service: General;  Laterality: N/A;    ROBOTIC REPAIR, HERNIA, PARAESOPHAGEAL  10/4/2023    Procedure: ROBOTIC REPAIR, HERNIA, PARAESOPHAGEAL;  Surgeon: Nas Meyer MD;  Location: [address];  Service: General;;    ROTATOR CUFF REPAIR Right     x2        Family History:   Family History   Problem Relation Age of Onset    Diabetes Mother     Heart disease Father         CHF    Glaucoma Neg Hx     Colon cancer Neg Hx     Prostate cancer Neg Hx         Social History:   Social History     Tobacco Use    Smoking status: Former     Current packs/day: 0.25     Average packs/day: 0.2 packs/day for 41.1 years (10.3 ttl pk-yrs)     Types: Cigarettes     Start date: 1983     Passive exposure: Current    Smokeless tobacco: Never    Tobacco comments:     Done smoking since september   Substance Use Topics    Alcohol use: Yes     Comment: a couple of beers a day        I have reviewed and updated the patient's past medical, surgical, family and social histories.    Allergies:   Review of patient's allergies indicates:  No Known Allergies     Medications:   Current Outpatient Medications   Medication Sig Dispense Refill    pantoprazole (PROTONIX) 40 MG tablet Take 1 tablet (40 mg total) by mouth once daily. 90 tablet 3    acetaminophen (TYLENOL) 500 MG tablet Take 1 tablet (500 mg total) by mouth every 6 (six) hours as needed for Pain. (Patient not taking: Reported on 1/9/2024)  0    cholecalciferol, vitamin D3, (VITAMIN D3) 10 mcg (400 unit) Tab Take by mouth once daily.      LIDOcaine-prilocaine (EMLA) cream Apply topically as needed (Port access). (Patient not taking: Reported on 7/31/2023) 30 g 1    oxyCODONE (ROXICODONE) 5 mg/5 mL Soln 5 mLs (5 mg total) by Per J Tube route every 4  "(four) hours as needed. (Patient not taking: Reported on 10/11/2023) 210 mL 0    sildenafil (VIAGRA) 100 MG tablet Take 1 tablet (100 mg total) by mouth daily as needed for Erectile Dysfunction. (Patient not taking: Reported on 10/11/2023) 10 tablet 6     No current facility-administered medications for this visit.     Facility-Administered Medications Ordered in Other Visits   Medication Dose Route Frequency Provider Last Rate Last Admin    alteplase injection 2 mg  2 mg Intra-Catheter PRN Delta June MD        diphenhydrAMINE injection 50 mg  50 mg Intravenous Once PRN Delta June MD        EPINEPHrine (EPIPEN) 0.3 mg/0.3 mL pen injection 0.3 mg  0.3 mg Intramuscular Once PRN Delta June MD        heparin, porcine (PF) 100 unit/mL injection flush 500 Units  500 Units Intravenous PRN Delta June MD        hydrocortisone sodium succinate injection 100 mg  100 mg Intravenous Once PRN Delta June MD        nivolumab 480 mg in sodium chloride 0.9% 98 mL infusion  480 mg Intravenous 1 time in Clinic/HOD Delta June MD        prochlorperazine injection Soln 10 mg  10 mg Intravenous Once PRN Delta June MD        sodium chloride 0.9% 250 mL flush bag   Intravenous PRN Delta June MD        sodium chloride 0.9% flush 10 mL  10 mL Intravenous PRN Delta June MD            Physical Exam:   /70 (BP Location: Left arm, Patient Position: Sitting, BP Method: Medium (Automatic))   Pulse 66   Temp 98.5 °F (36.9 °C) (Oral)   Ht 5' 9" (1.753 m)   Wt 60.3 kg (132 lb 15 oz)   SpO2 100%   BMI 19.63 kg/m²      ECOG Performance status: 0          Physical Exam  Vitals reviewed.   Constitutional:       General: He is not in acute distress.     Appearance: Normal appearance. He is normal weight.   HENT:      Head: Normocephalic and atraumatic.      Right Ear: External ear normal.      Left Ear: External ear normal.      Nose: Nose normal. "      Mouth/Throat:      Mouth: Mucous membranes are moist.      Pharynx: Oropharynx is clear. No posterior oropharyngeal erythema.   Eyes:      General: No scleral icterus.     Extraocular Movements: Extraocular movements intact.      Conjunctiva/sclera: Conjunctivae normal.      Pupils: Pupils are equal, round, and reactive to light.   Cardiovascular:      Rate and Rhythm: Normal rate and regular rhythm.      Pulses: Normal pulses.      Heart sounds: Normal heart sounds.   Pulmonary:      Effort: Pulmonary effort is normal.      Breath sounds: Normal breath sounds. No wheezing or rales.   Abdominal:      General: Bowel sounds are normal. There is no distension.      Palpations: Abdomen is soft.      Tenderness: There is no abdominal tenderness.      Comments: Surgical wounds well healing   Musculoskeletal:         General: No swelling. Normal range of motion.      Cervical back: Normal range of motion and neck supple.   Skin:     General: Skin is warm.      Coloration: Skin is not jaundiced.      Findings: No erythema or rash.   Neurological:      General: No focal deficit present.      Mental Status: He is alert and oriented to person, place, and time. Mental status is at baseline.      Gait: Gait normal.   Psychiatric:         Mood and Affect: Mood normal.         Behavior: Behavior normal.         Thought Content: Thought content normal.         Judgment: Judgment normal.           Labs:   Recent Results (from the past 48 hour(s))   CBC Oncology    Collection Time: 02/06/24  8:33 AM   Result Value Ref Range    WBC 5.30 3.90 - 12.70 K/uL    RBC 3.94 (L) 4.60 - 6.20 M/uL    Hemoglobin 13.0 (L) 14.0 - 18.0 g/dL    Hematocrit 38.6 (L) 40.0 - 54.0 %    MCV 98 82 - 98 fL    MCH 33.0 (H) 27.0 - 31.0 pg    MCHC 33.7 32.0 - 36.0 g/dL    RDW 14.7 (H) 11.5 - 14.5 %    Platelets 277 150 - 450 K/uL    MPV 8.4 (L) 9.2 - 12.9 fL    Gran # (ANC) 3.1 1.8 - 7.7 K/uL    Immature Grans (Abs) 0.05 (H) 0.00 - 0.04 K/uL    Comprehensive Metabolic Panel    Collection Time: 02/06/24  8:33 AM   Result Value Ref Range    Sodium 138 136 - 145 mmol/L    Potassium 4.2 3.5 - 5.1 mmol/L    Chloride 103 95 - 110 mmol/L    CO2 26 23 - 29 mmol/L    Glucose 114 (H) 70 - 110 mg/dL    BUN 9 8 - 23 mg/dL    Creatinine 0.7 0.5 - 1.4 mg/dL    Calcium 9.2 8.7 - 10.5 mg/dL    Total Protein 6.9 6.0 - 8.4 g/dL    Albumin 3.5 3.5 - 5.2 g/dL    Total Bilirubin 0.4 0.1 - 1.0 mg/dL    Alkaline Phosphatase 108 55 - 135 U/L    AST 15 10 - 40 U/L    ALT 9 (L) 10 - 44 U/L    eGFR >60.0 >60 mL/min/1.73 m^2    Anion Gap 9 8 - 16 mmol/L        I have reviewed the pertinent labs.  Mild anemia.    Imaging:       PET/CT 9/22/23:    Impression:     1. In this patient with biopsy-proven esophageal adenocarcinoma, there is persistent circumferential soft tissue thickening in the distal esophagus likely extending into the proximal stomach with interval decreased hypermetabolic activity.  Index cervical and AP window lymph nodes are decreased in size/uptake.  No new lymphadenopathy or suspicious lesions elsewhere.  2. Additional stable findings as above.    Path:   2. Gastroesophageal junction mass (biopsy):   Invasive adenocarcinoma, moderately differentiated   Background low and high-grade glandular dysplasia   HER2 immunohistochemistry:  Equivocal.     Immunohistochemistry (IHC) Testing for Mismatch Repair (MMR) Proteins:   MLH1, MSH2, MSH6, PMS2 - Intact nuclear expression   Background nonneoplastic tissue/internal control with intact nuclear expression     There are exceptions to the above IHC interpretations. These results should not be considered in isolation, and clinical correlation with genetic counseling is recommended to assess the need for germline testing.     Interpretation: No loss of nuclear expression of MMR proteins: low probability of microsatellite instability       Assessment:       1. Malignant neoplasm of gastroesophageal junction    2.  Immunodeficiency due to chemotherapy    3. Esophagitis    4. Pulmonary emphysema, unspecified emphysema type    5. Nicotine dependence, cigarettes, uncomplicated    6. Atherosclerosis of aorta    7. Moderate malnutrition           Plan:        # Esophageal adenocarcinoma, chemotherapy induced neutropenia/thrombocytopenia  He initially presented with a locally advanced adenocarcinoma of the middle and lower third of the esophagus, pMMR. PET/CT on 6/1/23 did not show clear distant metastatic disease.  We discussed the case and plan with Dr. Meyer and he feels the patient is surgically resectable as well.    Began cycle 1 on 7/18/23. He unfortunately had a reaction to the Taxol. During the Taxol infusion, he developed coughing, flushing, chest tightness and nausea. O2 sat was 92%, 2L O2 applied NC. We were notified and stopped the Taxol. He was able to proceed with the Carboplatin without complication.   We switched him to Abraxane 40 mg/m2 with week 2.  This was tolerated very well without issue. Has tolerated RT well and has completed 4 cycles of chemoRT. He completed radiation 8/18/23.  We did have to forego his last dose of chemotherapy due to cytopenias.    PET/CT on 9/22/23 showed very nice response to treatment with reduction in SUV avidity of primary tumor.  Discussed with Dr. Meyer.      He underwent esophagogastrectomy with Dr. Meyer on 10/4/23.  Pathology showed ypT2N0 with negative margins and 18 negative lymph nodes.    Because of his residual disease, I have recommended adjuvant nivolumab per [id number].  Will request PD-L1 testing but proceed with q4week nivolumab 480 mg regardless.  He was in agreement.    Began cycle 1 on 11/15/23.  Doing well and continues to do well.  Presents today for cycle 4.   Labs reviewed, adequate for treatment.   Proceed with cycle 4 today    RTC in 4 weeks for next cycle.   Will perform surveillance CT in April 2024.    # Esophagitis, stricture  S/p multiple dilations.  Feeding tube has been removed.  Mildly worsening dysphagia in last 1-2 days.  I've asked Dr. Meyer's team to get him back in for repeat dilation.    # COPD, tobacco abuse, aortic atherosclerosis  Counseled on tobacco cessation.  He has stopped smoking.  Normal SpO2.  No dyspnea.  Atherosclerosis noted on imaging.    # Malnutrition  Following with surgery team.  Weight stable.  Tube is out.    Patient is in agreement with the proposed treatment plan. All questions were answered to the patient's satisfaction. Pt knows to call clinic if anything is needed before the next clinic visit.    Delta June MD  Hematology/Oncology  [facility]    Route Chart for Scheduling    Med Onc Chart Routing      Follow up with physician 2 months. for nivolumab   Follow up with DEMARCO 4 weeks. for nivolumab   Infusion scheduling note    Injection scheduling note    Labs CBC, CMP and TSH   Scheduling:  Preferred lab:  Lab interval: every 4 weeks     Imaging CT chest abdomen pelvis   prior to f/u in 8 weeks   Pharmacy appointment    Other referrals              Treatment Plan Information   OP NIVOLUMAB 480 MG Q4W   Delta June MD   Upcoming Treatment Dates - OP NIVOLUMAB 480 MG Q4W    3/6/2024       Chemotherapy       nivolumab 480 mg in sodium chloride 0.9% 98 mL infusion  4/3/2024       Chemotherapy       nivolumab 480 mg in sodium chloride 0.9% 98 mL infusion  5/1/2024       Chemotherapy       nivolumab 480 mg in sodium chloride 0.9% 98 mL infusion  5/29/2024       Chemotherapy       nivolumab 480 mg in sodium chloride 0.9% 98 mL infusion

## 2024-02-08 NOTE — PRE-PROCEDURE INSTRUCTIONS
PREOP INSTRUCTIONS:  No food,milk or milk products for 8 hours before surgery.  Clear liquids like water,gatorade,apple juice are allowed up until 2 hours before surgery.  Instructed to follow the surgeon's instructions if they differ from these.  Shower instructions as well as directions to the Surgery Center were given.  Encouraged to wear loose fitting,comfortable clothing.  Medication instructions for pm prior to and am of procedure reviewed.  Instructed to avoid taking vitamins,supplements,aspirin and ibuprofen the morning of surgery.    Patient denies any side effects or issues with anesthesia or sedation.     Patient does not know arrival time.Explained that this information comes from the surgeon's office and if they haven't heard from them by 2 or 3 pm 2/9/2024 to call the office.Patient stated an understanding.

## 2024-02-09 ENCOUNTER — TELEPHONE (OUTPATIENT)
Dept: SURGERY | Facility: CLINIC | Age: 64
End: 2024-02-09
Payer: COMMERCIAL

## 2024-02-09 NOTE — TELEPHONE ENCOUNTER
Spoke to pt's spouse, Aislinn and confirmed procedure for 2/12/24 with Dr. Meyer. Informed to arrive at the [facility] on the 2nd floor on [facility] for 0500 and surgery time is approximately 0700. Surgery Instructions provided as follows:  instructed to remain NPO solids 8 hours prior to surgery, clear liquids until 2 hours prior to surgery, to shower with antibacterial soap the night before surgery and morning of surgery before arrival, not to apply any lotions, powders or deodorant, remove all metal and jewelry, to wear comfortable clothes, and leave all valuables at home. Medications reviewed and  instructed to hold medications on DOS. Confirmed pt  will have transportation home and spouse will accompany pt on DOS. Pt spouse verbalized understanding to all of the above.

## 2024-02-11 ENCOUNTER — ANESTHESIA EVENT (OUTPATIENT)
Dept: SURGERY | Facility: HOSPITAL | Age: 64
End: 2024-02-11
Payer: COMMERCIAL

## 2024-02-12 ENCOUNTER — HOSPITAL ENCOUNTER (OUTPATIENT)
Facility: HOSPITAL | Age: 64
Discharge: HOME | End: 2024-02-12
Attending: SURGERY | Admitting: SURGERY
Payer: COMMERCIAL

## 2024-02-12 ENCOUNTER — ANESTHESIA (OUTPATIENT)
Dept: SURGERY | Facility: HOSPITAL | Age: 64
End: 2024-02-12
Payer: COMMERCIAL

## 2024-02-12 VITALS
BODY MASS INDEX: 19.69 KG/M2 | HEIGHT: 69 IN | SYSTOLIC BLOOD PRESSURE: 125 MMHG | RESPIRATION RATE: 16 BRPM | HEART RATE: 73 BPM | TEMPERATURE: 98 F | DIASTOLIC BLOOD PRESSURE: 72 MMHG | WEIGHT: 132.94 LBS | OXYGEN SATURATION: 98 %

## 2024-02-12 DIAGNOSIS — K22.2 ESOPHAGEAL STRICTURE: ICD-10-CM

## 2024-02-12 PROCEDURE — 25000003 PHARM REV CODE 250: Performed by: NURSE ANESTHETIST, CERTIFIED REGISTERED

## 2024-02-12 PROCEDURE — C1726 CATH, BAL DIL, NON-VASCULAR: HCPCS | Performed by: SURGERY

## 2024-02-12 PROCEDURE — D9220A PRA ANESTHESIA: Mod: CRNA,,, | Performed by: NURSE ANESTHETIST, CERTIFIED REGISTERED

## 2024-02-12 PROCEDURE — 36000707: Performed by: SURGERY

## 2024-02-12 PROCEDURE — 37000008 HC ANESTHESIA 1ST 15 MINUTES: Performed by: SURGERY

## 2024-02-12 PROCEDURE — 27201037 HC PRESSURE MONITORING SET UP

## 2024-02-12 PROCEDURE — 27201423 OPTIME MED/SURG SUP & DEVICES STERILE SUPPLY: Performed by: SURGERY

## 2024-02-12 PROCEDURE — 71000044 HC DOSC ROUTINE RECOVERY FIRST HOUR: Performed by: SURGERY

## 2024-02-12 PROCEDURE — 25000003 PHARM REV CODE 250: Performed by: SURGERY

## 2024-02-12 PROCEDURE — 71000015 HC POSTOP RECOV 1ST HR: Performed by: SURGERY

## 2024-02-12 PROCEDURE — 63600175 PHARM REV CODE 636 W HCPCS: Performed by: NURSE ANESTHETIST, CERTIFIED REGISTERED

## 2024-02-12 PROCEDURE — 37000009 HC ANESTHESIA EA ADD 15 MINS: Performed by: SURGERY

## 2024-02-12 PROCEDURE — 36000706: Performed by: SURGERY

## 2024-02-12 PROCEDURE — 43249 ESOPH EGD DILATION <30 MM: CPT | Mod: ,,, | Performed by: SURGERY

## 2024-02-12 PROCEDURE — D9220A PRA ANESTHESIA: Mod: ANES,,, | Performed by: SURGERY

## 2024-02-12 RX ORDER — SODIUM CHLORIDE 9 MG/ML
INJECTION, SOLUTION INTRAVENOUS CONTINUOUS
Status: DISCONTINUED | OUTPATIENT
Start: 2024-02-12 | End: 2024-02-12 | Stop reason: HOSPADM

## 2024-02-12 RX ORDER — SODIUM CHLORIDE 0.9 % (FLUSH) 0.9 %
10 SYRINGE (ML) INJECTION
Status: DISCONTINUED | OUTPATIENT
Start: 2024-02-12 | End: 2024-02-12 | Stop reason: HOSPADM

## 2024-02-12 RX ORDER — EPHEDRINE SULFATE 50 MG/ML
INJECTION, SOLUTION INTRAVENOUS
Status: DISCONTINUED | OUTPATIENT
Start: 2024-02-12 | End: 2024-02-12

## 2024-02-12 RX ORDER — FENTANYL CITRATE 50 UG/ML
INJECTION, SOLUTION INTRAMUSCULAR; INTRAVENOUS
Status: DISCONTINUED | OUTPATIENT
Start: 2024-02-12 | End: 2024-02-12

## 2024-02-12 RX ORDER — LIDOCAINE HYDROCHLORIDE 20 MG/ML
INJECTION INTRAVENOUS
Status: DISCONTINUED | OUTPATIENT
Start: 2024-02-12 | End: 2024-02-12

## 2024-02-12 RX ORDER — FAMOTIDINE 10 MG/ML
INJECTION INTRAVENOUS
Status: DISCONTINUED | OUTPATIENT
Start: 2024-02-12 | End: 2024-02-12

## 2024-02-12 RX ORDER — FENTANYL CITRATE 50 UG/ML
25 INJECTION, SOLUTION INTRAMUSCULAR; INTRAVENOUS EVERY 5 MIN PRN
Status: DISCONTINUED | OUTPATIENT
Start: 2024-02-12 | End: 2024-02-12 | Stop reason: HOSPADM

## 2024-02-12 RX ORDER — MIDAZOLAM HYDROCHLORIDE 1 MG/ML
INJECTION, SOLUTION INTRAMUSCULAR; INTRAVENOUS
Status: DISCONTINUED | OUTPATIENT
Start: 2024-02-12 | End: 2024-02-12

## 2024-02-12 RX ORDER — ROCURONIUM BROMIDE 10 MG/ML
INJECTION, SOLUTION INTRAVENOUS
Status: DISCONTINUED | OUTPATIENT
Start: 2024-02-12 | End: 2024-02-12

## 2024-02-12 RX ORDER — PROPOFOL 10 MG/ML
VIAL (ML) INTRAVENOUS
Status: DISCONTINUED | OUTPATIENT
Start: 2024-02-12 | End: 2024-02-12

## 2024-02-12 RX ORDER — LIDOCAINE HYDROCHLORIDE 10 MG/ML
1 INJECTION, SOLUTION EPIDURAL; INFILTRATION; INTRACAUDAL; PERINEURAL ONCE AS NEEDED
Status: DISCONTINUED | OUTPATIENT
Start: 2024-02-12 | End: 2024-02-12 | Stop reason: HOSPADM

## 2024-02-12 RX ORDER — ONDANSETRON HYDROCHLORIDE 2 MG/ML
INJECTION, SOLUTION INTRAVENOUS
Status: DISCONTINUED | OUTPATIENT
Start: 2024-02-12 | End: 2024-02-12

## 2024-02-12 RX ORDER — PHENYLEPHRINE HYDROCHLORIDE 10 MG/ML
INJECTION INTRAVENOUS
Status: DISCONTINUED | OUTPATIENT
Start: 2024-02-12 | End: 2024-02-12

## 2024-02-12 RX ORDER — SUCCINYLCHOLINE CHLORIDE 20 MG/ML
INJECTION INTRAMUSCULAR; INTRAVENOUS
Status: DISCONTINUED | OUTPATIENT
Start: 2024-02-12 | End: 2024-02-12

## 2024-02-12 RX ORDER — DEXAMETHASONE SODIUM PHOSPHATE 4 MG/ML
INJECTION, SOLUTION INTRA-ARTICULAR; INTRALESIONAL; INTRAMUSCULAR; INTRAVENOUS; SOFT TISSUE
Status: DISCONTINUED | OUTPATIENT
Start: 2024-02-12 | End: 2024-02-12

## 2024-02-12 RX ADMIN — ROCURONIUM BROMIDE 20 MG: 10 INJECTION, SOLUTION INTRAVENOUS at 07:02

## 2024-02-12 RX ADMIN — MIDAZOLAM HYDROCHLORIDE 2 MG: 1 INJECTION, SOLUTION INTRAMUSCULAR; INTRAVENOUS at 06:02

## 2024-02-12 RX ADMIN — PHENYLEPHRINE HYDROCHLORIDE 100 MCG: 10 INJECTION INTRAVENOUS at 07:02

## 2024-02-12 RX ADMIN — SUCCINYLCHOLINE CHLORIDE 100 MG: 20 INJECTION, SOLUTION INTRAMUSCULAR; INTRAVENOUS at 07:02

## 2024-02-12 RX ADMIN — LIDOCAINE HYDROCHLORIDE 100 MG: 20 INJECTION INTRAVENOUS at 07:02

## 2024-02-12 RX ADMIN — SUGAMMADEX 200 MG: 100 INJECTION, SOLUTION INTRAVENOUS at 08:02

## 2024-02-12 RX ADMIN — EPHEDRINE SULFATE 10 MG: 50 INJECTION INTRAVENOUS at 07:02

## 2024-02-12 RX ADMIN — SODIUM CHLORIDE, SODIUM GLUCONATE, SODIUM ACETATE, POTASSIUM CHLORIDE, MAGNESIUM CHLORIDE, SODIUM PHOSPHATE, DIBASIC, AND POTASSIUM PHOSPHATE: .53; .5; .37; .037; .03; .012; .00082 INJECTION, SOLUTION INTRAVENOUS at 07:02

## 2024-02-12 RX ADMIN — FENTANYL CITRATE 50 MCG: 50 INJECTION, SOLUTION INTRAMUSCULAR; INTRAVENOUS at 07:02

## 2024-02-12 RX ADMIN — PROPOFOL 150 MG: 10 INJECTION, EMULSION INTRAVENOUS at 07:02

## 2024-02-12 RX ADMIN — SODIUM CHLORIDE: 0.9 INJECTION, SOLUTION INTRAVENOUS at 06:02

## 2024-02-12 RX ADMIN — DEXAMETHASONE SODIUM PHOSPHATE 8 MG: 4 INJECTION, SOLUTION INTRAMUSCULAR; INTRAVENOUS at 07:02

## 2024-02-12 RX ADMIN — FENTANYL CITRATE 25 MCG: 50 INJECTION, SOLUTION INTRAMUSCULAR; INTRAVENOUS at 07:02

## 2024-02-12 RX ADMIN — ONDANSETRON 4 MG: 2 INJECTION INTRAMUSCULAR; INTRAVENOUS at 07:02

## 2024-02-12 RX ADMIN — FAMOTIDINE 20 MG: 10 INJECTION, SOLUTION INTRAVENOUS at 07:02

## 2024-02-12 NOTE — H&P
H&P completed on 1/9/24 has been reviewed, the patient has been examined and:  I concur with the findings and no changes have occurred since H&P was written.    Here for EGD with balloon dilation. Consent obtained     There are no hospital problems to display for this patient.     Physical Exam  Constitutional:       Appearance: Normal appearance.   HENT:      Head: Normocephalic and atraumatic.      Mouth/Throat:      Mouth: Mucous membranes are moist.   Cardiovascular:      Rate and Rhythm: Regular rhythm.   Pulmonary:      Effort: Pulmonary effort is normal. No respiratory distress.   Abdominal:      General: Abdomen is flat.      Palpations: Abdomen is soft.   Skin:     General: Skin is warm.   Neurological:      General: No focal deficit present.      Mental Status: He is alert and oriented to person, place, and time. Mental status is at baseline.   Psychiatric:         Mood and Affect: Mood normal.         Behavior: Behavior normal.         Thought Content: Thought content normal.        Joel Awad MD  [facility]         Post-Op Follow-up Visit:   2/6/2024  Patient ID: Blayne Beebe is a 63 y.o. male, born 1960    No chief complaint on file.    6/2023: abnormal lung CT screening identified mid esophageal mass with lymph nodes, stage III, [id number] at mid to lower esophagus.   7/16/2023 - 8/15/2023: neoadj CRT per Dr. June  9/22/23: restaging PET, 9/28/23: restaging appt with Dr. Meyer  10/4/23: s/p esophagectomy per Dr. Meyer  11/15/23: start adj IO per Dr. June  12/1/2023: EGD with dilation per Dr. Meyer  12/11/2023:  EGD with dilation per Dr. Meyer  12/20/2023:  EGD with dilation per Dr. Meyer    Interval History: This 62 y/o gentleman returns to clinic with his wife from [place]. He was last seen in clinic in 11/2023. Since then, he has undergone serial EGD with dilation per Dr. Meyer. Swallowing had improved enough he was tolerating eggs, gumbo until Sunday. Now he is only  "drinking water, Gatorade, jello, popsicle. Currently NOT drinking protein supplement. Denies nausea/ diarrhea. Having regular BMs. He continues to instill 2 cartons of feeding nightly via J tube. He had difficulty flushing J tube with syringe so he flushed for 30 minutes or so via infusion pump. He reports home scale weight stable at 134-135#. He was seen recently by CHARI Morelos RD on 12/29/23.   Remains afebrile. Denies CP, SOB.     Lab Results   Component Value Date    ALBUMIN 3.5 02/06/2024       Chemistry        Component Value Date/Time     02/06/2024 0833    K 4.2 02/06/2024 0833     02/06/2024 0833    CO2 26 02/06/2024 0833    BUN 9 02/06/2024 0833    CREATININE 0.7 02/06/2024 0833     (H) 02/06/2024 0833        Component Value Date/Time    CALCIUM 9.2 02/06/2024 0833    ALKPHOS 108 02/06/2024 0833    AST 15 02/06/2024 0833    ALT 9 (L) 02/06/2024 0833    BILITOT 0.4 02/06/2024 0833    ESTGFRAFRICA >60.0 01/28/2020 0803    EGFRNONAA >60.0 01/28/2020 0803        Lab Results   Component Value Date    WBC 5.30 02/06/2024    HGB 13.0 (L) 02/06/2024    HCT 38.6 (L) 02/06/2024    MCV 98 02/06/2024     02/06/2024       Physical Exam:  /66 (BP Location: Left arm, Patient Position: Lying)   Pulse 71   Temp 98.2 °F (36.8 °C) (Oral)   Resp 20   Ht 5' 9" (1.753 m)   Wt 60.3 kg (132 lb 15 oz)   SpO2 100%   BMI 19.63 kg/m²     General:  Non-toxic, ambulatory  Abd:  Soft, non-tender  Incision:  J tube secured with 3 sutures     Pathology:  Tumor Site Distal esophagus (low thoracic esophagus) Relationship of Tumor to Esophagogastric Junction Tumor midpoint lies in the distal esophagus AND tumor involves the esophagogastric junction Histologic Type Adenocarcinoma Histologic Grade G2, moderately differentiated Tumor Size Greatest dimension in Centimeters (cm): 6.5 cm Tumor Extent Invades muscularis propria Treatment Effect Present, with residual cancer showing evident tumor regression, " but more than single cells or rare small groups of cancer cells (partial response, score 2) Lymphovascular Invasion Not identified Perineural Invasion Not identified MARGINS All margins negative for invasive carcinoma REGIONAL LYMPH NODES All regional lymph nodes negative for tumor Number of Lymph Nodes Examined: 18 DISTANT METASTASIS Not applicable PATHOLOGIC STAGE CLASSIFICATION (pTNM, AJCC 8th Edition) Reporting of pT, pN, and (when applicable) pM categories is based on information available to the pathologist at the time the report is issued. As per the AJCC (Chapter 1, 8th Ed.) it is the managing physicians responsibility to establish the final pathologic stage based upon all pertinent information, including but potentially not limited to this pathology report. pT Category pT2: Tumor invades the muscularis propria  pN Category pN0: No regional lymph node metastasis pM Category Not applicable - pM cannot be determined from the submitted specimen(s)      ICD-10-CM ICD-9-CM    1. Malignant neoplasm of gastroesophageal junction  C16.0 151.0       2. Esophageal dysphagia  R13.19 787.29       3. H/O esophagectomy  Z98.890 V15.29     Z90.49        4. Jejunostomy present  Z93.4 V44.4       5. Alteration in nutrition associated with tube feeding  R63.8 783.9       Plan     Scheduled for adj immunotherapy today, proceed if cleared by med onc  Continue J feeds 2 cartons until swallowing improves. Monitor home scale weight. Recommend postpone f/u with RD given upcoming EGD procedure.   Scheduled for EGD with dilation 1/10/2024 per Dr. Meyer. He will exchange J tube tomorrow in OR.     RTC prn    Questions were asked and answered to patient and his wife's satisfaction.    Discussed case with Dr. Meyer today in person in clinic, who agrees with the above plan of care.        Belem Chanel NP  Upper GI / Hepatobiliary Surgical Oncology  [facility] [place], [place]  Office: [phone]  Fax: [phone]

## 2024-02-12 NOTE — TRANSFER OF CARE
"Anesthesia Transfer of Care Note    Patient: Blayne D III Steib    Procedure(s) Performed: Procedure(s) (LRB):  EGD, WITH BALLOON DILATION (N/A)    Patient location: [facility]    Anesthesia Type: general    Transport from OR: Transported from OR on room air with adequate spontaneous ventilation    Post pain: adequate analgesia    Post assessment: tolerated procedure well and no apparent anesthetic complications    Post vital signs: stable    Level of consciousness: awake    Nausea/Vomiting: no nausea/vomiting    Complications: none    Transfer of care protocol was followed      Last vitals: Visit Vitals  BP (!) 169/82   Pulse 91   Temp 36.3 °C (97.4 °F) (Skin)   Resp 16   Ht 5' 9" (1.753 m)   Wt 60.3 kg (132 lb 15 oz)   SpO2 97%   BMI 19.63 kg/m²     "

## 2024-02-12 NOTE — OP NOTE
[facility] - Surgery ([facility])  Surgery Department  Operative Note       Date of Procedure: 2/12/2024     Surgeon(s):  Surgeon(s) and Role:     * Nas Meyer MD - Primary     * Joel Awad MD - Resident - Assisting      Pre-Operative Diagnosis:   Esophageal dysphagia [R13.19]  Cervical esophagogastric anastomotic stricture    Post-Operative Diagnosis:   Same  Same     Anesthesia: General    Operative Findings:   Cervical esophagogastric anastomotic stricture at 20 cm. The endoscope could not be passed initially but could be after balloon dilation to 15 mm. It was further dilated to 20 mm. There was some expected/intended anastomotic bleeding at the end of the procedure.     Procedures:  Esophagogastroduodenoscopy (EGD), with transendoscopic balloon dilation of esophagus (<30 mm)    Estimated Blood Loss (EBL): <5 mL    Specimen(s):    Specimen (24h ago, onward)      None                   Indications:  Blayne Beebe is a 63 year old man with cervical esophagogastric anastomotic stricture following esophagectomy. He was swallowing better after his last dilation but then developed recurrent dysphagia. Risks and benefits of EGD with dilation were reviewed including bleeding, infection, damage to local structures, perforation of GI tract, need for additional procedures, death, and imponderables.  He understands and signed written informed consent to proceed.    Details:  The patient was transported to the operating room and satisfactory anesthesia established. Preoperative antibiotics were administered. The patient was placed in the supine position    The adult gastroscope was introduced into the mouth, passed into the hypopharynx and cervical esophagus under endoscopic vision. The anastomosis was at 20 cm. There was a moderate stenosis. This was traversed with a balloon dilator, and dilated up progressively from 12 -15 mm, held for 1 minutes at each interval. Once it was dilated to 15 mm, the endoscope easily  passed through the stricture. The anastomosis was further dilated sequentially up to 20 mm. The balloon was held for 20 minutes at 20 mm. In between dilations, the stenosis was examined and was without perforation. On final dilation, The scope was advanced through the conduit to the pylorus. It was slowly withdrawn, and the mucosa was examined circumferentially. There was some expected/intented minimal bleeding at the anastomosis with no other pathology noted. .    I communicated the intraoperative findings with the family following the procedure.     Condition: Good    Disposition: PACU - hemodynamically stable.    Attestation: I was present and scrubbed for the entire procedure.    Nas Meyer MD  Staff Surgeon  Surgical Oncology

## 2024-02-12 NOTE — ANESTHESIA PROCEDURE NOTES
Intubation    Date/Time: 2/12/2024 7:08 AM    Performed by: Katerina Rossi  Authorized by: Juan Waller CRNA    Intubation:     Induction:  Intravenous    Intubated:  Postinduction    Mask Ventilation:  Easy with oral airway    Attempts:  1    Attempted By:  Student    Method of Intubation:  Video laryngoscopy    Blade:  Glidescope 3    Laryngeal View Grade: Grade IIA - cords partially seen      Difficult Airway Encountered?: Yes      Future Airway Recommendations:  Remove pillow; limited neck extension, went straight to glidscope, passed ETT without bougie    Airway Device:  Oral endotracheal tube    Airway Device Size:  7.0    Style/Cuff Inflation:  Cuffed (inflated to minimal occlusive pressure)    Tube secured:  22    Secured at:  The lips    Placement Verified By:  Capnometry and Revisualization with laryngoscopy    Complicating Factors:  Anterior larynx, small mouth and poor neck/head extension (limited mouth opening; poor dentition)    Findings Post-Intubation:  BS equal bilateral and atraumatic/condition of teeth unchanged  Notes:      removed pillow; limited neck extension d//t hardware, went straight to glidescope 3, passed ETT without bougie; easy mask w/ OPA

## 2024-02-12 NOTE — BRIEF OP NOTE
[facility] - Surgery ([facility])  Brief Operative Note    Surgery Date: 2/12/2024     Surgeon(s) and Role:     * Nas Meyer MD - Primary     * Joel Awad MD - Resident - Assisting        Pre-op Diagnosis:  Esophageal dysphagia [R13.19]    Post-op Diagnosis:  Post-Op Diagnosis Codes:     * Esophageal dysphagia [R13.19]    Procedure(s) (LRB):  EGD, WITH BALLOON DILATION (N/A)    Anesthesia: General    Operative Findings: EGD with balloon dilation without apparent complication    Estimated Blood Loss: minimal         Specimens:   Specimen (24h ago, onward)      None              Discharge Note    OUTCOME: Patient tolerated treatment/procedure well without complication and is now ready for discharge.    DISPOSITION: Home or Self Care    FINAL DIAGNOSIS: Esophageal dysphagia     FOLLOWUP: In clinic    DISCHARGE INSTRUCTIONS:    Discharge Procedure Orders   Diet clear liquid   Order Comments: Clear liquids today, full liquids tomorrow     Notify your health care provider if you experience any of the following:  temperature >100.4     Notify your health care provider if you experience any of the following:  persistent nausea and vomiting or diarrhea     Notify your health care provider if you experience any of the following:  severe uncontrolled pain     Notify your health care provider if you experience any of the following:  redness, tenderness, or signs of infection (pain, swelling, redness, odor or green/yellow discharge around incision site)     Notify your health care provider if you experience any of the following:  difficulty breathing or increased cough     Notify your health care provider if you experience any of the following:  severe persistent headache     Notify your health care provider if you experience any of the following:  worsening rash     Notify your health care provider if you experience any of the following:  persistent dizziness, light-headedness, or visual disturbances     Notify your health  care provider if you experience any of the following:  increased confusion or weakness     Activity as tolerated

## 2024-02-12 NOTE — PROGRESS NOTES
Discharge instructions reviewed w/pt and wife, verbalized understanding. Pt in NADN. No complaints at this time. Tolerated clear liquids at this time. To be d/c'd home w/ wife.

## 2024-02-12 NOTE — ANESTHESIA PREPROCEDURE EVALUATION
02/12/2024  Blayne Beebe is a 63 y.o., male.  Pre-operative evaluation for Procedure(s) (LRB):  EGD, WITH BALLOON DILATION (N/A)    Blayne Beebe is a 63 y.o. male   H/o difficult airway (small mouth, poor extension, anterior larynx, poor dentition); last 3 surgeries requiring 3 attempts     Patient Active Problem List   Diagnosis    Disc disease, degenerative, cervical    Erectile dysfunction    Tobacco use disorder, moderate, in early remission    COPD (chronic obstructive pulmonary disease)    RBBB    Pulmonary nodule 1 cm or greater in diameter    Personal history of colonic polyps    Hard to intubate    Malignant neoplasm of gastroesophageal junction    Weakness of both hips    Decreased functional mobility and endurance    Jejunostomy present    H/O esophagectomy    Alteration in nutrition associated with tube feeding       Past Surgical History:   Procedure Laterality Date    CERVICAL FUSION      COLONOSCOPY N/A 3/27/2018    Procedure: COLONOSCOPY;  Surgeon: Maria Teresa Morocho MD;  Location: [facility] ENDO;  Service: Endoscopy;  Laterality: N/A;    COLONOSCOPY N/A 6/13/2023    Procedure: COLONOSCOPY;  Surgeon: Kelli Snell MD;  Location: [facility] ENDO;  Service: Endoscopy;  Laterality: N/A;    EGD, WITH BALLOON DILATION N/A 12/1/2023    Procedure: EGD, WITH BALLOON DILATION;  Surgeon: Nas Meyer MD;  Location: [address];  Service: General;  Laterality: N/A;    EGD, WITH BALLOON DILATION N/A 12/11/2023    Procedure: EGD, WITH BALLOON DILATION;  Surgeon: Nas Meyer MD;  Location: [facility] OR [address] [place] [address];  Service: General;  Laterality: N/A;    EGD, WITH BALLOON DILATION N/A 12/20/2023    Procedure: EGD, WITH BALLOON DILATION;  Surgeon: Nas Meyer MD;  [facility];  Service: General;  Laterality: N/A;    EGD, WITH BALLOON DILATION N/A  1/10/2024    Procedure: EGD, WITH BALLOON DILATION;  Surgeon: Nas Meyer MD;  Location: [facility] OR [facility];  Service: General;  Laterality: N/A;    ENDOSCOPIC ULTRASOUND OF UPPER GASTROINTESTINAL TRACT N/A 7/3/2023    Procedure: ULTRASOUND, UPPER GI TRACT, ENDOSCOPIC;  Surgeon: Ray Duffy MD;  Location: [facility];  Service: Endoscopy;  Laterality: N/A;    ESOPHAGOGASTRODUODENOSCOPY N/A 6/13/2023    Procedure: EGD (ESOPHAGOGASTRODUODENOSCOPY);  Surgeon: Kelli Snell MD;  Location: [facility];  Service: Endoscopy;  Laterality: N/A;    ESOPHAGOGASTRODUODENOSCOPY N/A 7/3/2023    Procedure: EGD (ESOPHAGOGASTRODUODENOSCOPY);  Surgeon: Ray Duffy MD;  Location: [facility];  Service: Endoscopy;  Laterality: N/A;    INGUINAL HERNIA REPAIR Bilateral     Right x2, x1 left    PLACEMENT OF JEJUNOSTOMY TUBE  10/4/2023    Procedure: INSERTION, JEJUNOSTOMY TUBE;  Surgeon: Nas Meyer MD;  [facility];  Service: General;;    PYLOROMYOTOMY  10/4/2023    Procedure: PYLOROMYOTOMY;  Surgeon: Nas Meyer MD;  [facility];  Service: General;;    ROBOT-ASSISTED SURGICAL REMOVAL OF ESOPHAGUS USING DA BALWINDER XI N/A 10/4/2023    Procedure: XI ROBOTIC ESOPHAGECTOMY;  Surgeon: Nas Meyer MD;  Location: [address];  Service: General;  Laterality: N/A;    ROBOTIC REPAIR, HERNIA, PARAESOPHAGEAL  10/4/2023    Procedure: ROBOTIC REPAIR, HERNIA, PARAESOPHAGEAL;  Surgeon: Nas Meyer MD;  [facility];  Service: General;;    ROTATOR CUFF REPAIR Right     x2       Social History     Socioeconomic History    Marital status:    Tobacco Use    Smoking status: Former     Current packs/day: 0.25     Average packs/day: 0.2 packs/day for 41.1 years (10.3 ttl pk-yrs)     Types: Cigarettes     Start date: 1983     Passive exposure: Current    Smokeless tobacco: Never    Tobacco comments:     Done smoking since september   Substance and Sexual Activity     Alcohol use: Yes     Comment: a couple of beers a day    Drug use: No    Sexual activity: Yes     Partners: Female     Comment:      Social Determinants of Health     Financial Resource Strain: Patient Declined (12/29/2023)    Overall Financial Resource Strain (CARDIA)     Difficulty of Paying Living Expenses: Patient declined   Food Insecurity: Patient Declined (12/29/2023)    Hunger Vital Sign     Worried About Running Out of Food in the Last Year: Patient declined     Ran Out of Food in the Last Year: Patient declined   Transportation Needs: Patient Declined (12/29/2023)    PRAPARE - Transportation     Lack of Transportation (Medical): Patient declined     Lack of Transportation (Non-Medical): Patient declined   Physical Activity: Sufficiently Active (12/29/2023)    Exercise Vital Sign     Days of Exercise per Week: 5 days     Minutes of Exercise per Session: 40 min   Stress: No Stress Concern Present (12/29/2023)    [employer] [employer] - Occupational Stress Questionnaire     Feeling of Stress : Not at all   Social Connections: Unknown (12/29/2023)    Social Connection and Isolation Panel [NHANES]     Frequency of Communication with Friends and Family: Patient declined     Frequency of Social Gatherings with Friends and Family: Patient declined     Attends Quaker Services: Never     Active Member of Clubs or Organizations: No     Attends Club or Organization Meetings: Patient declined     Marital Status:    Housing Stability: Patient Declined (12/29/2023)    Housing Stability Vital Sign     Unable to Pay for Housing in the Last Year: Patient declined     Unstable Housing in the Last Year: Patient declined       No current facility-administered medications on file prior to encounter.     Current Outpatient Medications on File Prior to Encounter   Medication Sig Dispense Refill    acetaminophen (TYLENOL) 500 MG tablet Take 1 tablet (500 mg total) by mouth  "every 6 (six) hours as needed for Pain. (Patient not taking: Reported on 1/9/2024)  0    cholecalciferol, vitamin D3, (VITAMIN D3) 10 mcg (400 unit) Tab Take by mouth once daily.      LIDOcaine-prilocaine (EMLA) cream Apply topically as needed (Port access). (Patient not taking: Reported on 7/31/2023) 30 g 1    oxyCODONE (ROXICODONE) 5 mg/5 mL Soln 5 mLs (5 mg total) by Per J Tube route every 4 (four) hours as needed. (Patient not taking: Reported on 10/11/2023) 210 mL 0    pantoprazole (PROTONIX) 40 MG tablet Take 1 tablet (40 mg total) by mouth once daily. 90 tablet 3    sildenafil (VIAGRA) 100 MG tablet Take 1 tablet (100 mg total) by mouth daily as needed for Erectile Dysfunction. (Patient not taking: Reported on 10/11/2023) 10 tablet 6       Review of patient's allergies indicates:  No Known Allergies      CBC:   No results for input(s): "WBC", "RBC", "HGB", "HCT", "PLT", "MCV", "MCH", "MCHC" in the last 72 hours.      CMP:   No results for input(s): "NA", "K", "CL", "CO2", "BUN", "CREATININE", "GLU", "MG", "PHOS", "CALCIUM", "ALBUMIN", "PROT", "ALKPHOS", "ALT", "AST", "BILITOT" in the last 72 hours.      INR  No results for input(s): "PT", "INR", "PROTIME", "APTT" in the last 72 hours.      Diagnostic Studies:    EKG:   Results for orders placed or performed in visit on 08/22/23   EKG 12-lead    Collection Time: 08/22/23 11:09 AM    Narrative    Test Reason : C16.0,J44.9,F17.200,I45.10,Z01.818,D70.9,D64.81,T45.1X5A,D69.6,    Vent. Rate : 083 BPM     Atrial Rate : 083 BPM     P-R Int : 136 ms          QRS Dur : 130 ms      QT Int : 392 ms       P-R-T Axes : 082 089 -13 degrees     QTc Int : 460 ms    Normal sinus rhythm  Possible Left atrial enlargement  Nonspecific intraventricular block  T wave abnormality, consider inferior ischemia  Abnormal ECG  When compared with ECG of 28-APR-2015 14:52,  Nonspecific intraventricular block has replaced Right bundle branch block  Confirmed by Edilia AMAYA, Manohar FARAH " [id number]) on 8/29/2023 1:09:44 PM    Referred By: magui lopes           Confirmed By:Manohar Yeboah MD       TTE:  No results found for this or any previous visit.  No results found for: "EF"   No results found for this or any previous visit.    KWAME:  No results found for this or any previous visit.    Stress Test:  Results for orders placed during the hospital encounter of 08/29/23    Nuclear Stress Test    Interpretation Summary    The ECG portion of the study is abnormal but not diagnostic.    The patient reported no chest pain during the stress test.    During stress, rare PVCs are noted.    The nuclear portion of this study will be reported separately.       LHC:  No results found for this or any previous visit.       PFT:  No results found for: "FEV1", "FVC", "CNA1AXJ", "TLC", "DLCO"     ALLERGIES:   Review of patient's allergies indicates:  No Known Allergies  LDA:          Lines/Drains/Airways       Central Venous Catheter Line  Duration                  PowerPort A Cath Single Lumen 07/12/23 1344 Internal Jugular Right 214 days              Drain  Duration                  Gastrostomy/Enterostomy 10/04/23 1730 Jejunostomy tube LUQ feeding 130 days              Peripheral Intravenous Line  Duration                  Peripheral IV - Single Lumen 02/12/24 0557 20 G Left;Posterior Forearm <1 day                   Anesthesia Evaluation      Airway   Mallampati: III  TM distance: 4 - 6 cm  Neck ROM: Extension Decreased  Dental      Pulmonary    (+) COPD  Cardiovascular     Rate: Normal    Neuro/Psych      GI/Hepatic/Renal      Endo/Other    Abdominal                         Pre-op Assessment    I have reviewed the Patient Summary Reports.     I have reviewed the Nursing Notes. I have reviewed the NPO Status.   I have reviewed the Medications.     Review of Systems  Anesthesia Hx:  No problems with previous Anesthesia             Denies Family Hx of Anesthesia complications.    Denies Personal Hx of Anesthesia " complications.                    Cardiovascular:  Cardiovascular Normal                                            Pulmonary:   COPD                     Renal/:  Renal/ Normal                 Neurological:  Neurology Normal                                      Endocrine:  Endocrine Normal                Physical Exam    Airway:  Mallampati: III   Mouth Opening: Small, but > 3cm  TM Distance: 4 - 6 cm  Tongue: Normal  Neck ROM: Extension Decreased    Dental:  Loose upper right canine; understands the risk of further dental trauma and agrees to proceed  Chest/Lungs:  Normal Respiratory Rate    Heart:  Rate: Normal      Anesthesia Plan  Type of Anesthesia, risks & benefits discussed:    Anesthesia Type: Gen ETT  Intra-op Monitoring Plan: Standard ASA Monitors  Post Op Pain Control Plan: multimodal analgesia and IV/PO Opioids PRN  Induction:  IV and rapid sequence  Airway Plan: Video, Post-Induction  Informed Consent: Informed consent signed with the Patient and all parties understand the risks and agree with anesthesia plan.  All questions answered. Patient consented to blood products? Yes  ASA Score: 3  Day of Surgery Review of History & Physical: H&P Update referred to the surgeon/provider.    Ready For Surgery From Anesthesia Perspective.     .

## 2024-02-12 NOTE — ANESTHESIA POSTPROCEDURE EVALUATION
Anesthesia Post Evaluation    Patient: Blayne D III Steib    Procedure(s) Performed: Procedure(s) (LRB):  EGD, WITH BALLOON DILATION (N/A)    Final Anesthesia Type: general      Patient location during evaluation: PACU  Patient participation: Yes- Able to Participate  Level of consciousness: awake and alert  Post-procedure vital signs: reviewed and stable  Pain management: adequate  Airway patency: patent  TRENT mitigation strategies: Multimodal analgesia, Preoperative use of mandibular advancement devices or oral appliances, Intraoperative administration of CPAP, nasopharyngeal airway, or oral appliance during sedation, Extubation while patient is awake and Verification of full reversal of neuromuscular block  PONV status at discharge: No PONV  Anesthetic complications: no      Cardiovascular status: blood pressure returned to baseline, hemodynamically stable and stable  Respiratory status: unassisted and spontaneous ventilation  Hydration status: euvolemic  Follow-up not needed.              Vitals Value Taken Time   /72 02/12/24 0930   Temp 36.5 °C (97.7 °F) 02/12/24 0930   Pulse 73 02/12/24 0930   Resp 16 02/12/24 0930   SpO2 98 % 02/12/24 0930         No case tracking events are documented in the log.      Pain/Eyssy Score: Yessy Score: 10 (2/12/2024  8:58 AM)

## 2024-02-22 ENCOUNTER — ANESTHESIA EVENT (OUTPATIENT)
Dept: SURGERY | Facility: HOSPITAL | Age: 64
End: 2024-02-22
Payer: COMMERCIAL

## 2024-02-22 ENCOUNTER — TELEPHONE (OUTPATIENT)
Dept: SURGERY | Facility: CLINIC | Age: 64
End: 2024-02-22
Payer: COMMERCIAL

## 2024-02-22 DIAGNOSIS — R13.19 ESOPHAGEAL DYSPHAGIA: Primary | ICD-10-CM

## 2024-02-22 NOTE — TELEPHONE ENCOUNTER
Spoke to Mr. Beebe's spouse, Aislinn and confirmed procedure for 2/23/24 with Dr. Meyer. Informed to arrive at the [facility] on the 2nd floor on [facility] for 0500 and surgery time is approximately 0700. Surgery Instructions provided as follows:  instructed to remain NPO solids 8 hours prior to surgery, clear liquids until 2 hours prior to surgery, to shower with antibacterial soap the night before surgery and morning of surgery before arrival, not to apply any lotions, powders or deodorant, remove all metal and jewelry, to wear comfortable clothes, and leave all valuables at home. Medications reviewed and  instructed to hold medications on DOS. Confirmed pt  will have transportation home and spouse will accompany pt on DOS. Spouse verbalized understanding to all of the above.

## 2024-02-23 ENCOUNTER — HOSPITAL ENCOUNTER (OUTPATIENT)
Facility: HOSPITAL | Age: 64
Discharge: HOME | End: 2024-02-23
Attending: SURGERY | Admitting: SURGERY
Payer: COMMERCIAL

## 2024-02-23 ENCOUNTER — ANESTHESIA (OUTPATIENT)
Dept: SURGERY | Facility: HOSPITAL | Age: 64
End: 2024-02-23
Payer: COMMERCIAL

## 2024-02-23 VITALS
OXYGEN SATURATION: 96 % | DIASTOLIC BLOOD PRESSURE: 77 MMHG | BODY MASS INDEX: 19.99 KG/M2 | HEIGHT: 69 IN | WEIGHT: 135 LBS | SYSTOLIC BLOOD PRESSURE: 140 MMHG | RESPIRATION RATE: 20 BRPM | HEART RATE: 88 BPM | TEMPERATURE: 98 F

## 2024-02-23 DIAGNOSIS — R13.19 ESOPHAGEAL DYSPHAGIA: Primary | ICD-10-CM

## 2024-02-23 PROCEDURE — D9220A PRA ANESTHESIA: Mod: ANES,,, | Performed by: ANESTHESIOLOGY

## 2024-02-23 PROCEDURE — 36000706: Performed by: SURGERY

## 2024-02-23 PROCEDURE — 71000044 HC DOSC ROUTINE RECOVERY FIRST HOUR: Performed by: SURGERY

## 2024-02-23 PROCEDURE — 37000009 HC ANESTHESIA EA ADD 15 MINS: Performed by: SURGERY

## 2024-02-23 PROCEDURE — 25000003 PHARM REV CODE 250: Performed by: NURSE ANESTHETIST, CERTIFIED REGISTERED

## 2024-02-23 PROCEDURE — C1726 CATH, BAL DIL, NON-VASCULAR: HCPCS | Performed by: SURGERY

## 2024-02-23 PROCEDURE — 37000008 HC ANESTHESIA 1ST 15 MINUTES: Performed by: SURGERY

## 2024-02-23 PROCEDURE — D9220A PRA ANESTHESIA: Mod: CRNA,,, | Performed by: NURSE ANESTHETIST, CERTIFIED REGISTERED

## 2024-02-23 PROCEDURE — 36000707: Performed by: SURGERY

## 2024-02-23 PROCEDURE — 71000015 HC POSTOP RECOV 1ST HR: Performed by: SURGERY

## 2024-02-23 PROCEDURE — 43249 ESOPH EGD DILATION <30 MM: CPT | Mod: ,,, | Performed by: SURGERY

## 2024-02-23 PROCEDURE — 63600175 PHARM REV CODE 636 W HCPCS: Performed by: NURSE ANESTHETIST, CERTIFIED REGISTERED

## 2024-02-23 RX ORDER — ROCURONIUM BROMIDE 10 MG/ML
INJECTION, SOLUTION INTRAVENOUS
Status: DISCONTINUED | OUTPATIENT
Start: 2024-02-23 | End: 2024-02-23

## 2024-02-23 RX ORDER — MIDAZOLAM HYDROCHLORIDE 1 MG/ML
INJECTION INTRAMUSCULAR; INTRAVENOUS
Status: DISCONTINUED | OUTPATIENT
Start: 2024-02-23 | End: 2024-02-23

## 2024-02-23 RX ORDER — FENTANYL CITRATE 50 UG/ML
INJECTION, SOLUTION INTRAMUSCULAR; INTRAVENOUS
Status: DISCONTINUED | OUTPATIENT
Start: 2024-02-23 | End: 2024-02-23

## 2024-02-23 RX ORDER — LIDOCAINE HYDROCHLORIDE 10 MG/ML
INJECTION, SOLUTION EPIDURAL; INFILTRATION; INTRACAUDAL; PERINEURAL
Status: DISCONTINUED
Start: 2024-02-23 | End: 2024-02-23 | Stop reason: HOSPADM

## 2024-02-23 RX ORDER — PHENYLEPHRINE HYDROCHLORIDE 10 MG/ML
INJECTION INTRAVENOUS
Status: DISCONTINUED | OUTPATIENT
Start: 2024-02-23 | End: 2024-02-23

## 2024-02-23 RX ORDER — SUCCINYLCHOLINE CHLORIDE 20 MG/ML
INJECTION INTRAMUSCULAR; INTRAVENOUS
Status: DISCONTINUED | OUTPATIENT
Start: 2024-02-23 | End: 2024-02-23

## 2024-02-23 RX ORDER — HYDROMORPHONE HYDROCHLORIDE 1 MG/ML
0.2 INJECTION, SOLUTION INTRAMUSCULAR; INTRAVENOUS; SUBCUTANEOUS EVERY 5 MIN PRN
Status: DISCONTINUED | OUTPATIENT
Start: 2024-02-23 | End: 2024-02-23 | Stop reason: HOSPADM

## 2024-02-23 RX ORDER — ONDANSETRON HYDROCHLORIDE 2 MG/ML
4 INJECTION, SOLUTION INTRAVENOUS DAILY PRN
Status: DISCONTINUED | OUTPATIENT
Start: 2024-02-23 | End: 2024-02-23 | Stop reason: HOSPADM

## 2024-02-23 RX ORDER — PROPOFOL 10 MG/ML
VIAL (ML) INTRAVENOUS
Status: DISCONTINUED | OUTPATIENT
Start: 2024-02-23 | End: 2024-02-23

## 2024-02-23 RX ORDER — DEXAMETHASONE SODIUM PHOSPHATE 4 MG/ML
INJECTION, SOLUTION INTRA-ARTICULAR; INTRALESIONAL; INTRAMUSCULAR; INTRAVENOUS; SOFT TISSUE
Status: DISCONTINUED | OUTPATIENT
Start: 2024-02-23 | End: 2024-02-23

## 2024-02-23 RX ORDER — ONDANSETRON HYDROCHLORIDE 2 MG/ML
INJECTION, SOLUTION INTRAVENOUS
Status: DISCONTINUED | OUTPATIENT
Start: 2024-02-23 | End: 2024-02-23

## 2024-02-23 RX ORDER — LIDOCAINE HYDROCHLORIDE 20 MG/ML
INJECTION INTRAVENOUS
Status: DISCONTINUED | OUTPATIENT
Start: 2024-02-23 | End: 2024-02-23

## 2024-02-23 RX ADMIN — PHENYLEPHRINE HYDROCHLORIDE 200 MCG: 10 INJECTION INTRAVENOUS at 07:02

## 2024-02-23 RX ADMIN — DEXAMETHASONE SODIUM PHOSPHATE 4 MG: 4 INJECTION, SOLUTION INTRAMUSCULAR; INTRAVENOUS at 07:02

## 2024-02-23 RX ADMIN — SUCCINYLCHOLINE CHLORIDE 160 MG: 20 INJECTION, SOLUTION INTRAMUSCULAR; INTRAVENOUS at 07:02

## 2024-02-23 RX ADMIN — FENTANYL CITRATE 50 MCG: 0.05 INJECTION, SOLUTION INTRAMUSCULAR; INTRAVENOUS at 07:02

## 2024-02-23 RX ADMIN — PROPOFOL 20 MG: 10 INJECTION, EMULSION INTRAVENOUS at 07:02

## 2024-02-23 RX ADMIN — MIDAZOLAM HYDROCHLORIDE 2 MG: 1 INJECTION, SOLUTION INTRAMUSCULAR; INTRAVENOUS at 06:02

## 2024-02-23 RX ADMIN — SODIUM CHLORIDE: 0.9 INJECTION, SOLUTION INTRAVENOUS at 06:02

## 2024-02-23 RX ADMIN — ONDANSETRON 4 MG: 2 INJECTION INTRAMUSCULAR; INTRAVENOUS at 07:02

## 2024-02-23 RX ADMIN — ROCURONIUM BROMIDE 5 MG: 10 INJECTION, SOLUTION INTRAVENOUS at 07:02

## 2024-02-23 RX ADMIN — LIDOCAINE HYDROCHLORIDE 80 MG: 20 INJECTION INTRAVENOUS at 07:02

## 2024-02-23 RX ADMIN — PROPOFOL 180 MG: 10 INJECTION, EMULSION INTRAVENOUS at 07:02

## 2024-02-23 NOTE — H&P
General Surgery: History & Physical     Chief Complaint: dysphagia     Subjective:  Mr. Beebe is a 63 y.o. male who presents for EGD with balloon dilation.       On examination, Mr. Beebe is sitting comfortably in the room upon entering in no acute distress. He denies headaches, malaise, shortness of breath, chest pain, or nausea, vomiting, fever, chills, or nightsweats. He denies any abdominal pain, and the abdomen is soft, nontender, and nondistended on palpation.      PMHx, SHx, FHx, SocHx, Allergies, Medications:  Mr. Beebe  has a past medical history of Arthritis, Cancer, and COPD (chronic obstructive pulmonary disease).      He  has a past surgical history that includes Cervical fusion; Rotator cuff repair (Right); Inguinal hernia repair (Bilateral); Colonoscopy (N/A, 3/27/2018); Colonoscopy (N/A, 6/13/2023); Esophagogastroduodenoscopy (N/A, 6/13/2023); Endoscopic ultrasound of upper gastrointestinal tract (N/A, 7/3/2023); Esophagogastroduodenoscopy (N/A, 7/3/2023); Robot-assisted surgical removal of esophagus using da Zabrina Xi (N/A, 10/4/2023); Placement of jejunostomy tube (10/4/2023); robotic repair, hernia, paraesophageal (10/4/2023); Pyloromyotomy (10/4/2023); egd, with balloon dilation (N/A, 12/1/2023); egd, with balloon dilation (N/A, 12/11/2023); egd, with balloon dilation (N/A, 12/20/2023); egd, with balloon dilation (N/A, 1/10/2024); and egd, with balloon dilation (N/A, 2/12/2024).     He  reports that he has quit smoking. His smoking use included cigarettes. He started smoking about 41 years ago. He has a 10.3 pack-year smoking history. He has been exposed to tobacco smoke. He has never used smokeless tobacco. He reports current alcohol use. He reports that he does not use drugs.      Mr. Beebe's family history includes Diabetes in his mother; Heart disease in his father.  He has No Known Allergies.     Mr. Beebe has a current medication list which includes the following prescription(s):  "acetaminophen, cholecalciferol (vitamin d3), lidocaine-prilocaine, oxycodone, pantoprazole, and sildenafil, and the following Facility-Administered Medications: lidocaine (pf) 10 mg/ml (1%).     ROS:  Pertinent items from 14 system review are noted in HPI, all others negative      Objective:  /73 (BP Location: Right arm, Patient Position: Lying)   Pulse 69   Temp 98.4 °F (36.9 °C) (Oral)   Resp 20   Ht 5' 9" (1.753 m)   Wt 61.2 kg (135 lb)   SpO2 98%   BMI 19.94 kg/m²      Physical Exam:  Gen: no acute distress, alert and oriented  HEENT: extraocular movements intact   CV: regular rate and rhythm   Pulm: no increased work of breathing, symmetrical chest rise  Abd: Soft, nontender, nondistended  Extr: moves all extremities well  Neuro: CN II-XII grossly intact w/o focal deficit  Integument: Normal turgor and tone. No jaundice.  Psych: appropriate affect, normal speech     Assessment:   Mr. Beebe is a 63 y.o. male who presents for EGD with balloon dilation.       After discussing the alternatives, benefits, and risks for the proposed operation, Mr. Beebe wished to proceed. All of Mr. Beebe questions concerning the operation were answered, he expressed full understanding of the procedure and the risks/benefits associated, and signed informed consent was obtained.     Plan:  -OR for EGD with balloon dilation.    -informed consent obtained      Case discussed, reviewed, and helped formulated by Dr. Meyer, attending physician, who agrees with the above plan.      John Leija MD  General Surgery  2/23/2024       "

## 2024-02-23 NOTE — ANESTHESIA PROCEDURE NOTES
Intubation    Date/Time: 2/23/2024 7:11 AM    Performed by: Zi Flores CRNA  Authorized by: Stephen Duffy MD    Intubation:     Induction:  Intravenous    Intubated:  Postinduction    Mask Ventilation:  Easy mask    Attempts:  1    Attempted By:  CRNA    Method of Intubation:  Video laryngoscopy    Blade:  Zapata 3    Laryngeal View Grade: Grade IIA - cords partially seen      Difficult Airway Encountered?: Yes      Future Airway Recommendations:  Video Laryngoscope is a must; difficult to visualize anatomy even with the Zapata; small / narrow mouth opening and no neck mobility    Complications:  None    Airway Device:  Oral endotracheal tube    Airway Device Size:  7.5    Style/Cuff Inflation:  Cuffed (inflated to minimal occlusive pressure)    Tube secured:  22    Secured at:  The lips    Placement Verified By:  Capnometry    Complicating Factors:  None    Findings Post-Intubation:  BS equal bilateral and atraumatic/condition of teeth unchanged

## 2024-02-23 NOTE — PLAN OF CARE
Patient was prepared for surgery.    He needs consents and update to H&P.    Patient said he was unable to remove wedding ring. Note placed on pre op checklist for care team. The patient said it was taped previously for this procedure.

## 2024-02-23 NOTE — DISCHARGE INSTRUCTIONS
POSTOPERATIVE INSTRUCTIONS FOLLOWING SURGERY    The following are post-operative instructions that will help you to recover from your surgery. Please read over these instructions carefully and contact us if we can answer any of your questions or concerns.    Surgery  You had an EGD with a balloon dilation of your esophagus. We were able to dilate without issue.    Diet  You will be on a clear liquid diet only on the day of surgery, 2/23  You can begin a regular diet as tolerated tomorrow on 2/24    Please call the office to report the following:  - Temperature greater than 101 degrees  - Redness at incision and/or drain sites  - Bloody vomit  - Shortness of breath    Follow up  You will not need to follow up with Dr. Meyer in clinic. His clinic will call in about 7-10 days to set up a repeat EGD with possible dilation    Should you have any concerns or questions, please call:  1. General Surgery Clinic ([phone]) during business hours  2. [facility]  ([phone]) after business hours  3  Go the the Emergency Department if you feel you need urgent attention.

## 2024-02-23 NOTE — BRIEF OP NOTE
[facility] - Surgery ([facility])  Brief Operative Note    Surgery Date: 2/23/2024     Surgeon(s) and Role:     * Nas Meyer MD - Primary     * John Leija MD    Assisting Surgeon: None    Pre-op Diagnosis:  Esophageal dysphagia [R13.19]    Post-op Diagnosis:  Post-Op Diagnosis Codes:     * Esophageal dysphagia [R13.19]    Procedure(s) (LRB):  EGD, WITH BALLOON DILATION (N/A)    Anesthesia: General    Operative Findings: EGD initially unable to be passed across anastomosis, but was able to be passed without issue after dilation to 15mm. Successful dilation to 20mm for 20 minutes.    Estimated Blood Loss: <5ml         Specimens:   Specimen (24h ago, onward)      None              Discharge Note    OUTCOME: Patient tolerated treatment/procedure well without complication and is now ready for discharge.    DISPOSITION: Home or Self Care    FINAL DIAGNOSIS:  Esophageal dysphagia [R13.19]    FOLLOWUP: Do not have to follow up in clinic. Clinic will call patient to schedule next EGD    DISCHARGE INSTRUCTIONS:    Discharge Procedure Orders   Diet clear liquid   Order Comments: Clear liquid diet all day on 2/23. Can start Regular Diet as tolerated on 2/24     No dressing needed     Notify your health care provider if you experience any of the following:  temperature >100.4     Notify your health care provider if you experience any of the following:  persistent nausea and vomiting or diarrhea     Notify your health care provider if you experience any of the following:  severe uncontrolled pain     Notify your health care provider if you experience any of the following:  redness, tenderness, or signs of infection (pain, swelling, redness, odor or green/yellow discharge around incision site)     Notify your health care provider if you experience any of the following:  difficulty breathing or increased cough     Notify your health care provider if you experience any of the following:  severe persistent headache      Notify your health care provider if you experience any of the following:  worsening rash     Notify your health care provider if you experience any of the following:  persistent dizziness, light-headedness, or visual disturbances     Notify your health care provider if you experience any of the following:  increased confusion or weakness     Activity as tolerated

## 2024-02-23 NOTE — OP NOTE
Dimitri Javier - Surgery ([facility])  Surgery Department  Operative Note       Date of Procedure: 2/23/2024     Surgeon(s):  Surgeon(s) and Role:     * Nas Meyer MD - Primary     * John Leija MD  Assisting Surgeon: None    Pre-Operative Diagnosis:   Esophageal dysphagia [R13.19]  Cervical esophagogastric anastomotic stricture.     Post-Operative Diagnosis:   Same  Same     Anesthesia: General    Operative Findings:   Mild cervical esophagogastric anastomotic stricture. Less narrow than it was on the previous EGD.       Procedures:  Esophagogastroduodenoscopy (EGD), with transendoscopic balloon dilation of esophagus (<30 mm)    Estimated Blood Loss (EBL): <2 mL    Specimen(s):    Specimen (24h ago, onward)      None                   Indications:  Blayne Beebe is a 63 year old man with a cervical esophagogastric anastomotic stricture following esophagectomy. Her presents for EGD and dilation. Risks and benefits were reviewed including bleeding, infection, damage to local structures, perforation of GI tract, need for additional procedures, death, and imponderables.  He understands and signed written informed consent to proceed.    Details:  The patient was transported to the operating room and satisfactory anesthesia established. Preoperative antibiotics were administered. The patient was placed in the supine position    The adult gastroscope was introduced into the mouth, passed into the hypopharynx and cervical esophagus under endoscopic vision. The anastomosis was at 20 cm. There was a mild stenosis. It was clearly more open than before, but I still could not pass the endoscope without dilation. This was traversed with a 12-15 mm balloon dilator, and dilated up progressively to 20 mm, held for 1 minute at each interval, and 20 minutes at 20 mm. In between dilations, the stenosis was examined and was without perforation. Once it had been dilated to 15 mm, we were able to traverse the stricture. There  was some expected/intended minimal bleeding.    The endoscope was advanced past the anastomosis into the gastric conduit to the level of the pylorus. Upon withdrawal, the mucosa was examined circumferentially. The mucosa appeared healthy with no evident pathology.      I communicated the intraoperative findings with the family following the procedure.     Condition: Good    Disposition: PACU - hemodynamically stable.    Attestation: I was present and scrubbed for the entire procedure.    Nas Meyer MD  Staff Surgeon  Surgical Oncology

## 2024-02-23 NOTE — TRANSFER OF CARE
"Anesthesia Transfer of Care Note    Patient: Blayne D III Steib    Procedure(s) Performed: Procedure(s) (LRB):  EGD, WITH BALLOON DILATION (N/A)    Patient location: PACU    Anesthesia Type: general    Transport from OR: Transported from OR on room air with adequate spontaneous ventilation    Post pain: adequate analgesia    Post assessment: no apparent anesthetic complications and tolerated procedure well    Post vital signs: stable    Level of consciousness: awake    Nausea/Vomiting: no nausea/vomiting    Complications: none    Transfer of care protocol was followed      Last vitals: Visit Vitals  BP (!) 142/73   Pulse 98   Temp 36.7 °C (98 °F) (Temporal)   Resp 20   Ht 5' 9" (1.753 m)   Wt 61.2 kg (135 lb)   SpO2 (!) 93%   BMI 19.94 kg/m²     "

## 2024-02-23 NOTE — ANESTHESIA PREPROCEDURE EVALUATION
[facility]  Anesthesia Pre-Operative Evaluation         Patient Name/: Blayne Beebe, 1960  MRN: [id number]    SUBJECTIVE:     Pre-operative evaluation for Procedure(s) (LRB):  EGD, WITH BALLOON DILATION (N/A)     2024    Blayne Beebe is a 63 y.o. male     Patient now presents for the above procedure(s).    ________________________________________  No results found for this or any previous visit.    ________________________________________    LDA:        PowerPort A Cath Single Lumen 23 1344 Internal Jugular Right (Active)   Line Necessity Review Longterm central access required 24 1115   Site Assessment No drainage;No redness;No swelling;No warmth 24 1115   Line Securement Device Secured with sutureless device 24 1115   Dressing Type Transparent (Tegaderm) 24 1115   Dressing Status Clean;Dry;Intact 24 1115   Dressing Intervention Integrity maintained 24 1115   Date on Dressing 24 1115   Dressing Due to be Changed 24 1115   Patency/Care flushed w/o difficulty;blood return present;heparin locked 24 1115   Status Accessed 24 1115   Accessed by: Peter SINGH 24 1115   Needle Insertion Date 24 1115   Needle Insertion Time 1010 24 1010   Type of Needle Mann 24 1010   Gauge 20 24 1010   Needle Length 3/4 in 24 1010   Needle Status Removed 24 1115   Flush Performed Yes 24 1115   Date to be Reflushed 24 1115   Needle Removal Date 24 1115   Needle Removal Time 1115 24 1115   Deaccessed By Peter SINGH 24 1115   Number of days: 225            Gastrostomy/Enterostomy 10/04/23 1730 Jejunostomy tube LUQ feeding (Active)   Number of days: 141       Drips:       Patient Active Problem List   Diagnosis    Disc disease, degenerative, cervical    Erectile dysfunction    Tobacco use disorder, moderate, in early  remission    COPD (chronic obstructive pulmonary disease)    RBBB    Pulmonary nodule 1 cm or greater in diameter    Personal history of colonic polyps    Hard to intubate    Malignant neoplasm of gastroesophageal junction    Weakness of both hips    Decreased functional mobility and endurance    Jejunostomy present    H/O esophagectomy    Alteration in nutrition associated with tube feeding       Review of patient's allergies indicates:  No Known Allergies    Current Inpatient Medications:       No current facility-administered medications on file prior to encounter.     Current Outpatient Medications on File Prior to Encounter   Medication Sig Dispense Refill    acetaminophen (TYLENOL) 500 MG tablet Take 1 tablet (500 mg total) by mouth every 6 (six) hours as needed for Pain. (Patient not taking: Reported on 1/9/2024)  0    cholecalciferol, vitamin D3, (VITAMIN D3) 10 mcg (400 unit) Tab Take by mouth once daily.      LIDOcaine-prilocaine (EMLA) cream Apply topically as needed (Port access). (Patient not taking: Reported on 7/31/2023) 30 g 1    oxyCODONE (ROXICODONE) 5 mg/5 mL Soln 5 mLs (5 mg total) by Per J Tube route every 4 (four) hours as needed. (Patient not taking: Reported on 10/11/2023) 210 mL 0    pantoprazole (PROTONIX) 40 MG tablet Take 1 tablet (40 mg total) by mouth once daily. 90 tablet 3    sildenafil (VIAGRA) 100 MG tablet Take 1 tablet (100 mg total) by mouth daily as needed for Erectile Dysfunction. (Patient not taking: Reported on 10/11/2023) 10 tablet 6       Past Surgical History:   Procedure Laterality Date    CERVICAL FUSION      COLONOSCOPY N/A 3/27/2018    Procedure: COLONOSCOPY;  Surgeon: Maria Teresa Morocho MD;  Location: [facility] ENDO;  Service: Endoscopy;  Laterality: N/A;    COLONOSCOPY N/A 6/13/2023    Procedure: COLONOSCOPY;  Surgeon: Kelli Snell MD;  Location: [facility] ENDO;  Service: Endoscopy;  Laterality: N/A;    EGD, WITH BALLOON DILATION N/A 12/1/2023    Procedure: EGD, WITH  BALLOON DILATION;  Surgeon: Nas Meyer MD;  [facility];  Service: General;  Laterality: N/A;    EGD, WITH BALLOON DILATION N/A 12/11/2023    Procedure: EGD, WITH BALLOON DILATION;  Surgeon: Nas Meyer MD;  [facility];  Service: General;  Laterality: N/A;    EGD, WITH BALLOON DILATION N/A 12/20/2023    Procedure: EGD, WITH BALLOON DILATION;  Surgeon: Nas Meyer MD;  [facility];  Service: General;  Laterality: N/A;    EGD, WITH BALLOON DILATION N/A 1/10/2024    Procedure: EGD, WITH BALLOON DILATION;  Surgeon: Nas Meyer MD;  [facility];  Service: General;  Laterality: N/A;    EGD, WITH BALLOON DILATION N/A 2/12/2024    Procedure: EGD, WITH BALLOON DILATION;  Surgeon: Nas Meyer MD;  [facility];  Service: General;  Laterality: N/A;    ENDOSCOPIC ULTRASOUND OF UPPER GASTROINTESTINAL TRACT N/A 7/3/2023    Procedure: ULTRASOUND, UPPER GI TRACT, ENDOSCOPIC;  Surgeon: Ray Duffy MD;  Location: [facility];  Service: Endoscopy;  Laterality: N/A;    ESOPHAGOGASTRODUODENOSCOPY N/A 6/13/2023    Procedure: EGD (ESOPHAGOGASTRODUODENOSCOPY);  Surgeon: Kelli Snell MD;  Location: [facility];  Service: Endoscopy;  Laterality: N/A;    ESOPHAGOGASTRODUODENOSCOPY N/A 7/3/2023    Procedure: EGD (ESOPHAGOGASTRODUODENOSCOPY);  Surgeon: Ray Duffy MD;  Location: [facility];  Service: Endoscopy;  Laterality: N/A;    INGUINAL HERNIA REPAIR Bilateral     Right x2, x1 left    PLACEMENT OF JEJUNOSTOMY TUBE  10/4/2023    Procedure: INSERTION, JEJUNOSTOMY TUBE;  Surgeon: Nas Meyer MD;  Location: [facility] OR [address] [place] [address];  Service: General;;    PYLOROMYOTOMY  10/4/2023    Procedure: PYLOROMYOTOMY;  Surgeon: Nas Meyer MD;  Location: [facility] OR 2ND FLR;  Service: General;;    ROBOT-ASSISTED SURGICAL REMOVAL OF ESOPHAGUS USING DA BALWINDER XI N/A 10/4/2023    Procedure: XI ROBOTIC ESOPHAGECTOMY;  Surgeon: Nas Meyer,  MD;  Location: [facility] OR [address] [place] [address];  Service: General;  Laterality: N/A;    ROBOTIC REPAIR, HERNIA, PARAESOPHAGEAL  10/4/2023    Procedure: ROBOTIC REPAIR, HERNIA, PARAESOPHAGEAL;  Surgeon: Nas Meyer MD;  Location: [facility] OR [address] [place] [address];  Service: General;;    ROTATOR CUFF REPAIR Right     x2       Social History:  Tobacco Use: Medium Risk (2/14/2024)    Patient History     Smoking Tobacco Use: Former     Smokeless Tobacco Use: Never     Passive Exposure: Current       Alcohol Use: Patient Declined (12/29/2023)    AUDIT-C     Frequency of Alcohol Consumption: Patient declined     Average Number of Drinks: Patient declined     Frequency of Binge Drinking: Patient declined       OBJECTIVE:     Vital Signs Range:  BMI Readings from Last 1 Encounters:   02/12/24 19.63 kg/m²               Significant Labs:        Component Value Date/Time    WBC 5.30 02/06/2024 0833    HGB 13.0 (L) 02/06/2024 0833    HCT 38.6 (L) 02/06/2024 0833    HCT 28 (L) 10/05/2023 0838     02/06/2024 0833     02/06/2024 0833    K 4.2 02/06/2024 0833     02/06/2024 0833    CO2 26 02/06/2024 0833     (H) 02/06/2024 0833    BUN 9 02/06/2024 0833    CREATININE 0.7 02/06/2024 0833    MG 1.9 10/10/2023 0325    PHOS 4.0 10/10/2023 0325    CALCIUM 9.2 02/06/2024 0833    ALBUMIN 3.5 02/06/2024 0833    PROT 6.9 02/06/2024 0833    ALKPHOS 108 02/06/2024 0833    BILITOT 0.4 02/06/2024 0833    AST 15 02/06/2024 0833    ALT 9 (L) 02/06/2024 0833    INR 1.0 10/04/2023 2011    HGBA1C 5.0 05/10/2023 1054        Please see Results Review for additional labs.     Diagnostic Studies: No relevant studies.    EKG:   Results for orders placed or performed in visit on 08/22/23   EKG 12-lead    Collection Time: 08/22/23 11:09 AM    Narrative    Test Reason : C16.0,J44.9,F17.200,I45.10,Z01.818,D70.9,D64.81,T45.1X5A,D69.6,    Vent. Rate : 083 BPM     Atrial Rate : 083 BPM     P-R Int : 136 ms          QRS Dur : 130 ms      QT Int : 392 ms       P-R-T  Axes : 082 089 -13 degrees     QTc Int : 460 ms    Normal sinus rhythm  Possible Left atrial enlargement  Nonspecific intraventricular block  T wave abnormality, consider inferior ischemia  Abnormal ECG  When compared with ECG of 28-APR-2015 14:52,  Nonspecific intraventricular block has replaced Right bundle branch block  Confirmed by Manohar Yeboah MD ([id number]) on 8/29/2023 1:09:44 PM    Referred By: [email or web address] lopes           Confirmed By:Manohar Yeboah MD       ECHO:  See subjective, if available.      ASSESSMENT/PLAN:                                                                                                                  02/23/2024  Blayne Beebe is a 63 y.o., male.      Pre-op Assessment          Review of Systems  Cardiovascular:         Dysrhythmias (RBBB)                                    Musculoskeletal:  Arthritis          Spine Disorders: cervical                Physical Exam    Airway:  Mallampati: III   Mouth Opening: Small, but > 3cm  TM Distance: 4 - 6 cm  Tongue: Normal  Neck ROM: Extension Decreased    Dental:  Loose upper right canine; understands the risk of further dental trauma and agrees to proceed      Anesthesia Plan  Type of Anesthesia, risks & benefits discussed:    Anesthesia Type: Gen ETT  Intra-op Monitoring Plan: Standard ASA Monitors  Induction:  IV  Informed Consent: Informed consent signed with the Patient and all parties understand the risks and agree with anesthesia plan.  All questions answered.   ASA Score: 2  Day of Surgery Review of History & Physical: H&P Update referred to the surgeon/provider.    Ready For Surgery From Anesthesia Perspective.     .

## 2024-02-26 NOTE — ANESTHESIA POSTPROCEDURE EVALUATION
Anesthesia Post Evaluation    Patient: Blayne D III Steib    Procedure(s) Performed: Procedure(s) (LRB):  EGD, WITH BALLOON DILATION (N/A)    Final Anesthesia Type: general      Patient location during evaluation: PACU  Patient participation: Yes- Able to Participate  Level of consciousness: awake and alert  Post-procedure vital signs: reviewed and stable  Pain management: adequate  Airway patency: patent    PONV status at discharge: No PONV  Anesthetic complications: no      Cardiovascular status: blood pressure returned to baseline  Respiratory status: unassisted  Hydration status: euvolemic  Follow-up not needed.              Vitals Value Taken Time   /77 02/23/24 0845   Temp 36.7 °C (98 °F) 02/23/24 0816   Pulse 88 02/23/24 0845   Resp 20 02/23/24 0845   SpO2 96 % 02/23/24 0845         No case tracking events are documented in the log.      Pain/Yessy Score: No data recorded

## 2024-02-28 NOTE — PROGRESS NOTES
Oncology Nutrition Assessment for Medical Nutrition Therapy  Follow-up Visit    Blayne Beebe   1960    Referring Provider: No ref. provider found      Reason for Visit: nutrition counseling and education    The patient location is: [place]  The chief complaint leading to consultation is: nutrition follow-up    Visit type: audiovisual    Face to Face time with patient: 9 minutes  15 minutes of total time spent on the encounter, which includes face to face time and non-face to face time preparing to see the patient (eg, review of tests), Obtaining and/or reviewing separately obtained history, Documenting clinical information in the electronic or other health record, Independently interpreting results (not separately reported) and communicating results to the patient/family/caregiver, or Care coordination (not separately reported).     Each patient to whom he or she provides medical services by telemedicine is:  (1) informed of the relationship between the physician and patient and the respective role of any other health care provider with respect to management of the patient; and (2) notified that he or she may decline to receive medical services by telemedicine and may withdraw from such care at any time.    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 Currently on adjuvant Opdivo. He is known to me from previous appointments. At our last appointment, he reported increased dysphagia and was limited to creamy soups and pureed beans. He is now s/p EGD w/dilation 2/12 and 2/23, with another planned 3/4. He continues on TF overnight, 2 cartons/day. He is maintaining his weight. He reports that he hasn't been pushing what he is eating but did have half a hamburger yesterday and tolerated  "ok.    Weight: 133lb  Height: 5' 9" (1.753 m)  BMI: 19.2    Usual BW: 140-145lb  Weight Change: 10lb loss from COVID 2/2023 then another 5-10lb loss - since maintaining    Allergies: Patient has no known allergies.    Current Medications:  Current Outpatient Medications:     acetaminophen (TYLENOL) 500 MG tablet, Take 1 tablet (500 mg total) by mouth every 6 (six) hours as needed for Pain. (Patient not taking: Reported on 1/9/2024), Disp: , Rfl: 0    cholecalciferol, vitamin D3, (VITAMIN D3) 10 mcg (400 unit) Tab, Take by mouth once daily., Disp: , Rfl:     LIDOcaine-prilocaine (EMLA) cream, Apply topically as needed (Port access). (Patient not taking: Reported on 7/31/2023), Disp: 30 g, Rfl: 1    oxyCODONE (ROXICODONE) 5 mg/5 mL Soln, 5 mLs (5 mg total) by Per J Tube route every 4 (four) hours as needed. (Patient not taking: Reported on 10/11/2023), Disp: 210 mL, Rfl: 0    pantoprazole (PROTONIX) 40 MG tablet, Take 1 tablet (40 mg total) by mouth once daily., Disp: 90 tablet, Rfl: 3    sildenafil (VIAGRA) 100 MG tablet, Take 1 tablet (100 mg total) by mouth daily as needed for Erectile Dysfunction. (Patient not taking: Reported on 10/11/2023), Disp: 10 tablet, Rfl: 6    Food/medication interactions noted: none    Vitamins/Supplements: none reported    Labs: Reviewed    Nutrition Diagnosis    Problem: moderate malnutrition  Etiology (related to): inadequate energy and protein intake  Signs/Symptoms (as evidenced by): reports of inadequate PO intake, weight loss, and both fat & muscle wasting present  Status: Improving    Nutrition Intervention    Nutrition Prescription   2090 kcals (35kcal/kg)  84g protein (1.4g/kg)   2090mL fluid (35mL/kg)    Recommendations:  PO diet as tolerated  Make meals/snacks high in calories and protein  Continue 2 cartons/day TF  Continue water flushes at 55mL/hr for every hour on feeds  Flush J-tube with at least 60mL water before and after feeds    Materials Provided/Reviewed: " none    Nutrition Monitoring and Evaluation    Monitor: diet education needs, energy intake, rate/formulation of tube feeding, and weight status    Goals: prevent further weight loss and encourage weight gain    Follow up: in 3-4 weeks    Communication to referring provider/care team: note available in chart    Counseling time: 9 minutes      Noelle Morelos MS, RD, LDN  Senior Clinical Dietitian  [facility] [place]

## 2024-02-29 ENCOUNTER — CLINICAL SUPPORT (OUTPATIENT)
Dept: HEMATOLOGY/ONCOLOGY | Facility: CLINIC | Age: 64
End: 2024-02-29
Payer: COMMERCIAL

## 2024-02-29 DIAGNOSIS — E44.0 MODERATE MALNUTRITION: ICD-10-CM

## 2024-02-29 DIAGNOSIS — Z71.3 NUTRITIONAL COUNSELING: Primary | ICD-10-CM

## 2024-02-29 DIAGNOSIS — C16.0 MALIGNANT NEOPLASM OF GASTROESOPHAGEAL JUNCTION: ICD-10-CM

## 2024-03-01 ENCOUNTER — TELEPHONE (OUTPATIENT)
Dept: SURGERY | Facility: CLINIC | Age: 64
End: 2024-03-01
Payer: COMMERCIAL

## 2024-03-01 DIAGNOSIS — R13.19 ESOPHAGEAL DYSPHAGIA: Primary | ICD-10-CM

## 2024-03-01 NOTE — TELEPHONE ENCOUNTER
Spoke to Pt's spouse, Aislinn and confirmed procedure for 3/1/24 with Dr. Meyer. Informed to arrive at the [facility] on the 2nd floor on [facility] for 0500 and surgery time is approximately 0700. Surgery Instructions provided as follows:  instructed to remain NPO solids 8 hours prior to surgery, clear liquids until 2 hours prior to surgery, to shower with hibiclens the night before surgery and morning of surgery before arrival, not to apply any lotions, powders or deodorant, remove all metal and jewelry, to wear comfortable clothes, and leave all valuables at home. Medications reviewed and instructed to hold medications on DOS. Confirmed pt  will have transportation home and spouse will accompany pt on DOS. Pt verbalized understanding to all of the above.

## 2024-03-04 ENCOUNTER — ANESTHESIA (OUTPATIENT)
Dept: SURGERY | Facility: HOSPITAL | Age: 64
End: 2024-03-04
Payer: COMMERCIAL

## 2024-03-04 ENCOUNTER — ANESTHESIA EVENT (OUTPATIENT)
Dept: SURGERY | Facility: HOSPITAL | Age: 64
End: 2024-03-04
Payer: COMMERCIAL

## 2024-03-04 ENCOUNTER — HOSPITAL ENCOUNTER (OUTPATIENT)
Facility: HOSPITAL | Age: 64
Discharge: HOME | End: 2024-03-04
Attending: SURGERY | Admitting: SURGERY
Payer: COMMERCIAL

## 2024-03-04 ENCOUNTER — PATIENT MESSAGE (OUTPATIENT)
Dept: SURGERY | Facility: CLINIC | Age: 64
End: 2024-03-04

## 2024-03-04 ENCOUNTER — PATIENT MESSAGE (OUTPATIENT)
Dept: SURGERY | Facility: HOSPITAL | Age: 64
End: 2024-03-04
Payer: COMMERCIAL

## 2024-03-04 VITALS
OXYGEN SATURATION: 95 % | TEMPERATURE: 98 F | HEART RATE: 88 BPM | RESPIRATION RATE: 16 BRPM | WEIGHT: 134.94 LBS | HEIGHT: 69 IN | SYSTOLIC BLOOD PRESSURE: 134 MMHG | BODY MASS INDEX: 19.99 KG/M2 | DIASTOLIC BLOOD PRESSURE: 76 MMHG

## 2024-03-04 DIAGNOSIS — Z90.49 H/O ESOPHAGECTOMY: Primary | ICD-10-CM

## 2024-03-04 DIAGNOSIS — R13.19 ESOPHAGEAL DYSPHAGIA: Primary | ICD-10-CM

## 2024-03-04 DIAGNOSIS — R13.19 ESOPHAGEAL DYSPHAGIA: ICD-10-CM

## 2024-03-04 DIAGNOSIS — Z98.890 H/O ESOPHAGECTOMY: Primary | ICD-10-CM

## 2024-03-04 PROCEDURE — D9220A PRA ANESTHESIA: Mod: CRNA,,, | Performed by: NURSE ANESTHETIST, CERTIFIED REGISTERED

## 2024-03-04 PROCEDURE — 36000707: Performed by: SURGERY

## 2024-03-04 PROCEDURE — 71000015 HC POSTOP RECOV 1ST HR: Performed by: SURGERY

## 2024-03-04 PROCEDURE — 43249 ESOPH EGD DILATION <30 MM: CPT | Mod: ,,, | Performed by: SURGERY

## 2024-03-04 PROCEDURE — 36000706: Performed by: SURGERY

## 2024-03-04 PROCEDURE — 25000003 PHARM REV CODE 250: Performed by: NURSE ANESTHETIST, CERTIFIED REGISTERED

## 2024-03-04 PROCEDURE — 71000044 HC DOSC ROUTINE RECOVERY FIRST HOUR: Performed by: SURGERY

## 2024-03-04 PROCEDURE — 63600175 PHARM REV CODE 636 W HCPCS: Performed by: NURSE ANESTHETIST, CERTIFIED REGISTERED

## 2024-03-04 PROCEDURE — D9220A PRA ANESTHESIA: Mod: ANES,,, | Performed by: STUDENT IN AN ORGANIZED HEALTH CARE EDUCATION/TRAINING PROGRAM

## 2024-03-04 PROCEDURE — 37000009 HC ANESTHESIA EA ADD 15 MINS: Performed by: SURGERY

## 2024-03-04 PROCEDURE — C1726 CATH, BAL DIL, NON-VASCULAR: HCPCS | Performed by: SURGERY

## 2024-03-04 PROCEDURE — 37000008 HC ANESTHESIA 1ST 15 MINUTES: Performed by: SURGERY

## 2024-03-04 PROCEDURE — 27201423 OPTIME MED/SURG SUP & DEVICES STERILE SUPPLY: Performed by: SURGERY

## 2024-03-04 RX ORDER — PHENYLEPHRINE HYDROCHLORIDE 10 MG/ML
INJECTION INTRAVENOUS
Status: DISCONTINUED | OUTPATIENT
Start: 2024-03-04 | End: 2024-03-04

## 2024-03-04 RX ORDER — FENTANYL CITRATE 50 UG/ML
INJECTION, SOLUTION INTRAMUSCULAR; INTRAVENOUS
Status: DISCONTINUED | OUTPATIENT
Start: 2024-03-04 | End: 2024-03-04

## 2024-03-04 RX ORDER — FENTANYL CITRATE 50 UG/ML
25 INJECTION, SOLUTION INTRAMUSCULAR; INTRAVENOUS EVERY 5 MIN PRN
Status: DISCONTINUED | OUTPATIENT
Start: 2024-03-04 | End: 2024-03-04 | Stop reason: HOSPADM

## 2024-03-04 RX ORDER — ONDANSETRON HYDROCHLORIDE 2 MG/ML
INJECTION, SOLUTION INTRAVENOUS
Status: DISCONTINUED | OUTPATIENT
Start: 2024-03-04 | End: 2024-03-04

## 2024-03-04 RX ORDER — ONDANSETRON HYDROCHLORIDE 2 MG/ML
4 INJECTION, SOLUTION INTRAVENOUS DAILY PRN
Status: DISCONTINUED | OUTPATIENT
Start: 2024-03-04 | End: 2024-03-04 | Stop reason: HOSPADM

## 2024-03-04 RX ORDER — DEXAMETHASONE SODIUM PHOSPHATE 4 MG/ML
INJECTION, SOLUTION INTRA-ARTICULAR; INTRALESIONAL; INTRAMUSCULAR; INTRAVENOUS; SOFT TISSUE
Status: DISCONTINUED | OUTPATIENT
Start: 2024-03-04 | End: 2024-03-04

## 2024-03-04 RX ORDER — LIDOCAINE HYDROCHLORIDE 20 MG/ML
INJECTION INTRAVENOUS
Status: DISCONTINUED | OUTPATIENT
Start: 2024-03-04 | End: 2024-03-04

## 2024-03-04 RX ORDER — MIDAZOLAM HYDROCHLORIDE 1 MG/ML
INJECTION, SOLUTION INTRAMUSCULAR; INTRAVENOUS
Status: DISCONTINUED | OUTPATIENT
Start: 2024-03-04 | End: 2024-03-04

## 2024-03-04 RX ORDER — PROPOFOL 10 MG/ML
VIAL (ML) INTRAVENOUS
Status: DISCONTINUED | OUTPATIENT
Start: 2024-03-04 | End: 2024-03-04

## 2024-03-04 RX ORDER — SODIUM CHLORIDE 0.9 % (FLUSH) 0.9 %
10 SYRINGE (ML) INJECTION
Status: DISCONTINUED | OUTPATIENT
Start: 2024-03-04 | End: 2024-03-04 | Stop reason: HOSPADM

## 2024-03-04 RX ORDER — ROCURONIUM BROMIDE 10 MG/ML
INJECTION, SOLUTION INTRAVENOUS
Status: DISCONTINUED | OUTPATIENT
Start: 2024-03-04 | End: 2024-03-04

## 2024-03-04 RX ADMIN — GLYCOPYRROLATE 0.2 MG: 0.2 INJECTION, SOLUTION INTRAMUSCULAR; INTRAVENOUS at 07:03

## 2024-03-04 RX ADMIN — LIDOCAINE HYDROCHLORIDE 100 MG: 20 INJECTION INTRAVENOUS at 07:03

## 2024-03-04 RX ADMIN — PHENYLEPHRINE HYDROCHLORIDE 200 MCG: 10 INJECTION INTRAVENOUS at 07:03

## 2024-03-04 RX ADMIN — SODIUM CHLORIDE: 0.9 INJECTION, SOLUTION INTRAVENOUS at 06:03

## 2024-03-04 RX ADMIN — FENTANYL CITRATE 25 MCG: 50 INJECTION, SOLUTION INTRAMUSCULAR; INTRAVENOUS at 08:03

## 2024-03-04 RX ADMIN — FENTANYL CITRATE 50 MCG: 50 INJECTION, SOLUTION INTRAMUSCULAR; INTRAVENOUS at 07:03

## 2024-03-04 RX ADMIN — PROPOFOL 120 MG: 10 INJECTION, EMULSION INTRAVENOUS at 07:03

## 2024-03-04 RX ADMIN — PROPOFOL 40 MG: 10 INJECTION, EMULSION INTRAVENOUS at 07:03

## 2024-03-04 RX ADMIN — PHENYLEPHRINE HYDROCHLORIDE 300 MCG: 10 INJECTION INTRAVENOUS at 07:03

## 2024-03-04 RX ADMIN — PROPOFOL 50 MG: 10 INJECTION, EMULSION INTRAVENOUS at 07:03

## 2024-03-04 RX ADMIN — DEXAMETHASONE SODIUM PHOSPHATE 8 MG: 4 INJECTION, SOLUTION INTRAMUSCULAR; INTRAVENOUS at 07:03

## 2024-03-04 RX ADMIN — LIDOCAINE HYDROCHLORIDE 100 MG: 20 INJECTION INTRAVENOUS at 08:03

## 2024-03-04 RX ADMIN — ROCURONIUM BROMIDE 40 MG: 10 INJECTION, SOLUTION INTRAVENOUS at 07:03

## 2024-03-04 RX ADMIN — SODIUM CHLORIDE, SODIUM GLUCONATE, SODIUM ACETATE, POTASSIUM CHLORIDE, MAGNESIUM CHLORIDE, SODIUM PHOSPHATE, DIBASIC, AND POTASSIUM PHOSPHATE: .53; .5; .37; .037; .03; .012; .00082 INJECTION, SOLUTION INTRAVENOUS at 07:03

## 2024-03-04 RX ADMIN — SUGAMMADEX 200 MG: 100 INJECTION, SOLUTION INTRAVENOUS at 08:03

## 2024-03-04 RX ADMIN — MIDAZOLAM HYDROCHLORIDE 2 MG: 1 INJECTION, SOLUTION INTRAMUSCULAR; INTRAVENOUS at 06:03

## 2024-03-04 RX ADMIN — ONDANSETRON 4 MG: 2 INJECTION INTRAMUSCULAR; INTRAVENOUS at 07:03

## 2024-03-04 NOTE — OP NOTE
Dimitri Javier - Surgery ([facility])  Surgery Department  Operative Note       Date of Procedure: 3/4/2024     Surgeon(s):  Surgeon(s) and Role:     * Nas Meyer MD - Primary     * Negin Rosales MD - Resident - Assisting      Pre-Operative Diagnosis:   Esophageal dysphagia [R13.19]    Post-Operative Diagnosis:   Same     Anesthesia: General    Operative Findings:   Persistent cervical esophagogastric anastomotic stricture at 20 cm. Unable to pass endoscope initially but scope passed easily after dilation to 15 mm      Procedures:  Esophagogastroduodenoscopy (EGD), with transendoscopic balloon dilation of esophagus (<30 mm)    Estimated Blood Loss (EBL): <2 mL    Specimen(s):    Specimen (24h ago, onward)      None                   Indications:  Blayne Beebe is a 63 year old man with cervical esophagogastric anastomotic stricture following esophagectomy who presents for EGD and anastomotic dilation. Risks and benefits were reviewed including bleeding, infection, damage to local structures, perforation of GI tract, need for additional procedures, death, and imponderables.  He understands and signed written informed consent to proceed.    Details:  The patient was transported to the operating room and satisfactory anesthesia established. The patient was placed in the supine position    The adult gastroscope was introduced into the mouth, passed into the hypopharynx and cervical esophagus under endoscopic vision. The anastomosis was at 20 cm.  There was a moderate stenosis. I still was unable to pass the adult endoscope without dilation. This was traversed with a balloon dilator, and dilated up progressively from 12-20 mm, held for 1 minute at each interval and 20 minutes at 20 mm.  In between dilations, the stenosis was examined and was without perforation. Once it was dilated to 15 mm, we were able to traverse the stricture. There was some expected/intended bleeding.     Once we had dilated to 20 mm, the  endoscope was advanced past the anastomosis into the gastric conduit, and distally to the pylorus. Upon withdrawal, the mucosa was examined circumferentially, with findings of normal healthy mucosa.        I communicated the intraoperative findings with the family following the procedure.     Condition: Good    Disposition: PACU - hemodynamically stable.    Attestation: I was present and scrubbed for the key portions of the procedure.    Nas Meyer MD  Staff Surgeon  Surgical Oncology

## 2024-03-04 NOTE — H&P
General Surgery: History & Physical     Chief Complaint: dysphagia     Subjective:  Mr. Beebe is a 63 y.o. male who presents for EGD with balloon dilation       On examination, Mr. Beebe is sitting comfortably in the room upon entering in no acute distress. He denies headaches, malaise, shortness of breath, chest pain, or nausea, vomiting, fever, chills, or nightsweats. He denies any abdominal pain, and the abdomen is soft, nontender, and nondistended on palpation.      PMHx, SHx, FHx, SocHx, Allergies, Medications:  Mr. Beebe  has a past medical history of Arthritis, Cancer, and COPD (chronic obstructive pulmonary disease).      He  has a past surgical history that includes Cervical fusion; Rotator cuff repair (Right); Inguinal hernia repair (Bilateral); Colonoscopy (N/A, 3/27/2018); Colonoscopy (N/A, 6/13/2023); Esophagogastroduodenoscopy (N/A, 6/13/2023); Endoscopic ultrasound of upper gastrointestinal tract (N/A, 7/3/2023); Esophagogastroduodenoscopy (N/A, 7/3/2023); Robot-assisted surgical removal of esophagus using da Zabrina Xi (N/A, 10/4/2023); Placement of jejunostomy tube (10/4/2023); robotic repair, hernia, paraesophageal (10/4/2023); Pyloromyotomy (10/4/2023); egd, with balloon dilation (N/A, 12/1/2023); egd, with balloon dilation (N/A, 12/11/2023); egd, with balloon dilation (N/A, 12/20/2023); egd, with balloon dilation (N/A, 1/10/2024); egd, with balloon dilation (N/A, 2/12/2024); and egd, with balloon dilation (N/A, 2/23/2024).     He  reports that he has quit smoking. His smoking use included cigarettes. He started smoking about 41 years ago. He has a 10.3 pack-year smoking history. He has been exposed to tobacco smoke. He has never used smokeless tobacco. He reports current alcohol use. He reports that he does not use drugs.      Mr. Beebe's family history includes Diabetes in his mother; Heart disease in his father.  He has No Known Allergies.     Mr. Beebe has a current medication list which  "includes the following prescription(s): pantoprazole, acetaminophen, cholecalciferol (vitamin d3), lidocaine-prilocaine, oxycodone, and sildenafil.     ROS:  Pertinent items from 14 system review are noted in HPI, all others negative      Objective:  /77   Pulse 79   Temp 97.8 °F (36.6 °C) (Oral)   Resp 16   Ht 5' 9" (1.753 m)   Wt 61.2 kg (134 lb 14.7 oz)   SpO2 99%   BMI 19.92 kg/m²      Physical Exam:  Gen: no acute distress, alert and oriented  HEENT: extraocular movements intact   CV: regular rate and rhythm   Pulm: no increased work of breathing, symmetrical chest rise  Abd: Soft, nontender, nondistended  Extr: moves all extremities well  Neuro: CN II-XII grossly intact w/o focal deficit  Integument: Normal turgor and tone. No jaundice.  Psych: appropriate affect, normal speech     Assessment:   Mr. Beebe is a 63 y.o. male who presents for EGD with balloon dilation       After discussing the alternatives, benefits, and risks for the proposed operation, Mr. Beebe wished to proceed. All of Mr. Beebe questions concerning the operation were answered, he expressed full understanding of the procedure and the risks/benefits associated, and signed informed consent was obtained.     Plan:  -OR for EGD with balloon dilation    -informed consent obtained      Case discussed, reviewed, and helped formulated by Dr. Meyer, attending physician, who agrees with the above plan.      John Leija MD  General Surgery  3/4/2024       "

## 2024-03-04 NOTE — ANESTHESIA PREPROCEDURE EVALUATION
03/04/2024  Blayne Beebe is a 63 y.o., male.    Previous airway note:    Intubation     Date/Time: 2/23/2024 7:11 AM     Performed by: Zi Flores CRNA  Authorized by: Stephen Duffy MD    Intubation:     Induction:  Intravenous    Intubated:  Postinduction    Mask Ventilation:  Easy mask    Attempts:  1    Attempted By:  CRNA    Method of Intubation:  Video laryngoscopy    Blade:  Zapata 3    Laryngeal View Grade: Grade IIA - cords partially seen      Difficult Airway Encountered?: Yes      Future Airway Recommendations:  Video Laryngoscope is a must; difficult to visualize anatomy even with the Zapata; small / narrow mouth opening and no neck mobility    Complications:  None    Airway Device:  Oral endotracheal tube    Airway Device Size:  7.5    Style/Cuff Inflation:  Cuffed (inflated to minimal occlusive pressure)    Tube secured:  22          Pre-operative evaluation for Procedure(s) (LRB):  EGD, WITH BALLOON DILATION (N/A)    Patient Active Problem List   Diagnosis    Disc disease, degenerative, cervical    Erectile dysfunction    Tobacco use disorder, moderate, in early remission    COPD (chronic obstructive pulmonary disease)    RBBB    Pulmonary nodule 1 cm or greater in diameter    Personal history of colonic polyps    Hard to intubate    Malignant neoplasm of gastroesophageal junction    Weakness of both hips    Decreased functional mobility and endurance    Jejunostomy present    H/O esophagectomy    Alteration in nutrition associated with tube feeding            PowerPort A Cath Single Lumen 07/12/23 1344 Internal Jugular Right (Active)   Number of days: 235            Peripheral IV - Single Lumen 03/04/24 0535 20 G Anterior;Left Forearm (Active)   Number of days: 0            Gastrostomy/Enterostomy 10/04/23 1730 Jejunostomy tube LUQ feeding (Active)   Number of days: 151        Medications Prior to Admission   Medication Sig Dispense Refill Last Dose    pantoprazole (PROTONIX) 40 MG tablet Take 1 tablet (40 mg total) by mouth once daily. 90 tablet 3 Past Month    acetaminophen (TYLENOL) 500 MG tablet Take 1 tablet (500 mg total) by mouth every 6 (six) hours as needed for Pain. (Patient not taking: Reported on 1/9/2024)  0 More than a month    cholecalciferol, vitamin D3, (VITAMIN D3) 10 mcg (400 unit) Tab Take by mouth once daily.   More than a month    LIDOcaine-prilocaine (EMLA) cream Apply topically as needed (Port access). (Patient not taking: Reported on 7/31/2023) 30 g 1 More than a month    oxyCODONE (ROXICODONE) 5 mg/5 mL Soln 5 mLs (5 mg total) by Per J Tube route every 4 (four) hours as needed. (Patient not taking: Reported on 10/11/2023) 210 mL 0 More than a month    sildenafil (VIAGRA) 100 MG tablet Take 1 tablet (100 mg total) by mouth daily as needed for Erectile Dysfunction. (Patient not taking: Reported on 10/11/2023) 10 tablet 6        Review of patient's allergies indicates:  No Known Allergies    Past Medical History:   Diagnosis Date    Arthritis     Cancer     COPD (chronic obstructive pulmonary disease)      Past Surgical History:   Procedure Laterality Date    CERVICAL FUSION      COLONOSCOPY N/A 3/27/2018    Procedure: COLONOSCOPY;  Surgeon: Maria Teresa Morocho MD;  Location: [facility];  Service: Endoscopy;  Laterality: N/A;    COLONOSCOPY N/A 6/13/2023    Procedure: COLONOSCOPY;  Surgeon: Kelli Snell MD;  Location: [facility] ENDO;  Service: Endoscopy;  Laterality: N/A;    EGD, WITH BALLOON DILATION N/A 12/1/2023    Procedure: EGD, WITH BALLOON DILATION;  Surgeon: Nas Meyer MD;  Location: [address];  Service: General;  Laterality: N/A;    EGD, WITH BALLOON DILATION N/A 12/11/2023    Procedure: EGD, WITH BALLOON DILATION;  Surgeon: Nas Meyer MD;  [facility];  Service: General;  Laterality: N/A;    EGD, WITH BALLOON  DILATION N/A 12/20/2023    Procedure: EGD, WITH BALLOON DILATION;  Surgeon: Nas Meyer MD;  [facility];  Service: General;  Laterality: N/A;    EGD, WITH BALLOON DILATION N/A 1/10/2024    Procedure: EGD, WITH BALLOON DILATION;  Surgeon: Nas Meyer MD;  Location: [facility] OR [address] [place] [address];  Service: General;  Laterality: N/A;    EGD, WITH BALLOON DILATION N/A 2/12/2024    Procedure: EGD, WITH BALLOON DILATION;  Surgeon: Nas Meyer MD;  Location: [facility] OR [address] [place] [address];  Service: General;  Laterality: N/A;    EGD, WITH BALLOON DILATION N/A 2/23/2024    Procedure: EGD, WITH BALLOON DILATION;  Surgeon: Nas Meyer MD;  Location: [facility] OR [address] [place] [address];  Service: General;  Laterality: N/A;    ENDOSCOPIC ULTRASOUND OF UPPER GASTROINTESTINAL TRACT N/A 7/3/2023    Procedure: ULTRASOUND, UPPER GI TRACT, ENDOSCOPIC;  Surgeon: Ray Duffy MD;  Location: [facility];  Service: Endoscopy;  Laterality: N/A;    ESOPHAGOGASTRODUODENOSCOPY N/A 6/13/2023    Procedure: EGD (ESOPHAGOGASTRODUODENOSCOPY);  Surgeon: Kelli Snell MD;  Location: [facility];  Service: Endoscopy;  Laterality: N/A;    ESOPHAGOGASTRODUODENOSCOPY N/A 7/3/2023    Procedure: EGD (ESOPHAGOGASTRODUODENOSCOPY);  Surgeon: Ray Duffy MD;  Location: [facility];  Service: Endoscopy;  Laterality: N/A;    INGUINAL HERNIA REPAIR Bilateral     Right x2, x1 left    PLACEMENT OF JEJUNOSTOMY TUBE  10/4/2023    Procedure: INSERTION, JEJUNOSTOMY TUBE;  Surgeon: Nas Meyer MD;  Location: [address];  Service: General;;    PYLOROMYOTOMY  10/4/2023    Procedure: PYLOROMYOTOMY;  Surgeon: Nas Meyer MD;  Location: [address];  Service: General;;    ROBOT-ASSISTED SURGICAL REMOVAL OF ESOPHAGUS USING DA BALWINDER XI N/A 10/4/2023    Procedure: XI ROBOTIC ESOPHAGECTOMY;  Surgeon: Nas Meyer MD;  Location: [facility] OR [address] [place] [address];  Service: General;  Laterality: N/A;    ROBOTIC REPAIR, HERNIA, PARAESOPHAGEAL  10/4/2023    Procedure:  "ROBOTIC REPAIR, HERNIA, PARAESOPHAGEAL;  Surgeon: Nas Meyer MD;  Location: [facility] OR [address] [place] [address];  Service: General;;    ROTATOR CUFF REPAIR Right     x2     Tobacco Use    Smoking status: Former     Current packs/day: 0.25     Average packs/day: 0.2 packs/day for 41.2 years (10.3 ttl pk-yrs)     Types: Cigarettes     Start date: 1983     Passive exposure: Current    Smokeless tobacco: Never    Tobacco comments:     Done smoking since september   Substance and Sexual Activity    Alcohol use: Yes     Comment: a couple of beers a day    Drug use: No    Sexual activity: Yes     Partners: Female     Comment:        Objective:     Vital Signs (Most Recent):  Temp: 36.6 °C (97.8 °F) (03/04/24 0531)  Pulse: 79 (03/04/24 0531)  Resp: 16 (03/04/24 0531)  BP: 137/77 (03/04/24 0532)  SpO2: 99 % (03/04/24 0531) Vital Signs (24h Range):  Temp:  [36.6 °C (97.8 °F)] 36.6 °C (97.8 °F)  Pulse:  [79] 79  Resp:  [16] 16  SpO2:  [99 %] 99 %  BP: (137)/(77) 137/77     Weight: 61.2 kg (134 lb 14.7 oz)  Body mass index is 19.92 kg/m².        Significant Labs:  All pertinent labs from the last 24 hours have been reviewed.    CBC: No results for input(s): "WBC", "RBC", "HGB", "HCT", "PLT", "MCV", "MCH", "MCHC" in the last 72 hours.    CMP: No results for input(s): "NA", "K", "CL", "CO2", "BUN", "CREATININE", "GLU", "MG", "PHOS", "CALCIUM", "ALBUMIN", "PROT", "ALKPHOS", "ALT", "AST", "BILITOT" in the last 72 hours.    INR  No results for input(s): "PT", "INR", "PROTIME", "APTT" in the last 72 hours.      Pre-op Assessment    I have reviewed the Patient Summary Reports.     I have reviewed the Nursing Notes. I have reviewed the NPO Status.   I have reviewed the Medications.     Review of Systems  Anesthesia Hx:             Denies Family Hx of Anesthesia complications.    Denies Personal Hx of Anesthesia complications.                    Cardiovascular:         Dysrhythmias                                Disorder of Cardiac Rhythm   "   Pulmonary:   COPD         Chronic Obstructive Pulmonary Disease (COPD):                      Musculoskeletal:  Arthritis        Arthritis          Neurological:      Arthritis                               Physical Exam  General: Well nourished    Airway:  Mallampati: II   Mouth Opening: Normal  TM Distance: Normal  Tongue: Normal  Neck ROM: Normal ROM    Dental:  Any loose and/or missing teeth verified with patient   Chest/Lungs:  Clear to auscultation    Heart:  Rate: Normal  Rhythm: Regular Rhythm  Sounds: Normal    Abdomen:  Normal        Anesthesia Plan  Type of Anesthesia, risks & benefits discussed:    Anesthesia Type: Gen ETT, Gen Supraglottic Airway, Gen Natural Airway, MAC  Intra-op Monitoring Plan: Standard ASA Monitors  Post Op Pain Control Plan: multimodal analgesia and IV/PO Opioids PRN  Induction:  IV  Informed Consent: Informed consent signed with the Patient and all parties understand the risks and agree with anesthesia plan.  All questions answered.   ASA Score: 3    Ready For Surgery From Anesthesia Perspective.     .

## 2024-03-04 NOTE — BRIEF OP NOTE
[facility] - Surgery (2nd Fl)  Brief Operative Note    Surgery Date: 3/4/2024     Surgeon(s) and Role:     * Nas Meyer MD - Primary     * Negin Rosales MD - Resident - Assisting        Pre-op Diagnosis:  Esophageal dysphagia [R13.19]    Post-op Diagnosis:  Post-Op Diagnosis Codes:     * Esophageal dysphagia [R13.19]    Procedure(s) (LRB):  EGD, WITH BALLOON DILATION (N/A)    Anesthesia: General    Operative Findings: EGD initially unable to be passed across anastomosis, but was able to be passed without issue after dilation to 15mm. Successful dilation to 20mm for 20 minutes.     Estimated Blood Loss: minimal          Specimens:   Specimen (24h ago, onward)      None              Discharge Note    OUTCOME: Patient tolerated treatment/procedure well without complication and is now ready for discharge.    DISPOSITION: Home or Self Care    FINAL DIAGNOSIS:  <principal problem not specified>    FOLLOWUP: In clinic    DISCHARGE INSTRUCTIONS:    Discharge Procedure Orders   Diet clear liquid     Other restrictions (specify):   Order Comments: Clear liquid diet for 24 hours then may resume regular diet.     Notify your health care provider if you experience any of the following:  increased confusion or weakness     Notify your health care provider if you experience any of the following:  persistent dizziness, light-headedness, or visual disturbances     Notify your health care provider if you experience any of the following:  worsening rash     Notify your health care provider if you experience any of the following:  severe persistent headache     Notify your health care provider if you experience any of the following:  difficulty breathing or increased cough     Notify your health care provider if you experience any of the following:  redness, tenderness, or signs of infection (pain, swelling, redness, odor or green/yellow discharge around incision site)     Notify your health care provider if you experience any  of the following:  severe uncontrolled pain     Notify your health care provider if you experience any of the following:  persistent nausea and vomiting or diarrhea     Notify your health care provider if you experience any of the following:  temperature >100.4     No dressing needed     Activity as tolerated

## 2024-03-04 NOTE — ANESTHESIA POSTPROCEDURE EVALUATION
Anesthesia Post Evaluation    Patient: Blayne D III Steib    Procedure(s) Performed: Procedure(s) (LRB):  EGD, WITH BALLOON DILATION (N/A)    Final Anesthesia Type: general      Patient location during evaluation: PACU  Patient participation: Yes- Able to Participate  Level of consciousness: awake and alert  Post-procedure vital signs: reviewed and stable  Pain management: adequate  Airway patency: patent  TRENT mitigation strategies: Extubation while patient is awake  PONV status at discharge: No PONV  Anesthetic complications: no      Cardiovascular status: stable  Respiratory status: spontaneous ventilation and face mask  Hydration status: euvolemic  Follow-up not needed.              Vitals Value Taken Time   /76 03/04/24 0916   Temp 36.6 °C (97.8 °F) 03/04/24 0915   Pulse 87 03/04/24 0916   Resp 13 03/04/24 0916   SpO2 94 % 03/04/24 0916   Vitals shown include unvalidated device data.      No case tracking events are documented in the log.      Pain/Yessy Score: Yessy Score: 10 (3/4/2024  8:45 AM)

## 2024-03-04 NOTE — DISCHARGE INSTRUCTIONS
Clear liquid diet for 24 hours then may resume regular diet.    Will plan for repeat EGD on 3/13. Dr. Meyer's clinic will call with time and date.     Please follow up with Dr. Meyer as needed.

## 2024-03-04 NOTE — TRANSFER OF CARE
"Anesthesia Transfer of Care Note    Patient: Blayne D III Steib    Procedure(s) Performed: Procedure(s) (LRB):  EGD, WITH BALLOON DILATION (N/A)    Patient location: [facility]    Anesthesia Type: general    Transport from OR: Transported from OR on 6-10 L/min O2 by face mask with adequate spontaneous ventilation    Post pain: adequate analgesia    Post assessment: no apparent anesthetic complications and tolerated procedure well    Post vital signs: stable    Level of consciousness: awake and alert    Nausea/Vomiting: no nausea/vomiting    Complications: none    Transfer of care protocol was followed      Last vitals: Visit Vitals  BP (!) 140/83   Pulse 105   Temp 37.1 °C (98.8 °F) (Temporal)   Resp 15   Ht 5' 9" (1.753 m)   Wt 61.2 kg (134 lb 14.7 oz)   SpO2 100%   BMI 19.92 kg/m²     "

## 2024-03-04 NOTE — ANESTHESIA PROCEDURE NOTES
Intubation    Date/Time: 3/4/2024 7:19 AM    Performed by: Morean Ortega CRNA  Authorized by: Danilo Bazzi MD    Intubation:     Induction:  Intravenous    Intubated:  Postinduction    Mask Ventilation:  Moderately difficult with oral airway (two person BMV with OPA- good tidal volumes with two person BMV)    Attempts:  1    Attempted By:  Staff anesthesiologist    Method of Intubation:  Video laryngoscopy and bougie    Blade:  Zapata 3    Laryngeal View Grade: Grade III - only epiglottis visible      Difficult Airway Encountered?: Yes      Complications:  Soft tissue trauma    Airway Device:  Oral endotracheal tube    Airway Device Size:  7.5    Style/Cuff Inflation:  Cuffed (inflated to minimal occlusive pressure)    Inflation Amount (mL):  10    Tube secured:  22    Secured at:  The lips    Placement Verified By:  Colorimetric ETCO2 device    Complicating Factors:  Anterior larynx, large/floppy epiglottis and poor neck/head extension    Findings Post-Intubation:  BS equal bilateral and atraumatic/condition of teeth unchanged

## 2024-03-05 ENCOUNTER — OFFICE VISIT (OUTPATIENT)
Dept: HEMATOLOGY/ONCOLOGY | Facility: CLINIC | Age: 64
End: 2024-03-05
Payer: COMMERCIAL

## 2024-03-05 ENCOUNTER — INFUSION (OUTPATIENT)
Dept: INFUSION THERAPY | Facility: HOSPITAL | Age: 64
End: 2024-03-05
Payer: COMMERCIAL

## 2024-03-05 ENCOUNTER — LAB VISIT (OUTPATIENT)
Dept: LAB | Facility: HOSPITAL | Age: 64
End: 2024-03-05
Payer: COMMERCIAL

## 2024-03-05 VITALS
BODY MASS INDEX: 19.76 KG/M2 | WEIGHT: 133.38 LBS | SYSTOLIC BLOOD PRESSURE: 124 MMHG | WEIGHT: 133.38 LBS | HEIGHT: 69 IN | OXYGEN SATURATION: 97 % | BODY MASS INDEX: 19.76 KG/M2 | HEART RATE: 57 BPM | HEIGHT: 69 IN | DIASTOLIC BLOOD PRESSURE: 71 MMHG | RESPIRATION RATE: 16 BRPM | SYSTOLIC BLOOD PRESSURE: 124 MMHG | HEART RATE: 57 BPM | TEMPERATURE: 98 F | OXYGEN SATURATION: 97 % | DIASTOLIC BLOOD PRESSURE: 71 MMHG | TEMPERATURE: 98 F

## 2024-03-05 DIAGNOSIS — C16.0 MALIGNANT NEOPLASM OF GASTROESOPHAGEAL JUNCTION: Primary | ICD-10-CM

## 2024-03-05 DIAGNOSIS — K20.90 ESOPHAGITIS: ICD-10-CM

## 2024-03-05 DIAGNOSIS — T45.1X5A IMMUNODEFICIENCY DUE TO CHEMOTHERAPY: ICD-10-CM

## 2024-03-05 DIAGNOSIS — E44.0 MODERATE MALNUTRITION: ICD-10-CM

## 2024-03-05 DIAGNOSIS — D84.821 IMMUNODEFICIENCY DUE TO CHEMOTHERAPY: ICD-10-CM

## 2024-03-05 DIAGNOSIS — R53.0 NEOPLASTIC MALIGNANT RELATED FATIGUE: ICD-10-CM

## 2024-03-05 DIAGNOSIS — Z79.899 IMMUNODEFICIENCY DUE TO CHEMOTHERAPY: ICD-10-CM

## 2024-03-05 DIAGNOSIS — C16.0 MALIGNANT NEOPLASM OF GASTROESOPHAGEAL JUNCTION: ICD-10-CM

## 2024-03-05 DIAGNOSIS — J44.9 CHRONIC OBSTRUCTIVE PULMONARY DISEASE, UNSPECIFIED COPD TYPE: ICD-10-CM

## 2024-03-05 LAB
ALBUMIN SERPL BCP-MCNC: 3.4 G/DL (ref 3.5–5.2)
ALP SERPL-CCNC: 107 U/L (ref 55–135)
ALT SERPL W/O P-5'-P-CCNC: 10 U/L (ref 10–44)
ANION GAP SERPL CALC-SCNC: 8 MMOL/L (ref 8–16)
AST SERPL-CCNC: 13 U/L (ref 10–40)
BASOPHILS # BLD AUTO: 0.02 K/UL (ref 0–0.2)
BASOPHILS NFR BLD: 0.2 % (ref 0–1.9)
BILIRUB SERPL-MCNC: 0.3 MG/DL (ref 0.1–1)
BUN SERPL-MCNC: 11 MG/DL (ref 8–23)
CALCIUM SERPL-MCNC: 9.5 MG/DL (ref 8.7–10.5)
CHLORIDE SERPL-SCNC: 103 MMOL/L (ref 95–110)
CO2 SERPL-SCNC: 26 MMOL/L (ref 23–29)
CREAT SERPL-MCNC: 0.8 MG/DL (ref 0.5–1.4)
DIFFERENTIAL METHOD BLD: ABNORMAL
EOSINOPHIL # BLD AUTO: 0 K/UL (ref 0–0.5)
EOSINOPHIL NFR BLD: 0 % (ref 0–8)
ERYTHROCYTE [DISTWIDTH] IN BLOOD BY AUTOMATED COUNT: 13.6 % (ref 11.5–14.5)
EST. GFR  (NO RACE VARIABLE): >60 ML/MIN/1.73 M^2
GLUCOSE SERPL-MCNC: 116 MG/DL (ref 70–110)
HCT VFR BLD AUTO: 39.2 % (ref 40–54)
HGB BLD-MCNC: 13.3 G/DL (ref 14–18)
IMM GRANULOCYTES # BLD AUTO: 0.04 K/UL (ref 0–0.04)
IMM GRANULOCYTES NFR BLD AUTO: 0.5 % (ref 0–0.5)
LYMPHOCYTES # BLD AUTO: 0.9 K/UL (ref 1–4.8)
LYMPHOCYTES NFR BLD: 10.9 % (ref 18–48)
MCH RBC QN AUTO: 33.6 PG (ref 27–31)
MCHC RBC AUTO-ENTMCNC: 33.9 G/DL (ref 32–36)
MCV RBC AUTO: 99 FL (ref 82–98)
MONOCYTES # BLD AUTO: 1 K/UL (ref 0.3–1)
MONOCYTES NFR BLD: 12.7 % (ref 4–15)
NEUTROPHILS # BLD AUTO: 6.2 K/UL (ref 1.8–7.7)
NEUTROPHILS NFR BLD: 75.7 % (ref 38–73)
NRBC BLD-RTO: 0 /100 WBC
PLATELET # BLD AUTO: 268 K/UL (ref 150–450)
PMV BLD AUTO: 8.6 FL (ref 9.2–12.9)
POTASSIUM SERPL-SCNC: 4.5 MMOL/L (ref 3.5–5.1)
PROT SERPL-MCNC: 7 G/DL (ref 6–8.4)
RBC # BLD AUTO: 3.96 M/UL (ref 4.6–6.2)
SODIUM SERPL-SCNC: 137 MMOL/L (ref 136–145)
TSH SERPL DL<=0.005 MIU/L-ACNC: 0.48 UIU/ML (ref 0.4–4)
WBC # BLD AUTO: 8.22 K/UL (ref 3.9–12.7)

## 2024-03-05 PROCEDURE — 1159F MED LIST DOCD IN RCRD: CPT | Mod: CPTII,S$GLB,, | Performed by: INTERNAL MEDICINE

## 2024-03-05 PROCEDURE — 85025 COMPLETE CBC W/AUTO DIFF WBC: CPT | Performed by: PHYSICIAN ASSISTANT

## 2024-03-05 PROCEDURE — A4216 STERILE WATER/SALINE, 10 ML: HCPCS | Performed by: INTERNAL MEDICINE

## 2024-03-05 PROCEDURE — 36415 COLL VENOUS BLD VENIPUNCTURE: CPT | Performed by: PHYSICIAN ASSISTANT

## 2024-03-05 PROCEDURE — 3008F BODY MASS INDEX DOCD: CPT | Mod: CPTII,S$GLB,, | Performed by: INTERNAL MEDICINE

## 2024-03-05 PROCEDURE — 25000003 PHARM REV CODE 250: Performed by: INTERNAL MEDICINE

## 2024-03-05 PROCEDURE — 84443 ASSAY THYROID STIM HORMONE: CPT | Performed by: PHYSICIAN ASSISTANT

## 2024-03-05 PROCEDURE — 99215 OFFICE O/P EST HI 40 MIN: CPT | Mod: S$GLB,,, | Performed by: INTERNAL MEDICINE

## 2024-03-05 PROCEDURE — 99999 PR PBB SHADOW E&M-EST. PATIENT-LVL III: CPT | Mod: PBBFAC,,, | Performed by: INTERNAL MEDICINE

## 2024-03-05 PROCEDURE — 80053 COMPREHEN METABOLIC PANEL: CPT | Performed by: PHYSICIAN ASSISTANT

## 2024-03-05 PROCEDURE — 3074F SYST BP LT 130 MM HG: CPT | Mod: CPTII,S$GLB,, | Performed by: INTERNAL MEDICINE

## 2024-03-05 PROCEDURE — 3078F DIAST BP <80 MM HG: CPT | Mod: CPTII,S$GLB,, | Performed by: INTERNAL MEDICINE

## 2024-03-05 PROCEDURE — 63600175 PHARM REV CODE 636 W HCPCS: Mod: JZ,JG | Performed by: INTERNAL MEDICINE

## 2024-03-05 PROCEDURE — 96413 CHEMO IV INFUSION 1 HR: CPT

## 2024-03-05 RX ORDER — PROCHLORPERAZINE EDISYLATE 5 MG/ML
10 INJECTION INTRAMUSCULAR; INTRAVENOUS ONCE AS NEEDED
Status: CANCELLED
Start: 2024-03-06

## 2024-03-05 RX ORDER — EPINEPHRINE 0.3 MG/.3ML
0.3 INJECTION SUBCUTANEOUS ONCE AS NEEDED
Status: CANCELLED | OUTPATIENT
Start: 2024-03-06

## 2024-03-05 RX ORDER — DIPHENHYDRAMINE HYDROCHLORIDE 50 MG/ML
50 INJECTION INTRAMUSCULAR; INTRAVENOUS ONCE AS NEEDED
Status: CANCELLED | OUTPATIENT
Start: 2024-03-06

## 2024-03-05 RX ORDER — EPINEPHRINE 0.3 MG/.3ML
0.3 INJECTION SUBCUTANEOUS ONCE AS NEEDED
Status: DISCONTINUED | OUTPATIENT
Start: 2024-03-05 | End: 2024-03-05 | Stop reason: HOSPADM

## 2024-03-05 RX ORDER — DIPHENHYDRAMINE HYDROCHLORIDE 50 MG/ML
50 INJECTION INTRAMUSCULAR; INTRAVENOUS ONCE AS NEEDED
Status: DISCONTINUED | OUTPATIENT
Start: 2024-03-05 | End: 2024-03-05 | Stop reason: HOSPADM

## 2024-03-05 RX ORDER — HEPARIN 100 UNIT/ML
500 SYRINGE INTRAVENOUS
Status: DISCONTINUED | OUTPATIENT
Start: 2024-03-05 | End: 2024-03-05 | Stop reason: HOSPADM

## 2024-03-05 RX ORDER — HEPARIN 100 UNIT/ML
500 SYRINGE INTRAVENOUS
Status: CANCELLED | OUTPATIENT
Start: 2024-03-06

## 2024-03-05 RX ORDER — PROCHLORPERAZINE EDISYLATE 5 MG/ML
10 INJECTION INTRAMUSCULAR; INTRAVENOUS ONCE AS NEEDED
Status: DISCONTINUED | OUTPATIENT
Start: 2024-03-05 | End: 2024-03-05 | Stop reason: HOSPADM

## 2024-03-05 RX ORDER — SODIUM CHLORIDE 0.9 % (FLUSH) 0.9 %
10 SYRINGE (ML) INJECTION
Status: CANCELLED | OUTPATIENT
Start: 2024-03-06

## 2024-03-05 RX ORDER — SODIUM CHLORIDE 0.9 % (FLUSH) 0.9 %
10 SYRINGE (ML) INJECTION
Status: DISCONTINUED | OUTPATIENT
Start: 2024-03-05 | End: 2024-03-05 | Stop reason: HOSPADM

## 2024-03-05 RX ADMIN — SODIUM CHLORIDE: 9 INJECTION, SOLUTION INTRAVENOUS at 08:03

## 2024-03-05 RX ADMIN — Medication 10 ML: at 09:03

## 2024-03-05 RX ADMIN — HEPARIN 500 UNITS: 100 SYRINGE at 09:03

## 2024-03-05 RX ADMIN — SODIUM CHLORIDE 480 MG: 9 INJECTION, SOLUTION INTRAVENOUS at 09:03

## 2024-03-05 NOTE — PROGRESS NOTES
MEDICAL ONCOLOGY - ESTABLISHED PATIENT VISIT    Reason for visit: Esophageal adenocarcinoma    Best Contact Phone Number(s): [phone] (home)      Cancer/Stage/TNM:    Cancer Staging   Malignant neoplasm of gastroesophageal junction  Staging form: Esophagus - Adenocarcinoma, AJCC 8th Edition  - Clinical stage from 6/13/2023: Stage III (cT3, cN1, cM0, G2) - Signed by Sunday Jasso MD on 7/5/2023       Oncology History   Malignant neoplasm of gastroesophageal junction   6/13/2023 Cancer Staged    Staging form: Esophagus - Adenocarcinoma, AJCC 8th Edition  - Clinical stage from 6/13/2023: Stage III (cT3, cN1, cM0, G2)     6/23/2023 Initial Diagnosis    Malignant neoplasm of gastroesophageal junction     7/3/2023 Tumor Conference     [facility] UGI MULTIDISCIPLINARY TUMOR BOARD  PATIENT REVIEW FORM   ____________________________________________________________    CLINIC #: [id number]   DATE: 7/3/2023    DIAGNOSIS: esophageal CA    PRESENTER: Mitch    PATIENT SUMMARY:   This 62 y/o gentleman is a long time smoker with emphysema who had low dose screening lung CT in 4/2023 per his PCP. Given an abnormality on this scan, he had PET on 6/1/23 which identified large hypermetabolic mid esophageal mass with lymph nodes. He underwent EGD on 6/13/23 that found large fungating ulcerated mass with bleeding in middle third of esophagus to lower esophagus, 34-44cm. Pathology revealed moderately differentiated adenocarcinoma.   Staging PET reviewed - level 2 cervical lymph node with FDG uptake, nothing in lungs concerning  Staging EUS today per Dr. Duffy.   He has been evaluated by Dr. Vance in [place] and Dr. June.   He is scheduled to see rad onc, Dr. Jasso, Wednesday and IR for port placement on 7/12/23.     BOARD RECOMMENDATIONS:   Proceed with neoadj CRT, then restaging to consider surgical resection    CONSULT NEEDED:     [] Surgery    [] Hem/Onc    [] Rad/Onc    [] Dietary                 [] Social  Service    [] Psychology       [] AES  [] Radiology     Clinical Stage: Tumor 3 Node(s) 1 Metastasis 0      GROUP STAGE:  [] O    [] 1   [] II     [x] III   []IV  [] Local recurrence     [] Regional recurrence     [] Distant recurrence   Metastatic site(s): none         [x] [facility]anche[facility] Treatment Guidelines reviewed and care planned is consistent with guidelines.         (i.e., NCCN, NCI, PD, ACO, AUA, etc.)    PRESENTATION AT CANCER CONFERENCE:         [x] Prospective    [] Retrospective     [] Follow-Up         7/16/2023 - 8/8/2023 Chemotherapy    Treatment Summary   Plan Name: OP ESOPHAGEAL PACLITAXEL CARBOPLATIN WEEKLY  Treatment Goal: Control  Status: Inactive  Start Date: 7/16/2023  End Date: 8/8/2023  Provider: Delta June MD  Chemotherapy: CARBOplatin (PARAPLATIN) 195 mg in sodium chloride 0.9% 304.5 mL chemo infusion, 195 mg (100 % of original dose 193 mg), Intravenous, Clinic/HOD 1 time, 1 of 1 cycle  Dose modification:   (original dose 193 mg, Cycle 1)  Administration: 195 mg (7/18/2023), 210 mg (7/24/2023), 230 mg (7/31/2023), 210 mg (8/8/2023)  PACLitaxeL (TAXOL) 50 mg/m2 = 84 mg in sodium chloride 0.9% 250 mL chemo infusion, 50 mg/m2 = 84 mg, Intravenous, Clinic/HOD 1 time, 1 of 1 cycle  Administration: 84 mg (7/18/2023)     7/18/2023 - 8/17/2023 Radiation Therapy    Treating physician: Sunday Jasso    Course: C1 Thorax 2023    Treatment Site Ref. ID Energy Dose/Fx (Gy) #Fx Dose Correction (Gy) Total Dose (Gy) Start Date End Date Elapsed Days   IM Esophagus PTV_High 6X 1.8 23 / 23 0 41.4 7/18/2023 8/17/2023 30        11/15/2023 -  Chemotherapy    Treatment Summary   Plan Name: OP NIVOLUMAB 480 MG Q4W  Treatment Goal: Curative  Status: Active  Start Date: 11/15/2023  End Date: 9/18/2024 (Planned)  Provider: Delta June MD  Chemotherapy: [No matching medication found in this treatment plan]     12/1/2023 Procedure    Surgeon(s) and Role: Nas Meyer MD - Primary     Pre-Operative  Diagnosis:   Esophageal adenocarcinoma  Suspected cervical esophagogastric anastomotic stricture     Post-Operative Diagnosis:   Same  Cervical esophagogastric anastomotic stricture      Operative Findings:    Cervical esophagogastric anastomotic stricture. Unable to pass endoscope through stricture initially, but the scope was able to pass after serial dilations to 18 mm.      Procedures:  Esophagogastroduodenoscopy (EGD), with transendoscopic balloon dilation of esophagus (<30 mm)                Interim History:   63 y.o. male with esophageal adenocarcinoma who presents for cycle 5 of adjuvant nivolumab.  He is feeling well overall.    Underwent Esophagogastroduodenoscopy (EGD), with transendoscopic balloon dilation of esophagus (<30 mm) yesterday. No complications. Tolerating IO well without side effects so far.       ECOG status is 0. Presents with his wife today.     ROS:   Review of Systems   Constitutional:  Negative for chills, fever, malaise/fatigue and weight loss.   HENT:  Negative for sore throat.    Eyes:  Negative for blurred vision and pain.   Respiratory:  Negative for cough and shortness of breath.    Cardiovascular:  Negative for chest pain and leg swelling.   Gastrointestinal:  Negative for abdominal pain, constipation, diarrhea, heartburn, nausea and vomiting.        Dysphagia   Genitourinary:  Negative for dysuria and frequency.   Musculoskeletal:  Negative for back pain, falls and myalgias.   Skin:  Negative for rash.   Neurological:  Negative for dizziness, weakness and headaches.   Endo/Heme/Allergies:  Does not bruise/bleed easily.   Psychiatric/Behavioral:  Negative for depression. The patient is not nervous/anxious.    All other systems reviewed and are negative.      Past Medical History:   Past Medical History:   Diagnosis Date    Arthritis     Cancer     COPD (chronic obstructive pulmonary disease)         Past Surgical History:   Past Surgical History:   Procedure Laterality Date     CERVICAL FUSION      COLONOSCOPY N/A 3/27/2018    Procedure: COLONOSCOPY;  Surgeon: Maria Teresa Morocho MD;  Location: [facility] ENDO;  Service: Endoscopy;  Laterality: N/A;    COLONOSCOPY N/A 6/13/2023    Procedure: COLONOSCOPY;  Surgeon: Kelli Snell MD;  Location: [facility] ENDO;  Service: Endoscopy;  Laterality: N/A;    EGD, WITH BALLOON DILATION N/A 12/1/2023    Procedure: EGD, WITH BALLOON DILATION;  Surgeon: Nas Meyer MD;  Location: [facility] OR 2ND FLR;  Service: General;  Laterality: N/A;    EGD, WITH BALLOON DILATION N/A 12/11/2023    Procedure: EGD, WITH BALLOON DILATION;  Surgeon: Nas Meyer MD;  Location: [facility] OR [facility] FLR;  Service: General;  Laterality: N/A;    EGD, WITH BALLOON DILATION N/A 12/20/2023    Procedure: EGD, WITH BALLOON DILATION;  Surgeon: Nas Meyer MD;  [facility];  Service: General;  Laterality: N/A;    EGD, WITH BALLOON DILATION N/A 1/10/2024    Procedure: EGD, WITH BALLOON DILATION;  Surgeon: Nas Meyer MD;  [facility];  Service: General;  Laterality: N/A;    EGD, WITH BALLOON DILATION N/A 2/12/2024    Procedure: EGD, WITH BALLOON DILATION;  Surgeon: Nas Meyer MD;  [facility];  Service: General;  Laterality: N/A;    EGD, WITH BALLOON DILATION N/A 2/23/2024    Procedure: EGD, WITH BALLOON DILATION;  Surgeon: Nas Meyer MD;  [facility];  Service: General;  Laterality: N/A;    EGD, WITH BALLOON DILATION N/A 3/4/2024    Procedure: EGD, WITH BALLOON DILATION;  Surgeon: Nas Meyer MD;  Location: [facility] OR 2ND FLR;  Service: General;  Laterality: N/A;    ENDOSCOPIC ULTRASOUND OF UPPER GASTROINTESTINAL TRACT N/A 7/3/2023    Procedure: ULTRASOUND, UPPER GI TRACT, ENDOSCOPIC;  Surgeon: Ray Duffy MD;  Location: [facility] ENDO;  Service: Endoscopy;  Laterality: N/A;    ESOPHAGOGASTRODUODENOSCOPY N/A 6/13/2023    Procedure: EGD (ESOPHAGOGASTRODUODENOSCOPY);  Surgeon: Kelli Snell MD;   Location: [facility] ENDO;  Service: Endoscopy;  Laterality: N/A;    ESOPHAGOGASTRODUODENOSCOPY N/A 7/3/2023    Procedure: EGD (ESOPHAGOGASTRODUODENOSCOPY);  Surgeon: Ray Duffy MD;  Location: [facility];  Service: Endoscopy;  Laterality: N/A;    INGUINAL HERNIA REPAIR Bilateral     Right x2, x1 left    PLACEMENT OF JEJUNOSTOMY TUBE  10/4/2023    Procedure: INSERTION, JEJUNOSTOMY TUBE;  Surgeon: Nas Meyer MD;  [facility];  Service: General;;    PYLOROMYOTOMY  10/4/2023    Procedure: PYLOROMYOTOMY;  Surgeon: Nas Meyer MD;  Location: [facility] OR [address] [place] [address];  Service: General;;    ROBOT-ASSISTED SURGICAL REMOVAL OF ESOPHAGUS USING DA BALWINDER XI N/A 10/4/2023    Procedure: XI ROBOTIC ESOPHAGECTOMY;  Surgeon: Nas Meyer MD;  [facility];  Service: General;  Laterality: N/A;    ROBOTIC REPAIR, HERNIA, PARAESOPHAGEAL  10/4/2023    Procedure: ROBOTIC REPAIR, HERNIA, PARAESOPHAGEAL;  Surgeon: Nas Meyer MD;  Location: [facility] OR [address] [place] [address];  Service: General;;    ROTATOR CUFF REPAIR Right     x2        Family History:   Family History   Problem Relation Age of Onset    Diabetes Mother     Heart disease Father         CHF    Glaucoma Neg Hx     Colon cancer Neg Hx     Prostate cancer Neg Hx         Social History:   Social History     Tobacco Use    Smoking status: Former     Current packs/day: 0.25     Average packs/day: 0.3 packs/day for 41.2 years (10.3 ttl pk-yrs)     Types: Cigarettes     Start date: 1983     Passive exposure: Current    Smokeless tobacco: Never    Tobacco comments:     Done smoking since september   Substance Use Topics    Alcohol use: Yes     Comment: a couple of beers a day        I have reviewed and updated the patient's past medical, surgical, family and social histories.    Allergies:   Review of patient's allergies indicates:  No Known Allergies     Medications:   Current Outpatient Medications   Medication Sig Dispense Refill    acetaminophen (TYLENOL)  "500 MG tablet Take 1 tablet (500 mg total) by mouth every 6 (six) hours as needed for Pain. (Patient not taking: Reported on 1/9/2024)  0    cholecalciferol, vitamin D3, (VITAMIN D3) 10 mcg (400 unit) Tab Take by mouth once daily.      LIDOcaine-prilocaine (EMLA) cream Apply topically as needed (Port access). (Patient not taking: Reported on 7/31/2023) 30 g 1    oxyCODONE (ROXICODONE) 5 mg/5 mL Soln 5 mLs (5 mg total) by Per J Tube route every 4 (four) hours as needed. (Patient not taking: Reported on 10/11/2023) 210 mL 0    pantoprazole (PROTONIX) 40 MG tablet Take 1 tablet (40 mg total) by mouth once daily. (Patient not taking: Reported on 3/5/2024) 90 tablet 3    sildenafil (VIAGRA) 100 MG tablet Take 1 tablet (100 mg total) by mouth daily as needed for Erectile Dysfunction. (Patient not taking: Reported on 10/11/2023) 10 tablet 6     No current facility-administered medications for this visit.        Physical Exam:   /71 (BP Location: Right arm, Patient Position: Sitting, BP Method: Medium (Automatic))   Pulse (!) 57   Temp 98 °F (36.7 °C) (Oral)   Ht 5' 9" (1.753 m)   Wt 60.5 kg (133 lb 6.1 oz)   SpO2 97%   BMI 19.70 kg/m²      ECOG Performance status: 0          Physical Exam  Vitals reviewed.   Constitutional:       General: He is not in acute distress.     Appearance: Normal appearance. He is normal weight.   HENT:      Head: Normocephalic and atraumatic.      Right Ear: External ear normal.      Left Ear: External ear normal.      Nose: Nose normal.      Mouth/Throat:      Mouth: Mucous membranes are moist.      Pharynx: Oropharynx is clear. No posterior oropharyngeal erythema.   Eyes:      General: No scleral icterus.     Extraocular Movements: Extraocular movements intact.      Conjunctiva/sclera: Conjunctivae normal.      Pupils: Pupils are equal, round, and reactive to light.   Cardiovascular:      Rate and Rhythm: Normal rate and regular rhythm.      Pulses: Normal pulses.      Heart " sounds: Normal heart sounds.   Pulmonary:      Effort: Pulmonary effort is normal.      Breath sounds: Normal breath sounds. No wheezing or rales.   Abdominal:      General: Bowel sounds are normal. There is no distension.      Palpations: Abdomen is soft.      Tenderness: There is no abdominal tenderness.      Comments: Surgical wounds well healing   Musculoskeletal:         General: No swelling. Normal range of motion.      Cervical back: Normal range of motion and neck supple.   Skin:     General: Skin is warm.      Coloration: Skin is not jaundiced.      Findings: No erythema or rash.   Neurological:      General: No focal deficit present.      Mental Status: He is alert and oriented to person, place, and time. Mental status is at baseline.      Gait: Gait normal.   Psychiatric:         Mood and Affect: Mood normal.         Behavior: Behavior normal.         Thought Content: Thought content normal.         Judgment: Judgment normal.           Labs:   Recent Results (from the past 48 hour(s))   CBC W/ AUTO DIFFERENTIAL    Collection Time: 03/05/24  7:34 AM   Result Value Ref Range    WBC 8.22 3.90 - 12.70 K/uL    RBC 3.96 (L) 4.60 - 6.20 M/uL    Hemoglobin 13.3 (L) 14.0 - 18.0 g/dL    Hematocrit 39.2 (L) 40.0 - 54.0 %    MCV 99 (H) 82 - 98 fL    MCH 33.6 (H) 27.0 - 31.0 pg    MCHC 33.9 32.0 - 36.0 g/dL    RDW 13.6 11.5 - 14.5 %    Platelets 268 150 - 450 K/uL    MPV 8.6 (L) 9.2 - 12.9 fL    Immature Granulocytes 0.5 0.0 - 0.5 %    Gran # (ANC) 6.2 1.8 - 7.7 K/uL    Immature Grans (Abs) 0.04 0.00 - 0.04 K/uL    Lymph # 0.9 (L) 1.0 - 4.8 K/uL    Mono # 1.0 0.3 - 1.0 K/uL    Eos # 0.0 0.0 - 0.5 K/uL    Baso # 0.02 0.00 - 0.20 K/uL    nRBC 0 0 /100 WBC    Gran % 75.7 (H) 38.0 - 73.0 %    Lymph % 10.9 (L) 18.0 - 48.0 %    Mono % 12.7 4.0 - 15.0 %    Eosinophil % 0.0 0.0 - 8.0 %    Basophil % 0.2 0.0 - 1.9 %    Differential Method Automated    Comprehensive Metabolic Panel    Collection Time: 03/05/24  7:34 AM   Result  Value Ref Range    Sodium 137 136 - 145 mmol/L    Potassium 4.5 3.5 - 5.1 mmol/L    Chloride 103 95 - 110 mmol/L    CO2 26 23 - 29 mmol/L    Glucose 116 (H) 70 - 110 mg/dL    BUN 11 8 - 23 mg/dL    Creatinine 0.8 0.5 - 1.4 mg/dL    Calcium 9.5 8.7 - 10.5 mg/dL    Total Protein 7.0 6.0 - 8.4 g/dL    Albumin 3.4 (L) 3.5 - 5.2 g/dL    Total Bilirubin 0.3 0.1 - 1.0 mg/dL    Alkaline Phosphatase 107 55 - 135 U/L    AST 13 10 - 40 U/L    ALT 10 10 - 44 U/L    eGFR >60.0 >60 mL/min/1.73 m^2    Anion Gap 8 8 - 16 mmol/L          I have reviewed the pertinent labs.  Mild anemia.    Imaging:       PET/CT 9/22/23:    Impression:     1. In this patient with biopsy-proven esophageal adenocarcinoma, there is persistent circumferential soft tissue thickening in the distal esophagus likely extending into the proximal stomach with interval decreased hypermetabolic activity.  Index cervical and AP window lymph nodes are decreased in size/uptake.  No new lymphadenopathy or suspicious lesions elsewhere.  2. Additional stable findings as above.    Path:   2. Gastroesophageal junction mass (biopsy):   Invasive adenocarcinoma, moderately differentiated   Background low and high-grade glandular dysplasia   HER2 immunohistochemistry:  Equivocal.     Immunohistochemistry (IHC) Testing for Mismatch Repair (MMR) Proteins:   MLH1, MSH2, MSH6, PMS2 - Intact nuclear expression   Background nonneoplastic tissue/internal control with intact nuclear expression     There are exceptions to the above IHC interpretations. These results should not be considered in isolation, and clinical correlation with genetic counseling is recommended to assess the need for germline testing.     Interpretation: No loss of nuclear expression of MMR proteins: low probability of microsatellite instability       Assessment:       1. Malignant neoplasm of gastroesophageal junction    2. Immunodeficiency due to chemotherapy    3. Moderate malnutrition    4. Esophagitis     5. Chronic obstructive pulmonary disease, unspecified COPD type             Plan:        # Esophageal adenocarcinoma, chemotherapy induced neutropenia/thrombocytopenia  He initially presented with a locally advanced adenocarcinoma of the middle and lower third of the esophagus, pMMR. PET/CT on 6/1/23 did not show clear distant metastatic disease.  We discussed the case and plan with Dr. Meyer and he feels the patient is surgically resectable as well.    Began cycle 1 on 7/18/23. He unfortunately had a reaction to the Taxol. During the Taxol infusion, he developed coughing, flushing, chest tightness and nausea. O2 sat was 92%, 2L O2 applied NC. We were notified and stopped the Taxol. He was able to proceed with the Carboplatin without complication.   We switched him to Abraxane 40 mg/m2 with week 2.  This was tolerated very well without issue. Has tolerated RT well and has completed 4 cycles of chemoRT. He completed radiation 8/18/23.  We did have to forego his last dose of chemotherapy due to cytopenias.    PET/CT on 9/22/23 showed very nice response to treatment with reduction in SUV avidity of primary tumor.  Discussed with Dr. Meyer.      He underwent esophagogastrectomy with Dr. Meyer on 10/4/23.  Pathology showed ypT2N0 with negative margins and 18 negative lymph nodes.    Because of his residual disease, I have recommended adjuvant nivolumab per [id number]. He was in agreement.    Began cycle 1 on 11/15/23.  Doing well and continues to do well.  Presents today for cycle 5.   Proceed with cycle 5 today    RTC in 4 weeks for next cycle.   Will perform surveillance CT in April 2024.    # Esophagitis, stricture  S/p multiple dilations. Feeding tube has been removed.  Underwent Esophagogastroduodenoscopy (EGD), with transendoscopic balloon dilation of esophagus (<30 mm) 3/4/2024    # COPD, tobacco abuse, aortic atherosclerosis  Counseled on tobacco cessation.  He has stopped smoking.  Normal SpO2.  No  dyspnea.  Atherosclerosis noted on imaging.    # Malnutrition  Following with surgery team.  Weight stable.      Patient is in agreement with the proposed treatment plan. All questions were answered to the patient's satisfaction. Pt knows to call clinic if anything is needed before the next clinic visit.    D/W Dr. Shaylee Whitley MD  Hematology and Medical Oncology fellow       Follow up:  CT CAP scheduled 3/27/2024  Lab/appt scheduled on 3/28  Next infusion scheduled 4/2/2024    Route Chart for Scheduling    Med Onc Chart Routing      Follow up with physician . Scheduled   Follow up with DEMARCO    Infusion scheduling note    Injection scheduling note    Labs    Imaging    Pharmacy appointment    Other referrals              Treatment Plan Information   OP NIVOLUMAB 480 MG Q4W   Delta June MD   Upcoming Treatment Dates - OP NIVOLUMAB 480 MG Q4W    3/6/2024       Chemotherapy       nivolumab 480 mg in sodium chloride 0.9% 98 mL infusion  4/3/2024       Chemotherapy       nivolumab 480 mg in sodium chloride 0.9% 98 mL infusion  5/1/2024       Chemotherapy       nivolumab 480 mg in sodium chloride 0.9% 98 mL infusion  5/29/2024       Chemotherapy       nivolumab 480 mg in sodium chloride 0.9% 98 mL infusion

## 2024-03-10 ENCOUNTER — PATIENT MESSAGE (OUTPATIENT)
Dept: HEMATOLOGY/ONCOLOGY | Facility: CLINIC | Age: 64
End: 2024-03-10
Payer: COMMERCIAL

## 2024-03-11 ENCOUNTER — PATIENT MESSAGE (OUTPATIENT)
Dept: SURGERY | Facility: CLINIC | Age: 64
End: 2024-03-11
Payer: COMMERCIAL

## 2024-03-11 DIAGNOSIS — R13.19 ESOPHAGEAL DYSPHAGIA: Primary | ICD-10-CM

## 2024-03-12 ENCOUNTER — HOSPITAL ENCOUNTER (OUTPATIENT)
Dept: RADIOLOGY | Facility: HOSPITAL | Age: 64
Discharge: HOME | End: 2024-03-12
Attending: SURGERY
Payer: COMMERCIAL

## 2024-03-12 DIAGNOSIS — R13.19 ESOPHAGEAL DYSPHAGIA: ICD-10-CM

## 2024-03-12 PROCEDURE — 74220 X-RAY XM ESOPHAGUS 1CNTRST: CPT | Mod: 26,,, | Performed by: INTERNAL MEDICINE

## 2024-03-12 PROCEDURE — 74220 X-RAY XM ESOPHAGUS 1CNTRST: CPT | Mod: TC

## 2024-03-12 PROCEDURE — 25500020 PHARM REV CODE 255: Performed by: SURGERY

## 2024-03-12 PROCEDURE — A9698 NON-RAD CONTRAST MATERIALNOC: HCPCS | Performed by: SURGERY

## 2024-03-12 RX ADMIN — BARIUM SULFATE 80 ML: 0.6 SUSPENSION ORAL at 01:03

## 2024-03-14 DIAGNOSIS — R13.19 ESOPHAGEAL DYSPHAGIA: Primary | ICD-10-CM

## 2024-03-18 ENCOUNTER — TELEPHONE (OUTPATIENT)
Dept: SURGERY | Facility: CLINIC | Age: 64
End: 2024-03-18
Payer: COMMERCIAL

## 2024-03-18 ENCOUNTER — ANESTHESIA EVENT (OUTPATIENT)
Dept: SURGERY | Facility: HOSPITAL | Age: 64
End: 2024-03-18
Payer: COMMERCIAL

## 2024-03-18 DIAGNOSIS — R13.19 ESOPHAGEAL DYSPHAGIA: Primary | ICD-10-CM

## 2024-03-18 NOTE — TELEPHONE ENCOUNTER
"Call placed to pt. Spoke with pt spouse. Confirmed procedure with Dr. Meyer on 3/19/2024. Arrival time 0500 and surgery time 0700. Offered to review procedure instructions. Pt spouse declined stating "We know the drill". Pt spouse verbalized understanding.   "

## 2024-03-19 ENCOUNTER — ANESTHESIA (OUTPATIENT)
Dept: SURGERY | Facility: HOSPITAL | Age: 64
End: 2024-03-19
Payer: COMMERCIAL

## 2024-03-19 ENCOUNTER — HOSPITAL ENCOUNTER (OUTPATIENT)
Facility: HOSPITAL | Age: 64
Discharge: HOME | End: 2024-03-19
Attending: SURGERY | Admitting: SURGERY
Payer: COMMERCIAL

## 2024-03-19 VITALS
HEART RATE: 76 BPM | TEMPERATURE: 98 F | HEIGHT: 69 IN | BODY MASS INDEX: 19.85 KG/M2 | DIASTOLIC BLOOD PRESSURE: 59 MMHG | WEIGHT: 134 LBS | SYSTOLIC BLOOD PRESSURE: 94 MMHG | RESPIRATION RATE: 18 BRPM | OXYGEN SATURATION: 96 %

## 2024-03-19 DIAGNOSIS — R13.19 ESOPHAGEAL DYSPHAGIA: ICD-10-CM

## 2024-03-19 PROCEDURE — D9220A PRA ANESTHESIA: Mod: ANES,,, | Performed by: ANESTHESIOLOGY

## 2024-03-19 PROCEDURE — 63600175 PHARM REV CODE 636 W HCPCS: Performed by: NURSE ANESTHETIST, CERTIFIED REGISTERED

## 2024-03-19 PROCEDURE — 37000009 HC ANESTHESIA EA ADD 15 MINS: Performed by: SURGERY

## 2024-03-19 PROCEDURE — D9220A PRA ANESTHESIA: Mod: CRNA,,, | Performed by: NURSE ANESTHETIST, CERTIFIED REGISTERED

## 2024-03-19 PROCEDURE — 71000044 HC DOSC ROUTINE RECOVERY FIRST HOUR: Performed by: SURGERY

## 2024-03-19 PROCEDURE — 43249 ESOPH EGD DILATION <30 MM: CPT | Mod: ,,, | Performed by: SURGERY

## 2024-03-19 PROCEDURE — 36000706: Performed by: SURGERY

## 2024-03-19 PROCEDURE — C1726 CATH, BAL DIL, NON-VASCULAR: HCPCS | Performed by: SURGERY

## 2024-03-19 PROCEDURE — 37000008 HC ANESTHESIA 1ST 15 MINUTES: Performed by: SURGERY

## 2024-03-19 PROCEDURE — 71000015 HC POSTOP RECOV 1ST HR: Performed by: SURGERY

## 2024-03-19 PROCEDURE — 25000003 PHARM REV CODE 250: Performed by: NURSE ANESTHETIST, CERTIFIED REGISTERED

## 2024-03-19 PROCEDURE — 63600175 PHARM REV CODE 636 W HCPCS: Mod: JZ,JG | Performed by: SURGERY

## 2024-03-19 PROCEDURE — 36000707: Performed by: SURGERY

## 2024-03-19 RX ORDER — LIDOCAINE HYDROCHLORIDE 20 MG/ML
INJECTION, SOLUTION EPIDURAL; INFILTRATION; INTRACAUDAL; PERINEURAL
Status: DISCONTINUED | OUTPATIENT
Start: 2024-03-19 | End: 2024-03-19

## 2024-03-19 RX ORDER — ONDANSETRON HYDROCHLORIDE 2 MG/ML
INJECTION, SOLUTION INTRAVENOUS
Status: DISCONTINUED | OUTPATIENT
Start: 2024-03-19 | End: 2024-03-19

## 2024-03-19 RX ORDER — PROCHLORPERAZINE EDISYLATE 5 MG/ML
5 INJECTION INTRAMUSCULAR; INTRAVENOUS EVERY 30 MIN PRN
Status: DISCONTINUED | OUTPATIENT
Start: 2024-03-19 | End: 2024-03-19 | Stop reason: HOSPADM

## 2024-03-19 RX ORDER — OXYCODONE AND ACETAMINOPHEN 5; 325 MG/1; MG/1
1 TABLET ORAL
Status: DISCONTINUED | OUTPATIENT
Start: 2024-03-19 | End: 2024-03-19 | Stop reason: HOSPADM

## 2024-03-19 RX ORDER — ONDANSETRON HYDROCHLORIDE 2 MG/ML
4 INJECTION, SOLUTION INTRAVENOUS DAILY PRN
Status: DISCONTINUED | OUTPATIENT
Start: 2024-03-19 | End: 2024-03-19 | Stop reason: HOSPADM

## 2024-03-19 RX ORDER — VASOPRESSIN 20 [USP'U]/ML
INJECTION, SOLUTION INTRAMUSCULAR; SUBCUTANEOUS
Status: DISCONTINUED | OUTPATIENT
Start: 2024-03-19 | End: 2024-03-19

## 2024-03-19 RX ORDER — DEXAMETHASONE SODIUM PHOSPHATE 4 MG/ML
INJECTION, SOLUTION INTRA-ARTICULAR; INTRALESIONAL; INTRAMUSCULAR; INTRAVENOUS; SOFT TISSUE
Status: DISCONTINUED | OUTPATIENT
Start: 2024-03-19 | End: 2024-03-19

## 2024-03-19 RX ORDER — FENTANYL CITRATE 50 UG/ML
25 INJECTION, SOLUTION INTRAMUSCULAR; INTRAVENOUS EVERY 5 MIN PRN
Status: DISCONTINUED | OUTPATIENT
Start: 2024-03-19 | End: 2024-03-19 | Stop reason: HOSPADM

## 2024-03-19 RX ORDER — DEXMEDETOMIDINE HYDROCHLORIDE 100 UG/ML
INJECTION, SOLUTION INTRAVENOUS
Status: DISCONTINUED | OUTPATIENT
Start: 2024-03-19 | End: 2024-03-19

## 2024-03-19 RX ORDER — PHENYLEPHRINE HCL IN 0.9% NACL 1 MG/10 ML
SYRINGE (ML) INTRAVENOUS
Status: DISCONTINUED | OUTPATIENT
Start: 2024-03-19 | End: 2024-03-19

## 2024-03-19 RX ORDER — FENTANYL CITRATE 50 UG/ML
INJECTION, SOLUTION INTRAMUSCULAR; INTRAVENOUS
Status: DISCONTINUED | OUTPATIENT
Start: 2024-03-19 | End: 2024-03-19

## 2024-03-19 RX ORDER — MIDAZOLAM HYDROCHLORIDE 1 MG/ML
INJECTION, SOLUTION INTRAMUSCULAR; INTRAVENOUS
Status: DISCONTINUED | OUTPATIENT
Start: 2024-03-19 | End: 2024-03-19

## 2024-03-19 RX ORDER — SUCCINYLCHOLINE CHLORIDE 20 MG/ML
INJECTION INTRAMUSCULAR; INTRAVENOUS
Status: DISCONTINUED | OUTPATIENT
Start: 2024-03-19 | End: 2024-03-19

## 2024-03-19 RX ORDER — PROPOFOL 10 MG/ML
VIAL (ML) INTRAVENOUS
Status: DISCONTINUED | OUTPATIENT
Start: 2024-03-19 | End: 2024-03-19

## 2024-03-19 RX ORDER — ROCURONIUM BROMIDE 10 MG/ML
INJECTION, SOLUTION INTRAVENOUS
Status: DISCONTINUED | OUTPATIENT
Start: 2024-03-19 | End: 2024-03-19

## 2024-03-19 RX ADMIN — Medication 100 MCG: at 07:03

## 2024-03-19 RX ADMIN — MIDAZOLAM 2 MG: 1 INJECTION INTRAMUSCULAR; INTRAVENOUS at 07:03

## 2024-03-19 RX ADMIN — ROCURONIUM BROMIDE 10 MG: 10 INJECTION, SOLUTION INTRAVENOUS at 07:03

## 2024-03-19 RX ADMIN — ONDANSETRON 4 MG: 2 INJECTION INTRAMUSCULAR; INTRAVENOUS at 07:03

## 2024-03-19 RX ADMIN — DEXAMETHASONE SODIUM PHOSPHATE 8 MG: 4 INJECTION INTRA-ARTICULAR; INTRALESIONAL; INTRAMUSCULAR; INTRAVENOUS; SOFT TISSUE at 07:03

## 2024-03-19 RX ADMIN — VASOPRESSIN 0.4 UNITS: 20 INJECTION INTRAVENOUS at 07:03

## 2024-03-19 RX ADMIN — LIDOCAINE HYDROCHLORIDE 50 MG: 20 INJECTION, SOLUTION EPIDURAL; INFILTRATION; INTRACAUDAL at 07:03

## 2024-03-19 RX ADMIN — DEXMEDETOMIDINE 8 MCG: 200 INJECTION, SOLUTION INTRAVENOUS at 07:03

## 2024-03-19 RX ADMIN — FENTANYL CITRATE 100 MCG: 50 INJECTION INTRAMUSCULAR; INTRAVENOUS at 07:03

## 2024-03-19 RX ADMIN — SODIUM CHLORIDE: 0.9 INJECTION, SOLUTION INTRAVENOUS at 07:03

## 2024-03-19 RX ADMIN — SUCCINYLCHOLINE CHLORIDE 120 MG: 20 INJECTION, SOLUTION INTRAMUSCULAR; INTRAVENOUS; PARENTERAL at 07:03

## 2024-03-19 RX ADMIN — Medication 300 MCG: at 07:03

## 2024-03-19 RX ADMIN — GLYCOPYRROLATE 0.2 MG: 0.2 INJECTION, SOLUTION INTRAMUSCULAR; INTRAVENOUS at 07:03

## 2024-03-19 RX ADMIN — PROPOFOL 200 MG: 10 INJECTION, EMULSION INTRAVENOUS at 07:03

## 2024-03-19 RX ADMIN — VASOPRESSIN 0.4 UNITS: 20 INJECTION INTRAVENOUS at 08:03

## 2024-03-19 NOTE — ANESTHESIA PROCEDURE NOTES
Intubation    Date/Time: 3/19/2024 7:12 AM    Performed by: Titus Yanes III CRNA  Authorized by: Yovany Elizondo MD    Intubation:     Induction:  Intravenous    Intubated:  Postinduction    Mask Ventilation:  Easy mask    Attempts:  2    Attempted By:  CRNA    Method of Intubation:  Video laryngoscopy    Blade:  Zapata 3    Laryngeal View Grade: Grade IIb - only the arytenoids and epiglottis seen      Attempted By (2nd Attempt):  CRNA    Method of Intubation (2nd Attempt):  Video laryngoscopy    Blade (2nd Attempt):  Zapata 3    Laryngeal View Grade (2nd Attempt): Grade IIb - only the arytenoids and epiglottis seen      Laryngeal View Grade (2nd Attempt) comment:  Jw    Difficult Airway Encountered?: Yes      Future Airway Recommendations:  Teresita    Complications:  None    Airway Device:  Oral endotracheal tube    Airway Device Size:  7.0    Style/Cuff Inflation:  Cuffed (inflated to minimal occlusive pressure)    Inflation Amount (mL):  5    Tube secured:  22    Secured at:  The lips    Placement Verified By:  Capnometry    Findings Post-Intubation:  BS equal bilateral and atraumatic/condition of teeth unchanged  Notes:      First intubation cuff rupture

## 2024-03-19 NOTE — OP NOTE
[facility] - Surgery ([facility])  Surgery Department  Operative Note       Date of Procedure: 3/19/2024     Surgeon(s):  Surgeon(s) and Role:     * Nas Meyer MD - Primary     * Saturnino Draper MD - Resident - Assisting      Pre-Operative Diagnosis:   Esophageal dysphagia [R13.19]    Post-Operative Diagnosis:   Same      Anesthesia: General    Operative Findings:   Persistent cervical esophagogastric anastomotic stricture at 20 cm. Unable to pass endoscope initially but scope passed easily after dilation to 15 mm     Procedures:  Esophagogastroduodenoscopy (EGD), with transendoscopic balloon dilation of esophagus (<30 mm)  EGD, with directed submucosal injection of steroid    Estimated Blood Loss (EBL): <2 mL    Specimen(s):    Specimen (24h ago, onward)      None                   Indications:  Blayne Beebe is a 63 year old man with cervical esophagogastric anastomotic stricture following esophagectomy who presents for EGD and anastomotic dilation with submucosal steroid injection. Risks and benefits were reviewed including bleeding, infection, damage to local structures, perforation of GI tract, need for additional procedures, death, and imponderables.  He understands and signed written informed consent to proceed.     Details:  The patient was transported to the operating room and satisfactory anesthesia established.  Preoperative antibiotics were administered. The patient was placed in the supine position    The adult gastroscope was introduced into the mouth, passed into the hypopharynx and cervical esophagus under endoscopic vision. The anastomosis was at 20 cm.  There was a moderate stenosis. I was unable to pass the adult endoscope without dilation. This was traversed with a balloon dilator, and dilated up progressively from 15-20 mm, held for 1 minute at each interval and 20 minutes at 20 mm.  In between dilations, the stenosis was examined and was without perforation. Once it was dilated to 15  mm, we were able to traverse the stricture. There was some expected/intended bleeding.      Once we had dilated to 20 mm, a total of 100 units of Botox was injected submucosally in four quadrants around the area of the stricture. The endoscope was advanced past the anastomosis into the gastric conduit, and distally to the pylorus. Upon withdrawal, the mucosa was examined circumferentially, with findings of normal healthy mucosa.      I communicated the intraoperative findings with the family following the procedure.     Condition: Good    Disposition: PACU - hemodynamically stable.    Attestation: I was present and scrubbed for the entire procedure.    Nas Meyer MD  Staff Surgeon  Surgical Oncology

## 2024-03-19 NOTE — ANESTHESIA POSTPROCEDURE EVALUATION
Anesthesia Post Evaluation    Patient: Blayne D III Steib    Procedure(s) Performed: Procedure(s) (LRB):  EGD, WITH BALLOON DILATION (N/A)    Final Anesthesia Type: general      Patient location during evaluation: PACU  Patient participation: Yes- Able to Participate  Level of consciousness: awake and alert and oriented  Post-procedure vital signs: reviewed and stable  Pain management: adequate  Airway patency: patent    PONV status at discharge: No PONV  Anesthetic complications: no      Cardiovascular status: hemodynamically stable  Respiratory status: unassisted, spontaneous ventilation and room air  Hydration status: euvolemic  Follow-up not needed.              Vitals Value Taken Time   BP 94/59 03/19/24 0902   Temp 36.5 °C (97.7 °F) 03/19/24 0900   Pulse 75 03/19/24 0916   Resp 19 03/19/24 0916   SpO2 95 % 03/19/24 0916   Vitals shown include unvalidated device data.      No case tracking events are documented in the log.      Pain/Yessy Score: Yessy Score: 10 (3/19/2024  9:15 AM)

## 2024-03-19 NOTE — PLAN OF CARE
Chart reviewed. Preop nursing care completed per orders. Safe surgery checklist complete aside from H&P update. Wife at bedside and to take belongings. Call bell within reach. Instructed pt to call for assistance.

## 2024-03-19 NOTE — H&P
General Surgery: History & Physical     Chief Complaint: Esophageal stricture     Subjective:  Mr. Beebe is a 63 y.o. male who presents for EGD with balloon dilation       On examination, Mr. Beebe is sitting comfortably in the room upon entering in no acute distress. He denies headaches, malaise, shortness of breath, chest pain, or nausea, vomiting, fever, chills, or nightsweats. He denies any abdominal pain, and the abdomen is soft, nontender, and nondistended on palpation.      PMHx, SHx, FHx, SocHx, Allergies, Medications:  Mr. Beebe  has a past medical history of Arthritis, Cancer, and COPD (chronic obstructive pulmonary disease).      He  has a past surgical history that includes Cervical fusion; Rotator cuff repair (Right); Inguinal hernia repair (Bilateral); Colonoscopy (N/A, 3/27/2018); Colonoscopy (N/A, 6/13/2023); Esophagogastroduodenoscopy (N/A, 6/13/2023); Endoscopic ultrasound of upper gastrointestinal tract (N/A, 7/3/2023); Esophagogastroduodenoscopy (N/A, 7/3/2023); Robot-assisted surgical removal of esophagus using da Zabrina Xi (N/A, 10/4/2023); Placement of jejunostomy tube (10/4/2023); robotic repair, hernia, paraesophageal (10/4/2023); Pyloromyotomy (10/4/2023); egd, with balloon dilation (N/A, 12/1/2023); egd, with balloon dilation (N/A, 12/11/2023); egd, with balloon dilation (N/A, 12/20/2023); egd, with balloon dilation (N/A, 1/10/2024); egd, with balloon dilation (N/A, 2/12/2024); egd, with balloon dilation (N/A, 2/23/2024); and egd, with balloon dilation (N/A, 3/4/2024).     He  reports that he has quit smoking. His smoking use included cigarettes. He started smoking about 41 years ago. He has a 10.3 pack-year smoking history. He has been exposed to tobacco smoke. He has never used smokeless tobacco. He reports current alcohol use. He reports that he does not use drugs.      Mr. Beebe's family history includes Diabetes in his mother; Heart disease in his father.  He has No Known Allergies.    "  Mr. Beebe has a current medication list which includes the following prescription(s): acetaminophen, cholecalciferol (vitamin d3), lidocaine-prilocaine, oxycodone, pantoprazole, and sildenafil.     ROS:  Pertinent items from 14 system review are noted in HPI, all others negative      Objective:  /69   Pulse 68   Temp 98.1 °F (36.7 °C) (Temporal)   Resp 16   Ht 5' 9" (1.753 m)   Wt 60.8 kg (134 lb)   SpO2 99%   BMI 19.79 kg/m²      Physical Exam:  Gen: no acute distress, alert and oriented  HEENT: extraocular movements intact   CV: regular rate and rhythm   Pulm: no increased work of breathing, symmetrical chest rise  Abd: Soft, nontender, nondistended  Extr: moves all extremities well  Neuro: CN II-XII grossly intact w/o focal deficit  Integument: Normal turgor and tone. No jaundice.  Psych: appropriate affect, normal speech     Assessment:   Mr. Beebe is a 63 y.o. male who presents for EGD with balloon dilation       After discussing the alternatives, benefits, and risks for the proposed operation, Mr. Beebe wished to proceed. All of Mr. Beebe questions concerning the operation were answered, he expressed full understanding of the procedure and the risks/benefits associated, and signed informed consent was obtained.     Plan:  -OR for EGD with balloon dilation    -informed consent obtained      Case discussed, reviewed, and helped formulated by Dr. Meyer, attending physician, who agrees with the above plan.      John Leija MD  General Surgery  3/19/2024       "

## 2024-03-19 NOTE — BRIEF OP NOTE
[facility] - Surgery ([facility])  Brief Operative Note    Surgery Date: 3/19/2024     Surgeon(s) and Role:     * Nas Meyer MD - Primary     * Saturnino Draper MD - Resident - Assisting        Pre-op Diagnosis:  Esophageal dysphagia [R13.19]    Post-op Diagnosis:  Post-Op Diagnosis Codes:     * Esophageal dysphagia [R13.19]    Procedure(s) (LRB):  EGD, WITH BALLOON DILATION (N/A)    Anesthesia: General    Operative Findings: EGD demonstrating recurrent stenosis at esophageal-gastric conduit anastomosis. Dilated up to 20 mm. Injected with botox in 4 quadrants.    Estimated Blood Loss: Minimal         Specimens:   Specimen (24h ago, onward)      None              Discharge Note    OUTCOME: Patient tolerated treatment/procedure well without complication and is now ready for discharge.    DISPOSITION: Home or Self Care    FINAL DIAGNOSIS:  Esophageal Stenosis    FOLLOWUP:  As needed    DISCHARGE INSTRUCTIONS:    Discharge Procedure Orders   Notify your health care provider if you experience any of the following:  temperature >100.4     Notify your health care provider if you experience any of the following:  persistent nausea and vomiting or diarrhea     Notify your health care provider if you experience any of the following:  severe uncontrolled pain     Notify your health care provider if you experience any of the following:  redness, tenderness, or signs of infection (pain, swelling, redness, odor or green/yellow discharge around incision site)     Notify your health care provider if you experience any of the following:  difficulty breathing or increased cough     Notify your health care provider if you experience any of the following:  severe persistent headache     Notify your health care provider if you experience any of the following:  persistent dizziness, light-headedness, or visual disturbances     Notify your health care provider if you experience any of the following:  increased confusion or weakness      Activity as tolerated

## 2024-03-19 NOTE — ANESTHESIA PREPROCEDURE EVALUATION
03/19/2024  Blyane Beebe is a 63 y.o., male.  Pre-operative evaluation for EGD, WITH BALLOON DILATION (N/A)    Chief Complaint: dysphagia    PMH:  Numerous compications of esophagectomy  Cervical fusion  Past Surgical History:   Procedure Laterality Date    CERVICAL FUSION      COLONOSCOPY N/A 3/27/2018    Procedure: COLONOSCOPY;  Surgeon: Maria Teresa Morocho MD;  Location: [facility] ENDO;  Service: Endoscopy;  Laterality: N/A;    COLONOSCOPY N/A 6/13/2023    Procedure: COLONOSCOPY;  Surgeon: Kelli Snell MD;  Location: [facility] ENDO;  Service: Endoscopy;  Laterality: N/A;    EGD, WITH BALLOON DILATION N/A 12/1/2023    Procedure: EGD, WITH BALLOON DILATION;  Surgeon: Nas Meyer MD;  [facility];  Service: General;  Laterality: N/A;    EGD, WITH BALLOON DILATION N/A 12/11/2023    Procedure: EGD, WITH BALLOON DILATION;  Surgeon: Nas Meyer MD;  [facility];  Service: General;  Laterality: N/A;    EGD, WITH BALLOON DILATION N/A 12/20/2023    Procedure: EGD, WITH BALLOON DILATION;  Surgeon: Nas Meyer MD;  [facility];  Service: General;  Laterality: N/A;    EGD, WITH BALLOON DILATION N/A 1/10/2024    Procedure: EGD, WITH BALLOON DILATION;  Surgeon: Nas Meyer MD;  [facility];  Service: General;  Laterality: N/A;    EGD, WITH BALLOON DILATION N/A 2/12/2024    Procedure: EGD, WITH BALLOON DILATION;  Surgeon: Nas Meeyr MD;  Location: [facility] OR 2ND FLR;  Service: General;  Laterality: N/A;    EGD, WITH BALLOON DILATION N/A 2/23/2024    Procedure: EGD, WITH BALLOON DILATION;  Surgeon: Nas Meyer MD;  Location: [facility] OR 2ND FLR;  Service: General;  Laterality: N/A;    EGD, WITH BALLOON DILATION N/A 3/4/2024    Procedure: EGD, WITH BALLOON DILATION;  Surgeon: Nas Meyer MD;  Location: [facility] OR 2ND FLR;   "Service: General;  Laterality: N/A;    ENDOSCOPIC ULTRASOUND OF UPPER GASTROINTESTINAL TRACT N/A 7/3/2023    Procedure: ULTRASOUND, UPPER GI TRACT, ENDOSCOPIC;  Surgeon: Ray Duffy MD;  Location: [facility];  Service: Endoscopy;  Laterality: N/A;    ESOPHAGOGASTRODUODENOSCOPY N/A 6/13/2023    Procedure: EGD (ESOPHAGOGASTRODUODENOSCOPY);  Surgeon: Kelli Snell MD;  Location: [facility];  Service: Endoscopy;  Laterality: N/A;    ESOPHAGOGASTRODUODENOSCOPY N/A 7/3/2023    Procedure: EGD (ESOPHAGOGASTRODUODENOSCOPY);  Surgeon: Ray Duffy MD;  Location: [facility];  Service: Endoscopy;  Laterality: N/A;    INGUINAL HERNIA REPAIR Bilateral     Right x2, x1 left    PLACEMENT OF JEJUNOSTOMY TUBE  10/4/2023    Procedure: INSERTION, JEJUNOSTOMY TUBE;  Surgeon: Nas Meyer MD;  Location: [facility] FLR;  Service: General;;    PYLOROMYOTOMY  10/4/2023    Procedure: PYLOROMYOTOMY;  Surgeon: Nas Meyer MD;  Location: [facility] OR [facility] FLR;  Service: General;;    ROBOT-ASSISTED SURGICAL REMOVAL OF ESOPHAGUS USING DA BALWINDER XI N/A 10/4/2023    Procedure: XI ROBOTIC ESOPHAGECTOMY;  Surgeon: Nas Meyer MD;  Location: [facility] OR 2ND FLR;  Service: General;  Laterality: N/A;    ROBOTIC REPAIR, HERNIA, PARAESOPHAGEAL  10/4/2023    Procedure: ROBOTIC REPAIR, HERNIA, PARAESOPHAGEAL;  Surgeon: Nsa Meyer MD;  [facility];  Service: General;;    ROTATOR CUFF REPAIR Right     x2         Vital Signs Range (Last 24H):  Temp:  [36.7 °C (98.1 °F)]   Pulse:  [68]   Resp:  [16]   BP: (127)/(69)   SpO2:  [99 %]       CBC:     Recent Labs   Lab 03/05/24  0734   WBC 8.22   RBC 3.96*   HGB 13.3*   HCT 39.2*      MCV 99*   MCH 33.6*   MCHC 33.9       CMP:   Recent Labs   Lab 03/05/24  0734      K 4.5      CO2 26   BUN 11   CREATININE 0.8   *   CALCIUM 9.5   ALBUMIN 3.4*   PROT 7.0   ALKPHOS 107   ALT 10   AST 13   BILITOT 0.3       INR:  No results for input(s): "PT", "INR", " ""PROTIME", "APTT" in the last 720 hours.      Diagnostic Studies:      EKD Echo:     Pre-op Assessment    I have reviewed the Patient Summary Reports.     I have reviewed the Nursing Notes. I have reviewed the NPO Status.   I have reviewed the Medications.     Review of Systems  Anesthesia Hx:  No problems with previous Anesthesia                Social:  Non-Smoker, No Alcohol Use       Cardiovascular:  Cardiovascular Normal                                            Pulmonary:  Pulmonary Normal                       Neurological:  Neurology Normal                                          Physical Exam  General: Well nourished, Cooperative, Alert and Oriented    Airway:  Mallampati: IV   Mouth Opening: Normal  TM Distance: Normal  Tongue: Normal  Neck ROM: Normal ROM    Dental:  Loose teeth    Chest/Lungs:  Normal Respiratory Rate        Anesthesia Plan  Type of Anesthesia, risks & benefits discussed:    Anesthesia Type: Gen ETT, Gen Natural Airway  Intra-op Monitoring Plan: Standard ASA Monitors  Post Op Pain Control Plan: multimodal analgesia  Induction:  IV  Airway Plan: Video  Informed Consent: Informed consent signed with the Patient and all parties understand the risks and agree with anesthesia plan.  All questions answered.   ASA Score: 3  Day of Surgery Review of History & Physical: H&P Update referred to the surgeon/provider.    Ready For Surgery From Anesthesia Perspective.     .      "

## 2024-03-19 NOTE — DISCHARGE INSTRUCTIONS
Surgery Post Op Instructions:    You had an esophageal dilation and botox injection performed today.     Diet:  Please limit yourself to a clear liquid diet today. You can then advance to a regular diet as tolerated tomorrow.      Follow up:.Please notify Dr Meyer's clinic if you develop recurrent symptoms and next steps will be arranged/

## 2024-03-19 NOTE — TRANSFER OF CARE
"Anesthesia Transfer of Care Note    Patient: Blayne D III Steib    Procedure(s) Performed: Procedure(s) (LRB):  EGD, WITH BALLOON DILATION (N/A)    Patient location: [facility]    Anesthesia Type: general    Transport from OR: Transported from OR on 6-10 L/min O2 by face mask with adequate spontaneous ventilation    Post pain: adequate analgesia    Post assessment: no apparent anesthetic complications and tolerated procedure well    Post vital signs: stable    Level of consciousness: awake and alert    Nausea/Vomiting: no nausea/vomiting    Complications: none    Transfer of care protocol was followed      Last vitals: Visit Vitals  /69   Pulse 68   Temp 36.7 °C (98.1 °F) (Temporal)   Resp 16   Ht 5' 9" (1.753 m)   Wt 60.8 kg (134 lb)   SpO2 99%   BMI 19.79 kg/m²     "

## 2024-03-20 NOTE — PROGRESS NOTES
"Oncology Nutrition Assessment for Medical Nutrition Therapy  Follow-up Visit    Blayne Beebe   1960    Referring Provider: No ref. provider found      Reason for Visit: nutrition counseling and education    The patient location is: [place]  The chief complaint leading to consultation is: nutrition follow-up    Visit type: audiovisual    Face to Face time with patient: 6 minutes  10 minutes of total time spent on the encounter, which includes face to face time and non-face to face time preparing to see the patient (eg, review of tests), Obtaining and/or reviewing separately obtained history, Documenting clinical information in the electronic or other health record, Independently interpreting results (not separately reported) and communicating results to the patient/family/caregiver, or Care coordination (not separately reported).     Each patient to whom he or she provides medical services by telemedicine is:  (1) informed of the relationship between the physician and patient and the respective role of any other health care provider with respect to management of the patient; and (2) notified that he or she may decline to receive medical services by telemedicine and may withdraw from such care at any time.    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 Currently on adjuvant Opdivo. He is known to me from previous appointments. He has continued with serial dilations (2/12, 2/23, 3/4, 3/13, and 3/19) due to increased dysphagia. He continued on TF overnight, 2 cartons/day (occasionally 3 if intake is decreased). He is maintaining his weight. He reports that he continues not to push what he is eating, sticking to softer foods.    Weight: 133lb  Height: 5' 9" (1.753 m)  BMI: 19.2    Usual " BW: 140-145lb  Weight Change: 10lb loss from COVID 2/2023 then another 5-10lb loss - since maintaining    Allergies: Patient has no known allergies.    Current Medications:  Current Outpatient Medications:     acetaminophen (TYLENOL) 500 MG tablet, Take 1 tablet (500 mg total) by mouth every 6 (six) hours as needed for Pain. (Patient not taking: Reported on 1/9/2024), Disp: , Rfl: 0    cholecalciferol, vitamin D3, (VITAMIN D3) 10 mcg (400 unit) Tab, Take by mouth once daily., Disp: , Rfl:     LIDOcaine-prilocaine (EMLA) cream, Apply topically as needed (Port access). (Patient not taking: Reported on 7/31/2023), Disp: 30 g, Rfl: 1    pantoprazole (PROTONIX) 40 MG tablet, Take 1 tablet (40 mg total) by mouth once daily. (Patient not taking: Reported on 3/5/2024), Disp: 90 tablet, Rfl: 3    sildenafil (VIAGRA) 100 MG tablet, Take 1 tablet (100 mg total) by mouth daily as needed for Erectile Dysfunction. (Patient not taking: Reported on 10/11/2023), Disp: 10 tablet, Rfl: 6    Food/medication interactions noted: none    Vitamins/Supplements: none reported    Labs: Reviewed    Nutrition Diagnosis    Problem: moderate malnutrition  Etiology (related to): inadequate energy and protein intake  Signs/Symptoms (as evidenced by): reports of inadequate PO intake, weight loss, and both fat & muscle wasting present  Status: Improving    Nutrition Intervention    Nutrition Prescription   2090 kcals (35kcal/kg)  84g protein (1.4g/kg)   2090mL fluid (35mL/kg)    Recommendations:  Continue to increase PO intake as tolerated  Make meals/snacks high in calories and protein  Continue 2 cartons/day TF  Continue water flushes at 55mL/hr for every hour on feeds  Flush J-tube with at least 60mL water before and after feeds    Materials Provided/Reviewed: none    Nutrition Monitoring and Evaluation    Monitor: diet education needs, energy intake, rate/formulation of tube feeding, and weight status    Goals: prevent further weight loss and  encourage weight gain    Follow up: in 2-3 weeks    Communication to referring provider/care team: note available in chart    Counseling time: 6 minutes      Noelle Morelos MS, RD, LDN  Senior Clinical Dietitian  [facility]

## 2024-03-21 ENCOUNTER — CLINICAL SUPPORT (OUTPATIENT)
Dept: HEMATOLOGY/ONCOLOGY | Facility: CLINIC | Age: 64
End: 2024-03-21
Payer: COMMERCIAL

## 2024-03-21 DIAGNOSIS — Z71.3 NUTRITIONAL COUNSELING: Primary | ICD-10-CM

## 2024-03-21 DIAGNOSIS — E44.0 MODERATE MALNUTRITION: ICD-10-CM

## 2024-03-21 DIAGNOSIS — C16.0 MALIGNANT NEOPLASM OF GASTROESOPHAGEAL JUNCTION: ICD-10-CM

## 2024-03-28 ENCOUNTER — LAB VISIT (OUTPATIENT)
Dept: LAB | Facility: HOSPITAL | Age: 64
End: 2024-03-28
Payer: COMMERCIAL

## 2024-03-28 ENCOUNTER — OFFICE VISIT (OUTPATIENT)
Dept: HEMATOLOGY/ONCOLOGY | Facility: CLINIC | Age: 64
End: 2024-03-28
Payer: COMMERCIAL

## 2024-03-28 ENCOUNTER — PATIENT MESSAGE (OUTPATIENT)
Dept: HEMATOLOGY/ONCOLOGY | Facility: CLINIC | Age: 64
End: 2024-03-28
Payer: COMMERCIAL

## 2024-03-28 VITALS
OXYGEN SATURATION: 98 % | HEART RATE: 68 BPM | DIASTOLIC BLOOD PRESSURE: 70 MMHG | BODY MASS INDEX: 19.12 KG/M2 | HEIGHT: 69 IN | SYSTOLIC BLOOD PRESSURE: 122 MMHG | WEIGHT: 129.06 LBS | TEMPERATURE: 98 F

## 2024-03-28 DIAGNOSIS — I70.0 ATHEROSCLEROSIS OF AORTA: ICD-10-CM

## 2024-03-28 DIAGNOSIS — D84.821 IMMUNODEFICIENCY DUE TO CHEMOTHERAPY: ICD-10-CM

## 2024-03-28 DIAGNOSIS — J44.9 CHRONIC OBSTRUCTIVE PULMONARY DISEASE, UNSPECIFIED COPD TYPE: ICD-10-CM

## 2024-03-28 DIAGNOSIS — E44.0 MODERATE MALNUTRITION: ICD-10-CM

## 2024-03-28 DIAGNOSIS — C16.0 MALIGNANT NEOPLASM OF GASTROESOPHAGEAL JUNCTION: ICD-10-CM

## 2024-03-28 DIAGNOSIS — K20.90 ESOPHAGITIS: ICD-10-CM

## 2024-03-28 DIAGNOSIS — T45.1X5A IMMUNODEFICIENCY DUE TO CHEMOTHERAPY: ICD-10-CM

## 2024-03-28 DIAGNOSIS — J43.9 PULMONARY EMPHYSEMA, UNSPECIFIED EMPHYSEMA TYPE: ICD-10-CM

## 2024-03-28 DIAGNOSIS — Z79.899 IMMUNODEFICIENCY DUE TO CHEMOTHERAPY: ICD-10-CM

## 2024-03-28 DIAGNOSIS — C16.0 MALIGNANT NEOPLASM OF GASTROESOPHAGEAL JUNCTION: Primary | ICD-10-CM

## 2024-03-28 DIAGNOSIS — F17.210 NICOTINE DEPENDENCE, CIGARETTES, UNCOMPLICATED: ICD-10-CM

## 2024-03-28 LAB
ALBUMIN SERPL BCP-MCNC: 3.5 G/DL (ref 3.5–5.2)
ALP SERPL-CCNC: 121 U/L (ref 55–135)
ALT SERPL W/O P-5'-P-CCNC: 9 U/L (ref 10–44)
ANION GAP SERPL CALC-SCNC: 10 MMOL/L (ref 8–16)
AST SERPL-CCNC: 17 U/L (ref 10–40)
BASOPHILS # BLD AUTO: 0.04 K/UL (ref 0–0.2)
BASOPHILS NFR BLD: 0.6 % (ref 0–1.9)
BILIRUB SERPL-MCNC: 0.7 MG/DL (ref 0.1–1)
BUN SERPL-MCNC: 9 MG/DL (ref 8–23)
CALCIUM SERPL-MCNC: 9.5 MG/DL (ref 8.7–10.5)
CHLORIDE SERPL-SCNC: 103 MMOL/L (ref 95–110)
CO2 SERPL-SCNC: 25 MMOL/L (ref 23–29)
CREAT SERPL-MCNC: 0.8 MG/DL (ref 0.5–1.4)
DIFFERENTIAL METHOD BLD: ABNORMAL
EOSINOPHIL # BLD AUTO: 0.2 K/UL (ref 0–0.5)
EOSINOPHIL NFR BLD: 3.3 % (ref 0–8)
ERYTHROCYTE [DISTWIDTH] IN BLOOD BY AUTOMATED COUNT: 13 % (ref 11.5–14.5)
EST. GFR  (NO RACE VARIABLE): >60 ML/MIN/1.73 M^2
GLUCOSE SERPL-MCNC: 131 MG/DL (ref 70–110)
HCT VFR BLD AUTO: 41.9 % (ref 40–54)
HGB BLD-MCNC: 14.3 G/DL (ref 14–18)
IMM GRANULOCYTES # BLD AUTO: 0.05 K/UL (ref 0–0.04)
IMM GRANULOCYTES NFR BLD AUTO: 0.7 % (ref 0–0.5)
LYMPHOCYTES # BLD AUTO: 1.1 K/UL (ref 1–4.8)
LYMPHOCYTES NFR BLD: 15.8 % (ref 18–48)
MCH RBC QN AUTO: 33.8 PG (ref 27–31)
MCHC RBC AUTO-ENTMCNC: 34.1 G/DL (ref 32–36)
MCV RBC AUTO: 99 FL (ref 82–98)
MONOCYTES # BLD AUTO: 0.9 K/UL (ref 0.3–1)
MONOCYTES NFR BLD: 12.2 % (ref 4–15)
NEUTROPHILS # BLD AUTO: 4.7 K/UL (ref 1.8–7.7)
NEUTROPHILS NFR BLD: 67.4 % (ref 38–73)
NRBC BLD-RTO: 0 /100 WBC
PLATELET # BLD AUTO: 293 K/UL (ref 150–450)
PMV BLD AUTO: 8.5 FL (ref 9.2–12.9)
POTASSIUM SERPL-SCNC: 4.6 MMOL/L (ref 3.5–5.1)
PROT SERPL-MCNC: 6.7 G/DL (ref 6–8.4)
RBC # BLD AUTO: 4.23 M/UL (ref 4.6–6.2)
SODIUM SERPL-SCNC: 138 MMOL/L (ref 136–145)
TSH SERPL DL<=0.005 MIU/L-ACNC: 0.93 UIU/ML (ref 0.4–4)
WBC # BLD AUTO: 6.96 K/UL (ref 3.9–12.7)

## 2024-03-28 PROCEDURE — 80053 COMPREHEN METABOLIC PANEL: CPT | Performed by: PHYSICIAN ASSISTANT

## 2024-03-28 PROCEDURE — 3008F BODY MASS INDEX DOCD: CPT | Mod: CPTII,S$GLB,, | Performed by: INTERNAL MEDICINE

## 2024-03-28 PROCEDURE — 85025 COMPLETE CBC W/AUTO DIFF WBC: CPT | Performed by: PHYSICIAN ASSISTANT

## 2024-03-28 PROCEDURE — 3078F DIAST BP <80 MM HG: CPT | Mod: CPTII,S$GLB,, | Performed by: INTERNAL MEDICINE

## 2024-03-28 PROCEDURE — 84443 ASSAY THYROID STIM HORMONE: CPT | Performed by: INTERNAL MEDICINE

## 2024-03-28 PROCEDURE — 1159F MED LIST DOCD IN RCRD: CPT | Mod: CPTII,S$GLB,, | Performed by: INTERNAL MEDICINE

## 2024-03-28 PROCEDURE — 36415 COLL VENOUS BLD VENIPUNCTURE: CPT | Performed by: PHYSICIAN ASSISTANT

## 2024-03-28 PROCEDURE — 99215 OFFICE O/P EST HI 40 MIN: CPT | Mod: S$GLB,,, | Performed by: INTERNAL MEDICINE

## 2024-03-28 PROCEDURE — 99999 PR PBB SHADOW E&M-EST. PATIENT-LVL III: CPT | Mod: PBBFAC,,, | Performed by: INTERNAL MEDICINE

## 2024-03-28 PROCEDURE — 3074F SYST BP LT 130 MM HG: CPT | Mod: CPTII,S$GLB,, | Performed by: INTERNAL MEDICINE

## 2024-03-28 RX ORDER — HEPARIN 100 UNIT/ML
500 SYRINGE INTRAVENOUS
Status: CANCELLED | OUTPATIENT
Start: 2024-04-02

## 2024-03-28 RX ORDER — DIPHENHYDRAMINE HYDROCHLORIDE 50 MG/ML
50 INJECTION INTRAMUSCULAR; INTRAVENOUS ONCE AS NEEDED
Status: CANCELLED | OUTPATIENT
Start: 2024-04-02

## 2024-03-28 RX ORDER — SODIUM CHLORIDE 0.9 % (FLUSH) 0.9 %
10 SYRINGE (ML) INJECTION
Status: CANCELLED | OUTPATIENT
Start: 2024-04-02

## 2024-03-28 RX ORDER — EPINEPHRINE 0.3 MG/.3ML
0.3 INJECTION SUBCUTANEOUS ONCE AS NEEDED
Status: CANCELLED | OUTPATIENT
Start: 2024-04-02

## 2024-03-28 RX ORDER — PROCHLORPERAZINE EDISYLATE 5 MG/ML
10 INJECTION INTRAMUSCULAR; INTRAVENOUS ONCE AS NEEDED
Status: CANCELLED
Start: 2024-04-02

## 2024-03-28 NOTE — PROGRESS NOTES
MEDICAL ONCOLOGY - ESTABLISHED PATIENT VISIT    Reason for visit: Esophageal adenocarcinoma    Best Contact Phone Number(s): [phone] (home)      Cancer/Stage/TNM:    Cancer Staging   Malignant neoplasm of gastroesophageal junction  Staging form: Esophagus - Adenocarcinoma, AJCC 8th Edition  - Clinical stage from 6/13/2023: Stage III (cT3, cN1, cM0, G2) - Signed by Sunday Jasso MD on 7/5/2023       Oncology History   Malignant neoplasm of gastroesophageal junction   6/13/2023 Cancer Staged    Staging form: Esophagus - Adenocarcinoma, AJCC 8th Edition  - Clinical stage from 6/13/2023: Stage III (cT3, cN1, cM0, G2)     6/23/2023 Initial Diagnosis    Malignant neoplasm of gastroesophageal junction     7/3/2023 Tumor Conference     [facility] UGI MULTIDISCIPLINARY TUMOR BOARD  PATIENT REVIEW FORM   ____________________________________________________________    CLINIC #: [id number]   DATE: 7/3/2023    DIAGNOSIS: esophageal CA    PRESENTER: Mitch    PATIENT SUMMARY:   This 64 y/o gentleman is a long time smoker with emphysema who had low dose screening lung CT in 4/2023 per his PCP. Given an abnormality on this scan, he had PET on 6/1/23 which identified large hypermetabolic mid esophageal mass with lymph nodes. He underwent EGD on 6/13/23 that found large fungating ulcerated mass with bleeding in middle third of esophagus to lower esophagus, 34-44cm. Pathology revealed moderately differentiated adenocarcinoma.   Staging PET reviewed - level 2 cervical lymph node with FDG uptake, nothing in lungs concerning  Staging EUS today per Dr. Duffy.   He has been evaluated by Dr. Vance in [place] and Dr. June.   He is scheduled to see rad onc, Dr. Jasso, Wednesday and IR for port placement on 7/12/23.     BOARD RECOMMENDATIONS:   Proceed with neoadj CRT, then restaging to consider surgical resection    CONSULT NEEDED:     [] Surgery    [] Hem/Onc    [] Rad/Onc    [] Dietary                 [] Social  Service    [] Psychology       [] AES  [] Radiology     Clinical Stage: Tumor 3 Node(s) 1 Metastasis 0      GROUP STAGE:  [] O    [] 1   [] II     [x] III   []IV  [] Local recurrence     [] Regional recurrence     [] Distant recurrence   Metastatic site(s): none         [x] [facility] Treatment Guidelines reviewed and care planned is consistent with guidelines.         (i.e., NCCN, NCI, PD, ACO, AUA, etc.)    PRESENTATION AT CANCER CONFERENCE:         [x] Prospective    [] Retrospective     [] Follow-Up         7/16/2023 - 8/8/2023 Chemotherapy    Treatment Summary   Plan Name: OP ESOPHAGEAL PACLITAXEL CARBOPLATIN WEEKLY  Treatment Goal: Control  Status: Inactive  Start Date: 7/16/2023  End Date: 8/8/2023  Provider: Delta June MD  Chemotherapy: CARBOplatin (PARAPLATIN) 195 mg in sodium chloride 0.9% 304.5 mL chemo infusion, 195 mg (100 % of original dose 193 mg), Intravenous, Clinic/HOD 1 time, 1 of 1 cycle  Dose modification:   (original dose 193 mg, Cycle 1)  Administration: 195 mg (7/18/2023), 210 mg (7/24/2023), 230 mg (7/31/2023), 210 mg (8/8/2023)  PACLitaxeL (TAXOL) 50 mg/m2 = 84 mg in sodium chloride 0.9% 250 mL chemo infusion, 50 mg/m2 = 84 mg, Intravenous, Clinic/HOD 1 time, 1 of 1 cycle  Administration: 84 mg (7/18/2023)     7/18/2023 - 8/17/2023 Radiation Therapy    Treating physician: Sunday Jasso    Course: C1 Thorax 2023    Treatment Site Ref. ID Energy Dose/Fx (Gy) #Fx Dose Correction (Gy) Total Dose (Gy) Start Date End Date Elapsed Days   IM Esophagus PTV_High 6X 1.8 23 / 23 0 41.4 7/18/2023 8/17/2023 30        11/15/2023 -  Chemotherapy    Treatment Summary   Plan Name: OP NIVOLUMAB 480 MG Q4W  Treatment Goal: Curative  Status: Active  Start Date: 11/15/2023  End Date: 9/18/2024 (Planned)  Provider: Delta June MD  Chemotherapy: [No matching medication found in this treatment plan]     12/1/2023 Procedure    Surgeon(s) and Role: Nas Meyer MD - Primary     Pre-Operative  Diagnosis:   Esophageal adenocarcinoma  Suspected cervical esophagogastric anastomotic stricture     Post-Operative Diagnosis:   Same  Cervical esophagogastric anastomotic stricture      Operative Findings:    Cervical esophagogastric anastomotic stricture. Unable to pass endoscope through stricture initially, but the scope was able to pass after serial dilations to 18 mm.      Procedures:  Esophagogastroduodenoscopy (EGD), with transendoscopic balloon dilation of esophagus (<30 mm)                Interim History:   63 y.o. male with esophageal adenocarcinoma who presents for cycle 6 of adjuvant nivolumab.  He is feeling well overall.  He had an esophageal dilation last week with Dr. Meyer.  Says he is swallowing without issue.  No new concerns regarding tolerance to nivolumab.  No dyspnea or diarrhea.  No rash.    ECOG status is 0. Presents with his wife today.     ROS:   Review of Systems   Constitutional:  Negative for chills, fever, malaise/fatigue and weight loss.   HENT:  Negative for sore throat.    Eyes:  Negative for blurred vision and pain.   Respiratory:  Negative for cough and shortness of breath.    Cardiovascular:  Negative for chest pain and leg swelling.   Gastrointestinal:  Negative for abdominal pain, constipation, diarrhea, heartburn, nausea and vomiting.   Genitourinary:  Negative for dysuria and frequency.   Musculoskeletal:  Negative for back pain, falls and myalgias.   Skin:  Negative for rash.   Neurological:  Negative for dizziness, weakness and headaches.   Endo/Heme/Allergies:  Does not bruise/bleed easily.   Psychiatric/Behavioral:  Negative for depression. The patient is not nervous/anxious.    All other systems reviewed and are negative.      Past Medical History:   Past Medical History:   Diagnosis Date    Arthritis     Cancer     COPD (chronic obstructive pulmonary disease)         Past Surgical History:   Past Surgical History:   Procedure Laterality Date    CERVICAL FUSION       COLONOSCOPY N/A 3/27/2018    Procedure: COLONOSCOPY;  Surgeon: Maria Teresa Morocho MD;  Location: [facility] ENDO;  Service: Endoscopy;  Laterality: N/A;    COLONOSCOPY N/A 6/13/2023    Procedure: COLONOSCOPY;  Surgeon: Kelli Snell MD;  Location: [facility] ENDO;  Service: Endoscopy;  Laterality: N/A;    EGD, WITH BALLOON DILATION N/A 12/1/2023    Procedure: EGD, WITH BALLOON DILATION;  Surgeon: Nas Meyer MD;  Location: [facility] OR 2ND FLR;  Service: General;  Laterality: N/A;    EGD, WITH BALLOON DILATION N/A 12/11/2023    Procedure: EGD, WITH BALLOON DILATION;  Surgeon: Nas Meyer MD;  Location: [facility] OR 2ND FLR;  Service: General;  Laterality: N/A;    EGD, WITH BALLOON DILATION N/A 12/20/2023    Procedure: EGD, WITH BALLOON DILATION;  Surgeon: Nas Meyer MD;  Location: [facility] OR 2ND FLR;  Service: General;  Laterality: N/A;    EGD, WITH BALLOON DILATION N/A 1/10/2024    Procedure: EGD, WITH BALLOON DILATION;  Surgeon: Nas Meyer MD;  Location: [facility] OR 2ND FLR;  Service: General;  Laterality: N/A;    EGD, WITH BALLOON DILATION N/A 2/12/2024    Procedure: EGD, WITH BALLOON DILATION;  Surgeon: Nas Meyer MD;  Location: [facility] OR 2ND FLR;  Service: General;  Laterality: N/A;    EGD, WITH BALLOON DILATION N/A 2/23/2024    Procedure: EGD, WITH BALLOON DILATION;  Surgeon: Nas Meyer MD;  Location: [facility] OR 2ND FLR;  Service: General;  Laterality: N/A;    EGD, WITH BALLOON DILATION N/A 3/4/2024    Procedure: EGD, WITH BALLOON DILATION;  Surgeon: Nas Meyer MD;  Location: [facility] OR 2ND FLR;  Service: General;  Laterality: N/A;    EGD, WITH BALLOON DILATION N/A 3/19/2024    Procedure: EGD, WITH BALLOON DILATION;  Surgeon: Nas Meyer MD;  Location: [facility] OR 2ND FLR;  Service: General;  Laterality: N/A;    ENDOSCOPIC ULTRASOUND OF UPPER GASTROINTESTINAL TRACT N/A 7/3/2023    Procedure: ULTRASOUND, UPPER GI TRACT, ENDOSCOPIC;  Surgeon: Ray Duffy MD;  Location: [facility];   Service: Endoscopy;  Laterality: N/A;    ESOPHAGOGASTRODUODENOSCOPY N/A 6/13/2023    Procedure: EGD (ESOPHAGOGASTRODUODENOSCOPY);  Surgeon: Kelli Snell MD;  Location: [facility];  Service: Endoscopy;  Laterality: N/A;    ESOPHAGOGASTRODUODENOSCOPY N/A 7/3/2023    Procedure: EGD (ESOPHAGOGASTRODUODENOSCOPY);  Surgeon: Ray Duffy MD;  Location: [facility];  Service: Endoscopy;  Laterality: N/A;    INGUINAL HERNIA REPAIR Bilateral     Right x2, x1 left    PLACEMENT OF JEJUNOSTOMY TUBE  10/4/2023    Procedure: INSERTION, JEJUNOSTOMY TUBE;  Surgeon: Nas Meyer MD;  Location: [facility] OR [address] [place] [address];  Service: General;;    PYLOROMYOTOMY  10/4/2023    Procedure: PYLOROMYOTOMY;  Surgeon: Nas Meyer MD;  Location: [facility] OR [address] [place] [address];  Service: General;;    ROBOT-ASSISTED SURGICAL REMOVAL OF ESOPHAGUS USING DA BALWINDER XI N/A 10/4/2023    Procedure: XI ROBOTIC ESOPHAGECTOMY;  Surgeon: Nas Meyer MD;  Location: [facility] OR [address] [place] [address];  Service: General;  Laterality: N/A;    ROBOTIC REPAIR, HERNIA, PARAESOPHAGEAL  10/4/2023    Procedure: ROBOTIC REPAIR, HERNIA, PARAESOPHAGEAL;  Surgeon: Nas Meyer MD;  Location: [facility] OR [address] [place] [address];  Service: General;;    ROTATOR CUFF REPAIR Right     x2        Family History:   Family History   Problem Relation Age of Onset    Diabetes Mother     Heart disease Father         CHF    Glaucoma Neg Hx     Colon cancer Neg Hx     Prostate cancer Neg Hx         Social History:   Social History     Tobacco Use    Smoking status: Former     Current packs/day: 0.25     Average packs/day: 0.2 packs/day for 41.2 years (10.3 ttl pk-yrs)     Types: Cigarettes     Start date: 1983     Passive exposure: Current    Smokeless tobacco: Never    Tobacco comments:     Done smoking since september   Substance Use Topics    Alcohol use: Yes     Comment: a couple of beers a day        I have reviewed and updated the patient's past medical, surgical, family and social histories.    Allergies:  "  Review of patient's allergies indicates:  No Known Allergies     Medications:   Current Outpatient Medications   Medication Sig Dispense Refill    acetaminophen (TYLENOL) 500 MG tablet Take 1 tablet (500 mg total) by mouth every 6 (six) hours as needed for Pain. (Patient not taking: Reported on 1/9/2024)  0    cholecalciferol, vitamin D3, (VITAMIN D3) 10 mcg (400 unit) Tab Take by mouth once daily.      LIDOcaine-prilocaine (EMLA) cream Apply topically as needed (Port access). (Patient not taking: Reported on 7/31/2023) 30 g 1    pantoprazole (PROTONIX) 40 MG tablet Take 1 tablet (40 mg total) by mouth once daily. (Patient not taking: Reported on 3/5/2024) 90 tablet 3    sildenafil (VIAGRA) 100 MG tablet Take 1 tablet (100 mg total) by mouth daily as needed for Erectile Dysfunction. (Patient not taking: Reported on 10/11/2023) 10 tablet 6     No current facility-administered medications for this visit.        Physical Exam:   /70 (BP Location: Left arm, Patient Position: Sitting, BP Method: Medium (Automatic))   Pulse 68   Temp 98.4 °F (36.9 °C) (Oral)   Ht 5' 9" (1.753 m)   Wt 58.6 kg (129 lb 1.3 oz)   SpO2 98%   BMI 19.06 kg/m²      ECOG Performance status: 0          Physical Exam  Vitals reviewed.   Constitutional:       General: He is not in acute distress.     Appearance: Normal appearance. He is normal weight.   HENT:      Head: Normocephalic and atraumatic.      Right Ear: External ear normal.      Left Ear: External ear normal.      Nose: Nose normal.      Mouth/Throat:      Mouth: Mucous membranes are moist.      Pharynx: Oropharynx is clear. No posterior oropharyngeal erythema.   Eyes:      General: No scleral icterus.     Extraocular Movements: Extraocular movements intact.      Conjunctiva/sclera: Conjunctivae normal.      Pupils: Pupils are equal, round, and reactive to light.   Cardiovascular:      Rate and Rhythm: Normal rate and regular rhythm.      Pulses: Normal pulses.      Heart " sounds: Normal heart sounds.   Pulmonary:      Effort: Pulmonary effort is normal.      Breath sounds: Normal breath sounds. No wheezing or rales.   Abdominal:      General: Bowel sounds are normal. There is no distension.      Palpations: Abdomen is soft.      Tenderness: There is no abdominal tenderness.      Comments: Surgical wounds well healed   Musculoskeletal:         General: No swelling. Normal range of motion.      Cervical back: Normal range of motion and neck supple.   Skin:     General: Skin is warm.      Coloration: Skin is not jaundiced.      Findings: No erythema or rash.   Neurological:      General: No focal deficit present.      Mental Status: He is alert and oriented to person, place, and time. Mental status is at baseline.      Gait: Gait normal.   Psychiatric:         Mood and Affect: Mood normal.         Behavior: Behavior normal.         Thought Content: Thought content normal.         Judgment: Judgment normal.           Labs:   Recent Results (from the past 48 hour(s))   CBC W/ AUTO DIFFERENTIAL    Collection Time: 03/28/24 11:51 AM   Result Value Ref Range    WBC 6.96 3.90 - 12.70 K/uL    RBC 4.23 (L) 4.60 - 6.20 M/uL    Hemoglobin 14.3 14.0 - 18.0 g/dL    Hematocrit 41.9 40.0 - 54.0 %    MCV 99 (H) 82 - 98 fL    MCH 33.8 (H) 27.0 - 31.0 pg    MCHC 34.1 32.0 - 36.0 g/dL    RDW 13.0 11.5 - 14.5 %    Platelets 293 150 - 450 K/uL    MPV 8.5 (L) 9.2 - 12.9 fL    Immature Granulocytes 0.7 (H) 0.0 - 0.5 %    Gran # (ANC) 4.7 1.8 - 7.7 K/uL    Immature Grans (Abs) 0.05 (H) 0.00 - 0.04 K/uL    Lymph # 1.1 1.0 - 4.8 K/uL    Mono # 0.9 0.3 - 1.0 K/uL    Eos # 0.2 0.0 - 0.5 K/uL    Baso # 0.04 0.00 - 0.20 K/uL    nRBC 0 0 /100 WBC    Gran % 67.4 38.0 - 73.0 %    Lymph % 15.8 (L) 18.0 - 48.0 %    Mono % 12.2 4.0 - 15.0 %    Eosinophil % 3.3 0.0 - 8.0 %    Basophil % 0.6 0.0 - 1.9 %    Differential Method Automated           I have reviewed the pertinent labs.  Improved anemia.    Imaging:       CT CAP  3/27/24:    Impression:   Increased size and density of a nodular focus in the region of right apical scarring currently measuring up to 2.0 cm.  Consider short interval follow-up with repeat CT chest versus PET/CT.     Otherwise, no new evidence of intrathoracic or abdominopelvic metastatic disease.     Esophagectomy and gastric pull-through changes without evidence of complication.     6 mm hypodense lesion at the posterior right renal cortex, not clearly cystic.  This statistically likely still represents a benign cyst although further follow-up should be considered with ultrasound or MRI.     Nonspecific asymmetric thickening of the left adrenal gland without discrete nodule.  Further attention to this region on future imaging recommended.    Path:   2. Gastroesophageal junction mass (biopsy):   Invasive adenocarcinoma, moderately differentiated   Background low and high-grade glandular dysplasia   HER2 immunohistochemistry:  Equivocal.     Immunohistochemistry (IHC) Testing for Mismatch Repair (MMR) Proteins:   MLH1, MSH2, MSH6, PMS2 - Intact nuclear expression   Background nonneoplastic tissue/internal control with intact nuclear expression     There are exceptions to the above IHC interpretations. These results should not be considered in isolation, and clinical correlation with genetic counseling is recommended to assess the need for germline testing.     Interpretation: No loss of nuclear expression of MMR proteins: low probability of microsatellite instability       Assessment:       1. Malignant neoplasm of gastroesophageal junction    2. Immunodeficiency due to chemotherapy    3. Esophagitis    4. Chronic obstructive pulmonary disease, unspecified COPD type    5. Pulmonary emphysema, unspecified emphysema type    6. Nicotine dependence, cigarettes, uncomplicated    7. Atherosclerosis of aorta    8. Moderate malnutrition               Plan:        # Esophageal adenocarcinoma, chemotherapy induced  neutropenia/thrombocytopenia  He initially presented with a locally advanced adenocarcinoma of the middle and lower third of the esophagus, pMMR. PET/CT on 6/1/23 did not show clear distant metastatic disease.  We discussed the case and plan with Dr. Meyer and he feels the patient is surgically resectable as well.    Began cycle 1 on 7/18/23. He unfortunately had a reaction to the Taxol. During the Taxol infusion, he developed coughing, flushing, chest tightness and nausea. O2 sat was 92%, 2L O2 applied NC. We were notified and stopped the Taxol. He was able to proceed with the Carboplatin without complication.   We switched him to Abraxane 40 mg/m2 with week 2.  This was tolerated very well without issue. Has tolerated RT well and has completed 4 cycles of chemoRT. He completed radiation 8/18/23.  We did have to forego his last dose of chemotherapy due to cytopenias.    PET/CT on 9/22/23 showed very nice response to treatment with reduction in SUV avidity of primary tumor.  Discussed with Dr. Meyer.      He underwent esophagogastrectomy with Dr. Meyer on 10/4/23.  Pathology showed ypT2N0 with negative margins and 18 negative lymph nodes.    Because of his residual disease, I have recommended adjuvant nivolumab per [id number]. He was in agreement.    Began cycle 1 on 11/15/23.  Doing well and continues to do well.  Presents today for cycle 6.  Will proceed.  Surveillance CT CAP 3/27/24 shows no clear evidence of disease.  Continued R apical scarring a bit more nodular.  Will repeat chest CT in 3 months and CT CAP in 6 months.    RTC in 4 weeks for next cycle.     # Esophagitis, stricture  S/p multiple dilations. Most recent 3/19/24.  Feeding tube has been removed.    # COPD, tobacco abuse, aortic atherosclerosis  Counseled on tobacco cessation.  He has stopped smoking.  Normal SpO2.  No dyspnea.  Atherosclerosis noted on imaging.    # Malnutrition  Following with surgery team.  Weight stable.      Patient is  in agreement with the proposed treatment plan. All questions were answered to the patient's satisfaction. Pt knows to call clinic if anything is needed before the next clinic visit.    Delta June MD  Hematology/Oncology  [facility]      Route Chart for Scheduling    Med Onc Chart Routing      Follow up with physician 2 months. for nivolumab   Follow up with DEMARCO 4 weeks. for nivolumab   Infusion scheduling note    Injection scheduling note    Labs CBC, CMP and TSH   Scheduling:  Preferred lab:  Lab interval:     Imaging    Pharmacy appointment    Other referrals              Treatment Plan Information   OP NIVOLUMAB 480 MG Q4W   Delta June MD   Upcoming Treatment Dates - OP NIVOLUMAB 480 MG Q4W    4/3/2024       Chemotherapy       nivolumab 480 mg in sodium chloride 0.9% 98 mL infusion  5/1/2024       Chemotherapy       nivolumab 480 mg in sodium chloride 0.9% 98 mL infusion  5/29/2024       Chemotherapy       nivolumab 480 mg in sodium chloride 0.9% 98 mL infusion  6/26/2024       Chemotherapy       nivolumab 480 mg in sodium chloride 0.9% 98 mL infusion

## 2024-04-02 ENCOUNTER — INFUSION (OUTPATIENT)
Dept: INFUSION THERAPY | Facility: HOSPITAL | Age: 64
End: 2024-04-02
Payer: COMMERCIAL

## 2024-04-02 VITALS
SYSTOLIC BLOOD PRESSURE: 124 MMHG | RESPIRATION RATE: 18 BRPM | WEIGHT: 130.06 LBS | DIASTOLIC BLOOD PRESSURE: 66 MMHG | BODY MASS INDEX: 19.26 KG/M2 | TEMPERATURE: 98 F | HEART RATE: 70 BPM | HEIGHT: 69 IN | OXYGEN SATURATION: 99 %

## 2024-04-02 DIAGNOSIS — C16.0 MALIGNANT NEOPLASM OF GASTROESOPHAGEAL JUNCTION: Primary | ICD-10-CM

## 2024-04-02 PROCEDURE — 63600175 PHARM REV CODE 636 W HCPCS: Mod: JZ,JG | Performed by: INTERNAL MEDICINE

## 2024-04-02 PROCEDURE — 96413 CHEMO IV INFUSION 1 HR: CPT

## 2024-04-02 PROCEDURE — 25000003 PHARM REV CODE 250: Performed by: INTERNAL MEDICINE

## 2024-04-02 PROCEDURE — A4216 STERILE WATER/SALINE, 10 ML: HCPCS | Performed by: INTERNAL MEDICINE

## 2024-04-02 RX ORDER — SODIUM CHLORIDE 0.9 % (FLUSH) 0.9 %
10 SYRINGE (ML) INJECTION
Status: DISCONTINUED | OUTPATIENT
Start: 2024-04-02 | End: 2024-04-02 | Stop reason: HOSPADM

## 2024-04-02 RX ORDER — EPINEPHRINE 0.3 MG/.3ML
0.3 INJECTION SUBCUTANEOUS ONCE AS NEEDED
Status: DISCONTINUED | OUTPATIENT
Start: 2024-04-02 | End: 2024-04-02 | Stop reason: HOSPADM

## 2024-04-02 RX ORDER — HEPARIN 100 UNIT/ML
500 SYRINGE INTRAVENOUS
Status: DISCONTINUED | OUTPATIENT
Start: 2024-04-02 | End: 2024-04-02 | Stop reason: HOSPADM

## 2024-04-02 RX ORDER — PROCHLORPERAZINE EDISYLATE 5 MG/ML
10 INJECTION INTRAMUSCULAR; INTRAVENOUS ONCE AS NEEDED
Status: DISCONTINUED | OUTPATIENT
Start: 2024-04-02 | End: 2024-04-02 | Stop reason: HOSPADM

## 2024-04-02 RX ORDER — DIPHENHYDRAMINE HYDROCHLORIDE 50 MG/ML
50 INJECTION INTRAMUSCULAR; INTRAVENOUS ONCE AS NEEDED
Status: DISCONTINUED | OUTPATIENT
Start: 2024-04-02 | End: 2024-04-02 | Stop reason: HOSPADM

## 2024-04-02 RX ADMIN — HEPARIN 500 UNITS: 100 SYRINGE at 10:04

## 2024-04-02 RX ADMIN — Medication 10 ML: at 10:04

## 2024-04-02 RX ADMIN — SODIUM CHLORIDE 480 MG: 9 INJECTION, SOLUTION INTRAVENOUS at 09:04

## 2024-04-02 RX ADMIN — SODIUM CHLORIDE: 9 INJECTION, SOLUTION INTRAVENOUS at 08:04

## 2024-04-02 NOTE — PLAN OF CARE
Pt received Opdivo today and tolerated well, without complications. Educated patient about Opdivo (indications, side effects, possible reactions, chemotherapy precautions) and verbalized understanding.  VSS. CW port positive for blood return, saline flushed, Heparin flush instilled to dwell and de accessed prior to DC. Pt DC with no distress noted, ambulated off unit. W/o event, w/ fx, pleased.

## 2024-04-03 NOTE — PROGRESS NOTES
Oncology Nutrition Assessment for Medical Nutrition Therapy  Follow-up Visit    Blayne Beebe   1960    Referring Provider: No ref. provider found      Reason for Visit: nutrition counseling and education    The patient location is: [place]  The chief complaint leading to consultation is: nutrition follow-up    Visit type: audiovisual    Face to Face time with patient: 9 minutes  15 minutes of total time spent on the encounter, which includes face to face time and non-face to face time preparing to see the patient (eg, review of tests), Obtaining and/or reviewing separately obtained history, Documenting clinical information in the electronic or other health record, Independently interpreting results (not separately reported) and communicating results to the patient/family/caregiver, or Care coordination (not separately reported).     Each patient to whom he or she provides medical services by telemedicine is:  (1) informed of the relationship between the physician and patient and the respective role of any other health care provider with respect to management of the patient; and (2) notified that he or she may decline to receive medical services by telemedicine and may withdraw from such care at any time.    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 Currently on adjuvant Opdivo. He is known to me from previous appointments. He had serial dilations (2/12, 2/23, 3/4, 3/13, and 3/19) due to increased dysphagia. He reports he has no issues swallowing lately but admits he is not pushing it with different textures of food. He is content with what he is able to eat though. He continues on TF overnight, 2 cartons/day. He is reports possibly losing 1lb but has been very active lately at  "his [place] property ([employer] [place]).    Weight: 58.6 kg (129 lb 1.3 oz) - 3/28 clinic weight  Height: 5' 9" (1.753 m)  BMI: 19.1    Usual BW: 140-145lb  Weight Change: 10lb loss from COVID 2/2023 then another 5-10lb loss - since maintaining    Allergies: Patient has no known allergies.    Current Medications:  Current Outpatient Medications:     acetaminophen (TYLENOL) 500 MG tablet, Take 1 tablet (500 mg total) by mouth every 6 (six) hours as needed for Pain. (Patient not taking: Reported on 1/9/2024), Disp: , Rfl: 0    cholecalciferol, vitamin D3, (VITAMIN D3) 10 mcg (400 unit) Tab, Take by mouth once daily., Disp: , Rfl:     LIDOcaine-prilocaine (EMLA) cream, Apply topically as needed (Port access). (Patient not taking: Reported on 7/31/2023), Disp: 30 g, Rfl: 1    pantoprazole (PROTONIX) 40 MG tablet, Take 1 tablet (40 mg total) by mouth once daily. (Patient not taking: Reported on 3/5/2024), Disp: 90 tablet, Rfl: 3    sildenafil (VIAGRA) 100 MG tablet, Take 1 tablet (100 mg total) by mouth daily as needed for Erectile Dysfunction. (Patient not taking: Reported on 10/11/2023), Disp: 10 tablet, Rfl: 6    Food/medication interactions noted: none    Vitamins/Supplements: none reported    Labs: Reviewed    Nutrition Diagnosis    Problem: moderate malnutrition  Etiology (related to): inadequate energy and protein intake  Signs/Symptoms (as evidenced by): reports of inadequate PO intake, weight loss, and both fat & muscle wasting present  Status: Improving    Nutrition Intervention    Nutrition Prescription   2090 kcals (35kcal/kg)  84g protein (1.4g/kg)   2090mL fluid (35mL/kg)    Recommendations:  Continue to increase PO intake as tolerated  Make meals/snacks high in calories and protein  Continue 2 cartons/day TF  Continue water flushes at 55mL/hr for every hour on feeds  Flush J-tube with at least 60mL water before and after feeds    Materials Provided/Reviewed: none    Nutrition Monitoring and Evaluation    Monitor: " diet education needs, energy intake, rate/formulation of tube feeding, and weight status    Goals: prevent further weight loss and encourage weight gain    Follow up: in 1-2 months    Communication to referring provider/care team: note available in chart    Counseling time: 9 minutes      Noelle Morelos MS, RD, LDN  Senior Clinical Dietitian  [facility]

## 2024-04-04 ENCOUNTER — PATIENT MESSAGE (OUTPATIENT)
Dept: HEMATOLOGY/ONCOLOGY | Facility: CLINIC | Age: 64
End: 2024-04-04
Payer: COMMERCIAL

## 2024-04-05 ENCOUNTER — CLINICAL SUPPORT (OUTPATIENT)
Dept: HEMATOLOGY/ONCOLOGY | Facility: CLINIC | Age: 64
End: 2024-04-05
Payer: COMMERCIAL

## 2024-04-05 DIAGNOSIS — E44.0 MODERATE MALNUTRITION: ICD-10-CM

## 2024-04-05 DIAGNOSIS — Z71.3 NUTRITIONAL COUNSELING: Primary | ICD-10-CM

## 2024-04-05 DIAGNOSIS — C16.0 MALIGNANT NEOPLASM OF GASTROESOPHAGEAL JUNCTION: ICD-10-CM

## 2024-04-05 PROCEDURE — 97803 MED NUTRITION INDIV SUBSEQ: CPT | Mod: 95,,, | Performed by: DIETITIAN, REGISTERED

## 2024-04-15 ENCOUNTER — PATIENT MESSAGE (OUTPATIENT)
Dept: GASTROENTEROLOGY | Facility: CLINIC | Age: 64
End: 2024-04-15
Payer: COMMERCIAL

## 2024-04-22 ENCOUNTER — TELEPHONE (OUTPATIENT)
Dept: HEMATOLOGY/ONCOLOGY | Facility: CLINIC | Age: 64
End: 2024-04-22
Payer: COMMERCIAL

## 2024-04-22 NOTE — TELEPHONE ENCOUNTER
----- Message from Donna Le sent at 4/22/2024 10:37 AM CDT -----  Regarding: Clearance on Rx  Contact: [phone]  Caller is calling to speak to someone in the office to get clearance on the rx called Halcion for pt's dental procedure on tmr. . Please call to advise. Thanks.                 Name of Caller:  Namrata           Additional Information: Please fax clearance letter to : [phone]

## 2024-04-22 NOTE — TELEPHONE ENCOUNTER
Returned call to dentist office.  MD verbally gave clearance for Halcion.  Dentist office stated they didn't need anything else from our office.

## 2024-04-30 ENCOUNTER — PATIENT MESSAGE (OUTPATIENT)
Dept: SURGERY | Facility: CLINIC | Age: 64
End: 2024-04-30
Payer: COMMERCIAL

## 2024-04-30 ENCOUNTER — OFFICE VISIT (OUTPATIENT)
Dept: HEMATOLOGY/ONCOLOGY | Facility: CLINIC | Age: 64
End: 2024-04-30
Payer: COMMERCIAL

## 2024-04-30 ENCOUNTER — LAB VISIT (OUTPATIENT)
Dept: LAB | Facility: HOSPITAL | Age: 64
End: 2024-04-30
Attending: INTERNAL MEDICINE
Payer: COMMERCIAL

## 2024-04-30 ENCOUNTER — INFUSION (OUTPATIENT)
Dept: INFUSION THERAPY | Facility: HOSPITAL | Age: 64
End: 2024-04-30
Payer: COMMERCIAL

## 2024-04-30 VITALS
RESPIRATION RATE: 16 BRPM | OXYGEN SATURATION: 99 % | HEART RATE: 68 BPM | SYSTOLIC BLOOD PRESSURE: 138 MMHG | HEIGHT: 69 IN | WEIGHT: 128.5 LBS | DIASTOLIC BLOOD PRESSURE: 70 MMHG | BODY MASS INDEX: 19.03 KG/M2 | TEMPERATURE: 98 F

## 2024-04-30 VITALS
BODY MASS INDEX: 19.03 KG/M2 | SYSTOLIC BLOOD PRESSURE: 131 MMHG | HEIGHT: 69 IN | OXYGEN SATURATION: 99 % | HEART RATE: 62 BPM | DIASTOLIC BLOOD PRESSURE: 78 MMHG | RESPIRATION RATE: 16 BRPM | TEMPERATURE: 98 F | WEIGHT: 128.5 LBS

## 2024-04-30 DIAGNOSIS — D84.821 IMMUNODEFICIENCY DUE TO CHEMOTHERAPY: ICD-10-CM

## 2024-04-30 DIAGNOSIS — E44.0 MODERATE MALNUTRITION: ICD-10-CM

## 2024-04-30 DIAGNOSIS — R13.19 ESOPHAGEAL DYSPHAGIA: Primary | ICD-10-CM

## 2024-04-30 DIAGNOSIS — J44.9 CHRONIC OBSTRUCTIVE PULMONARY DISEASE, UNSPECIFIED COPD TYPE: ICD-10-CM

## 2024-04-30 DIAGNOSIS — R53.0 NEOPLASTIC MALIGNANT RELATED FATIGUE: ICD-10-CM

## 2024-04-30 DIAGNOSIS — C16.0 MALIGNANT NEOPLASM OF GASTROESOPHAGEAL JUNCTION: ICD-10-CM

## 2024-04-30 DIAGNOSIS — K20.90 ESOPHAGITIS: ICD-10-CM

## 2024-04-30 DIAGNOSIS — I70.0 ATHEROSCLEROSIS OF AORTA: ICD-10-CM

## 2024-04-30 DIAGNOSIS — J43.9 PULMONARY EMPHYSEMA, UNSPECIFIED EMPHYSEMA TYPE: ICD-10-CM

## 2024-04-30 DIAGNOSIS — T45.1X5A IMMUNODEFICIENCY DUE TO CHEMOTHERAPY: ICD-10-CM

## 2024-04-30 DIAGNOSIS — C16.0 MALIGNANT NEOPLASM OF GASTROESOPHAGEAL JUNCTION: Primary | ICD-10-CM

## 2024-04-30 DIAGNOSIS — Z79.899 IMMUNODEFICIENCY DUE TO CHEMOTHERAPY: ICD-10-CM

## 2024-04-30 LAB
ALBUMIN SERPL BCP-MCNC: 3.4 G/DL (ref 3.5–5.2)
ALP SERPL-CCNC: 101 U/L (ref 55–135)
ALT SERPL W/O P-5'-P-CCNC: 16 U/L (ref 10–44)
ANION GAP SERPL CALC-SCNC: 8 MMOL/L (ref 8–16)
AST SERPL-CCNC: 18 U/L (ref 10–40)
BILIRUB SERPL-MCNC: 0.4 MG/DL (ref 0.1–1)
BUN SERPL-MCNC: 13 MG/DL (ref 8–23)
CALCIUM SERPL-MCNC: 9.2 MG/DL (ref 8.7–10.5)
CHLORIDE SERPL-SCNC: 101 MMOL/L (ref 95–110)
CO2 SERPL-SCNC: 30 MMOL/L (ref 23–29)
CREAT SERPL-MCNC: 0.7 MG/DL (ref 0.5–1.4)
ERYTHROCYTE [DISTWIDTH] IN BLOOD BY AUTOMATED COUNT: 12.5 % (ref 11.5–14.5)
EST. GFR  (NO RACE VARIABLE): >60 ML/MIN/1.73 M^2
GLUCOSE SERPL-MCNC: 90 MG/DL (ref 70–110)
HCT VFR BLD AUTO: 40.3 % (ref 40–54)
HGB BLD-MCNC: 13.8 G/DL (ref 14–18)
IMM GRANULOCYTES # BLD AUTO: 0.2 K/UL (ref 0–0.04)
MCH RBC QN AUTO: 34.5 PG (ref 27–31)
MCHC RBC AUTO-ENTMCNC: 34.2 G/DL (ref 32–36)
MCV RBC AUTO: 101 FL (ref 82–98)
NEUTROPHILS # BLD AUTO: 3.9 K/UL (ref 1.8–7.7)
PLATELET # BLD AUTO: 311 K/UL (ref 150–450)
PMV BLD AUTO: 8.4 FL (ref 9.2–12.9)
POTASSIUM SERPL-SCNC: 4.4 MMOL/L (ref 3.5–5.1)
PROT SERPL-MCNC: 6.9 G/DL (ref 6–8.4)
RBC # BLD AUTO: 4 M/UL (ref 4.6–6.2)
SODIUM SERPL-SCNC: 139 MMOL/L (ref 136–145)
TSH SERPL DL<=0.005 MIU/L-ACNC: 1.4 UIU/ML (ref 0.4–4)
WBC # BLD AUTO: 6.71 K/UL (ref 3.9–12.7)

## 2024-04-30 PROCEDURE — 80053 COMPREHEN METABOLIC PANEL: CPT | Performed by: INTERNAL MEDICINE

## 2024-04-30 PROCEDURE — 3075F SYST BP GE 130 - 139MM HG: CPT | Mod: CPTII,S$GLB,, | Performed by: PHYSICIAN ASSISTANT

## 2024-04-30 PROCEDURE — 96413 CHEMO IV INFUSION 1 HR: CPT

## 2024-04-30 PROCEDURE — 85027 COMPLETE CBC AUTOMATED: CPT | Performed by: INTERNAL MEDICINE

## 2024-04-30 PROCEDURE — 36415 COLL VENOUS BLD VENIPUNCTURE: CPT | Performed by: INTERNAL MEDICINE

## 2024-04-30 PROCEDURE — 63600175 PHARM REV CODE 636 W HCPCS: Performed by: PHYSICIAN ASSISTANT

## 2024-04-30 PROCEDURE — 3008F BODY MASS INDEX DOCD: CPT | Mod: CPTII,S$GLB,, | Performed by: PHYSICIAN ASSISTANT

## 2024-04-30 PROCEDURE — 99215 OFFICE O/P EST HI 40 MIN: CPT | Mod: S$GLB,,, | Performed by: PHYSICIAN ASSISTANT

## 2024-04-30 PROCEDURE — G2211 COMPLEX E/M VISIT ADD ON: HCPCS | Mod: S$GLB,,, | Performed by: PHYSICIAN ASSISTANT

## 2024-04-30 PROCEDURE — 25000003 PHARM REV CODE 250: Performed by: PHYSICIAN ASSISTANT

## 2024-04-30 PROCEDURE — 3078F DIAST BP <80 MM HG: CPT | Mod: CPTII,S$GLB,, | Performed by: PHYSICIAN ASSISTANT

## 2024-04-30 PROCEDURE — 1160F RVW MEDS BY RX/DR IN RCRD: CPT | Mod: CPTII,S$GLB,, | Performed by: PHYSICIAN ASSISTANT

## 2024-04-30 PROCEDURE — 84443 ASSAY THYROID STIM HORMONE: CPT | Performed by: INTERNAL MEDICINE

## 2024-04-30 PROCEDURE — 99999 PR PBB SHADOW E&M-EST. PATIENT-LVL IV: CPT | Mod: PBBFAC,,, | Performed by: PHYSICIAN ASSISTANT

## 2024-04-30 PROCEDURE — 1159F MED LIST DOCD IN RCRD: CPT | Mod: CPTII,S$GLB,, | Performed by: PHYSICIAN ASSISTANT

## 2024-04-30 RX ORDER — PROCHLORPERAZINE EDISYLATE 5 MG/ML
10 INJECTION INTRAMUSCULAR; INTRAVENOUS ONCE AS NEEDED
Status: DISCONTINUED | OUTPATIENT
Start: 2024-04-30 | End: 2024-04-30 | Stop reason: HOSPADM

## 2024-04-30 RX ORDER — SODIUM CHLORIDE 0.9 % (FLUSH) 0.9 %
10 SYRINGE (ML) INJECTION
Status: CANCELLED | OUTPATIENT
Start: 2024-04-30

## 2024-04-30 RX ORDER — SODIUM CHLORIDE 0.9 % (FLUSH) 0.9 %
10 SYRINGE (ML) INJECTION
Status: DISCONTINUED | OUTPATIENT
Start: 2024-04-30 | End: 2024-04-30 | Stop reason: HOSPADM

## 2024-04-30 RX ORDER — HEPARIN 100 UNIT/ML
500 SYRINGE INTRAVENOUS
Status: CANCELLED | OUTPATIENT
Start: 2024-04-30

## 2024-04-30 RX ORDER — PROCHLORPERAZINE EDISYLATE 5 MG/ML
10 INJECTION INTRAMUSCULAR; INTRAVENOUS ONCE AS NEEDED
Status: CANCELLED
Start: 2024-04-30

## 2024-04-30 RX ORDER — HEPARIN 100 UNIT/ML
500 SYRINGE INTRAVENOUS
Status: DISCONTINUED | OUTPATIENT
Start: 2024-04-30 | End: 2024-04-30 | Stop reason: HOSPADM

## 2024-04-30 RX ORDER — DIPHENHYDRAMINE HYDROCHLORIDE 50 MG/ML
50 INJECTION INTRAMUSCULAR; INTRAVENOUS ONCE AS NEEDED
Status: CANCELLED | OUTPATIENT
Start: 2024-04-30

## 2024-04-30 RX ORDER — DIPHENHYDRAMINE HYDROCHLORIDE 50 MG/ML
50 INJECTION INTRAMUSCULAR; INTRAVENOUS ONCE AS NEEDED
Status: DISCONTINUED | OUTPATIENT
Start: 2024-04-30 | End: 2024-04-30 | Stop reason: HOSPADM

## 2024-04-30 RX ORDER — EPINEPHRINE 0.3 MG/.3ML
0.3 INJECTION SUBCUTANEOUS ONCE AS NEEDED
Status: DISCONTINUED | OUTPATIENT
Start: 2024-04-30 | End: 2024-04-30 | Stop reason: HOSPADM

## 2024-04-30 RX ORDER — EPINEPHRINE 0.3 MG/.3ML
0.3 INJECTION SUBCUTANEOUS ONCE AS NEEDED
Status: CANCELLED | OUTPATIENT
Start: 2024-04-30

## 2024-04-30 RX ADMIN — SODIUM CHLORIDE: 9 INJECTION, SOLUTION INTRAVENOUS at 09:04

## 2024-04-30 RX ADMIN — SODIUM CHLORIDE 480 MG: 9 INJECTION, SOLUTION INTRAVENOUS at 09:04

## 2024-04-30 RX ADMIN — HEPARIN 500 UNITS: 100 SYRINGE at 09:04

## 2024-04-30 NOTE — PLAN OF CARE
Pt admitted for C7 Opdivo. Labs reviewed and assessment performed. Pt tolerated treatment well. Port de-accessed and Pt discharged home with wife at his side

## 2024-04-30 NOTE — PROGRESS NOTES
MEDICAL ONCOLOGY - ESTABLISHED PATIENT VISIT    Reason for visit: Esophageal adenocarcinoma    Best Contact Phone Number(s): [phone] (home)      Cancer/Stage/TNM:    Cancer Staging   Malignant neoplasm of gastroesophageal junction  Staging form: Esophagus - Adenocarcinoma, AJCC 8th Edition  - Clinical stage from 6/13/2023: Stage III (cT3, cN1, cM0, G2) - Signed by Sunday Jasso MD on 7/5/2023       Oncology History   Malignant neoplasm of gastroesophageal junction   6/13/2023 Cancer Staged    Staging form: Esophagus - Adenocarcinoma, AJCC 8th Edition  - Clinical stage from 6/13/2023: Stage III (cT3, cN1, cM0, G2)     6/23/2023 Initial Diagnosis    Malignant neoplasm of gastroesophageal junction     7/3/2023 Tumor Conference     [facility] UGI MULTIDISCIPLINARY TUMOR BOARD  PATIENT REVIEW FORM   ____________________________________________________________    CLINIC #: [id number]   DATE: 7/3/2023    DIAGNOSIS: esophageal CA    PRESENTER: Mitch    PATIENT SUMMARY:   This 64 y/o gentleman is a long time smoker with emphysema who had low dose screening lung CT in 4/2023 per his PCP. Given an abnormality on this scan, he had PET on 6/1/23 which identified large hypermetabolic mid esophageal mass with lymph nodes. He underwent EGD on 6/13/23 that found large fungating ulcerated mass with bleeding in middle third of esophagus to lower esophagus, 34-44cm. Pathology revealed moderately differentiated adenocarcinoma.   Staging PET reviewed - level 2 cervical lymph node with FDG uptake, nothing in lungs concerning  Staging EUS today per Dr. Duffy.   He has been evaluated by Dr. Vance in [place] and Dr. June.   He is scheduled to see rad onc, Dr. Jasso, Wednesday and IR for port placement on 7/12/23.     BOARD RECOMMENDATIONS:   Proceed with neoadj CRT, then restaging to consider surgical resection    CONSULT NEEDED:     [] Surgery    [] Hem/Onc    [] Rad/Onc    [] Dietary                 [] Social  Service    [] Psychology       [] AES  [] Radiology     Clinical Stage: Tumor 3 Node(s) 1 Metastasis 0      GROUP STAGE:  [] O    [] 1   [] II     [x] III   []IV  [] Local recurrence     [] Regional recurrence     [] Distant recurrence   Metastatic site(s): none         [x] [facility] Treatment Guidelines reviewed and care planned is consistent with guidelines.         (i.e., NCCN, NCI, PD, ACO, AUA, etc.)    PRESENTATION AT CANCER CONFERENCE:         [x] Prospective    [] Retrospective     [] Follow-Up         7/16/2023 - 8/8/2023 Chemotherapy    Treatment Summary   Plan Name: OP ESOPHAGEAL PACLITAXEL CARBOPLATIN WEEKLY  Treatment Goal: Control  Status: Inactive  Start Date: 7/16/2023  End Date: 8/8/2023  Provider: Delta June MD  Chemotherapy: CARBOplatin (PARAPLATIN) 195 mg in sodium chloride 0.9% 304.5 mL chemo infusion, 195 mg (100 % of original dose 193 mg), Intravenous, Clinic/HOD 1 time, 1 of 1 cycle  Dose modification:   (original dose 193 mg, Cycle 1)  Administration: 195 mg (7/18/2023), 210 mg (7/24/2023), 230 mg (7/31/2023), 210 mg (8/8/2023)  PACLitaxeL (TAXOL) 50 mg/m2 = 84 mg in sodium chloride 0.9% 250 mL chemo infusion, 50 mg/m2 = 84 mg, Intravenous, Clinic/HOD 1 time, 1 of 1 cycle  Administration: 84 mg (7/18/2023)     7/18/2023 - 8/17/2023 Radiation Therapy    Treating physician: Sunday Jasso    Course: C1 Thorax 2023    Treatment Site Ref. ID Energy Dose/Fx (Gy) #Fx Dose Correction (Gy) Total Dose (Gy) Start Date End Date Elapsed Days   IM Esophagus PTV_High 6X 1.8 23 / 23 0 41.4 7/18/2023 8/17/2023 30          11/15/2023 -  Chemotherapy    Treatment Summary   Plan Name: OP NIVOLUMAB 480 MG Q4W  Treatment Goal: Curative  Status: Active  Start Date: 11/15/2023  End Date: 9/17/2024 (Planned)  Provider: Delta June MD  Chemotherapy: [No matching medication found in this treatment plan]     12/1/2023 Procedure    Surgeon(s) and Role: Nas Meyer MD - Primary      Pre-Operative Diagnosis:   Esophageal adenocarcinoma  Suspected cervical esophagogastric anastomotic stricture     Post-Operative Diagnosis:   Same  Cervical esophagogastric anastomotic stricture      Operative Findings:    Cervical esophagogastric anastomotic stricture. Unable to pass endoscope through stricture initially, but the scope was able to pass after serial dilations to 18 mm.      Procedures:  Esophagogastroduodenoscopy (EGD), with transendoscopic balloon dilation of esophagus (<30 mm)                Interim History:   63 y.o. male with esophageal adenocarcinoma who presents for cycle 7 of adjuvant nivolumab.  He is feeling well overall.  He had an esophageal dilation back in March with Dr. Meyer. He is going to request a repeat dilation within the next couple of weeks, given that he is going on vacation [place] soon. Says he is swallowing without issue.  No new concerns regarding tolerance to nivolumab.  No dyspnea or diarrhea.  No rash.    ECOG status is 0. Presents with his wife today.     ROS:   Review of Systems   Constitutional:  Negative for chills, fever, malaise/fatigue and weight loss.   HENT:  Negative for sore throat.    Eyes:  Negative for blurred vision and pain.   Respiratory:  Negative for cough and shortness of breath.    Cardiovascular:  Negative for chest pain and leg swelling.   Gastrointestinal:  Negative for abdominal pain, constipation, diarrhea, heartburn, nausea and vomiting.   Genitourinary:  Negative for dysuria and frequency.   Musculoskeletal:  Negative for back pain, falls and myalgias.   Skin:  Negative for rash.   Neurological:  Negative for dizziness, weakness and headaches.   Endo/Heme/Allergies:  Does not bruise/bleed easily.   Psychiatric/Behavioral:  Negative for depression. The patient is not nervous/anxious.    All other systems reviewed and are negative.      Past Medical History:   Past Medical History:   Diagnosis Date    Arthritis     Cancer     COPD (chronic  obstructive pulmonary disease)         Past Surgical History:   Past Surgical History:   Procedure Laterality Date    CERVICAL FUSION      COLONOSCOPY N/A 3/27/2018    Procedure: COLONOSCOPY;  Surgeon: Maria Teresa Morocho MD;  Location: [facility] ENDO;  Service: Endoscopy;  Laterality: N/A;    COLONOSCOPY N/A 6/13/2023    Procedure: COLONOSCOPY;  Surgeon: Kelli Snell MD;  Location: [facility] ENDO;  Service: Endoscopy;  Laterality: N/A;    EGD, WITH BALLOON DILATION N/A 12/1/2023    Procedure: EGD, WITH BALLOON DILATION;  Surgeon: Nas Meyer MD;  Location: [facility] OR 2ND FLR;  Service: General;  Laterality: N/A;    EGD, WITH BALLOON DILATION N/A 12/11/2023    Procedure: EGD, WITH BALLOON DILATION;  Surgeon: Nas Meyer MD;  Location: [facility] OR 2ND FLR;  Service: General;  Laterality: N/A;    EGD, WITH BALLOON DILATION N/A 12/20/2023    Procedure: EGD, WITH BALLOON DILATION;  Surgeon: Nas Meyer MD;  Location: [facility] OR 2ND FLR;  Service: General;  Laterality: N/A;    EGD, WITH BALLOON DILATION N/A 1/10/2024    Procedure: EGD, WITH BALLOON DILATION;  Surgeon: Nas Meyer MD;  Location: [facility] OR 2ND FLR;  Service: General;  Laterality: N/A;    EGD, WITH BALLOON DILATION N/A 2/12/2024    Procedure: EGD, WITH BALLOON DILATION;  Surgeon: Nas Meyer MD;  Location: [facility] OR 2ND FLR;  Service: General;  Laterality: N/A;    EGD, WITH BALLOON DILATION N/A 2/23/2024    Procedure: EGD, WITH BALLOON DILATION;  Surgeon: Nas Meyer MD;  Location: [facility] OR 2ND FLR;  Service: General;  Laterality: N/A;    EGD, WITH BALLOON DILATION N/A 3/4/2024    Procedure: EGD, WITH BALLOON DILATION;  Surgeon: Nas Meyer MD;  Location: [facility] OR 2ND FLR;  Service: General;  Laterality: N/A;    EGD, WITH BALLOON DILATION N/A 3/19/2024    Procedure: EGD, WITH BALLOON DILATION;  Surgeon: Nas Meyer MD;  [facility];  Service: General;  Laterality: N/A;    ENDOSCOPIC ULTRASOUND OF UPPER  GASTROINTESTINAL TRACT N/A 7/3/2023    Procedure: ULTRASOUND, UPPER GI TRACT, ENDOSCOPIC;  Surgeon: Ray Duffy MD;  Location: [facility];  Service: Endoscopy;  Laterality: N/A;    ESOPHAGOGASTRODUODENOSCOPY N/A 6/13/2023    Procedure: EGD (ESOPHAGOGASTRODUODENOSCOPY);  Surgeon: Kelli Snell MD;  Location: [facility] ENDO;  Service: Endoscopy;  Laterality: N/A;    ESOPHAGOGASTRODUODENOSCOPY N/A 7/3/2023    Procedure: EGD (ESOPHAGOGASTRODUODENOSCOPY);  Surgeon: Ray Duffy MD;  Location: [facility];  Service: Endoscopy;  Laterality: N/A;    INGUINAL HERNIA REPAIR Bilateral     Right x2, x1 left    PLACEMENT OF JEJUNOSTOMY TUBE  10/4/2023    Procedure: INSERTION, JEJUNOSTOMY TUBE;  Surgeon: Nas Meyer MD;  Location: [facility] OR [address] [place] [address];  Service: General;;    PYLOROMYOTOMY  10/4/2023    Procedure: PYLOROMYOTOMY;  Surgeon: Nas Meyer MD;  Location: [facility] OR [address] [place] [address];  Service: General;;    ROBOT-ASSISTED SURGICAL REMOVAL OF ESOPHAGUS USING DA BALWINDER XI N/A 10/4/2023    Procedure: XI ROBOTIC ESOPHAGECTOMY;  Surgeon: Nas Meyer MD;  Location: [facility] OR [address] [place] [address];  Service: General;  Laterality: N/A;    ROBOTIC REPAIR, HERNIA, PARAESOPHAGEAL  10/4/2023    Procedure: ROBOTIC REPAIR, HERNIA, PARAESOPHAGEAL;  Surgeon: Nas Meyer MD;  Location: [facility] OR [address] [place] [address];  Service: General;;    ROTATOR CUFF REPAIR Right     x2        Family History:   Family History   Problem Relation Name Age of Onset    Diabetes Mother      Heart disease Father          CHF    Glaucoma Neg Hx      Colon cancer Neg Hx      Prostate cancer Neg Hx          Social History:   Social History     Tobacco Use    Smoking status: Former     Current packs/day: 0.25     Average packs/day: 0.3 packs/day for 41.3 years (10.3 ttl pk-yrs)     Types: Cigarettes     Start date: 1983     Passive exposure: Current    Smokeless tobacco: Never    Tobacco comments:     Done smoking since september   Substance Use Topics    Alcohol use: Yes      "Comment: a couple of beers a day        I have reviewed and updated the patient's past medical, surgical, family and social histories.    Allergies:   Review of patient's allergies indicates:  No Known Allergies     Medications:   Current Outpatient Medications   Medication Sig Dispense Refill    LIDOcaine-prilocaine (EMLA) cream Apply topically as needed (Port access). 30 g 1    acetaminophen (TYLENOL) 500 MG tablet Take 1 tablet (500 mg total) by mouth every 6 (six) hours as needed for Pain. (Patient not taking: Reported on 1/9/2024)  0    cholecalciferol, vitamin D3, (VITAMIN D3) 10 mcg (400 unit) Tab Take by mouth once daily. (Patient not taking: Reported on 4/30/2024)      pantoprazole (PROTONIX) 40 MG tablet Take 1 tablet (40 mg total) by mouth once daily. (Patient not taking: Reported on 3/5/2024) 90 tablet 3    sildenafil (VIAGRA) 100 MG tablet Take 1 tablet (100 mg total) by mouth daily as needed for Erectile Dysfunction. (Patient not taking: Reported on 10/11/2023) 10 tablet 6     No current facility-administered medications for this visit.        Physical Exam:   /78 (BP Location: Left arm, Patient Position: Sitting, BP Method: Medium (Automatic))   Pulse 62   Temp 98.2 °F (36.8 °C) (Oral)   Resp 16   Ht 5' 9" (1.753 m)   Wt 58.3 kg (128 lb 8.5 oz)   SpO2 99%   BMI 18.98 kg/m²      ECOG Performance status: 0          Physical Exam  Vitals reviewed.   Constitutional:       General: He is not in acute distress.     Appearance: Normal appearance. He is normal weight.   HENT:      Head: Normocephalic and atraumatic.      Right Ear: External ear normal.      Left Ear: External ear normal.      Nose: Nose normal.      Mouth/Throat:      Mouth: Mucous membranes are moist.      Pharynx: Oropharynx is clear. No posterior oropharyngeal erythema.   Eyes:      General: No scleral icterus.     Extraocular Movements: Extraocular movements intact.      Conjunctiva/sclera: Conjunctivae normal.      Pupils: " Pupils are equal, round, and reactive to light.   Cardiovascular:      Rate and Rhythm: Normal rate and regular rhythm.      Pulses: Normal pulses.      Heart sounds: Normal heart sounds.   Pulmonary:      Effort: Pulmonary effort is normal.      Breath sounds: Normal breath sounds. No wheezing or rales.   Abdominal:      General: Bowel sounds are normal. There is no distension.      Palpations: Abdomen is soft.      Tenderness: There is no abdominal tenderness.      Comments: Surgical wounds well healed   Musculoskeletal:         General: No swelling. Normal range of motion.      Cervical back: Normal range of motion and neck supple.   Skin:     General: Skin is warm.      Coloration: Skin is not jaundiced.      Findings: No erythema or rash.   Neurological:      General: No focal deficit present.      Mental Status: He is alert and oriented to person, place, and time. Mental status is at baseline.      Gait: Gait normal.   Psychiatric:         Mood and Affect: Mood normal.         Behavior: Behavior normal.         Thought Content: Thought content normal.         Judgment: Judgment normal.           Labs:   Recent Results (from the past 48 hour(s))   CBC Oncology    Collection Time: 04/30/24  7:39 AM   Result Value Ref Range    WBC 6.71 3.90 - 12.70 K/uL    RBC 4.00 (L) 4.60 - 6.20 M/uL    Hemoglobin 13.8 (L) 14.0 - 18.0 g/dL    Hematocrit 40.3 40.0 - 54.0 %     (H) 82 - 98 fL    MCH 34.5 (H) 27.0 - 31.0 pg    MCHC 34.2 32.0 - 36.0 g/dL    RDW 12.5 11.5 - 14.5 %    Platelets 311 150 - 450 K/uL    MPV 8.4 (L) 9.2 - 12.9 fL    Gran # (ANC) 3.9 1.8 - 7.7 K/uL    Immature Grans (Abs) 0.20 (H) 0.00 - 0.04 K/uL     CMP pending  Imaging:       CT CAP 3/27/24:    Impression:   Increased size and density of a nodular focus in the region of right apical scarring currently measuring up to 2.0 cm.  Consider short interval follow-up with repeat CT chest versus PET/CT.     Otherwise, no new evidence of intrathoracic or  abdominopelvic metastatic disease.     Esophagectomy and gastric pull-through changes without evidence of complication.     6 mm hypodense lesion at the posterior right renal cortex, not clearly cystic.  This statistically likely still represents a benign cyst although further follow-up should be considered with ultrasound or MRI.     Nonspecific asymmetric thickening of the left adrenal gland without discrete nodule.  Further attention to this region on future imaging recommended.    Path:   2. Gastroesophageal junction mass (biopsy):   Invasive adenocarcinoma, moderately differentiated   Background low and high-grade glandular dysplasia   HER2 immunohistochemistry:  Equivocal.     Immunohistochemistry (IHC) Testing for Mismatch Repair (MMR) Proteins:   MLH1, MSH2, MSH6, PMS2 - Intact nuclear expression   Background nonneoplastic tissue/internal control with intact nuclear expression     There are exceptions to the above IHC interpretations. These results should not be considered in isolation, and clinical correlation with genetic counseling is recommended to assess the need for germline testing.     Interpretation: No loss of nuclear expression of MMR proteins: low probability of microsatellite instability       Assessment:       1. Malignant neoplasm of gastroesophageal junction    2. Immunodeficiency due to chemotherapy    3. Neoplastic malignant related fatigue    4. Esophagitis    5. Chronic obstructive pulmonary disease, unspecified COPD type    6. Pulmonary emphysema, unspecified emphysema type    7. Atherosclerosis of aorta    8. Moderate malnutrition         Plan:        # Esophageal adenocarcinoma, chemotherapy induced neutropenia/thrombocytopenia  He initially presented with a locally advanced adenocarcinoma of the middle and lower third of the esophagus, pMMR. PET/CT on 6/1/23 did not show clear distant metastatic disease.  We discussed the case and plan with Dr. Meyer and he feels the patient is  surgically resectable as well.    Began cycle 1 on 7/18/23. He unfortunately had a reaction to the Taxol. During the Taxol infusion, he developed coughing, flushing, chest tightness and nausea. O2 sat was 92%, 2L O2 applied NC. We were notified and stopped the Taxol. He was able to proceed with the Carboplatin without complication.   We switched him to Abraxane 40 mg/m2 with week 2.  This was tolerated very well without issue. Has tolerated RT well and has completed 4 cycles of chemoRT. He completed radiation 8/18/23.  We did have to forego his last dose of chemotherapy due to cytopenias.    PET/CT on 9/22/23 showed very nice response to treatment with reduction in SUV avidity of primary tumor.  Discussed with Dr. Meyer.      He underwent esophagogastrectomy with Dr. Meyer on 10/4/23.  Pathology showed ypT2N0 with negative margins and 18 negative lymph nodes.    Because of his residual disease, we recommended adjuvant nivolumab per [id number]. He was in agreement.    Began cycle 1 on 11/15/23.  Doing well and continues to do well. Swallowing well. Wants to request a repeat dilation procedure with Dr. Meyer next week, prior to his trip. Overall, doing very well.   CBC adequate today. CMP pending  Presents today for cycle 7.  Will proceed.  Surveillance CT CAP 3/27/24 shows no clear evidence of disease.  Continued R apical scarring a bit more nodular.      Will repeat chest CT in 2 months and CT CAP in 5 months.    RTC in 4 weeks for next cycle. He will heading to [place] on 5/11.    # Esophagitis, stricture  S/p multiple dilations. Most recent 3/19/24. Will request repeat procedure next week  Feeding tube has been removed.    # COPD, tobacco abuse, aortic atherosclerosis  Counseled on tobacco cessation.  He has stopped smoking.  Normal SpO2.  No dyspnea.  Atherosclerosis noted on imaging.    # Malnutrition  Following with surgery team.  Weight stable.      Patient is in agreement with the proposed treatment  plan. All questions were answered to the patient's satisfaction. Pt knows to call clinic if anything is needed before the next clinic visit.      DIANA Toledo, PA-C  Physician Assistant Certified  Dept of Hematology/Oncology  PA-C to Dr. Hernandez, Dr. June and Dr. Saleem     Route Chart for Scheduling    Med Onc Chart Routing      Follow up with physician 1 month, 4 months and 2 months. See Dr June in 1 month as scheduled. See Dr. June in 2 mos with lab work, CT Chest and nivo. See Dr. June in 4 mos with CT CAP, lab work and nivo   Follow up with DEMARCO 3 months. See DEMARCO in 3 months with Nivo   Infusion scheduling note    Injection scheduling note    Labs CBC, CMP and TSH   Scheduling:  Preferred lab:  Lab interval: every 4 weeks     Imaging CT chest abdomen pelvis   Scheduled CT chest in 2 mos. See CT CAP in 4 mos prior to clinic visit with Dr June   Pharmacy appointment    Other referrals              Treatment Plan Information   OP NIVOLUMAB 480 MG Q4W   Delta June MD   Upcoming Treatment Dates - OP NIVOLUMAB 480 MG Q4W    4/30/2024       Chemotherapy       nivolumab 480 mg in sodium chloride 0.9% 98 mL infusion  5/28/2024       Chemotherapy       nivolumab 480 mg in sodium chloride 0.9% 98 mL infusion  6/25/2024       Chemotherapy       nivolumab 480 mg in sodium chloride 0.9% 98 mL infusion  7/23/2024       Chemotherapy       nivolumab 480 mg in sodium chloride 0.9% 98 mL infusion

## 2024-05-03 NOTE — PRE-PROCEDURE INSTRUCTIONS
PREOP INSTRUCTIONS TO PATIENT'S WIFE:  No food,milk or milk products for 8 hours before surgery.  Clear liquids like water,gatorade,apple juice are allowed up until 2 hours before surgery.  Instructed to follow the surgeon's instructions if they differ from these.  Shower instructions as well as directions to the Surgery Center were given.  Encouraged to wear loose fitting,comfortable clothing.  Medication instructions for pm prior to and am of procedure reviewed.  Instructed to avoid taking vitamins,supplements,aspirin and ibuprofen the morning of surgery.    Patient's wife reported that the patient has had documented hard intubations in the past

## 2024-05-06 ENCOUNTER — TELEPHONE (OUTPATIENT)
Dept: SURGERY | Facility: CLINIC | Age: 64
End: 2024-05-06
Payer: COMMERCIAL

## 2024-05-06 ENCOUNTER — ANESTHESIA EVENT (OUTPATIENT)
Dept: SURGERY | Facility: HOSPITAL | Age: 64
End: 2024-05-06
Payer: COMMERCIAL

## 2024-05-06 DIAGNOSIS — R13.19 ESOPHAGEAL DYSPHAGIA: Primary | ICD-10-CM

## 2024-05-06 NOTE — TELEPHONE ENCOUNTER
Spoke to patient spouse. Pt was unavailable. Confirmed procedure for 5/7/2024 with Dr. Meyer.  Informed to arrive at the [facility] on the 2nd floor [address] [facility] for 0500  and surgery time is 0700. RN will call  day before to confirm surgery and arrival time. Surgery Instructions provided as follows:  instructed to remain NPO solids 8 hours prior to surgery, clear liquids until 2 hours prior to surgery, to shower with hibiclens/antibacterial soap the night before surgery and morning of surgery before arrival, not to apply any lotions, powders or deodorant, remove all metal and jewelry, to wear comfortable clothes, and leave all valuables at home. Medications reviewed and  instructed to bring medications on DOS. Confirmed PAT called on Friday. Confirmed pt  will have transportation home and spouse will accompany pt on DOS.   Post operative instructions reviewed. Pt spouse verbalized understanding to all of the above and instructed to contact us for any questions.

## 2024-05-06 NOTE — ANESTHESIA PREPROCEDURE EVALUATION
[facility]  Anesthesia Pre-Operative Evaluation         Patient Name: Blayne Beebe  YOB: 1960  MRN: [id number]    SUBJECTIVE:     Pre-operative evaluation for Procedure(s) (LRB):  EGD, WITH BALLOON DILATION (N/A)     05/06/2024    Blayne Beebe is a 63 y.o. male w/ a significant PMHx of esphageal adenocarcinoma, chronic tobacco use, COPD presents with persistent cervical esophagogastric anastomotic stricture at 20 cm requiring several prior balloon dilationss via EGD.    Patient now presents for the above procedure(s).      LDA:        PowerPort A Cath Single Lumen 07/12/23 1344 Internal Jugular Right (Active)   Dressing Intervention Integrity maintained 04/30/24 0905   Patency/Care flushed w/o difficulty;heparin locked 04/30/24 0949   Status Deaccessed 04/30/24 0949   Accessed by: JACINDA Prado 04/30/24 0905   Needle Insertion Date 04/30/24 04/30/24 0905   Needle Insertion Time 0905 04/30/24 0905   Type of Needle Mann 04/30/24 0905   Gauge 20 04/30/24 0905   Needle Length 3/4 in 04/30/24 0905   Needle Status Removed 04/30/24 0949   Flush Performed Yes 04/30/24 0949   Needle Removal Date 04/30/24 04/30/24 0949   Needle Removal Time 0949 04/30/24 0949   Deaccessed By JACINDA Prado 04/30/24 0949   Number of days: 299            Gastrostomy/Enterostomy 10/04/23 1730 Jejunostomy tube LUQ feeding (Active)   Number of days: 215       Prev airway:     Intubation     Date/Time: 3/19/2024 7:12 AM     Performed by: Titus Yanes III, CRNA  Authorized by: Yovany Elizondo MD    Intubation:     Induction:  Intravenous    Intubated:  Postinduction    Mask Ventilation:  Easy mask    Attempts:  2    Attempted By:  CRNA    Method of Intubation:  Video laryngoscopy    Blade:  Zapata 3    Laryngeal View Grade: Grade IIb - only the arytenoids and epiglottis seen      Attempted By (2nd Attempt):  CRNA    Method of Intubation (2nd Attempt):  Video laryngoscopy    Blade (2nd Attempt):  Zapata 3     Laryngeal View Grade (2nd Attempt): Grade IIb - only the arytenoids and epiglottis seen      Laryngeal View Grade (2nd Attempt) comment:  Jw    Difficult Airway Encountered?: Yes      Future Airway Recommendations:  Teresita    Complications:  None    Airway Device:  Oral endotracheal tube    Airway Device Size:  7.0    Style/Cuff Inflation:  Cuffed (inflated to minimal occlusive pressure)    Inflation Amount (mL):  5    Tube secured:  22    Secured at:  The lips    Placement Verified By:  Capnometry    Findings Post-Intubation:  BS equal bilateral and atraumatic/condition of teeth unchanged  Notes:      First intubation cuff rupture                 Intubation     Date/Time: 2/23/2024 7:11 AM     Performed by: Zi Flores CRNA  Authorized by: Stephen Duffy MD    Intubation:     Induction:  Intravenous    Intubated:  Postinduction    Mask Ventilation:  Easy mask    Attempts:  1    Attempted By:  CRNA    Method of Intubation:  Video laryngoscopy    Blade:  Zapata 3    Laryngeal View Grade: Grade IIA - cords partially seen      Difficult Airway Encountered?: Yes      Future Airway Recommendations:  Video Laryngoscope is a must; difficult to visualize anatomy even with the Zapata; small / narrow mouth opening and no neck mobility    Complications:  None    Airway Device:  Oral endotracheal tube    Airway Device Size:  7.5    Style/Cuff Inflation:  Cuffed (inflated to minimal occlusive pressure)    Tube secured:  22    Secured at:  The lips    Placement Verified By:  Capnometry    Complicating Factors:  None    Findings Post-Intubation:  BS equal bilateral and atraumatic/condition of teeth unchanged                Drips: None documented.      Patient Active Problem List   Diagnosis    Disc disease, degenerative, cervical    Erectile dysfunction    Tobacco use disorder, moderate, in early remission    COPD (chronic obstructive pulmonary disease)    RBBB    Pulmonary nodule 1 cm or greater in diameter     Personal history of colonic polyps    Hard to intubate    Malignant neoplasm of gastroesophageal junction    Weakness of both hips    Decreased functional mobility and endurance    Jejunostomy present    H/O esophagectomy    Alteration in nutrition associated with tube feeding       Review of patient's allergies indicates:  No Known Allergies    Current Inpatient Medications:      No current facility-administered medications on file prior to encounter.     Current Outpatient Medications on File Prior to Encounter   Medication Sig Dispense Refill    acetaminophen (TYLENOL) 500 MG tablet Take 1 tablet (500 mg total) by mouth every 6 (six) hours as needed for Pain. (Patient not taking: Reported on 1/9/2024)  0    cholecalciferol, vitamin D3, (VITAMIN D3) 10 mcg (400 unit) Tab Take by mouth once daily. (Patient not taking: Reported on 4/30/2024)      LIDOcaine-prilocaine (EMLA) cream Apply topically as needed (Port access). 30 g 1    pantoprazole (PROTONIX) 40 MG tablet Take 1 tablet (40 mg total) by mouth once daily. (Patient not taking: Reported on 3/5/2024) 90 tablet 3    sildenafil (VIAGRA) 100 MG tablet Take 1 tablet (100 mg total) by mouth daily as needed for Erectile Dysfunction. (Patient not taking: Reported on 10/11/2023) 10 tablet 6       Past Surgical History:   Procedure Laterality Date    CERVICAL FUSION      COLONOSCOPY N/A 3/27/2018    Procedure: COLONOSCOPY;  Surgeon: Maria Teresa Morocho MD;  Location: [facility];  Service: Endoscopy;  Laterality: N/A;    COLONOSCOPY N/A 6/13/2023    Procedure: COLONOSCOPY;  Surgeon: Kelli Snell MD;  Location: [facility];  Service: Endoscopy;  Laterality: N/A;    EGD, WITH BALLOON DILATION N/A 12/1/2023    Procedure: EGD, WITH BALLOON DILATION;  Surgeon: Nas Meyer MD;  Location: [address];  Service: General;  Laterality: N/A;    EGD, WITH BALLOON DILATION N/A 12/11/2023    Procedure: EGD, WITH BALLOON DILATION;  Surgeon: Nas Meyer MD;   Location: [facility] 2ND FLR;  Service: General;  Laterality: N/A;    EGD, WITH BALLOON DILATION N/A 12/20/2023    Procedure: EGD, WITH BALLOON DILATION;  Surgeon: Nas Meyer MD;  Location: [facility] 2ND FLR;  Service: General;  Laterality: N/A;    EGD, WITH BALLOON DILATION N/A 1/10/2024    Procedure: EGD, WITH BALLOON DILATION;  Surgeon: Nas Meyer MD;  Location: [facility] 2ND FLR;  Service: General;  Laterality: N/A;    EGD, WITH BALLOON DILATION N/A 2/12/2024    Procedure: EGD, WITH BALLOON DILATION;  Surgeon: Nas Meyer MD;  Location: [facility] 2ND FLR;  Service: General;  Laterality: N/A;    EGD, WITH BALLOON DILATION N/A 2/23/2024    Procedure: EGD, WITH BALLOON DILATION;  Surgeon: Nas Meyer MD;  [facility];  Service: General;  Laterality: N/A;    EGD, WITH BALLOON DILATION N/A 3/4/2024    Procedure: EGD, WITH BALLOON DILATION;  Surgeon: Nas Meyer MD;  [facility];  Service: General;  Laterality: N/A;    EGD, WITH BALLOON DILATION N/A 3/19/2024    Procedure: EGD, WITH BALLOON DILATION;  Surgeon: Nas Meyer MD;  [facility];  Service: General;  Laterality: N/A;    ENDOSCOPIC ULTRASOUND OF UPPER GASTROINTESTINAL TRACT N/A 7/3/2023    Procedure: ULTRASOUND, UPPER GI TRACT, ENDOSCOPIC;  Surgeon: Ray Duffy MD;  Location: [facility];  Service: Endoscopy;  Laterality: N/A;    ESOPHAGOGASTRODUODENOSCOPY N/A 6/13/2023    Procedure: EGD (ESOPHAGOGASTRODUODENOSCOPY);  Surgeon: Kelli Snell MD;  Location: [facility];  Service: Endoscopy;  Laterality: N/A;    ESOPHAGOGASTRODUODENOSCOPY N/A 7/3/2023    Procedure: EGD (ESOPHAGOGASTRODUODENOSCOPY);  Surgeon: Ray Duffy MD;  Location: [facility];  Service: Endoscopy;  Laterality: N/A;    INGUINAL HERNIA REPAIR Bilateral     Right x2, x1 left    PLACEMENT OF JEJUNOSTOMY TUBE  10/4/2023    Procedure: INSERTION, JEJUNOSTOMY TUBE;  Surgeon: Nas Meyer MD;  Location: [facility]  2ND FLR;  Service: General;;    PYLOROMYOTOMY  10/4/2023    Procedure: PYLOROMYOTOMY;  Surgeon: Nas Meyer MD;  Location: [facility] OR 2ND FLR;  Service: General;;    ROBOT-ASSISTED SURGICAL REMOVAL OF ESOPHAGUS USING DA BALWINDER XI N/A 10/4/2023    Procedure: XI ROBOTIC ESOPHAGECTOMY;  Surgeon: Nas Meyer MD;  Location: [facility] OR 2ND FLR;  Service: General;  Laterality: N/A;    ROBOTIC REPAIR, HERNIA, PARAESOPHAGEAL  10/4/2023    Procedure: ROBOTIC REPAIR, HERNIA, PARAESOPHAGEAL;  Surgeon: Nas Meyer MD;  Location: [facility] OR 2ND FLR;  Service: General;;    ROTATOR CUFF REPAIR Right     x2       Social History:  Tobacco Use: Medium Risk (4/30/2024)    Patient History     Smoking Tobacco Use: Former     Smokeless Tobacco Use: Never     Passive Exposure: Current      Alcohol Use: Patient Declined (12/29/2023)    AUDIT-C     Frequency of Alcohol Consumption: Patient declined     Average Number of Drinks: Patient declined     Frequency of Binge Drinking: Patient declined        OBJECTIVE:     Vital Signs Range (Last 24H):         Significant Labs:  Lab Results   Component Value Date    WBC 6.71 04/30/2024    HGB 13.8 (L) 04/30/2024    HCT 40.3 04/30/2024     04/30/2024    CHOL 170 05/10/2023    TRIG 48 05/10/2023    HDL 71 05/10/2023    ALT 16 04/30/2024    AST 18 04/30/2024     04/30/2024    K 4.4 04/30/2024     04/30/2024    CREATININE 0.7 04/30/2024    BUN 13 04/30/2024    CO2 30 (H) 04/30/2024    TSH 1.398 04/30/2024    PSA 0.70 05/10/2023    INR 1.0 10/04/2023    HGBA1C 5.0 05/10/2023       Diagnostic Studies: No relevant studies.    EKG:   Results for orders placed or performed in visit on 08/22/23   EKG 12-lead    Collection Time: 08/22/23 11:09 AM    Narrative    Test Reason : C16.0,J44.9,F17.200,I45.10,Z01.818,D70.9,D64.81,T45.1X5A,D69.6,    Vent. Rate : 083 BPM     Atrial Rate : 083 BPM     P-R Int : 136 ms          QRS Dur : 130 ms      QT Int : 392 ms       P-R-T Axes : 082 089  -13 degrees     QTc Int : 460 ms    Normal sinus rhythm  Possible Left atrial enlargement  Nonspecific intraventricular block  T wave abnormality, consider inferior ischemia  Abnormal ECG  When compared with ECG of 28-APR-2015 14:52,  Nonspecific intraventricular block has replaced Right bundle branch block  Confirmed by Manohar Yeboah MD ([id number]) on 8/29/2023 1:09:44 PM    Referred By: [email or web address] lopes           Confirmed By:Manohar Yeboah MD       2D ECHO:  TTE:  No results found for this or any previous visit.    KWAME:  No results found for this or any previous visit.    ASSESSMENT/PLAN:         Pre-op Assessment    I have reviewed the Patient Summary Reports.     I have reviewed the Nursing Notes. I have reviewed the NPO Status.   I have reviewed the Medications.     Review of Systems  Anesthesia Hx:  No problems with previous Anesthesia               Denies Personal Hx of Anesthesia complications.                    Cardiovascular:         Dysrhythmias                                    Pulmonary:   COPD                     Renal/:  Renal/ Normal                 Hepatic/GI:  Hepatic/GI Normal                 Musculoskeletal:  Arthritis               Neurological:  Neurology Normal                                          Physical Exam  General: Well nourished, Cooperative, Alert and Oriented    Airway:  Mallampati: III / II  Mouth Opening: Small, but > 3cm  Neck ROM: Extension Decreased    Dental:  Upper row implants       Anesthesia Plan  Type of Anesthesia, risks & benefits discussed:    Anesthesia Type: Gen ETT  Intra-op Monitoring Plan: Standard ASA Monitors  Post Op Pain Control Plan: multimodal analgesia and IV/PO Opioids PRN  Induction:  IV  Airway Plan: Direct, Post-Induction  Informed Consent: Informed consent signed with the Patient and all parties understand the risks and agree with anesthesia plan.  All questions answered.   ASA Score: 3  Day of Surgery Review of History & Physical: H&P Update  referred to the surgeon/provider.    Ready For Surgery From Anesthesia Perspective.     .

## 2024-05-07 ENCOUNTER — HOSPITAL ENCOUNTER (OUTPATIENT)
Facility: HOSPITAL | Age: 64
Discharge: HOME | End: 2024-05-07
Attending: SURGERY | Admitting: SURGERY
Payer: COMMERCIAL

## 2024-05-07 ENCOUNTER — ANESTHESIA (OUTPATIENT)
Dept: SURGERY | Facility: HOSPITAL | Age: 64
End: 2024-05-07
Payer: COMMERCIAL

## 2024-05-07 VITALS
BODY MASS INDEX: 19.03 KG/M2 | OXYGEN SATURATION: 95 % | RESPIRATION RATE: 20 BRPM | DIASTOLIC BLOOD PRESSURE: 78 MMHG | SYSTOLIC BLOOD PRESSURE: 131 MMHG | HEART RATE: 77 BPM | HEIGHT: 69 IN | TEMPERATURE: 98 F | WEIGHT: 128.5 LBS

## 2024-05-07 DIAGNOSIS — R13.19 ESOPHAGEAL DYSPHAGIA: Primary | ICD-10-CM

## 2024-05-07 PROCEDURE — 71000044 HC DOSC ROUTINE RECOVERY FIRST HOUR: Performed by: SURGERY

## 2024-05-07 PROCEDURE — 43249 ESOPH EGD DILATION <30 MM: CPT | Mod: ,,, | Performed by: SURGERY

## 2024-05-07 PROCEDURE — 36000706: Performed by: SURGERY

## 2024-05-07 PROCEDURE — 36000707: Performed by: SURGERY

## 2024-05-07 PROCEDURE — 37000008 HC ANESTHESIA 1ST 15 MINUTES: Performed by: SURGERY

## 2024-05-07 PROCEDURE — C1726 CATH, BAL DIL, NON-VASCULAR: HCPCS | Performed by: SURGERY

## 2024-05-07 PROCEDURE — 25000003 PHARM REV CODE 250

## 2024-05-07 PROCEDURE — 63600175 PHARM REV CODE 636 W HCPCS

## 2024-05-07 PROCEDURE — 71000015 HC POSTOP RECOV 1ST HR: Performed by: SURGERY

## 2024-05-07 PROCEDURE — D9220A PRA ANESTHESIA: Mod: ,,, | Performed by: ANESTHESIOLOGY

## 2024-05-07 PROCEDURE — 25000003 PHARM REV CODE 250: Performed by: SURGERY

## 2024-05-07 PROCEDURE — 37000009 HC ANESTHESIA EA ADD 15 MINS: Performed by: SURGERY

## 2024-05-07 RX ORDER — PHENYLEPHRINE HYDROCHLORIDE 10 MG/ML
INJECTION INTRAVENOUS
Status: DISCONTINUED | OUTPATIENT
Start: 2024-05-07 | End: 2024-05-07

## 2024-05-07 RX ORDER — LIDOCAINE HYDROCHLORIDE 20 MG/ML
INJECTION, SOLUTION EPIDURAL; INFILTRATION; INTRACAUDAL; PERINEURAL
Status: DISCONTINUED | OUTPATIENT
Start: 2024-05-07 | End: 2024-05-07

## 2024-05-07 RX ORDER — MIDAZOLAM HYDROCHLORIDE 5 MG/ML
INJECTION INTRAMUSCULAR; INTRAVENOUS
Status: DISCONTINUED | OUTPATIENT
Start: 2024-05-07 | End: 2024-05-07

## 2024-05-07 RX ORDER — PROPOFOL 10 MG/ML
VIAL (ML) INTRAVENOUS
Status: DISCONTINUED | OUTPATIENT
Start: 2024-05-07 | End: 2024-05-07

## 2024-05-07 RX ORDER — ONDANSETRON HYDROCHLORIDE 2 MG/ML
INJECTION, SOLUTION INTRAVENOUS
Status: DISCONTINUED | OUTPATIENT
Start: 2024-05-07 | End: 2024-05-07

## 2024-05-07 RX ORDER — ROCURONIUM BROMIDE 10 MG/ML
INJECTION, SOLUTION INTRAVENOUS
Status: DISCONTINUED | OUTPATIENT
Start: 2024-05-07 | End: 2024-05-07

## 2024-05-07 RX ORDER — DEXAMETHASONE SODIUM PHOSPHATE 4 MG/ML
INJECTION, SOLUTION INTRA-ARTICULAR; INTRALESIONAL; INTRAMUSCULAR; INTRAVENOUS; SOFT TISSUE
Status: DISCONTINUED | OUTPATIENT
Start: 2024-05-07 | End: 2024-05-07

## 2024-05-07 RX ORDER — SODIUM CHLORIDE 0.9 % (FLUSH) 0.9 %
10 SYRINGE (ML) INJECTION
Status: DISCONTINUED | OUTPATIENT
Start: 2024-05-07 | End: 2024-05-07 | Stop reason: HOSPADM

## 2024-05-07 RX ORDER — FENTANYL CITRATE 50 UG/ML
25 INJECTION, SOLUTION INTRAMUSCULAR; INTRAVENOUS EVERY 5 MIN PRN
Status: DISCONTINUED | OUTPATIENT
Start: 2024-05-07 | End: 2024-05-07 | Stop reason: HOSPADM

## 2024-05-07 RX ORDER — SODIUM CHLORIDE 9 MG/ML
INJECTION, SOLUTION INTRAVENOUS CONTINUOUS
Status: DISCONTINUED | OUTPATIENT
Start: 2024-05-07 | End: 2024-05-07 | Stop reason: HOSPADM

## 2024-05-07 RX ORDER — ONDANSETRON HYDROCHLORIDE 2 MG/ML
4 INJECTION, SOLUTION INTRAVENOUS DAILY PRN
Status: DISCONTINUED | OUTPATIENT
Start: 2024-05-07 | End: 2024-05-07 | Stop reason: HOSPADM

## 2024-05-07 RX ORDER — PHENYLEPHRINE HCL IN 0.9% NACL 1 MG/10 ML
SYRINGE (ML) INTRAVENOUS
Status: DISCONTINUED | OUTPATIENT
Start: 2024-05-07 | End: 2024-05-07

## 2024-05-07 RX ORDER — LIDOCAINE HYDROCHLORIDE 10 MG/ML
1 INJECTION, SOLUTION EPIDURAL; INFILTRATION; INTRACAUDAL; PERINEURAL ONCE AS NEEDED
Status: COMPLETED | OUTPATIENT
Start: 2024-05-07 | End: 2024-05-07

## 2024-05-07 RX ORDER — SUCCINYLCHOLINE CHLORIDE 20 MG/ML
INJECTION INTRAMUSCULAR; INTRAVENOUS
Status: DISCONTINUED | OUTPATIENT
Start: 2024-05-07 | End: 2024-05-07

## 2024-05-07 RX ORDER — FENTANYL CITRATE 50 UG/ML
INJECTION, SOLUTION INTRAMUSCULAR; INTRAVENOUS
Status: DISCONTINUED | OUTPATIENT
Start: 2024-05-07 | End: 2024-05-07

## 2024-05-07 RX ADMIN — LIDOCAINE HYDROCHLORIDE 0.5 MG: 10 INJECTION, SOLUTION EPIDURAL; INFILTRATION; INTRACAUDAL; PERINEURAL at 05:05

## 2024-05-07 RX ADMIN — PROPOFOL 10 MG: 10 INJECTION, EMULSION INTRAVENOUS at 08:05

## 2024-05-07 RX ADMIN — Medication 200 MCG: at 07:05

## 2024-05-07 RX ADMIN — DEXAMETHASONE SODIUM PHOSPHATE 8 MG: 4 INJECTION, SOLUTION INTRAMUSCULAR; INTRAVENOUS at 07:05

## 2024-05-07 RX ADMIN — SODIUM CHLORIDE: 9 INJECTION, SOLUTION INTRAVENOUS at 07:05

## 2024-05-07 RX ADMIN — PROPOFOL 50 MG: 10 INJECTION, EMULSION INTRAVENOUS at 07:05

## 2024-05-07 RX ADMIN — PHENYLEPHRINE HYDROCHLORIDE 300 MCG: 10 INJECTION INTRAVENOUS at 08:05

## 2024-05-07 RX ADMIN — PROPOFOL 120 MG: 10 INJECTION, EMULSION INTRAVENOUS at 07:05

## 2024-05-07 RX ADMIN — LIDOCAINE HYDROCHLORIDE 100 MG: 20 INJECTION, SOLUTION EPIDURAL; INFILTRATION; INTRACAUDAL at 07:05

## 2024-05-07 RX ADMIN — SODIUM CHLORIDE: 9 INJECTION, SOLUTION INTRAVENOUS at 05:05

## 2024-05-07 RX ADMIN — MIDAZOLAM 2 MG: 5 INJECTION INTRAMUSCULAR; INTRAVENOUS at 07:05

## 2024-05-07 RX ADMIN — PHENYLEPHRINE HYDROCHLORIDE 100 MCG: 10 INJECTION INTRAVENOUS at 07:05

## 2024-05-07 RX ADMIN — PHENYLEPHRINE HYDROCHLORIDE 200 MCG: 10 INJECTION INTRAVENOUS at 07:05

## 2024-05-07 RX ADMIN — PHENYLEPHRINE HYDROCHLORIDE 150 MCG: 10 INJECTION INTRAVENOUS at 07:05

## 2024-05-07 RX ADMIN — ROCURONIUM BROMIDE 40 MG: 10 INJECTION, SOLUTION INTRAVENOUS at 07:05

## 2024-05-07 RX ADMIN — FENTANYL CITRATE 75 MCG: 50 INJECTION INTRAMUSCULAR; INTRAVENOUS at 07:05

## 2024-05-07 RX ADMIN — ONDANSETRON 4 MG: 2 INJECTION INTRAMUSCULAR; INTRAVENOUS at 08:05

## 2024-05-07 RX ADMIN — SUCCINYLCHOLINE CHLORIDE 120 MG: 20 INJECTION, SOLUTION INTRAMUSCULAR; INTRAVENOUS; PARENTERAL at 07:05

## 2024-05-07 NOTE — H&P
Surgical Oncology History and Physical    Encounter Date:  2024    Patient ID: Blayne Beebe  Age:  63 y.o. :  1960    Chief Complaint:  Esophageal Anastomotic Stricture      History:    Mr. Beebe is a 63 y.o. male with a cervical esophagogastric anastomotic stricture following esophagectomy who presents for repeat endoscopic dilation due to worsening dysphagia symptoms over the past week.     Past Medical History:   Diagnosis Date    Arthritis     Cancer     COPD (chronic obstructive pulmonary disease)      Past Surgical History:   Procedure Laterality Date    CERVICAL FUSION      COLONOSCOPY N/A 3/27/2018    Procedure: COLONOSCOPY;  Surgeon: Maria Teresa Morocho MD;  Location: [facility] ENDO;  Service: Endoscopy;  Laterality: N/A;    COLONOSCOPY N/A 2023    Procedure: COLONOSCOPY;  Surgeon: Kelli Snell MD;  [facility];  Service: Endoscopy;  Laterality: N/A;    EGD, WITH BALLOON DILATION N/A 2023    Procedure: EGD, WITH BALLOON DILATION;  Surgeon: Nas Meyer MD;  [facility];  Service: General;  Laterality: N/A;    EGD, WITH BALLOON DILATION N/A 2023    Procedure: EGD, WITH BALLOON DILATION;  Surgeon: Nas Meyer MD;  [facility];  Service: General;  Laterality: N/A;    EGD, WITH BALLOON DILATION N/A 2023    Procedure: EGD, WITH BALLOON DILATION;  Surgeon: Nas Meyer MD;  [facility];  Service: General;  Laterality: N/A;    EGD, WITH BALLOON DILATION N/A 1/10/2024    Procedure: EGD, WITH BALLOON DILATION;  Surgeon: Nas Meyer MD;  [facility];  Service: General;  Laterality: N/A;    EGD, WITH BALLOON DILATION N/A 2024    Procedure: EGD, WITH BALLOON DILATION;  Surgeon: Nas Meyer MD;  Location: [facility] OR [facility] FLR;  Service: General;  Laterality: N/A;    EGD, WITH BALLOON DILATION N/A 2024    Procedure: EGD, WITH BALLOON DILATION;  Surgeon: Nas Meyer MD;   [facility]: [facility] FLR;  Service: General;  Laterality: N/A;    EGD, WITH BALLOON DILATION N/A 3/4/2024    Procedure: EGD, WITH BALLOON DILATION;  Surgeon: Nas Meyer MD;  [facility];  Service: General;  Laterality: N/A;    EGD, WITH BALLOON DILATION N/A 3/19/2024    Procedure: EGD, WITH BALLOON DILATION;  Surgeon: Nas Meyer MD;  [facility];  Service: General;  Laterality: N/A;    ENDOSCOPIC ULTRASOUND OF UPPER GASTROINTESTINAL TRACT N/A 7/3/2023    Procedure: ULTRASOUND, UPPER GI TRACT, ENDOSCOPIC;  Surgeon: Ray Duffy MD;  Location: [facility];  Service: Endoscopy;  Laterality: N/A;    ESOPHAGOGASTRODUODENOSCOPY N/A 6/13/2023    Procedure: EGD (ESOPHAGOGASTRODUODENOSCOPY);  Surgeon: Kelli Snell MD;  Location: [facility];  Service: Endoscopy;  Laterality: N/A;    ESOPHAGOGASTRODUODENOSCOPY N/A 7/3/2023    Procedure: EGD (ESOPHAGOGASTRODUODENOSCOPY);  Surgeon: Ray Duffy MD;  Location: [facility];  Service: Endoscopy;  Laterality: N/A;    INGUINAL HERNIA REPAIR Bilateral     Right x2, x1 left    PLACEMENT OF JEJUNOSTOMY TUBE  10/4/2023    Procedure: INSERTION, JEJUNOSTOMY TUBE;  Surgeon: Nas Meyer MD;  Location: [address];  Service: General;;    PYLOROMYOTOMY  10/4/2023    Procedure: PYLOROMYOTOMY;  Surgeon: Nas Meyer MD;  Location: [address];  Service: General;;    ROBOT-ASSISTED SURGICAL REMOVAL OF ESOPHAGUS USING DA BALWINDER XI N/A 10/4/2023    Procedure: XI ROBOTIC ESOPHAGECTOMY;  Surgeon: Nas Meyer MD;  [address];  Service: General;  Laterality: N/A;    ROBOTIC REPAIR, HERNIA, PARAESOPHAGEAL  10/4/2023    Procedure: ROBOTIC REPAIR, HERNIA, PARAESOPHAGEAL;  Surgeon: Nas Meyer MD;  Location: [facility] OR [address] [place] [address];  Service: General;;    ROTATOR CUFF REPAIR Right     x2     No current facility-administered medications on file prior to encounter.     Current Outpatient Medications on File Prior to  "Encounter   Medication Sig Dispense Refill    acetaminophen (TYLENOL) 500 MG tablet Take 1 tablet (500 mg total) by mouth every 6 (six) hours as needed for Pain. (Patient not taking: Reported on 1/9/2024)  0    cholecalciferol, vitamin D3, (VITAMIN D3) 10 mcg (400 unit) Tab Take by mouth once daily. (Patient not taking: Reported on 4/30/2024)      LIDOcaine-prilocaine (EMLA) cream Apply topically as needed (Port access). 30 g 1    pantoprazole (PROTONIX) 40 MG tablet Take 1 tablet (40 mg total) by mouth once daily. (Patient not taking: Reported on 3/5/2024) 90 tablet 3    sildenafil (VIAGRA) 100 MG tablet Take 1 tablet (100 mg total) by mouth daily as needed for Erectile Dysfunction. (Patient not taking: Reported on 10/11/2023) 10 tablet 6     Review of patient's allergies indicates:  No Known Allergies    Family History:  His family history includes Diabetes in his mother; Heart disease in his father.     Social History:  He reports that he has quit smoking. His smoking use included cigarettes. He started smoking about 41 years ago. He has a 10.3 pack-year smoking history. He has been exposed to tobacco smoke. He has never used smokeless tobacco. He reports current alcohol use. He reports that he does not use drugs.     ROS:     Review of Systems  Pertinent positive/negatives detailed in HPI, all other systems negative.     Physical Exam:  /78 (BP Location: Right arm, Patient Position: Lying)   Pulse 78   Temp 98.5 °F (36.9 °C) (Oral)   Resp 16   Ht 5' 9" (1.753 m)   Wt 58.3 kg (128 lb 8.5 oz)   SpO2 99%   BMI 18.98 kg/m²     Physical Exam    Constitutional:  Non-toxic, no acute distress.  Performance status: ECOG 0  Eyes:  Sclerae anicteric, gaze symmetrical  Neck:  Trachea midline, thyroid, non enlarged without palpable nodules,  FROM  Resp:  Easy work of breathing, no wheezes  CV:  Regular pulse, no JVD  Abd:  Soft, non-tender, no masses, no hepatosplenomegaly, no ascites, no superficial " varices  Lymphatics:  No cervical, supraclavicular, axillary, or inguinal lymphadenopathy  Musculoskeletal:  Ambulatory, normal gait, no muscle wasting  Neuro:  No gross deficits  Psych:  Awake, alert, oriented.  Answers and asks questions appropriately    Assessment: 63 year old man with esophagogastric anastomotic stricture following esophagectomy here for endoscopic dilation.       Plan:  - Proceed to OR. We discussed the risks and benefits of surgery, and he signed written informed consent to proceed.     Nas Meyer MD  Surgical Oncology  [facility] [place]         Blayne Beebe 1960

## 2024-05-07 NOTE — OP NOTE
[facility] - Surgery ([facility])  Surgery Department  Operative Note       Date of Procedure: 5/7/2024     Surgeon(s):  Surgeons and Role:     * Nas Meyer MD - Primary     * Giuliana Grayson MD - Resident - Assisting      Pre-Operative Diagnosis:   Esophageal dysphagia [R13.19]    Post-Operative Diagnosis:   Same     Anesthesia: General    Operative Findings:   Cervical esophagogastric anastomotic stricture at 20 cm. The adult endoscope did not pass the stricture initially but did after dilation. Stricture balloon dilated to 20 mm.   Conduit proximal and distal to stricture with healthy mucosa    Procedures:  Esophagogastroduodenoscopy (EGD), with transendoscopic balloon dilation of esophagus (<30 mm)    Estimated Blood Loss (EBL): <2 mL    Specimen(s):    Specimen (24h ago, onward)      None                   Indications:  Blayne Beebe is a 63 year old man with a cervical esophagogastric anastomotic stricture following esophagectomy who presents for EGD and balloon dilation. Risks and benefits were reviewed including bleeding, infection, damage to local structures, perforation of GI tract, need for additional procedures, death, and imponderables. He understands and signed written informed consent to proceed.    Details:  The patient was transported to the operating room and satisfactory anesthesia established. The patient was placed in the supine position    The adult gastroscope was introduced into the mouth, passed into the hypopharynx and cervical esophagus under endoscopic vision. The anastomosis was at 20 cm.  There was a moderate stenosis. It appeared a bit tighter than it was at his last dilation, but it had been over 6 weeks since that procedure. This was traversed with a balloon dilator, and dilated up progressively from 15 to 20 mm, held for 1 minute at each progressive diameter and 20 minutes at 20 mm. In between dilations, the stenosis was examined and was without perforation. On final  dilation, we were able to traverse the stricture. There was some expected/intended minimal bleeding.    The endoscope was advanced past the anastomosis into the gastric conduit and distally to the pylorus. Upon withdrawal, the mucosa was examined circumferentially and was healthy with no irregularity. At the anastomosis there were a few loose staples only partially within the mucosa. I was able to pull two of these staples out. This caused some expected bleeding. The scope was withdrawn from the oropharynx.     I communicated the intraoperative findings with the family following the procedure.     Condition: Good    Disposition: PACU - hemodynamically stable.    Attestation: I was present and scrubbed for the entire procedure.    Nas Meyer MD  Staff Surgeon  Surgical Oncology

## 2024-05-07 NOTE — ANESTHESIA POSTPROCEDURE EVALUATION
Anesthesia Post Evaluation    Patient: Blayne D III Steib    Procedure(s) Performed: Procedure(s) (LRB):  EGD, WITH BALLOON DILATION (N/A)    Final Anesthesia Type: general      Patient location during evaluation: PACU  Patient participation: Yes- Able to Participate  Level of consciousness: awake and alert and oriented  Post-procedure vital signs: reviewed and stable  Pain management: adequate  Airway patency: patent  TRENT mitigation strategies: Intraoperative administration of CPAP, nasopharyngeal airway, or oral appliance during sedation, Multimodal analgesia, Extubation while patient is awake, Verification of full reversal of neuromuscular block and Extubation and recovery carried out in lateral, semiupright, or other nonsupine position  PONV status at discharge: No PONV  Anesthetic complications: yes  Perioperative Events: dental trauma        Cardiovascular status: hemodynamically stable  Respiratory status: unassisted  Hydration status: euvolemic  Follow-up not needed.  Comments: Small defect in the outer coating of his dental prosthesis. Structurally intact, hard to appreciate but pt was notified and given printout on dental trauma.               Vitals Value Taken Time   /78 05/07/24 0917   Temp 36.8 °C (98.3 °F) 05/07/24 0845   Pulse 77 05/07/24 0920   Resp 28 05/07/24 0920   SpO2 96 % 05/07/24 0918   Vitals shown include unfiled device data.      No case tracking events are documented in the log.      Pain/Yessy Score: No data recorded

## 2024-05-07 NOTE — PLAN OF CARE
Dr Plascencia from anesthesia at [facility], Handout provided for dental injury.

## 2024-05-07 NOTE — TRANSFER OF CARE
"Anesthesia Transfer of Care Note    Patient: Blayne D III Steib    Procedure(s) Performed: Procedure(s) (LRB):  EGD, WITH BALLOON DILATION (N/A)    Patient location: PACU    Anesthesia Type: general    Transport from OR: Transported from OR on 6-10 L/min O2 by face mask with adequate spontaneous ventilation    Post pain: adequate analgesia    Post assessment: no apparent anesthetic complications    Post vital signs: stable    Level of consciousness: awake and alert    Nausea/Vomiting: no nausea/vomiting    Complications: none    Transfer of care protocol was followed      Last vitals: Visit Vitals  /78 (BP Location: Right arm, Patient Position: Lying)   Pulse 78   Temp 36.9 °C (98.5 °F) (Oral)   Resp 16   Ht 5' 9" (1.753 m)   Wt 58.3 kg (128 lb 8.5 oz)   SpO2 99%   BMI 18.98 kg/m²     "

## 2024-05-07 NOTE — PLAN OF CARE
Reviewed discharge instructions with patient.  Patient verbalizes understanding.  Vital stable, no complaints noted. Pt left floor via W/C, responsible person available for transport.

## 2024-05-07 NOTE — BRIEF OP NOTE
[facility] - Surgery ([facility])  Brief Operative Note    Surgery Date: 5/7/2024     Surgeons and Role:     * Nas Meyer MD - Primary     * Giuliana Grayson MD - Resident - Assisting    Pre-op Diagnosis:  Esophageal dysphagia [R13.19]    Post-op Diagnosis:  Post-Op Diagnosis Codes:     * Esophageal dysphagia [R13.19]    Procedure(s) (LRB):  EGD, WITH BALLOON DILATION (N/A)    Anesthesia: General    Operative Findings: EGD with stricture identified. Balloon dilation performed to 20mm. Patent lumen at completion of the case.     Estimated Blood Loss: * No values recorded between 5/7/2024  7:34 AM and 5/7/2024  8:31 AM *         Specimens:   Specimen (24h ago, onward)      None              Discharge Note    OUTCOME: Patient tolerated treatment/procedure well without complication and is now ready for discharge.    DISPOSITION: Home or Self Care    FINAL DIAGNOSIS:  <principal problem not specified>    FOLLOWUP: With primary care provider    DISCHARGE INSTRUCTIONS:    Discharge Procedure Orders   Diet clear liquid   Order Comments: Clear liquid diet for the remainder of the day today, ok to resume regular diet 5/8.     Lifting restrictions   Order Comments: No lifting greater than 10 pounds for 6 weeks from day of surgery.  No pushing/pulling such as vacuuming or raking.  No straining, avoid constipation and take stool softeners as described and laxatives as needed.  No driving while on narcotics and until you can react quickly without pain.     No driving until:   Order Comments: No driving until you are finished taking narcotic pain medications     No dressing needed   Order Comments: Ok to shower tomorrow. Do not submerge incision in bath or pool for 2 weeks.     Notify your health care provider if you experience any of the following:  temperature >100.4     Notify your health care provider if you experience any of the following:  persistent nausea and vomiting or diarrhea     Notify your health care provider if  you experience any of the following:  severe uncontrolled pain     Notify your health care provider if you experience any of the following:  redness, tenderness, or signs of infection (pain, swelling, redness, odor or green/yellow discharge around incision site)     Notify your health care provider if you experience any of the following:  difficulty breathing or increased cough     Notify your health care provider if you experience any of the following:  severe persistent headache     Notify your health care provider if you experience any of the following:  worsening rash     Notify your health care provider if you experience any of the following:  persistent dizziness, light-headedness, or visual disturbances     Notify your health care provider if you experience any of the following:  increased confusion or weakness     Activity as tolerated

## 2024-05-09 ENCOUNTER — PATIENT MESSAGE (OUTPATIENT)
Dept: SURGERY | Facility: CLINIC | Age: 64
End: 2024-05-09
Payer: COMMERCIAL

## 2024-05-15 ENCOUNTER — TELEPHONE (OUTPATIENT)
Dept: ENDOSCOPY | Facility: HOSPITAL | Age: 64
End: 2024-05-15
Payer: COMMERCIAL

## 2024-05-15 ENCOUNTER — PATIENT MESSAGE (OUTPATIENT)
Dept: ENDOSCOPY | Facility: HOSPITAL | Age: 64
End: 2024-05-15
Payer: COMMERCIAL

## 2024-05-15 DIAGNOSIS — K22.89 DILATION OF ESOPHAGUS: Primary | ICD-10-CM

## 2024-05-15 NOTE — TELEPHONE ENCOUNTER
Spoke to patient wife Aislinn to schedule procedure(s) Upper Endoscopy (EGD)       Physician to perform procedure(s) Dr. ELLIOTT Brady  Date of Procedure (s) 06/28/2024  Arrival Time 7:00 AM  Time of Procedure(s) 8:00 AM   Location of Procedure(s) [facility] [address]nd Floor  Type of Rx Prep sent to patient: Other  Instructions provided to patient via [facility]    Patient was informed on the following information and verbalized understanding. Screening questionnaire reviewed with patient and complete. If procedure requires anesthesia, a responsible adult needs to be present to accompany the patient home, patient cannot drive after receiving anesthesia. Appointment details are tentative, especially check-in time. Patient will receive a prep-op call 7 days prior to confirm check-in time for procedure. If applicable the patient should contact their pharmacy to verify Rx for procedure prep is ready for pick-up. Patient was advised to call the scheduling department at [phone] if pharmacy states no Rx is available. Patient was advised to call the endoscopy scheduling department if any questions or concerns arise.      [facility] Scheduling Department

## 2024-05-15 NOTE — TELEPHONE ENCOUNTER
EGD with dilation, possible steroid injection, possible needle-knife incision in about 4-6 weeks. [facility] only. 60min case.

## 2024-05-28 ENCOUNTER — OFFICE VISIT (OUTPATIENT)
Dept: HEMATOLOGY/ONCOLOGY | Facility: CLINIC | Age: 64
End: 2024-05-28
Payer: COMMERCIAL

## 2024-05-28 ENCOUNTER — LAB VISIT (OUTPATIENT)
Dept: LAB | Facility: HOSPITAL | Age: 64
End: 2024-05-28
Attending: INTERNAL MEDICINE
Payer: COMMERCIAL

## 2024-05-28 ENCOUNTER — INFUSION (OUTPATIENT)
Dept: INFUSION THERAPY | Facility: HOSPITAL | Age: 64
End: 2024-05-28
Payer: COMMERCIAL

## 2024-05-28 VITALS
DIASTOLIC BLOOD PRESSURE: 71 MMHG | SYSTOLIC BLOOD PRESSURE: 139 MMHG | TEMPERATURE: 98 F | RESPIRATION RATE: 16 BRPM | OXYGEN SATURATION: 100 % | HEART RATE: 60 BPM

## 2024-05-28 VITALS
HEART RATE: 64 BPM | DIASTOLIC BLOOD PRESSURE: 70 MMHG | HEIGHT: 69 IN | BODY MASS INDEX: 19.12 KG/M2 | TEMPERATURE: 99 F | OXYGEN SATURATION: 99 % | SYSTOLIC BLOOD PRESSURE: 120 MMHG | WEIGHT: 129.06 LBS

## 2024-05-28 DIAGNOSIS — R53.0 NEOPLASTIC MALIGNANT RELATED FATIGUE: ICD-10-CM

## 2024-05-28 DIAGNOSIS — Z79.899 IMMUNODEFICIENCY DUE TO CHEMOTHERAPY: ICD-10-CM

## 2024-05-28 DIAGNOSIS — J44.9 CHRONIC OBSTRUCTIVE PULMONARY DISEASE, UNSPECIFIED COPD TYPE: ICD-10-CM

## 2024-05-28 DIAGNOSIS — E44.0 MODERATE MALNUTRITION: ICD-10-CM

## 2024-05-28 DIAGNOSIS — C16.0 MALIGNANT NEOPLASM OF GASTROESOPHAGEAL JUNCTION: Primary | ICD-10-CM

## 2024-05-28 DIAGNOSIS — K20.90 ESOPHAGITIS: ICD-10-CM

## 2024-05-28 DIAGNOSIS — J43.9 PULMONARY EMPHYSEMA, UNSPECIFIED EMPHYSEMA TYPE: ICD-10-CM

## 2024-05-28 DIAGNOSIS — C16.0 MALIGNANT NEOPLASM OF GASTROESOPHAGEAL JUNCTION: ICD-10-CM

## 2024-05-28 DIAGNOSIS — T45.1X5A IMMUNODEFICIENCY DUE TO CHEMOTHERAPY: ICD-10-CM

## 2024-05-28 DIAGNOSIS — D84.821 IMMUNODEFICIENCY DUE TO CHEMOTHERAPY: ICD-10-CM

## 2024-05-28 DIAGNOSIS — I70.0 ATHEROSCLEROSIS OF AORTA: ICD-10-CM

## 2024-05-28 LAB
ALBUMIN SERPL BCP-MCNC: 3.4 G/DL (ref 3.5–5.2)
ALP SERPL-CCNC: 113 U/L (ref 55–135)
ALT SERPL W/O P-5'-P-CCNC: 12 U/L (ref 10–44)
ANION GAP SERPL CALC-SCNC: 7 MMOL/L (ref 8–16)
AST SERPL-CCNC: 18 U/L (ref 10–40)
BILIRUB SERPL-MCNC: 0.5 MG/DL (ref 0.1–1)
BUN SERPL-MCNC: 10 MG/DL (ref 8–23)
CALCIUM SERPL-MCNC: 9.1 MG/DL (ref 8.7–10.5)
CHLORIDE SERPL-SCNC: 103 MMOL/L (ref 95–110)
CO2 SERPL-SCNC: 27 MMOL/L (ref 23–29)
CREAT SERPL-MCNC: 0.7 MG/DL (ref 0.5–1.4)
ERYTHROCYTE [DISTWIDTH] IN BLOOD BY AUTOMATED COUNT: 12.9 % (ref 11.5–14.5)
EST. GFR  (NO RACE VARIABLE): >60 ML/MIN/1.73 M^2
GLUCOSE SERPL-MCNC: 123 MG/DL (ref 70–110)
HCT VFR BLD AUTO: 39.7 % (ref 40–54)
HGB BLD-MCNC: 13.4 G/DL (ref 14–18)
IMM GRANULOCYTES # BLD AUTO: 0.05 K/UL (ref 0–0.04)
MCH RBC QN AUTO: 34.1 PG (ref 27–31)
MCHC RBC AUTO-ENTMCNC: 33.8 G/DL (ref 32–36)
MCV RBC AUTO: 101 FL (ref 82–98)
NEUTROPHILS # BLD AUTO: 3.3 K/UL (ref 1.8–7.7)
PLATELET # BLD AUTO: 275 K/UL (ref 150–450)
PMV BLD AUTO: 8.7 FL (ref 9.2–12.9)
POTASSIUM SERPL-SCNC: 4.7 MMOL/L (ref 3.5–5.1)
PROT SERPL-MCNC: 6.7 G/DL (ref 6–8.4)
RBC # BLD AUTO: 3.93 M/UL (ref 4.6–6.2)
SODIUM SERPL-SCNC: 137 MMOL/L (ref 136–145)
TSH SERPL DL<=0.005 MIU/L-ACNC: 1.43 UIU/ML (ref 0.4–4)
WBC # BLD AUTO: 5.41 K/UL (ref 3.9–12.7)

## 2024-05-28 PROCEDURE — 1159F MED LIST DOCD IN RCRD: CPT | Mod: CPTII,S$GLB,, | Performed by: INTERNAL MEDICINE

## 2024-05-28 PROCEDURE — 3074F SYST BP LT 130 MM HG: CPT | Mod: CPTII,S$GLB,, | Performed by: INTERNAL MEDICINE

## 2024-05-28 PROCEDURE — A4216 STERILE WATER/SALINE, 10 ML: HCPCS | Performed by: INTERNAL MEDICINE

## 2024-05-28 PROCEDURE — 96413 CHEMO IV INFUSION 1 HR: CPT

## 2024-05-28 PROCEDURE — 3008F BODY MASS INDEX DOCD: CPT | Mod: CPTII,S$GLB,, | Performed by: INTERNAL MEDICINE

## 2024-05-28 PROCEDURE — 80053 COMPREHEN METABOLIC PANEL: CPT | Performed by: INTERNAL MEDICINE

## 2024-05-28 PROCEDURE — 3078F DIAST BP <80 MM HG: CPT | Mod: CPTII,S$GLB,, | Performed by: INTERNAL MEDICINE

## 2024-05-28 PROCEDURE — 36415 COLL VENOUS BLD VENIPUNCTURE: CPT | Performed by: INTERNAL MEDICINE

## 2024-05-28 PROCEDURE — 99999 PR PBB SHADOW E&M-EST. PATIENT-LVL III: CPT | Mod: PBBFAC,,, | Performed by: INTERNAL MEDICINE

## 2024-05-28 PROCEDURE — 85027 COMPLETE CBC AUTOMATED: CPT | Performed by: INTERNAL MEDICINE

## 2024-05-28 PROCEDURE — 84443 ASSAY THYROID STIM HORMONE: CPT | Performed by: INTERNAL MEDICINE

## 2024-05-28 PROCEDURE — G2211 COMPLEX E/M VISIT ADD ON: HCPCS | Mod: S$GLB,,, | Performed by: INTERNAL MEDICINE

## 2024-05-28 PROCEDURE — 25000003 PHARM REV CODE 250: Performed by: INTERNAL MEDICINE

## 2024-05-28 PROCEDURE — 99215 OFFICE O/P EST HI 40 MIN: CPT | Mod: S$GLB,,, | Performed by: INTERNAL MEDICINE

## 2024-05-28 PROCEDURE — 63600175 PHARM REV CODE 636 W HCPCS: Mod: JZ,JG | Performed by: INTERNAL MEDICINE

## 2024-05-28 RX ORDER — EPINEPHRINE 0.3 MG/.3ML
0.3 INJECTION SUBCUTANEOUS ONCE AS NEEDED
Status: DISCONTINUED | OUTPATIENT
Start: 2024-05-28 | End: 2024-05-28 | Stop reason: HOSPADM

## 2024-05-28 RX ORDER — DIPHENHYDRAMINE HYDROCHLORIDE 50 MG/ML
50 INJECTION INTRAMUSCULAR; INTRAVENOUS ONCE AS NEEDED
Status: DISCONTINUED | OUTPATIENT
Start: 2024-05-28 | End: 2024-05-28 | Stop reason: HOSPADM

## 2024-05-28 RX ORDER — DIPHENHYDRAMINE HYDROCHLORIDE 50 MG/ML
50 INJECTION INTRAMUSCULAR; INTRAVENOUS ONCE AS NEEDED
Status: CANCELLED | OUTPATIENT
Start: 2024-05-28

## 2024-05-28 RX ORDER — HEPARIN 100 UNIT/ML
500 SYRINGE INTRAVENOUS
Status: DISCONTINUED | OUTPATIENT
Start: 2024-05-28 | End: 2024-05-28 | Stop reason: HOSPADM

## 2024-05-28 RX ORDER — EPINEPHRINE 0.3 MG/.3ML
0.3 INJECTION SUBCUTANEOUS ONCE AS NEEDED
Status: CANCELLED | OUTPATIENT
Start: 2024-05-28

## 2024-05-28 RX ORDER — PROCHLORPERAZINE EDISYLATE 5 MG/ML
10 INJECTION INTRAMUSCULAR; INTRAVENOUS ONCE AS NEEDED
Status: CANCELLED
Start: 2024-05-28

## 2024-05-28 RX ORDER — HEPARIN 100 UNIT/ML
500 SYRINGE INTRAVENOUS
Status: CANCELLED | OUTPATIENT
Start: 2024-05-28

## 2024-05-28 RX ORDER — SODIUM CHLORIDE 0.9 % (FLUSH) 0.9 %
10 SYRINGE (ML) INJECTION
Status: CANCELLED | OUTPATIENT
Start: 2024-05-28

## 2024-05-28 RX ORDER — PROCHLORPERAZINE EDISYLATE 5 MG/ML
10 INJECTION INTRAMUSCULAR; INTRAVENOUS ONCE AS NEEDED
Status: DISCONTINUED | OUTPATIENT
Start: 2024-05-28 | End: 2024-05-28 | Stop reason: HOSPADM

## 2024-05-28 RX ORDER — SODIUM CHLORIDE 0.9 % (FLUSH) 0.9 %
10 SYRINGE (ML) INJECTION
Status: DISCONTINUED | OUTPATIENT
Start: 2024-05-28 | End: 2024-05-28 | Stop reason: HOSPADM

## 2024-05-28 RX ADMIN — SODIUM CHLORIDE, PRESERVATIVE FREE 10 ML: 5 INJECTION INTRAVENOUS at 10:05

## 2024-05-28 RX ADMIN — SODIUM CHLORIDE 480 MG: 9 INJECTION, SOLUTION INTRAVENOUS at 10:05

## 2024-05-28 RX ADMIN — HEPARIN 500 UNITS: 100 SYRINGE at 10:05

## 2024-05-28 RX ADMIN — SODIUM CHLORIDE: 9 INJECTION, SOLUTION INTRAVENOUS at 10:05

## 2024-05-28 NOTE — PROGRESS NOTES
MEDICAL ONCOLOGY - ESTABLISHED PATIENT VISIT    Reason for visit: Esophageal adenocarcinoma    Best Contact Phone Number(s): [phone] (home)      Cancer/Stage/TNM:    Cancer Staging   Malignant neoplasm of gastroesophageal junction  Staging form: Esophagus - Adenocarcinoma, AJCC 8th Edition  - Clinical stage from 6/13/2023: Stage III (cT3, cN1, cM0, G2) - Signed by Sunday Jasso MD on 7/5/2023       Oncology History   Malignant neoplasm of gastroesophageal junction   6/13/2023 Cancer Staged    Staging form: Esophagus - Adenocarcinoma, AJCC 8th Edition  - Clinical stage from 6/13/2023: Stage III (cT3, cN1, cM0, G2)     6/23/2023 Initial Diagnosis    Malignant neoplasm of gastroesophageal junction     7/3/2023 Tumor Conference     [facility] UGI MULTIDISCIPLINARY TUMOR BOARD  PATIENT REVIEW FORM   ____________________________________________________________    CLINIC #: [id number]   DATE: 7/3/2023    DIAGNOSIS: esophageal CA    PRESENTER: Mitch    PATIENT SUMMARY:   This 64 y/o gentleman is a long time smoker with emphysema who had low dose screening lung CT in 4/2023 per his PCP. Given an abnormality on this scan, he had PET on 6/1/23 which identified large hypermetabolic mid esophageal mass with lymph nodes. He underwent EGD on 6/13/23 that found large fungating ulcerated mass with bleeding in middle third of esophagus to lower esophagus, 34-44cm. Pathology revealed moderately differentiated adenocarcinoma.   Staging PET reviewed - level 2 cervical lymph node with FDG uptake, nothing in lungs concerning  Staging EUS today per Dr. Duffy.   He has been evaluated by Dr. Vance in [place] and Dr. June.   He is scheduled to see rad onc, Dr. Jasso, Wednesday and IR for port placement on 7/12/23.     BOARD RECOMMENDATIONS:   Proceed with neoadj CRT, then restaging to consider surgical resection    CONSULT NEEDED:     [] Surgery    [] Hem/Onc    [] Rad/Onc    [] Dietary                 [] Social  Service    [] Psychology       [] AES  [] Radiology     Clinical Stage: Tumor 3 Node(s) 1 Metastasis 0      GROUP STAGE:  [] O    [] 1   [] II     [x] III   []IV  [] Local recurrence     [] Regional recurrence     [] Distant recurrence   Metastatic site(s): none         [x] [facility]anche[facility] Treatment Guidelines reviewed and care planned is consistent with guidelines.         (i.e., NCCN, NCI, PD, ACO, AUA, etc.)    PRESENTATION AT CANCER CONFERENCE:         [x] Prospective    [] Retrospective     [] Follow-Up         7/16/2023 - 8/8/2023 Chemotherapy    Treatment Summary   Plan Name: OP ESOPHAGEAL PACLITAXEL CARBOPLATIN WEEKLY  Treatment Goal: Control  Status: Inactive  Start Date: 7/16/2023  End Date: 8/8/2023  Provider: Delta June MD  Chemotherapy: CARBOplatin (PARAPLATIN) 195 mg in sodium chloride 0.9% 304.5 mL chemo infusion, 195 mg (100 % of original dose 193 mg), Intravenous, Clinic/HOD 1 time, 1 of 1 cycle  Dose modification:   (original dose 193 mg, Cycle 1)  Administration: 195 mg (7/18/2023), 210 mg (7/24/2023), 230 mg (7/31/2023), 210 mg (8/8/2023)  PACLitaxeL (TAXOL) 50 mg/m2 = 84 mg in sodium chloride 0.9% 250 mL chemo infusion, 50 mg/m2 = 84 mg, Intravenous, Clinic/HOD 1 time, 1 of 1 cycle  Administration: 84 mg (7/18/2023)     7/18/2023 - 8/17/2023 Radiation Therapy    Treating physician: Sunday Jasso    Course: C1 Thorax 2023    Treatment Site Ref. ID Energy Dose/Fx (Gy) #Fx Dose Correction (Gy) Total Dose (Gy) Start Date End Date Elapsed Days   IM Esophagus PTV_High 6X 1.8 23 / 23 0 41.4 7/18/2023 8/17/2023 30          11/15/2023 -  Chemotherapy    Treatment Summary   Plan Name: OP NIVOLUMAB 480 MG Q4W  Treatment Goal: Curative  Status: Active  Start Date: 11/15/2023  End Date: 9/17/2024 (Planned)  Provider: Delta June MD  Chemotherapy: [No matching medication found in this treatment plan]     12/1/2023 Procedure    Surgeon(s) and Role: Nas Meyer MD - Primary      Pre-Operative Diagnosis:   Esophageal adenocarcinoma  Suspected cervical esophagogastric anastomotic stricture     Post-Operative Diagnosis:   Same  Cervical esophagogastric anastomotic stricture      Operative Findings:    Cervical esophagogastric anastomotic stricture. Unable to pass endoscope through stricture initially, but the scope was able to pass after serial dilations to 18 mm.      Procedures:  Esophagogastroduodenoscopy (EGD), with transendoscopic balloon dilation of esophagus (<30 mm)                Interim History:   63 y.o. male with esophageal adenocarcinoma who presents for cycle 8 of adjuvant nivolumab.  He underwent dilation with Dr. Meyer on 5/7/24.  He had initial improvement in his dysphagia but then after a couple weeks he is now back to where he was.  Set up for procedure with Dr. Brady on 6/28.  Denies any new toxicities from immunotherapy.  No diarrhea or dyspnea.    ECOG status is 0. Presents with his wife today.     ROS:   Review of Systems   Constitutional:  Negative for chills, fever, malaise/fatigue and weight loss.   HENT:  Negative for sore throat.    Eyes:  Negative for blurred vision and pain.   Respiratory:  Negative for cough and shortness of breath.    Cardiovascular:  Negative for chest pain and leg swelling.   Gastrointestinal:  Negative for abdominal pain, constipation, diarrhea, heartburn, nausea and vomiting.   Genitourinary:  Negative for dysuria and frequency.   Musculoskeletal:  Negative for back pain, falls and myalgias.   Skin:  Negative for rash.   Neurological:  Negative for dizziness, weakness and headaches.   Endo/Heme/Allergies:  Does not bruise/bleed easily.   Psychiatric/Behavioral:  Negative for depression. The patient is not nervous/anxious.    All other systems reviewed and are negative.      Past Medical History:   Past Medical History:   Diagnosis Date    Arthritis     Cancer     COPD (chronic obstructive pulmonary disease)         Past Surgical History:    Past Surgical History:   Procedure Laterality Date    CERVICAL FUSION      COLONOSCOPY N/A 3/27/2018    Procedure: COLONOSCOPY;  Surgeon: Maria Teresa Morocho MD;  Location: [facility] ENDO;  Service: Endoscopy;  Laterality: N/A;    COLONOSCOPY N/A 6/13/2023    Procedure: COLONOSCOPY;  Surgeon: Kelli Snell MD;  Location: [facility] ENDO;  Service: Endoscopy;  Laterality: N/A;    EGD, WITH BALLOON DILATION N/A 12/1/2023    Procedure: EGD, WITH BALLOON DILATION;  Surgeon: Nas Meyer MD;  Location: [facility] OR 2ND FLR;  Service: General;  Laterality: N/A;    EGD, WITH BALLOON DILATION N/A 12/11/2023    Procedure: EGD, WITH BALLOON DILATION;  Surgeon: Nas Meyer MD;  Location: [facility] OR [facility] FLR;  Service: General;  Laterality: N/A;    EGD, WITH BALLOON DILATION N/A 12/20/2023    Procedure: EGD, WITH BALLOON DILATION;  Surgeon: Nas Meyer MD;  Location: [facility] OR [facility] FLR;  Service: General;  Laterality: N/A;    EGD, WITH BALLOON DILATION N/A 1/10/2024    Procedure: EGD, WITH BALLOON DILATION;  Surgeon: Nas Meyer MD;  Location: [facility] OR [facility] FLR;  Service: General;  Laterality: N/A;    EGD, WITH BALLOON DILATION N/A 2/12/2024    Procedure: EGD, WITH BALLOON DILATION;  Surgeon: Nas Meyer MD;  Location: [facility] OR [facility] FLR;  Service: General;  Laterality: N/A;    EGD, WITH BALLOON DILATION N/A 2/23/2024    Procedure: EGD, WITH BALLOON DILATION;  Surgeon: Nas Meyer MD;  Location: [facility] OR 2ND FLR;  Service: General;  Laterality: N/A;    EGD, WITH BALLOON DILATION N/A 3/4/2024    Procedure: EGD, WITH BALLOON DILATION;  Surgeon: Nas Meyer MD;  Location: [facility] OR 2ND FLR;  Service: General;  Laterality: N/A;    EGD, WITH BALLOON DILATION N/A 3/19/2024    Procedure: EGD, WITH BALLOON DILATION;  Surgeon: Nas Meyer MD;  Location: [facility] OR [address] [place] [address];  Service: General;  Laterality: N/A;    EGD, WITH BALLOON DILATION N/A 5/7/2024    Procedure: EGD, WITH BALLOON DILATION;  Surgeon: Mitch  Nas SEAY MD;  [facility];  Service: General;  Laterality: N/A;    ENDOSCOPIC ULTRASOUND OF UPPER GASTROINTESTINAL TRACT N/A 7/3/2023    Procedure: ULTRASOUND, UPPER GI TRACT, ENDOSCOPIC;  Surgeon: Ray Duffy MD;  Location: [facility];  Service: Endoscopy;  Laterality: N/A;    ESOPHAGOGASTRODUODENOSCOPY N/A 6/13/2023    Procedure: EGD (ESOPHAGOGASTRODUODENOSCOPY);  Surgeon: Kelli Snell MD;  Location: [facility];  Service: Endoscopy;  Laterality: N/A;    ESOPHAGOGASTRODUODENOSCOPY N/A 7/3/2023    Procedure: EGD (ESOPHAGOGASTRODUODENOSCOPY);  Surgeon: Ray Duffy MD;  Location: [facility];  Service: Endoscopy;  Laterality: N/A;    INGUINAL HERNIA REPAIR Bilateral     Right x2, x1 left    PLACEMENT OF JEJUNOSTOMY TUBE  10/4/2023    Procedure: INSERTION, JEJUNOSTOMY TUBE;  Surgeon: Nas Meyer MD;  [address];  Service: General;;    PYLOROMYOTOMY  10/4/2023    Procedure: PYLOROMYOTOMY;  Surgeon: Nas Meyer MD;  Location: [address];  Service: General;;    ROBOT-ASSISTED SURGICAL REMOVAL OF ESOPHAGUS USING DA BALWINDER XI N/A 10/4/2023    Procedure: XI ROBOTIC ESOPHAGECTOMY;  Surgeon: Nas Meyer MD;  Location: [address];  Service: General;  Laterality: N/A;    ROBOTIC REPAIR, HERNIA, PARAESOPHAGEAL  10/4/2023    Procedure: ROBOTIC REPAIR, HERNIA, PARAESOPHAGEAL;  Surgeon: Nas Meyer MD;  Location: [address];  Service: General;;    ROTATOR CUFF REPAIR Right     x2        Family History:   Family History   Problem Relation Name Age of Onset    Diabetes Mother      Heart disease Father          CHF    Glaucoma Neg Hx      Colon cancer Neg Hx      Prostate cancer Neg Hx          Social History:   Social History     Tobacco Use    Smoking status: Former     Current packs/day: 0.25     Average packs/day: 0.3 packs/day for 41.4 years (10.4 ttl pk-yrs)     Types: Cigarettes     Start date: 1983     Passive exposure: Current    Smokeless  "tobacco: Never    Tobacco comments:     Done smoking since september   Substance Use Topics    Alcohol use: Yes     Comment: a couple of beers a day        I have reviewed and updated the patient's past medical, surgical, family and social histories.    Allergies:   Review of patient's allergies indicates:  No Known Allergies     Medications:   Current Outpatient Medications   Medication Sig Dispense Refill    LIDOcaine-prilocaine (EMLA) cream Apply topically as needed (Port access). 30 g 1    acetaminophen (TYLENOL) 500 MG tablet Take 1 tablet (500 mg total) by mouth every 6 (six) hours as needed for Pain. (Patient not taking: Reported on 1/9/2024)  0    cholecalciferol, vitamin D3, (VITAMIN D3) 10 mcg (400 unit) Tab Take by mouth once daily. (Patient not taking: Reported on 4/30/2024)      pantoprazole (PROTONIX) 40 MG tablet Take 1 tablet (40 mg total) by mouth once daily. (Patient not taking: Reported on 3/5/2024) 90 tablet 3    sildenafil (VIAGRA) 100 MG tablet Take 1 tablet (100 mg total) by mouth daily as needed for Erectile Dysfunction. (Patient not taking: Reported on 10/11/2023) 10 tablet 6     No current facility-administered medications for this visit.        Physical Exam:   /70 (BP Location: Left arm, Patient Position: Sitting, BP Method: Medium (Automatic))   Pulse 64   Temp 98.5 °F (36.9 °C) (Oral)   Ht 5' 9" (1.753 m)   Wt 58.6 kg (129 lb 1.3 oz)   SpO2 99%   BMI 19.06 kg/m²      ECOG Performance status: 0          Physical Exam  Vitals reviewed.   Constitutional:       General: He is not in acute distress.     Appearance: Normal appearance. He is normal weight.   HENT:      Head: Normocephalic and atraumatic.      Right Ear: External ear normal.      Left Ear: External ear normal.      Nose: Nose normal.      Mouth/Throat:      Mouth: Mucous membranes are moist.      Pharynx: Oropharynx is clear. No posterior oropharyngeal erythema.   Eyes:      General: No scleral icterus.     " Extraocular Movements: Extraocular movements intact.      Conjunctiva/sclera: Conjunctivae normal.      Pupils: Pupils are equal, round, and reactive to light.   Cardiovascular:      Rate and Rhythm: Normal rate and regular rhythm.      Pulses: Normal pulses.      Heart sounds: Normal heart sounds.   Pulmonary:      Effort: Pulmonary effort is normal.      Breath sounds: Normal breath sounds. No wheezing or rales.   Abdominal:      General: Bowel sounds are normal. There is no distension.      Palpations: Abdomen is soft.      Tenderness: There is no abdominal tenderness.      Comments: Surgical wounds well healed   Musculoskeletal:         General: No swelling. Normal range of motion.      Cervical back: Normal range of motion and neck supple.   Skin:     General: Skin is warm.      Coloration: Skin is not jaundiced.      Findings: No erythema or rash.   Neurological:      General: No focal deficit present.      Mental Status: He is alert and oriented to person, place, and time. Mental status is at baseline.      Gait: Gait normal.   Psychiatric:         Mood and Affect: Mood normal.         Behavior: Behavior normal.         Thought Content: Thought content normal.         Judgment: Judgment normal.           Labs:   Recent Results (from the past 48 hour(s))   CBC Oncology    Collection Time: 05/28/24  8:02 AM   Result Value Ref Range    WBC 5.41 3.90 - 12.70 K/uL    RBC 3.93 (L) 4.60 - 6.20 M/uL    Hemoglobin 13.4 (L) 14.0 - 18.0 g/dL    Hematocrit 39.7 (L) 40.0 - 54.0 %     (H) 82 - 98 fL    MCH 34.1 (H) 27.0 - 31.0 pg    MCHC 33.8 32.0 - 36.0 g/dL    RDW 12.9 11.5 - 14.5 %    Platelets 275 150 - 450 K/uL    MPV 8.7 (L) 9.2 - 12.9 fL    Gran # (ANC) 3.3 1.8 - 7.7 K/uL    Immature Grans (Abs) 0.05 (H) 0.00 - 0.04 K/uL     CMP pending  Imaging:       CT CAP 3/27/24:    Impression:   Increased size and density of a nodular focus in the region of right apical scarring currently measuring up to 2.0 cm.   Consider short interval follow-up with repeat CT chest versus PET/CT.     Otherwise, no new evidence of intrathoracic or abdominopelvic metastatic disease.     Esophagectomy and gastric pull-through changes without evidence of complication.     6 mm hypodense lesion at the posterior right renal cortex, not clearly cystic.  This statistically likely still represents a benign cyst although further follow-up should be considered with ultrasound or MRI.     Nonspecific asymmetric thickening of the left adrenal gland without discrete nodule.  Further attention to this region on future imaging recommended.    Path:   2. Gastroesophageal junction mass (biopsy):   Invasive adenocarcinoma, moderately differentiated   Background low and high-grade glandular dysplasia   HER2 immunohistochemistry:  Equivocal.     Immunohistochemistry (IHC) Testing for Mismatch Repair (MMR) Proteins:   MLH1, MSH2, MSH6, PMS2 - Intact nuclear expression   Background nonneoplastic tissue/internal control with intact nuclear expression     There are exceptions to the above IHC interpretations. These results should not be considered in isolation, and clinical correlation with genetic counseling is recommended to assess the need for germline testing.     Interpretation: No loss of nuclear expression of MMR proteins: low probability of microsatellite instability       Assessment:       1. Malignant neoplasm of gastroesophageal junction    2. Immunodeficiency due to chemotherapy    3. Neoplastic malignant related fatigue    4. Esophagitis    5. Chronic obstructive pulmonary disease, unspecified COPD type    6. Pulmonary emphysema, unspecified emphysema type    7. Atherosclerosis of aorta    8. Moderate malnutrition           Plan:        # Esophageal adenocarcinoma, chemotherapy induced neutropenia/thrombocytopenia  He initially presented with a locally advanced adenocarcinoma of the middle and lower third of the esophagus, pMMR. PET/CT on 6/1/23 did  not show clear distant metastatic disease.  We discussed the case and plan with Dr. Meyer and he feels the patient is surgically resectable as well.    Began cycle 1 on 7/18/23. He unfortunately had a reaction to the Taxol. During the Taxol infusion, he developed coughing, flushing, chest tightness and nausea. O2 sat was 92%, 2L O2 applied NC. We were notified and stopped the Taxol. He was able to proceed with the Carboplatin without complication.   We switched him to Abraxane 40 mg/m2 with week 2.  This was tolerated very well without issue. Has tolerated RT well and has completed 4 cycles of chemoRT. He completed radiation 8/18/23.  We did have to forego his last dose of chemotherapy due to cytopenias.    PET/CT on 9/22/23 showed very nice response to treatment with reduction in SUV avidity of primary tumor.  Discussed with Dr. Meyer.      He underwent esophagogastrectomy with Dr. Meyer on 10/4/23.  Pathology showed ypT2N0 with negative margins and 18 negative lymph nodes.    Because of his residual disease, we recommended adjuvant nivolumab per [id number]. He was in agreement.    Began cycle 1 on 11/15/23.  Tolerating well.   CBC adequate today. CMP pending  Presents today for cycle 8.  Will proceed.  Surveillance CT CAP 3/27/24 shows no clear evidence of disease.  Continued R apical scarring a bit more nodular.      Will repeat chest CT in 1 month and CT CAP in September.    RTC in 4 weeks for next cycle.     # Esophagitis, stricture  S/p multiple dilations. Most recent 5/7/24.   Procedure scheduled with Dr. Brady in June.  Feeding tube has been removed.    # COPD, tobacco abuse, aortic atherosclerosis  Counseled on tobacco cessation previously.  He has stopped smoking.  Normal SpO2.  No dyspnea.  Atherosclerosis noted on imaging.    # Malnutrition  Following with nutrition & surgery team.  Weight stable.      Patient is in agreement with the proposed treatment plan. All questions were answered to the  patient's satisfaction. Pt knows to call clinic if anything is needed before the next clinic visit.    Delta June MD  Hematology/Oncology  [facility]      Route Chart for Scheduling    Med Onc Chart Routing      Follow up with physician 1 month. for nivolumab   Follow up with DEMARCO 2 months. for nivolumab   Infusion scheduling note    Injection scheduling note    Labs CBC, CMP and TSH   Scheduling:  Preferred lab:  Lab interval:     Imaging    Pharmacy appointment    Other referrals              Treatment Plan Information   OP NIVOLUMAB 480 MG Q4W   Delta June MD   Upcoming Treatment Dates - OP NIVOLUMAB 480 MG Q4W    5/28/2024       Chemotherapy       nivolumab 480 mg in sodium chloride 0.9% 98 mL infusion  6/25/2024       Chemotherapy       nivolumab 480 mg in sodium chloride 0.9% 98 mL infusion  7/23/2024       Chemotherapy       nivolumab 480 mg in sodium chloride 0.9% 98 mL infusion  8/20/2024       Chemotherapy       nivolumab 480 mg in sodium chloride 0.9% 98 mL infusion           11-Apr-2019

## 2024-05-28 NOTE — PLAN OF CARE
1050-Pt tolerated Opdivo infusion well, no complications or side effects, POC and meds discussed with pt, pt aware of upcoming appts, pt knows to call MD with any questions or concerns. Pt ambulated off unit, no distress noted.

## 2024-05-30 NOTE — PROGRESS NOTES
Oncology Nutrition Assessment for Medical Nutrition Therapy  Follow-up Visit    Blayne Beebe   1960    Referring Provider: No ref. provider found      Reason for Visit: nutrition counseling and education    The patient location is: [place]  The chief complaint leading to consultation is: nutrition follow-up    Visit type: audiovisual    Face to Face time with patient: 6 minutes  10 minutes of total time spent on the encounter, which includes face to face time and non-face to face time preparing to see the patient (eg, review of tests), Obtaining and/or reviewing separately obtained history, Documenting clinical information in the electronic or other health record, Independently interpreting results (not separately reported) and communicating results to the patient/family/caregiver, or Care coordination (not separately reported).     Each patient to whom he or she provides medical services by telemedicine is:  (1) informed of the relationship between the physician and patient and the respective role of any other health care provider with respect to management of the patient; and (2) notified that he or she may decline to receive medical services by telemedicine and may withdraw from such care at any time.    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 Currently on adjuvant Opdivo. He is known to me from previous appointments. He has continued with dysphagia, most recently s/p dilation 5/7. He reports still issues swallowing, plan for EGD with GI 6/28. He also reports planning to get dental work middle of next month. He reports still eating soft foods (mashed potatoes, soup). He is also drinking malt with protein powder. He continues TF overnight, 2 cartons/day. He reports planning  "to do 3 cartons/day some days.    Tube Feeding Formula: TereDexmo Standard 1.4  Rate: 2-3 cartons/day (overnight)  Provides: 910-1365kcal/day and 40-60g protein/day  Water Flushes: 55mL/hr for every hour on feeds    Weight: 58.6 kg (129 lb 1.3 oz) - 5/28 clinic weight  Height: 5' 9" (1.753 m)  BMI: 19.1    Usual BW: 140-145lb  Weight Change: 10lb loss from COVID 2/2023 then another 5-10lb loss - since maintaining    Allergies: Patient has no known allergies.    Current Medications:  Current Outpatient Medications:     acetaminophen (TYLENOL) 500 MG tablet, Take 1 tablet (500 mg total) by mouth every 6 (six) hours as needed for Pain. (Patient not taking: Reported on 1/9/2024), Disp: , Rfl: 0    cholecalciferol, vitamin D3, (VITAMIN D3) 10 mcg (400 unit) Tab, Take by mouth once daily. (Patient not taking: Reported on 4/30/2024), Disp: , Rfl:     LIDOcaine-prilocaine (EMLA) cream, Apply topically as needed (Port access)., Disp: 30 g, Rfl: 1    pantoprazole (PROTONIX) 40 MG tablet, Take 1 tablet (40 mg total) by mouth once daily. (Patient not taking: Reported on 3/5/2024), Disp: 90 tablet, Rfl: 3    sildenafil (VIAGRA) 100 MG tablet, Take 1 tablet (100 mg total) by mouth daily as needed for Erectile Dysfunction. (Patient not taking: Reported on 10/11/2023), Disp: 10 tablet, Rfl: 6    Food/medication interactions noted: none    Vitamins/Supplements: none reported    Labs: Reviewed    Nutrition Diagnosis    Problem: moderate malnutrition  Etiology (related to): inadequate energy and protein intake  Signs/Symptoms (as evidenced by): reports of inadequate PO intake, weight loss, and both fat & muscle wasting present  Status: Improving    Nutrition Intervention    Nutrition Prescription   2090 kcals (35kcal/kg)  84g protein (1.4g/kg)   2090mL fluid (35mL/kg)    Recommendations:  Continue to increase PO intake as tolerated  Make meals/snacks high in calories and protein  Continue 2-3 cartons/day TF  Continue water flushes at " 55mL/hr for every hour on feeds  Flush J-tube with at least 60mL water before and after feeds    Materials Provided/Reviewed: none    Nutrition Monitoring and Evaluation    Monitor: diet education needs, energy intake, rate/formulation of tube feeding, and weight status    Goals: prevent further weight loss and encourage weight gain    Follow up: in 1-2 months    Communication to referring provider/care team: note available in chart; sent message to Dr. Meyer regarding patient's question about getting J-tube exchanged during GI procedure    Counseling time: 6 minutes      Noelle Morelos MS, RD, LDN  Senior Clinical Dietitian  [facility]

## 2024-05-31 ENCOUNTER — CLINICAL SUPPORT (OUTPATIENT)
Dept: HEMATOLOGY/ONCOLOGY | Facility: CLINIC | Age: 64
End: 2024-05-31
Payer: COMMERCIAL

## 2024-05-31 ENCOUNTER — PATIENT MESSAGE (OUTPATIENT)
Dept: SURGERY | Facility: CLINIC | Age: 64
End: 2024-05-31
Payer: COMMERCIAL

## 2024-05-31 DIAGNOSIS — C16.0 MALIGNANT NEOPLASM OF GASTROESOPHAGEAL JUNCTION: ICD-10-CM

## 2024-05-31 DIAGNOSIS — Z71.3 NUTRITIONAL COUNSELING: Primary | ICD-10-CM

## 2024-06-05 PROBLEM — K22.2: Status: ACTIVE | Noted: 2024-01-10

## 2024-06-05 PROBLEM — K91.89: Status: ACTIVE | Noted: 2024-01-10

## 2024-06-05 NOTE — PROGRESS NOTES
Encounter Date:  2024    Patient ID: Blayne Beebe  Age:  64 y.o. :  1960    Chief Complaint:  J tube malfunction     2023: abnormal lung CT screening identified mid esophageal mass with lymph nodes, stage III, [id number] at mid to lower esophagus.   2023 - 8/15/2023: neoadj CRT per Dr. June  23: restaging PET, 23: restaging appt with Dr. Meyer  10/4/23: s/p esophagectomy per Dr. Meyer  11/15/23: start adj IO per Dr. June  2023, 2023, 2023: EGD with dilation per Dr. Meyer  1/10/2024, 2024, 2024:  EGD with dilation per Dr. Meyer  3/4/2024, 3/19/2024, 2024: EGD with dilation per Dr. Myeer    Interval History:  Mr. Beebe returns to clinic from [place] with his wife. He was last seen in clinic in 2024 and seen by RD last month.   After neoadj CRT last , he underwent esophagectomy per Dr. Meyer in 10/2023. He remains on adj IO per Dr. June, received cycle 8 on 2024. He continues to suffer with cervical esophagogastric anastomotic stricture and has undergone multiple EGDs with dilation. He is scheduled for procedure with Dr. Brady end of this month.   He denies pain but continues to experience dysphagia. He is tolerating most soft foods including cream soups, mashed potato, honey bun. Drinks homemade protein powder shake, Gatorade, tea, coffee, popsicles. He remains J tube dependent, 2-3 cartons/ day at 80cc/hr. Weight is down. Denies nausea. Remains afebrile. Denies CP, SOB.     New Data:  Imaging:  I personally reviewed the following images:   3/2024: CT C/A/P:  Increased size and density of a nodular focus in the region of right apical scarring currently measuring up to 2.0 cm.  Consider short interval follow-up with repeat CT chest versus PET/CT.     Otherwise, no new evidence of intrathoracic or abdominopelvic metastatic disease.     Esophagectomy and gastric pull-through changes without evidence of complication.     6 mm  hypodense lesion at the posterior right renal cortex, not clearly cystic.  This statistically likely still represents a benign cyst although further follow-up should be considered with ultrasound or MRI.     Nonspecific asymmetric thickening of the left adrenal gland without discrete nodule.  Further attention to this region on future imaging recommended.    3/2024: [place] esophagram:  Postoperative changes from esophagectomy, proximal gastrectomy, and esophagogastrostomy.     Short-segment stricture in the neoesophagus at the T4 level measuring 6 mm in diameter, presumably at the anastomosis.     Stasis of liquid contrast in the distal neoesophagus suggesting dysmotility.       Labs:      Chemistry        Component Value Date/Time     05/28/2024 0802    K 4.7 05/28/2024 0802     05/28/2024 0802    CO2 27 05/28/2024 0802    BUN 10 05/28/2024 0802    CREATININE 0.7 05/28/2024 0802     (H) 05/28/2024 0802        Component Value Date/Time    CALCIUM 9.1 05/28/2024 0802    ALKPHOS 113 05/28/2024 0802    AST 18 05/28/2024 0802    ALT 12 05/28/2024 0802    BILITOT 0.5 05/28/2024 0802    ESTGFRAFRICA >60.0 01/28/2020 0803    EGFRNONAA >60.0 01/28/2020 0803        Lab Results   Component Value Date    ALBUMIN 3.4 (L) 05/28/2024     Lab Results   Component Value Date    WBC 5.41 05/28/2024    HGB 13.4 (L) 05/28/2024    HCT 39.7 (L) 05/28/2024     (H) 05/28/2024     05/28/2024     10/2023: Pathology:    Tumor Site  Distal esophagus (low thoracic esophagus)    Relationship of Tumor to Esophagogastric Junction  Tumor midpoint lies in the distal esophagus AND tumor involves the esophagogastric junction    Histologic Type  Adenocarcinoma    Histologic Grade  G2, moderately differentiated    Tumor Size  Greatest dimension in Centimeters (cm): 6.5 cm    Tumor Extent  Invades muscularis propria    Treatment Effect  Present, with residual cancer showing evident tumor regression, but more than single cells  or rare small groups of cancer cells (partial response, score 2)    Lymphovascular Invasion  Not identified    Perineural Invasion  Not identified    MARGINS  All margins negative for invasive carcinoma    REGIONAL LYMPH NODES  All regional lymph nodes negative for tumor  Number of Lymph Nodes Examined: 18    DISTANT METASTASIS  Not applicable    PATHOLOGIC STAGE CLASSIFICATION (pTNM, AJCC 8th Edition)  Reporting of pT, pN, and (when applicable) pM categories is based on information available to the pathologist at the time the report is issued. As per the AJCC (Chapter 1, 8th Ed.) it is the managing physician's responsibility to establish the final  pathologic stage based upon all pertinent information, including but potentially not limited to this pathology report.    pT Category  pT2: Tumor invades the muscularis propria    pN Category  pN0: No regional lymph node metastasis    pM Category  Not applicable - pM cannot be determined from the submitted specimen(s)         Past Medical History:   Diagnosis Date    Arthritis     Cancer     COPD (chronic obstructive pulmonary disease)      Past Surgical History:   Procedure Laterality Date    CERVICAL FUSION      COLONOSCOPY N/A 3/27/2018    Procedure: COLONOSCOPY;  Surgeon: Maria Teresa Morocho MD;  Location: [facility];  Service: Endoscopy;  Laterality: N/A;    COLONOSCOPY N/A 6/13/2023    Procedure: COLONOSCOPY;  Surgeon: Kelli Snell MD;  Location: [facility];  Service: Endoscopy;  Laterality: N/A;    EGD, WITH BALLOON DILATION N/A 12/1/2023    Procedure: EGD, WITH BALLOON DILATION;  Surgeon: Nas Meyer MD;  Location: [address];  Service: General;  Laterality: N/A;    EGD, WITH BALLOON DILATION N/A 12/11/2023    Procedure: EGD, WITH BALLOON DILATION;  Surgeon: Nas Meyer MD;  Location: [address];  Service: General;  Laterality: N/A;    EGD, WITH BALLOON DILATION N/A 12/20/2023    Procedure: EGD, WITH BALLOON DILATION;  Surgeon: Mitch  Nas SEAY MD;  Location: [facility] OR 2ND FLR;  Service: General;  Laterality: N/A;    EGD, WITH BALLOON DILATION N/A 1/10/2024    Procedure: EGD, WITH BALLOON DILATION;  Surgeon: Nas Meyer MD;  Location: [facility] OR 2ND FLR;  Service: General;  Laterality: N/A;    EGD, WITH BALLOON DILATION N/A 2/12/2024    Procedure: EGD, WITH BALLOON DILATION;  Surgeon: Nas Meyer MD;  Location: [facility] OR 2ND FLR;  Service: General;  Laterality: N/A;    EGD, WITH BALLOON DILATION N/A 2/23/2024    Procedure: EGD, WITH BALLOON DILATION;  Surgeon: Nas Meyer MD;  Location: [facility] OR 2ND FLR;  Service: General;  Laterality: N/A;    EGD, WITH BALLOON DILATION N/A 3/4/2024    Procedure: EGD, WITH BALLOON DILATION;  Surgeon: Nas Meyer MD;  Location: [facility] OR 2ND FLR;  Service: General;  Laterality: N/A;    EGD, WITH BALLOON DILATION N/A 3/19/2024    Procedure: EGD, WITH BALLOON DILATION;  Surgeon: Nas Meyer MD;  [facility];  Service: General;  Laterality: N/A;    EGD, WITH BALLOON DILATION N/A 5/7/2024    Procedure: EGD, WITH BALLOON DILATION;  Surgeon: Nas Meyer MD;  [facility];  Service: General;  Laterality: N/A;    ENDOSCOPIC ULTRASOUND OF UPPER GASTROINTESTINAL TRACT N/A 7/3/2023    Procedure: ULTRASOUND, UPPER GI TRACT, ENDOSCOPIC;  Surgeon: Ray Duffy MD;  Location: [facility];  Service: Endoscopy;  Laterality: N/A;    ESOPHAGOGASTRODUODENOSCOPY N/A 6/13/2023    Procedure: EGD (ESOPHAGOGASTRODUODENOSCOPY);  Surgeon: Kelli Snell MD;  Location: [facility];  Service: Endoscopy;  Laterality: N/A;    ESOPHAGOGASTRODUODENOSCOPY N/A 7/3/2023    Procedure: EGD (ESOPHAGOGASTRODUODENOSCOPY);  Surgeon: Ray Duffy MD;  Location: [facility];  Service: Endoscopy;  Laterality: N/A;    INGUINAL HERNIA REPAIR Bilateral     Right x2, x1 left    PLACEMENT OF JEJUNOSTOMY TUBE  10/4/2023    Procedure: INSERTION, JEJUNOSTOMY TUBE;  Surgeon: Nas Meyer MD;   [facility];  Service: General;;    PYLOROMYOTOMY  10/4/2023    Procedure: PYLOROMYOTOMY;  Surgeon: Nas Meyer MD;  Location: [facility] OR 2ND FLR;  Service: General;;    ROBOT-ASSISTED SURGICAL REMOVAL OF ESOPHAGUS USING DA BALWINDER XI N/A 10/4/2023    Procedure: XI ROBOTIC ESOPHAGECTOMY;  Surgeon: Nas Meyer MD;  [facility];  Service: General;  Laterality: N/A;    ROBOTIC REPAIR, HERNIA, PARAESOPHAGEAL  10/4/2023    Procedure: ROBOTIC REPAIR, HERNIA, PARAESOPHAGEAL;  Surgeon: Nas Meyer MD;  [facility];  Service: General;;    ROTATOR CUFF REPAIR Right     x2     Current Outpatient Medications on File Prior to Visit   Medication Sig Dispense Refill    acetaminophen (TYLENOL) 500 MG tablet Take 1 tablet (500 mg total) by mouth every 6 (six) hours as needed for Pain. (Patient not taking: Reported on 1/9/2024)  0    cholecalciferol, vitamin D3, (VITAMIN D3) 10 mcg (400 unit) Tab Take by mouth once daily. (Patient not taking: Reported on 4/30/2024)      LIDOcaine-prilocaine (EMLA) cream Apply topically as needed (Port access). 30 g 1    pantoprazole (PROTONIX) 40 MG tablet Take 1 tablet (40 mg total) by mouth once daily. (Patient not taking: Reported on 3/5/2024) 90 tablet 3    sildenafil (VIAGRA) 100 MG tablet Take 1 tablet (100 mg total) by mouth daily as needed for Erectile Dysfunction. (Patient not taking: Reported on 10/11/2023) 10 tablet 6     No current facility-administered medications on file prior to visit.     Review of patient's allergies indicates:  No Known Allergies    Family History:  His family history includes Diabetes in his mother; Heart disease in his father.     Social History:   reports that he has quit smoking. His smoking use included cigarettes. He started smoking about 41 years ago. He has a 10.4 pack-year smoking history. He has been exposed to tobacco smoke. He has never used smokeless tobacco. He reports current alcohol use. He  reports that he does not use drugs.     ROS:    Pertinent positive/negatives detailed in HPI, all other systems negative.     Physical Exam:  /65 (BP Location: Left arm, Patient Position: Sitting)   Pulse 72   Resp 18   Wt 57.5 kg (126 lb 10.5 oz)   SpO2 98%   BMI 18.70 kg/m²     Constitutional:  Non-toxic, no acute distress.    Eyes:  Sclerae anicteric, gaze symmetrical  Neck:  Trachea midline,  FROM  Resp:  Easy work of breathing  Abd:  Soft, non-tender, no masses, no ascites  Musculoskeletal:  Ambulatory, normal gait, + muscle wasting.  Extremities are symmetrical without lymphedema.  Neuro:  No gross deficits  Psych:  Awake, alert, oriented.  Answers and asks questions appropriately      ICD-10-CM ICD-9-CM    1. Anastomotic stricture after esophagectomy  K91.89 997.49     K22.2 530.3       2. H/O esophagectomy  Z98.890 V15.29     Z90.49        3. Jejunostomy present  Z93.4 V44.4       4. Alteration in nutrition associated with tube feeding  R63.8 783.9       5. Malignant neoplasm of gastroesophageal junction  C16.0 151.0       Plan   This 65 y/o gentleman dx with distal esophageal CA in 6/2023. He completed neoadj CRT in 8/2024, then underwent esophagectomy on 10/4/23 per Dr. Meyer. Path revealed 6.5 cm moderately differentiated AP, margins and lymph nodes neg, no LVI, no PNI. He started adj immunotherapy in 11/2023 and has scan with clinic visit with Dr. June later this month. He continues to suffer with cervical esophagogastric anastomotic stricture with persistent dysphagia despite multiple EGDs with dilation. Therefore, he remains J tube dependent. Exchanged J tube with 12 Fr in clinic today, sutured in place with 3 stitches. J tube flushed easily with 100cc water. Encouraged to continue soft food diet with supplemental nutrition. Monitor home scale weight. F/u with RD in [facility] clinic as scheduled in July. Remain tobacco free.    Questions were asked and answered to patient and his  wife's satisfaction.           Belem Chanel NP  Upper GI / Hepatobiliary Surgical Oncology  [facility] [place], [place]  Office: [phone]  Fax: [phone]

## 2024-06-06 ENCOUNTER — OFFICE VISIT (OUTPATIENT)
Dept: SURGERY | Facility: CLINIC | Age: 64
End: 2024-06-06
Payer: COMMERCIAL

## 2024-06-06 VITALS
DIASTOLIC BLOOD PRESSURE: 65 MMHG | OXYGEN SATURATION: 98 % | RESPIRATION RATE: 18 BRPM | BODY MASS INDEX: 18.7 KG/M2 | WEIGHT: 126.63 LBS | HEART RATE: 72 BPM | SYSTOLIC BLOOD PRESSURE: 112 MMHG

## 2024-06-06 DIAGNOSIS — R63.8 ALTERATION IN NUTRITION ASSOCIATED WITH TUBE FEEDING: ICD-10-CM

## 2024-06-06 DIAGNOSIS — C16.0 MALIGNANT NEOPLASM OF GASTROESOPHAGEAL JUNCTION: ICD-10-CM

## 2024-06-06 DIAGNOSIS — Z93.4 JEJUNOSTOMY PRESENT: ICD-10-CM

## 2024-06-06 DIAGNOSIS — Z98.890 H/O ESOPHAGECTOMY: ICD-10-CM

## 2024-06-06 DIAGNOSIS — K91.89 ANASTOMOTIC STRICTURE AFTER ESOPHAGECTOMY: Primary | ICD-10-CM

## 2024-06-06 DIAGNOSIS — Z90.49 H/O ESOPHAGECTOMY: ICD-10-CM

## 2024-06-06 DIAGNOSIS — K22.2 ANASTOMOTIC STRICTURE AFTER ESOPHAGECTOMY: Primary | ICD-10-CM

## 2024-06-06 PROCEDURE — 1160F RVW MEDS BY RX/DR IN RCRD: CPT | Mod: CPTII,S$GLB,, | Performed by: NURSE PRACTITIONER

## 2024-06-06 PROCEDURE — 99999 PR PBB SHADOW E&M-EST. PATIENT-LVL III: CPT | Mod: PBBFAC,,, | Performed by: NURSE PRACTITIONER

## 2024-06-06 PROCEDURE — 3074F SYST BP LT 130 MM HG: CPT | Mod: CPTII,S$GLB,, | Performed by: NURSE PRACTITIONER

## 2024-06-06 PROCEDURE — 3078F DIAST BP <80 MM HG: CPT | Mod: CPTII,S$GLB,, | Performed by: NURSE PRACTITIONER

## 2024-06-06 PROCEDURE — 1159F MED LIST DOCD IN RCRD: CPT | Mod: CPTII,S$GLB,, | Performed by: NURSE PRACTITIONER

## 2024-06-06 PROCEDURE — 3008F BODY MASS INDEX DOCD: CPT | Mod: CPTII,S$GLB,, | Performed by: NURSE PRACTITIONER

## 2024-06-06 PROCEDURE — 99213 OFFICE O/P EST LOW 20 MIN: CPT | Mod: S$GLB,,, | Performed by: NURSE PRACTITIONER

## 2024-06-21 ENCOUNTER — TELEPHONE (OUTPATIENT)
Dept: ENDOSCOPY | Facility: HOSPITAL | Age: 64
End: 2024-06-21
Payer: COMMERCIAL

## 2024-06-21 NOTE — TELEPHONE ENCOUNTER
Called pt to confirm EGD procedure. Pt denies blood thinners and GLP-1 medications. Pt verbalized ride home after procedure.

## 2024-06-27 ENCOUNTER — LAB VISIT (OUTPATIENT)
Dept: LAB | Facility: HOSPITAL | Age: 64
End: 2024-06-27
Payer: COMMERCIAL

## 2024-06-27 ENCOUNTER — INFUSION (OUTPATIENT)
Dept: INFUSION THERAPY | Facility: HOSPITAL | Age: 64
End: 2024-06-27
Payer: COMMERCIAL

## 2024-06-27 ENCOUNTER — OFFICE VISIT (OUTPATIENT)
Dept: HEMATOLOGY/ONCOLOGY | Facility: CLINIC | Age: 64
End: 2024-06-27
Payer: COMMERCIAL

## 2024-06-27 ENCOUNTER — ANESTHESIA EVENT (OUTPATIENT)
Dept: ENDOSCOPY | Facility: HOSPITAL | Age: 64
End: 2024-06-27
Payer: COMMERCIAL

## 2024-06-27 VITALS
HEART RATE: 71 BPM | OXYGEN SATURATION: 98 % | BODY MASS INDEX: 18.81 KG/M2 | WEIGHT: 127 LBS | SYSTOLIC BLOOD PRESSURE: 109 MMHG | DIASTOLIC BLOOD PRESSURE: 59 MMHG | TEMPERATURE: 99 F | HEIGHT: 69 IN

## 2024-06-27 VITALS
TEMPERATURE: 98 F | DIASTOLIC BLOOD PRESSURE: 69 MMHG | HEART RATE: 72 BPM | OXYGEN SATURATION: 98 % | SYSTOLIC BLOOD PRESSURE: 110 MMHG | RESPIRATION RATE: 18 BRPM

## 2024-06-27 DIAGNOSIS — J44.9 CHRONIC OBSTRUCTIVE PULMONARY DISEASE, UNSPECIFIED COPD TYPE: ICD-10-CM

## 2024-06-27 DIAGNOSIS — C16.0 MALIGNANT NEOPLASM OF GASTROESOPHAGEAL JUNCTION: ICD-10-CM

## 2024-06-27 DIAGNOSIS — R53.0 NEOPLASTIC MALIGNANT RELATED FATIGUE: ICD-10-CM

## 2024-06-27 DIAGNOSIS — T45.1X5A IMMUNODEFICIENCY DUE TO CHEMOTHERAPY: ICD-10-CM

## 2024-06-27 DIAGNOSIS — C16.0 MALIGNANT NEOPLASM OF GASTROESOPHAGEAL JUNCTION: Primary | ICD-10-CM

## 2024-06-27 DIAGNOSIS — D84.821 IMMUNODEFICIENCY DUE TO CHEMOTHERAPY: ICD-10-CM

## 2024-06-27 DIAGNOSIS — I70.0 ATHEROSCLEROSIS OF AORTA: ICD-10-CM

## 2024-06-27 DIAGNOSIS — J43.9 PULMONARY EMPHYSEMA, UNSPECIFIED EMPHYSEMA TYPE: ICD-10-CM

## 2024-06-27 DIAGNOSIS — K20.90 ESOPHAGITIS: ICD-10-CM

## 2024-06-27 DIAGNOSIS — Z79.899 IMMUNODEFICIENCY DUE TO CHEMOTHERAPY: ICD-10-CM

## 2024-06-27 DIAGNOSIS — R91.1 APICAL LUNG NODULE: ICD-10-CM

## 2024-06-27 DIAGNOSIS — E44.0 MODERATE MALNUTRITION: ICD-10-CM

## 2024-06-27 LAB
ALBUMIN SERPL BCP-MCNC: 3.4 G/DL (ref 3.5–5.2)
ALP SERPL-CCNC: 104 U/L (ref 55–135)
ALT SERPL W/O P-5'-P-CCNC: 14 U/L (ref 10–44)
ANION GAP SERPL CALC-SCNC: 10 MMOL/L (ref 8–16)
AST SERPL-CCNC: 19 U/L (ref 10–40)
BASOPHILS # BLD AUTO: 0.08 K/UL (ref 0–0.2)
BASOPHILS NFR BLD: 0.7 % (ref 0–1.9)
BILIRUB SERPL-MCNC: 1.2 MG/DL (ref 0.1–1)
BUN SERPL-MCNC: 9 MG/DL (ref 8–23)
CALCIUM SERPL-MCNC: 9.1 MG/DL (ref 8.7–10.5)
CHLORIDE SERPL-SCNC: 101 MMOL/L (ref 95–110)
CO2 SERPL-SCNC: 25 MMOL/L (ref 23–29)
CREAT SERPL-MCNC: 0.8 MG/DL (ref 0.5–1.4)
DIFFERENTIAL METHOD BLD: ABNORMAL
EOSINOPHIL # BLD AUTO: 0.1 K/UL (ref 0–0.5)
EOSINOPHIL NFR BLD: 1.1 % (ref 0–8)
ERYTHROCYTE [DISTWIDTH] IN BLOOD BY AUTOMATED COUNT: 12.8 % (ref 11.5–14.5)
EST. GFR  (NO RACE VARIABLE): >60 ML/MIN/1.73 M^2
GLUCOSE SERPL-MCNC: 124 MG/DL (ref 70–110)
HCT VFR BLD AUTO: 37.4 % (ref 40–54)
HGB BLD-MCNC: 13 G/DL (ref 14–18)
IMM GRANULOCYTES # BLD AUTO: 0.12 K/UL (ref 0–0.04)
IMM GRANULOCYTES NFR BLD AUTO: 1 % (ref 0–0.5)
LYMPHOCYTES # BLD AUTO: 1.2 K/UL (ref 1–4.8)
LYMPHOCYTES NFR BLD: 10 % (ref 18–48)
MCH RBC QN AUTO: 34.4 PG (ref 27–31)
MCHC RBC AUTO-ENTMCNC: 34.8 G/DL (ref 32–36)
MCV RBC AUTO: 99 FL (ref 82–98)
MONOCYTES # BLD AUTO: 1 K/UL (ref 0.3–1)
MONOCYTES NFR BLD: 8 % (ref 4–15)
NEUTROPHILS # BLD AUTO: 9.5 K/UL (ref 1.8–7.7)
NEUTROPHILS NFR BLD: 79.2 % (ref 38–73)
NRBC BLD-RTO: 0 /100 WBC
PLATELET # BLD AUTO: 309 K/UL (ref 150–450)
PMV BLD AUTO: 8.4 FL (ref 9.2–12.9)
POTASSIUM SERPL-SCNC: 4.5 MMOL/L (ref 3.5–5.1)
PROT SERPL-MCNC: 6.8 G/DL (ref 6–8.4)
RBC # BLD AUTO: 3.78 M/UL (ref 4.6–6.2)
SODIUM SERPL-SCNC: 136 MMOL/L (ref 136–145)
TSH SERPL DL<=0.005 MIU/L-ACNC: 0.41 UIU/ML (ref 0.4–4)
WBC # BLD AUTO: 11.95 K/UL (ref 3.9–12.7)

## 2024-06-27 PROCEDURE — 3008F BODY MASS INDEX DOCD: CPT | Mod: CPTII,S$GLB,, | Performed by: INTERNAL MEDICINE

## 2024-06-27 PROCEDURE — 25000003 PHARM REV CODE 250: Performed by: INTERNAL MEDICINE

## 2024-06-27 PROCEDURE — 3078F DIAST BP <80 MM HG: CPT | Mod: CPTII,S$GLB,, | Performed by: INTERNAL MEDICINE

## 2024-06-27 PROCEDURE — 96413 CHEMO IV INFUSION 1 HR: CPT

## 2024-06-27 PROCEDURE — 84443 ASSAY THYROID STIM HORMONE: CPT | Performed by: PHYSICIAN ASSISTANT

## 2024-06-27 PROCEDURE — 3074F SYST BP LT 130 MM HG: CPT | Mod: CPTII,S$GLB,, | Performed by: INTERNAL MEDICINE

## 2024-06-27 PROCEDURE — 99215 OFFICE O/P EST HI 40 MIN: CPT | Mod: S$GLB,,, | Performed by: INTERNAL MEDICINE

## 2024-06-27 PROCEDURE — 99999 PR PBB SHADOW E&M-EST. PATIENT-LVL III: CPT | Mod: PBBFAC,,, | Performed by: INTERNAL MEDICINE

## 2024-06-27 PROCEDURE — 36415 COLL VENOUS BLD VENIPUNCTURE: CPT | Performed by: PHYSICIAN ASSISTANT

## 2024-06-27 PROCEDURE — 80053 COMPREHEN METABOLIC PANEL: CPT | Performed by: PHYSICIAN ASSISTANT

## 2024-06-27 PROCEDURE — G2211 COMPLEX E/M VISIT ADD ON: HCPCS | Mod: S$GLB,,, | Performed by: INTERNAL MEDICINE

## 2024-06-27 PROCEDURE — 63600175 PHARM REV CODE 636 W HCPCS: Mod: JZ,JG | Performed by: INTERNAL MEDICINE

## 2024-06-27 PROCEDURE — 1159F MED LIST DOCD IN RCRD: CPT | Mod: CPTII,S$GLB,, | Performed by: INTERNAL MEDICINE

## 2024-06-27 PROCEDURE — 85025 COMPLETE CBC W/AUTO DIFF WBC: CPT | Performed by: PHYSICIAN ASSISTANT

## 2024-06-27 RX ORDER — SODIUM CHLORIDE 0.9 % (FLUSH) 0.9 %
10 SYRINGE (ML) INJECTION
Status: DISCONTINUED | OUTPATIENT
Start: 2024-06-27 | End: 2024-06-27 | Stop reason: HOSPADM

## 2024-06-27 RX ORDER — EPINEPHRINE 0.3 MG/.3ML
0.3 INJECTION SUBCUTANEOUS ONCE AS NEEDED
Status: CANCELLED | OUTPATIENT
Start: 2024-06-27

## 2024-06-27 RX ORDER — DIPHENHYDRAMINE HYDROCHLORIDE 50 MG/ML
50 INJECTION INTRAMUSCULAR; INTRAVENOUS ONCE AS NEEDED
Status: DISCONTINUED | OUTPATIENT
Start: 2024-06-27 | End: 2024-06-27 | Stop reason: HOSPADM

## 2024-06-27 RX ORDER — PROCHLORPERAZINE EDISYLATE 5 MG/ML
10 INJECTION INTRAMUSCULAR; INTRAVENOUS ONCE AS NEEDED
Status: CANCELLED
Start: 2024-06-27

## 2024-06-27 RX ORDER — SODIUM CHLORIDE 0.9 % (FLUSH) 0.9 %
10 SYRINGE (ML) INJECTION
Status: CANCELLED | OUTPATIENT
Start: 2024-06-27

## 2024-06-27 RX ORDER — PROCHLORPERAZINE EDISYLATE 5 MG/ML
10 INJECTION INTRAMUSCULAR; INTRAVENOUS ONCE AS NEEDED
Status: DISCONTINUED | OUTPATIENT
Start: 2024-06-27 | End: 2024-06-27 | Stop reason: HOSPADM

## 2024-06-27 RX ORDER — HEPARIN 100 UNIT/ML
500 SYRINGE INTRAVENOUS
Status: DISCONTINUED | OUTPATIENT
Start: 2024-06-27 | End: 2024-06-27 | Stop reason: HOSPADM

## 2024-06-27 RX ORDER — HEPARIN 100 UNIT/ML
500 SYRINGE INTRAVENOUS
Status: CANCELLED | OUTPATIENT
Start: 2024-06-27

## 2024-06-27 RX ORDER — DIPHENHYDRAMINE HYDROCHLORIDE 50 MG/ML
50 INJECTION INTRAMUSCULAR; INTRAVENOUS ONCE AS NEEDED
Status: CANCELLED | OUTPATIENT
Start: 2024-06-27

## 2024-06-27 RX ORDER — EPINEPHRINE 0.3 MG/.3ML
0.3 INJECTION SUBCUTANEOUS ONCE AS NEEDED
Status: DISCONTINUED | OUTPATIENT
Start: 2024-06-27 | End: 2024-06-27 | Stop reason: HOSPADM

## 2024-06-27 RX ADMIN — SODIUM CHLORIDE: 0.9 INJECTION, SOLUTION INTRAVENOUS at 09:06

## 2024-06-27 RX ADMIN — SODIUM CHLORIDE 480 MG: 9 INJECTION, SOLUTION INTRAVENOUS at 09:06

## 2024-06-27 RX ADMIN — HEPARIN 500 UNITS: 100 SYRINGE at 09:06

## 2024-06-27 NOTE — PROGRESS NOTES
MEDICAL ONCOLOGY - ESTABLISHED PATIENT VISIT    Reason for visit: Esophageal adenocarcinoma    Best Contact Phone Number(s): [phone] (home)      Cancer/Stage/TNM:    Cancer Staging   Malignant neoplasm of gastroesophageal junction  Staging form: Esophagus - Adenocarcinoma, AJCC 8th Edition  - Clinical stage from 6/13/2023: Stage III (cT3, cN1, cM0, G2) - Signed by Sunday Jasso MD on 7/5/2023       Oncology History   Malignant neoplasm of gastroesophageal junction   6/13/2023 Cancer Staged    Staging form: Esophagus - Adenocarcinoma, AJCC 8th Edition  - Clinical stage from 6/13/2023: Stage III (cT3, cN1, cM0, G2)     6/23/2023 Initial Diagnosis    Malignant neoplasm of gastroesophageal junction     7/3/2023 Tumor Conference     [facility] UGI MULTIDISCIPLINARY TUMOR BOARD  PATIENT REVIEW FORM   ____________________________________________________________    CLINIC #: [id number]   DATE: 7/3/2023    DIAGNOSIS: esophageal CA    PRESENTER: Mitch    PATIENT SUMMARY:   This 64 y/o gentleman is a long time smoker with emphysema who had low dose screening lung CT in 4/2023 per his PCP. Given an abnormality on this scan, he had PET on 6/1/23 which identified large hypermetabolic mid esophageal mass with lymph nodes. He underwent EGD on 6/13/23 that found large fungating ulcerated mass with bleeding in middle third of esophagus to lower esophagus, 34-44cm. Pathology revealed moderately differentiated adenocarcinoma.   Staging PET reviewed - level 2 cervical lymph node with FDG uptake, nothing in lungs concerning  Staging EUS today per Dr. Duffy.   He has been evaluated by Dr. Vance in [place] and Dr. June.   He is scheduled to see rad onc, Dr. Jasso, Wednesday and IR for port placement on 7/12/23.     BOARD RECOMMENDATIONS:   Proceed with neoadj CRT, then restaging to consider surgical resection    CONSULT NEEDED:     [] Surgery    [] Hem/Onc    [] Rad/Onc    [] Dietary                 [] Social  Service    [] Psychology       [] AES  [] Radiology     Clinical Stage: Tumor 3 Node(s) 1 Metastasis 0      GROUP STAGE:  [] O    [] 1   [] II     [x] III   []IV  [] Local recurrence     [] Regional recurrence     [] Distant recurrence   Metastatic site(s): none         [x] [facility] Treatment Guidelines reviewed and care planned is consistent with guidelines.         (i.e., NCCN, NCI, PD, ACO, AUA, etc.)    PRESENTATION AT CANCER CONFERENCE:         [x] Prospective    [] Retrospective     [] Follow-Up         7/16/2023 - 8/8/2023 Chemotherapy    Treatment Summary   Plan Name: OP ESOPHAGEAL PACLITAXEL CARBOPLATIN WEEKLY  Treatment Goal: Control  Status: Inactive  Start Date: 7/16/2023  End Date: 8/8/2023  Provider: Delta June MD  Chemotherapy: CARBOplatin (PARAPLATIN) 195 mg in sodium chloride 0.9% 304.5 mL chemo infusion, 195 mg (100 % of original dose 193 mg), Intravenous, Clinic/HOD 1 time, 1 of 1 cycle  Dose modification:   (original dose 193 mg, Cycle 1)  Administration: 195 mg (7/18/2023), 210 mg (7/24/2023), 230 mg (7/31/2023), 210 mg (8/8/2023)  PACLitaxeL (TAXOL) 50 mg/m2 = 84 mg in sodium chloride 0.9% 250 mL chemo infusion, 50 mg/m2 = 84 mg, Intravenous, Clinic/HOD 1 time, 1 of 1 cycle  Administration: 84 mg (7/18/2023)     7/18/2023 - 8/17/2023 Radiation Therapy    Treating physician: Sunday Jasso    Course: C1 Thorax 2023    Treatment Site Ref. ID Energy Dose/Fx (Gy) #Fx Dose Correction (Gy) Total Dose (Gy) Start Date End Date Elapsed Days   IM Esophagus PTV_High 6X 1.8 23 / 23 0 41.4 7/18/2023 8/17/2023 30          11/15/2023 -  Chemotherapy    Treatment Summary   Plan Name: OP NIVOLUMAB 480 MG Q4W  Treatment Goal: Curative  Status: Active  Start Date: 11/15/2023  End Date: 9/17/2024 (Planned)  Provider: Delta June MD  Chemotherapy: [No matching medication found in this treatment plan]     12/1/2023 Procedure    Surgeon(s) and Role: Nas Meyer MD - Primary      Pre-Operative Diagnosis:   Esophageal adenocarcinoma  Suspected cervical esophagogastric anastomotic stricture     Post-Operative Diagnosis:   Same  Cervical esophagogastric anastomotic stricture      Operative Findings:    Cervical esophagogastric anastomotic stricture. Unable to pass endoscope through stricture initially, but the scope was able to pass after serial dilations to 18 mm.      Procedures:  Esophagogastroduodenoscopy (EGD), with transendoscopic balloon dilation of esophagus (<30 mm)                Interim History:   64 y.o. male with esophageal adenocarcinoma who presents for cycle 9 of adjuvant nivolumab.  He underwent dilation with Dr. Meyer on 5/7/24.  He had initial improvement in his dysphagia but then after a couple weeks he is now back to where he was.  Set up for procedure with Dr. Grullon tomorrow.  Says he has some paraspinal aching which he thinks is related to activity, movement.  Not in the middle of his back, but wraps around both sides.    Denies any new toxicities from immunotherapy.  No diarrhea or dyspnea.    ECOG status is 0. Presents with his wife today.     ROS:   Review of Systems   Constitutional:  Negative for chills, fever, malaise/fatigue and weight loss.   HENT:  Negative for sore throat.    Eyes:  Negative for blurred vision and pain.   Respiratory:  Negative for cough and shortness of breath.    Cardiovascular:  Negative for chest pain and leg swelling.   Gastrointestinal:  Negative for abdominal pain, constipation, diarrhea, heartburn, nausea and vomiting.   Genitourinary:  Negative for dysuria and frequency.   Musculoskeletal:  Negative for back pain, falls and myalgias.   Skin:  Negative for rash.   Neurological:  Negative for dizziness, weakness and headaches.   Endo/Heme/Allergies:  Does not bruise/bleed easily.   Psychiatric/Behavioral:  Negative for depression. The patient is not nervous/anxious.    All other systems reviewed and are negative.      Past Medical  History:   Past Medical History:   Diagnosis Date    Arthritis     Cancer     COPD (chronic obstructive pulmonary disease)         Past Surgical History:   Past Surgical History:   Procedure Laterality Date    CERVICAL FUSION      COLONOSCOPY N/A 3/27/2018    Procedure: COLONOSCOPY;  Surgeon: Maria Teresa Morocho MD;  Location: [facility] ENDO;  Service: Endoscopy;  Laterality: N/A;    COLONOSCOPY N/A 6/13/2023    Procedure: COLONOSCOPY;  Surgeon: Kelli Snell MD;  Location: [facility] ENDO;  Service: Endoscopy;  Laterality: N/A;    EGD, WITH BALLOON DILATION N/A 12/1/2023    Procedure: EGD, WITH BALLOON DILATION;  Surgeon: Nas Meyer MD;  [facility];  Service: General;  Laterality: N/A;    EGD, WITH BALLOON DILATION N/A 12/11/2023    Procedure: EGD, WITH BALLOON DILATION;  Surgeon: Nas Meyer MD;  [facility];  Service: General;  Laterality: N/A;    EGD, WITH BALLOON DILATION N/A 12/20/2023    Procedure: EGD, WITH BALLOON DILATION;  Surgeon: Nas Meyer MD;  [facility];  Service: General;  Laterality: N/A;    EGD, WITH BALLOON DILATION N/A 1/10/2024    Procedure: EGD, WITH BALLOON DILATION;  Surgeon: Nas Meyer MD;  [facility];  Service: General;  Laterality: N/A;    EGD, WITH BALLOON DILATION N/A 2/12/2024    Procedure: EGD, WITH BALLOON DILATION;  Surgeon: Nas Meyer MD;  [facility];  Service: General;  Laterality: N/A;    EGD, WITH BALLOON DILATION N/A 2/23/2024    Procedure: EGD, WITH BALLOON DILATION;  Surgeon: Nas Meyer MD;  [facility];  Service: General;  Laterality: N/A;    EGD, WITH BALLOON DILATION N/A 3/4/2024    Procedure: EGD, WITH BALLOON DILATION;  Surgeon: Nas Meyer MD;  [facility];  Service: General;  Laterality: N/A;    EGD, WITH BALLOON DILATION N/A 3/19/2024    Procedure: EGD, WITH BALLOON DILATION;  Surgeon: Nas Meyer MD;  Location:  [facility];  Service: General;  Laterality: N/A;    EGD, WITH BALLOON DILATION N/A 5/7/2024    Procedure: EGD, WITH BALLOON DILATION;  Surgeon: Nas Meyer MD;  [facility];  Service: General;  Laterality: N/A;    ENDOSCOPIC ULTRASOUND OF UPPER GASTROINTESTINAL TRACT N/A 7/3/2023    Procedure: ULTRASOUND, UPPER GI TRACT, ENDOSCOPIC;  Surgeon: Ray Duffy MD;  Location: [facility];  Service: Endoscopy;  Laterality: N/A;    ESOPHAGOGASTRODUODENOSCOPY N/A 6/13/2023    Procedure: EGD (ESOPHAGOGASTRODUODENOSCOPY);  Surgeon: Kelli Snell MD;  Location: [facility];  Service: Endoscopy;  Laterality: N/A;    ESOPHAGOGASTRODUODENOSCOPY N/A 7/3/2023    Procedure: EGD (ESOPHAGOGASTRODUODENOSCOPY);  Surgeon: Ray Duffy MD;  Location: [facility];  Service: Endoscopy;  Laterality: N/A;    INGUINAL HERNIA REPAIR Bilateral     Right x2, x1 left    PLACEMENT OF JEJUNOSTOMY TUBE  10/4/2023    Procedure: INSERTION, JEJUNOSTOMY TUBE;  Surgeon: Nas Meyer MD;  Location: [address];  Service: General;;    PYLOROMYOTOMY  10/4/2023    Procedure: PYLOROMYOTOMY;  Surgeon: Nas Meyer MD;  Location: [address];  Service: General;;    ROBOT-ASSISTED SURGICAL REMOVAL OF ESOPHAGUS USING DA BALWINDER XI N/A 10/4/2023    Procedure: XI ROBOTIC ESOPHAGECTOMY;  Surgeon: Nas Meyer MD;  Location: [address];  Service: General;  Laterality: N/A;    ROBOTIC REPAIR, HERNIA, PARAESOPHAGEAL  10/4/2023    Procedure: ROBOTIC REPAIR, HERNIA, PARAESOPHAGEAL;  Surgeon: Nas Meyer MD;  Location: [address];  Service: General;;    ROTATOR CUFF REPAIR Right     x2        Family History:   Family History   Problem Relation Name Age of Onset    Diabetes Mother      Heart disease Father          CHF    Glaucoma Neg Hx      Colon cancer Neg Hx      Prostate cancer Neg Hx          Social History:   Social History     Tobacco Use    Smoking status: Former     Current packs/day: 0.25      Average packs/day: 0.2 packs/day for 41.5 years (10.4 ttl pk-yrs)     Types: Cigarettes     Start date: 1983     Passive exposure: Current    Smokeless tobacco: Never    Tobacco comments:     Done smoking since september   Substance Use Topics    Alcohol use: Yes     Comment: a couple of beers a day        I have reviewed and updated the patient's past medical, surgical, family and social histories.    Allergies:   Review of patient's allergies indicates:  No Known Allergies     Medications:   Current Outpatient Medications   Medication Sig Dispense Refill    LIDOcaine-prilocaine (EMLA) cream Apply topically as needed (Port access). 30 g 1    acetaminophen (TYLENOL) 500 MG tablet Take 1 tablet (500 mg total) by mouth every 6 (six) hours as needed for Pain. (Patient not taking: Reported on 6/27/2024)  0    cholecalciferol, vitamin D3, (VITAMIN D3) 10 mcg (400 unit) Tab Take by mouth once daily. (Patient not taking: Reported on 4/30/2024)      pantoprazole (PROTONIX) 40 MG tablet Take 1 tablet (40 mg total) by mouth once daily. (Patient not taking: Reported on 6/27/2024) 90 tablet 3    sildenafil (VIAGRA) 100 MG tablet Take 1 tablet (100 mg total) by mouth daily as needed for Erectile Dysfunction. (Patient not taking: Reported on 10/11/2023) 10 tablet 6     No current facility-administered medications for this visit.     Facility-Administered Medications Ordered in Other Visits   Medication Dose Route Frequency Provider Last Rate Last Admin    0.9% NaCl 250 mL flush bag   Intravenous PRN Delta June MD        alteplase injection 2 mg  2 mg Intra-Catheter PRN Delta June MD        diphenhydrAMINE injection 50 mg  50 mg Intravenous Once PRN Delta June MD        EPINEPHrine (EPIPEN) 0.3 mg/0.3 mL pen injection 0.3 mg  0.3 mg Intramuscular Once PRN Delta June MD        heparin, porcine (PF) 100 unit/mL injection flush 500 Units  500 Units Intravenous PRN Delta June MD    "     hydrocortisone sodium succinate injection 100 mg  100 mg Intravenous Once PRN Delta June MD        nivolumab 480 mg in 0.9% NaCl 111 mL infusion  480 mg Intravenous 1 time in Clinic/HOD Delta June MD        prochlorperazine injection Soln 10 mg  10 mg Intravenous Once PRN Delta June MD        sodium chloride 0.9% flush 10 mL  10 mL Intravenous PRN Delta June MD            Physical Exam:   BP (!) 109/59 (BP Location: Right arm, Patient Position: Sitting, BP Method: Medium (Automatic))   Pulse 71   Temp 99.1 °F (37.3 °C) (Oral)   Ht 5' 9" (1.753 m)   Wt 57.6 kg (126 lb 15.8 oz)   SpO2 98%   BMI 18.75 kg/m²      ECOG Performance status: 0          Physical Exam  Vitals reviewed.   Constitutional:       General: He is not in acute distress.     Appearance: Normal appearance. He is normal weight.   HENT:      Head: Normocephalic and atraumatic.      Right Ear: External ear normal.      Left Ear: External ear normal.      Nose: Nose normal.      Mouth/Throat:      Mouth: Mucous membranes are moist.      Pharynx: Oropharynx is clear. No posterior oropharyngeal erythema.   Eyes:      General: No scleral icterus.     Extraocular Movements: Extraocular movements intact.      Conjunctiva/sclera: Conjunctivae normal.      Pupils: Pupils are equal, round, and reactive to light.   Cardiovascular:      Rate and Rhythm: Normal rate and regular rhythm.      Pulses: Normal pulses.      Heart sounds: Normal heart sounds.   Pulmonary:      Effort: Pulmonary effort is normal.      Breath sounds: Normal breath sounds. No wheezing or rales.   Abdominal:      General: Bowel sounds are normal. There is no distension.      Palpations: Abdomen is soft.      Tenderness: There is no abdominal tenderness.      Comments: Surgical wounds well healed   Musculoskeletal:         General: No swelling. Normal range of motion.      Cervical back: Normal range of motion and neck supple.   Skin:     " General: Skin is warm.      Coloration: Skin is not jaundiced.      Findings: No erythema or rash.   Neurological:      General: No focal deficit present.      Mental Status: He is alert and oriented to person, place, and time. Mental status is at baseline.      Gait: Gait normal.   Psychiatric:         Mood and Affect: Mood normal.         Behavior: Behavior normal.         Thought Content: Thought content normal.         Judgment: Judgment normal.           Labs:   Recent Results (from the past 48 hour(s))   CBC W/ AUTO DIFFERENTIAL    Collection Time: 06/27/24  7:13 AM   Result Value Ref Range    WBC 11.95 3.90 - 12.70 K/uL    RBC 3.78 (L) 4.60 - 6.20 M/uL    Hemoglobin 13.0 (L) 14.0 - 18.0 g/dL    Hematocrit 37.4 (L) 40.0 - 54.0 %    MCV 99 (H) 82 - 98 fL    MCH 34.4 (H) 27.0 - 31.0 pg    MCHC 34.8 32.0 - 36.0 g/dL    RDW 12.8 11.5 - 14.5 %    Platelets 309 150 - 450 K/uL    MPV 8.4 (L) 9.2 - 12.9 fL    Immature Granulocytes 1.0 (H) 0.0 - 0.5 %    Gran # (ANC) 9.5 (H) 1.8 - 7.7 K/uL    Immature Grans (Abs) 0.12 (H) 0.00 - 0.04 K/uL    Lymph # 1.2 1.0 - 4.8 K/uL    Mono # 1.0 0.3 - 1.0 K/uL    Eos # 0.1 0.0 - 0.5 K/uL    Baso # 0.08 0.00 - 0.20 K/uL    nRBC 0 0 /100 WBC    Gran % 79.2 (H) 38.0 - 73.0 %    Lymph % 10.0 (L) 18.0 - 48.0 %    Mono % 8.0 4.0 - 15.0 %    Eosinophil % 1.1 0.0 - 8.0 %    Basophil % 0.7 0.0 - 1.9 %    Differential Method Automated    Comprehensive Metabolic Panel    Collection Time: 06/27/24  7:13 AM   Result Value Ref Range    Sodium 136 136 - 145 mmol/L    Potassium 4.5 3.5 - 5.1 mmol/L    Chloride 101 95 - 110 mmol/L    CO2 25 23 - 29 mmol/L    Glucose 124 (H) 70 - 110 mg/dL    BUN 9 8 - 23 mg/dL    Creatinine 0.8 0.5 - 1.4 mg/dL    Calcium 9.1 8.7 - 10.5 mg/dL    Total Protein 6.8 6.0 - 8.4 g/dL    Albumin 3.4 (L) 3.5 - 5.2 g/dL    Total Bilirubin 1.2 (H) 0.1 - 1.0 mg/dL    Alkaline Phosphatase 104 55 - 135 U/L    AST 19 10 - 40 U/L    ALT 14 10 - 44 U/L    eGFR >60.0 >60 mL/min/1.73  m^2    Anion Gap 10 8 - 16 mmol/L   TSH    Collection Time: 06/27/24  7:13 AM   Result Value Ref Range    TSH 0.412 0.400 - 4.000 uIU/mL     Mild bilirubin elevation.    Imaging:       CT CAP 6/26/24:    Impression:     1. Acute or subacute T8 compression deformity.  Clinical correlation is recommended.  MRI thoracic spine with and without contrast recommended in this patient with known malignancy.  2. No significant change in the right apical opacity.  Continued attention on follow-up recommended.  3. Other findings as above.    Path:   2. Gastroesophageal junction mass (biopsy):   Invasive adenocarcinoma, moderately differentiated   Background low and high-grade glandular dysplasia   HER2 immunohistochemistry:  Equivocal.     Immunohistochemistry (IHC) Testing for Mismatch Repair (MMR) Proteins:   MLH1, MSH2, MSH6, PMS2 - Intact nuclear expression   Background nonneoplastic tissue/internal control with intact nuclear expression     There are exceptions to the above IHC interpretations. These results should not be considered in isolation, and clinical correlation with genetic counseling is recommended to assess the need for germline testing.     Interpretation: No loss of nuclear expression of MMR proteins: low probability of microsatellite instability       Assessment:       1. Malignant neoplasm of gastroesophageal junction    2. Immunodeficiency due to chemotherapy    3. Neoplastic malignant related fatigue    4. Esophagitis    5. Chronic obstructive pulmonary disease, unspecified COPD type    6. Pulmonary emphysema, unspecified emphysema type    7. Atherosclerosis of aorta    8. Moderate malnutrition    9. Apical lung nodule             Plan:        # Esophageal adenocarcinoma, chemotherapy induced neutropenia/thrombocytopenia  He initially presented with a locally advanced adenocarcinoma of the middle and lower third of the esophagus, pMMR. PET/CT on 6/1/23 did not show clear distant metastatic disease.  We  discussed the case and plan with Dr. Meyer and he feels the patient is surgically resectable as well.    Began cycle 1 on 7/18/23. He unfortunately had a reaction to the Taxol. During the Taxol infusion, he developed coughing, flushing, chest tightness and nausea. O2 sat was 92%, 2L O2 applied NC. We were notified and stopped the Taxol. He was able to proceed with the Carboplatin without complication.   We switched him to Abraxane 40 mg/m2 with week 2.  This was tolerated very well without issue. Has tolerated RT well and has completed 4 cycles of chemoRT. He completed radiation 8/18/23.  We did have to forego his last dose of chemotherapy due to cytopenias.    PET/CT on 9/22/23 showed very nice response to treatment with reduction in SUV avidity of primary tumor.  Discussed with Dr. Meyer.      He underwent esophagogastrectomy with Dr. Meyer on 10/4/23.  Pathology showed [id number] with negative margins and 18 negative lymph nodes.    Because of his residual disease, we recommended adjuvant nivolumab per [id number]. He was in agreement.    Began cycle 1 on 11/15/23.  Tolerating well.   Labs acceptable to proceed.  Presents today for cycle 9.  Will proceed.  Surveillance CT CAP 3/27/24 showed no clear evidence of disease.  Continued R apical scarring a bit more nodular.    CT CAP 6/26/24 showed continued R apical nodular scar/lesion.  Will obtain PET/CT to evaluate whether this may be neoplastic.    Incidental T8 compression fracture was seen on CT.  He does not have clear localizing symptoms.  He wishes to defer MRI for now.    RTC in 4 weeks for next cycle.     # Esophagitis, stricture  S/p multiple dilations. Most recent 5/7/24.   Procedure scheduled with Dr. Grullon 6/28/24.    # COPD, tobacco abuse, aortic atherosclerosis  Counseled on tobacco cessation previously.  He has stopped smoking.  Normal SpO2.  No dyspnea.  Atherosclerosis noted on imaging.    # Malnutrition  Following with nutrition & surgery team.   Weight stable.      Patient is in agreement with the proposed treatment plan. All questions were answered to the patient's satisfaction. Pt knows to call clinic if anything is needed before the next clinic visit.    Delta June MD  Hematology/Oncology  [facility]      Route Chart for Scheduling    Med Onc Chart Routing      Follow up with physician 4 weeks. for nivolumab   Follow up with DEMARCO 2 months. for nivolumab   Infusion scheduling note    Injection scheduling note    Labs CBC, CMP and TSH   Scheduling:  Preferred lab:  Lab interval:     Imaging PET scan   PET in next couple weeks   Pharmacy appointment    Other referrals              Treatment Plan Information   OP NIVOLUMAB 480 MG Q4W   Delta June MD   Upcoming Treatment Dates - OP NIVOLUMAB 480 MG Q4W    7/23/2024       Chemotherapy       nivolumab 480 mg in 0.9% NaCl 98 mL infusion  8/20/2024       Chemotherapy       nivolumab 480 mg in 0.9% NaCl 98 mL infusion  9/17/2024       Chemotherapy       nivolumab 480 mg in 0.9% NaCl 98 mL infusion

## 2024-06-27 NOTE — PLAN OF CARE
Problem: Chemotherapy Effects  Goal: Anemia Symptom Improvement  Outcome: Progressing  Goal: Safety Maintained  Outcome: Progressing  Goal: Absence of Hematuria  Outcome: Progressing  Goal: Nausea and Vomiting Relief  Outcome: Progressing  Goal: Neurotoxicity Symptom Control  Outcome: Progressing  Goal: Absence of Infection  Outcome: Progressing  Goal: Absence of Bleeding  Outcome: Progressing      Ambulatory to clinic with family.  No c/o adverse effects or s/s of infection.  PAC accessed, flushed with out difficulty, blood return noted and infused with no problems.  Nivolumab tolerated with no problems.  Ambulatory home with family.  NAD.

## 2024-06-28 ENCOUNTER — ANESTHESIA (OUTPATIENT)
Dept: ENDOSCOPY | Facility: HOSPITAL | Age: 64
End: 2024-06-28
Payer: COMMERCIAL

## 2024-06-28 ENCOUNTER — HOSPITAL ENCOUNTER (OUTPATIENT)
Facility: HOSPITAL | Age: 64
Discharge: HOME | End: 2024-06-28
Attending: INTERNAL MEDICINE | Admitting: INTERNAL MEDICINE
Payer: COMMERCIAL

## 2024-06-28 VITALS
SYSTOLIC BLOOD PRESSURE: 134 MMHG | OXYGEN SATURATION: 95 % | HEART RATE: 78 BPM | BODY MASS INDEX: 19.26 KG/M2 | WEIGHT: 130 LBS | RESPIRATION RATE: 22 BRPM | DIASTOLIC BLOOD PRESSURE: 72 MMHG | TEMPERATURE: 98 F | HEIGHT: 69 IN

## 2024-06-28 DIAGNOSIS — C16.0 MALIGNANT NEOPLASM OF GASTROESOPHAGEAL JUNCTION: ICD-10-CM

## 2024-06-28 DIAGNOSIS — R13.10 DYSPHAGIA: ICD-10-CM

## 2024-06-28 PROCEDURE — 43248 EGD GUIDE WIRE INSERTION: CPT | Performed by: INTERNAL MEDICINE

## 2024-06-28 PROCEDURE — 63600175 PHARM REV CODE 636 W HCPCS: Performed by: INTERNAL MEDICINE

## 2024-06-28 PROCEDURE — 43236 UPPR GI SCOPE W/SUBMUC INJ: CPT | Mod: 59,,, | Performed by: INTERNAL MEDICINE

## 2024-06-28 PROCEDURE — C1769 GUIDE WIRE: HCPCS | Performed by: INTERNAL MEDICINE

## 2024-06-28 PROCEDURE — 43248 EGD GUIDE WIRE INSERTION: CPT | Mod: 22,,, | Performed by: INTERNAL MEDICINE

## 2024-06-28 PROCEDURE — 37000008 HC ANESTHESIA 1ST 15 MINUTES: Performed by: INTERNAL MEDICINE

## 2024-06-28 PROCEDURE — 25000003 PHARM REV CODE 250: Performed by: NURSE ANESTHETIST, CERTIFIED REGISTERED

## 2024-06-28 PROCEDURE — 43236 UPPR GI SCOPE W/SUBMUC INJ: CPT | Mod: 59 | Performed by: INTERNAL MEDICINE

## 2024-06-28 PROCEDURE — 37000009 HC ANESTHESIA EA ADD 15 MINS: Performed by: INTERNAL MEDICINE

## 2024-06-28 PROCEDURE — 25000003 PHARM REV CODE 250: Performed by: ANESTHESIOLOGY

## 2024-06-28 PROCEDURE — 27201028 HC NEEDLE, SCLERO: Performed by: INTERNAL MEDICINE

## 2024-06-28 PROCEDURE — 63600175 PHARM REV CODE 636 W HCPCS: Performed by: NURSE ANESTHETIST, CERTIFIED REGISTERED

## 2024-06-28 RX ORDER — KETAMINE HCL IN 0.9 % NACL 50 MG/5 ML
SYRINGE (ML) INTRAVENOUS
Status: DISCONTINUED | OUTPATIENT
Start: 2024-06-28 | End: 2024-06-28

## 2024-06-28 RX ORDER — FENTANYL CITRATE 50 UG/ML
INJECTION, SOLUTION INTRAMUSCULAR; INTRAVENOUS
Status: DISCONTINUED | OUTPATIENT
Start: 2024-06-28 | End: 2024-06-28

## 2024-06-28 RX ORDER — PROPOFOL 10 MG/ML
VIAL (ML) INTRAVENOUS
Status: DISCONTINUED | OUTPATIENT
Start: 2024-06-28 | End: 2024-06-28

## 2024-06-28 RX ORDER — LIDOCAINE HYDROCHLORIDE 40 MG/ML
INJECTION, SOLUTION RETROBULBAR
Status: DISCONTINUED | OUTPATIENT
Start: 2024-06-28 | End: 2024-06-28

## 2024-06-28 RX ORDER — SODIUM CHLORIDE 0.9 % (FLUSH) 0.9 %
10 SYRINGE (ML) INJECTION
Status: DISCONTINUED | OUTPATIENT
Start: 2024-06-28 | End: 2024-06-28 | Stop reason: HOSPADM

## 2024-06-28 RX ORDER — MIDAZOLAM HYDROCHLORIDE 1 MG/ML
INJECTION INTRAMUSCULAR; INTRAVENOUS
Status: DISCONTINUED | OUTPATIENT
Start: 2024-06-28 | End: 2024-06-28

## 2024-06-28 RX ORDER — RABEPRAZOLE SODIUM 20 MG/1
20 TABLET, DELAYED RELEASE ORAL
Qty: 60 TABLET | Refills: 11 | Status: SHIPPED | OUTPATIENT
Start: 2024-06-28 | End: 2025-06-28

## 2024-06-28 RX ORDER — TRIAMCINOLONE ACETONIDE 40 MG/ML
INJECTION, SUSPENSION INTRA-ARTICULAR; INTRAMUSCULAR
Status: COMPLETED | OUTPATIENT
Start: 2024-06-28 | End: 2024-06-28

## 2024-06-28 RX ORDER — SODIUM CHLORIDE 0.9 % (FLUSH) 0.9 %
3 SYRINGE (ML) INJECTION
Status: DISCONTINUED | OUTPATIENT
Start: 2024-06-28 | End: 2024-06-28 | Stop reason: HOSPADM

## 2024-06-28 RX ORDER — ONDANSETRON HYDROCHLORIDE 2 MG/ML
INJECTION, SOLUTION INTRAVENOUS
Status: DISCONTINUED | OUTPATIENT
Start: 2024-06-28 | End: 2024-06-28

## 2024-06-28 RX ORDER — SODIUM CHLORIDE 9 MG/ML
INJECTION, SOLUTION INTRAVENOUS CONTINUOUS
Status: DISCONTINUED | OUTPATIENT
Start: 2024-06-28 | End: 2024-06-28 | Stop reason: HOSPADM

## 2024-06-28 RX ORDER — ROCURONIUM BROMIDE 10 MG/ML
INJECTION, SOLUTION INTRAVENOUS
Status: DISCONTINUED | OUTPATIENT
Start: 2024-06-28 | End: 2024-06-28

## 2024-06-28 RX ADMIN — SODIUM CHLORIDE: 0.9 INJECTION, SOLUTION INTRAVENOUS at 08:06

## 2024-06-28 RX ADMIN — ROCURONIUM BROMIDE 30 MG: 10 INJECTION INTRAVENOUS at 08:06

## 2024-06-28 RX ADMIN — PROPOFOL 100 MG: 10 INJECTION, EMULSION INTRAVENOUS at 08:06

## 2024-06-28 RX ADMIN — SUGAMMADEX 200 MG: 100 INJECTION, SOLUTION INTRAVENOUS at 09:06

## 2024-06-28 RX ADMIN — Medication 10 MG: at 08:06

## 2024-06-28 RX ADMIN — SODIUM CHLORIDE, SODIUM GLUCONATE, SODIUM ACETATE, POTASSIUM CHLORIDE, MAGNESIUM CHLORIDE, SODIUM PHOSPHATE, DIBASIC, AND POTASSIUM PHOSPHATE: .53; .5; .37; .037; .03; .012; .00082 INJECTION, SOLUTION INTRAVENOUS at 09:06

## 2024-06-28 RX ADMIN — FENTANYL CITRATE 100 MCG: 50 INJECTION, SOLUTION INTRAMUSCULAR; INTRAVENOUS at 08:06

## 2024-06-28 RX ADMIN — ONDANSETRON 4 MG: 2 INJECTION INTRAMUSCULAR; INTRAVENOUS at 09:06

## 2024-06-28 RX ADMIN — ROCURONIUM BROMIDE 20 MG: 10 INJECTION INTRAVENOUS at 08:06

## 2024-06-28 RX ADMIN — LIDOCAINE HYDROCHLORIDE 200 MG: 40 INJECTION, SOLUTION RETROBULBAR; TOPICAL at 08:06

## 2024-06-28 RX ADMIN — MIDAZOLAM HYDROCHLORIDE 2 MG: 2 INJECTION, SOLUTION INTRAMUSCULAR; INTRAVENOUS at 08:06

## 2024-06-28 NOTE — PROVATION PATIENT INSTRUCTIONS
Discharge Summary/Instructions after an Endoscopic Procedure  Patient Name: Blayne Beebe  Patient MRN: [id number]  Patient YOB: 1960 Friday, June 28, 2024  Rose Marie Grullon MD  Dear patient,  As a result of recent federal legislation (The Federal Cures Act), you may   receive lab or pathology results from your procedure in your [facility]   account before your physician is able to contact you. Your physician or   their representative will relay the results to you with their   recommendations at their soonest availability.  Thank you,  RESTRICTIONS:  During your procedure today, you received medications for sedation.  These   medications may affect your judgment, balance and coordination.  Therefore,   for 24 hours, you have the following restrictions:   - DO NOT drive a car, operate machinery, make legal/financial decisions,   sign important papers or drink alcohol.    ACTIVITY:  Today: no heavy lifting, straining or running due to procedural   sedation/anesthesia.  The following day: return to full activity including work.  DIET:  Eat and drink normally unless instructed otherwise.     TREATMENT FOR COMMON SIDE EFFECTS:  - Mild abdominal pain, nausea, belching, bloating or excessive gas:  rest,   eat lightly and use a heating pad.  - Sore Throat: treat with throat lozenges and/or gargle with warm salt   water.  - Because air was used during the procedure, expelling large amounts of air   from your rectum or belching is normal.  - If a bowel prep was taken, you may not have a bowel movement for 1-3 days.    This is normal.  SYMPTOMS TO WATCH FOR AND REPORT TO YOUR PHYSICIAN:  1. Abdominal pain or bloating, other than gas cramps.  2. Chest pain.  3. Back pain.  4. Signs of infection such as: chills or fever occurring within 24 hours   after the procedure.  5. Rectal bleeding, which would show as bright red, maroon, or black stools.   (A tablespoon of blood from the rectum is not serious, especially if    hemorrhoids are present.)  6. Vomiting.  7. Weakness or dizziness.  GO DIRECTLY TO THE NEAREST EMERGENCY ROOM IF YOU HAVE ANY OF THE FOLLOWING:      Difficulty breathing              Chills and/or fever over 101 F   Persistent vomiting and/or vomiting blood   Severe abdominal pain   Severe chest pain   Black, tarry stools   Bleeding- more than one tablespoon   Any other symptom or condition that you feel may need urgent attention  Your doctor recommends these additional instructions:  If any biopsies were taken, your doctors clinic will contact you in 1 to 2   weeks with any results.  - Very difficult/high risk intubation per anesthesia and will require added   procedural time to account for this for all future endoscopies (took 45min   prior to being able to start endoscopy).  - Patient has a contact number available for emergencies.  The signs and   symptoms of potential delayed complications were discussed with the   patient.  Return to normal activities tomorrow.  Written discharge   instructions were provided to the patient.   - Discharge patient to home (ambulatory).   - Resume previous diet.   - Continue present medications.   - Please be sure patient is taking high dose PPI therapy twice daily taken   45min before breakfast and dinner.  - Repeat upper endoscopy in 7-10 days for retreatment at [facility] only   for 75min session due to significant increased time to sedate/intubate with   anesthesia.   - Return to referring physician.  For questions, problems or results please call your physician - Rose Marie Grullon MD at Work:  [phone].  [facility], EMERGENCY ROOM PHONE NUMBER: [phone]  IF A COMPLICATION OR EMERGENCY SITUATION ARISES AND YOU ARE UNABLE TO REACH   YOUR PHYSICIAN - GO DIRECTLY TO THE EMERGENCY ROOM.  Rose Marie Grullon MD  6/28/2024 9:45:54 AM  This report has been verified and signed electronically.  Dear patient,  As a result of recent federal legislation (The Federal Cures  Act), you may   receive lab or pathology results from your procedure in your [facility]   account before your physician is able to contact you. Your physician or   their representative will relay the results to you with their   recommendations at their soonest availability.  Thank you,  PROVATION

## 2024-06-28 NOTE — H&P
Short Stay Endoscopy History and Physical    PCP - Zachariah Reyna MD  Referring Physician - Nas Meyer MD  [address] [place] [address]    Procedure - EGD  ASA - per anesthesia  Mallampati - per anesthesia  History of Anesthesia problems - per anesthesia  Family history Anesthesia problems -  per anesthesia   Plan of anesthesia - per anesthesia    HPI:  This is a 64 y.o. male here for evaluation of: EGD for management of post esophagectomy anastomotic stricture    Reflux - no  Dysphagia - yes  Abdominal pain - no  Diarrhea - no    ROS:  Constitutional: No fevers, chills  CV: No chest pain  Pulm: No cough, No shortness of breath  Ophtho: No vision changes  GI: see HPI  Derm: No rash    Medical History:  has a past medical history of Arthritis, Cancer, and COPD (chronic obstructive pulmonary disease).    Surgical History:  has a past surgical history that includes Cervical fusion; Rotator cuff repair (Right); Inguinal hernia repair (Bilateral); Colonoscopy (N/A, 3/27/2018); Colonoscopy (N/A, 6/13/2023); Esophagogastroduodenoscopy (N/A, 6/13/2023); Endoscopic ultrasound of upper gastrointestinal tract (N/A, 7/3/2023); Esophagogastroduodenoscopy (N/A, 7/3/2023); Robot-assisted surgical removal of esophagus using da Zabrina Xi (N/A, 10/4/2023); Placement of jejunostomy tube (10/4/2023); robotic repair, hernia, paraesophageal (10/4/2023); Pyloromyotomy (10/4/2023); egd, with balloon dilation (N/A, 12/1/2023); egd, with balloon dilation (N/A, 12/11/2023); egd, with balloon dilation (N/A, 12/20/2023); egd, with balloon dilation (N/A, 1/10/2024); egd, with balloon dilation (N/A, 2/12/2024); egd, with balloon dilation (N/A, 2/23/2024); egd, with balloon dilation (N/A, 3/4/2024); egd, with balloon dilation (N/A, 3/19/2024); and egd, with balloon dilation (N/A, 5/7/2024).    Family History: family history includes Diabetes in his mother; Heart disease in his father..    Social History:  reports  that he has quit smoking. His smoking use included cigarettes. He started smoking about 41 years ago. He has a 10.4 pack-year smoking history. He has been exposed to tobacco smoke. He has never used smokeless tobacco. He reports current alcohol use. He reports that he does not use drugs.    Review of patient's allergies indicates:  No Known Allergies    Medications:   Medications Prior to Admission   Medication Sig Dispense Refill Last Dose    acetaminophen (TYLENOL) 500 MG tablet Take 1 tablet (500 mg total) by mouth every 6 (six) hours as needed for Pain. (Patient not taking: Reported on 6/27/2024)  0     cholecalciferol, vitamin D3, (VITAMIN D3) 10 mcg (400 unit) Tab Take by mouth once daily. (Patient not taking: Reported on 4/30/2024)       LIDOcaine-prilocaine (EMLA) cream Apply topically as needed (Port access). 30 g 1     pantoprazole (PROTONIX) 40 MG tablet Take 1 tablet (40 mg total) by mouth once daily. (Patient not taking: Reported on 6/27/2024) 90 tablet 3     sildenafil (VIAGRA) 100 MG tablet Take 1 tablet (100 mg total) by mouth daily as needed for Erectile Dysfunction. (Patient not taking: Reported on 10/11/2023) 10 tablet 6        Physical Exam:    Vital Signs:   Vitals:    06/28/24 0725   BP: 128/71   Pulse: 79   Resp: 16   Temp: 99.1 °F (37.3 °C)       General Appearance: Well appearing in no acute distress    Labs:  Lab Results   Component Value Date    WBC 11.95 06/27/2024    HGB 13.0 (L) 06/27/2024    HCT 37.4 (L) 06/27/2024     06/27/2024    CHOL 170 05/10/2023    TRIG 48 05/10/2023    HDL 71 05/10/2023    ALT 14 06/27/2024    AST 19 06/27/2024     06/27/2024    K 4.5 06/27/2024     06/27/2024    CREATININE 0.8 06/27/2024    BUN 9 06/27/2024    CO2 25 06/27/2024    TSH 0.412 06/27/2024    PSA 0.70 05/10/2023    INR 1.0 10/04/2023    HGBA1C 5.0 05/10/2023       I have explained the risks and benefits of this endoscopic procedure to the patient including but not limited to  bleeding, inflammation, infection, perforation, missing a lesion and death.      Rose Marie Grullon MD

## 2024-06-28 NOTE — ANESTHESIA PROCEDURE NOTES
Intubation    Date/Time: 6/28/2024 8:41 AM    Performed by: Tyler De La Cruz CRNA  Authorized by: Hank Rg MD    Intubation:     Induction:  Intravenous    Intubated:  Postinduction    Mask Ventilation:  Easy with oral airway    Attempts:  More than 3    Attempted By:  CRNA and staff anesthesiologist    Method of Intubation:  Fiberoptic    Blade:  Zapata 2    Laryngeal View Grade: Grade IV - neither epiglottis nor glottis seen      Laryngeal View Grade (2nd Attempt): Grade IV - neither epiglottis nor glottis seen      Difficult Airway Encountered?: Yes      Future Airway Recommendations:  See note    Airway Device:  Oral endotracheal tube    Airway Device Size:  7.0    Style/Cuff Inflation:  Cuffed (inflated to minimal occlusive pressure)    Tube secured:  21    Secured at:  The lips    Placement Verified By:  Capnometry and Fiber optic visualization    Complicating Factors:  Small mouth, anterior larynx and poor neck/head extension    Findings Post-Intubation:  BS equal bilateral and atraumatic/condition of teeth unchanged  Notes:      Failed attempts with fiberoptic (patient did not topicalize at all with nebulized lidocaine), failed attempt with Zapata 3 blade, Successful intubation with Zapata 2 blade and eschmann. Limited neck and jaw mobility. Future attempts should include multiple providers, fiberoptic scope, eschmann, Zapata #2 blade and 6.5 or 7.0 ETT.

## 2024-06-28 NOTE — PROGRESS NOTES
Recovery care complete. Pt tolerated well. Discharge instructions and handouts provided. Pt verbalized understanding. Pt given po fluids and tolerated well. Pt in NAD, VSS. Pt discharge home to self care.

## 2024-06-28 NOTE — ANESTHESIA POSTPROCEDURE EVALUATION
Anesthesia Post Evaluation    Patient: Blayne ECKERT III Steib    Procedure(s) Performed: Procedure(s) (LRB):  EGD (ESOPHAGOGASTRODUODENOSCOPY) (N/A)    Final Anesthesia Type: general      Patient location during evaluation: PACU  Patient participation: Yes- Able to Participate  Level of consciousness: awake and alert  Post-procedure vital signs: reviewed and stable  Pain management: adequate  Airway patency: patent    PONV status at discharge: No PONV  Anesthetic complications: yes  Perioperative Events: difficult intubation      Jossie-operative Events Comments: Discussed with patient and wife consideration for removing dental implants (most recent was lower, added since last intubation) for what may be multiple upcoming procedures. Continuing intubation attempts with current anatomy high risk and patient does not topicalize well.  Cardiovascular status: blood pressure returned to baseline  Respiratory status: unassisted  Hydration status: euvolemic  Follow-up not needed.              Vitals Value Taken Time   /66 06/28/24 1016   Temp 36.7 °C (98.1 °F) 06/28/24 0935   Pulse 79 06/28/24 1018   Resp 14 06/28/24 1018   SpO2 98 % 06/28/24 1018   Vitals shown include unfiled device data.      No case tracking events are documented in the log.      Pain/Yessy Score: Yessy Score: 10 (6/28/2024  9:45 AM)

## 2024-06-28 NOTE — TRANSFER OF CARE
"Anesthesia Transfer of Care Note    Patient: Blayne D III Steib    Procedure(s) Performed: Procedure(s) (LRB):  EGD (ESOPHAGOGASTRODUODENOSCOPY) (N/A)    Patient location: [facility]    Anesthesia Type: general    Transport from OR: Transported from OR on 6-10 L/min O2 by face mask with adequate spontaneous ventilation    Post pain: adequate analgesia    Post assessment: no apparent anesthetic complications and tolerated procedure well    Post vital signs: stable    Level of consciousness: awake    Nausea/Vomiting: no nausea/vomiting    Complications: none    Transfer of care protocol was followed    Last vitals: Visit Vitals  /71 (BP Location: Left arm, Patient Position: Lying)   Pulse 79   Temp 37.3 °C (99.1 °F) (Temporal)   Resp 16   Ht 5' 9" (1.753 m)   Wt 59 kg (130 lb)   SpO2 99%   BMI 19.20 kg/m²     "

## 2024-06-28 NOTE — ANESTHESIA PREPROCEDURE EVALUATION
06/28/2024  Blayne Beebe is a 64 y.o., male.      Pre-op Assessment    I have reviewed the Patient Summary Reports.       I have reviewed the Medications.     Review of Systems  Anesthesia Hx:   History of prior surgery of interest to airway management or planning:           Personal Hx of Anesthesia complications   Difficult Intubation                  Cardiovascular:         Dysrhythmias                                    Pulmonary:   COPD                     Hepatic/GI:        Esophageal CA          Musculoskeletal:  Arthritis          Spine Disorders: cervical                Physical Exam  General: Well nourished    Airway:  Mallampati: IV / III  Mouth Opening: Small, but > 3cm  TM Distance: 4 - 6 cm  Tongue: Normal  Neck ROM: Extension Decreased    Dental:  Caps / Implants    Chest/Lungs:  Normal Respiratory Rate    Heart:  Rate: Normal        Anesthesia Plan  Type of Anesthesia, risks & benefits discussed:    Anesthesia Type: Gen ETT  Intra-op Monitoring Plan: Standard ASA Monitors  Post Op Pain Control Plan: multimodal analgesia  Induction:  IV  Airway Plan: Fiberoptic  Informed Consent: Informed consent signed with the Patient and all parties understand the risks and agree with anesthesia plan.  All questions answered.   ASA Score: 3  Day of Surgery Review of History & Physical: H&P Update referred to the surgeon/provider.  Anesthesia Plan Notes: NPO confirmed.   Was difficult previously and has additional implant since that difficult intubation.  Plan awake fiberoptic    Ready For Surgery From Anesthesia Perspective.     .

## 2024-07-01 ENCOUNTER — PATIENT MESSAGE (OUTPATIENT)
Dept: ENDOSCOPY | Facility: HOSPITAL | Age: 64
End: 2024-07-01
Payer: COMMERCIAL

## 2024-07-01 ENCOUNTER — TELEPHONE (OUTPATIENT)
Dept: ENDOSCOPY | Facility: HOSPITAL | Age: 64
End: 2024-07-01
Payer: COMMERCIAL

## 2024-07-01 DIAGNOSIS — K22.89 DILATION OF ESOPHAGUS: Primary | ICD-10-CM

## 2024-07-01 NOTE — TELEPHONE ENCOUNTER
Spoke to patient spouse Aislinn to schedule procedure(s) Upper Endoscopy (EGD)       Physician to perform procedure(s) Dr. KIRAN Grullon  Date of Procedure (s) 7/11/2024  Arrival Time 6:30 AM  Time of Procedure(s) 7:30 AM   Location of Procedure(s) [address]  Type of Rx Prep sent to patient: Other  Instructions provided to patient via [facility]    Patient was informed on the following information and verbalized understanding. Screening questionnaire reviewed with patient and complete. If procedure requires anesthesia, a responsible adult needs to be present to accompany the patient home, patient cannot drive after receiving anesthesia. Appointment details are tentative, especially check-in time. Patient will receive a prep-op call 7 days prior to confirm check-in time for procedure. If applicable the patient should contact their pharmacy to verify Rx for procedure prep is ready for pick-up. Patient was advised to call the scheduling department at [phone] if pharmacy states no Rx is available. Patient was advised to call the endoscopy scheduling department if any questions or concerns arise.      [facility] Scheduling Department

## 2024-07-03 ENCOUNTER — TELEPHONE (OUTPATIENT)
Dept: ENDOSCOPY | Facility: HOSPITAL | Age: 64
End: 2024-07-03
Payer: COMMERCIAL

## 2024-07-03 NOTE — TELEPHONE ENCOUNTER
Confirmed  egd appt for 7/11/24 with pt.wife Confirmed receipt of prep  instructions. Reviewed instructions pt  wife denies pt is taking blood thinning or glp1 medications.Confirmed ride home after procedure.Pt wifeverbalized understanding

## 2024-07-08 ENCOUNTER — PATIENT MESSAGE (OUTPATIENT)
Dept: SURGERY | Facility: CLINIC | Age: 64
End: 2024-07-08
Payer: COMMERCIAL

## 2024-07-08 ENCOUNTER — PATIENT MESSAGE (OUTPATIENT)
Dept: HEMATOLOGY/ONCOLOGY | Facility: CLINIC | Age: 64
End: 2024-07-08
Payer: COMMERCIAL

## 2024-07-08 ENCOUNTER — PATIENT MESSAGE (OUTPATIENT)
Dept: GASTROENTEROLOGY | Facility: CLINIC | Age: 64
End: 2024-07-08
Payer: COMMERCIAL

## 2024-07-11 ENCOUNTER — ANESTHESIA (OUTPATIENT)
Dept: ENDOSCOPY | Facility: HOSPITAL | Age: 64
End: 2024-07-11
Payer: COMMERCIAL

## 2024-07-11 ENCOUNTER — ANESTHESIA EVENT (OUTPATIENT)
Dept: ENDOSCOPY | Facility: HOSPITAL | Age: 64
End: 2024-07-11
Payer: COMMERCIAL

## 2024-07-11 ENCOUNTER — HOSPITAL ENCOUNTER (OUTPATIENT)
Facility: HOSPITAL | Age: 64
Discharge: HOME | End: 2024-07-11
Attending: INTERNAL MEDICINE | Admitting: INTERNAL MEDICINE
Payer: COMMERCIAL

## 2024-07-11 VITALS
OXYGEN SATURATION: 98 % | HEIGHT: 69 IN | DIASTOLIC BLOOD PRESSURE: 81 MMHG | BODY MASS INDEX: 19.26 KG/M2 | SYSTOLIC BLOOD PRESSURE: 141 MMHG | TEMPERATURE: 97 F | WEIGHT: 130 LBS | HEART RATE: 88 BPM | RESPIRATION RATE: 34 BRPM

## 2024-07-11 DIAGNOSIS — K22.2 ANASTOMOTIC STRICTURE AFTER ESOPHAGECTOMY: ICD-10-CM

## 2024-07-11 DIAGNOSIS — K91.89 ANASTOMOTIC STRICTURE AFTER ESOPHAGECTOMY: ICD-10-CM

## 2024-07-11 PROCEDURE — D9220A PRA ANESTHESIA: Mod: ANES,,, | Performed by: ANESTHESIOLOGY

## 2024-07-11 PROCEDURE — 63600175 PHARM REV CODE 636 W HCPCS: Performed by: NURSE ANESTHETIST, CERTIFIED REGISTERED

## 2024-07-11 PROCEDURE — 43236 UPPR GI SCOPE W/SUBMUC INJ: CPT | Mod: 59,,, | Performed by: INTERNAL MEDICINE

## 2024-07-11 PROCEDURE — C1726 CATH, BAL DIL, NON-VASCULAR: HCPCS | Performed by: INTERNAL MEDICINE

## 2024-07-11 PROCEDURE — 43249 ESOPH EGD DILATION <30 MM: CPT | Performed by: INTERNAL MEDICINE

## 2024-07-11 PROCEDURE — 25000003 PHARM REV CODE 250: Performed by: NURSE ANESTHETIST, CERTIFIED REGISTERED

## 2024-07-11 PROCEDURE — 27201028 HC NEEDLE, SCLERO: Performed by: INTERNAL MEDICINE

## 2024-07-11 PROCEDURE — 43236 UPPR GI SCOPE W/SUBMUC INJ: CPT | Mod: 59 | Performed by: INTERNAL MEDICINE

## 2024-07-11 PROCEDURE — 27202074 HC NEEDLE KNIFE, BILIARY: Performed by: INTERNAL MEDICINE

## 2024-07-11 PROCEDURE — 43249 ESOPH EGD DILATION <30 MM: CPT | Mod: 22,,, | Performed by: INTERNAL MEDICINE

## 2024-07-11 PROCEDURE — 37000008 HC ANESTHESIA 1ST 15 MINUTES: Performed by: INTERNAL MEDICINE

## 2024-07-11 PROCEDURE — 37000009 HC ANESTHESIA EA ADD 15 MINS: Performed by: INTERNAL MEDICINE

## 2024-07-11 PROCEDURE — 63600175 PHARM REV CODE 636 W HCPCS: Performed by: INTERNAL MEDICINE

## 2024-07-11 PROCEDURE — D9220A PRA ANESTHESIA: Mod: CRNA,,, | Performed by: NURSE ANESTHETIST, CERTIFIED REGISTERED

## 2024-07-11 PROCEDURE — 25000003 PHARM REV CODE 250: Performed by: INTERNAL MEDICINE

## 2024-07-11 RX ORDER — ROCURONIUM BROMIDE 10 MG/ML
INJECTION, SOLUTION INTRAVENOUS
Status: DISCONTINUED | OUTPATIENT
Start: 2024-07-11 | End: 2024-07-11

## 2024-07-11 RX ORDER — ONDANSETRON HYDROCHLORIDE 2 MG/ML
INJECTION, SOLUTION INTRAVENOUS
Status: DISCONTINUED | OUTPATIENT
Start: 2024-07-11 | End: 2024-07-11

## 2024-07-11 RX ORDER — ONDANSETRON HYDROCHLORIDE 2 MG/ML
4 INJECTION, SOLUTION INTRAVENOUS ONCE AS NEEDED
Status: DISCONTINUED | OUTPATIENT
Start: 2024-07-11 | End: 2024-07-11 | Stop reason: HOSPADM

## 2024-07-11 RX ORDER — SODIUM CHLORIDE 9 MG/ML
INJECTION, SOLUTION INTRAVENOUS CONTINUOUS
Status: DISCONTINUED | OUTPATIENT
Start: 2024-07-11 | End: 2024-07-11 | Stop reason: HOSPADM

## 2024-07-11 RX ORDER — DEXAMETHASONE SODIUM PHOSPHATE 4 MG/ML
INJECTION, SOLUTION INTRA-ARTICULAR; INTRALESIONAL; INTRAMUSCULAR; INTRAVENOUS; SOFT TISSUE
Status: DISCONTINUED | OUTPATIENT
Start: 2024-07-11 | End: 2024-07-11

## 2024-07-11 RX ORDER — LIDOCAINE HYDROCHLORIDE 20 MG/ML
INJECTION, SOLUTION EPIDURAL; INFILTRATION; INTRACAUDAL; PERINEURAL
Status: DISCONTINUED | OUTPATIENT
Start: 2024-07-11 | End: 2024-07-11

## 2024-07-11 RX ORDER — SODIUM CHLORIDE 0.9 % (FLUSH) 0.9 %
10 SYRINGE (ML) INJECTION
Status: DISCONTINUED | OUTPATIENT
Start: 2024-07-11 | End: 2024-07-11 | Stop reason: HOSPADM

## 2024-07-11 RX ORDER — TRIAMCINOLONE ACETONIDE 40 MG/ML
INJECTION, SUSPENSION INTRA-ARTICULAR; INTRAMUSCULAR
Status: COMPLETED | OUTPATIENT
Start: 2024-07-11 | End: 2024-07-11

## 2024-07-11 RX ORDER — GLUCAGON 1 MG
1 KIT INJECTION
Status: DISCONTINUED | OUTPATIENT
Start: 2024-07-11 | End: 2024-07-11 | Stop reason: HOSPADM

## 2024-07-11 RX ORDER — EPHEDRINE SULFATE 50 MG/ML
INJECTION, SOLUTION INTRAVENOUS
Status: DISCONTINUED | OUTPATIENT
Start: 2024-07-11 | End: 2024-07-11

## 2024-07-11 RX ORDER — MIDAZOLAM HYDROCHLORIDE 1 MG/ML
INJECTION INTRAMUSCULAR; INTRAVENOUS
Status: DISCONTINUED | OUTPATIENT
Start: 2024-07-11 | End: 2024-07-11

## 2024-07-11 RX ORDER — PHENYLEPHRINE HYDROCHLORIDE 10 MG/ML
INJECTION INTRAVENOUS
Status: DISCONTINUED | OUTPATIENT
Start: 2024-07-11 | End: 2024-07-11

## 2024-07-11 RX ORDER — PROPOFOL 10 MG/ML
VIAL (ML) INTRAVENOUS
Status: DISCONTINUED | OUTPATIENT
Start: 2024-07-11 | End: 2024-07-11

## 2024-07-11 RX ADMIN — PHENYLEPHRINE HYDROCHLORIDE 100 MCG: 10 INJECTION INTRAVENOUS at 08:07

## 2024-07-11 RX ADMIN — ONDANSETRON 4 MG: 2 INJECTION INTRAMUSCULAR; INTRAVENOUS at 09:07

## 2024-07-11 RX ADMIN — LIDOCAINE HYDROCHLORIDE 100 MG: 20 INJECTION, SOLUTION EPIDURAL; INFILTRATION; INTRACAUDAL at 07:07

## 2024-07-11 RX ADMIN — DEXAMETHASONE SODIUM PHOSPHATE 4 MG: 4 INJECTION INTRA-ARTICULAR; INTRALESIONAL; INTRAMUSCULAR; INTRAVENOUS; SOFT TISSUE at 09:07

## 2024-07-11 RX ADMIN — PROPOFOL 150 MG: 10 INJECTION, EMULSION INTRAVENOUS at 07:07

## 2024-07-11 RX ADMIN — SODIUM CHLORIDE: 0.9 INJECTION, SOLUTION INTRAVENOUS at 07:07

## 2024-07-11 RX ADMIN — SODIUM CHLORIDE, SODIUM ACETATE ANHYDROUS, SODIUM GLUCONATE, POTASSIUM CHLORIDE, AND MAGNESIUM CHLORIDE: 526; 222; 502; 37; 30 INJECTION, SOLUTION INTRAVENOUS at 09:07

## 2024-07-11 RX ADMIN — PHENYLEPHRINE HYDROCHLORIDE 200 MCG: 10 INJECTION INTRAVENOUS at 08:07

## 2024-07-11 RX ADMIN — EPHEDRINE SULFATE 10 MG: 50 INJECTION INTRAVENOUS at 08:07

## 2024-07-11 RX ADMIN — ROCURONIUM BROMIDE 50 MG: 10 INJECTION INTRAVENOUS at 07:07

## 2024-07-11 RX ADMIN — GLYCOPYRROLATE 0.2 MG: 0.2 INJECTION INTRAMUSCULAR; INTRAVENOUS at 08:07

## 2024-07-11 RX ADMIN — MIDAZOLAM 2 MG: 1 INJECTION INTRAMUSCULAR; INTRAVENOUS at 07:07

## 2024-07-11 NOTE — ANESTHESIA PREPROCEDURE EVALUATION
[facility]  Anesthesia Pre-Operative Evaluation     Patient Name: Blayne Beebe  YOB: 1960  MRN: [id number]  [facility]: [id number]       Admit Date: 7/11/2024   Admit Team: Networked reference to record PCT   Hospital Day: 1  Date of Procedure: 7/11/2024  Anesthesia: Choice Procedure: Procedure(s) (LRB):  EGD (ESOPHAGOGASTRODUODENOSCOPY) (N/A)  Pre-Operative Diagnosis: Dilation of esophagus [K22.89]  Proceduralist:Surgeons and Role:     * Rose Marie Grullon MD - Primary  Code Status: Prior   Advanced Directive: <no information>  Isolation Precautions: No active isolations  Capacity: Full capacity     SUBJECTIVE:   Blayne Beebe is a 64 y.o. male who  has a past medical history of Arthritis, Cancer, and COPD (chronic obstructive pulmonary disease).  No notes on file  PMHx of esphageal adenocarcinoma, chronic tobacco use, COPD presents with persistent cervical esophagogastric anastomotic stricture at 20 cm requiring several prior balloon dilationss via EGD.       Hank Rg MD  Anesthesiologist  Specialty: Anesthesiology     Anesthesia Procedure Notes      Addendum     Creation Time: 6/28/2024  8:56 AM  Procedure Orders   Intubation [[id number]] ordered by Tyler De La Cruz CRNA            Intubation     Date/Time: 6/28/2024 8:41 AM     Performed by: Tyler De La Cruz CRNA  Authorized by: Hank Rg MD    Intubation:     Induction:  Intravenous    Intubated:  Postinduction    Mask Ventilation:  Easy with oral airway    Attempts:  More than 3    Attempted By:  CRNA and staff anesthesiologist    Method of Intubation:  Fiberoptic    Blade:  Zapata 2    Laryngeal View Grade: Grade IV - neither epiglottis nor glottis seen      Laryngeal View Grade (2nd Attempt): Grade IV - neither epiglottis nor glottis seen      Difficult Airway Encountered?: Yes      Future Airway Recommendations:  See note    Airway Device:  Oral endotracheal tube    Airway Device Size:  7.0     Style/Cuff Inflation:  Cuffed (inflated to minimal occlusive pressure)    Tube secured:  21    Secured at:  The lips    Placement Verified By:  Capnometry and Fiber optic visualization    Complicating Factors:  Small mouth, anterior larynx and poor neck/head extension    Findings Post-Intubation:  BS equal bilateral and atraumatic/condition of teeth unchanged  Notes:      Failed attempts with fiberoptic (patient did not topicalize at all with nebulized lidocaine), failed attempt with Zapata 3 blade, Successful intubation with Zapata 2 blade and eschmann. Limited neck and jaw mobility. Future attempts should include multiple providers, fiberoptic scope, eschmann, Zapata #2 blade and 6.5 or 7.0 ETT.           Hospital LOS: 0 days  ICU LOS: Patient does not have an ICU stay during this admission.    he has a current medication list which includes the following long-term medication(s): rabeprazole and sildenafil.     ALLERGIES:   Review of patient's allergies indicates:  No Known Allergies  LDA:   AIRWAY:         * No LDAs found *      Lines/Drains/Airways       Central Venous Catheter Line  Duration                  PowerPort A Cath Single Lumen 07/12/23 1344 Internal Jugular Right 364 days              Drain  Duration                  Gastrostomy/Enterostomy 10/04/23 1730 Jejunostomy tube LUQ feeding 280 days                   Anesthesia Evaluation      Airway   Mallampati: III  TM distance: Normal  Neck ROM: Normal ROM  Dental    (+) Intact    Pulmonary    (+) COPD  Cardiovascular   (+) dysrhythmias    Neuro/Psych      GI/Hepatic/Renal      Endo/Other    (+) arthritis  Abdominal                    MEDICATIONS:     Current Outpatient Medications on File Prior to Encounter   Medication Sig Dispense Refill Last Dose    acetaminophen (TYLENOL) 500 MG tablet Take 1 tablet (500 mg total) by mouth every 6 (six) hours as needed for Pain. (Patient not taking: Reported on 6/27/2024)  0     cholecalciferol, vitamin D3,  (VITAMIN D3) 10 mcg (400 unit) Tab Take by mouth once daily. (Patient not taking: Reported on 4/30/2024)       LIDOcaine-prilocaine (EMLA) cream Apply topically as needed (Port access). 30 g 1     RABEprazole (ACIPHEX) 20 mg tablet Take 1 tablet (20 mg total) by mouth 2 (two) times daily before meals. 60 tablet 11     sildenafil (VIAGRA) 100 MG tablet Take 1 tablet (100 mg total) by mouth daily as needed for Erectile Dysfunction. (Patient not taking: Reported on 10/11/2023) 10 tablet 6       Inpatient Medications:  Antibiotics (From admission, onward)      None          VTE Risk Mitigation (From admission, onward)      None              No current facility-administered medications for this encounter.          History:   There are no hospital problems to display for this patient.    Surgical History:    has a past surgical history that includes Cervical fusion; Rotator cuff repair (Right); Inguinal hernia repair (Bilateral); Colonoscopy (N/A, 3/27/2018); Colonoscopy (N/A, 6/13/2023); Esophagogastroduodenoscopy (N/A, 6/13/2023); Endoscopic ultrasound of upper gastrointestinal tract (N/A, 7/3/2023); Esophagogastroduodenoscopy (N/A, 7/3/2023); Robot-assisted surgical removal of esophagus using da Zabrina Xi (N/A, 10/4/2023); Placement of jejunostomy tube (10/4/2023); robotic repair, hernia, paraesophageal (10/4/2023); Pyloromyotomy (10/4/2023); egd, with balloon dilation (N/A, 12/1/2023); egd, with balloon dilation (N/A, 12/11/2023); egd, with balloon dilation (N/A, 12/20/2023); egd, with balloon dilation (N/A, 1/10/2024); egd, with balloon dilation (N/A, 2/12/2024); egd, with balloon dilation (N/A, 2/23/2024); egd, with balloon dilation (N/A, 3/4/2024); egd, with balloon dilation (N/A, 3/19/2024); egd, with balloon dilation (N/A, 5/7/2024); and Esophagogastroduodenoscopy (N/A, 6/28/2024).   Social History:    reports being sexually active and has had partner(s) who are female.  reports that he has quit smoking. His  "smoking use included cigarettes. He started smoking about 41 years ago. He has a 10.4 pack-year smoking history. He has been exposed to tobacco smoke. He has never used smokeless tobacco. He reports current alcohol use. He reports that he does not use drugs.    There were no vitals filed for this visit.  Vital Signs Range (Last 24H):       There is no height or weight on file to calculate BMI.  Wt Readings from Last 4 Encounters:   06/28/24 59 kg (130 lb)   06/27/24 57.6 kg (126 lb 15.8 oz)   06/06/24 57.5 kg (126 lb 10.5 oz)   05/28/24 58.6 kg (129 lb 1.3 oz)        Intake/Output - Last 3 Shifts       None          Lab Results   Component Value Date    WBC 11.95 06/27/2024    HGB 13.0 (L) 06/27/2024    HCT 37.4 (L) 06/27/2024     06/27/2024     06/27/2024    K 4.5 06/27/2024     06/27/2024    CREATININE 0.8 06/27/2024    BUN 9 06/27/2024    CO2 25 06/27/2024     (H) 06/27/2024    CALCIUM 9.1 06/27/2024    MG 1.9 10/10/2023    PHOS 4.0 10/10/2023    ALKPHOS 104 06/27/2024    ALT 14 06/27/2024    AST 19 06/27/2024    ALBUMIN 3.4 (L) 06/27/2024    INR 1.0 10/04/2023    APTT 27.3 10/04/2023    HGBA1C 5.0 05/10/2023    LACTATE 2.2 10/05/2023     No results found for this or any previous visit (from the past 12 hour(s)).  No results for input(s): "WBC", "HGB", "HCT", "PLT", "NA", "K", "CREATININE", "GLU", "INR", "LACTATE" in the last 168 hours.  No LMP for male patient.    EKG:   Results for orders placed or performed in visit on 08/22/23   EKG 12-lead    Collection Time: 08/22/23 11:09 AM    Narrative    Test Reason : C16.0,J44.9,F17.200,I45.10,Z01.818,D70.9,D64.81,T45.1X5A,D69.6,    Vent. Rate : 083 BPM     Atrial Rate : 083 BPM     P-R Int : 136 ms          QRS Dur : 130 ms      QT Int : 392 ms       P-R-T Axes : 082 089 -13 degrees     QTc Int : 460 ms    Normal sinus rhythm  Possible Left atrial enlargement  Nonspecific intraventricular block  T wave abnormality, consider inferior " ischemia  Abnormal ECG  When compared with ECG of 28-APR-2015 14:52,  Nonspecific intraventricular block has replaced Right bundle branch block  Confirmed by Manohar Yeboah MD ([id number]) on 8/29/2023 1:09:44 PM    Referred By: magui lopes           Confirmed By:Manohar Yeboah MD     TTE:  No results found for this or any previous visit.  No results found for this or any previous visit.  KWAME:  No results found for this or any previous visit.  Stress Test:  Results for orders placed during the hospital encounter of 08/29/23    Nuclear Stress Test    Interpretation Summary    The ECG portion of the study is abnormal but not diagnostic.    The patient reported no chest pain during the stress test.    During stress, rare PVCs are noted.    The nuclear portion of this study will be reported separately.    This is a technically adequate study. The aortic root is normal in size, measuring 2.7 cm at sinotubular junction. The right atrium is normal in size, measuring 3.4 cm in the apical views. The left atrial volume index is normal, measuring 16.26 cc/m2.   The right ventricle is normal in size. The left ventricle is normal in size, with an end-diastolic diameter of 4.1 cm, and an end-systolic diameter of 2.6 cm. Wall thickness is normal, with the septum and the posterior wall each measuring 0.9 cm across.   Relative wall thickness was increased at 0.44, and the LV mass index was 69.4 g/m2 consistent with concentric remodeling.     Global right ventricular systolic function appears normal.   Regional and global left ventricular systolic function appears normal with a visually estimated ejection fraction of 55%.       There is no evidence of pericardial effusion, intracavity mass, thrombi, or vegetation.     Post Exercise Imaging:     Immediate post exercise images demonstrate left ventricular function augmenting. Left ventricular end systolic volume decreases.     No exercise induced wall motion abnormalities were  identified.     Pre-op Assessment          Review of Systems  Cardiovascular:         Dysrhythmias                                Disorder of Cardiac Rhythm     Pulmonary:   COPD         Chronic Obstructive Pulmonary Disease (COPD):                      Musculoskeletal:  Arthritis        Arthritis          Neurological:      Arthritis                               Physical Exam  General: Well nourished    Airway:  Mallampati: III / II  Mouth Opening: Normal  TM Distance: Normal  Tongue: Normal  Neck ROM: Normal ROM    Dental:  Intact        Anesthesia Plan  Type of Anesthesia, risks & benefits discussed:    Anesthesia Type: Gen ETT, Gen Natural Airway, MAC  Intra-op Monitoring Plan: Standard ASA Monitors  Post Op Pain Control Plan: multimodal analgesia and IV/PO Opioids PRN  Induction:  IV  Airway Plan: Direct and Video, Post-Induction  Informed Consent: Informed consent signed with the Patient and all parties understand the risks and agree with anesthesia plan.  All questions answered.   ASA Score: 3  Day of Surgery Review of History & Physical: H&P completed by Anesthesiologist.  Anesthesia Plan Notes: Chart reviewed, patient interviewed and examined.  The anesthetic plan was explained.  Risks, benefits, and alternatives were discussed. Questions were answered and the consent was signed.        ROSE Castaneda M.D.         Ready For Surgery From Anesthesia Perspective.     .

## 2024-07-11 NOTE — PROGRESS NOTES
Pt discharged per orders. AAOx'a 3. VSS. No s/s of acute distress. Resp even and unlabored.No complaints of pain verbalized. IV removed prior to discharge.Reviewed discharge instructions, follow up care/appointments with patient and spouse. Patient and spouse verbalized understanding of discharge and follow up care/appointments. Discharged with all personal belongings.Pt transported home via personal transportation.

## 2024-07-11 NOTE — TRANSFER OF CARE
"Anesthesia Transfer of Care Note    Patient: Blayne D III Steib    Procedure(s) Performed: Procedure(s) (LRB):  EGD (ESOPHAGOGASTRODUODENOSCOPY) (N/A)    [facility]    Anesthesia Type: general    Transport from OR: Transported from OR on 6-10 L/min O2 by face mask with adequate spontaneous ventilation    Post pain: adequate analgesia    Post assessment: no apparent anesthetic complications    Post vital signs: stable    Level of consciousness: awake, alert and oriented    Nausea/Vomiting: no nausea/vomiting    Complications: none    Transfer of care protocol was followed    Last vitals: Visit Vitals  BP (!) 152/84 (BP Location: Left arm, Patient Position: Lying)   Pulse 70   Temp 36.7 °C (98.1 °F) (Temporal)   Resp 16   Ht 5' 9" (1.753 m)   Wt 59 kg (130 lb)   SpO2 96%   BMI 19.20 kg/m²     "

## 2024-07-11 NOTE — [employer]ATION PATIENT INSTRUCTIONS
Discharge Summary/Instructions after an Endoscopic Procedure  Patient Name: Blayne Beebe  Patient MRN: [id number]  Patient YOB: 1960 Thursday, July 11, 2024  Rose Marie Grullon MD  Dear patient,  As a result of recent federal legislation (The Federal Cures Act), you may   receive lab or pathology results from your procedure in your [facility]   account before your physician is able to contact you. Your physician or   their representative will relay the results to you with their   recommendations at their soonest availability.  Thank you,  RESTRICTIONS:  During your procedure today, you received medications for sedation.  These   medications may affect your judgment, balance and coordination.  Therefore,   for 24 hours, you have the following restrictions:   - DO NOT drive a car, operate machinery, make legal/financial decisions,   sign important papers or drink alcohol.    ACTIVITY:  Today: no heavy lifting, straining or running due to procedural   sedation/anesthesia.  The following day: return to full activity including work.  DIET:  Eat and drink normally unless instructed otherwise.     TREATMENT FOR COMMON SIDE EFFECTS:  - Mild abdominal pain, nausea, belching, bloating or excessive gas:  rest,   eat lightly and use a heating pad.  - Sore Throat: treat with throat lozenges and/or gargle with warm salt   water.  - Because air was used during the procedure, expelling large amounts of air   from your rectum or belching is normal.  - If a bowel prep was taken, you may not have a bowel movement for 1-3 days.    This is normal.  SYMPTOMS TO WATCH FOR AND REPORT TO YOUR PHYSICIAN:  1. Abdominal pain or bloating, other than gas cramps.  2. Chest pain.  3. Back pain.  4. Signs of infection such as: chills or fever occurring within 24 hours   after the procedure.  5. Rectal bleeding, which would show as bright red, maroon, or black stools.   (A tablespoon of blood from the rectum is not serious, especially if    hemorrhoids are present.)  6. Vomiting.  7. Weakness or dizziness.  GO DIRECTLY TO THE NEAREST EMERGENCY ROOM IF YOU HAVE ANY OF THE FOLLOWING:      Difficulty breathing              Chills and/or fever over 101 F   Persistent vomiting and/or vomiting blood   Severe abdominal pain   Severe chest pain   Black, tarry stools   Bleeding- more than one tablespoon   Any other symptom or condition that you feel may need urgent attention  Your doctor recommends these additional instructions:  If any biopsies were taken, your doctors clinic will contact you in 1 to 2   weeks with any results.  - Patient has a contact number available for emergencies.  The signs and   symptoms of potential delayed complications were discussed with the   patient.  Return to normal activities tomorrow.  Written discharge   instructions were provided to the patient.   - Discharge patient to home (ambulatory).   - Resume previous diet.   - Continue present medications.   - Continue high dose PPI therapy twice daily taken 45min before mealtimes.  - Repeat upper endoscopy PRN for retreatment pending symptom improvement and   duration of effect post intervention. If requires further EGD with   interventions, may repeat above therapies and consider a future temporary   esophageal stent. If a future EGD is required, recommend 75-90min session   due to prolonged anesthesia requirements to intubate, etc.   - Return to referring physician.  For questions, problems or results please call your physician - Rose Marie Grullon MD at Work:  [phone].  [facility], EMERGENCY ROOM PHONE NUMBER: [phone]  IF A COMPLICATION OR EMERGENCY SITUATION ARISES AND YOU ARE UNABLE TO REACH   YOUR PHYSICIAN - GO DIRECTLY TO THE EMERGENCY ROOM.  Rose Marie Grullon MD  7/11/2024 9:45:12 AM  This report has been verified and signed electronically.  Dear patient,  As a result of recent federal legislation (The Federal Cures Act), you may   receive lab or pathology  results from your procedure in your [facility]   account before your physician is able to contact you. Your physician or   their representative will relay the results to you with their   recommendations at their soonest availability.  Thank you,  [employer]ATION

## 2024-07-11 NOTE — H&P
Short Stay Endoscopy History and Physical    PCP - Zachariah Reyna MD  Referring Physician - Rose Marie Grullon MD  [address] [place] [address]    Procedure - EGD  ASA - per anesthesia  Mallampati - per anesthesia  History of Anesthesia problems - per anesthesia  Family history Anesthesia problems -  per anesthesia   Plan of anesthesia - per anesthesia    HPI:  This is a 64 y.o. male here for evaluation of: EGD for further management of post esophagectomy anastomotic stricture causing recurrent dysphagia and refractory to dilations; today with needle knife stricturoplasty, dilations, possible steroid injection    Reflux - no  Dysphagia - improved, tolerating soft items  Abdominal pain - no  Diarrhea - no    ROS:  Constitutional: No fevers, chills  CV: No chest pain  Pulm: No cough, No shortness of breath  Ophtho: No vision changes  GI: see HPI  Derm: No rash    Medical History:  has a past medical history of Arthritis, Cancer, and COPD (chronic obstructive pulmonary disease).    Surgical History:  has a past surgical history that includes Cervical fusion; Rotator cuff repair (Right); Inguinal hernia repair (Bilateral); Colonoscopy (N/A, 3/27/2018); Colonoscopy (N/A, 6/13/2023); Esophagogastroduodenoscopy (N/A, 6/13/2023); Endoscopic ultrasound of upper gastrointestinal tract (N/A, 7/3/2023); Esophagogastroduodenoscopy (N/A, 7/3/2023); Robot-assisted surgical removal of esophagus using da Zabrina Xi (N/A, 10/4/2023); Placement of jejunostomy tube (10/4/2023); robotic repair, hernia, paraesophageal (10/4/2023); Pyloromyotomy (10/4/2023); egd, with balloon dilation (N/A, 12/1/2023); egd, with balloon dilation (N/A, 12/11/2023); egd, with balloon dilation (N/A, 12/20/2023); egd, with balloon dilation (N/A, 1/10/2024); egd, with balloon dilation (N/A, 2/12/2024); egd, with balloon dilation (N/A, 2/23/2024); egd, with balloon dilation (N/A, 3/4/2024); egd, with balloon dilation (N/A, 3/19/2024); egd,  with balloon dilation (N/A, 5/7/2024); and Esophagogastroduodenoscopy (N/A, 6/28/2024).    Family History: family history includes Diabetes in his mother; Heart disease in his father..    Social History:  reports that he has quit smoking. His smoking use included cigarettes. He started smoking about 41 years ago. He has a 10.4 pack-year smoking history. He has been exposed to tobacco smoke. He has never used smokeless tobacco. He reports current alcohol use. He reports that he does not use drugs.    Review of patient's allergies indicates:  No Known Allergies    Medications:   Medications Prior to Admission   Medication Sig Dispense Refill Last Dose    RABEprazole (ACIPHEX) 20 mg tablet Take 1 tablet (20 mg total) by mouth 2 (two) times daily before meals. 60 tablet 11 7/10/2024    acetaminophen (TYLENOL) 500 MG tablet Take 1 tablet (500 mg total) by mouth every 6 (six) hours as needed for Pain. (Patient not taking: Reported on 6/27/2024)  0     cholecalciferol, vitamin D3, (VITAMIN D3) 10 mcg (400 unit) Tab Take by mouth once daily. (Patient not taking: Reported on 4/30/2024)   More than a month    LIDOcaine-prilocaine (EMLA) cream Apply topically as needed (Port access). 30 g 1     sildenafil (VIAGRA) 100 MG tablet Take 1 tablet (100 mg total) by mouth daily as needed for Erectile Dysfunction. (Patient not taking: Reported on 10/11/2023) 10 tablet 6        Physical Exam:    Vital Signs:   Vitals:    07/11/24 0704   BP: (!) 152/84   Pulse: 70   Resp: 16   Temp: 98.1 °F (36.7 °C)       General Appearance: Well appearing in no acute distress    Labs:  Lab Results   Component Value Date    WBC 11.95 06/27/2024    HGB 13.0 (L) 06/27/2024    HCT 37.4 (L) 06/27/2024     06/27/2024    CHOL 170 05/10/2023    TRIG 48 05/10/2023    HDL 71 05/10/2023    ALT 14 06/27/2024    AST 19 06/27/2024     06/27/2024    K 4.5 06/27/2024     06/27/2024    CREATININE 0.8 06/27/2024    BUN 9 06/27/2024    CO2 25  06/27/2024    TSH 0.412 06/27/2024    PSA 0.70 05/10/2023    INR 1.0 10/04/2023    HGBA1C 5.0 05/10/2023       I have explained the risks and benefits of this endoscopic procedure to the patient including but not limited to bleeding, inflammation, infection, perforation, missing a lesion and death.      Rose Marie Grullon MD      Island Pedicle Flap Text: The defect edges were debeveled with a #15 scalpel blade.  Given the location of the defect, shape of the defect and the proximity to free margins an island pedicle advancement flap was deemed most appropriate.  Using a sterile surgical marker, an appropriate advancement flap was drawn incorporating the defect, outlining the appropriate donor tissue and placing the expected incisions within the relaxed skin tension lines where possible.    The area thus outlined was incised deep to adipose tissue with a #15 scalpel blade.  The skin margins were undermined to an appropriate distance in all directions around the primary defect and laterally outward around the island pedicle utilizing iris scissors.  There was minimal undermining beneath the pedicle flap.

## 2024-07-16 NOTE — ANESTHESIA POSTPROCEDURE EVALUATION
Anesthesia Post Evaluation    Patient: Blayne D III Steib    Procedure(s) Performed: Procedure(s) (LRB):  EGD (ESOPHAGOGASTRODUODENOSCOPY) (N/A)    Final Anesthesia Type: general      Patient location during evaluation: PACU  Patient participation: Yes- Able to Participate  Level of consciousness: awake and alert  Post-procedure vital signs: reviewed and stable  Pain management: adequate  Airway patency: patent    PONV status at discharge: No PONV  Anesthetic complications: no      Cardiovascular status: blood pressure returned to baseline  Respiratory status: unassisted  Hydration status: euvolemic  Follow-up not needed.              Vitals Value Taken Time   /81 07/11/24 1001   Temp 36.2 °C (97.1 °F) 07/11/24 0935   Pulse 80 07/11/24 1014   Resp 17 07/11/24 1014   SpO2 97 % 07/11/24 1014   Vitals shown include unfiled device data.      No case tracking events are documented in the log.      Pain/Yessy Score: No data recorded

## 2024-07-24 ENCOUNTER — INFUSION (OUTPATIENT)
Dept: INFUSION THERAPY | Facility: HOSPITAL | Age: 64
End: 2024-07-24
Payer: COMMERCIAL

## 2024-07-24 ENCOUNTER — OFFICE VISIT (OUTPATIENT)
Dept: HEMATOLOGY/ONCOLOGY | Facility: CLINIC | Age: 64
End: 2024-07-24
Payer: COMMERCIAL

## 2024-07-24 ENCOUNTER — LAB VISIT (OUTPATIENT)
Dept: LAB | Facility: HOSPITAL | Age: 64
End: 2024-07-24
Attending: INTERNAL MEDICINE
Payer: COMMERCIAL

## 2024-07-24 VITALS
TEMPERATURE: 98 F | HEART RATE: 57 BPM | SYSTOLIC BLOOD PRESSURE: 136 MMHG | DIASTOLIC BLOOD PRESSURE: 73 MMHG | RESPIRATION RATE: 18 BRPM | OXYGEN SATURATION: 98 %

## 2024-07-24 VITALS
OXYGEN SATURATION: 98 % | SYSTOLIC BLOOD PRESSURE: 154 MMHG | WEIGHT: 123.81 LBS | HEART RATE: 60 BPM | DIASTOLIC BLOOD PRESSURE: 77 MMHG | HEIGHT: 69 IN | BODY MASS INDEX: 18.34 KG/M2 | TEMPERATURE: 98 F

## 2024-07-24 DIAGNOSIS — Z79.899 IMMUNODEFICIENCY DUE TO CHEMOTHERAPY: ICD-10-CM

## 2024-07-24 DIAGNOSIS — F17.210 NICOTINE DEPENDENCE, CIGARETTES, UNCOMPLICATED: ICD-10-CM

## 2024-07-24 DIAGNOSIS — I70.0 ATHEROSCLEROSIS OF AORTA: ICD-10-CM

## 2024-07-24 DIAGNOSIS — C16.0 MALIGNANT NEOPLASM OF GASTROESOPHAGEAL JUNCTION: ICD-10-CM

## 2024-07-24 DIAGNOSIS — C16.0 MALIGNANT NEOPLASM OF GASTROESOPHAGEAL JUNCTION: Primary | ICD-10-CM

## 2024-07-24 DIAGNOSIS — E44.0 MODERATE MALNUTRITION: ICD-10-CM

## 2024-07-24 DIAGNOSIS — K22.2 ESOPHAGEAL STRICTURE: ICD-10-CM

## 2024-07-24 DIAGNOSIS — T45.1X5A IMMUNODEFICIENCY DUE TO CHEMOTHERAPY: ICD-10-CM

## 2024-07-24 DIAGNOSIS — J44.9 CHRONIC OBSTRUCTIVE PULMONARY DISEASE, UNSPECIFIED COPD TYPE: ICD-10-CM

## 2024-07-24 DIAGNOSIS — K20.90 ESOPHAGITIS: ICD-10-CM

## 2024-07-24 DIAGNOSIS — D84.821 IMMUNODEFICIENCY DUE TO CHEMOTHERAPY: ICD-10-CM

## 2024-07-24 DIAGNOSIS — R53.83 FATIGUE, UNSPECIFIED TYPE: ICD-10-CM

## 2024-07-24 DIAGNOSIS — R53.0 NEOPLASTIC MALIGNANT RELATED FATIGUE: ICD-10-CM

## 2024-07-24 LAB
ALBUMIN SERPL BCP-MCNC: 3.7 G/DL (ref 3.5–5.2)
ALP SERPL-CCNC: 93 U/L (ref 55–135)
ALT SERPL W/O P-5'-P-CCNC: 9 U/L (ref 10–44)
ANION GAP SERPL CALC-SCNC: 7 MMOL/L (ref 8–16)
AST SERPL-CCNC: 14 U/L (ref 10–40)
BILIRUB SERPL-MCNC: 0.7 MG/DL (ref 0.1–1)
BUN SERPL-MCNC: 11 MG/DL (ref 8–23)
CALCIUM SERPL-MCNC: 9.1 MG/DL (ref 8.7–10.5)
CHLORIDE SERPL-SCNC: 102 MMOL/L (ref 95–110)
CO2 SERPL-SCNC: 28 MMOL/L (ref 23–29)
CREAT SERPL-MCNC: 0.8 MG/DL (ref 0.5–1.4)
ERYTHROCYTE [DISTWIDTH] IN BLOOD BY AUTOMATED COUNT: 13 % (ref 11.5–14.5)
EST. GFR  (NO RACE VARIABLE): >60 ML/MIN/1.73 M^2
GLUCOSE SERPL-MCNC: 125 MG/DL (ref 70–110)
HCT VFR BLD AUTO: 41.7 % (ref 40–54)
HGB BLD-MCNC: 14 G/DL (ref 14–18)
IMM GRANULOCYTES # BLD AUTO: 0.06 K/UL (ref 0–0.04)
MCH RBC QN AUTO: 34.1 PG (ref 27–31)
MCHC RBC AUTO-ENTMCNC: 33.6 G/DL (ref 32–36)
MCV RBC AUTO: 102 FL (ref 82–98)
NEUTROPHILS # BLD AUTO: 5 K/UL (ref 1.8–7.7)
PLATELET # BLD AUTO: 253 K/UL (ref 150–450)
PMV BLD AUTO: 8.3 FL (ref 9.2–12.9)
POTASSIUM SERPL-SCNC: 4.8 MMOL/L (ref 3.5–5.1)
PROT SERPL-MCNC: 7 G/DL (ref 6–8.4)
RBC # BLD AUTO: 4.1 M/UL (ref 4.6–6.2)
SODIUM SERPL-SCNC: 137 MMOL/L (ref 136–145)
TSH SERPL DL<=0.005 MIU/L-ACNC: 0.8 UIU/ML (ref 0.4–4)
WBC # BLD AUTO: 7.4 K/UL (ref 3.9–12.7)

## 2024-07-24 PROCEDURE — 1159F MED LIST DOCD IN RCRD: CPT | Mod: CPTII,S$GLB,, | Performed by: PHYSICIAN ASSISTANT

## 2024-07-24 PROCEDURE — 1160F RVW MEDS BY RX/DR IN RCRD: CPT | Mod: CPTII,S$GLB,, | Performed by: PHYSICIAN ASSISTANT

## 2024-07-24 PROCEDURE — 85027 COMPLETE CBC AUTOMATED: CPT | Performed by: INTERNAL MEDICINE

## 2024-07-24 PROCEDURE — 36415 COLL VENOUS BLD VENIPUNCTURE: CPT | Performed by: INTERNAL MEDICINE

## 2024-07-24 PROCEDURE — 84443 ASSAY THYROID STIM HORMONE: CPT | Performed by: INTERNAL MEDICINE

## 2024-07-24 PROCEDURE — 80053 COMPREHEN METABOLIC PANEL: CPT | Performed by: INTERNAL MEDICINE

## 2024-07-24 PROCEDURE — 63600175 PHARM REV CODE 636 W HCPCS: Mod: JZ,JG | Performed by: PHYSICIAN ASSISTANT

## 2024-07-24 PROCEDURE — 3077F SYST BP >= 140 MM HG: CPT | Mod: CPTII,S$GLB,, | Performed by: PHYSICIAN ASSISTANT

## 2024-07-24 PROCEDURE — 3078F DIAST BP <80 MM HG: CPT | Mod: CPTII,S$GLB,, | Performed by: PHYSICIAN ASSISTANT

## 2024-07-24 PROCEDURE — 25000003 PHARM REV CODE 250: Performed by: PHYSICIAN ASSISTANT

## 2024-07-24 PROCEDURE — 96413 CHEMO IV INFUSION 1 HR: CPT

## 2024-07-24 PROCEDURE — G2211 COMPLEX E/M VISIT ADD ON: HCPCS | Mod: S$GLB,,, | Performed by: PHYSICIAN ASSISTANT

## 2024-07-24 PROCEDURE — A4216 STERILE WATER/SALINE, 10 ML: HCPCS | Performed by: PHYSICIAN ASSISTANT

## 2024-07-24 PROCEDURE — 99215 OFFICE O/P EST HI 40 MIN: CPT | Mod: S$GLB,,, | Performed by: PHYSICIAN ASSISTANT

## 2024-07-24 PROCEDURE — 3008F BODY MASS INDEX DOCD: CPT | Mod: CPTII,S$GLB,, | Performed by: PHYSICIAN ASSISTANT

## 2024-07-24 PROCEDURE — 99999 PR PBB SHADOW E&M-EST. PATIENT-LVL III: CPT | Mod: PBBFAC,,, | Performed by: PHYSICIAN ASSISTANT

## 2024-07-24 RX ORDER — SODIUM CHLORIDE 0.9 % (FLUSH) 0.9 %
10 SYRINGE (ML) INJECTION
Status: DISCONTINUED | OUTPATIENT
Start: 2024-07-24 | End: 2024-07-24 | Stop reason: HOSPADM

## 2024-07-24 RX ORDER — EPINEPHRINE 0.3 MG/.3ML
0.3 INJECTION SUBCUTANEOUS ONCE AS NEEDED
Status: CANCELLED | OUTPATIENT
Start: 2024-07-24

## 2024-07-24 RX ORDER — DIPHENHYDRAMINE HYDROCHLORIDE 50 MG/ML
50 INJECTION INTRAMUSCULAR; INTRAVENOUS ONCE AS NEEDED
Status: CANCELLED | OUTPATIENT
Start: 2024-07-24

## 2024-07-24 RX ORDER — HEPARIN 100 UNIT/ML
500 SYRINGE INTRAVENOUS
Status: CANCELLED | OUTPATIENT
Start: 2024-07-24

## 2024-07-24 RX ORDER — PROCHLORPERAZINE EDISYLATE 5 MG/ML
10 INJECTION INTRAMUSCULAR; INTRAVENOUS ONCE AS NEEDED
Status: DISCONTINUED | OUTPATIENT
Start: 2024-07-24 | End: 2024-07-24 | Stop reason: HOSPADM

## 2024-07-24 RX ORDER — DIPHENHYDRAMINE HYDROCHLORIDE 50 MG/ML
50 INJECTION INTRAMUSCULAR; INTRAVENOUS ONCE AS NEEDED
Status: DISCONTINUED | OUTPATIENT
Start: 2024-07-24 | End: 2024-07-24 | Stop reason: HOSPADM

## 2024-07-24 RX ORDER — EPINEPHRINE 0.3 MG/.3ML
0.3 INJECTION SUBCUTANEOUS ONCE AS NEEDED
Status: DISCONTINUED | OUTPATIENT
Start: 2024-07-24 | End: 2024-07-24 | Stop reason: HOSPADM

## 2024-07-24 RX ORDER — SODIUM CHLORIDE 0.9 % (FLUSH) 0.9 %
10 SYRINGE (ML) INJECTION
Status: CANCELLED | OUTPATIENT
Start: 2024-07-24

## 2024-07-24 RX ORDER — HEPARIN 100 UNIT/ML
500 SYRINGE INTRAVENOUS
Status: DISCONTINUED | OUTPATIENT
Start: 2024-07-24 | End: 2024-07-24 | Stop reason: HOSPADM

## 2024-07-24 RX ORDER — PROCHLORPERAZINE EDISYLATE 5 MG/ML
10 INJECTION INTRAMUSCULAR; INTRAVENOUS ONCE AS NEEDED
Status: CANCELLED
Start: 2024-07-24

## 2024-07-24 RX ADMIN — SODIUM CHLORIDE: 9 INJECTION, SOLUTION INTRAVENOUS at 11:07

## 2024-07-24 RX ADMIN — HEPARIN 500 UNITS: 100 SYRINGE at 11:07

## 2024-07-24 RX ADMIN — Medication 10 ML: at 11:07

## 2024-07-24 RX ADMIN — SODIUM CHLORIDE 480 MG: 9 INJECTION, SOLUTION INTRAVENOUS at 11:07

## 2024-07-24 NOTE — PROGRESS NOTES
MEDICAL ONCOLOGY - ESTABLISHED PATIENT VISIT    Reason for visit: Esophageal adenocarcinoma    Best Contact Phone Number(s): [phone] (home)      Cancer/Stage/TNM:    Cancer Staging   Malignant neoplasm of gastroesophageal junction  Staging form: Esophagus - Adenocarcinoma, AJCC 8th Edition  - Clinical stage from 6/13/2023: Stage III (cT3, cN1, cM0, G2) - Signed by Sunday Jasso MD on 7/5/2023       Oncology History   Malignant neoplasm of gastroesophageal junction   6/13/2023 Cancer Staged    Staging form: Esophagus - Adenocarcinoma, AJCC 8th Edition  - Clinical stage from 6/13/2023: Stage III (cT3, cN1, cM0, G2)     6/23/2023 Initial Diagnosis    Malignant neoplasm of gastroesophageal junction     7/3/2023 Tumor Conference     [facility] UGI MULTIDISCIPLINARY TUMOR BOARD  PATIENT REVIEW FORM   ____________________________________________________________    CLINIC #: [id number]   DATE: 7/3/2023    DIAGNOSIS: esophageal CA    PRESENTER: Mitch    PATIENT SUMMARY:   This 62 y/o gentleman is a long time smoker with emphysema who had low dose screening lung CT in 4/2023 per his PCP. Given an abnormality on this scan, he had PET on 6/1/23 which identified large hypermetabolic mid esophageal mass with lymph nodes. He underwent EGD on 6/13/23 that found large fungating ulcerated mass with bleeding in middle third of esophagus to lower esophagus, 34-44cm. Pathology revealed moderately differentiated adenocarcinoma.   Staging PET reviewed - level 2 cervical lymph node with FDG uptake, nothing in lungs concerning  Staging EUS today per Dr. Duffy.   He has been evaluated by Dr. Vance in [place] and Dr. June.   He is scheduled to see rad onc, Dr. Jasso, Wednesday and IR for port placement on 7/12/23.     BOARD RECOMMENDATIONS:   Proceed with neoadj CRT, then restaging to consider surgical resection    CONSULT NEEDED:     [] Surgery    [] Hem/Onc    [] Rad/Onc    [] Dietary                 [] Social  Service    [] Psychology       [] AES  [] Radiology     Clinical Stage: Tumor 3 Node(s) 1 Metastasis 0      GROUP STAGE:  [] O    [] 1   [] II     [x] III   []IV  [] Local recurrence     [] Regional recurrence     [] Distant recurrence   Metastatic site(s): none         [x] [facility] Treatment Guidelines reviewed and care planned is consistent with guidelines.         (i.e., NCCN, NCI, PD, ACO, AUA, etc.)    PRESENTATION AT CANCER CONFERENCE:         [x] Prospective    [] Retrospective     [] Follow-Up         7/16/2023 - 8/8/2023 Chemotherapy    Treatment Summary   Plan Name: OP ESOPHAGEAL PACLITAXEL CARBOPLATIN WEEKLY  Treatment Goal: Control  Status: Inactive  Start Date: 7/16/2023  End Date: 8/8/2023  Provider: Delta June MD  Chemotherapy: CARBOplatin (PARAPLATIN) 195 mg in sodium chloride 0.9% 304.5 mL chemo infusion, 195 mg (100 % of original dose 193 mg), Intravenous, Clinic/HOD 1 time, 1 of 1 cycle  Dose modification:   (original dose 193 mg, Cycle 1)  Administration: 195 mg (7/18/2023), 210 mg (7/24/2023), 230 mg (7/31/2023), 210 mg (8/8/2023)  PACLitaxeL (TAXOL) 50 mg/m2 = 84 mg in sodium chloride 0.9% 250 mL chemo infusion, 50 mg/m2 = 84 mg, Intravenous, Clinic/HOD 1 time, 1 of 1 cycle  Administration: 84 mg (7/18/2023)     7/18/2023 - 8/17/2023 Radiation Therapy    Treating physician: Sunday Jasso    Course: C1 Thorax 2023    Treatment Site Ref. ID Energy Dose/Fx (Gy) #Fx Dose Correction (Gy) Total Dose (Gy) Start Date End Date Elapsed Days   IM Esophagus PTV_High 6X 1.8 23 / 23 0 41.4 7/18/2023 8/17/2023 30          11/15/2023 -  Chemotherapy    Treatment Summary   Plan Name: OP NIVOLUMAB 480 MG Q4W  Treatment Goal: Curative  Status: Active  Start Date: 11/15/2023  End Date: 9/17/2024 (Planned)  Provider: Dleta June MD  Chemotherapy: [No matching medication found in this treatment plan]     12/1/2023 Procedure    Surgeon(s) and Role: Nas Meyer MD - Primary      Pre-Operative Diagnosis:   Esophageal adenocarcinoma  Suspected cervical esophagogastric anastomotic stricture     Post-Operative Diagnosis:   Same  Cervical esophagogastric anastomotic stricture      Operative Findings:    Cervical esophagogastric anastomotic stricture. Unable to pass endoscope through stricture initially, but the scope was able to pass after serial dilations to 18 mm.      Procedures:  Esophagogastroduodenoscopy (EGD), with transendoscopic balloon dilation of esophagus (<30 mm)                Interim History:   64 y.o. male with esophageal adenocarcinoma who presents for cycle 10 of adjuvant nivolumab.  He underwent dilation with Dr. Meyer on 5/7/24.  He had initial improvement in his dysphagia but then after a couple weeks his dysphagia began to worsen. He then underwent a repeat EGD with Dr. Grullon on 7/11/2024, and is doing better today. No pain, nausea, vomiting or abdominal discomfort.     Denies any new toxicities from immunotherapy.  No diarrhea or dyspnea.    ECOG status is 0. Presents with his wife today.     ROS:   Review of Systems   Constitutional:  Negative for chills, fever, malaise/fatigue and weight loss.   HENT:  Negative for sore throat.    Eyes:  Negative for blurred vision and pain.   Respiratory:  Negative for cough and shortness of breath.    Cardiovascular:  Negative for chest pain and leg swelling.   Gastrointestinal:  Negative for abdominal pain, constipation, diarrhea, heartburn, nausea and vomiting.   Genitourinary:  Negative for dysuria and frequency.   Musculoskeletal:  Negative for back pain, falls and myalgias.   Skin:  Negative for rash.   Neurological:  Negative for dizziness, weakness and headaches.   Endo/Heme/Allergies:  Does not bruise/bleed easily.   Psychiatric/Behavioral:  Negative for depression. The patient is not nervous/anxious.    All other systems reviewed and are negative.      Past Medical History:   Past Medical History:   Diagnosis Date     Arthritis     Cancer     COPD (chronic obstructive pulmonary disease)         Past Surgical History:   Past Surgical History:   Procedure Laterality Date    CERVICAL FUSION      COLONOSCOPY N/A 3/27/2018    Procedure: COLONOSCOPY;  Surgeon: Maria Teresa Morocho MD;  Location: [facility] ENDO;  Service: Endoscopy;  Laterality: N/A;    COLONOSCOPY N/A 6/13/2023    Procedure: COLONOSCOPY;  Surgeon: Kelli Snell MD;  Location: [facility] ENDO;  Service: Endoscopy;  Laterality: N/A;    EGD, WITH BALLOON DILATION N/A 12/1/2023    Procedure: EGD, WITH BALLOON DILATION;  Surgeon: Nas Meyer MD;  [facility];  Service: General;  Laterality: N/A;    EGD, WITH BALLOON DILATION N/A 12/11/2023    Procedure: EGD, WITH BALLOON DILATION;  Surgeon: Nas Meyer MD;  [facility];  Service: General;  Laterality: N/A;    EGD, WITH BALLOON DILATION N/A 12/20/2023    Procedure: EGD, WITH BALLOON DILATION;  Surgeon: Nas Meyer MD;  [facility];  Service: General;  Laterality: N/A;    EGD, WITH BALLOON DILATION N/A 1/10/2024    Procedure: EGD, WITH BALLOON DILATION;  Surgeon: Nas Meyer MD;  [facility];  Service: General;  Laterality: N/A;    EGD, WITH BALLOON DILATION N/A 2/12/2024    Procedure: EGD, WITH BALLOON DILATION;  Surgeon: Nas Meyer MD;  [facility];  Service: General;  Laterality: N/A;    EGD, WITH BALLOON DILATION N/A 2/23/2024    Procedure: EGD, WITH BALLOON DILATION;  Surgeon: Nas Meyer MD;  [facility];  Service: General;  Laterality: N/A;    EGD, WITH BALLOON DILATION N/A 3/4/2024    Procedure: EGD, WITH BALLOON DILATION;  Surgeon: Nas Meyer MD;  [facility];  Service: General;  Laterality: N/A;    EGD, WITH BALLOON DILATION N/A 3/19/2024    Procedure: EGD, WITH BALLOON DILATION;  Surgeon: Nas Meyer MD;  Location: [facility] OR [address] [place] [address];  Service: General;   Laterality: N/A;    EGD, WITH BALLOON DILATION N/A 5/7/2024    Procedure: EGD, WITH BALLOON DILATION;  Surgeon: Nas Meyer MD;  [facility];  Service: General;  Laterality: N/A;    ENDOSCOPIC ULTRASOUND OF UPPER GASTROINTESTINAL TRACT N/A 7/3/2023    Procedure: ULTRASOUND, UPPER GI TRACT, ENDOSCOPIC;  Surgeon: Ray Duffy MD;  Location: [facility];  Service: Endoscopy;  Laterality: N/A;    ESOPHAGOGASTRODUODENOSCOPY N/A 6/13/2023    Procedure: EGD (ESOPHAGOGASTRODUODENOSCOPY);  Surgeon: Kelli Snell MD;  Location: [facility];  Service: Endoscopy;  Laterality: N/A;    ESOPHAGOGASTRODUODENOSCOPY N/A 7/3/2023    Procedure: EGD (ESOPHAGOGASTRODUODENOSCOPY);  Surgeon: Ray Duffy MD;  Location: [facility];  Service: Endoscopy;  Laterality: N/A;    ESOPHAGOGASTRODUODENOSCOPY N/A 6/28/2024    Procedure: EGD (ESOPHAGOGASTRODUODENOSCOPY);  Surgeon: Rose Marie Grullon MD;  Location: [facility] (2ND FLR);  Service: Endoscopy;  Laterality: N/A;  5/15 portal-EGD with dilation, possible steroid injection, possible needle-knife incision in about 4-6 weeks. [facility] only. 60min case. Jose  6/21/24: precall complete-GD    ESOPHAGOGASTRODUODENOSCOPY N/A 7/11/2024    Procedure: EGD (ESOPHAGOGASTRODUODENOSCOPY);  Surgeon: Rose Marie Grullon MD;  Location: [facility] (2ND FLR);  Service: Endoscopy;  Laterality: N/A;  7/1 portal-Repeat upper endoscopy in 7-10 days for retreatment at [facility] only for 75min session. ryan  7/3-pre call complete-tb    INGUINAL HERNIA REPAIR Bilateral     Right x2, x1 left    PLACEMENT OF JEJUNOSTOMY TUBE  10/4/2023    Procedure: INSERTION, JEJUNOSTOMY TUBE;  Surgeon: Nas Meyer MD;  [facility];  Service: General;;    PYLOROMYOTOMY  10/4/2023    Procedure: PYLOROMYOTOMY;  Surgeon: Nas Meyer MD;  Location: [facility] OR [address] [place] [address];  Service: General;;    ROBOT-ASSISTED SURGICAL REMOVAL OF ESOPHAGUS USING DA BALWINDER XI N/A 10/4/2023    Procedure: XI  ROBOTIC ESOPHAGECTOMY;  Surgeon: Nas Meyer MD;  Location: [facility] OR [address] [place] [address];  Service: General;  Laterality: N/A;    ROBOTIC REPAIR, HERNIA, PARAESOPHAGEAL  10/4/2023    Procedure: ROBOTIC REPAIR, HERNIA, PARAESOPHAGEAL;  Surgeon: Nas Meyer MD;  [facility];  Service: General;;    ROTATOR CUFF REPAIR Right     x2        Family History:   Family History   Problem Relation Name Age of Onset    Diabetes Mother      Heart disease Father          CHF    Glaucoma Neg Hx      Colon cancer Neg Hx      Prostate cancer Neg Hx          Social History:   Social History     Tobacco Use    Smoking status: Former     Current packs/day: 0.25     Average packs/day: 0.3 packs/day for 41.6 years (10.4 ttl pk-yrs)     Types: Cigarettes     Start date: 1983     Passive exposure: Current    Smokeless tobacco: Never    Tobacco comments:     Done smoking since september   Substance Use Topics    Alcohol use: Yes     Comment: a couple of beers a day        I have reviewed and updated the patient's past medical, surgical, family and social histories.    Allergies:   Review of patient's allergies indicates:  No Known Allergies     Medications:   Current Outpatient Medications   Medication Sig Dispense Refill    LIDOcaine-prilocaine (EMLA) cream Apply topically as needed (Port access). 30 g 1    RABEprazole (ACIPHEX) 20 mg tablet Take 1 tablet (20 mg total) by mouth 2 (two) times daily before meals. 60 tablet 11    acetaminophen (TYLENOL) 500 MG tablet Take 1 tablet (500 mg total) by mouth every 6 (six) hours as needed for Pain. (Patient not taking: Reported on 6/27/2024)  0    cholecalciferol, vitamin D3, (VITAMIN D3) 10 mcg (400 unit) Tab Take by mouth once daily. (Patient not taking: Reported on 4/30/2024)      sildenafil (VIAGRA) 100 MG tablet Take 1 tablet (100 mg total) by mouth daily as needed for Erectile Dysfunction. (Patient not taking: Reported on 10/11/2023) 10 tablet 6     No current  "facility-administered medications for this visit.        Physical Exam:   BP (!) 154/77 (BP Location: Left arm, Patient Position: Sitting, BP Method: Medium (Automatic))   Pulse 60   Temp 98 °F (36.7 °C) (Oral)   Ht 5' 9" (1.753 m)   Wt 56.1 kg (123 lb 12.6 oz)   SpO2 98%   BMI 18.28 kg/m²      ECOG Performance status: 0          Physical Exam  Vitals reviewed.   Constitutional:       General: He is not in acute distress.     Appearance: Normal appearance. He is normal weight.   HENT:      Head: Normocephalic and atraumatic.      Right Ear: External ear normal.      Left Ear: External ear normal.      Nose: Nose normal.      Mouth/Throat:      Mouth: Mucous membranes are moist.      Pharynx: Oropharynx is clear. No posterior oropharyngeal erythema.   Eyes:      General: No scleral icterus.     Extraocular Movements: Extraocular movements intact.      Conjunctiva/sclera: Conjunctivae normal.      Pupils: Pupils are equal, round, and reactive to light.   Cardiovascular:      Rate and Rhythm: Normal rate and regular rhythm.      Pulses: Normal pulses.      Heart sounds: Normal heart sounds.   Pulmonary:      Effort: Pulmonary effort is normal.      Breath sounds: Normal breath sounds. No wheezing or rales.   Abdominal:      General: Bowel sounds are normal. There is no distension.      Palpations: Abdomen is soft.      Tenderness: There is no abdominal tenderness.      Comments: Surgical wounds well healed   Musculoskeletal:         General: No swelling. Normal range of motion.      Cervical back: Normal range of motion and neck supple.   Skin:     General: Skin is warm.      Coloration: Skin is not jaundiced.      Findings: No erythema or rash.   Neurological:      General: No focal deficit present.      Mental Status: He is alert and oriented to person, place, and time. Mental status is at baseline.      Gait: Gait normal.   Psychiatric:         Mood and Affect: Mood normal.         Behavior: Behavior normal.   "       Thought Content: Thought content normal.         Judgment: Judgment normal.         Labs:   Recent Results (from the past 48 hour(s))   CBC Oncology    Collection Time: 07/24/24  9:21 AM   Result Value Ref Range    WBC 7.40 3.90 - 12.70 K/uL    RBC 4.10 (L) 4.60 - 6.20 M/uL    Hemoglobin 14.0 14.0 - 18.0 g/dL    Hematocrit 41.7 40.0 - 54.0 %     (H) 82 - 98 fL    MCH 34.1 (H) 27.0 - 31.0 pg    MCHC 33.6 32.0 - 36.0 g/dL    RDW 13.0 11.5 - 14.5 %    Platelets 253 150 - 450 K/uL    MPV 8.3 (L) 9.2 - 12.9 fL    Gran # (ANC) 5.0 1.8 - 7.7 K/uL    Immature Grans (Abs) 0.06 (H) 0.00 - 0.04 K/uL   Comprehensive Metabolic Panel    Collection Time: 07/24/24  9:21 AM   Result Value Ref Range    Sodium 137 136 - 145 mmol/L    Potassium 4.8 3.5 - 5.1 mmol/L    Chloride 102 95 - 110 mmol/L    CO2 28 23 - 29 mmol/L    Glucose 125 (H) 70 - 110 mg/dL    BUN 11 8 - 23 mg/dL    Creatinine 0.8 0.5 - 1.4 mg/dL    Calcium 9.1 8.7 - 10.5 mg/dL    Total Protein 7.0 6.0 - 8.4 g/dL    Albumin 3.7 3.5 - 5.2 g/dL    Total Bilirubin 0.7 0.1 - 1.0 mg/dL    Alkaline Phosphatase 93 55 - 135 U/L    AST 14 10 - 40 U/L    ALT 9 (L) 10 - 44 U/L    eGFR >60.0 >60 mL/min/1.73 m^2    Anion Gap 7 (L) 8 - 16 mmol/L   TSH    Collection Time: 07/24/24  9:21 AM   Result Value Ref Range    TSH 0.804 0.400 - 4.000 uIU/mL     Mild bilirubin elevation.    Imaging:       CT CAP 6/26/24:    Impression:     1. Acute or subacute T8 compression deformity.  Clinical correlation is recommended.  MRI thoracic spine with and without contrast recommended in this patient with known malignancy.  2. No significant change in the right apical opacity.  Continued attention on follow-up recommended.  3. Other findings as above.    Path:   2. Gastroesophageal junction mass (biopsy):   Invasive adenocarcinoma, moderately differentiated   Background low and high-grade glandular dysplasia   HER2 immunohistochemistry:  Equivocal.     Immunohistochemistry (IHC) Testing  for Mismatch Repair (MMR) Proteins:   MLH1, MSH2, MSH6, PMS2 - Intact nuclear expression   Background nonneoplastic tissue/internal control with intact nuclear expression     There are exceptions to the above IHC interpretations. These results should not be considered in isolation, and clinical correlation with genetic counseling is recommended to assess the need for germline testing.     Interpretation: No loss of nuclear expression of MMR proteins: low probability of microsatellite instability       Assessment:       1. Malignant neoplasm of gastroesophageal junction    2. Immunodeficiency due to chemotherapy    3. Neoplastic malignant related fatigue    4. Esophagitis    5. Chronic obstructive pulmonary disease, unspecified COPD type    6. Atherosclerosis of aorta    7. Nicotine dependence, cigarettes, uncomplicated    8. Moderate malnutrition    9. Esophageal stricture    10. Fatigue, unspecified type               Plan:        # Esophageal adenocarcinoma, chemotherapy induced neutropenia/thrombocytopenia  He initially presented with a locally advanced adenocarcinoma of the middle and lower third of the esophagus, pMMR. PET/CT on 6/1/23 did not show clear distant metastatic disease.  We discussed the case and plan with Dr. Meyer and he feels the patient is surgically resectable as well.    Began cycle 1 on 7/18/23. He unfortunately had a reaction to the Taxol. During the Taxol infusion, he developed coughing, flushing, chest tightness and nausea. O2 sat was 92%, 2L O2 applied NC. We were notified and stopped the Taxol. He was able to proceed with the Carboplatin without complication.   We switched him to Abraxane 40 mg/m2 with week 2.  This was tolerated very well without issue. Has tolerated RT well and has completed 4 cycles of chemoRT. He completed radiation 8/18/23.  We did have to forego his last dose of chemotherapy due to cytopenias.    PET/CT on 9/22/23 showed very nice response to treatment with  reduction in SUV avidity of primary tumor.  Discussed with Dr. Meyer.      He underwent esophagogastrectomy with Dr. Meyer on 10/4/23.  Pathology showed [id number] with negative margins and 18 negative lymph nodes.    Because of his residual disease, we recommended adjuvant nivolumab per [id number]. He was in agreement.    Began cycle 1 on 11/15/23.  Tolerating very well.   Lab work adequate for treatment today  Presents today for cycle 10.  Will proceed.    Surveillance CT CAP 3/27/24 showed no clear evidence of disease.  Continued R apical scarring a bit more nodular.    CT CAP 6/26/24 showed continued R apical nodular scar/lesion.  Will obtain PET/CT to evaluate whether this may be neoplastic.    Incidental T8 compression fracture was seen on CT.  He does not have clear localizing symptoms.  He wishes to defer MRI for now.    RTC in 4 weeks for cycle 11, PET/CT and treatment discussion.     # Esophagitis, stricture  S/p multiple dilations. Most recent 5/7/24.   Had EGD with Dr. Grullon 7/11/24.    # COPD, tobacco abuse, aortic atherosclerosis  Counseled on tobacco cessation previously.  He has stopped smoking.  Normal SpO2.  No dyspnea.  Atherosclerosis noted on imaging.    # Malnutrition, fatigue  Following with nutrition & surgery team.  Weight stable.    Trying to eat better. Feeling better   Monitor TSH    Patient is in agreement with the proposed treatment plan. All questions were answered to the patient's satisfaction. Pt knows to call clinic if anything is needed before the next clinic visit.        DIANA Toledo, PA-C [facility]  Dept of Hematology/Oncology  PALUNA to GI Oncology team         Route Chart for Scheduling    Med Onc Chart Routing      Follow up with physician 1 month and 2 months. Nivolumab and PET/CT. 2 mos Nivolumab   Follow up with DEMARCO    Infusion scheduling note    Injection scheduling note    Labs CBC, CMP and TSH   Scheduling:  Preferred lab:  Lab interval: every 4  weeks     Imaging PET scan   Scheduled.   Pharmacy appointment    Other referrals          Treatment Plan Information   OP NIVOLUMAB 480 MG Q4W   Delta June MD   Upcoming Treatment Dates - OP NIVOLUMAB 480 MG Q4W    7/23/2024       Chemotherapy       nivolumab 480 mg in 0.9% NaCl 98 mL infusion  8/20/2024       Chemotherapy       nivolumab 480 mg in 0.9% NaCl 98 mL infusion  9/17/2024       Chemotherapy       nivolumab 480 mg in 0.9% NaCl 98 mL infusion

## 2024-07-24 NOTE — PLAN OF CARE
1145-Pt tolerated Opdivo infusion well, no complications or side effects, POC and meds discussed with pt, pt aware of upcoming appts, pt knows to call MD with any questions or concerns. Pt ambulated off unit, no distress noted.

## 2024-07-31 NOTE — PROGRESS NOTES
Oncology Nutrition Assessment for Medical Nutrition Therapy  Follow-up Visit    Blayne Beebe   1960    Referring Provider: No ref. provider found      Reason for Visit: nutrition counseling and education    The patient location is: [place]  The chief complaint leading to consultation is: nutrition follow-up    Visit type: audiovisual    Face to Face time with patient: 10 minutes  15 minutes of total time spent on the encounter, which includes face to face time and non-face to face time preparing to see the patient (eg, review of tests), Obtaining and/or reviewing separately obtained history, Documenting clinical information in the electronic or other health record, Independently interpreting results (not separately reported) and communicating results to the patient/family/caregiver, or Care coordination (not separately reported).     Each patient to whom he or she provides medical services by telemedicine is:  (1) informed of the relationship between the physician and patient and the respective role of any other health care provider with respect to management of the patient; and (2) notified that he or she may decline to receive medical services by telemedicine and may withdraw from such care at any time.    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 Currently on adjuvant Opdivo. He is known to me from previous appointments. He has continued with dysphagia, most recently s/p EGD w/stricturotomy 7/11. He reports since then he has been eating better, more solid food. He has been able to tolerate eggs, toast, green, crawfish etouffee, salad, sweet potato, fried fish, waffle, ham & cheese sandwich, chips. He had lost some weight prior to EGD but is starting to regain on home scale (up  "to 124.5lb). He reports new J-tube working well, flushing extra. He continues on TF overnight, 2 cartons/day. He is also supplementing with protein malts at home. Has noticed some issues tolerating dairy products lately (diarrhea).    Tube Feeding Formula: [employer] Standard 1.4  Rate: 2 cartons/day (overnight)  Provides: 910kcal/day and 40g protein/day  Water Flushes: 55mL/hr for every hour on feeds    Weight: 56.1 kg (123 lb 12.6 oz) - 7/24 clinic weight  Height: 5' 9" (1.753 m)  BMI: 18.3    Usual BW: 140-145lb  Weight Change: 10lb loss from COVID 2/2023 then another 5-10lb loss - since maintaining    Allergies: Patient has no known allergies.    Current Medications:  Current Outpatient Medications:     acetaminophen (TYLENOL) 500 MG tablet, Take 1 tablet (500 mg total) by mouth every 6 (six) hours as needed for Pain. (Patient not taking: Reported on 6/27/2024), Disp: , Rfl: 0    LIDOcaine-prilocaine (EMLA) cream, Apply topically as needed (Port access)., Disp: 30 g, Rfl: 1    RABEprazole (ACIPHEX) 20 mg tablet, Take 1 tablet (20 mg total) by mouth 2 (two) times daily before meals., Disp: 60 tablet, Rfl: 11    Food/medication interactions noted: none    Vitamins/Supplements: none reported    Labs: Reviewed    Nutrition Diagnosis    Problem: moderate malnutrition  Etiology (related to): inadequate energy and protein intake  Signs/Symptoms (as evidenced by): reports of inadequate PO intake, weight loss, and both fat & muscle wasting present  Status: Improving    Nutrition Intervention    Nutrition Prescription   1964 kcals (35kcal/kg)  79g protein (1.4g/kg)   1964mL fluid (35mL/kg)    Recommendations:  Continue to increase PO intake as tolerated  Make meals/snacks high in calories and protein  Continue 2 cartons/day TF for weight gain  Continue water flushes at 55mL/hr for every hour on feeds  Flush J-tube with at least 60mL water before and after feeds    Materials Provided/Reviewed: none    Nutrition Monitoring " and Evaluation    Monitor: diet education needs, energy intake, rate/formulation of tube feeding, and weight status    Goals: prevent further weight loss and encourage weight gain    Follow up: in 1-2 months    Communication to referring provider/care team: note available in chart    Counseling time: 10 minutes      Noelle Morelos MS, RD, LDN  Senior Clinical Dietitian  [facility]

## 2024-08-01 ENCOUNTER — CLINICAL SUPPORT (OUTPATIENT)
Dept: HEMATOLOGY/ONCOLOGY | Facility: CLINIC | Age: 64
End: 2024-08-01
Payer: COMMERCIAL

## 2024-08-01 DIAGNOSIS — C16.0 MALIGNANT NEOPLASM OF GASTROESOPHAGEAL JUNCTION: ICD-10-CM

## 2024-08-01 DIAGNOSIS — Z71.3 NUTRITIONAL COUNSELING: Primary | ICD-10-CM

## 2024-08-13 ENCOUNTER — OFFICE VISIT (OUTPATIENT)
Dept: SURGERY | Facility: CLINIC | Age: 64
End: 2024-08-13
Payer: COMMERCIAL

## 2024-08-13 ENCOUNTER — PATIENT MESSAGE (OUTPATIENT)
Dept: SURGERY | Facility: CLINIC | Age: 64
End: 2024-08-13

## 2024-08-13 VITALS
OXYGEN SATURATION: 98 % | WEIGHT: 119.94 LBS | BODY MASS INDEX: 17.71 KG/M2 | HEART RATE: 85 BPM | SYSTOLIC BLOOD PRESSURE: 138 MMHG | DIASTOLIC BLOOD PRESSURE: 80 MMHG

## 2024-08-13 DIAGNOSIS — K22.2 ANASTOMOTIC STRICTURE AFTER ESOPHAGECTOMY: Primary | ICD-10-CM

## 2024-08-13 DIAGNOSIS — Z93.4 JEJUNOSTOMY PRESENT: ICD-10-CM

## 2024-08-13 DIAGNOSIS — C16.0 MALIGNANT NEOPLASM OF GASTROESOPHAGEAL JUNCTION: ICD-10-CM

## 2024-08-13 DIAGNOSIS — Z98.890 H/O ESOPHAGECTOMY: ICD-10-CM

## 2024-08-13 DIAGNOSIS — K91.89 ANASTOMOTIC STRICTURE AFTER ESOPHAGECTOMY: Primary | ICD-10-CM

## 2024-08-13 DIAGNOSIS — R63.8 ALTERATION IN NUTRITION ASSOCIATED WITH TUBE FEEDING: ICD-10-CM

## 2024-08-13 DIAGNOSIS — Z78.9 ON ENTERAL NUTRITION AT HOME: ICD-10-CM

## 2024-08-13 DIAGNOSIS — Z90.49 H/O ESOPHAGECTOMY: ICD-10-CM

## 2024-08-13 PROCEDURE — 3008F BODY MASS INDEX DOCD: CPT | Mod: CPTII,S$GLB,, | Performed by: NURSE PRACTITIONER

## 2024-08-13 PROCEDURE — 99213 OFFICE O/P EST LOW 20 MIN: CPT | Mod: S$GLB,,, | Performed by: NURSE PRACTITIONER

## 2024-08-13 PROCEDURE — 99999 PR PBB SHADOW E&M-EST. PATIENT-LVL III: CPT | Mod: PBBFAC,,, | Performed by: NURSE PRACTITIONER

## 2024-08-13 PROCEDURE — 3079F DIAST BP 80-89 MM HG: CPT | Mod: CPTII,S$GLB,, | Performed by: NURSE PRACTITIONER

## 2024-08-13 PROCEDURE — 3075F SYST BP GE 130 - 139MM HG: CPT | Mod: CPTII,S$GLB,, | Performed by: NURSE PRACTITIONER

## 2024-08-13 NOTE — PROGRESS NOTES
Encounter Date:  2024    Patient ID: Blayne Beebe  Age:  64 y.o. :  1960    Chief Complaint:   J tube malfunction     2023: abnormal lung CT screening identified mid esophageal mass with lymph nodes, stage III, [id number] at mid to lower esophagus.   2023 - 8/15/2023: neoadj CRT per Dr. June  23: restaging PET, 23: restaging appt with Dr. Meyer  10/4/23: s/p esophagectomy per Dr. Meyer  11/15/23: start adj IO per Dr. June  2023, 2023, 2023: EGD with dilation per Dr. Meyer  1/10/2024, 2024, 2024:  EGD with dilation per Dr. Meyer  3/4/2024, 3/19/2024, 2024: EGD with dilation per Dr. Meyer  2024: EGD with dilation per Dr. Grullon  2024: EGD with dilation per Dr. Grullon  2024: cycle 10 of adj IO    Interval History:  Mr. Beebe returns to clinic from [facility]. He continues to use J tube for supplemental feedings as he has required serial EGDs with dilation. Last EGD procedure was 24 per Dr. Grullon. Currently he denies dysphagia or odynophagia, but admits he has not tried too many new foods although he did eat pizza. He continues to use J tube instilling [employer] 1.4 at 87cc/hr, 2 cartons/ night. Recently seen by RD on 24. Weight continues to trend down, today he weighs 119#. He does NOT drink protein supplement. J tube clogged 24 and he was unable to unclog to home so he presents today for J tube exchange.     New Data:  Imagin2024: CT C/A/P:  Impression:   1. Acute or subacute T8 compression deformity.  Clinical correlation is recommended.  MRI thoracic spine with and without contrast recommended in this patient with known malignancy.  2. No significant change in the right apical opacity.  Continued attention on follow-up recommended.    Labs:      Chemistry        Component Value Date/Time     2024 0921    K 4.8 2024 0921     2024 0921    CO2 28 2024 0921    BUN  11 07/24/2024 0921    CREATININE 0.8 07/24/2024 0921     (H) 07/24/2024 0921        Component Value Date/Time    CALCIUM 9.1 07/24/2024 0921    ALKPHOS 93 07/24/2024 0921    AST 14 07/24/2024 0921    ALT 9 (L) 07/24/2024 0921    BILITOT 0.7 07/24/2024 0921    ESTGFRAFRICA >60.0 01/28/2020 0803    EGFRNONAA >60.0 01/28/2020 0803        Lab Results   Component Value Date    ALBUMIN 3.7 07/24/2024     Lab Results   Component Value Date    WBC 7.40 07/24/2024    HGB 14.0 07/24/2024    HCT 41.7 07/24/2024     (H) 07/24/2024     07/24/2024 7/11/2024: Endoscopy:  EGD per Dr. Grullon   - One benign-appearing, intrinsic severe stenosis                          was found 23 to 24 cm from the incisors at GE                          anastomosis from prior esophagectomy; needle knife                          stricturotomies performed, steroids injected and                          dilation performed. This stenosis measured about 8                          mm (inner diameter) prior to intervention - unable                          to traverse with adult upper endoscope until after                          intervention. Needle knife Endocut blended current                          for stricturotomy was successful - stricturotomies                          in 3 locations were performed. Estimated blood                          loss was minimal. Area was then successfully                          injected with total of 8 mL of a steroid solution                          for drug delivery. A TTS dilator was passed                          through the scope. Dilation with a 15-16.5-18 mm                          balloon and an 18-19-20 mm balloon dilator was                          performed to 19 mm. The dilation site was examined                          and showed moderate mucosal disruption, moderate                          (near complete) improvement in luminal narrowing                          and no  overt perforation noted thereafter. Minimal                          self limited bleeding, which did not require                          intervention.   - Significant tortuosity/twisting of the proximal esophageal lumen proximal to level of anastomosis seen at about 22 cm from incisors.   - UES seen at about 17cm from incisors.   - Normal stomach.   - Normal duodenal bulb and second portion of the duodenum.   - No specimens collected.     10/2023: Pathology:    Tumor Site  Distal esophagus (low thoracic esophagus)    Relationship of Tumor to Esophagogastric Junction  Tumor midpoint lies in the distal esophagus AND tumor involves the esophagogastric junction    Histologic Type  Adenocarcinoma    Histologic Grade  G2, moderately differentiated    Tumor Size  Greatest dimension in Centimeters (cm): 6.5 cm    Tumor Extent  Invades muscularis propria    Treatment Effect  Present, with residual cancer showing evident tumor regression, but more than single cells or rare small groups of cancer cells (partial response, score 2)    Lymphovascular Invasion  Not identified    Perineural Invasion  Not identified    MARGINS  All margins negative for invasive carcinoma    REGIONAL LYMPH NODES  All regional lymph nodes negative for tumor  Number of Lymph Nodes Examined: 18    DISTANT METASTASIS  Not applicable    PATHOLOGIC STAGE CLASSIFICATION (pTNM, AJCC 8th Edition)  Reporting of pT, pN, and (when applicable) pM categories is based on information available to the pathologist at the time the report is issued. As per the AJCC (Chapter 1, 8th Ed.) it is the managing physician's responsibility to establish the final  pathologic stage based upon all pertinent information, including but potentially not limited to this pathology report.    pT Category  pT2: Tumor invades the muscularis propria    pN Category  pN0: No regional lymph node metastasis    pM Category  Not applicable - pM cannot be determined from the submitted  specimen(s)  Past Medical History:   Diagnosis Date    Arthritis     Cancer     COPD (chronic obstructive pulmonary disease)      Past Surgical History:   Procedure Laterality Date    CERVICAL FUSION      COLONOSCOPY N/A 3/27/2018    Procedure: COLONOSCOPY;  Surgeon: Maria Teresa Morocho MD;  Location: [facility] ENDO;  Service: Endoscopy;  Laterality: N/A;    COLONOSCOPY N/A 6/13/2023    Procedure: COLONOSCOPY;  Surgeon: Kelli Snell MD;  Location: [facility] ENDO;  Service: Endoscopy;  Laterality: N/A;    EGD, WITH BALLOON DILATION N/A 12/1/2023    Procedure: EGD, WITH BALLOON DILATION;  Surgeon: Nas Meyer MD;  [facility];  Service: General;  Laterality: N/A;    EGD, WITH BALLOON DILATION N/A 12/11/2023    Procedure: EGD, WITH BALLOON DILATION;  Surgeon: Nas Meyer MD;  [facility];  Service: General;  Laterality: N/A;    EGD, WITH BALLOON DILATION N/A 12/20/2023    Procedure: EGD, WITH BALLOON DILATION;  Surgeon: Nas Meyer MD;  [facility];  Service: General;  Laterality: N/A;    EGD, WITH BALLOON DILATION N/A 1/10/2024    Procedure: EGD, WITH BALLOON DILATION;  Surgeon: Nas Meyer MD;  [facility];  Service: General;  Laterality: N/A;    EGD, WITH BALLOON DILATION N/A 2/12/2024    Procedure: EGD, WITH BALLOON DILATION;  Surgeon: Nas Meyer MD;  [facility];  Service: General;  Laterality: N/A;    EGD, WITH BALLOON DILATION N/A 2/23/2024    Procedure: EGD, WITH BALLOON DILATION;  Surgeon: Nas Meyer MD;  [facility];  Service: General;  Laterality: N/A;    EGD, WITH BALLOON DILATION N/A 3/4/2024    Procedure: EGD, WITH BALLOON DILATION;  Surgeon: Nas Meyer MD;  [facility];  Service: General;  Laterality: N/A;    EGD, WITH BALLOON DILATION N/A 3/19/2024    Procedure: EGD, WITH BALLOON DILATION;  Surgeon: Nas Meyer MD;  Location: [facility] OR [address] [place] [address];  Service:  General;  Laterality: N/A;    EGD, WITH BALLOON DILATION N/A 5/7/2024    Procedure: EGD, WITH BALLOON DILATION;  Surgeon: Nas Meyer MD;  Location: [facility] OR 2ND FLR;  Service: General;  Laterality: N/A;    ENDOSCOPIC ULTRASOUND OF UPPER GASTROINTESTINAL TRACT N/A 7/3/2023    Procedure: ULTRASOUND, UPPER GI TRACT, ENDOSCOPIC;  Surgeon: Ray Duffy MD;  Location[facility];  Service: Endoscopy;  Laterality: N/A;    ESOPHAGOGASTRODUODENOSCOPY N/A 6/13/2023    Procedure: EGD (ESOPHAGOGASTRODUODENOSCOPY);  Surgeon: Kelli Snell MD;  Location: [facility];  Service: Endoscopy;  Laterality: N/A;    ESOPHAGOGASTRODUODENOSCOPY N/A 7/3/2023    Procedure: EGD (ESOPHAGOGASTRODUODENOSCOPY);  Surgeon: Ray Duffy MD;  Location: [facility];  Service: Endoscopy;  Laterality: N/A;    ESOPHAGOGASTRODUODENOSCOPY N/A 6/28/2024    Procedure: EGD (ESOPHAGOGASTRODUODENOSCOPY);  Surgeon: Rose Marie Grullon MD;  Location: [facility] (2ND FLR);  Service: Endoscopy;  Laterality: N/A;  5/15 portal-EGD with dilation, possible steroid injection, possible needle-knife incision in about 4-6 weeks. [facility] only. 60min case. Jose  6/21/24: precall complete-GD    ESOPHAGOGASTRODUODENOSCOPY N/A 7/11/2024    Procedure: EGD (ESOPHAGOGASTRODUODENOSCOPY);  Surgeon: Rose Marie Grullon MD;  Location: [facility] (2ND FLR);  Service: Endoscopy;  Laterality: N/A;  7/1 portal-Repeat upper endoscopy in 7-10 days for retreatment at [facility] only for 75min session. ryan  7/3-pre call complete-tb    INGUINAL HERNIA REPAIR Bilateral     Right x2, x1 left    PLACEMENT OF JEJUNOSTOMY TUBE  10/4/2023    Procedure: INSERTION, JEJUNOSTOMY TUBE;  Surgeon: Nas Meyer MD;  [facility];  Service: General;;    PYLOROMYOTOMY  10/4/2023    Procedure: PYLOROMYOTOMY;  Surgeon: Nas Meyer MD;  Location: [facility] OR [address] [place] [address];  Service: General;;    ROBOT-ASSISTED SURGICAL REMOVAL OF ESOPHAGUS USING DA BALWINDER XI N/A 10/4/2023    Procedure: XI  ROBOTIC ESOPHAGECTOMY;  Surgeon: Nas Meyer MD;  Location: [facility] OR [address] [place] [address];  Service: General;  Laterality: N/A;    ROBOTIC REPAIR, HERNIA, PARAESOPHAGEAL  10/4/2023    Procedure: ROBOTIC REPAIR, HERNIA, PARAESOPHAGEAL;  Surgeon: Nas Meyer MD;  Location: [facility] OR [address] [place] [address];  Service: General;;    ROTATOR CUFF REPAIR Right     x2     Current Outpatient Medications on File Prior to Visit   Medication Sig Dispense Refill    acetaminophen (TYLENOL) 500 MG tablet Take 1 tablet (500 mg total) by mouth every 6 (six) hours as needed for Pain. (Patient not taking: Reported on 6/27/2024)  0    LIDOcaine-prilocaine (EMLA) cream Apply topically as needed (Port access). 30 g 1    RABEprazole (ACIPHEX) 20 mg tablet Take 1 tablet (20 mg total) by mouth 2 (two) times daily before meals. 60 tablet 11     No current facility-administered medications on file prior to visit.     Review of patient's allergies indicates:  No Known Allergies    Family History:  His family history includes Diabetes in his mother; Heart disease in his father.     Social History:   reports that he has quit smoking. His smoking use included cigarettes. He started smoking about 41 years ago. He has a 10.4 pack-year smoking history. He has been exposed to tobacco smoke. He has never used smokeless tobacco. He reports current alcohol use. He reports that he does not use drugs.     ROS:    Pertinent positive/negatives detailed in HPI, all other systems negative.     Physical Exam:  /80 (BP Location: Left arm, Patient Position: Sitting, BP Method: Small (Automatic))   Pulse 85   Wt 54.4 kg (119 lb 14.9 oz)   SpO2 98%   BMI 17.71 kg/m²     Constitutional:  Non-toxic, no acute distress.    Eyes:  Sclerae anicteric, gaze symmetrical  Neck:  Trachea midline,  FROM  Resp:  Easy work of breathing  Abd:  Soft, non-tender, no masses, no ascites  Musculoskeletal:  Ambulatory, normal gait, + muscle wasting.  Extremities are symmetrical without  lymphedema.  Neuro:  No gross deficits  Psych:  Awake, alert, oriented.  Answers and asks questions appropriately      ICD-10-CM ICD-9-CM    1. Anastomotic stricture after esophagectomy  K91.89 997.49     K22.2 530.3       2. Malignant neoplasm of gastroesophageal junction  C16.0 151.0       3. H/O esophagectomy  Z98.890 V15.29     Z90.49        4. Alteration in nutrition associated with tube feeding  R63.8 783.9       5. Jejunostomy present  Z93.4 V44.4       6. On enteral nutrition at home  Z78.9 V49.89       Plan   This 63 y/o gentleman dx with distal esophageal CA in 6/2023. He completed neoadj CRT in 8/2024, then underwent esophagectomy on 10/4/23 per Dr. Meyer. Path revealed 6.5 cm moderately differentiated AP, margins and lymph nodes neg, no LVI, no PNI. He started adj immunotherapy in 11/2023. He suffered with cervical esophagogastric anastomotic stricture with persistent dysphagia despite multiple EGDs with dilation, latest done 7/11/24. He remains J tube dependent. Exchanged J tube with 12 Fr in clinic today, sutured in place with 3 stitches. J tube flushed easily with 100cc water. Encouraged to continue soft food diet with supplemental nutrition. Monitor home scale weight.  He will f/u with RD as recommended.     RTC prn    Questions were asked and answered to patient's satisfaction.          Belem Chanel NP  Upper GI / Hepatobiliary Surgical Oncology  [facility] [place], [place]  Office: [phone]  Fax: [phone]

## 2024-08-15 ENCOUNTER — HOSPITAL ENCOUNTER (OUTPATIENT)
Dept: RADIOLOGY | Facility: HOSPITAL | Age: 64
Discharge: HOME | End: 2024-08-15
Attending: INTERNAL MEDICINE
Payer: COMMERCIAL

## 2024-08-15 DIAGNOSIS — R91.1 APICAL LUNG NODULE: ICD-10-CM

## 2024-08-15 DIAGNOSIS — C16.0 MALIGNANT NEOPLASM OF GASTROESOPHAGEAL JUNCTION: ICD-10-CM

## 2024-08-15 LAB — POCT GLUCOSE: 112 MG/DL (ref 70–110)

## 2024-08-15 PROCEDURE — A9552 F18 FDG: HCPCS | Performed by: INTERNAL MEDICINE

## 2024-08-15 PROCEDURE — 78815 PET IMAGE W/CT SKULL-THIGH: CPT | Mod: TC

## 2024-08-15 RX ORDER — FLUDEOXYGLUCOSE F18 500 MCI/ML
12 INJECTION INTRAVENOUS ONCE
Status: COMPLETED | OUTPATIENT
Start: 2024-08-15 | End: 2024-08-15

## 2024-08-15 RX ADMIN — FLUDEOXYGLUCOSE F-18 13.61 MILLICURIE: 500 INJECTION INTRAVENOUS at 10:08

## 2024-08-20 ENCOUNTER — INFUSION (OUTPATIENT)
Dept: INFUSION THERAPY | Facility: HOSPITAL | Age: 64
End: 2024-08-20
Payer: COMMERCIAL

## 2024-08-20 ENCOUNTER — LAB VISIT (OUTPATIENT)
Dept: LAB | Facility: HOSPITAL | Age: 64
End: 2024-08-20
Attending: INTERNAL MEDICINE
Payer: COMMERCIAL

## 2024-08-20 ENCOUNTER — OFFICE VISIT (OUTPATIENT)
Dept: HEMATOLOGY/ONCOLOGY | Facility: CLINIC | Age: 64
End: 2024-08-20
Payer: COMMERCIAL

## 2024-08-20 VITALS
DIASTOLIC BLOOD PRESSURE: 70 MMHG | BODY MASS INDEX: 18.4 KG/M2 | TEMPERATURE: 97 F | HEART RATE: 62 BPM | SYSTOLIC BLOOD PRESSURE: 134 MMHG | HEIGHT: 69 IN | WEIGHT: 124.25 LBS

## 2024-08-20 VITALS
WEIGHT: 124.25 LBS | OXYGEN SATURATION: 99 % | DIASTOLIC BLOOD PRESSURE: 64 MMHG | BODY MASS INDEX: 18.35 KG/M2 | HEART RATE: 66 BPM | SYSTOLIC BLOOD PRESSURE: 125 MMHG

## 2024-08-20 DIAGNOSIS — C16.0 MALIGNANT NEOPLASM OF GASTROESOPHAGEAL JUNCTION: Primary | ICD-10-CM

## 2024-08-20 DIAGNOSIS — T45.1X5A IMMUNODEFICIENCY DUE TO CHEMOTHERAPY: ICD-10-CM

## 2024-08-20 DIAGNOSIS — E44.0 MODERATE MALNUTRITION: ICD-10-CM

## 2024-08-20 DIAGNOSIS — Z79.899 IMMUNODEFICIENCY DUE TO CHEMOTHERAPY: ICD-10-CM

## 2024-08-20 DIAGNOSIS — F17.210 NICOTINE DEPENDENCE, CIGARETTES, UNCOMPLICATED: ICD-10-CM

## 2024-08-20 DIAGNOSIS — R53.0 NEOPLASTIC MALIGNANT RELATED FATIGUE: ICD-10-CM

## 2024-08-20 DIAGNOSIS — I70.0 ATHEROSCLEROSIS OF AORTA: ICD-10-CM

## 2024-08-20 DIAGNOSIS — D84.821 IMMUNODEFICIENCY DUE TO CHEMOTHERAPY: ICD-10-CM

## 2024-08-20 DIAGNOSIS — J44.9 CHRONIC OBSTRUCTIVE PULMONARY DISEASE, UNSPECIFIED COPD TYPE: ICD-10-CM

## 2024-08-20 DIAGNOSIS — K20.90 ESOPHAGITIS: ICD-10-CM

## 2024-08-20 DIAGNOSIS — C16.0 MALIGNANT NEOPLASM OF GASTROESOPHAGEAL JUNCTION: ICD-10-CM

## 2024-08-20 LAB
ALBUMIN SERPL BCP-MCNC: 3.7 G/DL (ref 3.5–5.2)
ALP SERPL-CCNC: 90 U/L (ref 55–135)
ALT SERPL W/O P-5'-P-CCNC: 16 U/L (ref 10–44)
ANION GAP SERPL CALC-SCNC: 5 MMOL/L (ref 8–16)
AST SERPL-CCNC: 20 U/L (ref 10–40)
BILIRUB SERPL-MCNC: 0.5 MG/DL (ref 0.1–1)
BUN SERPL-MCNC: 11 MG/DL (ref 8–23)
CALCIUM SERPL-MCNC: 9.3 MG/DL (ref 8.7–10.5)
CHLORIDE SERPL-SCNC: 102 MMOL/L (ref 95–110)
CO2 SERPL-SCNC: 31 MMOL/L (ref 23–29)
CREAT SERPL-MCNC: 0.7 MG/DL (ref 0.5–1.4)
ERYTHROCYTE [DISTWIDTH] IN BLOOD BY AUTOMATED COUNT: 12.9 % (ref 11.5–14.5)
EST. GFR  (NO RACE VARIABLE): >60 ML/MIN/1.73 M^2
GLUCOSE SERPL-MCNC: 78 MG/DL (ref 70–110)
HCT VFR BLD AUTO: 38.2 % (ref 40–54)
HGB BLD-MCNC: 13.2 G/DL (ref 14–18)
IMM GRANULOCYTES # BLD AUTO: 0.03 K/UL (ref 0–0.04)
MCH RBC QN AUTO: 34.6 PG (ref 27–31)
MCHC RBC AUTO-ENTMCNC: 34.6 G/DL (ref 32–36)
MCV RBC AUTO: 100 FL (ref 82–98)
NEUTROPHILS # BLD AUTO: 3.2 K/UL (ref 1.8–7.7)
PLATELET # BLD AUTO: 221 K/UL (ref 150–450)
PMV BLD AUTO: 8.6 FL (ref 9.2–12.9)
POTASSIUM SERPL-SCNC: 4.3 MMOL/L (ref 3.5–5.1)
PROT SERPL-MCNC: 6.9 G/DL (ref 6–8.4)
RBC # BLD AUTO: 3.81 M/UL (ref 4.6–6.2)
SODIUM SERPL-SCNC: 138 MMOL/L (ref 136–145)
TSH SERPL DL<=0.005 MIU/L-ACNC: 0.97 UIU/ML (ref 0.4–4)
WBC # BLD AUTO: 5.15 K/UL (ref 3.9–12.7)

## 2024-08-20 PROCEDURE — 99999 PR PBB SHADOW E&M-EST. PATIENT-LVL III: CPT | Mod: PBBFAC,,, | Performed by: INTERNAL MEDICINE

## 2024-08-20 PROCEDURE — A4216 STERILE WATER/SALINE, 10 ML: HCPCS | Performed by: INTERNAL MEDICINE

## 2024-08-20 PROCEDURE — 96413 CHEMO IV INFUSION 1 HR: CPT

## 2024-08-20 PROCEDURE — 84443 ASSAY THYROID STIM HORMONE: CPT | Performed by: INTERNAL MEDICINE

## 2024-08-20 PROCEDURE — 1159F MED LIST DOCD IN RCRD: CPT | Mod: CPTII,S$GLB,, | Performed by: INTERNAL MEDICINE

## 2024-08-20 PROCEDURE — 85027 COMPLETE CBC AUTOMATED: CPT | Performed by: INTERNAL MEDICINE

## 2024-08-20 PROCEDURE — 80053 COMPREHEN METABOLIC PANEL: CPT | Performed by: INTERNAL MEDICINE

## 2024-08-20 PROCEDURE — 3074F SYST BP LT 130 MM HG: CPT | Mod: CPTII,S$GLB,, | Performed by: INTERNAL MEDICINE

## 2024-08-20 PROCEDURE — 99215 OFFICE O/P EST HI 40 MIN: CPT | Mod: S$GLB,,, | Performed by: INTERNAL MEDICINE

## 2024-08-20 PROCEDURE — 3008F BODY MASS INDEX DOCD: CPT | Mod: CPTII,S$GLB,, | Performed by: INTERNAL MEDICINE

## 2024-08-20 PROCEDURE — 25000003 PHARM REV CODE 250: Performed by: INTERNAL MEDICINE

## 2024-08-20 PROCEDURE — 3078F DIAST BP <80 MM HG: CPT | Mod: CPTII,S$GLB,, | Performed by: INTERNAL MEDICINE

## 2024-08-20 PROCEDURE — 63600175 PHARM REV CODE 636 W HCPCS: Mod: JZ,JG | Performed by: INTERNAL MEDICINE

## 2024-08-20 PROCEDURE — 36415 COLL VENOUS BLD VENIPUNCTURE: CPT | Performed by: INTERNAL MEDICINE

## 2024-08-20 PROCEDURE — G2211 COMPLEX E/M VISIT ADD ON: HCPCS | Mod: S$GLB,,, | Performed by: INTERNAL MEDICINE

## 2024-08-20 RX ORDER — SODIUM CHLORIDE 0.9 % (FLUSH) 0.9 %
10 SYRINGE (ML) INJECTION
Status: CANCELLED | OUTPATIENT
Start: 2024-08-20

## 2024-08-20 RX ORDER — PROCHLORPERAZINE EDISYLATE 5 MG/ML
10 INJECTION INTRAMUSCULAR; INTRAVENOUS ONCE AS NEEDED
Status: CANCELLED
Start: 2024-08-20

## 2024-08-20 RX ORDER — HEPARIN 100 UNIT/ML
500 SYRINGE INTRAVENOUS
Status: CANCELLED | OUTPATIENT
Start: 2024-08-20

## 2024-08-20 RX ORDER — DIPHENHYDRAMINE HYDROCHLORIDE 50 MG/ML
50 INJECTION INTRAMUSCULAR; INTRAVENOUS ONCE AS NEEDED
Status: DISCONTINUED | OUTPATIENT
Start: 2024-08-20 | End: 2024-08-20 | Stop reason: HOSPADM

## 2024-08-20 RX ORDER — EPINEPHRINE 0.3 MG/.3ML
0.3 INJECTION SUBCUTANEOUS ONCE AS NEEDED
Status: CANCELLED | OUTPATIENT
Start: 2024-08-20

## 2024-08-20 RX ORDER — SODIUM CHLORIDE 0.9 % (FLUSH) 0.9 %
10 SYRINGE (ML) INJECTION
Status: DISCONTINUED | OUTPATIENT
Start: 2024-08-20 | End: 2024-08-20 | Stop reason: HOSPADM

## 2024-08-20 RX ORDER — PROCHLORPERAZINE EDISYLATE 5 MG/ML
10 INJECTION INTRAMUSCULAR; INTRAVENOUS ONCE AS NEEDED
Status: DISCONTINUED | OUTPATIENT
Start: 2024-08-20 | End: 2024-08-20 | Stop reason: HOSPADM

## 2024-08-20 RX ORDER — HEPARIN 100 UNIT/ML
500 SYRINGE INTRAVENOUS
Status: DISCONTINUED | OUTPATIENT
Start: 2024-08-20 | End: 2024-08-20 | Stop reason: HOSPADM

## 2024-08-20 RX ORDER — EPINEPHRINE 0.3 MG/.3ML
0.3 INJECTION SUBCUTANEOUS ONCE AS NEEDED
Status: DISCONTINUED | OUTPATIENT
Start: 2024-08-20 | End: 2024-08-20 | Stop reason: HOSPADM

## 2024-08-20 RX ORDER — DIPHENHYDRAMINE HYDROCHLORIDE 50 MG/ML
50 INJECTION INTRAMUSCULAR; INTRAVENOUS ONCE AS NEEDED
Status: CANCELLED | OUTPATIENT
Start: 2024-08-20

## 2024-08-20 RX ADMIN — SODIUM CHLORIDE: 9 INJECTION, SOLUTION INTRAVENOUS at 09:08

## 2024-08-20 RX ADMIN — HEPARIN 500 UNITS: 100 SYRINGE at 10:08

## 2024-08-20 RX ADMIN — Medication 10 ML: at 10:08

## 2024-08-20 RX ADMIN — SODIUM CHLORIDE 480 MG: 9 INJECTION, SOLUTION INTRAVENOUS at 09:08

## 2024-08-20 NOTE — PROGRESS NOTES
MEDICAL ONCOLOGY - ESTABLISHED PATIENT VISIT    Reason for visit: Esophageal adenocarcinoma    Best Contact Phone Number(s): [phone] (home)      Cancer/Stage/TNM:    Cancer Staging   Malignant neoplasm of gastroesophageal junction  Staging form: Esophagus - Adenocarcinoma, AJCC 8th Edition  - Clinical stage from 6/13/2023: Stage III (cT3, cN1, cM0, G2) - Signed by Sunday Jasso MD on 7/5/2023       Oncology History   Malignant neoplasm of gastroesophageal junction   6/13/2023 Cancer Staged    Staging form: Esophagus - Adenocarcinoma, AJCC 8th Edition  - Clinical stage from 6/13/2023: Stage III (cT3, cN1, cM0, G2)     6/23/2023 Initial Diagnosis    Malignant neoplasm of gastroesophageal junction     7/3/2023 Tumor Conference     [facility] UGI MULTIDISCIPLINARY TUMOR BOARD  PATIENT REVIEW FORM   ____________________________________________________________    CLINIC #: [id number]   DATE: 7/3/2023    DIAGNOSIS: esophageal CA    PRESENTER: Mitch    PATIENT SUMMARY:   This 64 y/o gentleman is a long time smoker with emphysema who had low dose screening lung CT in 4/2023 per his PCP. Given an abnormality on this scan, he had PET on 6/1/23 which identified large hypermetabolic mid esophageal mass with lymph nodes. He underwent EGD on 6/13/23 that found large fungating ulcerated mass with bleeding in middle third of esophagus to lower esophagus, 34-44cm. Pathology revealed moderately differentiated adenocarcinoma.   Staging PET reviewed - level 2 cervical lymph node with FDG uptake, nothing in lungs concerning  Staging EUS today per Dr. Duffy.   He has been evaluated by Dr. Vance in [place] and Dr. June.   He is scheduled to see rad onc, Dr. Jasso, Wednesday and IR for port placement on 7/12/23.     BOARD RECOMMENDATIONS:   Proceed with neoadj CRT, then restaging to consider surgical resection    CONSULT NEEDED:     [] Surgery    [] Hem/Onc    [] Rad/Onc    [] Dietary                 [] Social  Service    [] Psychology       [] AES  [] Radiology     Clinical Stage: Tumor 3 Node(s) 1 Metastasis 0      GROUP STAGE:  [] O    [] 1   [] II     [x] III   []IV  [] Local recurrence     [] Regional recurrence     [] Distant recurrence   Metastatic site(s): none         [x] [facility] Treatment Guidelines reviewed and care planned is consistent with guidelines.         (i.e., NCCN, NCI, PD, ACO, AUA, etc.)    PRESENTATION AT CANCER CONFERENCE:         [x] Prospective    [] Retrospective     [] Follow-Up         7/16/2023 - 8/8/2023 Chemotherapy    Treatment Summary   Plan Name: OP ESOPHAGEAL PACLITAXEL CARBOPLATIN WEEKLY  Treatment Goal: Control  Status: Inactive  Start Date: 7/16/2023  End Date: 8/8/2023  Provider: Delta June MD  Chemotherapy: CARBOplatin (PARAPLATIN) 195 mg in sodium chloride 0.9% 304.5 mL chemo infusion, 195 mg (100 % of original dose 193 mg), Intravenous, Clinic/HOD 1 time, 1 of 1 cycle  Dose modification:   (original dose 193 mg, Cycle 1)  Administration: 195 mg (7/18/2023), 210 mg (7/24/2023), 230 mg (7/31/2023), 210 mg (8/8/2023)  PACLitaxeL (TAXOL) 50 mg/m2 = 84 mg in sodium chloride 0.9% 250 mL chemo infusion, 50 mg/m2 = 84 mg, Intravenous, Clinic/HOD 1 time, 1 of 1 cycle  Administration: 84 mg (7/18/2023)     7/18/2023 - 8/17/2023 Radiation Therapy    Treating physician: Sunday Jasso    Course: C1 Thorax 2023    Treatment Site Ref. ID Energy Dose/Fx (Gy) #Fx Dose Correction (Gy) Total Dose (Gy) Start Date End Date Elapsed Days   IM Esophagus PTV_High 6X 1.8 23 / 23 0 41.4 7/18/2023 8/17/2023 30          11/15/2023 -  Chemotherapy    Treatment Summary   Plan Name: OP NIVOLUMAB 480 MG Q4W  Treatment Goal: Curative  Status: Active  Start Date: 11/15/2023  End Date: 9/17/2024 (Planned)  Provider: Delta June MD  Chemotherapy: [No matching medication found in this treatment plan]     12/1/2023 Procedure    Surgeon(s) and Role: Nas Meyer MD - Primary      Pre-Operative Diagnosis:   Esophageal adenocarcinoma  Suspected cervical esophagogastric anastomotic stricture     Post-Operative Diagnosis:   Same  Cervical esophagogastric anastomotic stricture      Operative Findings:    Cervical esophagogastric anastomotic stricture. Unable to pass endoscope through stricture initially, but the scope was able to pass after serial dilations to 18 mm.      Procedures:  Esophagogastroduodenoscopy (EGD), with transendoscopic balloon dilation of esophagus (<30 mm)                Interim History:   64 y.o. male with esophageal adenocarcinoma who presents for cycle 11 of adjuvant nivolumab.  He underwent EGD with stricturotomies, steroid injection and dilation of stricture.  He is swallowing much better since then, eating more substantial foods.    Denies any new toxicities from immunotherapy.  No diarrhea or dyspnea.    ECOG status is 0. Presents with his wife today.     ROS:   Review of Systems   Constitutional:  Negative for chills, fever, malaise/fatigue and weight loss.   HENT:  Negative for sore throat.    Eyes:  Negative for blurred vision and pain.   Respiratory:  Negative for cough and shortness of breath.    Cardiovascular:  Negative for chest pain and leg swelling.   Gastrointestinal:  Negative for abdominal pain, constipation, diarrhea, heartburn, nausea and vomiting.   Genitourinary:  Negative for dysuria and frequency.   Musculoskeletal:  Negative for back pain, falls and myalgias.   Skin:  Negative for rash.   Neurological:  Negative for dizziness, weakness and headaches.   Endo/Heme/Allergies:  Does not bruise/bleed easily.   Psychiatric/Behavioral:  Negative for depression. The patient is not nervous/anxious.    All other systems reviewed and are negative.      Past Medical History:   Past Medical History:   Diagnosis Date    Arthritis     Cancer     COPD (chronic obstructive pulmonary disease)         Past Surgical History:   Past Surgical History:   Procedure  Laterality Date    CERVICAL FUSION      COLONOSCOPY N/A 3/27/2018    Procedure: COLONOSCOPY;  Surgeon: Maria Teresa Morocho MD;  Location: [facility] ENDO;  Service: Endoscopy;  Laterality: N/A;    COLONOSCOPY N/A 6/13/2023    Procedure: COLONOSCOPY;  Surgeon: Kelli Snell MD;  Location: [facility] ENDO;  Service: Endoscopy;  Laterality: N/A;    EGD, WITH BALLOON DILATION N/A 12/1/2023    Procedure: EGD, WITH BALLOON DILATION;  Surgeon: Nas Meyer MD;  Location: [facility] OR 2ND FLR;  Service: General;  Laterality: N/A;    EGD, WITH BALLOON DILATION N/A 12/11/2023    Procedure: EGD, WITH BALLOON DILATION;  Surgeon: Nas Meyer MD;  Location: [facility] OR 2ND FLR;  Service: General;  Laterality: N/A;    EGD, WITH BALLOON DILATION N/A 12/20/2023    Procedure: EGD, WITH BALLOON DILATION;  Surgeon: Nas Meyer MD;  [facility];  Service: General;  Laterality: N/A;    EGD, WITH BALLOON DILATION N/A 1/10/2024    Procedure: EGD, WITH BALLOON DILATION;  Surgeon: Nas Meyer MD;  [facility];  Service: General;  Laterality: N/A;    EGD, WITH BALLOON DILATION N/A 2/12/2024    Procedure: EGD, WITH BALLOON DILATION;  Surgeon: Nas Meyer MD;  Location: [facility] OR [facility] FLR;  Service: General;  Laterality: N/A;    EGD, WITH BALLOON DILATION N/A 2/23/2024    Procedure: EGD, WITH BALLOON DILATION;  Surgeon: Nas Meyer MD;  Location: [facility] OR 2ND FLR;  Service: General;  Laterality: N/A;    EGD, WITH BALLOON DILATION N/A 3/4/2024    Procedure: EGD, WITH BALLOON DILATION;  Surgeon: Nas Meyer MD;  Location: [facility] OR 2ND FLR;  Service: General;  Laterality: N/A;    EGD, WITH BALLOON DILATION N/A 3/19/2024    Procedure: EGD, WITH BALLOON DILATION;  Surgeon: Nas Meyer MD;  Location: [facility] OR 2ND FLR;  Service: General;  Laterality: N/A;    EGD, WITH BALLOON DILATION N/A 5/7/2024    Procedure: EGD, WITH BALLOON DILATION;  Surgeon: Nas Meyer MD;  Location: [facility] OR [facility]  FLR;  Service: General;  Laterality: N/A;    ENDOSCOPIC ULTRASOUND OF UPPER GASTROINTESTINAL TRACT N/A 7/3/2023    Procedure: ULTRASOUND, UPPER GI TRACT, ENDOSCOPIC;  Surgeon: Ray Duffy MD;  Location: [facility];  Service: Endoscopy;  Laterality: N/A;    ESOPHAGOGASTRODUODENOSCOPY N/A 6/13/2023    Procedure: EGD (ESOPHAGOGASTRODUODENOSCOPY);  Surgeon: Kelli Snell MD;  Location: [facility];  Service: Endoscopy;  Laterality: N/A;    ESOPHAGOGASTRODUODENOSCOPY N/A 7/3/2023    Procedure: EGD (ESOPHAGOGASTRODUODENOSCOPY);  Surgeon: Ray Duffy MD;  Location: [facility];  Service: Endoscopy;  Laterality: N/A;    ESOPHAGOGASTRODUODENOSCOPY N/A 6/28/2024    Procedure: EGD (ESOPHAGOGASTRODUODENOSCOPY);  Surgeon: Rose Marie Grullon MD;  Location: [facility] (2ND FLR);  Service: Endoscopy;  Laterality: N/A;  5/15 portal-EGD with dilation, possible steroid injection, possible needle-knife incision in about 4-6 weeks. [facility] only. 60min case. Caitlyn-tt  6/21/24: precall complete-GD    ESOPHAGOGASTRODUODENOSCOPY N/A 7/11/2024    Procedure: EGD (ESOPHAGOGASTRODUODENOSCOPY);  Surgeon: Rose Marie Grullon MD;  Location: [facility] (2ND FLR);  Service: Endoscopy;  Laterality: N/A;  7/1 portal-Repeat upper endoscopy in 7-10 days for retreatment at [facility] only for 75min session. ryan  7/3-pre call complete-tb    INGUINAL HERNIA REPAIR Bilateral     Right x2, x1 left    PLACEMENT OF JEJUNOSTOMY TUBE  10/4/2023    Procedure: INSERTION, JEJUNOSTOMY TUBE;  Surgeon: Nas Meyer MD;  [facility];  Service: General;;    PYLOROMYOTOMY  10/4/2023    Procedure: PYLOROMYOTOMY;  Surgeon: Nas Meyer MD;  [facility];  Service: General;;    ROBOT-ASSISTED SURGICAL REMOVAL OF ESOPHAGUS USING DA BALWINDER XI N/A 10/4/2023    Procedure: XI ROBOTIC ESOPHAGECTOMY;  Surgeon: Nas Meyer MD;  Location: [facility] OR [address] [place] [address];  Service: General;  Laterality: N/A;    ROBOTIC REPAIR, HERNIA, PARAESOPHAGEAL   10/4/2023    Procedure: ROBOTIC REPAIR, HERNIA, PARAESOPHAGEAL;  Surgeon: Nas Meyer MD;  Location: [facility] OR [address] [place] [address];  Service: General;;    ROTATOR CUFF REPAIR Right     x2        Family History:   Family History   Problem Relation Name Age of Onset    Diabetes Mother      Heart disease Father          CHF    Glaucoma Neg Hx      Colon cancer Neg Hx      Prostate cancer Neg Hx          Social History:   Social History     Tobacco Use    Smoking status: Former     Current packs/day: 0.25     Average packs/day: 0.3 packs/day for 41.6 years (10.4 ttl pk-yrs)     Types: Cigarettes     Start date: 1983     Passive exposure: Current    Smokeless tobacco: Never    Tobacco comments:     Done smoking since september   Substance Use Topics    Alcohol use: Yes     Comment: a couple of beers a day        I have reviewed and updated the patient's past medical, surgical, family and social histories.    Allergies:   Review of patient's allergies indicates:  No Known Allergies     Medications:   Current Outpatient Medications   Medication Sig Dispense Refill    acetaminophen (TYLENOL) 500 MG tablet Take 1 tablet (500 mg total) by mouth every 6 (six) hours as needed for Pain. (Patient not taking: Reported on 6/27/2024)  0    LIDOcaine-prilocaine (EMLA) cream Apply topically as needed (Port access). 30 g 1    RABEprazole (ACIPHEX) 20 mg tablet Take 1 tablet (20 mg total) by mouth 2 (two) times daily before meals. 60 tablet 11     No current facility-administered medications for this visit.        Physical Exam:   /64 (BP Location: Left arm, Patient Position: Sitting, BP Method: Large (Automatic))   Pulse 66   Wt 56.4 kg (124 lb 3.7 oz)   SpO2 99%   BMI 18.35 kg/m²      ECOG Performance status: 0          Physical Exam  Vitals reviewed.   Constitutional:       General: He is not in acute distress.     Appearance: Normal appearance. He is normal weight.   HENT:      Head: Normocephalic and atraumatic.      Right Ear:  External ear normal.      Left Ear: External ear normal.      Nose: Nose normal.      Mouth/Throat:      Mouth: Mucous membranes are moist.      Pharynx: Oropharynx is clear. No posterior oropharyngeal erythema.   Eyes:      General: No scleral icterus.     Extraocular Movements: Extraocular movements intact.      Conjunctiva/sclera: Conjunctivae normal.      Pupils: Pupils are equal, round, and reactive to light.   Cardiovascular:      Rate and Rhythm: Normal rate and regular rhythm.      Pulses: Normal pulses.      Heart sounds: Normal heart sounds.   Pulmonary:      Effort: Pulmonary effort is normal.      Breath sounds: Normal breath sounds. No wheezing or rales.   Abdominal:      General: Bowel sounds are normal. There is no distension.      Palpations: Abdomen is soft.      Tenderness: There is no abdominal tenderness.      Comments: Surgical wounds well healed   Musculoskeletal:         General: No swelling. Normal range of motion.      Cervical back: Normal range of motion and neck supple.   Skin:     General: Skin is warm.      Coloration: Skin is not jaundiced.      Findings: No erythema or rash.   Neurological:      General: No focal deficit present.      Mental Status: He is alert and oriented to person, place, and time. Mental status is at baseline.      Gait: Gait normal.   Psychiatric:         Mood and Affect: Mood normal.         Behavior: Behavior normal.         Thought Content: Thought content normal.         Judgment: Judgment normal.           Labs:   Recent Results (from the past 48 hour(s))   CBC Oncology    Collection Time: 08/20/24  7:16 AM   Result Value Ref Range    WBC 5.15 3.90 - 12.70 K/uL    RBC 3.81 (L) 4.60 - 6.20 M/uL    Hemoglobin 13.2 (L) 14.0 - 18.0 g/dL    Hematocrit 38.2 (L) 40.0 - 54.0 %     (H) 82 - 98 fL    MCH 34.6 (H) 27.0 - 31.0 pg    MCHC 34.6 32.0 - 36.0 g/dL    RDW 12.9 11.5 - 14.5 %    Platelets 221 150 - 450 K/uL    MPV 8.6 (L) 9.2 - 12.9 fL    Gran # (ANC)  3.2 1.8 - 7.7 K/uL    Immature Grans (Abs) 0.03 0.00 - 0.04 K/uL   Comprehensive Metabolic Panel    Collection Time: 08/20/24  7:16 AM   Result Value Ref Range    Sodium 138 136 - 145 mmol/L    Potassium 4.3 3.5 - 5.1 mmol/L    Chloride 102 95 - 110 mmol/L    CO2 31 (H) 23 - 29 mmol/L    Glucose 78 70 - 110 mg/dL    BUN 11 8 - 23 mg/dL    Creatinine 0.7 0.5 - 1.4 mg/dL    Calcium 9.3 8.7 - 10.5 mg/dL    Total Protein 6.9 6.0 - 8.4 g/dL    Albumin 3.7 3.5 - 5.2 g/dL    Total Bilirubin 0.5 0.1 - 1.0 mg/dL    Alkaline Phosphatase 90 55 - 135 U/L    AST 20 10 - 40 U/L    ALT 16 10 - 44 U/L    eGFR >60.0 >60 mL/min/1.73 m^2    Anion Gap 5 (L) 8 - 16 mmol/L   TSH    Collection Time: 08/20/24  7:16 AM   Result Value Ref Range    TSH 0.967 0.400 - 4.000 uIU/mL       Imaging:       PET/CT 8/15/24:    Impression:     1. In this patient with esophageal adenocarcinoma status post esophagogastrectomy there is radiotracer uptake along the posterior aspect of the neoesophagus, favored to represent postoperative uptake.  No residual uptake within index AP window lymph node or left level 2 cervical lymph node and similar uptake within lesion within the right posterior iliac bone, suggestive of favorable response to treatment.  2. There is interval increased radiotracer uptake within right lung apex irregular opacity, favored to represent infectious or inflammatory process.  Attention on follow-up.  3. Additional findings, as above.    Path:   2. Gastroesophageal junction mass (biopsy):   Invasive adenocarcinoma, moderately differentiated   Background low and high-grade glandular dysplasia   HER2 immunohistochemistry:  Equivocal.     Immunohistochemistry (IHC) Testing for Mismatch Repair (MMR) Proteins:   MLH1, MSH2, MSH6, PMS2 - Intact nuclear expression   Background nonneoplastic tissue/internal control with intact nuclear expression     There are exceptions to the above IHC interpretations. These results should not be considered  in isolation, and clinical correlation with genetic counseling is recommended to assess the need for germline testing.     Interpretation: No loss of nuclear expression of MMR proteins: low probability of microsatellite instability       Assessment:       1. Malignant neoplasm of gastroesophageal junction    2. Immunodeficiency due to chemotherapy    3. Neoplastic malignant related fatigue    4. Esophagitis    5. Chronic obstructive pulmonary disease, unspecified COPD type    6. Atherosclerosis of aorta    7. Nicotine dependence, cigarettes, uncomplicated    8. Moderate malnutrition           Plan:        # Esophageal adenocarcinoma, chemotherapy induced neutropenia/thrombocytopenia  He initially presented with a locally advanced adenocarcinoma of the middle and lower third of the esophagus, pMMR. PET/CT on 6/1/23 did not show clear distant metastatic disease.  We discussed the case and plan with Dr. Meyer and he feels the patient is surgically resectable as well.    Began cycle 1 on 7/18/23. He unfortunately had a reaction to the Taxol. During the Taxol infusion, he developed coughing, flushing, chest tightness and nausea. O2 sat was 92%, 2L O2 applied NC. We were notified and stopped the Taxol. He was able to proceed with the Carboplatin without complication.   We switched him to Abraxane 40 mg/m2 with week 2.  This was tolerated very well without issue. Has tolerated RT well and has completed 4 cycles of chemoRT. He completed radiation 8/18/23.  We did have to forego his last dose of chemotherapy due to cytopenias.    PET/CT on 9/22/23 showed very nice response to treatment with reduction in SUV avidity of primary tumor.  Discussed with Dr. Meyer.      He underwent esophagogastrectomy with Dr. Meyer on 10/4/23.  Pathology showed [id number] with negative margins and 18 negative lymph nodes.    Because of his residual disease, we recommended adjuvant nivolumab per [id number]. He was in agreement.    Began cycle 1  on 11/15/23.  Tolerating very well.   Lab work adequate for treatment today  Presents today for cycle 11.  Will proceed.    Surveillance CT CAP 3/27/24 showed no clear evidence of disease.  Continued R apical scarring a bit more nodular.    CT CAP 6/26/24 showed continued R apical nodular scar/lesion.  Will obtain PET/CT to evaluate whether this may be neoplastic.  PET/CT 8/15/24 showed mild hypermetabolism in R apical lesion; radiology read favored to represent inflammatory changes.  Discussed option to biopsy now or continue surveillance; he wants to continue surveillance.  Repeat CT in 4 months.    Incidental T8 compression fracture was seen on C; no localizing symptoms.  He wishes to defer MRI for now.  No activity there on PET.    RTC in 4 weeks for cycle 12    # Esophagitis, stricture  S/p multiple dilations.   Had EGD & stricturotomy, steroid injection, dilation with Dr. Grullon 7/11/24.  Much improved symptoms.    # COPD, tobacco abuse, aortic atherosclerosis  Counseled on tobacco cessation previously.  He has stopped smoking.  Normal SpO2.  No dyspnea.  Atherosclerosis noted on imaging.    # Malnutrition, fatigue  Following with nutrition & surgery team.  Weight stable.    Trying to eat better. Feeling better   Monitor TSH    Patient is in agreement with the proposed treatment plan. All questions were answered to the patient's satisfaction. Pt knows to call clinic if anything is needed before the next clinic visit.      Delta June MD  Hematology/Oncology  [facility]           Route Chart for Scheduling    Med Onc Chart Routing      Follow up with physician 4 months. with labs and CT prior   Follow up with [facility]ARCO 4 weeks. for opdivo (last dose)   Infusion scheduling note    Injection scheduling note    Labs CBC and CMP   Scheduling:  Preferred lab:  Lab interval:     Imaging CT chest abdomen pelvis   prior to f/u in 4 months   Pharmacy appointment    Other referrals               Treatment Plan Information   OP NIVOLUMAB 480 MG Q4W   Delta June MD   Upcoming Treatment Dates - OP NIVOLUMAB 480 MG Q4W    8/20/2024       Chemotherapy       nivolumab 480 mg in 0.9% NaCl 98 mL infusion  9/17/2024       Chemotherapy       nivolumab 480 mg in 0.9% NaCl 98 mL infusion

## 2024-08-20 NOTE — PLAN OF CARE
Pt here for C11 Opdivo. Labs and clinic visit complete. PAC accessed with positive blood return.     10:30- Pt tolerated treatment. RTC in 4 weeks, D/C in stable condition, ambulated independently.

## 2024-09-04 NOTE — PROGRESS NOTES
Oncology Nutrition Assessment for Medical Nutrition Therapy  Follow-up Visit    Blayne Beebe   1960    Referring Provider: No ref. provider found      Reason for Visit: nutrition counseling and education    The patient location is: [place]  The chief complaint leading to consultation is: nutrition follow-up    Visit type: audiovisual    Face to Face time with patient: 8 minutes  15 minutes of total time spent on the encounter, which includes face to face time and non-face to face time preparing to see the patient (eg, review of tests), Obtaining and/or reviewing separately obtained history, Documenting clinical information in the electronic or other health record, Independently interpreting results (not separately reported) and communicating results to the patient/family/caregiver, or Care coordination (not separately reported).     Each patient to whom he or she provides medical services by telemedicine is:  (1) informed of the relationship between the physician and patient and the respective role of any other health care provider with respect to management of the patient; and (2) notified that he or she may decline to receive medical services by telemedicine and may withdraw from such care at any time.    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 Currently on adjuvant Opdivo. He is known to me from previous appointments. Recently had issues with clogged J-tube, exchanged in clinic 8/13. He had continued with dysphagia, most recently s/p EGD w/stricturotomy 7/11. He reports he has continued to eat better, trying more solid food. He reports he is maintaining weight on home scale (up to 124.5lb). He reports new J-tube working well, flushing extra. He continues on TF overnight, 2  "cartons/day.    Tube Feeding Formula: Impact Driven Standard 1.4  Rate: 2 cartons/day (overnight)  Provides: 910kcal/day and 40g protein/day  Water Flushes: 55mL/hr for every hour on feeds    Weight: 56.4 kg (124 lb 3.7 oz) - 8/24 clinic weight  Height: 5' 9" (1.753 m)  BMI: 18.4    Usual BW: 140-145lb  Weight Change: 10lb loss from COVID 2/2023 then another 5-10lb loss - since maintaining    Allergies: Patient has no known allergies.    Current Medications:  Current Outpatient Medications:     acetaminophen (TYLENOL) 500 MG tablet, Take 1 tablet (500 mg total) by mouth every 6 (six) hours as needed for Pain. (Patient not taking: Reported on 6/27/2024), Disp: , Rfl: 0    LIDOcaine-prilocaine (EMLA) cream, Apply topically as needed (Port access)., Disp: 30 g, Rfl: 1    RABEprazole (ACIPHEX) 20 mg tablet, Take 1 tablet (20 mg total) by mouth 2 (two) times daily before meals., Disp: 60 tablet, Rfl: 11    Food/medication interactions noted: none    Vitamins/Supplements: none reported    Labs: Reviewed    Nutrition Diagnosis    Problem: moderate malnutrition  Etiology (related to): inadequate energy and protein intake  Signs/Symptoms (as evidenced by): reports of inadequate PO intake, weight loss, and both fat & muscle wasting present  Status: Improving    Nutrition Intervention    Nutrition Prescription   1964 kcals (35kcal/kg)  79g protein (1.4g/kg)   1964mL fluid (35mL/kg)    Recommendations:  Continue to increase PO intake as tolerated  Make meals/snacks high in calories and protein  Continue 2 cartons/day TF for weight gain  Continue water flushes at 55mL/hr for every hour on feeds  Flush J-tube with at least 60mL water before and after feeds    Materials Provided/Reviewed: none    Nutrition Monitoring and Evaluation    Monitor: diet education needs, energy intake, rate/formulation of tube feeding, and weight status    Goals: prevent further weight loss and encourage weight gain    Follow up: in 1-2 " months    Communication to referring provider/care team: note available in chart    Counseling time: 8 minutes      Noelle Morelos MS, RD, LDN  Senior Clinical Dietitian  [facility]

## 2024-09-05 ENCOUNTER — CLINICAL SUPPORT (OUTPATIENT)
Dept: HEMATOLOGY/ONCOLOGY | Facility: CLINIC | Age: 64
End: 2024-09-05
Payer: COMMERCIAL

## 2024-09-05 DIAGNOSIS — Z71.3 NUTRITIONAL COUNSELING: Primary | ICD-10-CM

## 2024-09-05 DIAGNOSIS — E44.0 MODERATE MALNUTRITION: ICD-10-CM

## 2024-09-05 DIAGNOSIS — C16.0 MALIGNANT NEOPLASM OF GASTROESOPHAGEAL JUNCTION: ICD-10-CM

## 2024-09-17 ENCOUNTER — INFUSION (OUTPATIENT)
Dept: INFUSION THERAPY | Facility: HOSPITAL | Age: 64
End: 2024-09-17
Payer: COMMERCIAL

## 2024-09-17 ENCOUNTER — OFFICE VISIT (OUTPATIENT)
Dept: HEMATOLOGY/ONCOLOGY | Facility: CLINIC | Age: 64
End: 2024-09-17
Payer: COMMERCIAL

## 2024-09-17 ENCOUNTER — LAB VISIT (OUTPATIENT)
Dept: LAB | Facility: HOSPITAL | Age: 64
End: 2024-09-17
Payer: COMMERCIAL

## 2024-09-17 VITALS
HEART RATE: 57 BPM | SYSTOLIC BLOOD PRESSURE: 121 MMHG | BODY MASS INDEX: 18.15 KG/M2 | DIASTOLIC BLOOD PRESSURE: 69 MMHG | TEMPERATURE: 98 F | DIASTOLIC BLOOD PRESSURE: 69 MMHG | SYSTOLIC BLOOD PRESSURE: 121 MMHG | RESPIRATION RATE: 18 BRPM | OXYGEN SATURATION: 98 % | WEIGHT: 122.56 LBS | BODY MASS INDEX: 18.15 KG/M2 | WEIGHT: 122.56 LBS | TEMPERATURE: 98 F | HEIGHT: 69 IN | HEIGHT: 69 IN

## 2024-09-17 DIAGNOSIS — K22.2 ESOPHAGEAL STRICTURE: ICD-10-CM

## 2024-09-17 DIAGNOSIS — R53.83 FATIGUE, UNSPECIFIED TYPE: ICD-10-CM

## 2024-09-17 DIAGNOSIS — D84.821 IMMUNODEFICIENCY DUE TO CHEMOTHERAPY: ICD-10-CM

## 2024-09-17 DIAGNOSIS — D69.59 CHEMOTHERAPY-INDUCED THROMBOCYTOPENIA: ICD-10-CM

## 2024-09-17 DIAGNOSIS — E44.0 MODERATE MALNUTRITION: ICD-10-CM

## 2024-09-17 DIAGNOSIS — F17.210 NICOTINE DEPENDENCE, CIGARETTES, UNCOMPLICATED: ICD-10-CM

## 2024-09-17 DIAGNOSIS — T45.1X5A IMMUNODEFICIENCY DUE TO CHEMOTHERAPY: ICD-10-CM

## 2024-09-17 DIAGNOSIS — Z79.899 IMMUNODEFICIENCY DUE TO CHEMOTHERAPY: ICD-10-CM

## 2024-09-17 DIAGNOSIS — C16.0 MALIGNANT NEOPLASM OF GASTROESOPHAGEAL JUNCTION: ICD-10-CM

## 2024-09-17 DIAGNOSIS — C16.0 MALIGNANT NEOPLASM OF GASTROESOPHAGEAL JUNCTION: Primary | ICD-10-CM

## 2024-09-17 DIAGNOSIS — T45.1X5A CHEMOTHERAPY-INDUCED THROMBOCYTOPENIA: ICD-10-CM

## 2024-09-17 DIAGNOSIS — R53.0 NEOPLASTIC MALIGNANT RELATED FATIGUE: ICD-10-CM

## 2024-09-17 DIAGNOSIS — T45.1X5A CHEMOTHERAPY INDUCED NEUTROPENIA: ICD-10-CM

## 2024-09-17 DIAGNOSIS — I70.0 ATHEROSCLEROSIS OF AORTA: ICD-10-CM

## 2024-09-17 DIAGNOSIS — K20.90 ESOPHAGITIS: ICD-10-CM

## 2024-09-17 DIAGNOSIS — D70.1 CHEMOTHERAPY INDUCED NEUTROPENIA: ICD-10-CM

## 2024-09-17 DIAGNOSIS — J44.9 CHRONIC OBSTRUCTIVE PULMONARY DISEASE, UNSPECIFIED COPD TYPE: ICD-10-CM

## 2024-09-17 LAB
ALBUMIN SERPL BCP-MCNC: 3.6 G/DL (ref 3.5–5.2)
ALP SERPL-CCNC: 89 U/L (ref 55–135)
ALT SERPL W/O P-5'-P-CCNC: 12 U/L (ref 10–44)
ANION GAP SERPL CALC-SCNC: 4 MMOL/L (ref 8–16)
AST SERPL-CCNC: 17 U/L (ref 10–40)
BASOPHILS # BLD AUTO: 0.05 K/UL (ref 0–0.2)
BASOPHILS NFR BLD: 0.8 % (ref 0–1.9)
BILIRUB SERPL-MCNC: 0.8 MG/DL (ref 0.1–1)
BUN SERPL-MCNC: 9 MG/DL (ref 8–23)
CALCIUM SERPL-MCNC: 9.3 MG/DL (ref 8.7–10.5)
CHLORIDE SERPL-SCNC: 104 MMOL/L (ref 95–110)
CO2 SERPL-SCNC: 29 MMOL/L (ref 23–29)
CREAT SERPL-MCNC: 0.7 MG/DL (ref 0.5–1.4)
DIFFERENTIAL METHOD BLD: ABNORMAL
EOSINOPHIL # BLD AUTO: 0.1 K/UL (ref 0–0.5)
EOSINOPHIL NFR BLD: 2.4 % (ref 0–8)
ERYTHROCYTE [DISTWIDTH] IN BLOOD BY AUTOMATED COUNT: 13.3 % (ref 11.5–14.5)
EST. GFR  (NO RACE VARIABLE): >60 ML/MIN/1.73 M^2
GLUCOSE SERPL-MCNC: 120 MG/DL (ref 70–110)
HCT VFR BLD AUTO: 40 % (ref 40–54)
HGB BLD-MCNC: 13.9 G/DL (ref 14–18)
IMM GRANULOCYTES # BLD AUTO: 0.04 K/UL (ref 0–0.04)
IMM GRANULOCYTES NFR BLD AUTO: 0.7 % (ref 0–0.5)
LYMPHOCYTES # BLD AUTO: 1.4 K/UL (ref 1–4.8)
LYMPHOCYTES NFR BLD: 24 % (ref 18–48)
MCH RBC QN AUTO: 35.2 PG (ref 27–31)
MCHC RBC AUTO-ENTMCNC: 34.8 G/DL (ref 32–36)
MCV RBC AUTO: 101 FL (ref 82–98)
MONOCYTES # BLD AUTO: 0.7 K/UL (ref 0.3–1)
MONOCYTES NFR BLD: 11.8 % (ref 4–15)
NEUTROPHILS # BLD AUTO: 3.6 K/UL (ref 1.8–7.7)
NEUTROPHILS NFR BLD: 60.3 % (ref 38–73)
NRBC BLD-RTO: 0 /100 WBC
PLATELET # BLD AUTO: 244 K/UL (ref 150–450)
PMV BLD AUTO: 8.4 FL (ref 9.2–12.9)
POTASSIUM SERPL-SCNC: 4.3 MMOL/L (ref 3.5–5.1)
PROT SERPL-MCNC: 6.8 G/DL (ref 6–8.4)
RBC # BLD AUTO: 3.95 M/UL (ref 4.6–6.2)
SODIUM SERPL-SCNC: 137 MMOL/L (ref 136–145)
TSH SERPL DL<=0.005 MIU/L-ACNC: 0.76 UIU/ML (ref 0.4–4)
WBC # BLD AUTO: 5.92 K/UL (ref 3.9–12.7)

## 2024-09-17 PROCEDURE — 3078F DIAST BP <80 MM HG: CPT | Mod: CPTII,S$GLB,, | Performed by: REGISTERED NURSE

## 2024-09-17 PROCEDURE — 84443 ASSAY THYROID STIM HORMONE: CPT | Performed by: PHYSICIAN ASSISTANT

## 2024-09-17 PROCEDURE — 3008F BODY MASS INDEX DOCD: CPT | Mod: CPTII,S$GLB,, | Performed by: REGISTERED NURSE

## 2024-09-17 PROCEDURE — 25000003 PHARM REV CODE 250: Performed by: REGISTERED NURSE

## 2024-09-17 PROCEDURE — 63600175 PHARM REV CODE 636 W HCPCS: Mod: JZ,JG | Performed by: REGISTERED NURSE

## 2024-09-17 PROCEDURE — 96413 CHEMO IV INFUSION 1 HR: CPT

## 2024-09-17 PROCEDURE — 1159F MED LIST DOCD IN RCRD: CPT | Mod: CPTII,S$GLB,, | Performed by: REGISTERED NURSE

## 2024-09-17 PROCEDURE — 36415 COLL VENOUS BLD VENIPUNCTURE: CPT | Performed by: PHYSICIAN ASSISTANT

## 2024-09-17 PROCEDURE — 85025 COMPLETE CBC W/AUTO DIFF WBC: CPT | Performed by: PHYSICIAN ASSISTANT

## 2024-09-17 PROCEDURE — A4216 STERILE WATER/SALINE, 10 ML: HCPCS | Performed by: REGISTERED NURSE

## 2024-09-17 PROCEDURE — 99999 PR PBB SHADOW E&M-EST. PATIENT-LVL III: CPT | Mod: PBBFAC,,, | Performed by: REGISTERED NURSE

## 2024-09-17 PROCEDURE — 3074F SYST BP LT 130 MM HG: CPT | Mod: CPTII,S$GLB,, | Performed by: REGISTERED NURSE

## 2024-09-17 PROCEDURE — 99215 OFFICE O/P EST HI 40 MIN: CPT | Mod: S$GLB,,, | Performed by: REGISTERED NURSE

## 2024-09-17 PROCEDURE — 1160F RVW MEDS BY RX/DR IN RCRD: CPT | Mod: CPTII,S$GLB,, | Performed by: REGISTERED NURSE

## 2024-09-17 PROCEDURE — 80053 COMPREHEN METABOLIC PANEL: CPT | Performed by: PHYSICIAN ASSISTANT

## 2024-09-17 PROCEDURE — G2211 COMPLEX E/M VISIT ADD ON: HCPCS | Mod: S$GLB,,, | Performed by: REGISTERED NURSE

## 2024-09-17 RX ORDER — EPINEPHRINE 0.3 MG/.3ML
0.3 INJECTION SUBCUTANEOUS ONCE AS NEEDED
Status: DISCONTINUED | OUTPATIENT
Start: 2024-09-17 | End: 2024-09-17 | Stop reason: HOSPADM

## 2024-09-17 RX ORDER — DIPHENHYDRAMINE HYDROCHLORIDE 50 MG/ML
50 INJECTION INTRAMUSCULAR; INTRAVENOUS ONCE AS NEEDED
Status: CANCELLED | OUTPATIENT
Start: 2024-09-17

## 2024-09-17 RX ORDER — PROCHLORPERAZINE EDISYLATE 5 MG/ML
10 INJECTION INTRAMUSCULAR; INTRAVENOUS ONCE AS NEEDED
Status: DISCONTINUED | OUTPATIENT
Start: 2024-09-17 | End: 2024-09-17 | Stop reason: HOSPADM

## 2024-09-17 RX ORDER — HEPARIN 100 UNIT/ML
500 SYRINGE INTRAVENOUS
Status: DISCONTINUED | OUTPATIENT
Start: 2024-09-17 | End: 2024-09-17 | Stop reason: HOSPADM

## 2024-09-17 RX ORDER — SODIUM CHLORIDE 0.9 % (FLUSH) 0.9 %
10 SYRINGE (ML) INJECTION
Status: DISCONTINUED | OUTPATIENT
Start: 2024-09-17 | End: 2024-09-17 | Stop reason: HOSPADM

## 2024-09-17 RX ORDER — PROCHLORPERAZINE EDISYLATE 5 MG/ML
10 INJECTION INTRAMUSCULAR; INTRAVENOUS ONCE AS NEEDED
Status: CANCELLED
Start: 2024-09-17

## 2024-09-17 RX ORDER — EPINEPHRINE 0.3 MG/.3ML
0.3 INJECTION SUBCUTANEOUS ONCE AS NEEDED
Status: CANCELLED | OUTPATIENT
Start: 2024-09-17

## 2024-09-17 RX ORDER — SODIUM CHLORIDE 0.9 % (FLUSH) 0.9 %
10 SYRINGE (ML) INJECTION
Status: CANCELLED | OUTPATIENT
Start: 2024-09-17

## 2024-09-17 RX ORDER — HEPARIN 100 UNIT/ML
500 SYRINGE INTRAVENOUS
Status: CANCELLED | OUTPATIENT
Start: 2024-09-17

## 2024-09-17 RX ORDER — DIPHENHYDRAMINE HYDROCHLORIDE 50 MG/ML
50 INJECTION INTRAMUSCULAR; INTRAVENOUS ONCE AS NEEDED
Status: DISCONTINUED | OUTPATIENT
Start: 2024-09-17 | End: 2024-09-17 | Stop reason: HOSPADM

## 2024-09-17 RX ADMIN — Medication 10 ML: at 11:09

## 2024-09-17 RX ADMIN — SODIUM CHLORIDE 480 MG: 9 INJECTION, SOLUTION INTRAVENOUS at 11:09

## 2024-09-17 RX ADMIN — HEPARIN 500 UNITS: 100 SYRINGE at 11:09

## 2024-09-17 NOTE — PROGRESS NOTES
MEDICAL ONCOLOGY - ESTABLISHED PATIENT VISIT    Reason for visit: Esophageal adenocarcinoma    Best Contact Phone Number(s): [phone] (home)      Cancer/Stage/TNM:    Cancer Staging   Malignant neoplasm of gastroesophageal junction  Staging form: Esophagus - Adenocarcinoma, AJCC 8th Edition  - Clinical stage from 6/13/2023: Stage III (cT3, cN1, cM0, G2) - Signed by Sunday Jasso MD on 7/5/2023       Oncology History   Malignant neoplasm of gastroesophageal junction   6/13/2023 Cancer Staged    Staging form: Esophagus - Adenocarcinoma, AJCC 8th Edition  - Clinical stage from 6/13/2023: Stage III (cT3, cN1, cM0, G2)     6/23/2023 Initial Diagnosis    Malignant neoplasm of gastroesophageal junction     7/3/2023 Tumor Conference     [facility] UGI MULTIDISCIPLINARY TUMOR BOARD  PATIENT REVIEW FORM   ____________________________________________________________    CLINIC #: [id number]   DATE: 7/3/2023    DIAGNOSIS: esophageal CA    PRESENTER: Mitch    PATIENT SUMMARY:   This 64 y/o gentleman is a long time smoker with emphysema who had low dose screening lung CT in 4/2023 per his PCP. Given an abnormality on this scan, he had PET on 6/1/23 which identified large hypermetabolic mid esophageal mass with lymph nodes. He underwent EGD on 6/13/23 that found large fungating ulcerated mass with bleeding in middle third of esophagus to lower esophagus, 34-44cm. Pathology revealed moderately differentiated adenocarcinoma.   Staging PET reviewed - level 2 cervical lymph node with FDG uptake, nothing in lungs concerning  Staging EUS today per Dr. Duffy.   He has been evaluated by Dr. Vance in [place] and Dr. June.   He is scheduled to see rad onc, Dr. Jasso, Wednesday and IR for port placement on 7/12/23.     BOARD RECOMMENDATIONS:   Proceed with neoadj CRT, then restaging to consider surgical resection    CONSULT NEEDED:     [] Surgery    [] Hem/Onc    [] Rad/Onc    [] Dietary                 [] Social  Service    [] Psychology       [] AES  [] Radiology     Clinical Stage: Tumor 3 Node(s) 1 Metastasis 0      GROUP STAGE:  [] O    [] 1   [] II     [x] III   []IV  [] Local recurrence     [] Regional recurrence     [] Distant recurrence   Metastatic site(s): none         [x] [facility] Treatment Guidelines reviewed and care planned is consistent with guidelines.         (i.e., NCCN, NCI, PD, ACO, AUA, etc.)    PRESENTATION AT CANCER CONFERENCE:         [x] Prospective    [] Retrospective     [] Follow-Up         7/16/2023 - 8/8/2023 Chemotherapy    Treatment Summary   Plan Name: OP ESOPHAGEAL PACLITAXEL CARBOPLATIN WEEKLY  Treatment Goal: Control  Status: Inactive  Start Date: 7/16/2023  End Date: 8/8/2023  Provider: Delta June MD  Chemotherapy: CARBOplatin (PARAPLATIN) 195 mg in sodium chloride 0.9% 304.5 mL chemo infusion, 195 mg (100 % of original dose 193 mg), Intravenous, Clinic/HOD 1 time, 1 of 1 cycle  Dose modification:   (original dose 193 mg, Cycle 1)  Administration: 195 mg (7/18/2023), 210 mg (7/24/2023), 230 mg (7/31/2023), 210 mg (8/8/2023)  PACLitaxeL (TAXOL) 50 mg/m2 = 84 mg in sodium chloride 0.9% 250 mL chemo infusion, 50 mg/m2 = 84 mg, Intravenous, Clinic/HOD 1 time, 1 of 1 cycle  Administration: 84 mg (7/18/2023)     7/18/2023 - 8/17/2023 Radiation Therapy    Treating physician: Sunday Jasso    Course: C1 Thorax 2023    Treatment Site Ref. ID Energy Dose/Fx (Gy) #Fx Dose Correction (Gy) Total Dose (Gy) Start Date End Date Elapsed Days   IM Esophagus PTV_High 6X 1.8 23 / 23 0 41.4 7/18/2023 8/17/2023 30          11/15/2023 -  Chemotherapy    Treatment Summary   Plan Name: OP NIVOLUMAB 480 MG Q4W  Treatment Goal: Curative  Status: Active  Start Date: 11/15/2023  End Date: 9/17/2024 (Planned)  Provider: Delta June MD  Chemotherapy: [No matching medication found in this treatment plan]     12/1/2023 Procedure    Surgeon(s) and Role: Nas Meyer MD - Primary      Pre-Operative Diagnosis:   Esophageal adenocarcinoma  Suspected cervical esophagogastric anastomotic stricture     Post-Operative Diagnosis:   Same  Cervical esophagogastric anastomotic stricture      Operative Findings:    Cervical esophagogastric anastomotic stricture. Unable to pass endoscope through stricture initially, but the scope was able to pass after serial dilations to 18 mm.      Procedures:  Esophagogastroduodenoscopy (EGD), with transendoscopic balloon dilation of esophagus (<30 mm)                Interim History:   64 y.o. male with esophageal adenocarcinoma who presents for cycle 12 of adjuvant nivolumab. He is doing very well overall. Continues on tube feeds, but has been eating more substantial foods and tolerating well since last procedure. No current trouble swallowing. No new toxicities or concerns. No CP, palpitations, SOB, diarrhea, nausea, overt bleeding, rashes or skin changes.     ECOG status is 0. Presents with his wife today.     ROS:   Review of Systems   Constitutional:  Negative for chills, fever, malaise/fatigue and weight loss.   HENT:  Negative for sore throat.    Eyes:  Negative for blurred vision and pain.   Respiratory:  Negative for cough and shortness of breath.    Cardiovascular:  Negative for chest pain and leg swelling.   Gastrointestinal:  Negative for abdominal pain, constipation, diarrhea, heartburn, nausea and vomiting.   Genitourinary:  Negative for dysuria and frequency.   Musculoskeletal:  Negative for back pain, falls and myalgias.   Skin:  Negative for rash.   Neurological:  Negative for dizziness, weakness and headaches.   Endo/Heme/Allergies:  Does not bruise/bleed easily.   Psychiatric/Behavioral:  Negative for depression. The patient is not nervous/anxious.    All other systems reviewed and are negative.      Past Medical History:   Past Medical History:   Diagnosis Date    Arthritis     Cancer     COPD (chronic obstructive pulmonary disease)         Past  Surgical History:   Past Surgical History:   Procedure Laterality Date    CERVICAL FUSION      COLONOSCOPY N/A 3/27/2018    Procedure: COLONOSCOPY;  Surgeon: Maria Teresa Morocho MD;  Location: [facility] ENDO;  Service: Endoscopy;  Laterality: N/A;    COLONOSCOPY N/A 6/13/2023    Procedure: COLONOSCOPY;  Surgeon: Kelli Snell MD;  Location: [facility] ENDO;  Service: Endoscopy;  Laterality: N/A;    EGD, WITH BALLOON DILATION N/A 12/1/2023    Procedure: EGD, WITH BALLOON DILATION;  Surgeon: Nas Meyer MD;  Location: [facility] OR 2ND FLR;  Service: General;  Laterality: N/A;    EGD, WITH BALLOON DILATION N/A 12/11/2023    Procedure: EGD, WITH BALLOON DILATION;  Surgeon: Nas Meyer MD;  Location: [facility] OR 2ND FLR;  Service: General;  Laterality: N/A;    EGD, WITH BALLOON DILATION N/A 12/20/2023    Procedure: EGD, WITH BALLOON DILATION;  Surgeon: Nas Meyer MD;  [facility];  Service: General;  Laterality: N/A;    EGD, WITH BALLOON DILATION N/A 1/10/2024    Procedure: EGD, WITH BALLOON DILATION;  Surgeon: Nas Meyer MD;  Location: [facility] OR [facility] FLR;  Service: General;  Laterality: N/A;    EGD, WITH BALLOON DILATION N/A 2/12/2024    Procedure: EGD, WITH BALLOON DILATION;  Surgeon: Nas Meyer MD;  Location: [facility] OR [facility] FLR;  Service: General;  Laterality: N/A;    EGD, WITH BALLOON DILATION N/A 2/23/2024    Procedure: EGD, WITH BALLOON DILATION;  Surgeon: Nas Meeyr MD;  Location: [facility] OR 2ND FLR;  Service: General;  Laterality: N/A;    EGD, WITH BALLOON DILATION N/A 3/4/2024    Procedure: EGD, WITH BALLOON DILATION;  Surgeon: Nas Meyer MD;  Location: [facility] OR 2ND FLR;  Service: General;  Laterality: N/A;    EGD, WITH BALLOON DILATION N/A 3/19/2024    Procedure: EGD, WITH BALLOON DILATION;  Surgeon: Nas Meyer MD;  Location: [facility] OR [address] [place] [address];  Service: General;  Laterality: N/A;    EGD, WITH BALLOON DILATION N/A 5/7/2024    Procedure: EGD, WITH BALLOON DILATION;   Surgeon: Nas Meyer MD;  Location: [facility] OR [address] [place] [address];  Service: General;  Laterality: N/A;    ENDOSCOPIC ULTRASOUND OF UPPER GASTROINTESTINAL TRACT N/A 7/3/2023    Procedure: ULTRASOUND, UPPER GI TRACT, ENDOSCOPIC;  Surgeon: Ray Duffy MD;  Location: [facility];  Service: Endoscopy;  Laterality: N/A;    ESOPHAGOGASTRODUODENOSCOPY N/A 6/13/2023    Procedure: EGD (ESOPHAGOGASTRODUODENOSCOPY);  Surgeon: Kelli Snell MD;  Location: [facility];  Service: Endoscopy;  Laterality: N/A;    ESOPHAGOGASTRODUODENOSCOPY N/A 7/3/2023    Procedure: EGD (ESOPHAGOGASTRODUODENOSCOPY);  Surgeon: Ray Duffy MD;  Location: [facility];  Service: Endoscopy;  Laterality: N/A;    ESOPHAGOGASTRODUODENOSCOPY N/A 6/28/2024    Procedure: EGD (ESOPHAGOGASTRODUODENOSCOPY);  Surgeon: Rose Marie Grullon MD;  [facility]  Service: Endoscopy;  Laterality: N/A;  5/15 portal-EGD with dilation, possible steroid injection, possible needle-knife incision in about 4-6 weeks. [facility] only. 60min case. Jose  6/21/24: precall complete-GD    ESOPHAGOGASTRODUODENOSCOPY N/A 7/11/2024    Procedure: EGD (ESOPHAGOGASTRODUODENOSCOPY);  Surgeon: Rose Marie Grullon MD;  [facility]  Service: Endoscopy;  Laterality: N/A;  7/1 portal-Repeat upper endoscopy in 7-10 days for retreatment at [facility] only for 75min session. ryan  7/3-pre call complete-tb    INGUINAL HERNIA REPAIR Bilateral     Right x2, x1 left    PLACEMENT OF JEJUNOSTOMY TUBE  10/4/2023    Procedure: INSERTION, JEJUNOSTOMY TUBE;  Surgeon: Nas Meyer MD;  [facility];  Service: General;;    PYLOROMYOTOMY  10/4/2023    Procedure: PYLOROMYOTOMY;  Surgeon: Nas Meyer MD;  [facility];  Service: General;;    ROBOT-ASSISTED SURGICAL REMOVAL OF ESOPHAGUS USING DA BALWINDER XI N/A 10/4/2023    Procedure: XI ROBOTIC ESOPHAGECTOMY;  Surgeon: Nas Meyer MD;  Location: [facility] OR [address] [place] [address];  Service: General;   "Laterality: N/A;    ROBOTIC REPAIR, HERNIA, PARAESOPHAGEAL  10/4/2023    Procedure: ROBOTIC REPAIR, HERNIA, PARAESOPHAGEAL;  Surgeon: Nas Meyer MD;  Location: [facility] OR [address] [place] [address];  Service: General;;    ROTATOR CUFF REPAIR Right     x2        Family History:   Family History   Problem Relation Name Age of Onset    Diabetes Mother      Heart disease Father          CHF    Glaucoma Neg Hx      Colon cancer Neg Hx      Prostate cancer Neg Hx          Social History:   Social History     Tobacco Use    Smoking status: Former     Current packs/day: 0.25     Average packs/day: 0.3 packs/day for 41.7 years (10.4 ttl pk-yrs)     Types: Cigarettes     Start date: 1983     Passive exposure: Current    Smokeless tobacco: Never    Tobacco comments:     Done smoking since september   Substance Use Topics    Alcohol use: Yes     Comment: a couple of beers a day        I have reviewed and updated the patient's past medical, surgical, family and social histories.    Allergies:   Review of patient's allergies indicates:  No Known Allergies     Medications:   Current Outpatient Medications   Medication Sig Dispense Refill    RABEprazole (ACIPHEX) 20 mg tablet Take 1 tablet (20 mg total) by mouth 2 (two) times daily before meals. 60 tablet 11    acetaminophen (TYLENOL) 500 MG tablet Take 1 tablet (500 mg total) by mouth every 6 (six) hours as needed for Pain. (Patient not taking: Reported on 6/27/2024)  0    LIDOcaine-prilocaine (EMLA) cream Apply topically as needed (Port access). (Patient not taking: Reported on 9/17/2024) 30 g 1     No current facility-administered medications for this visit.        Physical Exam:   /69 (BP Location: Left arm, Patient Position: Sitting, BP Method: Medium (Automatic))   Pulse (!) (P) 57   Temp 97.9 °F (36.6 °C) (Temporal)   Ht 5' 9" (1.753 m)   Wt 55.6 kg (122 lb 9.2 oz)   SpO2 98%   BMI 18.10 kg/m²      ECOG Performance status: 0          Physical Exam  Vitals reviewed. "   Constitutional:       General: He is not in acute distress.     Appearance: Normal appearance. He is normal weight.   HENT:      Head: Normocephalic and atraumatic.      Right Ear: External ear normal.      Left Ear: External ear normal.      Nose: Nose normal.      Mouth/Throat:      Mouth: Mucous membranes are moist.      Pharynx: Oropharynx is clear. No posterior oropharyngeal erythema.   Eyes:      General: No scleral icterus.     Extraocular Movements: Extraocular movements intact.      Conjunctiva/sclera: Conjunctivae normal.      Pupils: Pupils are equal, round, and reactive to light.   Cardiovascular:      Rate and Rhythm: Normal rate and regular rhythm.      Pulses: Normal pulses.      Heart sounds: Normal heart sounds.   Pulmonary:      Effort: Pulmonary effort is normal.      Breath sounds: Normal breath sounds. No wheezing or rales.   Abdominal:      General: Bowel sounds are normal. There is no distension.      Palpations: Abdomen is soft.      Tenderness: There is no abdominal tenderness.      Comments: Surgical wounds well healed   Musculoskeletal:         General: No swelling. Normal range of motion.      Cervical back: Normal range of motion and neck supple.   Skin:     General: Skin is warm.      Coloration: Skin is not jaundiced.      Findings: No erythema or rash.   Neurological:      General: No focal deficit present.      Mental Status: He is alert and oriented to person, place, and time. Mental status is at baseline.      Gait: Gait normal.   Psychiatric:         Mood and Affect: Mood normal.         Behavior: Behavior normal.         Thought Content: Thought content normal.         Judgment: Judgment normal.           Labs:   No results found for this or any previous visit (from the past 48 hour(s)).    Imaging:       PET/CT 8/15/24:    Impression:     1. In this patient with esophageal adenocarcinoma status post esophagogastrectomy there is radiotracer uptake along the posterior aspect of  the neoesophagus, favored to represent postoperative uptake.  No residual uptake within index AP window lymph node or left level 2 cervical lymph node and similar uptake within lesion within the right posterior iliac bone, suggestive of favorable response to treatment.  2. There is interval increased radiotracer uptake within right lung apex irregular opacity, favored to represent infectious or inflammatory process.  Attention on follow-up.  3. Additional findings, as above.    Path:   2. Gastroesophageal junction mass (biopsy):   Invasive adenocarcinoma, moderately differentiated   Background low and high-grade glandular dysplasia   HER2 immunohistochemistry:  Equivocal.     Immunohistochemistry (IHC) Testing for Mismatch Repair (MMR) Proteins:   MLH1, MSH2, MSH6, PMS2 - Intact nuclear expression   Background nonneoplastic tissue/internal control with intact nuclear expression     There are exceptions to the above IHC interpretations. These results should not be considered in isolation, and clinical correlation with genetic counseling is recommended to assess the need for germline testing.     Interpretation: No loss of nuclear expression of MMR proteins: low probability of microsatellite instability       Assessment:       1. Malignant neoplasm of gastroesophageal junction    2. Immunodeficiency due to chemotherapy    3. Neoplastic malignant related fatigue    4. Chemotherapy induced neutropenia    5. Chemotherapy-induced thrombocytopenia    6. Esophagitis    7. Esophageal stricture    8. Chronic obstructive pulmonary disease, unspecified COPD type    9. Atherosclerosis of aorta    10. Nicotine dependence, cigarettes, uncomplicated    11. Moderate malnutrition    12. Fatigue, unspecified type       Plan:        # Esophageal adenocarcinoma, chemotherapy induced neutropenia/thrombocytopenia  He initially presented with a locally advanced adenocarcinoma of the middle and lower third of the esophagus, pMMR. PET/CT on  6/1/23 did not show clear distant metastatic disease.  We discussed the case and plan with Dr. Meyer and he feels the patient is surgically resectable as well.    Began cycle 1 on 7/18/23. He unfortunately had a reaction to the Taxol. During the Taxol infusion, he developed coughing, flushing, chest tightness and nausea. O2 sat was 92%, 2L O2 applied NC. We were notified and stopped the Taxol. He was able to proceed with the Carboplatin without complication.   We switched him to Abraxane 40 mg/m2 with week 2.  This was tolerated very well without issue. Has tolerated RT well and has completed 4 cycles of chemoRT. He completed radiation 8/18/23.  We did have to forego his last dose of chemotherapy due to cytopenias.    PET/CT on 9/22/23 showed very nice response to treatment with reduction in SUV avidity of primary tumor.  Discussed with Dr. Meyer.      He underwent esophagogastrectomy with Dr. Meyer on 10/4/23.  Pathology showed ypT2N0 with negative margins and 18 negative lymph nodes.    Because of his residual disease, we recommended adjuvant nivolumab per [id number]. He was in agreement.    Began cycle 1 on 11/15/23.  Tolerating very well.   Lab work pending today. Feeling well with no new concerns.   Presents today for cycle 12, final cycle.  Will proceed pending labs.     Surveillance CT CAP 3/27/24 showed no clear evidence of disease.  Continued R apical scarring a bit more nodular.    CT CAP 6/26/24 showed continued R apical nodular scar/lesion.  Will obtain PET/CT to evaluate whether this may be neoplastic.  PET/CT 8/15/24 showed mild hypermetabolism in R apical lesion; radiology read favored to represent inflammatory changes.  Discussed option to biopsy now or continue surveillance; he wants to continue surveillance.  Repeat CT in 3 months.    Incidental T8 compression fracture was seen on C; no localizing symptoms.  He wishes to defer MRI for now.  No activity there on PET.    RTC in 3 months with  repeat imaging. Reviewed upcoming appointments.     # Esophagitis, stricture  S/p multiple dilations.   Had EGD & stricturotomy, steroid injection, dilation with Dr. Grullon 7/11/24.  Much improved symptoms. Monitor.     # COPD, tobacco abuse, aortic atherosclerosis  Counseled on tobacco cessation previously.  He has stopped smoking.  Normal SpO2.  No dyspnea.  Atherosclerosis noted on imaging.    # Malnutrition, fatigue  Following with nutrition & surgery team.  Weight stable.    Trying to eat better. Feeling better   Monitor TSH.     Patient is in agreement with the proposed treatment plan. All questions were answered to the patient's satisfaction. Pt knows to call clinic if anything is needed before the next clinic visit.    Patient discussed with collaborating physician, Dr. June.    At least 40 minutes were spent today on this encounter including face to face time with the patient, data gathering/interpretation and documentation.       Amanda Armstrong, MSN, APRN, ACCNS-  Hematology and Medical Oncology  Clinical Nurse Specialist to Dr. June, Dr. Riley & Dr. Hernandez    Route Chart for Scheduling    Med Onc Chart Routing      Follow up with physician 3 months. keep as scheduled - thanks!   Follow up with DEMARCO    Infusion scheduling note    Injection scheduling note    Labs    Imaging    Pharmacy appointment    Other referrals              Treatment Plan Information   OP NIVOLUMAB 480 MG Q4W Delta June MD   Associated diagnosis: Malignant neoplasm of gastroesophageal junction Stage III cT3, cN1, cM0, G2 noted on 6/23/2023   Line of treatment: Adjuvant  Treatment Goal: Curative     Upcoming Treatment Dates - OP NIVOLUMAB 480 MG Q4W    9/17/2024       Chemotherapy       nivolumab 480 mg in 0.9% NaCl 98 mL infusion

## 2024-09-25 ENCOUNTER — PATIENT MESSAGE (OUTPATIENT)
Dept: SURGERY | Facility: CLINIC | Age: 64
End: 2024-09-25
Payer: COMMERCIAL

## 2024-09-26 NOTE — TELEPHONE ENCOUNTER
Attempted to contact [facility] 9/25 x2 unsuccessfully ([phone]).     Successfully contacted enteral dietitian at [facility]. She confirmed patient's tube feeding order is . She reports new order had been faxed over previously but turns out it is the incorrect number. I provided correct fax number and she resent. Fax received and Dr. Meyer signed new order. Faxed back to [facility] ([phone]).

## 2024-10-16 NOTE — PROGRESS NOTES
Oncology Nutrition Assessment for Medical Nutrition Therapy  Follow-up Visit    Blayne Beebe   1960    Referring Provider: No ref. provider found      Reason for Visit: nutrition counseling and education    The patient location is: [place]  The chief complaint leading to consultation is: nutrition follow-up    Visit type: audiovisual    Face to Face time with patient: 9 minutes  15 minutes of total time spent on the encounter, which includes face to face time and non-face to face time preparing to see the patient (eg, review of tests), Obtaining and/or reviewing separately obtained history, Documenting clinical information in the electronic or other health record, Independently interpreting results (not separately reported) and communicating results to the patient/family/caregiver, or Care coordination (not separately reported).     Each patient to whom he or she provides medical services by telemedicine is:  (1) informed of the relationship between the physician and patient and the respective role of any other health care provider with respect to management of the patient; and (2) notified that he or she may decline to receive medical services by telemedicine and may withdraw from such care at any time.    PMHx:   Past Medical History:   Diagnosis Date    Arthritis     Cancer     COPD (chronic obstructive pulmonary disease)        Nutrition Assessment    This is a 64 y.o.male with a medical diagnosis of GEJ adenocarcinoma s/p chemoradiation, then esophagectomy 10/4/23, followed by adjuvant Opdivo. He is known to me from previous appointments. He reports overall doing well, feeling good. He reports maintaining weight, actually regaining some. He denies any issues swallowing, eating better. He reports J-tube still working well, flushing extra. He continues on TF overnight, 2 cartons/day.    Tube Feeding Formula: [employer] Standard 1.4  Rate: 2 cartons/day (overnight)  Provides: 910kcal/day and 40g  "protein/day  Water Flushes: 55mL/hr for every hour on feeds    Weight: 124-125lb - home scale weight  Height: 5' 9" (1.753 m)  BMI: 18.4    Usual BW: 140-145lb  Weight Change: 10lb loss from COVID 2/2023 then another 5-10lb loss - since maintaining    Allergies: Patient has no known allergies.    Current Medications:  Current Outpatient Medications:     acetaminophen (TYLENOL) 500 MG tablet, Take 1 tablet (500 mg total) by mouth every 6 (six) hours as needed for Pain. (Patient not taking: Reported on 6/27/2024), Disp: , Rfl: 0    LIDOcaine-prilocaine (EMLA) cream, Apply topically as needed (Port access). (Patient not taking: Reported on 9/17/2024), Disp: 30 g, Rfl: 1    RABEprazole (ACIPHEX) 20 mg tablet, Take 1 tablet (20 mg total) by mouth 2 (two) times daily before meals., Disp: 60 tablet, Rfl: 11    Food/medication interactions noted: none    Vitamins/Supplements: none reported    Labs: Reviewed    Nutrition Diagnosis    Problem: moderate malnutrition  Etiology (related to): inadequate energy and protein intake  Signs/Symptoms (as evidenced by): reports of inadequate PO intake, weight loss, and both fat & muscle wasting present  Status: Improving    Nutrition Intervention    Nutrition Prescription   1964 kcals (35kcal/kg)  79g protein (1.4g/kg)   1964mL fluid (35mL/kg)    Recommendations:  Continue to increase PO intake as tolerated  Make meals/snacks high in calories and protein  Continue 2 cartons/day TF for weight gain  Continue water flushes at 55mL/hr for every hour on feeds  Flush J-tube with at least 60mL water before and after feeds    Materials Provided/Reviewed: none    Nutrition Monitoring and Evaluation    Monitor: diet education needs, energy intake, rate/formulation of tube feeding, and weight status    Goals: prevent further weight loss and encourage weight gain    Follow up: in 3-4 months    Communication to referring provider/care team: note available in chart    Counseling time: 9 " minutes      Noelle Morelos, MS, RD, LDN  Senior Clinical Dietitian  [facility]

## 2024-10-17 ENCOUNTER — CLINICAL SUPPORT (OUTPATIENT)
Dept: HEMATOLOGY/ONCOLOGY | Facility: CLINIC | Age: 64
End: 2024-10-17
Payer: COMMERCIAL

## 2024-10-17 DIAGNOSIS — C16.0 MALIGNANT NEOPLASM OF GASTROESOPHAGEAL JUNCTION: ICD-10-CM

## 2024-10-17 DIAGNOSIS — Z71.3 NUTRITIONAL COUNSELING: Primary | ICD-10-CM

## 2024-10-17 DIAGNOSIS — E44.0 MODERATE MALNUTRITION: ICD-10-CM

## 2024-10-21 ENCOUNTER — PATIENT MESSAGE (OUTPATIENT)
Dept: SURGERY | Facility: CLINIC | Age: 64
End: 2024-10-21
Payer: COMMERCIAL

## 2024-10-23 ENCOUNTER — PATIENT MESSAGE (OUTPATIENT)
Dept: RESEARCH | Facility: HOSPITAL | Age: 64
End: 2024-10-23
Payer: COMMERCIAL

## 2024-10-24 ENCOUNTER — PATIENT MESSAGE (OUTPATIENT)
Dept: RESEARCH | Facility: HOSPITAL | Age: 64
End: 2024-10-24
Payer: COMMERCIAL

## 2024-10-25 ENCOUNTER — PATIENT MESSAGE (OUTPATIENT)
Dept: SURGERY | Facility: CLINIC | Age: 64
End: 2024-10-25
Payer: COMMERCIAL

## 2024-10-28 ENCOUNTER — TELEPHONE (OUTPATIENT)
Dept: SURGERY | Facility: CLINIC | Age: 64
End: 2024-10-28
Payer: COMMERCIAL

## 2024-10-31 ENCOUNTER — OFFICE VISIT (OUTPATIENT)
Dept: SURGERY | Facility: CLINIC | Age: 64
End: 2024-10-31
Payer: COMMERCIAL

## 2024-10-31 VITALS
BODY MASS INDEX: 17.76 KG/M2 | SYSTOLIC BLOOD PRESSURE: 132 MMHG | DIASTOLIC BLOOD PRESSURE: 70 MMHG | WEIGHT: 120.25 LBS | HEART RATE: 77 BPM | OXYGEN SATURATION: 94 %

## 2024-10-31 DIAGNOSIS — K22.2 ANASTOMOTIC STRICTURE AFTER ESOPHAGECTOMY: ICD-10-CM

## 2024-10-31 DIAGNOSIS — Z72.0 TOBACCO USE: ICD-10-CM

## 2024-10-31 DIAGNOSIS — K91.89 ANASTOMOTIC STRICTURE AFTER ESOPHAGECTOMY: ICD-10-CM

## 2024-10-31 DIAGNOSIS — Z90.49 H/O ESOPHAGECTOMY: ICD-10-CM

## 2024-10-31 DIAGNOSIS — C16.0 MALIGNANT NEOPLASM OF GASTROESOPHAGEAL JUNCTION: Primary | ICD-10-CM

## 2024-10-31 DIAGNOSIS — Z98.890 H/O ESOPHAGECTOMY: ICD-10-CM

## 2024-10-31 PROBLEM — R63.8 ALTERATION IN NUTRITION ASSOCIATED WITH TUBE FEEDING: Status: RESOLVED | Noted: 2023-10-20 | Resolved: 2024-10-31

## 2024-10-31 PROBLEM — Z93.4 JEJUNOSTOMY PRESENT: Status: RESOLVED | Noted: 2023-10-20 | Resolved: 2024-10-31

## 2024-10-31 PROCEDURE — 1159F MED LIST DOCD IN RCRD: CPT | Mod: CPTII,S$GLB,, | Performed by: NURSE PRACTITIONER

## 2024-10-31 PROCEDURE — 3008F BODY MASS INDEX DOCD: CPT | Mod: CPTII,S$GLB,, | Performed by: NURSE PRACTITIONER

## 2024-10-31 PROCEDURE — 3075F SYST BP GE 130 - 139MM HG: CPT | Mod: CPTII,S$GLB,, | Performed by: NURSE PRACTITIONER

## 2024-10-31 PROCEDURE — 99212 OFFICE O/P EST SF 10 MIN: CPT | Mod: S$GLB,,, | Performed by: NURSE PRACTITIONER

## 2024-10-31 PROCEDURE — 99999 PR PBB SHADOW E&M-EST. PATIENT-LVL III: CPT | Mod: PBBFAC,,, | Performed by: NURSE PRACTITIONER

## 2024-10-31 PROCEDURE — 3078F DIAST BP <80 MM HG: CPT | Mod: CPTII,S$GLB,, | Performed by: NURSE PRACTITIONER

## 2024-12-18 ENCOUNTER — HOSPITAL ENCOUNTER (OUTPATIENT)
Dept: RADIOLOGY | Facility: HOSPITAL | Age: 64
Discharge: HOME | End: 2024-12-18
Attending: INTERNAL MEDICINE
Payer: COMMERCIAL

## 2024-12-18 DIAGNOSIS — C16.0 MALIGNANT NEOPLASM OF GASTROESOPHAGEAL JUNCTION: ICD-10-CM

## 2024-12-18 PROCEDURE — 74177 CT ABD & PELVIS W/CONTRAST: CPT | Mod: 26,,, | Performed by: RADIOLOGY

## 2024-12-18 PROCEDURE — 25500020 PHARM REV CODE 255: Performed by: INTERNAL MEDICINE

## 2024-12-18 PROCEDURE — 74177 CT ABD & PELVIS W/CONTRAST: CPT | Mod: TC

## 2024-12-18 PROCEDURE — 71260 CT THORAX DX C+: CPT | Mod: 26,,, | Performed by: RADIOLOGY

## 2024-12-18 RX ADMIN — IOHEXOL 15 ML: 350 INJECTION, SOLUTION INTRAVENOUS at 08:12

## 2024-12-18 RX ADMIN — IOHEXOL 75 ML: 350 INJECTION, SOLUTION INTRAVENOUS at 10:12

## 2024-12-19 LAB
CREAT SERPL-MCNC: 0.9 MG/DL (ref 0.5–1.4)
SAMPLE: NORMAL

## 2024-12-23 ENCOUNTER — OFFICE VISIT (OUTPATIENT)
Dept: HEMATOLOGY/ONCOLOGY | Facility: CLINIC | Age: 64
End: 2024-12-23
Payer: COMMERCIAL

## 2024-12-23 VITALS
WEIGHT: 118.19 LBS | DIASTOLIC BLOOD PRESSURE: 68 MMHG | OXYGEN SATURATION: 97 % | HEIGHT: 69 IN | SYSTOLIC BLOOD PRESSURE: 110 MMHG | HEART RATE: 68 BPM | BODY MASS INDEX: 17.51 KG/M2 | TEMPERATURE: 98 F

## 2024-12-23 DIAGNOSIS — T45.1X5A IMMUNODEFICIENCY DUE TO CHEMOTHERAPY: ICD-10-CM

## 2024-12-23 DIAGNOSIS — C16.0 MALIGNANT NEOPLASM OF GASTROESOPHAGEAL JUNCTION: Primary | ICD-10-CM

## 2024-12-23 DIAGNOSIS — K22.2 ESOPHAGEAL STRICTURE: ICD-10-CM

## 2024-12-23 DIAGNOSIS — I70.0 ATHEROSCLEROSIS OF AORTA: ICD-10-CM

## 2024-12-23 DIAGNOSIS — Z79.899 IMMUNODEFICIENCY DUE TO CHEMOTHERAPY: ICD-10-CM

## 2024-12-23 DIAGNOSIS — K20.90 ESOPHAGITIS: ICD-10-CM

## 2024-12-23 DIAGNOSIS — J44.9 CHRONIC OBSTRUCTIVE PULMONARY DISEASE, UNSPECIFIED COPD TYPE: ICD-10-CM

## 2024-12-23 DIAGNOSIS — R53.83 FATIGUE, UNSPECIFIED TYPE: ICD-10-CM

## 2024-12-23 DIAGNOSIS — D84.821 IMMUNODEFICIENCY DUE TO CHEMOTHERAPY: ICD-10-CM

## 2024-12-23 DIAGNOSIS — E44.0 MODERATE MALNUTRITION: ICD-10-CM

## 2024-12-23 DIAGNOSIS — R53.0 NEOPLASTIC MALIGNANT RELATED FATIGUE: ICD-10-CM

## 2024-12-23 PROCEDURE — 3078F DIAST BP <80 MM HG: CPT | Mod: CPTII,S$GLB,, | Performed by: INTERNAL MEDICINE

## 2024-12-23 PROCEDURE — 99999 PR PBB SHADOW E&M-EST. PATIENT-LVL III: CPT | Mod: PBBFAC,,, | Performed by: INTERNAL MEDICINE

## 2024-12-23 PROCEDURE — 99214 OFFICE O/P EST MOD 30 MIN: CPT | Mod: S$GLB,,, | Performed by: INTERNAL MEDICINE

## 2024-12-23 PROCEDURE — 3074F SYST BP LT 130 MM HG: CPT | Mod: CPTII,S$GLB,, | Performed by: INTERNAL MEDICINE

## 2024-12-23 PROCEDURE — 3008F BODY MASS INDEX DOCD: CPT | Mod: CPTII,S$GLB,, | Performed by: INTERNAL MEDICINE

## 2024-12-23 PROCEDURE — 1159F MED LIST DOCD IN RCRD: CPT | Mod: CPTII,S$GLB,, | Performed by: INTERNAL MEDICINE

## 2024-12-23 PROCEDURE — G2211 COMPLEX E/M VISIT ADD ON: HCPCS | Mod: S$GLB,,, | Performed by: INTERNAL MEDICINE

## 2024-12-23 NOTE — PROGRESS NOTES
MEDICAL ONCOLOGY - ESTABLISHED PATIENT VISIT    Reason for visit: Esophageal adenocarcinoma    Best Contact Phone Number(s): [phone] (home)      Cancer/Stage/TNM:    Cancer Staging   Malignant neoplasm of gastroesophageal junction  Staging form: Esophagus - Adenocarcinoma, AJCC 8th Edition  - Clinical stage from 6/13/2023: Stage III (cT3, cN1, cM0, G2) - Signed by Sunday Jasso MD on 7/5/2023       Oncology History   Malignant neoplasm of gastroesophageal junction   6/13/2023 Cancer Staged    Staging form: Esophagus - Adenocarcinoma, AJCC 8th Edition  - Clinical stage from 6/13/2023: Stage III (cT3, cN1, cM0, G2)     6/23/2023 Initial Diagnosis    Malignant neoplasm of gastroesophageal junction     7/3/2023 Tumor Conference     [facility] UGI MULTIDISCIPLINARY TUMOR BOARD  PATIENT REVIEW FORM   ____________________________________________________________    CLINIC #: [id number]   DATE: 7/3/2023    DIAGNOSIS: esophageal CA    PRESENTER: Mitch    PATIENT SUMMARY:   This 64 y/o gentleman is a long time smoker with emphysema who had low dose screening lung CT in 4/2023 per his PCP. Given an abnormality on this scan, he had PET on 6/1/23 which identified large hypermetabolic mid esophageal mass with lymph nodes. He underwent EGD on 6/13/23 that found large fungating ulcerated mass with bleeding in middle third of esophagus to lower esophagus, 34-44cm. Pathology revealed moderately differentiated adenocarcinoma.   Staging PET reviewed - level 2 cervical lymph node with FDG uptake, nothing in lungs concerning  Staging EUS today per Dr. Duffy.   He has been evaluated by Dr. Vance in [place] and Dr. June.   He is scheduled to see rad onc, Dr. Jasso, Wednesday and IR for port placement on 7/12/23.     BOARD RECOMMENDATIONS:   Proceed with neoadj CRT, then restaging to consider surgical resection    CONSULT NEEDED:     [] Surgery    [] Hem/Onc    [] Rad/Onc    [] Dietary                 [] Social  Service    [] Psychology       [] AES  [] Radiology     Clinical Stage: Tumor 3 Node(s) 1 Metastasis 0      GROUP STAGE:  [] O    [] 1   [] II     [x] III   []IV  [] Local recurrence     [] Regional recurrence     [] Distant recurrence   Metastatic site(s): none         [x] [facility] Treatment Guidelines reviewed and care planned is consistent with guidelines.         (i.e., NCCN, NCI, PD, ACO, AUA, etc.)    PRESENTATION AT CANCER CONFERENCE:         [x] Prospective    [] Retrospective     [] Follow-Up         7/16/2023 - 8/8/2023 Chemotherapy    Treatment Summary   Plan Name: OP ESOPHAGEAL PACLITAXEL CARBOPLATIN WEEKLY  Treatment Goal: Control  Status: Inactive  Start Date: 7/16/2023  End Date: 8/8/2023  Provider: Delta June MD  Chemotherapy: CARBOplatin (PARAPLATIN) 195 mg in sodium chloride 0.9% 304.5 mL chemo infusion, 195 mg (100 % of original dose 193 mg), Intravenous, Clinic/HOD 1 time, 1 of 1 cycle  Dose modification:   (original dose 193 mg, Cycle 1)  Administration: 195 mg (7/18/2023), 210 mg (7/24/2023), 230 mg (7/31/2023), 210 mg (8/8/2023)  PACLitaxeL (TAXOL) 50 mg/m2 = 84 mg in sodium chloride 0.9% 250 mL chemo infusion, 50 mg/m2 = 84 mg, Intravenous, Clinic/HOD 1 time, 1 of 1 cycle  Administration: 84 mg (7/18/2023)     7/18/2023 - 8/17/2023 Radiation Therapy    Treating physician: Sunday Jasso    Course: C1 Thorax 2023    Treatment Site Ref. ID Energy Dose/Fx (Gy) #Fx Dose Correction (Gy) Total Dose (Gy) Start Date End Date Elapsed Days   IM Esophagus PTV_High 6X 1.8 23 / 23 0 41.4 7/18/2023 8/17/2023 30          11/15/2023 -  Chemotherapy    Treatment Summary   Plan Name: OP NIVOLUMAB 480 MG Q4W  Treatment Goal: Curative  Status: Active  Start Date: 11/15/2023  End Date: 9/17/2024  Provider: Delta June MD  Chemotherapy: [No matching medication found in this treatment plan]     12/1/2023 Procedure    Surgeon(s) and Role: Nas Meyer MD - Primary     Pre-Operative  Diagnosis:   Esophageal adenocarcinoma  Suspected cervical esophagogastric anastomotic stricture     Post-Operative Diagnosis:   Same  Cervical esophagogastric anastomotic stricture      Operative Findings:    Cervical esophagogastric anastomotic stricture. Unable to pass endoscope through stricture initially, but the scope was able to pass after serial dilations to 18 mm.      Procedures:  Esophagogastroduodenoscopy (EGD), with transendoscopic balloon dilation of esophagus (<30 mm)                Interim History:   64 y.o. male with esophageal adenocarcinoma who presents for follow-up while on surveillance.  He completed his 12th cycle of adjuvant nivolumab on 9/17/24. He feels well physically.  His feeding tube accidentally fell out in October, has been maintaining his weight with PO nutrition alone.  No dysphagia.    ECOG status is 0. Presents with his wife today.     ROS:   Review of Systems   Constitutional:  Negative for chills, fever, malaise/fatigue and weight loss.   HENT:  Negative for sore throat.    Eyes:  Negative for blurred vision and pain.   Respiratory:  Negative for cough and shortness of breath.    Cardiovascular:  Negative for chest pain and leg swelling.   Gastrointestinal:  Negative for abdominal pain, constipation, diarrhea, heartburn, nausea and vomiting.   Genitourinary:  Negative for dysuria and frequency.   Musculoskeletal:  Negative for back pain, falls and myalgias.   Skin:  Negative for rash.   Neurological:  Negative for dizziness, weakness and headaches.   Endo/Heme/Allergies:  Does not bruise/bleed easily.   Psychiatric/Behavioral:  Negative for depression. The patient is not nervous/anxious.    All other systems reviewed and are negative.      Past Medical History:   Past Medical History:   Diagnosis Date    Arthritis     Cancer     COPD (chronic obstructive pulmonary disease)         Past Surgical History:   Past Surgical History:   Procedure Laterality Date    CERVICAL FUSION       COLONOSCOPY N/A 3/27/2018    Procedure: COLONOSCOPY;  Surgeon: Maria Teresa Morocho MD;  Location: [facility] ENDO;  Service: Endoscopy;  Laterality: N/A;    COLONOSCOPY N/A 6/13/2023    Procedure: COLONOSCOPY;  Surgeon: Kelli Snell MD;  Location: [facility] ENDO;  Service: Endoscopy;  Laterality: N/A;    EGD, WITH BALLOON DILATION N/A 12/1/2023    Procedure: EGD, WITH BALLOON DILATION;  Surgeon: Nas Meyer MD;  Location: [facility] OR 2ND FLR;  Service: General;  Laterality: N/A;    EGD, WITH BALLOON DILATION N/A 12/11/2023    Procedure: EGD, WITH BALLOON DILATION;  Surgeon: Nas Meyer MD;  Location: [facility] OR 2ND FLR;  Service: General;  Laterality: N/A;    EGD, WITH BALLOON DILATION N/A 12/20/2023    Procedure: EGD, WITH BALLOON DILATION;  Surgeon: Nas Meyer MD;  Location: [facility] OR 2ND FLR;  Service: General;  Laterality: N/A;    EGD, WITH BALLOON DILATION N/A 1/10/2024    Procedure: EGD, WITH BALLOON DILATION;  Surgeon: Nas Meyer MD;  Location: [facility] OR 2ND FLR;  Service: General;  Laterality: N/A;    EGD, WITH BALLOON DILATION N/A 2/12/2024    Procedure: EGD, WITH BALLOON DILATION;  Surgeon: Nas Meyer MD;  Location: [facility] OR 2ND FLR;  Service: General;  Laterality: N/A;    EGD, WITH BALLOON DILATION N/A 2/23/2024    Procedure: EGD, WITH BALLOON DILATION;  Surgeon: Nas Meyer MD;  Location: [facility] OR 2ND FLR;  Service: General;  Laterality: N/A;    EGD, WITH BALLOON DILATION N/A 3/4/2024    Procedure: EGD, WITH BALLOON DILATION;  Surgeon: Nas Meyer MD;  Location: [facility] OR 2ND FLR;  Service: General;  Laterality: N/A;    EGD, WITH BALLOON DILATION N/A 3/19/2024    Procedure: EGD, WITH BALLOON DILATION;  Surgeon: Nas Meyer MD;  Location: [facility] OR 2ND FLR;  Service: General;  Laterality: N/A;    EGD, WITH BALLOON DILATION N/A 5/7/2024    Procedure: EGD, WITH BALLOON DILATION;  Surgeon: Nas Meyer MD;  Location: [facility] OR 2ND FLR;  Service: General;  Laterality:  N/A;    ENDOSCOPIC ULTRASOUND OF UPPER GASTROINTESTINAL TRACT N/A 7/3/2023    Procedure: ULTRASOUND, UPPER GI TRACT, ENDOSCOPIC;  Surgeon: Ray Duffy MD;  Location: [facility];  Service: Endoscopy;  Laterality: N/A;    ESOPHAGOGASTRODUODENOSCOPY N/A 6/13/2023    Procedure: EGD (ESOPHAGOGASTRODUODENOSCOPY);  Surgeon: Kelli Snell MD;  Location: [facility];  Service: Endoscopy;  Laterality: N/A;    ESOPHAGOGASTRODUODENOSCOPY N/A 7/3/2023    Procedure: EGD (ESOPHAGOGASTRODUODENOSCOPY);  Surgeon: Ray Duffy MD;  Location: [facility];  Service: Endoscopy;  Laterality: N/A;    ESOPHAGOGASTRODUODENOSCOPY N/A 6/28/2024    Procedure: EGD (ESOPHAGOGASTRODUODENOSCOPY);  Surgeon: Rose Marie Grullon MD;  Location: [facility] (2ND FLR);  Service: Endoscopy;  Laterality: N/A;  5/15 portal-EGD with dilation, possible steroid injection, possible needle-knife incision in about 4-6 weeks. [facility] only. 60min case. Caitlyn-tt  6/21/24: precall complete-GD    ESOPHAGOGASTRODUODENOSCOPY N/A 7/11/2024    Procedure: EGD (ESOPHAGOGASTRODUODENOSCOPY);  Surgeon: Rose Marie Grullon MD;  Location: [facility] (2ND FLR);  Service: Endoscopy;  Laterality: N/A;  7/1 portal-Repeat upper endoscopy in 7-10 days for retreatment at [facility] only for 75min session. ryan  7/3-pre call complete-tb    INGUINAL HERNIA REPAIR Bilateral     Right x2, x1 left    PLACEMENT OF JEJUNOSTOMY TUBE  10/4/2023    Procedure: INSERTION, JEJUNOSTOMY TUBE;  Surgeon: Nas Meyer MD;  Location: [facility] OR [address] [place] [address];  Service: General;;    PYLOROMYOTOMY  10/4/2023    Procedure: PYLOROMYOTOMY;  Surgeon: Nas Meyer MD;  Location: [facility] OR [address] [place] [address];  Service: General;;    ROBOT-ASSISTED SURGICAL REMOVAL OF ESOPHAGUS USING DA BALWINDER XI N/A 10/4/2023    Procedure: XI ROBOTIC ESOPHAGECTOMY;  Surgeon: Nas Meyer MD;  Location: [facility] OR [address] [place] [address];  Service: General;  Laterality: N/A;    ROBOTIC REPAIR, HERNIA, PARAESOPHAGEAL  10/4/2023    Procedure: ROBOTIC REPAIR,  "HERNIA, PARAESOPHAGEAL;  Surgeon: Nas Meyer MD;  Location: [facility] OR [address] [place] [address];  Service: General;;    ROTATOR CUFF REPAIR Right     x2        Family History:   Family History   Problem Relation Name Age of Onset    Diabetes Mother      Heart disease Father          CHF    Glaucoma Neg Hx      Colon cancer Neg Hx      Prostate cancer Neg Hx          Social History:   Social History     Tobacco Use    Smoking status: Former     Current packs/day: 0.25     Average packs/day: 0.2 packs/day for 42.0 years (10.5 ttl pk-yrs)     Types: Cigarettes     Start date: 1983     Passive exposure: Current    Smokeless tobacco: Never    Tobacco comments:     Done smoking since september   Substance Use Topics    Alcohol use: Yes     Comment: a couple of beers a day        I have reviewed and updated the patient's past medical, surgical, family and social histories.    Allergies:   Review of patient's allergies indicates:  No Known Allergies     Medications:   Current Outpatient Medications   Medication Sig Dispense Refill    RABEprazole (ACIPHEX) 20 mg tablet Take 1 tablet (20 mg total) by mouth 2 (two) times daily before meals. 60 tablet 11    acetaminophen (TYLENOL) 500 MG tablet Take 1 tablet (500 mg total) by mouth every 6 (six) hours as needed for Pain. (Patient not taking: Reported on 12/23/2024)  0    LIDOcaine-prilocaine (EMLA) cream Apply topically as needed (Port access). (Patient not taking: Reported on 12/23/2024) 30 g 1     No current facility-administered medications for this visit.        Physical Exam:   /68 (BP Location: Left arm, Patient Position: Sitting)   Pulse 68   Temp 97.9 °F (36.6 °C) (Temporal)   Ht 5' 9" (1.753 m)   Wt 53.6 kg (118 lb 2.7 oz)   SpO2 97%   BMI 17.45 kg/m²      ECOG Performance status: 0          Physical Exam  Vitals reviewed.   Constitutional:       General: He is not in acute distress.     Appearance: Normal appearance. He is normal weight.   HENT:      Head: " Normocephalic and atraumatic.      Right Ear: External ear normal.      Left Ear: External ear normal.      Nose: Nose normal.      Mouth/Throat:      Mouth: Mucous membranes are moist.      Pharynx: Oropharynx is clear. No posterior oropharyngeal erythema.   Eyes:      General: No scleral icterus.     Extraocular Movements: Extraocular movements intact.      Conjunctiva/sclera: Conjunctivae normal.      Pupils: Pupils are equal, round, and reactive to light.   Cardiovascular:      Rate and Rhythm: Normal rate and regular rhythm.      Pulses: Normal pulses.      Heart sounds: Normal heart sounds.   Pulmonary:      Effort: Pulmonary effort is normal.      Breath sounds: Normal breath sounds. No wheezing or rales.   Abdominal:      General: Bowel sounds are normal. There is no distension.      Palpations: Abdomen is soft.      Tenderness: There is no abdominal tenderness.      Comments: Surgical wounds well healed   Musculoskeletal:         General: No swelling. Normal range of motion.      Cervical back: Normal range of motion and neck supple.   Skin:     General: Skin is warm.      Coloration: Skin is not jaundiced.      Findings: No erythema or rash.   Neurological:      General: No focal deficit present.      Mental Status: He is alert and oriented to person, place, and time. Mental status is at baseline.      Gait: Gait normal.   Psychiatric:         Mood and Affect: Mood normal.         Behavior: Behavior normal.         Thought Content: Thought content normal.         Judgment: Judgment normal.           Labs:   No results found for this or any previous visit (from the past 48 hours).    Imaging:       PET/CT 8/15/24:    Impression:     1. In this patient with esophageal adenocarcinoma status post esophagogastrectomy there is radiotracer uptake along the posterior aspect of the neoesophagus, favored to represent postoperative uptake.  No residual uptake within index AP window lymph node or left level 2 cervical  lymph node and similar uptake within lesion within the right posterior iliac bone, suggestive of favorable response to treatment.  2. There is interval increased radiotracer uptake within right lung apex irregular opacity, favored to represent infectious or inflammatory process.  Attention on follow-up.  3. Additional findings, as above.    Path:   2. Gastroesophageal junction mass (biopsy):   Invasive adenocarcinoma, moderately differentiated   Background low and high-grade glandular dysplasia   HER2 immunohistochemistry:  Equivocal.     Immunohistochemistry (IHC) Testing for Mismatch Repair (MMR) Proteins:   MLH1, MSH2, MSH6, PMS2 - Intact nuclear expression   Background nonneoplastic tissue/internal control with intact nuclear expression     There are exceptions to the above IHC interpretations. These results should not be considered in isolation, and clinical correlation with genetic counseling is recommended to assess the need for germline testing.     Interpretation: No loss of nuclear expression of MMR proteins: low probability of microsatellite instability       Assessment:       1. Malignant neoplasm of gastroesophageal junction    2. Immunodeficiency due to chemotherapy    3. Moderate malnutrition    4. Neoplastic malignant related fatigue    5. Esophagitis    6. Esophageal stricture    7. Chronic obstructive pulmonary disease, unspecified COPD type    8. Atherosclerosis of aorta    9. Fatigue, unspecified type         Plan:        # Esophageal adenocarcinoma, chemotherapy induced neutropenia/thrombocytopenia  He initially presented with a locally advanced adenocarcinoma of the middle and lower third of the esophagus, pMMR. PET/CT on 6/1/23 did not show clear distant metastatic disease.  We discussed the case and plan with Dr. Meyer and he feels the patient is surgically resectable as well.    Began cycle 1 on 7/18/23. He unfortunately had a reaction to the Taxol. During the Taxol infusion, he developed  coughing, flushing, chest tightness and nausea. O2 sat was 92%, 2L O2 applied NC. We were notified and stopped the Taxol. He was able to proceed with the Carboplatin without complication.   We switched him to Abraxane 40 mg/m2 with week 2.  This was tolerated very well without issue. Has tolerated RT well and has completed 4 cycles of chemoRT. He completed radiation 8/18/23.  We did have to forego his last dose of chemotherapy due to cytopenias.    PET/CT on 9/22/23 showed very nice response to treatment with reduction in SUV avidity of primary tumor.  Discussed with Dr. Meyer.      He underwent esophagogastrectomy with Dr. Meyer on 10/4/23.  Pathology showed ypT2N0 with negative margins and 18 negative lymph nodes.    Because of his residual disease, we recommended adjuvant nivolumab per [id number]. He was in agreement.    Began cycle 1 on 11/15/23.  Tolerating very well.   Lab work pending today. Feeling well with no new concerns.   Presents today for cycle 12, final cycle.  Will proceed pending labs.     Surveillance CT CAP 3/27/24 showed no clear evidence of disease.  Continued R apical scarring a bit more nodular.    CT CAP 6/26/24 showed continued R apical nodular scar/lesion.  Will obtain PET/CT to evaluate whether this may be neoplastic.  PET/CT 8/15/24 showed mild hypermetabolism in R apical lesion; radiology read favored to represent inflammatory changes.  Discussed option to biopsy now or continue surveillance; he wants to continue surveillance.  Incidental T8 compression fracture was seen on CT; no localizing symptoms.  He wishes to defer MRI for now.  No activity there on PET.    CT CAP 12/18/24 shows stable R apical nodule and new mixed appearance of lesions in a pattern felt most likely to represent infectious/inflammatory.  Underlying metastatic disease not excluded.  Will repeat CT chest in 3 months and defer biopsy for now after discussion.    RTC in 3 months with repeat imaging. Reviewed  upcoming appointments.     # Esophagitis, stricture  S/p multiple dilations.   Had EGD & stricturotomy, steroid injection, dilation with Dr. Grullon 7/11/24.  Much improved symptoms. Monitor.     # COPD, tobacco abuse, aortic atherosclerosis  Counseled on tobacco cessation previously.  He has stopped smoking.  Normal SpO2.  No dyspnea.  Atherosclerosis noted on imaging.    # Malnutrition, fatigue  Following with nutrition & surgery team.  Weight stable.    Feeding tube out as of October 2024.  Monitor TSH.     Patient is in agreement with the proposed treatment plan. All questions were answered to the patient's satisfaction. Pt knows to call clinic if anything is needed before the next clinic visit.    Delta June MD  Hematology/Oncology  [facility]      Route Chart for Scheduling    Med Onc Chart Routing      Follow up with physician 3 months.   Follow up with DEMARCO    Infusion scheduling note    Injection scheduling note    Labs CBC, CMP and TSH   Scheduling:  Preferred lab:  Lab interval:     Imaging   CT chest in 3 months prior to f/u   Pharmacy appointment    Other referrals              Treatment Plan Information   OP NIVOLUMAB 480 MG Q4W Delta June MD   Associated diagnosis: Malignant neoplasm of gastroesophageal junction Stage III cT3, cN1, cM0, G2 noted on 6/23/2023   Line of treatment: Adjuvant  Treatment Goal: Curative     Upcoming Treatment Dates - OP NIVOLUMAB 480 MG Q4W    No upcoming days in selected categories.

## 2025-02-19 ENCOUNTER — PATIENT MESSAGE (OUTPATIENT)
Dept: HEMATOLOGY/ONCOLOGY | Facility: CLINIC | Age: 65
End: 2025-02-19
Payer: COMMERCIAL

## 2025-02-19 NOTE — PROGRESS NOTES
"Oncology Nutrition Assessment for Medical Nutrition Therapy  Follow-up Visit    Blayne Beebe   1960    Referring Provider: No ref. provider found      Reason for Visit: nutrition counseling and education    The patient location is: [place]  The chief complaint leading to consultation is: nutrition follow-up    Visit type: audiovisual    Face to Face time with patient: 9 minutes  15 minutes of total time spent on the encounter, which includes face to face time and non-face to face time preparing to see the patient (eg, review of tests), Obtaining and/or reviewing separately obtained history, Documenting clinical information in the electronic or other health record, Independently interpreting results (not separately reported) and communicating results to the patient/family/caregiver, or Care coordination (not separately reported).     Each patient to whom he or she provides medical services by telemedicine is:  (1) informed of the relationship between the physician and patient and the respective role of any other health care provider with respect to management of the patient; and (2) notified that he or she may decline to receive medical services by telemedicine and may withdraw from such care at any time.    PMHx:   Past Medical History:   Diagnosis Date    Arthritis     Cancer     COPD (chronic obstructive pulmonary disease)        Nutrition Assessment    This is a 64 y.o.male with a medical diagnosis of GEJ adenocarcinoma s/p chemoradiation, then esophagectomy 10/4/23, followed by adjuvant Opdivo. He is known to me from previous appointments. J-tube fell out 10/2024. He reports that his appetite is good, eating well. He denies issues swallowing, just avoiding steak. He is including protein at most meals, mostly fish and some chicken. He is also snacking on nuts throughout the day, keeping them in his truck.     Weight: 53.6 kg (118 lb 2.7 oz) - 12/23 clinic weight  Height: 5' 9" (1.753 m)  BMI: " 17.5    Usual BW: 140-145lb  Weight Change: 10lb loss from COVID 2/2023 then another 5-10lb loss - since maintaining    Allergies: Patient has no known allergies.    Current Medications:  Current Outpatient Medications:     acetaminophen (TYLENOL) 500 MG tablet, Take 1 tablet (500 mg total) by mouth every 6 (six) hours as needed for Pain. (Patient not taking: Reported on 12/23/2024), Disp: , Rfl: 0    LIDOcaine-prilocaine (EMLA) cream, Apply topically as needed (Port access). (Patient not taking: Reported on 12/23/2024), Disp: 30 g, Rfl: 1    RABEprazole (ACIPHEX) 20 mg tablet, Take 1 tablet (20 mg total) by mouth 2 (two) times daily before meals., Disp: 60 tablet, Rfl: 11    Food/medication interactions noted: none    Vitamins/Supplements: none reported    Labs: Reviewed    Nutrition Diagnosis    Problem: moderate malnutrition  Etiology (related to): inadequate energy and protein intake  Signs/Symptoms (as evidenced by): reports of inadequate PO intake, weight loss, and both fat & muscle wasting present  Status: Resolved    Nutrition Intervention    Nutrition Prescription   1876 kcals (35kcal/kg)  75g protein (1.4g/kg)   1876mL fluid (35mL/kg)    Recommendations:  Continue to increase PO intake as tolerated  Make meals/snacks high in calories and protein  Drink at least 64oz fluid/day    Materials Provided/Reviewed: none    Nutrition Monitoring and Evaluation    Monitor: diet education needs, energy intake, and weight status    Goals: prevent further weight loss and encourage weight gain    Follow up: in 6-7 months    Communication to referring provider/care team: note available in chart    Counseling time: 9 minutes      Noelle Morelos, MS, RD, LDN  Senior Clinical Dietitian  [facility]

## 2025-02-20 ENCOUNTER — CLINICAL SUPPORT (OUTPATIENT)
Dept: HEMATOLOGY/ONCOLOGY | Facility: CLINIC | Age: 65
End: 2025-02-20
Payer: COMMERCIAL

## 2025-02-20 DIAGNOSIS — Z71.3 NUTRITIONAL COUNSELING: Primary | ICD-10-CM

## 2025-02-20 DIAGNOSIS — C16.0 MALIGNANT NEOPLASM OF GASTROESOPHAGEAL JUNCTION: ICD-10-CM

## 2025-02-20 DIAGNOSIS — E44.0 MODERATE MALNUTRITION: ICD-10-CM

## 2025-03-19 DIAGNOSIS — R53.83 FATIGUE, UNSPECIFIED TYPE: Primary | ICD-10-CM

## 2025-03-19 DIAGNOSIS — C16.0 MALIGNANT NEOPLASM OF GASTROESOPHAGEAL JUNCTION: ICD-10-CM

## 2025-03-21 ENCOUNTER — HOSPITAL ENCOUNTER (OUTPATIENT)
Dept: RADIOLOGY | Facility: HOSPITAL | Age: 65
Discharge: HOME | End: 2025-03-21
Attending: INTERNAL MEDICINE
Payer: COMMERCIAL

## 2025-03-21 DIAGNOSIS — C16.0 MALIGNANT NEOPLASM OF GASTROESOPHAGEAL JUNCTION: ICD-10-CM

## 2025-03-21 PROCEDURE — 71250 CT THORAX DX C-: CPT | Mod: 26,,, | Performed by: RADIOLOGY

## 2025-03-21 PROCEDURE — 71250 CT THORAX DX C-: CPT | Mod: TC

## 2025-03-24 ENCOUNTER — OFFICE VISIT (OUTPATIENT)
Dept: HEMATOLOGY/ONCOLOGY | Facility: CLINIC | Age: 65
End: 2025-03-24
Payer: COMMERCIAL

## 2025-03-24 VITALS
HEIGHT: 69 IN | HEART RATE: 65 BPM | SYSTOLIC BLOOD PRESSURE: 108 MMHG | RESPIRATION RATE: 18 BRPM | OXYGEN SATURATION: 96 % | BODY MASS INDEX: 16.91 KG/M2 | TEMPERATURE: 98 F | DIASTOLIC BLOOD PRESSURE: 73 MMHG | WEIGHT: 114.19 LBS

## 2025-03-24 DIAGNOSIS — C16.0 MALIGNANT NEOPLASM OF GASTROESOPHAGEAL JUNCTION: Primary | ICD-10-CM

## 2025-03-24 DIAGNOSIS — J44.9 CHRONIC OBSTRUCTIVE PULMONARY DISEASE, UNSPECIFIED COPD TYPE: ICD-10-CM

## 2025-03-24 DIAGNOSIS — F17.210 NICOTINE DEPENDENCE, CIGARETTES, UNCOMPLICATED: ICD-10-CM

## 2025-03-24 DIAGNOSIS — I70.0 ATHEROSCLEROSIS OF AORTA: ICD-10-CM

## 2025-03-24 DIAGNOSIS — R53.83 FATIGUE, UNSPECIFIED TYPE: ICD-10-CM

## 2025-03-24 DIAGNOSIS — K20.90 ESOPHAGITIS: ICD-10-CM

## 2025-03-24 DIAGNOSIS — K22.2 ESOPHAGEAL STRICTURE: ICD-10-CM

## 2025-03-24 DIAGNOSIS — E44.0 MODERATE MALNUTRITION: ICD-10-CM

## 2025-03-24 PROCEDURE — 1159F MED LIST DOCD IN RCRD: CPT | Mod: CPTII,S$GLB,, | Performed by: INTERNAL MEDICINE

## 2025-03-24 PROCEDURE — 3008F BODY MASS INDEX DOCD: CPT | Mod: CPTII,S$GLB,, | Performed by: INTERNAL MEDICINE

## 2025-03-24 PROCEDURE — 3078F DIAST BP <80 MM HG: CPT | Mod: CPTII,S$GLB,, | Performed by: INTERNAL MEDICINE

## 2025-03-24 PROCEDURE — 3074F SYST BP LT 130 MM HG: CPT | Mod: CPTII,S$GLB,, | Performed by: INTERNAL MEDICINE

## 2025-03-24 PROCEDURE — G2211 COMPLEX E/M VISIT ADD ON: HCPCS | Mod: S$GLB,,, | Performed by: INTERNAL MEDICINE

## 2025-03-24 PROCEDURE — 99999 PR PBB SHADOW E&M-EST. PATIENT-LVL III: CPT | Mod: PBBFAC,,, | Performed by: INTERNAL MEDICINE

## 2025-03-24 PROCEDURE — 99214 OFFICE O/P EST MOD 30 MIN: CPT | Mod: S$GLB,,, | Performed by: INTERNAL MEDICINE

## 2025-03-24 NOTE — PROGRESS NOTES
MEDICAL ONCOLOGY - ESTABLISHED PATIENT VISIT    Reason for visit: Esophageal adenocarcinoma    Best Contact Phone Number(s): [phone] (home)      Cancer/Stage/TNM:    Cancer Staging   Malignant neoplasm of gastroesophageal junction  Staging form: Esophagus - Adenocarcinoma, AJCC 8th Edition  - Clinical stage from 6/13/2023: Stage III (cT3, cN1, cM0, G2) - Signed by Sunday Jasso MD on 7/5/2023       Oncology History   Malignant neoplasm of gastroesophageal junction   6/13/2023 Cancer Staged    Staging form: Esophagus - Adenocarcinoma, AJCC 8th Edition  - Clinical stage from 6/13/2023: Stage III (cT3, cN1, cM0, G2)     6/23/2023 Initial Diagnosis    Malignant neoplasm of gastroesophageal junction     7/3/2023 Tumor Conference     [facility] UGI MULTIDISCIPLINARY TUMOR BOARD  PATIENT REVIEW FORM   ____________________________________________________________    CLINIC #: [id number]   DATE: 7/3/2023    DIAGNOSIS: esophageal CA    PRESENTER: Mitch    PATIENT SUMMARY:   This 64 y/o gentleman is a long time smoker with emphysema who had low dose screening lung CT in 4/2023 per his PCP. Given an abnormality on this scan, he had PET on 6/1/23 which identified large hypermetabolic mid esophageal mass with lymph nodes. He underwent EGD on 6/13/23 that found large fungating ulcerated mass with bleeding in middle third of esophagus to lower esophagus, 34-44cm. Pathology revealed moderately differentiated adenocarcinoma.   Staging PET reviewed - level 2 cervical lymph node with FDG uptake, nothing in lungs concerning  Staging EUS today per Dr. Duffy.   He has been evaluated by Dr. Vance in [place] and Dr. June.   He is scheduled to see rad onc, Dr. Jasso, Wednesday and IR for port placement on 7/12/23.     BOARD RECOMMENDATIONS:   Proceed with neoadj CRT, then restaging to consider surgical resection    CONSULT NEEDED:     [] Surgery    [] Hem/Onc    [] Rad/Onc    [] Dietary                 [] Social  Service    [] Psychology       [] AES  [] Radiology     Clinical Stage: Tumor 3 Node(s) 1 Metastasis 0      GROUP STAGE:  [] O    [] 1   [] II     [x] III   []IV  [] Local recurrence     [] Regional recurrence     [] Distant recurrence   Metastatic site(s): none         [x] [facility] Treatment Guidelines reviewed and care planned is consistent with guidelines.         (i.e., NCCN, NCI, PD, ACO, AUA, etc.)    PRESENTATION AT CANCER CONFERENCE:         [x] Prospective    [] Retrospective     [] Follow-Up         7/16/2023 - 8/8/2023 Chemotherapy    Treatment Summary   Plan Name: OP ESOPHAGEAL PACLITAXEL CARBOPLATIN WEEKLY  Treatment Goal: Control  Status: Inactive  Start Date: 7/16/2023  End Date: 8/8/2023  Provider: Delta June MD  Chemotherapy: CARBOplatin (PARAPLATIN) 195 mg in sodium chloride 0.9% 304.5 mL chemo infusion, 195 mg (100 % of original dose 193 mg), Intravenous, Clinic/HOD 1 time, 1 of 1 cycle  Dose modification:   (original dose 193 mg, Cycle 1)  Administration: 195 mg (7/18/2023), 210 mg (7/24/2023), 230 mg (7/31/2023), 210 mg (8/8/2023)  PACLitaxeL (TAXOL) 50 mg/m2 = 84 mg in sodium chloride 0.9% 250 mL chemo infusion, 50 mg/m2 = 84 mg, Intravenous, Clinic/HOD 1 time, 1 of 1 cycle  Administration: 84 mg (7/18/2023)     7/18/2023 - 8/17/2023 Radiation Therapy    Treating physician: Sunday Jasso    Course: C1 Thorax 2023    Treatment Site Ref. ID Energy Dose/Fx (Gy) #Fx Dose Correction (Gy) Total Dose (Gy) Start Date End Date Elapsed Days   IM Esophagus PTV_High 6X 1.8 23 / 23 0 41.4 7/18/2023 8/17/2023 30          11/15/2023 -  Chemotherapy    Treatment Summary   Plan Name: OP NIVOLUMAB 480 MG Q4W  Treatment Goal: Curative  Status: Active  Start Date: 11/15/2023  End Date: 9/17/2024  Provider: Delta June MD  Chemotherapy: [No matching medication found in this treatment plan]     12/1/2023 Procedure    Surgeon(s) and Role: Nas Meyer MD - Primary     Pre-Operative  Diagnosis:   Esophageal adenocarcinoma  Suspected cervical esophagogastric anastomotic stricture     Post-Operative Diagnosis:   Same  Cervical esophagogastric anastomotic stricture      Operative Findings:    Cervical esophagogastric anastomotic stricture. Unable to pass endoscope through stricture initially, but the scope was able to pass after serial dilations to 18 mm.      Procedures:  Esophagogastroduodenoscopy (EGD), with transendoscopic balloon dilation of esophagus (<30 mm)                Interim History:   64 y.o. male with esophageal adenocarcinoma who presents for follow-up while on surveillance.  He completed his 12th cycle of adjuvant nivolumab on 9/17/24. He continues to feel well.  He is eating well but has lost a bit of weight.  Denies any significant or worsening dysphagia or pain.  No dyspnea.    ECOG status is 0. Presents with his wife today.     ROS:   Review of Systems   Constitutional:  Negative for chills, fever, malaise/fatigue and weight loss.   HENT:  Negative for sore throat.    Eyes:  Negative for blurred vision and pain.   Respiratory:  Negative for cough and shortness of breath.    Cardiovascular:  Negative for chest pain and leg swelling.   Gastrointestinal:  Negative for abdominal pain, constipation, diarrhea, heartburn, nausea and vomiting.   Genitourinary:  Negative for dysuria and frequency.   Musculoskeletal:  Negative for back pain, falls and myalgias.   Skin:  Negative for rash.   Neurological:  Negative for dizziness, weakness and headaches.   Endo/Heme/Allergies:  Does not bruise/bleed easily.   Psychiatric/Behavioral:  Negative for depression. The patient is not nervous/anxious.    All other systems reviewed and are negative.      Past Medical History:   Past Medical History:   Diagnosis Date    Arthritis     Cancer     COPD (chronic obstructive pulmonary disease)         Past Surgical History:   Past Surgical History:   Procedure Laterality Date    CERVICAL FUSION       COLONOSCOPY N/A 3/27/2018    Procedure: COLONOSCOPY;  Surgeon: Maria Teresa Morocho MD;  Location: [facility] ENDO;  Service: Endoscopy;  Laterality: N/A;    COLONOSCOPY N/A 6/13/2023    Procedure: COLONOSCOPY;  Surgeon: Kelli Snell MD;  [facility];  Service: Endoscopy;  Laterality: N/A;    EGD, WITH BALLOON DILATION N/A 12/1/2023    Procedure: EGD, WITH BALLOON DILATION;  Surgeon: Nas Meyer MD;  Location: [facility] OR 2ND FLR;  Service: General;  Laterality: N/A;    EGD, WITH BALLOON DILATION N/A 12/11/2023    Procedure: EGD, WITH BALLOON DILATION;  Surgeon: Nas Meyer MD;  Location: [facility] OR 2ND FLR;  Service: General;  Laterality: N/A;    EGD, WITH BALLOON DILATION N/A 12/20/2023    Procedure: EGD, WITH BALLOON DILATION;  Surgeon: Nas Meyer MD;  Location: [facility] OR 2ND FLR;  Service: General;  Laterality: N/A;    EGD, WITH BALLOON DILATION N/A 1/10/2024    Procedure: EGD, WITH BALLOON DILATION;  Surgeon: Nas Meyer MD;  Location: [facility] OR 2ND FLR;  Service: General;  Laterality: N/A;    EGD, WITH BALLOON DILATION N/A 2/12/2024    Procedure: EGD, WITH BALLOON DILATION;  Surgeon: Nas Meyer MD;  Location: [facility] OR 2ND FLR;  Service: General;  Laterality: N/A;    EGD, WITH BALLOON DILATION N/A 2/23/2024    Procedure: EGD, WITH BALLOON DILATION;  Surgeon: Nas Meyer MD;  Location: [facility] OR 2ND FLR;  Service: General;  Laterality: N/A;    EGD, WITH BALLOON DILATION N/A 3/4/2024    Procedure: EGD, WITH BALLOON DILATION;  Surgeon: Nas Meyer MD;  [facility] 2ND FLR;  Service: General;  Laterality: N/A;    EGD, WITH BALLOON DILATION N/A 3/19/2024    Procedure: EGD, WITH BALLOON DILATION;  Surgeon: Nas Meyer MD;  Location: [facility] OR 2ND FLR;  Service: General;  Laterality: N/A;    EGD, WITH BALLOON DILATION N/A 5/7/2024    Procedure: EGD, WITH BALLOON DILATION;  Surgeon: Nas Meyer MD;  Location: [facility] OR 2ND FLR;  Service: General;  Laterality:  N/A;    ENDOSCOPIC ULTRASOUND OF UPPER GASTROINTESTINAL TRACT N/A 7/3/2023    Procedure: ULTRASOUND, UPPER GI TRACT, ENDOSCOPIC;  Surgeon: Ray Duffy MD;  Location: [facility];  Service: Endoscopy;  Laterality: N/A;    ESOPHAGOGASTRODUODENOSCOPY N/A 6/13/2023    Procedure: EGD (ESOPHAGOGASTRODUODENOSCOPY);  Surgeon: Kelli Snell MD;  Location: [facility];  Service: Endoscopy;  Laterality: N/A;    ESOPHAGOGASTRODUODENOSCOPY N/A 7/3/2023    Procedure: EGD (ESOPHAGOGASTRODUODENOSCOPY);  Surgeon: Ray Duffy MD;  Location: [facility];  Service: Endoscopy;  Laterality: N/A;    ESOPHAGOGASTRODUODENOSCOPY N/A 6/28/2024    Procedure: EGD (ESOPHAGOGASTRODUODENOSCOPY);  Surgeon: Rose Marie Grullon MD;  Location: [facility] (2ND FLR);  Service: Endoscopy;  Laterality: N/A;  5/15 portal-EGD with dilation, possible steroid injection, possible needle-knife incision in about 4-6 weeks. [facility] only. 60min case. Caitlyn-tt  6/21/24: precall complete-GD    ESOPHAGOGASTRODUODENOSCOPY N/A 7/11/2024    Procedure: EGD (ESOPHAGOGASTRODUODENOSCOPY);  Surgeon: Rose Marie Grullon MD;  Location: [facility] (2ND FLR);  Service: Endoscopy;  Laterality: N/A;  7/1 portal-Repeat upper endoscopy in 7-10 days for retreatment at [facility] only for 75min session. ryan  7/3-pre call complete-tb    INGUINAL HERNIA REPAIR Bilateral     Right x2, x1 left    PLACEMENT OF JEJUNOSTOMY TUBE  10/4/2023    Procedure: INSERTION, JEJUNOSTOMY TUBE;  Surgeon: Nas Meyer MD;  Location: [facility] OR [address] [place] [address];  Service: General;;    PYLOROMYOTOMY  10/4/2023    Procedure: PYLOROMYOTOMY;  Surgeon: Nas Meyer MD;  Location: [facility] OR [address] [place] [address];  Service: General;;    ROBOT-ASSISTED SURGICAL REMOVAL OF ESOPHAGUS USING DA BALWINDER XI N/A 10/4/2023    Procedure: XI ROBOTIC ESOPHAGECTOMY;  Surgeon: Nas Meyer MD;  Location: [facility] OR [address] [place] [address];  Service: General;  Laterality: N/A;    ROBOTIC REPAIR, HERNIA, PARAESOPHAGEAL  10/4/2023    Procedure: ROBOTIC REPAIR,  "HERNIA, PARAESOPHAGEAL;  Surgeon: Nas Meyer MD;  Location: [facility] OR [address] [place] [address];  Service: General;;    ROTATOR CUFF REPAIR Right     x2        Family History:   Family History   Problem Relation Name Age of Onset    Diabetes Mother      Heart disease Father          CHF    Glaucoma Neg Hx      Colon cancer Neg Hx      Prostate cancer Neg Hx          Social History:   Social History     Tobacco Use    Smoking status: Former     Current packs/day: 0.25     Average packs/day: 0.3 packs/day for 42.2 years (10.6 ttl pk-yrs)     Types: Cigarettes     Start date: 1983     Passive exposure: Current    Smokeless tobacco: Never    Tobacco comments:     Done smoking since september   Substance Use Topics    Alcohol use: Yes     Comment: a couple of beers a day        I have reviewed and updated the patient's past medical, surgical, family and social histories.    Allergies:   Review of patient's allergies indicates:  No Known Allergies     Medications:   Current Outpatient Medications   Medication Sig Dispense Refill    RABEprazole (ACIPHEX) 20 mg tablet Take 1 tablet (20 mg total) by mouth 2 (two) times daily before meals. 60 tablet 11    acetaminophen (TYLENOL) 500 MG tablet Take 1 tablet (500 mg total) by mouth every 6 (six) hours as needed for Pain. (Patient not taking: Reported on 6/27/2024)  0    LIDOcaine-prilocaine (EMLA) cream Apply topically as needed (Port access). (Patient not taking: Reported on 9/17/2024) 30 g 1     No current facility-administered medications for this visit.        Physical Exam:   /73 (BP Location: Left arm, Patient Position: Sitting)   Pulse 65   Temp 98.1 °F (36.7 °C) (Temporal)   Resp 18   Ht 5' 9" (1.753 m)   Wt 51.8 kg (114 lb 3.2 oz)   SpO2 96%   BMI 16.86 kg/m²                Physical Exam  Vitals reviewed.   Constitutional:       General: He is not in acute distress.     Appearance: Normal appearance. He is normal weight.      Comments: Thin male   Eyes:      General: " No scleral icterus.     Extraocular Movements: Extraocular movements intact.      Conjunctiva/sclera: Conjunctivae normal.   Cardiovascular:      Rate and Rhythm: Normal rate.   Pulmonary:      Effort: Pulmonary effort is normal. No respiratory distress.   Abdominal:      Tenderness: There is no abdominal tenderness.   Musculoskeletal:         General: No swelling. Normal range of motion.   Skin:     General: Skin is warm.      Coloration: Skin is not jaundiced.      Findings: No erythema or rash.   Neurological:      General: No focal deficit present.      Mental Status: He is alert and oriented to person, place, and time. Mental status is at baseline.      Gait: Gait normal.   Psychiatric:         Mood and Affect: Mood normal.         Behavior: Behavior normal.           Labs:   No results found for this or any previous visit (from the past 48 hours).    Imaging:       CT CAP - 12/18/24:    Impression:     Status post esophagectomy with gastric pull-through for esophageal cancer.  No convincing local recurrent disease.     Right apical lesion, stable when compared to prior CT.  However, notable increase in size when compared to CT 04/20/2023.     New scattered solid and ground-glass pulmonary opacities in a peribronchovascular distribution.  Findings can be seen with aspiration, infectious/inflammatory process with underlying metastatic disease not excluded.     Stable nonspecific left adrenal gland nodular thickening.     Additional findings as above    Path:   2. Gastroesophageal junction mass (biopsy):   Invasive adenocarcinoma, moderately differentiated   Background low and high-grade glandular dysplasia   HER2 immunohistochemistry:  Equivocal.     Immunohistochemistry (IHC) Testing for Mismatch Repair (MMR) Proteins:   MLH1, MSH2, MSH6, PMS2 - Intact nuclear expression   Background nonneoplastic tissue/internal control with intact nuclear expression     There are exceptions to the above IHC interpretations.  These results should not be considered in isolation, and clinical correlation with genetic counseling is recommended to assess the need for germline testing.     Interpretation: No loss of nuclear expression of MMR proteins: low probability of microsatellite instability       Assessment:       1. Malignant neoplasm of gastroesophageal junction    2. Esophagitis    3. Esophageal stricture    4. Chronic obstructive pulmonary disease, unspecified COPD type    5. Atherosclerosis of aorta    6. Nicotine dependence, cigarettes, uncomplicated    7. Moderate malnutrition    8. Fatigue, unspecified type           Plan:        # Esophageal adenocarcinoma, chemotherapy induced neutropenia/thrombocytopenia  He initially presented with a locally advanced adenocarcinoma of the middle and lower third of the esophagus, pMMR. PET/CT on 6/1/23 did not show clear distant metastatic disease.  We discussed the case and plan with Dr. Meyer and he feels the patient is surgically resectable as well.    Began cycle 1 on 7/18/23. He unfortunately had a reaction to the Taxol. During the Taxol infusion, he developed coughing, flushing, chest tightness and nausea. O2 sat was 92%, 2L O2 applied NC. We were notified and stopped the Taxol. He was able to proceed with the Carboplatin without complication.   We switched him to Abraxane 40 mg/m2 with week 2.  This was tolerated very well without issue. Has tolerated RT well and has completed 4 cycles of chemoRT. He completed radiation 8/18/23.  We did have to forego his last dose of chemotherapy due to cytopenias.    PET/CT on 9/22/23 showed very nice response to treatment with reduction in SUV avidity of primary tumor.  Discussed with Dr. Meyer.      He underwent esophagogastrectomy with Dr. Meyer on 10/4/23.  Pathology showed ypT2N0 with negative margins and 18 negative lymph nodes.    Because of his residual disease, we recommended adjuvant nivolumab per [id number]. He was in  agreement.    Began cycle 1 on 11/15/23.  Tolerated very well.  Completed cycle 12 9/17/24.    Surveillance CT CAP 3/27/24 showed no clear evidence of disease.  Continued R apical scarring a bit more nodular.    CT CAP 6/26/24 showed continued R apical nodular scar/lesion.  Will obtain PET/CT to evaluate whether this may be neoplastic.  PET/CT 8/15/24 showed mild hypermetabolism in R apical lesion; radiology read favored to represent inflammatory changes.  Discussed option to biopsy now or continue surveillance; he wants to continue surveillance.  Incidental T8 compression fracture was seen on CT; no localizing symptoms.  He wishes to defer MRI for now.  No activity there on PET.    CT CAP 12/18/24 shows stable R apical nodule and new mixed appearance of lesions in a pattern felt most likely to represent infectious/inflammatory.  Underlying metastatic disease not excluded.  CT Chest from 3/21/25 does not show any new concerning findings from a malignancy perspective.  Continues to have scattered opacities that appear more fibrotic/inflammatory than malignant to me.  Awaiting final read.  He wants to defer biopsy or pulmonology consultation at this time.    RTC in 3 months with repeat CT CAP.     # Esophagitis, stricture  S/p multiple dilations.   Had EGD & stricturotomy, steroid injection, dilation with Dr. Grullon 7/11/24.  Much improved symptoms. Monitor.     # COPD, tobacco abuse, aortic atherosclerosis  Counseled on tobacco cessation previously.  He has stopped smoking.  Normal SpO2.  No dyspnea.  Atherosclerosis noted on imaging.    # Malnutrition, fatigue  Following with nutrition & surgery team.  Weight slightly down.  Feeding tube out as of October 2024.  Monitor TSH.   Emphasized importance of getting his weight back up.    Patient is in agreement with the proposed treatment plan. All questions were answered to the patient's satisfaction. Pt knows to call clinic if anything is needed before the next clinic  visit.     code applied: patient requires or will require a continuous, longitudinal, and active collaborative plan of care related to this patient's health condition, esophageal cancer --the management of which requires the direction of a practitioner with specialized clinical knowledge, skill, and expertise.       Delta June MD  Hematology/Oncology  [facility]      Route Chart for Scheduling    Med Onc Chart Routing      Follow up with physician 3 months.   Follow up with DEMARCO    Infusion scheduling note    Injection scheduling note    Labs CBC, CMP and TSH   Scheduling:  Preferred lab:  Lab interval:     Imaging CT chest abdomen pelvis      Pharmacy appointment    Other referrals              Treatment Plan Information   OP NIVOLUMAB 480 MG Q4W Delta June MD   Associated diagnosis: Malignant neoplasm of gastroesophageal junction Stage III cT3, cN1, cM0, G2 noted on 6/23/2023   Line of treatment: Adjuvant  Treatment Goal: Curative     Upcoming Treatment Dates - OP NIVOLUMAB 480 MG Q4W    No upcoming days in selected categories.

## 2025-04-01 ENCOUNTER — PATIENT MESSAGE (OUTPATIENT)
Dept: HEMATOLOGY/ONCOLOGY | Facility: CLINIC | Age: 65
End: 2025-04-01
Payer: COMMERCIAL

## 2025-05-28 ENCOUNTER — PATIENT MESSAGE (OUTPATIENT)
Dept: SURGERY | Facility: CLINIC | Age: 65
End: 2025-05-28
Payer: COMMERCIAL

## 2025-05-28 DIAGNOSIS — K22.2 ANASTOMOTIC STRICTURE AFTER ESOPHAGECTOMY: Primary | ICD-10-CM

## 2025-05-28 DIAGNOSIS — R13.19 ESOPHAGEAL DYSPHAGIA: ICD-10-CM

## 2025-05-28 DIAGNOSIS — K91.89 ANASTOMOTIC STRICTURE AFTER ESOPHAGECTOMY: Primary | ICD-10-CM

## 2025-05-29 ENCOUNTER — TELEPHONE (OUTPATIENT)
Dept: CARDIOTHORACIC SURGERY | Facility: CLINIC | Age: 65
End: 2025-05-29
Payer: COMMERCIAL

## 2025-05-29 ENCOUNTER — ANESTHESIA EVENT (OUTPATIENT)
Dept: SURGERY | Facility: HOSPITAL | Age: 65
End: 2025-05-29
Payer: COMMERCIAL

## 2025-05-29 DIAGNOSIS — K22.2 ESOPHAGEAL STRICTURE: ICD-10-CM

## 2025-05-29 DIAGNOSIS — C16.0 MALIGNANT NEOPLASM OF GASTROESOPHAGEAL JUNCTION: Primary | ICD-10-CM

## 2025-05-29 NOTE — TELEPHONE ENCOUNTER
Spoke to pts spouse, Rekha and confirmed procedure for 5/30/25 with Dr. Meyer. Informed to arrive at [facility] on the 2nd floor on [facility] for 0630 and surgery time is approximately 0810. Surgery Instructions provided as follows:  instructed to remain NPO solids 24 hours prior to surgery, pt to remain on clear liquids for 24 h prior to surgery and up until 2 hours prior to surgery, to shower with antibacterial soap the night before surgery and morning of surgery before arrival, not to apply any lotions, powders or deodorant, remove all metal and jewelry, to wear comfortable clothes, and leave all valuables at home. Medications reviewed and  instructed to hold medications on DOS. Confirmed pt  will have transportation home and spouse will accompany pt on DOS. Pts spouse verbalized understanding to all of the above.

## 2025-05-30 ENCOUNTER — HOSPITAL ENCOUNTER (OUTPATIENT)
Facility: HOSPITAL | Age: 65
Discharge: HOME | End: 2025-05-30
Attending: SURGERY | Admitting: SURGERY
Payer: COMMERCIAL

## 2025-05-30 ENCOUNTER — ANESTHESIA (OUTPATIENT)
Dept: SURGERY | Facility: HOSPITAL | Age: 65
End: 2025-05-30
Payer: COMMERCIAL

## 2025-05-30 VITALS
HEART RATE: 65 BPM | TEMPERATURE: 98 F | RESPIRATION RATE: 22 BRPM | SYSTOLIC BLOOD PRESSURE: 132 MMHG | DIASTOLIC BLOOD PRESSURE: 69 MMHG | OXYGEN SATURATION: 100 %

## 2025-05-30 DIAGNOSIS — C16.0 MALIGNANT NEOPLASM OF GASTROESOPHAGEAL JUNCTION: ICD-10-CM

## 2025-05-30 DIAGNOSIS — K22.2 ESOPHAGEAL STRICTURE: ICD-10-CM

## 2025-05-30 PROCEDURE — 71000015 HC POSTOP RECOV 1ST HR: Performed by: SURGERY

## 2025-05-30 PROCEDURE — 36000707: Performed by: SURGERY

## 2025-05-30 PROCEDURE — C1726 CATH, BAL DIL, NON-VASCULAR: HCPCS | Performed by: SURGERY

## 2025-05-30 PROCEDURE — 71000044 HC DOSC ROUTINE RECOVERY FIRST HOUR: Performed by: SURGERY

## 2025-05-30 PROCEDURE — 36000706: Performed by: SURGERY

## 2025-05-30 PROCEDURE — D9220A PRA ANESTHESIA: Mod: CRNA,,,

## 2025-05-30 PROCEDURE — 37000009 HC ANESTHESIA EA ADD 15 MINS: Performed by: SURGERY

## 2025-05-30 PROCEDURE — 25000003 PHARM REV CODE 250

## 2025-05-30 PROCEDURE — 63600175 PHARM REV CODE 636 W HCPCS

## 2025-05-30 PROCEDURE — 37000008 HC ANESTHESIA 1ST 15 MINUTES: Performed by: SURGERY

## 2025-05-30 PROCEDURE — 43249 ESOPH EGD DILATION <30 MM: CPT | Mod: ,,, | Performed by: SURGERY

## 2025-05-30 PROCEDURE — D9220A PRA ANESTHESIA: Mod: ANES,,, | Performed by: SURGERY

## 2025-05-30 RX ORDER — SUCCINYLCHOLINE CHLORIDE 20 MG/ML
INJECTION INTRAMUSCULAR; INTRAVENOUS
Status: DISCONTINUED | OUTPATIENT
Start: 2025-05-30 | End: 2025-05-30

## 2025-05-30 RX ORDER — MIDAZOLAM HYDROCHLORIDE 1 MG/ML
INJECTION INTRAMUSCULAR; INTRAVENOUS
Status: DISCONTINUED | OUTPATIENT
Start: 2025-05-30 | End: 2025-05-30

## 2025-05-30 RX ORDER — ROCURONIUM BROMIDE 10 MG/ML
INJECTION, SOLUTION INTRAVENOUS
Status: DISCONTINUED | OUTPATIENT
Start: 2025-05-30 | End: 2025-05-30

## 2025-05-30 RX ORDER — PHENYLEPHRINE HYDROCHLORIDE 10 MG/ML
INJECTION INTRAVENOUS
Status: DISCONTINUED | OUTPATIENT
Start: 2025-05-30 | End: 2025-05-30

## 2025-05-30 RX ORDER — ONDANSETRON HYDROCHLORIDE 2 MG/ML
INJECTION, SOLUTION INTRAVENOUS
Status: DISCONTINUED | OUTPATIENT
Start: 2025-05-30 | End: 2025-05-30

## 2025-05-30 RX ORDER — GLUCAGON 1 MG
1 KIT INJECTION
Status: DISCONTINUED | OUTPATIENT
Start: 2025-05-30 | End: 2025-05-30 | Stop reason: HOSPADM

## 2025-05-30 RX ORDER — LIDOCAINE HYDROCHLORIDE 20 MG/ML
INJECTION, SOLUTION EPIDURAL; INFILTRATION; INTRACAUDAL; PERINEURAL
Status: DISCONTINUED | OUTPATIENT
Start: 2025-05-30 | End: 2025-05-30

## 2025-05-30 RX ORDER — HYDROMORPHONE HYDROCHLORIDE 1 MG/ML
0.2 INJECTION, SOLUTION INTRAMUSCULAR; INTRAVENOUS; SUBCUTANEOUS EVERY 5 MIN PRN
Status: DISCONTINUED | OUTPATIENT
Start: 2025-05-30 | End: 2025-05-30 | Stop reason: HOSPADM

## 2025-05-30 RX ORDER — SODIUM CHLORIDE 0.9 % (FLUSH) 0.9 %
10 SYRINGE (ML) INJECTION
Status: DISCONTINUED | OUTPATIENT
Start: 2025-05-30 | End: 2025-05-30 | Stop reason: HOSPADM

## 2025-05-30 RX ORDER — DEXMEDETOMIDINE HYDROCHLORIDE 100 UG/ML
INJECTION, SOLUTION INTRAVENOUS
Status: DISCONTINUED | OUTPATIENT
Start: 2025-05-30 | End: 2025-05-30

## 2025-05-30 RX ORDER — EPHEDRINE SULFATE 50 MG/ML
INJECTION, SOLUTION INTRAVENOUS
Status: DISCONTINUED | OUTPATIENT
Start: 2025-05-30 | End: 2025-05-30

## 2025-05-30 RX ORDER — FENTANYL CITRATE 50 UG/ML
25 INJECTION, SOLUTION INTRAMUSCULAR; INTRAVENOUS EVERY 5 MIN PRN
Status: DISCONTINUED | OUTPATIENT
Start: 2025-05-30 | End: 2025-05-30 | Stop reason: HOSPADM

## 2025-05-30 RX ORDER — PROPOFOL 10 MG/ML
VIAL (ML) INTRAVENOUS
Status: DISCONTINUED | OUTPATIENT
Start: 2025-05-30 | End: 2025-05-30

## 2025-05-30 RX ORDER — FENTANYL CITRATE 50 UG/ML
INJECTION, SOLUTION INTRAMUSCULAR; INTRAVENOUS
Status: DISCONTINUED | OUTPATIENT
Start: 2025-05-30 | End: 2025-05-30

## 2025-05-30 RX ORDER — DEXAMETHASONE SODIUM PHOSPHATE 4 MG/ML
INJECTION, SOLUTION INTRA-ARTICULAR; INTRALESIONAL; INTRAMUSCULAR; INTRAVENOUS; SOFT TISSUE
Status: DISCONTINUED | OUTPATIENT
Start: 2025-05-30 | End: 2025-05-30

## 2025-05-30 RX ADMIN — FENTANYL CITRATE 25 MCG: 50 INJECTION, SOLUTION INTRAMUSCULAR; INTRAVENOUS at 08:05

## 2025-05-30 RX ADMIN — ROCURONIUM BROMIDE 20 MG: 10 INJECTION, SOLUTION INTRAVENOUS at 08:05

## 2025-05-30 RX ADMIN — LIDOCAINE HYDROCHLORIDE 100 MG: 20 INJECTION, SOLUTION EPIDURAL; INFILTRATION; INTRACAUDAL; PERINEURAL at 08:05

## 2025-05-30 RX ADMIN — SODIUM CHLORIDE: 9 INJECTION, SOLUTION INTRAVENOUS at 07:05

## 2025-05-30 RX ADMIN — EPHEDRINE SULFATE 5 MG: 50 INJECTION INTRAVENOUS at 08:05

## 2025-05-30 RX ADMIN — PHENYLEPHRINE HYDROCHLORIDE 50 MCG: 10 INJECTION INTRAVENOUS at 08:05

## 2025-05-30 RX ADMIN — PHENYLEPHRINE HYDROCHLORIDE 100 MCG: 10 INJECTION INTRAVENOUS at 08:05

## 2025-05-30 RX ADMIN — SUGAMMADEX 200 MG: 100 INJECTION, SOLUTION INTRAVENOUS at 09:05

## 2025-05-30 RX ADMIN — PROPOFOL 160 MG: 10 INJECTION, EMULSION INTRAVENOUS at 08:05

## 2025-05-30 RX ADMIN — PHENYLEPHRINE HYDROCHLORIDE 100 MCG: 10 INJECTION INTRAVENOUS at 09:05

## 2025-05-30 RX ADMIN — ONDANSETRON 4 MG: 2 INJECTION INTRAMUSCULAR; INTRAVENOUS at 08:05

## 2025-05-30 RX ADMIN — DEXMEDETOMIDINE 4 MCG: 100 INJECTION, SOLUTION, CONCENTRATE INTRAVENOUS at 09:05

## 2025-05-30 RX ADMIN — SUCCINYLCHOLINE 120 MG: 20 INJECTION, SOLUTION INTRAMUSCULAR; INTRAVENOUS at 08:05

## 2025-05-30 RX ADMIN — PROPOFOL 20 MG: 10 INJECTION, EMULSION INTRAVENOUS at 08:05

## 2025-05-30 RX ADMIN — FENTANYL CITRATE 50 MCG: 50 INJECTION, SOLUTION INTRAMUSCULAR; INTRAVENOUS at 08:05

## 2025-05-30 RX ADMIN — PROPOFOL 40 MG: 10 INJECTION, EMULSION INTRAVENOUS at 08:05

## 2025-05-30 RX ADMIN — MIDAZOLAM HYDROCHLORIDE 2 MG: 2 INJECTION, SOLUTION INTRAMUSCULAR; INTRAVENOUS at 07:05

## 2025-05-30 RX ADMIN — DEXAMETHASONE SODIUM PHOSPHATE 4 MG: 4 INJECTION, SOLUTION INTRAMUSCULAR; INTRAVENOUS at 08:05

## 2025-06-23 ENCOUNTER — HOSPITAL ENCOUNTER (OUTPATIENT)
Dept: RADIOLOGY | Facility: HOSPITAL | Age: 65
Discharge: HOME | End: 2025-06-23
Attending: INTERNAL MEDICINE
Payer: COMMERCIAL

## 2025-06-23 DIAGNOSIS — C16.0 MALIGNANT NEOPLASM OF GASTROESOPHAGEAL JUNCTION: ICD-10-CM

## 2025-06-23 PROCEDURE — 71260 CT THORAX DX C+: CPT | Mod: TC

## 2025-06-23 PROCEDURE — 71260 CT THORAX DX C+: CPT | Mod: 26,,, | Performed by: RADIOLOGY

## 2025-06-23 PROCEDURE — 25500020 PHARM REV CODE 255: Performed by: INTERNAL MEDICINE

## 2025-06-23 PROCEDURE — 74177 CT ABD & PELVIS W/CONTRAST: CPT | Mod: 26,,, | Performed by: RADIOLOGY

## 2025-06-23 RX ADMIN — IOHEXOL 15 ML: 350 INJECTION, SOLUTION INTRAVENOUS at 10:06

## 2025-06-23 RX ADMIN — IOHEXOL 100 ML: 350 INJECTION, SOLUTION INTRAVENOUS at 10:06

## 2025-06-26 ENCOUNTER — OFFICE VISIT (OUTPATIENT)
Dept: HEMATOLOGY/ONCOLOGY | Facility: CLINIC | Age: 65
End: 2025-06-26
Payer: COMMERCIAL

## 2025-06-26 VITALS
BODY MASS INDEX: 17 KG/M2 | RESPIRATION RATE: 18 BRPM | SYSTOLIC BLOOD PRESSURE: 111 MMHG | HEART RATE: 72 BPM | TEMPERATURE: 97 F | OXYGEN SATURATION: 99 % | HEIGHT: 69 IN | WEIGHT: 114.75 LBS | DIASTOLIC BLOOD PRESSURE: 72 MMHG

## 2025-06-26 DIAGNOSIS — I70.0 ATHEROSCLEROSIS OF AORTA: ICD-10-CM

## 2025-06-26 DIAGNOSIS — R53.83 FATIGUE, UNSPECIFIED TYPE: ICD-10-CM

## 2025-06-26 DIAGNOSIS — K20.90 ESOPHAGITIS: ICD-10-CM

## 2025-06-26 DIAGNOSIS — C16.0 MALIGNANT NEOPLASM OF GASTROESOPHAGEAL JUNCTION: Primary | ICD-10-CM

## 2025-06-26 DIAGNOSIS — F17.210 NICOTINE DEPENDENCE, CIGARETTES, UNCOMPLICATED: ICD-10-CM

## 2025-06-26 DIAGNOSIS — J44.9 CHRONIC OBSTRUCTIVE PULMONARY DISEASE, UNSPECIFIED COPD TYPE: ICD-10-CM

## 2025-06-26 DIAGNOSIS — E44.0 MODERATE MALNUTRITION: ICD-10-CM

## 2025-06-26 DIAGNOSIS — K22.2 ESOPHAGEAL STRICTURE: ICD-10-CM

## 2025-06-26 PROCEDURE — 99999 PR PBB SHADOW E&M-EST. PATIENT-LVL IV: CPT | Mod: PBBFAC,,, | Performed by: INTERNAL MEDICINE

## 2025-06-26 PROCEDURE — 1159F MED LIST DOCD IN RCRD: CPT | Mod: CPTII,S$GLB,, | Performed by: INTERNAL MEDICINE

## 2025-06-26 PROCEDURE — 3008F BODY MASS INDEX DOCD: CPT | Mod: CPTII,S$GLB,, | Performed by: INTERNAL MEDICINE

## 2025-06-26 PROCEDURE — 3074F SYST BP LT 130 MM HG: CPT | Mod: CPTII,S$GLB,, | Performed by: INTERNAL MEDICINE

## 2025-06-26 PROCEDURE — 99214 OFFICE O/P EST MOD 30 MIN: CPT | Mod: S$GLB,,, | Performed by: INTERNAL MEDICINE

## 2025-06-26 PROCEDURE — G2211 COMPLEX E/M VISIT ADD ON: HCPCS | Mod: S$GLB,,, | Performed by: INTERNAL MEDICINE

## 2025-06-26 PROCEDURE — 3078F DIAST BP <80 MM HG: CPT | Mod: CPTII,S$GLB,, | Performed by: INTERNAL MEDICINE

## 2025-06-26 NOTE — PROGRESS NOTES
MEDICAL ONCOLOGY - ESTABLISHED PATIENT VISIT    Reason for visit: Esophageal adenocarcinoma    Best Contact Phone Number(s): [phone] (home)      Cancer/Stage/TNM:    Cancer Staging   Malignant neoplasm of gastroesophageal junction  Staging form: Esophagus - Adenocarcinoma, AJCC 8th Edition  - Clinical stage from 6/13/2023: Stage III (cT3, cN1, cM0, G2) - Signed by Sunday Jasso MD on 7/5/2023       Oncology History   Malignant neoplasm of gastroesophageal junction   6/13/2023 Cancer Staged    Staging form: Esophagus - Adenocarcinoma, AJCC 8th Edition  - Clinical stage from 6/13/2023: Stage III (cT3, cN1, cM0, G2)     6/23/2023 Initial Diagnosis    Malignant neoplasm of gastroesophageal junction     7/3/2023 Tumor Conference     [facility] UGI MULTIDISCIPLINARY TUMOR BOARD  PATIENT REVIEW FORM   ____________________________________________________________    CLINIC #: [id number]   DATE: 7/3/2023    DIAGNOSIS: esophageal CA    PRESENTER: Mitch    PATIENT SUMMARY:   This 64 y/o gentleman is a long time smoker with emphysema who had low dose screening lung CT in 4/2023 per his PCP. Given an abnormality on this scan, he had PET on 6/1/23 which identified large hypermetabolic mid esophageal mass with lymph nodes. He underwent EGD on 6/13/23 that found large fungating ulcerated mass with bleeding in middle third of esophagus to lower esophagus, 34-44cm. Pathology revealed moderately differentiated adenocarcinoma.   Staging PET reviewed - level 2 cervical lymph node with FDG uptake, nothing in lungs concerning  Staging EUS today per Dr. Duffy.   He has been evaluated by Dr. Vance in [place] and Dr. June.   He is scheduled to see rad onc, Dr. Jasso, Wednesday and IR for port placement on 7/12/23.     BOARD RECOMMENDATIONS:   Proceed with neoadj CRT, then restaging to consider surgical resection    CONSULT NEEDED:     [] Surgery    [] Hem/Onc    [] Rad/Onc    [] Dietary                 [] Social  Service    [] Psychology       [] AES  [] Radiology     Clinical Stage: Tumor 3 Node(s) 1 Metastasis 0      GROUP STAGE:  [] O    [] 1   [] II     [x] III   []IV  [] Local recurrence     [] Regional recurrence     [] Distant recurrence   Metastatic site(s): none         [x] [facility] Treatment Guidelines reviewed and care planned is consistent with guidelines.         (i.e., NCCN, NCI, PD, ACO, AUA, etc.)    PRESENTATION AT CANCER CONFERENCE:         [x] Prospective    [] Retrospective     [] Follow-Up         7/16/2023 - 8/8/2023 Chemotherapy    Treatment Summary   Plan Name: OP ESOPHAGEAL PACLITAXEL CARBOPLATIN WEEKLY  Treatment Goal: Control  Status: Inactive  Start Date: 7/16/2023  End Date: 8/8/2023  Provider: Delta June MD  Chemotherapy: CARBOplatin (PARAPLATIN) 195 mg in sodium chloride 0.9% 304.5 mL chemo infusion, 195 mg (100 % of original dose 193 mg), Intravenous, Clinic/HOD 1 time, 1 of 1 cycle  Dose modification:   (original dose 193 mg, Cycle 1)  Administration: 195 mg (7/18/2023), 210 mg (7/24/2023), 230 mg (7/31/2023), 210 mg (8/8/2023)  PACLitaxeL (TAXOL) 50 mg/m2 = 84 mg in sodium chloride 0.9% 250 mL chemo infusion, 50 mg/m2 = 84 mg, Intravenous, Clinic/HOD 1 time, 1 of 1 cycle  Administration: 84 mg (7/18/2023)     7/18/2023 - 8/17/2023 Radiation Therapy    Treating physician: Sunday Jasso    Course: C1 Thorax 2023    Treatment Site Ref. ID Energy Dose/Fx (Gy) #Fx Dose Correction (Gy) Total Dose (Gy) Start Date End Date Elapsed Days   IM Esophagus PTV_High 6X 1.8 23 / 23 0 41.4 7/18/2023 8/17/2023 30          11/15/2023 -  Chemotherapy    Treatment Summary   Plan Name: OP NIVOLUMAB 480 MG Q4W  Treatment Goal: Curative  Status: Active  Start Date: 11/15/2023  End Date: 9/17/2024  Provider: Delta June MD  Chemotherapy: [No matching medication found in this treatment plan]     12/1/2023 Procedure    Surgeon(s) and Role: Nas Meyer MD - Primary     Pre-Operative  Diagnosis:   Esophageal adenocarcinoma  Suspected cervical esophagogastric anastomotic stricture     Post-Operative Diagnosis:   Same  Cervical esophagogastric anastomotic stricture      Operative Findings:    Cervical esophagogastric anastomotic stricture. Unable to pass endoscope through stricture initially, but the scope was able to pass after serial dilations to 18 mm.      Procedures:  Esophagogastroduodenoscopy (EGD), with transendoscopic balloon dilation of esophagus (<30 mm)                Interim History:   65 y.o. male with esophageal adenocarcinoma who presents for follow-up while on surveillance.  He completed his 12th cycle of adjuvant nivolumab on 9/17/24. He developed some worsened dysphagia rather abruptly in late May.  Underwent EGD with dilation on 5/30/25 with Dr. Meyer with significant relief.   Now he is feeling well.  Eating well.  No pain.  He has some joint discomfort in his fingers in the morning, primarily stiffness in the proximal finger joints.  Has been ongoing for about 2 months.  Also mild swelling in the fingers.  No rash.  No other new symptoms or other extremities/joints involved.    ECOG status is 0. Presents with his wife today.     ROS:   Review of Systems   Constitutional:  Negative for chills, fever, malaise/fatigue and weight loss.   HENT:  Negative for sore throat.    Eyes:  Negative for blurred vision and pain.   Respiratory:  Negative for cough and shortness of breath.    Cardiovascular:  Negative for chest pain and leg swelling.   Gastrointestinal:  Negative for abdominal pain, constipation, diarrhea, heartburn, nausea and vomiting.   Genitourinary:  Negative for dysuria and frequency.   Musculoskeletal:  Positive for joint pain. Negative for back pain, falls and myalgias.   Skin:  Negative for rash.   Neurological:  Negative for dizziness, weakness and headaches.   Endo/Heme/Allergies:  Does not bruise/bleed easily.   Psychiatric/Behavioral:  Negative for depression. The  patient is not nervous/anxious.    All other systems reviewed and are negative.      Past Medical History:   Past Medical History:   Diagnosis Date    Arthritis     Cancer     COPD (chronic obstructive pulmonary disease)         Past Surgical History:   Past Surgical History:   Procedure Laterality Date    CERVICAL FUSION      COLONOSCOPY N/A 3/27/2018    Procedure: COLONOSCOPY;  Surgeon: Maria Teresa Morocho MD;  Location: [facility] ENDO;  Service: Endoscopy;  Laterality: N/A;    COLONOSCOPY N/A 6/13/2023    Procedure: COLONOSCOPY;  Surgeon: Kelli Snell MD;  Location: [facility] ENDO;  Service: Endoscopy;  Laterality: N/A;    EGD, WITH BALLOON DILATION N/A 12/1/2023    Procedure: EGD, WITH BALLOON DILATION;  Surgeon: Nas Meyer MD;  [facility];  Service: General;  Laterality: N/A;    EGD, WITH BALLOON DILATION N/A 12/11/2023    Procedure: EGD, WITH BALLOON DILATION;  Surgeon: Nas Meyer MD;  [facility];  Service: General;  Laterality: N/A;    EGD, WITH BALLOON DILATION N/A 12/20/2023    Procedure: EGD, WITH BALLOON DILATION;  Surgeon: Nas Meyer MD;  [facility];  Service: General;  Laterality: N/A;    EGD, WITH BALLOON DILATION N/A 1/10/2024    Procedure: EGD, WITH BALLOON DILATION;  Surgeon: Nas Meyer MD;  [facility];  Service: General;  Laterality: N/A;    EGD, WITH BALLOON DILATION N/A 2/12/2024    Procedure: EGD, WITH BALLOON DILATION;  Surgeon: Nas Meyer MD;  [facility];  Service: General;  Laterality: N/A;    EGD, WITH BALLOON DILATION N/A 2/23/2024    Procedure: EGD, WITH BALLOON DILATION;  Surgeon: Nas Meyer MD;  [facility];  Service: General;  Laterality: N/A;    EGD, WITH BALLOON DILATION N/A 3/4/2024    Procedure: EGD, WITH BALLOON DILATION;  Surgeon: Nas Meyer MD;  [facility];  Service: General;  Laterality: N/A;    EGD, WITH BALLOON DILATION N/A  3/19/2024    Procedure: EGD, WITH BALLOON DILATION;  Surgeon: Nas Meyer MD;  Location: [facility] OR 2ND FLR;  Service: General;  Laterality: N/A;    EGD, WITH BALLOON DILATION N/A 5/7/2024    Procedure: EGD, WITH BALLOON DILATION;  Surgeon: Nas Meyer MD;  Location: [facility] OR 2ND FLR;  Service: General;  Laterality: N/A;    EGD, WITH BALLOON DILATION N/A 5/30/2025    Procedure: EGD, WITH BALLOON DILATION;  Surgeon: Nas Meyer MD;  Location: [facility] OR 2ND FLR;  Service: General;  Laterality: N/A;    ENDOSCOPIC ULTRASOUND OF UPPER GASTROINTESTINAL TRACT N/A 7/3/2023    Procedure: ULTRASOUND, UPPER GI TRACT, ENDOSCOPIC;  Surgeon: Ray Duffy MD;  Location: [facility];  Service: Endoscopy;  Laterality: N/A;    ESOPHAGOGASTRODUODENOSCOPY N/A 6/13/2023    Procedure: EGD (ESOPHAGOGASTRODUODENOSCOPY);  Surgeon: Kelli Snell MD;  Location: [facility];  Service: Endoscopy;  Laterality: N/A;    ESOPHAGOGASTRODUODENOSCOPY N/A 7/3/2023    Procedure: EGD (ESOPHAGOGASTRODUODENOSCOPY);  Surgeon: Ray Duffy MD;  Location: [facility];  Service: Endoscopy;  Laterality: N/A;    ESOPHAGOGASTRODUODENOSCOPY N/A 6/28/2024    Procedure: EGD (ESOPHAGOGASTRODUODENOSCOPY);  Surgeon: Rose Marie Hair MD;  Location: [facility] (2ND FLR);  Service: Endoscopy;  Laterality: N/A;  5/15 portal-EGD with dilation, possible steroid injection, possible needle-knife incision in about 4-6 weeks. [facility] only. 60min case. Brady-tt  6/21/24: precall complete-GD    ESOPHAGOGASTRODUODENOSCOPY N/A 7/11/2024    Procedure: EGD (ESOPHAGOGASTRODUODENOSCOPY);  Surgeon: Rose Marie Hair MD;  Location: [facility] ENDO (2ND FLR);  Service: Endoscopy;  Laterality: N/A;  7/1 portal-Repeat upper endoscopy in 7-10 days for retreatment at [facility] only for 75min session. hair-tt  7/3-pre call complete-tb    INGUINAL HERNIA REPAIR Bilateral     Right x2, x1 left    PLACEMENT OF JEJUNOSTOMY TUBE  10/4/2023    Procedure: INSERTION, JEJUNOSTOMY TUBE;  Surgeon:  "Nas Meyer MD;  [facility];  Service: General;;    PYLOROMYOTOMY  10/4/2023    Procedure: PYLOROMYOTOMY;  Surgeon: Nas Meyer MD;  Location: [facility] OR [address] [place] [address];  Service: General;;    ROBOT-ASSISTED SURGICAL REMOVAL OF ESOPHAGUS USING DA BALWINDER XI N/A 10/4/2023    Procedure: XI ROBOTIC ESOPHAGECTOMY;  Surgeon: Nas Meyer MD;  Location: [facility] OR [address] [place] [address];  Service: General;  Laterality: N/A;    ROBOTIC REPAIR, HERNIA, PARAESOPHAGEAL  10/4/2023    Procedure: ROBOTIC REPAIR, HERNIA, PARAESOPHAGEAL;  Surgeon: Nas Meyer MD;  Location: [facility] OR [address] [place] [address];  Service: General;;    ROTATOR CUFF REPAIR Right     x2        Family History:   Family History   Problem Relation Name Age of Onset    Diabetes Mother      Heart disease Father          CHF    Glaucoma Neg Hx      Colon cancer Neg Hx      Prostate cancer Neg Hx          Social History:   Social History     Tobacco Use    Smoking status: Former     Current packs/day: 0.25     Average packs/day: 0.2 packs/day for 42.5 years (10.6 ttl pk-yrs)     Types: Cigarettes     Start date: 1983     Passive exposure: Current    Smokeless tobacco: Never    Tobacco comments:     Done smoking since september   Substance Use Topics    Alcohol use: Yes     Comment: a couple of beers a day        I have reviewed and updated the patient's past medical, surgical, family and social histories.    Allergies:   Review of patient's allergies indicates:  No Known Allergies     Medications:   Current Outpatient Medications   Medication Sig Dispense Refill    RABEprazole (ACIPHEX) 20 mg tablet Take 1 tablet (20 mg total) by mouth 2 (two) times daily before meals. (Patient not taking: Reported on 6/26/2025) 60 tablet 11     No current facility-administered medications for this visit.        Physical Exam:   /72 (BP Location: Left arm, Patient Position: Sitting)   Pulse 72   Temp 97.2 °F (36.2 °C) (Temporal)   Resp 18   Ht 5' 9" (1.753 m)   Wt 52 kg " (114 lb 12 oz)   SpO2 99%   BMI 16.95 kg/m²                Physical Exam  Vitals reviewed.   Constitutional:       General: He is not in acute distress.     Appearance: Normal appearance. He is normal weight.      Comments: Thin male   Eyes:      General: No scleral icterus.     Extraocular Movements: Extraocular movements intact.      Conjunctiva/sclera: Conjunctivae normal.   Cardiovascular:      Rate and Rhythm: Normal rate.   Pulmonary:      Effort: Pulmonary effort is normal. No respiratory distress.   Abdominal:      Tenderness: There is no abdominal tenderness.   Musculoskeletal:         General: No swelling. Normal range of motion.   Skin:     General: Skin is warm.      Coloration: Skin is not jaundiced.      Findings: No erythema or rash.   Neurological:      General: No focal deficit present.      Mental Status: He is alert and oriented to person, place, and time. Mental status is at baseline.      Gait: Gait normal.   Psychiatric:         Mood and Affect: Mood normal.         Behavior: Behavior normal.           Labs:   No results found for this or any previous visit (from the past 48 hours).    Imaging:       CT CAP - 6/23/25:    Impression:     Interval improvement in appearance of lung nodules and scattered ground-glass opacities.    Path:   2. Gastroesophageal junction mass (biopsy):   Invasive adenocarcinoma, moderately differentiated   Background low and high-grade glandular dysplasia   HER2 immunohistochemistry:  Equivocal.     Immunohistochemistry (IHC) Testing for Mismatch Repair (MMR) Proteins:   MLH1, MSH2, MSH6, PMS2 - Intact nuclear expression   Background nonneoplastic tissue/internal control with intact nuclear expression     There are exceptions to the above IHC interpretations. These results should not be considered in isolation, and clinical correlation with genetic counseling is recommended to assess the need for germline testing.     Interpretation: No loss of nuclear expression of  MMR proteins: low probability of microsatellite instability       Assessment:       1. Malignant neoplasm of gastroesophageal junction    2. Esophagitis    3. Esophageal stricture    4. Chronic obstructive pulmonary disease, unspecified COPD type    5. Atherosclerosis of aorta    6. Nicotine dependence, cigarettes, uncomplicated    7. Moderate malnutrition    8. Fatigue, unspecified type             Plan:        # Esophageal adenocarcinoma, chemotherapy induced neutropenia/thrombocytopenia  He initially presented with a locally advanced adenocarcinoma of the middle and lower third of the esophagus, pMMR. PET/CT on 6/1/23 did not show clear distant metastatic disease.  We discussed the case and plan with Dr. Meyer and he feels the patient is surgically resectable as well.    Began cycle 1 on 7/18/23. He unfortunately had a reaction to the Taxol. During the Taxol infusion, he developed coughing, flushing, chest tightness and nausea. O2 sat was 92%, 2L O2 applied NC. We were notified and stopped the Taxol. He was able to proceed with the Carboplatin without complication.   We switched him to Abraxane 40 mg/m2 with week 2.  This was tolerated very well without issue. Has tolerated RT well and has completed 4 cycles of chemoRT. He completed radiation 8/18/23.  We did have to forego his last dose of chemotherapy due to cytopenias.    PET/CT on 9/22/23 showed very nice response to treatment with reduction in SUV avidity of primary tumor.  Discussed with Dr. Meyer.      He underwent esophagogastrectomy with Dr. Meyer on 10/4/23.  Pathology showed ypT2N0 with negative margins and 18 negative lymph nodes.    Because of his residual disease, we recommended adjuvant nivolumab per [id number]. He was in agreement.    Began cycle 1 on 11/15/23.  Tolerated very well.  Completed cycle 12 9/17/24.    Surveillance CT CAP 3/27/24 showed no clear evidence of disease.  Continued R apical scarring a bit more nodular.    CT CAP  6/26/24 showed continued R apical nodular scar/lesion.  Will obtain PET/CT to evaluate whether this may be neoplastic.  PET/CT 8/15/24 showed mild hypermetabolism in R apical lesion; radiology read favored to represent inflammatory changes.  Discussed option to biopsy now or continue surveillance; he wants to continue surveillance.  Incidental T8 compression fracture was seen on CT; no localizing symptoms.  He wishes to defer MRI for now.  No activity there on PET.    CT CAP 12/18/24 shows stable R apical nodule and new mixed appearance of lesions in a pattern felt most likely to represent infectious/inflammatory.  Underlying metastatic disease not excluded.  CT Chest from 3/21/25 does not show any new concerning findings from a malignancy perspective.  Continues to have scattered opacities that appear more fibrotic/inflammatory than malignant to me.   He wanted to defer biopsy for now.  CT CAP 6/23/25 shows some improvement in areas of groundglass opacities, stable RUL nodular scar/lesion.    He is willing to meet with Dr. Ramos to help with review/eval and follow-up of pulmonary findings.  Will meanwhile continue to monitor and repeat CT Chest in 3 months.    RTC in 3 months with CT chest.    # Esophagitis, stricture  S/p multiple dilations.   Had EGD & stricturotomy, steroid injection, dilation with Dr. Grullon 7/11/24.  Last dilation 5/30/25 with Dr. Meyer.    # COPD, tobacco abuse, aortic atherosclerosis  Counseled on tobacco cessation previously.  He has stopped smoking.  Normal SpO2.  No dyspnea.  Atherosclerosis noted on imaging.    # Malnutrition, fatigue  Following with nutrition & surgery team.  Weight stable.  Feeding tube out as of October 2024.  Monitor TSH.   Emphasized importance of getting his weight back up.    Patient is in agreement with the proposed treatment plan. All questions were answered to the patient's satisfaction. Pt knows to call clinic if anything is needed before the next clinic  visit.     code applied: patient requires or will require a continuous, longitudinal, and active collaborative plan of care related to this patient's health condition, esophageal cancer --the management of which requires the direction of a practitioner with specialized clinical knowledge, skill, and expertise.       Delta June MD  Hematology/Oncology  [facility]      Route Chart for Scheduling    Med Onc Chart Routing      Follow up with physician 3 months.   Follow up with DEMARCO    Infusion scheduling note    Injection scheduling note    Labs CBC and CMP   Scheduling:  Preferred lab:  Lab interval:     Imaging   And CT chest   Pharmacy appointment    Other referrals            Treatment Plan Information   OP NIVOLUMAB 480 MG Q4W Delta June MD   Associated diagnosis: Malignant neoplasm of gastroesophageal junction Stage III cT3, cN1, cM0, G2 noted on 6/23/2023   Line of treatment: Adjuvant  Treatment Goal: Curative     Upcoming Treatment Dates - OP NIVOLUMAB 480 MG Q4W    No upcoming days in selected categories.

## 2025-07-24 ENCOUNTER — OFFICE VISIT (OUTPATIENT)
Dept: PULMONOLOGY | Facility: CLINIC | Age: 65
End: 2025-07-24
Payer: COMMERCIAL

## 2025-07-24 VITALS
WEIGHT: 114 LBS | HEART RATE: 56 BPM | BODY MASS INDEX: 16.88 KG/M2 | HEIGHT: 69 IN | OXYGEN SATURATION: 100 % | DIASTOLIC BLOOD PRESSURE: 72 MMHG | SYSTOLIC BLOOD PRESSURE: 110 MMHG

## 2025-07-24 DIAGNOSIS — R91.1 RIGHT UPPER LOBE PULMONARY NODULE: Primary | ICD-10-CM

## 2025-07-24 DIAGNOSIS — J44.9 CHRONIC OBSTRUCTIVE PULMONARY DISEASE, UNSPECIFIED COPD TYPE: ICD-10-CM

## 2025-07-24 DIAGNOSIS — C16.0 MALIGNANT NEOPLASM OF GASTROESOPHAGEAL JUNCTION: ICD-10-CM

## 2025-07-24 DIAGNOSIS — R91.1 SOLITARY PULMONARY NODULE: ICD-10-CM

## 2025-07-24 DIAGNOSIS — T88.4XXA DIFFICULT AIRWAY FOR INTUBATION, INITIAL ENCOUNTER: ICD-10-CM

## 2025-07-24 PROCEDURE — 99999 PR PBB SHADOW E&M-EST. PATIENT-LVL V: CPT | Mod: PBBFAC,,, | Performed by: INTERNAL MEDICINE

## 2025-07-24 RX ORDER — CEPHALEXIN 500 MG/1
1000 CAPSULE ORAL 2 TIMES DAILY
COMMUNITY
Start: 2025-05-10

## 2025-07-24 NOTE — PROGRESS NOTES
Subjective:       Patient ID: Blayne Beebe is a 65 y.o. male.    Chief Complaint:   CHIEF COMPLAINT:  Consult from Dr June  Patient presents for follow-up of lung nodules and discussion of a potential biopsy.    HPI:  Patient was initially referred due to lung nodules discovered during imaging. These nodules have been present for an extended period and were initially thought to be scar tissue. During the investigation of these lung nodules, esophageal cancer was incidentally discovered. Patient has been undergoing regular scans every 3 months to monitor the lung nodules.    The right upper lobe nodule, which was the initial finding, has shown growth over time. It measured 2.1 cm by 1.5 cm in December 2024, a notable increase from April 2023. The most recent scan in June 2025 showed the nodule measuring 1.9 cm by 1.5 cm. Patient also has multiple ground glass opacities in the upper lobes that have worsened compared to previous exams.    Patient reports increased fatigue but denies shortness of breath or coughing. He has a history of emphysema and is a former smoker, having quit when his medical issues began. Patient sometimes has dysphagia and underwent an esophageal dilation procedure about 10 months ago.    Patient has been hesitant about further interventions, preferring to monitor the condition. Given the growth of the nodule and the appearance of new abnormalities, a biopsy of the right upper lobe nodule is being considered.    Patient denies fevers, chills, night sweats, chest pain, or swelling.    MEDICAL HISTORY:  Patient has a history of esophageal cancer, diagnosed around September 2023. He also has emphysema. Lung nodules were initially discovered prior to his esophageal cancer diagnosis.    SURGICAL HISTORY:  Patient underwent esophageal dilation approximately 10 months ago, and again a few weeks ago.    IMAGING:  Patient underwent multiple imaging studies. A PET scan in September 2023 showed a  small area in the lung apex, likely corresponding to the finding on lung cancer screening. A CT in March 2025 revealed a spot in the right upper lobe that had grown since the initial finding, and a new spot in the left upper lobe. Multiple new small spots were noted throughout the lungs. A CT chest abdomen pelvis in June 2025 showed the original spot in the right upper lobe was relatively stable in size, with other areas remaining present but possibly slightly improved. A PET scan in August 2024 showed irregular right lung apex opacity with increased radiotracer uptake (SUV max 5.5, previously 1.8). A CT in December 2024 revealed a right apical nodule measuring 2.1 cm by 1.5 cm, noting an increase in size compared to April 2023, with multiple ground glass opacities. Another CT in June 2025 showed a right upper lobe speculated pulmonary nodule measuring 1.9 by 1.5 cm, compared to 2 by 1.7 cm on the December scan, with multiple ground glass opacities in upper lobes worsened compared to previous exams.    SOCIAL HISTORY:  Smoking: Former smoker, quit when diagnosed with esophageal cancer.       ROS:  General: -fever, -chills, +fatigue  Cardiovascular: -chest pain  Respiratory: -cough, -shortness of breath  Gastrointestinal: +difficulty swallowing        Review of Systems   All other systems reviewed and are negative.      Objective:      Physical Exam   Constitutional: He is oriented to person, place, and time. He appears well-developed and well-nourished.   HENT:   Head: Normocephalic.   Cardiovascular: Normal rate and regular rhythm.   Pulmonary/Chest: Normal expansion. He has no wheezes. He has no rales.   Musculoskeletal:         General: No edema. Normal range of motion.   Lymphadenopathy: No supraclavicular adenopathy is present.     He has no cervical adenopathy.   Neurological: He is alert and oriented to person, place, and time.   Skin: Skin is warm and dry.   Psychiatric: He has a normal mood and affect.  "  Personal Diagnostic Review           Enlarging RUL: nodule, please see imaging review in HPI for further review of imaging personally reviewed with patient and wife.             7/24/2025     8:21 AM 6/26/2025    10:53 AM 5/30/2025    10:00 AM 5/30/2025     9:45 AM 5/30/2025     9:35 AM 5/30/2025     7:29 AM 3/24/2025     8:41 AM   Pulmonary Function Tests   SpO2 100 % 99 % 100 % 100 % 99 % 100 % 96 %   Height 5' 9" (1.753 m) 5' 9" (1.753 m)     5' 9" (1.753 m)   Weight 51.7 kg (113 lb 15.7 oz) 52 kg (114 lb 12 oz)     51.8 kg (114 lb 3.2 oz)   BMI (Calculated) 16.8 16.9     16.9         Assessment:       1. Right upper lobe pulmonary nodule    2. Malignant neoplasm of gastroesophageal junction    3. Chronic obstructive pulmonary disease, unspecified COPD type    4. Solitary pulmonary nodule    5. Difficult airway for intubation, initial encounter        Encounter Medications[1]  Orders Placed This Encounter   Procedures    CT Chest Without Contrast     Standing Status:   Future     Expected Date:   8/7/2025     Expiration Date:   7/24/2026     May the Radiologist modify the order per protocol to meet the clinical needs of the patient?:   Yes     Is this a  Screening?:   No    Case Request Operating Room: ROBOTIC BRONCHOSCOPY     Standing Status:   Standing     Number of Occurrences:   1     Medical Necessity::   Medically Urgent [101]     Case classification:   E - Elective [90]     Is an on-site pathologist required for this procedure?:   N/A     Plan:     Problem List Items Addressed This Visit       COPD (chronic obstructive pulmonary disease)    Malignant neoplasm of gastroesophageal junction    Relevant Orders    Case Request Operating Room: ROBOTIC BRONCHOSCOPY (Completed)     Other Visit Diagnoses         Right upper lobe pulmonary nodule    -  Primary    Relevant Orders    Case Request Operating Room: ROBOTIC BRONCHOSCOPY (Completed)      Solitary pulmonary nodule        Relevant Orders    CT " Chest Without Contrast      Difficult airway for intubation, initial encounter              Assessment & Plan    C16.0 Malignant neoplasm of gastroesophageal junction  J44.9 Chronic obstructive pulmonary disease, unspecified COPD type  R91.1 Right upper lobe pulmonary nodule  T88.4XXA Difficult airway for intubation, initial encounter    IMPRESSION:  - Concerned about growth of right upper lobe nodule initially thought to be scar tissue, now suspicious for lung cancer.  - Multiple ground glass opacities noted, likely inflammatory in nature.  - Recommend biopsy of right upper lobe nodule due to growth and increased PET uptake (SUV max increased from 1.8 to 5.5).  - Plan to perform navigational bronchoscopy with endobronchial ultrasound-guided biopsy of right upper lobe lesion and lymph node sampling.    RIGHT UPPER LOBE PULMONARY NODULE:  - Explained rationale for biopsy recommendation due to nodule growth over time.  - Described bronchoscopy procedure, including use of navigation software and US guidance.  - Discussed potential risks, including <1% chance of pneumothorax with this technique.  - Informed about procedure logistics, including arrival time, estimated procedure duration, and expected discharge timing.  - Contact the office 1-2 days before to confirm exact location of procedure.  - Ordered CT chest prior to biopsy procedure.  - Scheduled biopsy procedure for August 19th at [facility], arrive at 5:30 AM.    PLAN SUMMARY:  - CT chest ordered prior to biopsy procedure  - Biopsy procedure scheduled for August 19th at [facility]  - Patient to arrive at 5:30 AM for the procedure  - Patient to contact office 1-2 days before to confirm exact procedure location  - Follow-up with office 1-2 days before procedure to confirm location          This note was generated with the assistance of ambient listening technology. Verbal consent was obtained by the patient and  accompanying visitor(s) for the recording of patient appointment to facilitate this note. I attest to having reviewed and edited the generated note for accuracy, though some syntax or spelling errors may persist. Please contact the author of this note for any clarification.                  [1]   Outpatient Encounter Medications as of 7/24/2025   Medication Sig Dispense Refill    cephALEXin (KEFLEX) 500 MG capsule Take 1,000 mg by mouth 2 (two) times daily. (Patient not taking: Reported on 7/24/2025)      RABEprazole (ACIPHEX) 20 mg tablet Take 1 tablet (20 mg total) by mouth 2 (two) times daily before meals. (Patient not taking: Reported on 7/24/2025) 60 tablet 11     No facility-administered encounter medications on file as of 7/24/2025.

## 2025-08-12 ENCOUNTER — HOSPITAL ENCOUNTER (OUTPATIENT)
Dept: RADIOLOGY | Facility: HOSPITAL | Age: 65
Discharge: HOME | End: 2025-08-12
Attending: INTERNAL MEDICINE
Payer: COMMERCIAL

## 2025-08-12 DIAGNOSIS — R91.1 SOLITARY PULMONARY NODULE: ICD-10-CM

## 2025-08-12 PROCEDURE — 71250 CT THORAX DX C-: CPT | Mod: TC

## 2025-08-12 PROCEDURE — 71250 CT THORAX DX C-: CPT | Mod: 26,,, | Performed by: RADIOLOGY

## 2025-08-18 ENCOUNTER — TELEPHONE (OUTPATIENT)
Dept: PULMONOLOGY | Facility: CLINIC | Age: 65
End: 2025-08-18
Payer: COMMERCIAL

## 2025-08-18 ENCOUNTER — ANESTHESIA EVENT (OUTPATIENT)
Dept: SURGERY | Facility: HOSPITAL | Age: 65
End: 2025-08-18
Payer: COMMERCIAL

## 2025-08-19 ENCOUNTER — HOSPITAL ENCOUNTER (OUTPATIENT)
Facility: HOSPITAL | Age: 65
Discharge: HOME | End: 2025-08-19
Attending: INTERNAL MEDICINE | Admitting: INTERNAL MEDICINE
Payer: COMMERCIAL

## 2025-08-19 ENCOUNTER — ANESTHESIA (OUTPATIENT)
Dept: SURGERY | Facility: HOSPITAL | Age: 65
End: 2025-08-19
Payer: COMMERCIAL

## 2025-08-19 VITALS
TEMPERATURE: 98 F | HEART RATE: 58 BPM | OXYGEN SATURATION: 99 % | HEIGHT: 69 IN | DIASTOLIC BLOOD PRESSURE: 70 MMHG | BODY MASS INDEX: 16.88 KG/M2 | RESPIRATION RATE: 21 BRPM | SYSTOLIC BLOOD PRESSURE: 154 MMHG | WEIGHT: 114 LBS

## 2025-08-19 DIAGNOSIS — R91.1 RIGHT UPPER LOBE PULMONARY NODULE: ICD-10-CM

## 2025-08-19 DIAGNOSIS — C16.0 MALIGNANT NEOPLASM OF GASTROESOPHAGEAL JUNCTION: ICD-10-CM

## 2025-08-19 PROCEDURE — 31627 NAVIGATIONAL BRONCHOSCOPY: CPT | Mod: ,,, | Performed by: INTERNAL MEDICINE

## 2025-08-19 PROCEDURE — 63600175 PHARM REV CODE 636 W HCPCS

## 2025-08-19 PROCEDURE — 31629 BRONCHOSCOPY/NEEDLE BX EACH: CPT | Mod: 51,,, | Performed by: INTERNAL MEDICINE

## 2025-08-19 PROCEDURE — 71000015 HC POSTOP RECOV 1ST HR: Performed by: INTERNAL MEDICINE

## 2025-08-19 PROCEDURE — 87206 SMEAR FLUORESCENT/ACID STAI: CPT | Performed by: INTERNAL MEDICINE

## 2025-08-19 PROCEDURE — 31652 BRONCH EBUS SAMPLNG 1/2 NODE: CPT | Mod: ,,, | Performed by: INTERNAL MEDICINE

## 2025-08-19 PROCEDURE — 31624 DX BRONCHOSCOPE/LAVAGE: CPT | Mod: 51,,, | Performed by: INTERNAL MEDICINE

## 2025-08-19 PROCEDURE — 37000009 HC ANESTHESIA EA ADD 15 MINS: Performed by: INTERNAL MEDICINE

## 2025-08-19 PROCEDURE — 31654 BRONCH EBUS IVNTJ PERPH LES: CPT | Mod: ,,, | Performed by: INTERNAL MEDICINE

## 2025-08-19 PROCEDURE — 71000044 HC DOSC ROUTINE RECOVERY FIRST HOUR: Performed by: INTERNAL MEDICINE

## 2025-08-19 PROCEDURE — 87102 FUNGUS ISOLATION CULTURE: CPT | Performed by: INTERNAL MEDICINE

## 2025-08-19 PROCEDURE — 87116 MYCOBACTERIA CULTURE: CPT | Performed by: INTERNAL MEDICINE

## 2025-08-19 PROCEDURE — 27201423 OPTIME MED/SURG SUP & DEVICES STERILE SUPPLY: Performed by: INTERNAL MEDICINE

## 2025-08-19 PROCEDURE — 36000708 HC OR TIME LEV III 1ST 15 MIN: Performed by: INTERNAL MEDICINE

## 2025-08-19 PROCEDURE — 88305 TISSUE EXAM BY PATHOLOGIST: CPT | Mod: TC,91 | Performed by: INTERNAL MEDICINE

## 2025-08-19 PROCEDURE — 37000008 HC ANESTHESIA 1ST 15 MINUTES: Performed by: INTERNAL MEDICINE

## 2025-08-19 PROCEDURE — 87205 SMEAR GRAM STAIN: CPT | Performed by: INTERNAL MEDICINE

## 2025-08-19 PROCEDURE — 36000709 HC OR TIME LEV III EA ADD 15 MIN: Performed by: INTERNAL MEDICINE

## 2025-08-19 PROCEDURE — 31632 BRONCHOSCOPY/LUNG BX ADDL: CPT | Mod: ,,, | Performed by: INTERNAL MEDICINE

## 2025-08-19 PROCEDURE — 31628 BRONCHOSCOPY/LUNG BX EACH: CPT | Mod: 51,,, | Performed by: INTERNAL MEDICINE

## 2025-08-19 PROCEDURE — 25000003 PHARM REV CODE 250

## 2025-08-19 RX ORDER — DEXAMETHASONE SODIUM PHOSPHATE 4 MG/ML
INJECTION, SOLUTION INTRA-ARTICULAR; INTRALESIONAL; INTRAMUSCULAR; INTRAVENOUS; SOFT TISSUE
Status: DISCONTINUED | OUTPATIENT
Start: 2025-08-19 | End: 2025-08-19

## 2025-08-19 RX ORDER — ONDANSETRON HYDROCHLORIDE 2 MG/ML
INJECTION, SOLUTION INTRAVENOUS
Status: DISCONTINUED | OUTPATIENT
Start: 2025-08-19 | End: 2025-08-19

## 2025-08-19 RX ORDER — PROPOFOL 10 MG/ML
VIAL (ML) INTRAVENOUS CONTINUOUS PRN
Status: DISCONTINUED | OUTPATIENT
Start: 2025-08-19 | End: 2025-08-19

## 2025-08-19 RX ORDER — LIDOCAINE HYDROCHLORIDE 20 MG/ML
INJECTION INTRAVENOUS
Status: DISCONTINUED | OUTPATIENT
Start: 2025-08-19 | End: 2025-08-19

## 2025-08-19 RX ORDER — ROCURONIUM BROMIDE 10 MG/ML
INJECTION, SOLUTION INTRAVENOUS
Status: DISCONTINUED | OUTPATIENT
Start: 2025-08-19 | End: 2025-08-19

## 2025-08-19 RX ORDER — HYDROMORPHONE HYDROCHLORIDE 1 MG/ML
0.2 INJECTION, SOLUTION INTRAMUSCULAR; INTRAVENOUS; SUBCUTANEOUS EVERY 5 MIN PRN
Status: DISCONTINUED | OUTPATIENT
Start: 2025-08-19 | End: 2025-08-19 | Stop reason: HOSPADM

## 2025-08-19 RX ORDER — ONDANSETRON HYDROCHLORIDE 2 MG/ML
4 INJECTION, SOLUTION INTRAVENOUS DAILY PRN
Status: DISCONTINUED | OUTPATIENT
Start: 2025-08-19 | End: 2025-08-19 | Stop reason: HOSPADM

## 2025-08-19 RX ORDER — FENTANYL CITRATE 50 UG/ML
INJECTION, SOLUTION INTRAMUSCULAR; INTRAVENOUS
Status: DISCONTINUED | OUTPATIENT
Start: 2025-08-19 | End: 2025-08-19

## 2025-08-19 RX ORDER — SODIUM CHLORIDE 0.9 % (FLUSH) 0.9 %
10 SYRINGE (ML) INJECTION
Status: DISCONTINUED | OUTPATIENT
Start: 2025-08-19 | End: 2025-08-19 | Stop reason: HOSPADM

## 2025-08-19 RX ORDER — IPRATROPIUM BROMIDE AND ALBUTEROL SULFATE 2.5; .5 MG/3ML; MG/3ML
3 SOLUTION RESPIRATORY (INHALATION) EVERY 4 HOURS
Status: DISCONTINUED | OUTPATIENT
Start: 2025-08-19 | End: 2025-08-19 | Stop reason: HOSPADM

## 2025-08-19 RX ORDER — METHOCARBAMOL 100 MG/ML
1000 INJECTION, SOLUTION INTRAMUSCULAR; INTRAVENOUS ONCE AS NEEDED
Status: DISCONTINUED | OUTPATIENT
Start: 2025-08-19 | End: 2025-08-19 | Stop reason: HOSPADM

## 2025-08-19 RX ORDER — GLUCAGON 1 MG
1 KIT INJECTION
Status: DISCONTINUED | OUTPATIENT
Start: 2025-08-19 | End: 2025-08-19 | Stop reason: HOSPADM

## 2025-08-19 RX ADMIN — FENTANYL CITRATE 50 MCG: 50 INJECTION, SOLUTION INTRAMUSCULAR; INTRAVENOUS at 07:08

## 2025-08-19 RX ADMIN — ONDANSETRON 4 MG: 2 INJECTION INTRAMUSCULAR; INTRAVENOUS at 08:08

## 2025-08-19 RX ADMIN — LIDOCAINE HYDROCHLORIDE 100 MG: 20 INJECTION INTRAVENOUS at 07:08

## 2025-08-19 RX ADMIN — SUGAMMADEX 200 MG: 100 INJECTION, SOLUTION INTRAVENOUS at 08:08

## 2025-08-19 RX ADMIN — PROPOFOL 200 MCG/KG/MIN: 10 INJECTION, EMULSION INTRAVENOUS at 07:08

## 2025-08-19 RX ADMIN — FENTANYL CITRATE 25 MCG: 50 INJECTION, SOLUTION INTRAMUSCULAR; INTRAVENOUS at 08:08

## 2025-08-19 RX ADMIN — ROCURONIUM BROMIDE 60 MG: 10 INJECTION, SOLUTION INTRAVENOUS at 07:08

## 2025-08-19 RX ADMIN — PROPOFOL 30 MG: 10 INJECTION, EMULSION INTRAVENOUS at 07:08

## 2025-08-19 RX ADMIN — ROCURONIUM BROMIDE 40 MG: 10 INJECTION, SOLUTION INTRAVENOUS at 07:08

## 2025-08-19 RX ADMIN — PROPOFOL 100 MG: 10 INJECTION, EMULSION INTRAVENOUS at 07:08

## 2025-08-19 RX ADMIN — DEXAMETHASONE SODIUM PHOSPHATE 4 MG: 4 INJECTION, SOLUTION INTRAMUSCULAR; INTRAVENOUS at 07:08

## 2025-08-19 RX ADMIN — ROCURONIUM BROMIDE 20 MG: 10 INJECTION, SOLUTION INTRAVENOUS at 07:08

## 2025-08-19 RX ADMIN — SODIUM CHLORIDE: 0.9 INJECTION, SOLUTION INTRAVENOUS at 07:08

## 2025-08-19 RX ADMIN — SODIUM CHLORIDE, SODIUM GLUCONATE, SODIUM ACETATE, POTASSIUM CHLORIDE, MAGNESIUM CHLORIDE, SODIUM PHOSPHATE, DIBASIC, AND POTASSIUM PHOSPHATE: .53; .5; .37; .037; .03; .012; .00082 INJECTION, SOLUTION INTRAVENOUS at 08:08

## 2025-08-20 ENCOUNTER — PATIENT MESSAGE (OUTPATIENT)
Dept: HEMATOLOGY/ONCOLOGY | Facility: CLINIC | Age: 65
End: 2025-08-20
Payer: COMMERCIAL

## 2025-08-20 LAB — ACID FAST MOD KINY STN SPEC: NORMAL

## 2025-08-21 ENCOUNTER — CLINICAL SUPPORT (OUTPATIENT)
Dept: HEMATOLOGY/ONCOLOGY | Facility: CLINIC | Age: 65
End: 2025-08-21
Payer: COMMERCIAL

## 2025-08-21 DIAGNOSIS — Z71.3 NUTRITIONAL COUNSELING: Primary | ICD-10-CM

## 2025-08-21 DIAGNOSIS — C16.0 MALIGNANT NEOPLASM OF GASTROESOPHAGEAL JUNCTION: ICD-10-CM

## 2025-08-21 DIAGNOSIS — E44.0 MODERATE MALNUTRITION: ICD-10-CM

## 2025-08-21 LAB
BACTERIA SPEC CULT: NO GROWTH
GRAM STN SPEC: NORMAL

## 2025-08-22 LAB
ESTROGEN SERPL-MCNC: NORMAL PG/ML
INSULIN SERPL-ACNC: NORMAL U[IU]/ML
LAB AP CLINICAL INFORMATION: NORMAL
LAB AP GROSS DESCRIPTION: NORMAL
LAB AP NON-GYN INTERPRETATION SPECIMEN 1: NORMAL
LAB AP NON-GYN INTERPRETATION SPECIMEN 2: NORMAL
LAB AP NON-GYN INTERPRETATION SPECIMEN 3: NORMAL
LAB AP NON-GYN INTERPRETATION SPECIMEN 4: NORMAL
LAB AP NON-GYN INTERPRETATION SPECIMEN 5: NORMAL
LAB AP PERFORMING LOCATION(S): NORMAL
LAB AP REPORT FOOTNOTES: NORMAL
Lab: NORMAL

## 2025-08-26 LAB — FUNGUS SPEC CULT: NORMAL

## (undated) DEVICE — SYR SLIP TIP 20CC

## (undated) DEVICE — SYS LABEL CORRECT MED

## (undated) DEVICE — SPONGE COTTON TRAY 4X4IN

## (undated) DEVICE — PACK ECLIPSE SET-UP W/O DRAPE

## (undated) DEVICE — SYR SLIP TIP 1CC

## (undated) DEVICE — DRAPE THREE-QTR REINF 53X77IN

## (undated) DEVICE — RELOAD ENDO GIA TRISTAPLE 45MM

## (undated) DEVICE — SYR 60CC W/GAUGE

## (undated) DEVICE — TUBE PENROSE DRAIN 18INX5/8IN

## (undated) DEVICE — SPONGE IV DRAIN 4X4 STERILE

## (undated) DEVICE — LUBRICANT SURGILUBE 2 OZ

## (undated) DEVICE — SUT ETHILON 2-0 PSLX 30IN

## (undated) DEVICE — FILTER STRAW

## (undated) DEVICE — NDL INSUF ULTRA VERESS 120MM

## (undated) DEVICE — SUT 3-0 12-18IN SILK

## (undated) DEVICE — DILATOR CRE 18-20MM

## (undated) DEVICE — ADAPTER SWIVEL

## (undated) DEVICE — BALLOON DILATOR CRE 12-15MM

## (undated) DEVICE — INFLATOR ENDO BLLN ALLIANCE II

## (undated) DEVICE — BOWL STERILE LARGE 32OZ

## (undated) DEVICE — SOL 9P NACL IRR PIC IL

## (undated) DEVICE — PENCIL GOLF STERILE

## (undated) DEVICE — SUT SILK 0 SH 30IN BLK BR

## (undated) DEVICE — DILATOR CRE 12-15MM

## (undated) DEVICE — SUT VICRYL 3-0 27 SH

## (undated) DEVICE — SUT VICRYL PLUS 3-0 SH 18IN

## (undated) DEVICE — TUBING SUC UNIV W/CONN 12FT

## (undated) DEVICE — DILATOR FIXED WIRE CRE 15-18MM

## (undated) DEVICE — Device

## (undated) DEVICE — RELOAD SUREFORM 60 4.3 GRN 6R

## (undated) DEVICE — SEALER VESSEL EXTEND

## (undated) DEVICE — DILATOR CRE 15-18MM

## (undated) DEVICE — DRAPE COLUMN DAVINCI XI

## (undated) DEVICE — RELOAD SUREFORM 60 3.5 BLU 6R

## (undated) DEVICE — INTRODUCER LEAD 14FR

## (undated) DEVICE — SEE MEDLINE ITEM 156902

## (undated) DEVICE — CONTAINER SPECIMEN OR STER 4OZ

## (undated) DEVICE — TROCAR ENDOPATH XCEL 12MM 10CM

## (undated) DEVICE — SUT SILK 3-0 SH 18IN BLACK

## (undated) DEVICE — SUT MONOCRYL 4-0 PS-2

## (undated) DEVICE — DRAPE ARM DAVINCI XI

## (undated) DEVICE — PACK DRAPE UNIVERSAL CONVERTOR

## (undated) DEVICE — BAG ION VISION PROBE

## (undated) DEVICE — SUT 0 VICRYL / UR6 (J603)

## (undated) DEVICE — RELOAD SUREFORM 45 2.5 WHT 6R

## (undated) DEVICE — CANNULA SEAL 12MM

## (undated) DEVICE — SOL CLEARIFY VISUALIZATION LAP

## (undated) DEVICE — GOWN POLY REINF BRTH SLV XL

## (undated) DEVICE — KIT ANTIFOG W/SPONG & FLUID

## (undated) DEVICE — SUT 2/0 30IN SILK BLK BRAI

## (undated) DEVICE — TUBE FEEDING JEJUNAL 14FR

## (undated) DEVICE — NDL HYPO REG 25G X 1 1/2

## (undated) DEVICE — PACK UNIVERSAL SPLIT II

## (undated) DEVICE — IRRIGATOR ENDOSCOPY DISP.

## (undated) DEVICE — DRAIN CHEST DRY SUCTION

## (undated) DEVICE — STAPLER SUREFORM 60 SPU

## (undated) DEVICE — DILATOR FIXED WIRE CRE 8-10MM

## (undated) DEVICE — SEAL UNIVERSAL 5MM-8MM XI

## (undated) DEVICE — STAPLER ENDO GIA ULT UNIV 12MM

## (undated) DEVICE — BLADE SURG CARBON STEEL SZ11

## (undated) DEVICE — NDL FLTR 5MCRN BLNT TIP 18GX1

## (undated) DEVICE — SUT CHROMIC 2-0 SH 27IN BRN

## (undated) DEVICE — NDL ASPIRATING VIZISHOT 20-40M

## (undated) DEVICE — STAPLER SUREFORM CRVD TIP 45

## (undated) DEVICE — SUT SILK 2-0 SH 18IN BLACK

## (undated) DEVICE — CATH PYL BLLN DIL 7.5FR 15-18

## (undated) DEVICE — CONNECTOR SWIVEL

## (undated) DEVICE — SPONGE DERMACEA GAUZE 4X4

## (undated) DEVICE — ADHESIVE DERMABOND ADVANCED

## (undated) DEVICE — DRAIN CHANNEL ROUND 19FR

## (undated) DEVICE — TROCAR ENDOPATH XCEL 5X100MM

## (undated) DEVICE — DRAPE SCOPE PILLOW WARMER

## (undated) DEVICE — RETRACTOR ENDO RETRACT 10MM

## (undated) DEVICE — SYR 10CC LUER LOCK

## (undated) DEVICE — CANNULA REDUCER 12-8MM

## (undated) DEVICE — ELECTRODE REM PLYHSV RETURN 9

## (undated) DEVICE — NDL FLEXISION BIOPSY 21G

## (undated) DEVICE — SOL NS 1000CC

## (undated) DEVICE — SPONGE GAUZE 16PLY 4X4

## (undated) DEVICE — ADAPTER VISION PROBE & SUCTION

## (undated) DEVICE — TRAY CATH FOL SIL URIMTR 16FR

## (undated) DEVICE — DRAIN SINGLE ROUND 3/16 15F

## (undated) DEVICE — SUT ENDOLOOP PDSII 18 LIGA